# Patient Record
Sex: MALE | Race: WHITE | Employment: OTHER | URBAN - METROPOLITAN AREA
[De-identification: names, ages, dates, MRNs, and addresses within clinical notes are randomized per-mention and may not be internally consistent; named-entity substitution may affect disease eponyms.]

---

## 2017-01-05 ENCOUNTER — TELEPHONE (OUTPATIENT)
Dept: HEMATOLOGY/ONCOLOGY | Facility: HOSPITAL | Age: 67
End: 2017-01-05

## 2017-01-05 ENCOUNTER — TELEPHONE (OUTPATIENT)
Dept: FAMILY MEDICINE CLINIC | Facility: CLINIC | Age: 67
End: 2017-01-05

## 2017-01-05 DIAGNOSIS — E11.9 DIABETES MELLITUS WITH COINCIDENT HYPERTENSION (HCC): Primary | ICD-10-CM

## 2017-01-05 DIAGNOSIS — C90.00 MULTIPLE MYELOMA, STAGE 3 (HCC): Primary | ICD-10-CM

## 2017-01-05 DIAGNOSIS — I10 DIABETES MELLITUS WITH COINCIDENT HYPERTENSION (HCC): Primary | ICD-10-CM

## 2017-01-05 RX ORDER — POTASSIUM CHLORIDE 20 MEQ/1
20 TABLET, EXTENDED RELEASE ORAL DAILY
Qty: 30 TABLET | Refills: 5 | Status: SHIPPED | OUTPATIENT
Start: 2017-01-05 | End: 2017-05-19 | Stop reason: ALTCHOICE

## 2017-01-05 RX ORDER — ACYCLOVIR 400 MG/1
400 TABLET ORAL 2 TIMES DAILY
Qty: 60 TABLET | Refills: 6 | Status: SHIPPED | OUTPATIENT
Start: 2017-01-05 | End: 2017-08-28

## 2017-01-05 RX ORDER — HYDROCHLOROTHIAZIDE 25 MG/1
TABLET ORAL
Qty: 30 TABLET | Refills: 5 | Status: SHIPPED | OUTPATIENT
Start: 2017-01-05 | End: 2017-05-19 | Stop reason: ALTCHOICE

## 2017-01-05 RX ORDER — LISINOPRIL 10 MG/1
TABLET ORAL
Qty: 30 TABLET | Refills: 5 | Status: SHIPPED | OUTPATIENT
Start: 2017-01-05 | End: 2017-07-29

## 2017-01-05 NOTE — TELEPHONE ENCOUNTER
Pending Prescriptions Disp Refills    Lancets 30G Does not apply Misc 200 each 6     Sig: Please provide one touch ultra soft to fit patients device       Lov12/12/2016, Future Appointments  Date Time Provider Bo Ratliff   3/1/2017 9:30 AM Debi Moulton

## 2017-02-28 ENCOUNTER — TELEPHONE (OUTPATIENT)
Dept: FAMILY MEDICINE CLINIC | Facility: CLINIC | Age: 67
End: 2017-02-28

## 2017-02-28 DIAGNOSIS — I10 ESSENTIAL HYPERTENSION: Primary | ICD-10-CM

## 2017-02-28 DIAGNOSIS — Z00.00 ROUTINE GENERAL MEDICAL EXAMINATION AT A HEALTH CARE FACILITY: ICD-10-CM

## 2017-02-28 DIAGNOSIS — I10 DIABETES MELLITUS WITH COINCIDENT HYPERTENSION (HCC): ICD-10-CM

## 2017-02-28 DIAGNOSIS — E11.9 DIABETES MELLITUS WITH COINCIDENT HYPERTENSION (HCC): ICD-10-CM

## 2017-02-28 DIAGNOSIS — E78.5 HYPERLIPIDEMIA, UNSPECIFIED HYPERLIPIDEMIA TYPE: ICD-10-CM

## 2017-02-28 NOTE — TELEPHONE ENCOUNTER
Spoke with patient regarding up to date on his blood work and that Dr Patricia Grover may order blood work during his follow up visit for a future date, he verbalized understanding and will come in for his scheduled appointment time

## 2017-02-28 NOTE — TELEPHONE ENCOUNTER
Pt called asking if there is blood work orders in for him. He has a 3 month follow-up scheduled for tomorrow.  Can orders be put in?

## 2017-03-01 ENCOUNTER — OFFICE VISIT (OUTPATIENT)
Dept: FAMILY MEDICINE CLINIC | Facility: CLINIC | Age: 67
End: 2017-03-01

## 2017-03-01 VITALS
HEART RATE: 75 BPM | WEIGHT: 225.38 LBS | DIASTOLIC BLOOD PRESSURE: 79 MMHG | SYSTOLIC BLOOD PRESSURE: 110 MMHG | TEMPERATURE: 97 F | OXYGEN SATURATION: 97 % | BODY MASS INDEX: 30.53 KG/M2 | HEIGHT: 72 IN

## 2017-03-01 DIAGNOSIS — E11.9 DIABETES MELLITUS WITH COINCIDENT HYPERTENSION (HCC): Primary | ICD-10-CM

## 2017-03-01 DIAGNOSIS — I10 DIABETES MELLITUS WITH COINCIDENT HYPERTENSION (HCC): Primary | ICD-10-CM

## 2017-03-01 DIAGNOSIS — C90.00 MULTIPLE MYELOMA, STAGE 3 (HCC): ICD-10-CM

## 2017-03-01 PROCEDURE — 99214 OFFICE O/P EST MOD 30 MIN: CPT | Performed by: FAMILY MEDICINE

## 2017-03-01 RX ORDER — SULFAMETHOXAZOLE AND TRIMETHOPRIM 800; 160 MG/1; MG/1
1 TABLET ORAL
COMMUNITY
Start: 2017-02-02 | End: 2017-05-19 | Stop reason: ALTCHOICE

## 2017-03-01 RX ORDER — PROCHLORPERAZINE MALEATE 10 MG
10 TABLET ORAL
COMMUNITY
Start: 2017-02-02 | End: 2017-09-08

## 2017-03-01 RX ORDER — RANITIDINE 150 MG/1
150 TABLET ORAL
COMMUNITY
Start: 2017-02-02 | End: 2017-05-19 | Stop reason: ALTCHOICE

## 2017-03-01 RX ORDER — MULTIVITAMIN
1 TABLET ORAL
COMMUNITY
Start: 2017-02-02

## 2017-03-01 RX ORDER — FOLIC ACID 1 MG/1
1 TABLET ORAL
COMMUNITY
Start: 2017-02-02 | End: 2017-07-14

## 2017-03-01 RX ORDER — OLANZAPINE 10 MG/1
10 TABLET ORAL
COMMUNITY
Start: 2017-02-02 | End: 2017-05-19 | Stop reason: ALTCHOICE

## 2017-03-03 ENCOUNTER — OFFICE VISIT (OUTPATIENT)
Dept: HEMATOLOGY/ONCOLOGY | Age: 67
End: 2017-03-03
Attending: INTERNAL MEDICINE
Payer: MEDICARE

## 2017-03-03 VITALS
OXYGEN SATURATION: 100 % | WEIGHT: 224 LBS | TEMPERATURE: 97 F | RESPIRATION RATE: 20 BRPM | HEIGHT: 72.99 IN | HEART RATE: 72 BPM | DIASTOLIC BLOOD PRESSURE: 76 MMHG | SYSTOLIC BLOOD PRESSURE: 122 MMHG | BODY MASS INDEX: 29.69 KG/M2

## 2017-03-03 DIAGNOSIS — E11.9 DIABETES MELLITUS WITH COINCIDENT HYPERTENSION (HCC): ICD-10-CM

## 2017-03-03 DIAGNOSIS — I10 DIABETES MELLITUS WITH COINCIDENT HYPERTENSION (HCC): ICD-10-CM

## 2017-03-03 DIAGNOSIS — C90.00 MULTIPLE MYELOMA, STAGE 3 (HCC): Primary | ICD-10-CM

## 2017-03-03 LAB
EST. AVERAGE GLUCOSE BLD GHB EST-MCNC: 108 MG/DL (ref 68–126)
HBA1C MFR BLD HPLC: 5.4 % (ref ?–5.7)

## 2017-03-03 PROCEDURE — 99214 OFFICE O/P EST MOD 30 MIN: CPT | Performed by: INTERNAL MEDICINE

## 2017-03-03 NOTE — PROGRESS NOTES
/79 mmHg  Pulse 75  Temp(Src) 97.2 °F (36.2 °C) (Oral)  Ht 72\"  Wt 225 lb 6.4 oz  BMI 30.56 kg/m2  SpO2 97%              Patient presents with:  Diabetes       HPI;    Tim Hollins is a 74year old male.   Patient came here for follow-up, he was [facility] Tab per tablet Take 1 tablet by mouth. Disp:  Rfl:    lisinopril 10 MG Oral Tab TAKE 1 TABLET (10 MG TOTAL) BY MOUTH DAILY. Disp: 30 tablet Rfl: 5   acyclovir 400 MG Oral Tab Take 1 tablet (400 mg total) by mouth 2 (two) times daily.  Disp: 60 tablet Rfl: 6 2007     s/p total prostatectomy and radiation   • BCC (basal cell carcinoma of skin) 1997     s/p surgical excision   • Colon polyps    • Herniation of intervertebral disc between L4 and L5 2000     s/p surgical repair   • Unspecified internal derangement in 3 months  Total time spent about 35 minutes  \, Most of the time was spent on discussing about his treatments both the multiple myeloma and bone marrow as well as stem cell transplant    The patient indicates understanding of these issues and agrees to

## 2017-03-03 NOTE — PROGRESS NOTES
Cancer Center Progress Note    Problem List:      Patient Active Problem List:     Colon polyps     Impotence of organic origin     Generalized osteoarthrosis, involving hand     Trigger finger (acquired)     Morbid obesity ([address]     Essential hypertension Tab Take 1 tablet by mouth. Disp:  Rfl:    Sulfamethoxazole-TMP DS (BACTRIM DS) 800-160 MG Oral Tab per tablet Take 1 tablet by mouth. Disp:  Rfl:    lisinopril 10 MG Oral Tab TAKE 1 TABLET (10 MG TOTAL) BY MOUTH DAILY.  Disp: 30 tablet Rfl: 5   acyclovir 4 12/16/2016   TP 6.9 12/16/2016         Impression:     Multiple myeloma:  Hypercalcemia  Acute renal failure  Anemia  Diffuse bone lytic lesions  Beta-2 Microglobulin 18.5 mg/L    ISS stage III    He had 5 cycles of (VCd) Dexamethasone, Bortezomib a

## 2017-03-03 NOTE — PROGRESS NOTES
Pt here for follow up visit for Multiple Myeloma. Pt denies any c/os. He had stem transplant at [facility] recently.    Education Record    Learner:  Patient    Disease / Diagnosis:    Barriers / Limitations:  None   Comments:    Method:  Discussion   Comments:

## 2017-03-17 ENCOUNTER — NURSE ONLY (OUTPATIENT)
Dept: HEMATOLOGY/ONCOLOGY | Age: 67
End: 2017-03-17
Attending: INTERNAL MEDICINE
Payer: MEDICARE

## 2017-03-17 DIAGNOSIS — C90.00 MULTIPLE MYELOMA, STAGE 3 (HCC): Primary | ICD-10-CM

## 2017-03-17 LAB
BASOPHILS # BLD AUTO: 0.03 X10(3) UL (ref 0–0.1)
BASOPHILS NFR BLD AUTO: 0.5 %
EOSINOPHIL # BLD AUTO: 0.19 X10(3) UL (ref 0–0.3)
EOSINOPHIL NFR BLD AUTO: 3.4 %
ERYTHROCYTE [DISTWIDTH] IN BLOOD BY AUTOMATED COUNT: 14.6 % (ref 11.5–16)
HCT VFR BLD AUTO: 33.5 % (ref 37–53)
HGB BLD-MCNC: 11.4 G/DL (ref 13–17)
IMMATURE GRANULOCYTE COUNT: 0.02 X10(3) UL (ref 0–1)
IMMATURE GRANULOCYTE RATIO %: 0.4 %
LYMPHOCYTES # BLD AUTO: 0.89 X10(3) UL (ref 0.9–4)
LYMPHOCYTES NFR BLD AUTO: 15.7 %
MCH RBC QN AUTO: 33.5 PG (ref 27–33.2)
MCHC RBC AUTO-ENTMCNC: 34 G/DL (ref 31–37)
MCV RBC AUTO: 98.5 FL (ref 80–99)
MONOCYTES # BLD AUTO: 0.6 X10(3) UL (ref 0.1–0.6)
MONOCYTES NFR BLD AUTO: 10.6 %
NEUTROPHIL ABS PRELIM: 3.93 X10 (3) UL (ref 1.3–6.7)
NEUTROPHILS # BLD AUTO: 3.93 X10(3) UL (ref 1.3–6.7)
NEUTROPHILS NFR BLD AUTO: 69.4 %
PLATELET # BLD AUTO: 200 10(3)UL (ref 150–450)
RBC # BLD AUTO: 3.4 X10(6)UL (ref 3.8–5.8)
RED CELL DISTRIBUTION WIDTH-SD: 52.7 FL (ref 35.1–46.3)
WBC # BLD AUTO: 5.7 X10(3) UL (ref 4–13)

## 2017-03-17 PROCEDURE — 85025 COMPLETE CBC W/AUTO DIFF WBC: CPT

## 2017-03-17 PROCEDURE — 36415 COLL VENOUS BLD VENIPUNCTURE: CPT

## 2017-03-31 ENCOUNTER — NURSE ONLY (OUTPATIENT)
Dept: HEMATOLOGY/ONCOLOGY | Age: 67
End: 2017-03-31
Attending: INTERNAL MEDICINE
Payer: MEDICARE

## 2017-03-31 DIAGNOSIS — I10 DIABETES MELLITUS WITH COINCIDENT HYPERTENSION (HCC): ICD-10-CM

## 2017-03-31 DIAGNOSIS — C90.00 MULTIPLE MYELOMA, STAGE 3 (HCC): ICD-10-CM

## 2017-03-31 DIAGNOSIS — E11.9 DIABETES MELLITUS WITH COINCIDENT HYPERTENSION (HCC): ICD-10-CM

## 2017-03-31 LAB
BASOPHILS # BLD AUTO: 0.03 X10(3) UL (ref 0–0.1)
BASOPHILS NFR BLD AUTO: 0.4 %
EOSINOPHIL # BLD AUTO: 0.15 X10(3) UL (ref 0–0.3)
EOSINOPHIL NFR BLD AUTO: 2.1 %
ERYTHROCYTE [DISTWIDTH] IN BLOOD BY AUTOMATED COUNT: 13.7 % (ref 11.5–16)
HCT VFR BLD AUTO: 35.5 % (ref 37–53)
HGB BLD-MCNC: 12 G/DL (ref 13–17)
IMMATURE GRANULOCYTE COUNT: 0.02 X10(3) UL (ref 0–1)
IMMATURE GRANULOCYTE RATIO %: 0.3 %
LYMPHOCYTES # BLD AUTO: 0.91 X10(3) UL (ref 0.9–4)
LYMPHOCYTES NFR BLD AUTO: 12.9 %
MCH RBC QN AUTO: 33.6 PG (ref 27–33.2)
MCHC RBC AUTO-ENTMCNC: 33.8 G/DL (ref 31–37)
MCV RBC AUTO: 99.4 FL (ref 80–99)
MONOCYTES # BLD AUTO: 0.61 X10(3) UL (ref 0.1–0.6)
MONOCYTES NFR BLD AUTO: 8.7 %
NEUTROPHIL ABS PRELIM: 5.33 X10 (3) UL (ref 1.3–6.7)
NEUTROPHILS # BLD AUTO: 5.33 X10(3) UL (ref 1.3–6.7)
NEUTROPHILS NFR BLD AUTO: 75.6 %
PLATELET # BLD AUTO: 182 10(3)UL (ref 150–450)
RBC # BLD AUTO: 3.57 X10(6)UL (ref 3.8–5.8)
RED CELL DISTRIBUTION WIDTH-SD: 50.6 FL (ref 35.1–46.3)
WBC # BLD AUTO: 7.1 X10(3) UL (ref 4–13)

## 2017-03-31 PROCEDURE — 85025 COMPLETE CBC W/AUTO DIFF WBC: CPT

## 2017-03-31 PROCEDURE — 36415 COLL VENOUS BLD VENIPUNCTURE: CPT

## 2017-04-14 ENCOUNTER — NURSE ONLY (OUTPATIENT)
Dept: HEMATOLOGY/ONCOLOGY | Age: 67
End: 2017-04-14
Attending: INTERNAL MEDICINE
Payer: MEDICARE

## 2017-04-14 DIAGNOSIS — I10 DIABETES MELLITUS WITH COINCIDENT HYPERTENSION (HCC): ICD-10-CM

## 2017-04-14 DIAGNOSIS — E11.9 DIABETES MELLITUS WITH COINCIDENT HYPERTENSION (HCC): ICD-10-CM

## 2017-04-14 DIAGNOSIS — C90.00 MULTIPLE MYELOMA, STAGE 3 (HCC): ICD-10-CM

## 2017-04-14 PROCEDURE — 85025 COMPLETE CBC W/AUTO DIFF WBC: CPT

## 2017-04-14 PROCEDURE — 36415 COLL VENOUS BLD VENIPUNCTURE: CPT

## 2017-04-28 ENCOUNTER — NURSE ONLY (OUTPATIENT)
Dept: HEMATOLOGY/ONCOLOGY | Age: 67
End: 2017-04-28
Attending: INTERNAL MEDICINE
Payer: MEDICARE

## 2017-04-28 DIAGNOSIS — E11.9 DIABETES MELLITUS WITH COINCIDENT HYPERTENSION (HCC): ICD-10-CM

## 2017-04-28 DIAGNOSIS — I10 DIABETES MELLITUS WITH COINCIDENT HYPERTENSION (HCC): ICD-10-CM

## 2017-04-28 DIAGNOSIS — C90.00 MULTIPLE MYELOMA, STAGE 3 (HCC): ICD-10-CM

## 2017-04-28 PROCEDURE — 85025 COMPLETE CBC W/AUTO DIFF WBC: CPT

## 2017-04-28 PROCEDURE — 36415 COLL VENOUS BLD VENIPUNCTURE: CPT

## 2017-05-10 ENCOUNTER — MED REC SCAN ONLY (OUTPATIENT)
Dept: FAMILY MEDICINE CLINIC | Facility: CLINIC | Age: 67
End: 2017-05-10

## 2017-05-12 ENCOUNTER — OFFICE VISIT (OUTPATIENT)
Dept: HEMATOLOGY/ONCOLOGY | Age: 67
End: 2017-05-12
Attending: INTERNAL MEDICINE
Payer: MEDICARE

## 2017-05-12 ENCOUNTER — APPOINTMENT (OUTPATIENT)
Dept: HEMATOLOGY/ONCOLOGY | Age: 67
End: 2017-05-12
Attending: INTERNAL MEDICINE
Payer: MEDICARE

## 2017-05-19 ENCOUNTER — OFFICE VISIT (OUTPATIENT)
Dept: HEMATOLOGY/ONCOLOGY | Age: 67
End: 2017-05-19
Attending: INTERNAL MEDICINE
Payer: MEDICARE

## 2017-05-19 VITALS
OXYGEN SATURATION: 92 % | TEMPERATURE: 97 F | WEIGHT: 239.19 LBS | BODY MASS INDEX: 32 KG/M2 | RESPIRATION RATE: 20 BRPM | SYSTOLIC BLOOD PRESSURE: 135 MMHG | HEART RATE: 106 BPM | DIASTOLIC BLOOD PRESSURE: 78 MMHG

## 2017-05-19 DIAGNOSIS — I10 DIABETES MELLITUS WITH COINCIDENT HYPERTENSION (HCC): Primary | ICD-10-CM

## 2017-05-19 DIAGNOSIS — C90.00 MULTIPLE MYELOMA, STAGE 3 (HCC): Primary | ICD-10-CM

## 2017-05-19 DIAGNOSIS — E11.9 DIABETES MELLITUS WITH COINCIDENT HYPERTENSION (HCC): Primary | ICD-10-CM

## 2017-05-19 DIAGNOSIS — C90.00 MULTIPLE MYELOMA, STAGE 3 (HCC): ICD-10-CM

## 2017-05-19 PROCEDURE — 99214 OFFICE O/P EST MOD 30 MIN: CPT | Performed by: INTERNAL MEDICINE

## 2017-05-19 PROCEDURE — 96374 THER/PROPH/DIAG INJ IV PUSH: CPT

## 2017-05-19 RX ORDER — PROCHLORPERAZINE MALEATE 10 MG
10 TABLET ORAL EVERY 6 HOURS PRN
Qty: 30 TABLET | Refills: 3 | Status: SHIPPED | OUTPATIENT
Start: 2017-05-19 | End: 2017-09-08

## 2017-05-19 NOTE — PROGRESS NOTES
Zometa restarted per MD.  Given today. Outside labs from May 9th - Creatinine: 1.2; Albumin:  3.7; Calcium:  9.3. Given without incident and patient scheduled for monthly infusions.

## 2017-05-19 NOTE — PROGRESS NOTES
Cancer Center Progress Note    Problem List:      Patient Active Problem List:     Colon polyps     Impotence of organic origin     Generalized osteoarthrosis, involving hand     Trigger finger (acquired)     Morbid obesity ([address]     Essential hypertension mouth. Disp:  Rfl:    Multiple Vitamin Oral Tab Take 1 tablet by mouth. Disp:  Rfl:    [DISCONTINUED] Sulfamethoxazole-TMP DS (BACTRIM DS) 800-160 MG Oral Tab per tablet Take 1 tablet by mouth.  Disp:  Rfl:    lisinopril 10 MG Oral Tab TAKE 1 TABLET (10 MG .0 05/19/2017       Lab Results  Component Value Date    12/16/2016   K 3.5* 12/16/2016    12/16/2016   CO2 28.0 12/16/2016   BUN 23* 12/16/2016   CREATSERUM 1.18 12/16/2016   * 12/16/2016   CA 8.3 12/16/2016   ALKPHO 78 12/16/2

## 2017-05-19 NOTE — PROGRESS NOTES
Prescription Revlimid faxed to [facility].  Patient enrolled at the [employer] program authorization #[id number]

## 2017-05-26 ENCOUNTER — OFFICE VISIT (OUTPATIENT)
Dept: HEMATOLOGY/ONCOLOGY | Age: 67
End: 2017-05-26
Attending: INTERNAL MEDICINE
Payer: MEDICARE

## 2017-05-26 ENCOUNTER — TELEPHONE (OUTPATIENT)
Dept: HEMATOLOGY/ONCOLOGY | Facility: HOSPITAL | Age: 67
End: 2017-05-26

## 2017-05-26 VITALS
WEIGHT: 241.63 LBS | BODY MASS INDEX: 32 KG/M2 | HEART RATE: 71 BPM | TEMPERATURE: 96 F | RESPIRATION RATE: 20 BRPM | DIASTOLIC BLOOD PRESSURE: 72 MMHG | SYSTOLIC BLOOD PRESSURE: 117 MMHG | OXYGEN SATURATION: 98 %

## 2017-05-26 DIAGNOSIS — D61.82 ANEMIA ASSOCIATED WITH BONE MARROW INFILTRATION (HCC): ICD-10-CM

## 2017-05-26 DIAGNOSIS — I10 DIABETES MELLITUS WITH COINCIDENT HYPERTENSION (HCC): Primary | ICD-10-CM

## 2017-05-26 DIAGNOSIS — C90.00 MULTIPLE MYELOMA, STAGE 3 (HCC): ICD-10-CM

## 2017-05-26 DIAGNOSIS — E11.9 DIABETES MELLITUS WITH COINCIDENT HYPERTENSION (HCC): Primary | ICD-10-CM

## 2017-05-26 PROCEDURE — 88341 IMHCHEM/IMCYTCHM EA ADD ANTB: CPT | Performed by: INTERNAL MEDICINE

## 2017-05-26 PROCEDURE — 88184 FLOWCYTOMETRY/ TC 1 MARKER: CPT | Performed by: INTERNAL MEDICINE

## 2017-05-26 PROCEDURE — 38220 DX BONE MARROW ASPIRATIONS: CPT | Performed by: INTERNAL MEDICINE

## 2017-05-26 PROCEDURE — 88185 FLOWCYTOMETRY/TC ADD-ON: CPT | Performed by: INTERNAL MEDICINE

## 2017-05-26 PROCEDURE — 85097 BONE MARROW INTERPRETATION: CPT | Performed by: INTERNAL MEDICINE

## 2017-05-26 PROCEDURE — 88311 DECALCIFY TISSUE: CPT | Performed by: INTERNAL MEDICINE

## 2017-05-26 PROCEDURE — 38221 DX BONE MARROW BIOPSIES: CPT | Performed by: INTERNAL MEDICINE

## 2017-05-26 PROCEDURE — 88313 SPECIAL STAINS GROUP 2: CPT | Performed by: INTERNAL MEDICINE

## 2017-05-26 PROCEDURE — 88305 TISSUE EXAM BY PATHOLOGIST: CPT | Performed by: INTERNAL MEDICINE

## 2017-05-26 PROCEDURE — 88342 IMHCHEM/IMCYTCHM 1ST ANTB: CPT | Performed by: INTERNAL MEDICINE

## 2017-05-26 NOTE — TELEPHONE ENCOUNTER
I spoke with CHRIS at [phone]. They state foundation denied the patient assistance and now they will try to enroll the patient to get assistance through [address].  Patient was informed

## 2017-05-26 NOTE — PROGRESS NOTES
Patient here for bone marrow biopsy. Patient tolerated the procedure well. Band aid applied by MD to the site. No signs of bleeding noted. Discharge instructions given to the patient.   Education Record    Learner:  Patient    Disease / Diagnosis:    Murali العراقي

## 2017-05-26 NOTE — PROGRESS NOTES
Cancer Center Bone Marrow Procedure Note      Date of Service:  5/26/2017      Patient Active Problem List:     Colon polyps     Impotence of organic origin     Generalized osteoarthrosis, involving hand     Trigger finger (acquired)     Morbid obesity ([facility] Prochlorperazine Maleate (COMPAZINE) 10 mg tablet Take 1 tablet (10 mg total) by mouth every 6 (six) hours as needed for Nausea.  Disp: 30 tablet Rfl: 3   Lancets 30G Does not apply Misc Please provide one touch ultra soft to fit patients device Disp: 200

## 2017-06-05 ENCOUNTER — TELEPHONE (OUTPATIENT)
Dept: FAMILY MEDICINE CLINIC | Facility: CLINIC | Age: 67
End: 2017-06-05

## 2017-06-05 DIAGNOSIS — E78.5 HYPERLIPIDEMIA, UNSPECIFIED HYPERLIPIDEMIA TYPE: Primary | ICD-10-CM

## 2017-06-05 DIAGNOSIS — I10 ESSENTIAL HYPERTENSION: ICD-10-CM

## 2017-06-05 NOTE — TELEPHONE ENCOUNTER
Patient informed that his labs are up to date and if  needs anything, he will order during his office visit on Wednesday, patient verbalized understanding and keep his appointment on Wednesday.

## 2017-06-05 NOTE — TELEPHONE ENCOUNTER
Pt called to say he has an appointment with Dr Brandin Leong this Wednesday & would like to know if Blood work is needed. If so, let him know because he can go today to get it drawn if needed.

## 2017-06-07 ENCOUNTER — OFFICE VISIT (OUTPATIENT)
Dept: FAMILY MEDICINE CLINIC | Facility: CLINIC | Age: 67
End: 2017-06-07

## 2017-06-07 VITALS
TEMPERATURE: 99 F | BODY MASS INDEX: 32.1 KG/M2 | OXYGEN SATURATION: 98 % | DIASTOLIC BLOOD PRESSURE: 68 MMHG | WEIGHT: 237 LBS | HEIGHT: 72 IN | RESPIRATION RATE: 18 BRPM | HEART RATE: 67 BPM | SYSTOLIC BLOOD PRESSURE: 120 MMHG

## 2017-06-07 DIAGNOSIS — M25.562 CHRONIC PAIN OF LEFT KNEE: ICD-10-CM

## 2017-06-07 DIAGNOSIS — I10 DIABETES MELLITUS WITH COINCIDENT HYPERTENSION (HCC): Primary | ICD-10-CM

## 2017-06-07 DIAGNOSIS — E11.9 DIABETES MELLITUS WITH COINCIDENT HYPERTENSION (HCC): Primary | ICD-10-CM

## 2017-06-07 DIAGNOSIS — G89.29 CHRONIC PAIN OF LEFT KNEE: ICD-10-CM

## 2017-06-07 PROCEDURE — 99214 OFFICE O/P EST MOD 30 MIN: CPT | Performed by: FAMILY MEDICINE

## 2017-06-08 NOTE — PROGRESS NOTES
/68 mmHg  Pulse 67  Temp(Src) 98.6 °F (37 °C) (Oral)  Resp 18  Ht 72\"  Wt 237 lb  BMI 32.14 kg/m2  SpO2 98%              Patient presents with:  Diabetes       HPI;    Naomi Davenport is a 69year old male.   Who has been diagnosed with multiple myelo itching  Statins                     Current Outpatient Prescriptions:  Lenalidomide (REVLIMID) 10 MG Oral Cap Take 10 mg by mouth daily.  Disp: 21 capsule Rfl: 6   Prochlorperazine Maleate (COMPAZINE) 10 mg tablet Take 1 tablet (10 mg total) by mouth every (basal cell carcinoma of skin) 1997     s/p surgical excision   • Colon polyps    • Herniation of intervertebral disc between L4 and L5 2000     s/p surgical repair   • Unspecified internal derangement of knee 6/20/2013     Log Date: 08/21/2012    • Gaye Conteh agrees to the plan. Imaging & Consults:  ORTHOPEDIC - INTERNAL  Meds & Refills for this Visit:  No prescriptions requested or ordered in this encounter  No orders of the defined types were placed in this encounter.            Jonathan Chiu MD   [facility]

## 2017-06-13 ENCOUNTER — TELEPHONE (OUTPATIENT)
Dept: HEMATOLOGY/ONCOLOGY | Facility: HOSPITAL | Age: 67
End: 2017-06-13

## 2017-06-14 ENCOUNTER — SOCIAL WORK SERVICES (OUTPATIENT)
Dept: HEMATOLOGY/ONCOLOGY | Facility: HOSPITAL | Age: 67
End: 2017-06-14

## 2017-06-14 NOTE — PROGRESS NOTES
Working with patient to try to get him help with his co-pay assistance for Revlimid. This went to NICKI at [facility] and she sent it to her sister company CHRIS to Nichole Luu telephone # [phone].  Called chaka and she started telling me all what she had do

## 2017-06-16 ENCOUNTER — OFFICE VISIT (OUTPATIENT)
Dept: HEMATOLOGY/ONCOLOGY | Age: 67
End: 2017-06-16
Attending: INTERNAL MEDICINE
Payer: MEDICARE

## 2017-06-16 VITALS
RESPIRATION RATE: 20 BRPM | SYSTOLIC BLOOD PRESSURE: 123 MMHG | OXYGEN SATURATION: 98 % | WEIGHT: 241 LBS | HEART RATE: 54 BPM | TEMPERATURE: 97 F | DIASTOLIC BLOOD PRESSURE: 80 MMHG | BODY MASS INDEX: 33 KG/M2

## 2017-06-16 DIAGNOSIS — C90.00 MULTIPLE MYELOMA, STAGE 3 (HCC): Primary | ICD-10-CM

## 2017-06-16 DIAGNOSIS — I10 DIABETES MELLITUS WITH COINCIDENT HYPERTENSION (HCC): ICD-10-CM

## 2017-06-16 DIAGNOSIS — E11.9 DIABETES MELLITUS WITH COINCIDENT HYPERTENSION (HCC): ICD-10-CM

## 2017-06-16 PROCEDURE — 85025 COMPLETE CBC W/AUTO DIFF WBC: CPT

## 2017-06-16 PROCEDURE — 96374 THER/PROPH/DIAG INJ IV PUSH: CPT

## 2017-06-16 PROCEDURE — 82565 ASSAY OF CREATININE: CPT

## 2017-06-16 PROCEDURE — 82310 ASSAY OF CALCIUM: CPT

## 2017-06-16 PROCEDURE — 36415 COLL VENOUS BLD VENIPUNCTURE: CPT

## 2017-06-16 NOTE — PROGRESS NOTES
Education Record    Learner:  Patient    Disease / Vanessa Moreno    Barriers / Limitations:  None   Comments:    Method:  Brief focused and Printed material   Comments:    General Topics:  Medication, Side effects and symptom management and Plan of car

## 2017-06-16 NOTE — PATIENT INSTRUCTIONS
For Dr. Kay Davila nurse line, call  [phone] with any questions or concerns Monday through Friday 8:00 to 4:30.   After hours or weekends for emergent needs [phone]

## 2017-06-19 ENCOUNTER — TELEPHONE (OUTPATIENT)
Dept: HEMATOLOGY/ONCOLOGY | Facility: HOSPITAL | Age: 67
End: 2017-06-19

## 2017-06-19 ENCOUNTER — SOCIAL WORK SERVICES (OUTPATIENT)
Dept: HEMATOLOGY/ONCOLOGY | Facility: HOSPITAL | Age: 67
End: 2017-06-19

## 2017-06-19 NOTE — PROGRESS NOTES
SW has been in contact with the patient trying to get him help for his Revlimid co-pay assistance.  DAVION has talked to [facility], [address], [facility], [facility] delivery pharmacy, [address] [facility] and then [employer] at [phone]

## 2017-06-21 ENCOUNTER — HOSPITAL ENCOUNTER (OUTPATIENT)
Dept: MRI IMAGING | Age: 67
Discharge: HOME | End: 2017-06-21
Attending: PHYSICIAN ASSISTANT
Payer: MEDICARE

## 2017-06-21 DIAGNOSIS — M25.562 LEFT KNEE PAIN: ICD-10-CM

## 2017-06-21 PROCEDURE — 73721 MRI JNT OF LWR EXTRE W/O DYE: CPT | Performed by: PHYSICIAN ASSISTANT

## 2017-06-23 ENCOUNTER — OFFICE VISIT (OUTPATIENT)
Dept: HEMATOLOGY/ONCOLOGY | Age: 67
End: 2017-06-23
Attending: INTERNAL MEDICINE
Payer: MEDICARE

## 2017-06-23 VITALS
BODY MASS INDEX: 33 KG/M2 | DIASTOLIC BLOOD PRESSURE: 74 MMHG | HEART RATE: 76 BPM | RESPIRATION RATE: 20 BRPM | TEMPERATURE: 99 F | WEIGHT: 244 LBS | SYSTOLIC BLOOD PRESSURE: 126 MMHG | OXYGEN SATURATION: 98 %

## 2017-06-23 DIAGNOSIS — C90.00 MULTIPLE MYELOMA, STAGE 3 (HCC): Primary | ICD-10-CM

## 2017-06-23 PROCEDURE — 99214 OFFICE O/P EST MOD 30 MIN: CPT | Performed by: INTERNAL MEDICINE

## 2017-06-23 NOTE — PROGRESS NOTES
Cancer Center Progress Note    Problem List:      Patient Active Problem List:     Colon polyps     Impotence of organic origin     Generalized osteoarthrosis, involving hand     Trigger finger (acquired)     Morbid obesity ([address]     Essential hypertension in bowel habits and abdominal pain. Integument/breast: Negative for rash, skin lesions, and pruritus. Hematologic/lymphatic: Negative for easy bruising, bleeding, and lymphadenopathy.       Allergies:       Pcn [Penicillins]       Anaphylaxis, Hives, Jeneal Beecham palpable lymphadenopathy throughout in the cervical, supraclavicular, or axillary regions.       Lab Results  Component Value Date   WBC 6.2 06/16/2017   RBC 4.19 06/16/2017   HGB 13.7 06/16/2017   HCT 40.6 06/16/2017   MCV 96.9 06/16/2017   MCH 32.7 06/16/

## 2017-06-23 NOTE — PROGRESS NOTES
New authorization number given to [id number].  Patient will pay the co-pay for prescription

## 2017-06-26 ENCOUNTER — TELEPHONE (OUTPATIENT)
Dept: HEMATOLOGY/ONCOLOGY | Facility: HOSPITAL | Age: 67
End: 2017-06-26

## 2017-06-26 NOTE — TELEPHONE ENCOUNTER
Pt states his revlimid will be delivered to the [facility] in [place].   Attn:  Pharmacist.

## 2017-07-14 ENCOUNTER — OFFICE VISIT (OUTPATIENT)
Dept: HEMATOLOGY/ONCOLOGY | Age: 67
End: 2017-07-14
Attending: INTERNAL MEDICINE
Payer: MEDICARE

## 2017-07-14 ENCOUNTER — APPOINTMENT (OUTPATIENT)
Dept: HEMATOLOGY/ONCOLOGY | Age: 67
End: 2017-07-14
Attending: INTERNAL MEDICINE
Payer: MEDICARE

## 2017-07-14 VITALS
RESPIRATION RATE: 20 BRPM | DIASTOLIC BLOOD PRESSURE: 75 MMHG | BODY MASS INDEX: 33 KG/M2 | SYSTOLIC BLOOD PRESSURE: 123 MMHG | TEMPERATURE: 98 F | OXYGEN SATURATION: 98 % | WEIGHT: 244.69 LBS | HEART RATE: 60 BPM

## 2017-07-14 DIAGNOSIS — C90.00 MULTIPLE MYELOMA, STAGE 3 (HCC): ICD-10-CM

## 2017-07-14 DIAGNOSIS — C90.00 MULTIPLE MYELOMA, STAGE 3 (HCC): Primary | ICD-10-CM

## 2017-07-14 DIAGNOSIS — I10 DIABETES MELLITUS WITH COINCIDENT HYPERTENSION (HCC): ICD-10-CM

## 2017-07-14 DIAGNOSIS — E11.9 DIABETES MELLITUS WITH COINCIDENT HYPERTENSION (HCC): ICD-10-CM

## 2017-07-14 LAB
BASOPHILS # BLD AUTO: 0.02 X10(3) UL (ref 0–0.1)
BASOPHILS NFR BLD AUTO: 0.3 %
CALCIUM BLD-MCNC: 8.9 MG/DL (ref 8.3–10.3)
CREAT BLD-MCNC: 1.13 MG/DL (ref 0.7–1.3)
EOSINOPHIL # BLD AUTO: 0.17 X10(3) UL (ref 0–0.3)
EOSINOPHIL NFR BLD AUTO: 2.8 %
ERYTHROCYTE [DISTWIDTH] IN BLOOD BY AUTOMATED COUNT: 12.6 % (ref 11.5–16)
HCT VFR BLD AUTO: 37.5 % (ref 37–53)
HGB BLD-MCNC: 12.7 G/DL (ref 13–17)
IMMATURE GRANULOCYTE COUNT: 0.01 X10(3) UL (ref 0–1)
IMMATURE GRANULOCYTE RATIO %: 0.2 %
LYMPHOCYTES # BLD AUTO: 0.9 X10(3) UL (ref 0.9–4)
LYMPHOCYTES NFR BLD AUTO: 14.8 %
MCH RBC QN AUTO: 33.2 PG (ref 27–33.2)
MCHC RBC AUTO-ENTMCNC: 33.9 G/DL (ref 31–37)
MCV RBC AUTO: 97.9 FL (ref 80–99)
MONOCYTES # BLD AUTO: 0.91 X10(3) UL (ref 0.1–0.6)
MONOCYTES NFR BLD AUTO: 15 %
NEUTROPHIL ABS PRELIM: 4.07 X10 (3) UL (ref 1.3–6.7)
NEUTROPHILS # BLD AUTO: 4.07 X10(3) UL (ref 1.3–6.7)
NEUTROPHILS NFR BLD AUTO: 66.9 %
PLATELET # BLD AUTO: 146 10(3)UL (ref 150–450)
RBC # BLD AUTO: 3.83 X10(6)UL (ref 3.8–5.8)
RED CELL DISTRIBUTION WIDTH-SD: 45 FL (ref 35.1–46.3)
WBC # BLD AUTO: 6.1 X10(3) UL (ref 4–13)

## 2017-07-14 PROCEDURE — 96374 THER/PROPH/DIAG INJ IV PUSH: CPT

## 2017-07-14 PROCEDURE — 99214 OFFICE O/P EST MOD 30 MIN: CPT | Performed by: INTERNAL MEDICINE

## 2017-07-14 RX ORDER — FOLIC ACID 1 MG/1
1 TABLET ORAL DAILY
Qty: 30 TABLET | Refills: 6 | Status: SHIPPED | OUTPATIENT
Start: 2017-07-14 | End: 2017-08-11 | Stop reason: ALTCHOICE

## 2017-07-14 RX ORDER — SULFAMETHOXAZOLE AND TRIMETHOPRIM 800; 160 MG/1; MG/1
TABLET ORAL
Qty: 20 TABLET | Refills: 5 | Status: SHIPPED | OUTPATIENT
Start: 2017-07-14 | End: 2017-08-11 | Stop reason: ALTCHOICE

## 2017-07-14 NOTE — PROGRESS NOTES
Cancer Center Progress Note    Problem List:      Patient Active Problem List:     Colon polyps     Impotence of organic origin     Generalized osteoarthrosis, involving hand     Trigger finger (acquired)     Morbid obesity ([address]     Essential hypertension Oral Tab Take 1 tablet by mouth. Disp:  Rfl:    lisinopril 10 MG Oral Tab TAKE 1 TABLET (10 MG TOTAL) BY MOUTH DAILY. Disp: 30 tablet Rfl: 5   acyclovir 400 MG Oral Tab Take 1 tablet (400 mg total) by mouth 2 (two) times daily.  Disp: 60 tablet Rfl: 6   Khanh ALT 22 12/16/2016   AST 15 12/16/2016   BILT 0.4 12/16/2016   ALB 3.5 12/16/2016   TP 6.9 12/16/2016         Impression:     Multiple myeloma:  Hypercalcemia  Acute renal failure  Anemia  Diffuse bone lytic lesions  Beta-2 Microglobulin 18.5 mg/L  LDH 27

## 2017-07-21 DIAGNOSIS — C90.00 MULTIPLE MYELOMA, STAGE 3 (HCC): ICD-10-CM

## 2017-07-31 RX ORDER — LISINOPRIL 10 MG/1
TABLET ORAL
Qty: 30 TABLET | Refills: 5 | Status: SHIPPED | OUTPATIENT
Start: 2017-07-31 | End: 2017-09-06

## 2017-08-11 ENCOUNTER — OFFICE VISIT (OUTPATIENT)
Dept: HEMATOLOGY/ONCOLOGY | Age: 67
End: 2017-08-11
Attending: INTERNAL MEDICINE
Payer: MEDICARE

## 2017-08-11 ENCOUNTER — APPOINTMENT (OUTPATIENT)
Dept: HEMATOLOGY/ONCOLOGY | Age: 67
End: 2017-08-11
Attending: INTERNAL MEDICINE
Payer: MEDICARE

## 2017-08-11 VITALS
OXYGEN SATURATION: 99 % | SYSTOLIC BLOOD PRESSURE: 130 MMHG | RESPIRATION RATE: 20 BRPM | WEIGHT: 246.19 LBS | TEMPERATURE: 98 F | HEIGHT: 72.99 IN | BODY MASS INDEX: 32.63 KG/M2 | DIASTOLIC BLOOD PRESSURE: 77 MMHG | HEART RATE: 64 BPM

## 2017-08-11 DIAGNOSIS — I10 DIABETES MELLITUS WITH COINCIDENT HYPERTENSION (HCC): ICD-10-CM

## 2017-08-11 DIAGNOSIS — Z85.46 HISTORY OF PROSTATE CANCER: ICD-10-CM

## 2017-08-11 DIAGNOSIS — C90.00 MULTIPLE MYELOMA, STAGE 3 (HCC): Primary | ICD-10-CM

## 2017-08-11 DIAGNOSIS — C90.00 MULTIPLE MYELOMA, STAGE 3 (HCC): ICD-10-CM

## 2017-08-11 DIAGNOSIS — E11.9 DIABETES MELLITUS WITH COINCIDENT HYPERTENSION (HCC): ICD-10-CM

## 2017-08-11 LAB
ALBUMIN SERPL-MCNC: 3.7 G/DL (ref 3.5–4.8)
ALP LIVER SERPL-CCNC: 78 U/L (ref 45–117)
ALT SERPL-CCNC: 31 U/L (ref 17–63)
AST SERPL-CCNC: 22 U/L (ref 15–41)
BASOPHILS # BLD AUTO: 0.05 X10(3) UL (ref 0–0.1)
BASOPHILS NFR BLD AUTO: 0.8 %
BILIRUB SERPL-MCNC: 0.7 MG/DL (ref 0.1–2)
BUN BLD-MCNC: 22 MG/DL (ref 8–20)
CALCIUM BLD-MCNC: 8.8 MG/DL (ref 8.3–10.3)
CHLORIDE: 106 MMOL/L (ref 101–111)
CO2: 26 MMOL/L (ref 22–32)
CREAT BLD-MCNC: 1.02 MG/DL (ref 0.7–1.3)
EOSINOPHIL # BLD AUTO: 0.37 X10(3) UL (ref 0–0.3)
EOSINOPHIL NFR BLD AUTO: 6 %
ERYTHROCYTE [DISTWIDTH] IN BLOOD BY AUTOMATED COUNT: 13.2 % (ref 11.5–16)
GLUCOSE BLD-MCNC: 93 MG/DL (ref 70–99)
HCT VFR BLD AUTO: 37.4 % (ref 37–53)
HGB BLD-MCNC: 12.8 G/DL (ref 13–17)
IMMATURE GRANULOCYTE COUNT: 0.02 X10(3) UL (ref 0–1)
IMMATURE GRANULOCYTE RATIO %: 0.3 %
LYMPHOCYTES # BLD AUTO: 1.03 X10(3) UL (ref 0.9–4)
LYMPHOCYTES NFR BLD AUTO: 16.8 %
M PROTEIN MFR SERPL ELPH: 6.9 G/DL (ref 6.1–8.3)
MCH RBC QN AUTO: 32.9 PG (ref 27–33.2)
MCHC RBC AUTO-ENTMCNC: 34.2 G/DL (ref 31–37)
MCV RBC AUTO: 96.1 FL (ref 80–99)
MONOCYTES # BLD AUTO: 0.92 X10(3) UL (ref 0.1–0.6)
MONOCYTES NFR BLD AUTO: 15 %
NEUTROPHIL ABS PRELIM: 3.75 X10 (3) UL (ref 1.3–6.7)
NEUTROPHILS # BLD AUTO: 3.75 X10(3) UL (ref 1.3–6.7)
NEUTROPHILS NFR BLD AUTO: 61.1 %
PLATELET # BLD AUTO: 153 10(3)UL (ref 150–450)
POTASSIUM SERPL-SCNC: 4 MMOL/L (ref 3.6–5.1)
PSA SERPL-MCNC: <0.01 NG/ML (ref 0.01–4)
RBC # BLD AUTO: 3.89 X10(6)UL (ref 3.8–5.8)
RED CELL DISTRIBUTION WIDTH-SD: 46.7 FL (ref 35.1–46.3)
SODIUM SERPL-SCNC: 138 MMOL/L (ref 136–144)
WBC # BLD AUTO: 6.1 X10(3) UL (ref 4–13)

## 2017-08-11 PROCEDURE — 99214 OFFICE O/P EST MOD 30 MIN: CPT | Performed by: INTERNAL MEDICINE

## 2017-08-11 PROCEDURE — 96365 THER/PROPH/DIAG IV INF INIT: CPT

## 2017-08-11 NOTE — PROGRESS NOTES
Cancer Center Progress Note    Problem List:      Patient Active Problem List:     Colon polyps     Impotence of organic origin     Generalized osteoarthrosis, involving hand     Trigger finger (acquired)     Morbid obesity ([address]     Essential hypertension Vitamin Oral Tab Take 1 tablet by mouth. Disp:  Rfl:    acetaminophen 500 MG Oral Tab Take 500 mg by mouth every 6 (six) hours as needed for Pain.  Disp:  Rfl:    Calcium Carbonate Antacid 500 MG Oral Chew Tab Chew 2 tablets (1,000 mg total) by mouth 2 (two myeloma:  Hypercalcemia  Acute renal failure  Anemia  Diffuse bone lytic lesions  Beta-2 Microglobulin 18.5 mg/L    ISS stage III  Started Revlimid maintenance in 6/2017    He had 5 cycles of (VCd) Dexamethasone, Bortezomib and cyclophosphamide.  He

## 2017-08-16 DIAGNOSIS — C90.00 MULTIPLE MYELOMA, STAGE 3 (HCC): ICD-10-CM

## 2017-08-28 DIAGNOSIS — C90.00 MULTIPLE MYELOMA, STAGE 3 (HCC): ICD-10-CM

## 2017-08-28 RX ORDER — ACYCLOVIR 400 MG/1
TABLET ORAL
Qty: 60 TABLET | Refills: 0 | Status: SHIPPED | OUTPATIENT
Start: 2017-08-28 | End: 2017-09-26

## 2017-09-06 ENCOUNTER — OFFICE VISIT (OUTPATIENT)
Dept: FAMILY MEDICINE CLINIC | Facility: CLINIC | Age: 67
End: 2017-09-06

## 2017-09-06 ENCOUNTER — MED REC SCAN ONLY (OUTPATIENT)
Dept: FAMILY MEDICINE CLINIC | Facility: CLINIC | Age: 67
End: 2017-09-06

## 2017-09-06 VITALS
TEMPERATURE: 98 F | WEIGHT: 243 LBS | BODY MASS INDEX: 32.2 KG/M2 | OXYGEN SATURATION: 98 % | HEIGHT: 73 IN | RESPIRATION RATE: 18 BRPM | SYSTOLIC BLOOD PRESSURE: 122 MMHG | HEART RATE: 60 BPM | DIASTOLIC BLOOD PRESSURE: 70 MMHG

## 2017-09-06 DIAGNOSIS — E11.9 DIABETES MELLITUS WITH COINCIDENT HYPERTENSION (HCC): Primary | ICD-10-CM

## 2017-09-06 DIAGNOSIS — E78.5 HYPERLIPIDEMIA, UNSPECIFIED HYPERLIPIDEMIA TYPE: ICD-10-CM

## 2017-09-06 DIAGNOSIS — I10 DIABETES MELLITUS WITH COINCIDENT HYPERTENSION (HCC): Primary | ICD-10-CM

## 2017-09-06 DIAGNOSIS — I10 ESSENTIAL HYPERTENSION: ICD-10-CM

## 2017-09-06 LAB
EST. AVERAGE GLUCOSE BLD GHB EST-MCNC: 114 MG/DL (ref 68–126)
HBA1C MFR BLD HPLC: 5.6 % (ref ?–5.7)

## 2017-09-06 PROCEDURE — 83036 HEMOGLOBIN GLYCOSYLATED A1C: CPT | Performed by: FAMILY MEDICINE

## 2017-09-06 PROCEDURE — 99214 OFFICE O/P EST MOD 30 MIN: CPT | Performed by: FAMILY MEDICINE

## 2017-09-06 RX ORDER — LISINOPRIL 10 MG/1
10 TABLET ORAL DAILY
Qty: 90 TABLET | Refills: 3 | Status: SHIPPED | OUTPATIENT
Start: 2017-09-06 | End: 2018-09-20

## 2017-09-06 RX ORDER — ATORVASTATIN CALCIUM 20 MG/1
20 TABLET, FILM COATED ORAL DAILY
COMMUNITY
End: 2017-09-06

## 2017-09-06 RX ORDER — ATORVASTATIN CALCIUM 20 MG/1
20 TABLET, FILM COATED ORAL DAILY
Qty: 90 TABLET | Refills: 3 | Status: SHIPPED | OUTPATIENT
Start: 2017-09-06 | End: 2018-08-31

## 2017-09-06 NOTE — PROGRESS NOTES
/70   Pulse 60   Temp 98.1 °F (36.7 °C) (Oral)   Resp 18   Ht 73\"   Wt 243 lb   SpO2 98%   BMI 32.06 kg/m²               Patient presents with:  Diabetes       HPI;    Lorelei Gallegos is a 69year old male.   Patient has history of type 2 diabetes eduin 6   Prochlorperazine Maleate (COMPAZINE) 10 mg tablet Take 1 tablet (10 mg total) by mouth every 6 (six) hours as needed for Nausea. Disp: 30 tablet Rfl: 3   Multiple Vitamin Oral Tab Take 1 tablet by mouth.  Disp:  Rfl:    Prochlorperazine Maleate (COMPAZI REVIEW OF SYSTEMS:   GENERAL HEALTH: feels well otherwise  SKIN: denies any unusual skin lesions or rashes  RESPIRATORY: denies shortness of breath with exertion  CARDIOVASCULAR: denies chest pain on exertion  GI: denies abdominal pain and den Take 1 tablet (20 mg total) by mouth daily. lisinopril 10 MG Oral Tab 90 tablet 3      Sig: Take 1 tablet (10 mg total) by mouth daily.            Orders Placed This Encounter      Hemoglobin A1C        Christine Feng MD   [facility]  [id number]

## 2017-09-08 ENCOUNTER — OFFICE VISIT (OUTPATIENT)
Dept: HEMATOLOGY/ONCOLOGY | Age: 67
End: 2017-09-08
Attending: INTERNAL MEDICINE
Payer: MEDICARE

## 2017-09-08 VITALS
HEIGHT: 72.99 IN | RESPIRATION RATE: 18 BRPM | WEIGHT: 244.63 LBS | DIASTOLIC BLOOD PRESSURE: 72 MMHG | BODY MASS INDEX: 32.42 KG/M2 | OXYGEN SATURATION: 99 % | SYSTOLIC BLOOD PRESSURE: 112 MMHG | HEART RATE: 49 BPM | TEMPERATURE: 97 F

## 2017-09-08 DIAGNOSIS — C90.00 MULTIPLE MYELOMA, STAGE 3 (HCC): Primary | ICD-10-CM

## 2017-09-08 LAB
CALCIUM BLD-MCNC: 8.8 MG/DL (ref 8.3–10.3)
CREAT BLD-MCNC: 1.05 MG/DL (ref 0.7–1.3)

## 2017-09-08 PROCEDURE — 36415 COLL VENOUS BLD VENIPUNCTURE: CPT

## 2017-09-08 PROCEDURE — 82565 ASSAY OF CREATININE: CPT

## 2017-09-08 PROCEDURE — 82310 ASSAY OF CALCIUM: CPT

## 2017-09-08 PROCEDURE — 96374 THER/PROPH/DIAG INJ IV PUSH: CPT

## 2017-09-08 NOTE — PROGRESS NOTES
Education Record    Learner:  Patient    Disease / Diagnosis: multiple myeloma    Barriers / Limitations:  None   Comments:    Method:  Discussion   Comments:    General Topics:  Plan of care reviewed   Comments:    Outcome:  Shows understanding   Comments

## 2017-09-26 DIAGNOSIS — C90.00 MULTIPLE MYELOMA, STAGE 3 (HCC): ICD-10-CM

## 2017-09-28 ENCOUNTER — CHARTING TRANS (OUTPATIENT)
Dept: OTHER | Age: 67
End: 2017-09-28

## 2017-09-28 RX ORDER — ACYCLOVIR 400 MG/1
TABLET ORAL
Qty: 60 TABLET | Refills: 0 | Status: SHIPPED | OUTPATIENT
Start: 2017-09-28 | End: 2017-10-24

## 2017-10-06 ENCOUNTER — TELEPHONE (OUTPATIENT)
Dept: HEMATOLOGY/ONCOLOGY | Facility: HOSPITAL | Age: 67
End: 2017-10-06

## 2017-10-06 ENCOUNTER — OFFICE VISIT (OUTPATIENT)
Dept: HEMATOLOGY/ONCOLOGY | Age: 67
End: 2017-10-06
Attending: INTERNAL MEDICINE
Payer: MEDICARE

## 2017-10-06 VITALS
HEART RATE: 57 BPM | RESPIRATION RATE: 20 BRPM | BODY MASS INDEX: 33 KG/M2 | DIASTOLIC BLOOD PRESSURE: 73 MMHG | WEIGHT: 248.69 LBS | SYSTOLIC BLOOD PRESSURE: 120 MMHG | TEMPERATURE: 97 F | OXYGEN SATURATION: 98 %

## 2017-10-06 DIAGNOSIS — C90.00 MULTIPLE MYELOMA, STAGE 3 (HCC): ICD-10-CM

## 2017-10-06 DIAGNOSIS — C90.00 MULTIPLE MYELOMA, STAGE 3 (HCC): Primary | ICD-10-CM

## 2017-10-06 PROCEDURE — 36415 COLL VENOUS BLD VENIPUNCTURE: CPT

## 2017-10-06 PROCEDURE — 82310 ASSAY OF CALCIUM: CPT

## 2017-10-06 PROCEDURE — 96374 THER/PROPH/DIAG INJ IV PUSH: CPT

## 2017-10-06 PROCEDURE — 82565 ASSAY OF CREATININE: CPT

## 2017-10-06 NOTE — PROGRESS NOTES
Education Record    Learner:  Patient    Disease / Diagnosis:multiple myeloma    Barriers / Limitations:  None    Method:  Brief focused, printed material and  reinforcement    General Topics:  Plan of care reviewed    Outcome:  Shows understanding

## 2017-10-24 DIAGNOSIS — C90.00 MULTIPLE MYELOMA, STAGE 3 (HCC): ICD-10-CM

## 2017-10-25 RX ORDER — ACYCLOVIR 400 MG/1
TABLET ORAL
Qty: 60 TABLET | Refills: 6 | Status: SHIPPED | OUTPATIENT
Start: 2017-10-25 | End: 2018-06-02

## 2017-11-03 ENCOUNTER — OFFICE VISIT (OUTPATIENT)
Dept: HEMATOLOGY/ONCOLOGY | Age: 67
End: 2017-11-03
Attending: INTERNAL MEDICINE
Payer: MEDICARE

## 2017-11-03 VITALS
BODY MASS INDEX: 33 KG/M2 | RESPIRATION RATE: 20 BRPM | WEIGHT: 252.63 LBS | DIASTOLIC BLOOD PRESSURE: 64 MMHG | SYSTOLIC BLOOD PRESSURE: 126 MMHG | TEMPERATURE: 96 F | OXYGEN SATURATION: 97 % | HEART RATE: 51 BPM

## 2017-11-03 DIAGNOSIS — C90.00 MULTIPLE MYELOMA, STAGE 3 (HCC): Primary | ICD-10-CM

## 2017-11-03 DIAGNOSIS — Z85.46 HISTORY OF PROSTATE CANCER: ICD-10-CM

## 2017-11-03 DIAGNOSIS — I10 DIABETES MELLITUS WITH COINCIDENT HYPERTENSION (HCC): ICD-10-CM

## 2017-11-03 DIAGNOSIS — E11.9 DIABETES MELLITUS WITH COINCIDENT HYPERTENSION (HCC): ICD-10-CM

## 2017-11-03 PROCEDURE — 80053 COMPREHEN METABOLIC PANEL: CPT

## 2017-11-03 PROCEDURE — 85025 COMPLETE CBC W/AUTO DIFF WBC: CPT

## 2017-11-03 PROCEDURE — 86334 IMMUNOFIX E-PHORESIS SERUM: CPT

## 2017-11-03 PROCEDURE — 99214 OFFICE O/P EST MOD 30 MIN: CPT | Performed by: INTERNAL MEDICINE

## 2017-11-03 PROCEDURE — 84165 PROTEIN E-PHORESIS SERUM: CPT

## 2017-11-03 PROCEDURE — 36415 COLL VENOUS BLD VENIPUNCTURE: CPT

## 2017-11-03 PROCEDURE — 82784 ASSAY IGA/IGD/IGG/IGM EACH: CPT

## 2017-11-03 PROCEDURE — 83883 ASSAY NEPHELOMETRY NOT SPEC: CPT

## 2017-11-03 PROCEDURE — 83615 LACTATE (LD) (LDH) ENZYME: CPT

## 2017-11-03 NOTE — PROGRESS NOTES
Cancer Center Progress Note    Problem List:      Patient Active Problem List:     Colon polyps     Impotence of organic origin     Generalized osteoarthrosis, involving hand     Trigger finger (acquired)     Morbid obesity ([address]     Essential hypertension 10 MG Oral Cap Take 10 mg by mouth daily. Disp: 21 capsule Rfl: 6   atorvastatin 20 MG Oral Tab Take 1 tablet (20 mg total) by mouth daily. Disp: 90 tablet Rfl: 3   lisinopril 10 MG Oral Tab Take 1 tablet (10 mg total) by mouth daily.  Disp: 90 tablet Rfl: 11/03/2017   CA 8.7 11/03/2017   ALKPHO 86 11/03/2017   ALT 32 11/03/2017   AST 20 11/03/2017   BILT 0.7 11/03/2017   ALB 3.6 11/03/2017   TP 7.1 11/03/2017         Impression:     Multiple myeloma:  Hypercalcemia  Acute renal failure  Anemia  Diffuse bone

## 2017-11-27 ENCOUNTER — OFFICE VISIT (OUTPATIENT)
Dept: HEMATOLOGY/ONCOLOGY | Age: 67
End: 2017-11-27
Attending: INTERNAL MEDICINE
Payer: MEDICARE

## 2017-11-27 VITALS
TEMPERATURE: 97 F | HEART RATE: 76 BPM | BODY MASS INDEX: 33 KG/M2 | DIASTOLIC BLOOD PRESSURE: 79 MMHG | WEIGHT: 252.38 LBS | RESPIRATION RATE: 20 BRPM | SYSTOLIC BLOOD PRESSURE: 135 MMHG | OXYGEN SATURATION: 96 %

## 2017-11-27 DIAGNOSIS — C90.00 MULTIPLE MYELOMA, STAGE 3 (HCC): Primary | ICD-10-CM

## 2017-11-27 DIAGNOSIS — R21 RASH: ICD-10-CM

## 2017-11-27 PROCEDURE — 99214 OFFICE O/P EST MOD 30 MIN: CPT | Performed by: CLINICAL NURSE SPECIALIST

## 2017-11-27 RX ORDER — METHYLPREDNISOLONE 4 MG/1
TABLET ORAL
Qty: 1 PACKAGE | Refills: 0 | Status: SHIPPED | OUTPATIENT
Start: 2017-11-27 | End: 2018-01-04 | Stop reason: ALTCHOICE

## 2017-11-27 NOTE — PROGRESS NOTES
Cancer Center Progress Note    Patient Name: Maribel Juárez   YOB: 1950   Medical Record Number: [id number]   CSN: [id number]   Date of visit: 11/27/2017   Provider: TRUDY Aguilar  Referring Physician: No ref.  provider found    Proble Shortness of Breath    Comment:Respiratory distress  Beta Adrenergic Blo*      Latex                       Comment:Surgical tape  Welts, burning of skin, itching  Statins                     Vital Signs:  /79 (BP Location: Left ar or tongue swelling, no sinus tenderness, no oropharyngeal lesion/thrush, mucous membranes are moist.  Neck:  Supple, no tenderness, no masses  Skin:  Urticarial rash involving both axillae, BUEs and throughout back. None on abdomen or legs.   Lungs:  Clear

## 2017-12-01 ENCOUNTER — OFFICE VISIT (OUTPATIENT)
Dept: HEMATOLOGY/ONCOLOGY | Age: 67
End: 2017-12-01
Attending: INTERNAL MEDICINE
Payer: MEDICARE

## 2017-12-01 VITALS
DIASTOLIC BLOOD PRESSURE: 78 MMHG | RESPIRATION RATE: 20 BRPM | OXYGEN SATURATION: 98 % | TEMPERATURE: 97 F | SYSTOLIC BLOOD PRESSURE: 147 MMHG | HEART RATE: 47 BPM | WEIGHT: 253.63 LBS | BODY MASS INDEX: 33 KG/M2

## 2017-12-01 DIAGNOSIS — C90.00 MULTIPLE MYELOMA, STAGE 3 (HCC): Primary | ICD-10-CM

## 2017-12-01 PROCEDURE — 83883 ASSAY NEPHELOMETRY NOT SPEC: CPT

## 2017-12-01 PROCEDURE — 96374 THER/PROPH/DIAG INJ IV PUSH: CPT

## 2017-12-01 PROCEDURE — 86334 IMMUNOFIX E-PHORESIS SERUM: CPT

## 2017-12-01 PROCEDURE — 84165 PROTEIN E-PHORESIS SERUM: CPT

## 2017-12-01 PROCEDURE — 82784 ASSAY IGA/IGD/IGG/IGM EACH: CPT

## 2017-12-01 PROCEDURE — 85025 COMPLETE CBC W/AUTO DIFF WBC: CPT

## 2017-12-01 PROCEDURE — 36415 COLL VENOUS BLD VENIPUNCTURE: CPT

## 2017-12-01 PROCEDURE — 80053 COMPREHEN METABOLIC PANEL: CPT

## 2017-12-01 NOTE — PROGRESS NOTES
Education Record    Learner:  Patient    Disease / Diagnosis:mm    Barriers / Limitations:  None   Comments:    Method:  Brief focused and Printed material   Comments:    General Topics:  Medication, Side effects and symptom management and Plan of care rev

## 2017-12-01 NOTE — PROGRESS NOTES
Pt here for 1 month MD jane/porfirio. Pt saw NP on Monday for hives, has been on Medrol dose cassia and Revlimid on hold. Pt's rash/hives/itching gone since day 2. Energy level and appetite has been good. Denies pain.  Pt has had some diarrhea since steroids, no imodium

## 2017-12-01 NOTE — PATIENT INSTRUCTIONS
For Dr. Yolanda Ernst nurse line, call  [phone] with any questions or concerns Monday through Friday 8:00 to 4:30.   After hours or weekends for emergent needs [phone]

## 2017-12-01 NOTE — PROGRESS NOTES
Cancer Center Progress Note    Problem List:      Patient Active Problem List:     Colon polyps     Impotence of organic origin     Generalized osteoarthrosis, involving hand     Trigger finger (acquired)     Morbid obesity ([address]     Essential hypertension Medications:    methylPREDNISolone 4 MG Oral Tablet Therapy Pack Take as directed. Disp: 1 Package Rfl: 0   Ergocalciferol (VITAMIN D OR) Take by mouth.  Disp:  Rfl:    ACYCLOVIR 400 MG Oral Tab TAKE 1 TABLET(400 MG) BY MOUTH TWICE DAILY Disp: 60 tablet edema. No calf tenderness. Lymphatics: There is no palpable lymphadenopathy throughout in the cervical, supraclavicular, or axillary regions.       Lab Results  Component Value Date   WBC 7.9 12/01/2017   RBC 3.90 12/01/2017   HGB 12.7 (L) 12/01/2017   HCT

## 2017-12-04 DIAGNOSIS — C90.00 MULTIPLE MYELOMA, STAGE 3 (HCC): ICD-10-CM

## 2017-12-13 ENCOUNTER — OFFICE VISIT (OUTPATIENT)
Dept: FAMILY MEDICINE CLINIC | Facility: CLINIC | Age: 67
End: 2017-12-13

## 2017-12-13 VITALS
TEMPERATURE: 98 F | SYSTOLIC BLOOD PRESSURE: 124 MMHG | OXYGEN SATURATION: 98 % | HEART RATE: 74 BPM | DIASTOLIC BLOOD PRESSURE: 72 MMHG | BODY MASS INDEX: 32.09 KG/M2 | RESPIRATION RATE: 18 BRPM | HEIGHT: 73 IN | WEIGHT: 242.13 LBS

## 2017-12-13 DIAGNOSIS — E66.01 MORBID OBESITY (HCC): ICD-10-CM

## 2017-12-13 DIAGNOSIS — C90.00 MULTIPLE MYELOMA, REMISSION STATUS UNSPECIFIED (HCC): ICD-10-CM

## 2017-12-13 DIAGNOSIS — N52.9 IMPOTENCE OF ORGANIC ORIGIN: ICD-10-CM

## 2017-12-13 DIAGNOSIS — M15.9 GENERALIZED OSTEOARTHROSIS OF HAND: ICD-10-CM

## 2017-12-13 DIAGNOSIS — I10 ESSENTIAL HYPERTENSION: ICD-10-CM

## 2017-12-13 DIAGNOSIS — Z00.00 ANNUAL PHYSICAL EXAM: Primary | ICD-10-CM

## 2017-12-13 DIAGNOSIS — E11.9 DIABETES MELLITUS WITH COINCIDENT HYPERTENSION (HCC): ICD-10-CM

## 2017-12-13 DIAGNOSIS — D61.82 ANEMIA ASSOCIATED WITH BONE MARROW INFILTRATION (HCC): ICD-10-CM

## 2017-12-13 DIAGNOSIS — I10 DIABETES MELLITUS WITH COINCIDENT HYPERTENSION (HCC): ICD-10-CM

## 2017-12-13 PROBLEM — R21 RASH: Status: RESOLVED | Noted: 2017-11-27 | Resolved: 2017-12-13

## 2017-12-13 PROBLEM — Z94.84 HX OF STEM CELL TRANSPLANT (HCC): Status: ACTIVE | Noted: 2017-12-13

## 2017-12-13 PROCEDURE — G0439 PPPS, SUBSEQ VISIT: HCPCS | Performed by: FAMILY MEDICINE

## 2017-12-14 NOTE — PROGRESS NOTES
HPI:   Naomi Davenport is a 69year old male who presents for a Medicare Subsequent Annual Wellness visit (Pt already had Initial Annual Wellness).   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 MEDICAL INFORMATION:   He  has a past medical history of Allergic rhinitis, cause unspecified; BCC (basal cell carcinoma of skin) (1997); Colon polyps; Generalized osteoarthrosis, involving hand;  Herniat encounter: 73\". Weight as of this encounter: 242 lb 1.6 oz.     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

## 2017-12-15 ENCOUNTER — TELEPHONE (OUTPATIENT)
Dept: FAMILY MEDICINE CLINIC | Facility: CLINIC | Age: 67
End: 2017-12-15

## 2017-12-15 ENCOUNTER — MED REC SCAN ONLY (OUTPATIENT)
Dept: FAMILY MEDICINE CLINIC | Facility: CLINIC | Age: 67
End: 2017-12-15

## 2017-12-26 RX ORDER — LISINOPRIL 10 MG/1
TABLET ORAL
Qty: 90 TABLET | Refills: 5 | OUTPATIENT
Start: 2017-12-26

## 2017-12-28 DIAGNOSIS — C90.00 MULTIPLE MYELOMA, STAGE 3 (HCC): ICD-10-CM

## 2017-12-29 ENCOUNTER — OFFICE VISIT (OUTPATIENT)
Dept: HEMATOLOGY/ONCOLOGY | Age: 67
End: 2017-12-29
Attending: INTERNAL MEDICINE
Payer: MEDICARE

## 2017-12-29 VITALS
WEIGHT: 259 LBS | TEMPERATURE: 98 F | SYSTOLIC BLOOD PRESSURE: 133 MMHG | BODY MASS INDEX: 34 KG/M2 | HEART RATE: 65 BPM | OXYGEN SATURATION: 96 % | RESPIRATION RATE: 20 BRPM | DIASTOLIC BLOOD PRESSURE: 77 MMHG

## 2017-12-29 DIAGNOSIS — C90.00 MULTIPLE MYELOMA, STAGE 3 (HCC): Primary | ICD-10-CM

## 2017-12-29 DIAGNOSIS — I10 DIABETES MELLITUS WITH COINCIDENT HYPERTENSION (HCC): ICD-10-CM

## 2017-12-29 DIAGNOSIS — C90.00 MULTIPLE MYELOMA (HCC): ICD-10-CM

## 2017-12-29 DIAGNOSIS — E11.9 DIABETES MELLITUS WITH COINCIDENT HYPERTENSION (HCC): ICD-10-CM

## 2017-12-29 PROCEDURE — 85025 COMPLETE CBC W/AUTO DIFF WBC: CPT

## 2017-12-29 PROCEDURE — 80053 COMPREHEN METABOLIC PANEL: CPT

## 2017-12-29 PROCEDURE — 84443 ASSAY THYROID STIM HORMONE: CPT

## 2017-12-29 PROCEDURE — 96374 THER/PROPH/DIAG INJ IV PUSH: CPT

## 2017-12-29 PROCEDURE — 84439 ASSAY OF FREE THYROXINE: CPT

## 2017-12-29 PROCEDURE — 36415 COLL VENOUS BLD VENIPUNCTURE: CPT

## 2017-12-29 PROCEDURE — 82565 ASSAY OF CREATININE: CPT

## 2017-12-29 PROCEDURE — 83036 HEMOGLOBIN GLYCOSYLATED A1C: CPT

## 2017-12-29 NOTE — PROGRESS NOTES
Education Record    Learner:  Patient    Disease / Diagnosis: multiple myeloma    Barriers / Limitations:  None   Comments:    Method:  Discussion   Comments:    General Topics:  Medication, Side effects and symptom management and Plan of care reviewed   C

## 2018-01-02 ENCOUNTER — TELEPHONE (OUTPATIENT)
Dept: FAMILY MEDICINE CLINIC | Facility: CLINIC | Age: 68
End: 2018-01-02

## 2018-01-02 DIAGNOSIS — R79.89 ELEVATED SERUM CREATININE: Primary | ICD-10-CM

## 2018-01-02 NOTE — TELEPHONE ENCOUNTER
----- Message from Corrine Pandey MD sent at 12/30/2017  6:12 PM CST -----  Please notify the patient of a1c - 5.8  elevated cr - 1.36  rechk non fasting bmp

## 2018-01-02 NOTE — TELEPHONE ENCOUNTER
Patient informed of test results per dr's directive and verbalized understanding, he will have bmp rechecked this week.

## 2018-01-03 ENCOUNTER — APPOINTMENT (OUTPATIENT)
Dept: LAB | Age: 68
End: 2018-01-03
Attending: FAMILY MEDICINE
Payer: MEDICARE

## 2018-01-03 DIAGNOSIS — R79.89 ELEVATED SERUM CREATININE: ICD-10-CM

## 2018-01-03 LAB
BUN BLD-MCNC: 17 MG/DL (ref 8–20)
CALCIUM BLD-MCNC: 9 MG/DL (ref 8.3–10.3)
CHLORIDE: 108 MMOL/L (ref 101–111)
CO2: 26 MMOL/L (ref 22–32)
CREAT BLD-MCNC: 1.1 MG/DL (ref 0.7–1.3)
GLUCOSE BLD-MCNC: 95 MG/DL (ref 70–99)
POTASSIUM SERPL-SCNC: 4.2 MMOL/L (ref 3.6–5.1)
SODIUM SERPL-SCNC: 137 MMOL/L (ref 136–144)

## 2018-01-03 PROCEDURE — 36415 COLL VENOUS BLD VENIPUNCTURE: CPT

## 2018-01-03 PROCEDURE — 80048 BASIC METABOLIC PNL TOTAL CA: CPT

## 2018-01-04 ENCOUNTER — OFFICE VISIT (OUTPATIENT)
Dept: NEPHROLOGY | Facility: CLINIC | Age: 68
End: 2018-01-04

## 2018-01-04 VITALS — BODY MASS INDEX: 34 KG/M2 | WEIGHT: 257 LBS | SYSTOLIC BLOOD PRESSURE: 130 MMHG | DIASTOLIC BLOOD PRESSURE: 74 MMHG

## 2018-01-04 DIAGNOSIS — C90.00 MYELOMA KIDNEY (HCC): ICD-10-CM

## 2018-01-04 DIAGNOSIS — C90.00 MYELOMA KIDNEY DISEASE (HCC): Primary | ICD-10-CM

## 2018-01-04 DIAGNOSIS — N08 MYELOMA KIDNEY DISEASE (HCC): Primary | ICD-10-CM

## 2018-01-04 DIAGNOSIS — N08 MYELOMA KIDNEY (HCC): ICD-10-CM

## 2018-01-04 PROCEDURE — 99213 OFFICE O/P EST LOW 20 MIN: CPT | Performed by: INTERNAL MEDICINE

## 2018-01-04 NOTE — PROGRESS NOTES
Nephrology Progress Note      Teetee Velazco is a 69year old male. HPI:   Patient presents with:  Kidney Problem  Hypertension      70 yo male with hx of MM, MADDI related to myeloma kidney + severe hypercalcemia, DM, HTN presents for follow up.  He was t Medications (Active prior to today's visit):    Current Outpatient Prescriptions:  Lenalidomide (REVLIMID) 10 MG Oral Cap Take 10 mg by mouth daily. Disp: 21 capsule Rfl: 6   methylPREDNISolone 4 MG Oral Tablet Therapy Pack Take as directed. from Last 6 Encounters:  01/04/18 : 257 lb  12/29/17 : 259 lb  12/13/17 : 242 lb 1.6 oz  12/01/17 : 253 lb 9.6 oz  11/27/17 : 252 lb 6.4 oz  11/03/17 : 252 lb 9.6 oz      General: Alert and oriented in no apparent distress.   HEENT: No scleral icterus, MMM 41 U/L  21   ALT (SGPT)      17 - 63 U/L  34   Total Bilirubin      0.1 - 2.0 mg/dL  0.6   TOTAL PROTEIN      6.1 - 8.3 g/dL  6.8   Albumin      3.5 - 4.8 g/dL  3.2 (L)   Sodium      136 - 144 mmol/L 137 137   Potassium      3.6 - 5.1 mmol/L 4.2 4.4   Chlo BUN      8 - 20 mg/dL    CREATININE      0.70 - 1.30 mg/dL    GFR      >=60    CALCIUM      8.3 - 10.3 mg/dL    ALKALINE PHOSPHATASE      45 - 117 U/L    AST (SGOT)      15 - 41 U/L    ALT (SGPT)      17 - 63 U/L    Total Bilirubin      0.1 - 2.0 mg/dL

## 2018-01-26 ENCOUNTER — OFFICE VISIT (OUTPATIENT)
Dept: HEMATOLOGY/ONCOLOGY | Age: 68
End: 2018-01-26
Attending: INTERNAL MEDICINE
Payer: MEDICARE

## 2018-01-26 VITALS
DIASTOLIC BLOOD PRESSURE: 79 MMHG | BODY MASS INDEX: 34 KG/M2 | WEIGHT: 258.63 LBS | TEMPERATURE: 97 F | OXYGEN SATURATION: 99 % | SYSTOLIC BLOOD PRESSURE: 136 MMHG | RESPIRATION RATE: 20 BRPM | HEART RATE: 77 BPM

## 2018-01-26 DIAGNOSIS — C90.00 MULTIPLE MYELOMA, REMISSION STATUS UNSPECIFIED (HCC): ICD-10-CM

## 2018-01-26 DIAGNOSIS — C90.00 MULTIPLE MYELOMA, STAGE 3 (HCC): Primary | ICD-10-CM

## 2018-01-26 LAB
CALCIUM BLD-MCNC: 8.8 MG/DL (ref 8.3–10.3)
CREAT BLD-MCNC: 1.04 MG/DL (ref 0.7–1.3)

## 2018-01-26 PROCEDURE — 82565 ASSAY OF CREATININE: CPT

## 2018-01-26 PROCEDURE — 36415 COLL VENOUS BLD VENIPUNCTURE: CPT

## 2018-01-26 PROCEDURE — 82310 ASSAY OF CALCIUM: CPT

## 2018-01-26 PROCEDURE — 96374 THER/PROPH/DIAG INJ IV PUSH: CPT

## 2018-01-26 NOTE — PATIENT INSTRUCTIONS
To reach Dr Tanya Greenberg nurse during business hours, please call [phone]. After hours, including weekends, evenings, and holidays, please call the main number [phone] for emergent needs.

## 2018-01-26 NOTE — PROGRESS NOTES
Education Record    Learner:  Patient    Disease / Diagnosis: multiple myeloma    Barriers / Limitations:  None    Method:  Brief focused, printed material and  reinforcement    General Topics:  Plan of care reviewed    Outcome:  Shows understanding

## 2018-01-28 DIAGNOSIS — E11.9 DIABETES MELLITUS WITH COINCIDENT HYPERTENSION: ICD-10-CM

## 2018-01-28 DIAGNOSIS — I10 DIABETES MELLITUS WITH COINCIDENT HYPERTENSION: ICD-10-CM

## 2018-02-19 DIAGNOSIS — C90.00 MULTIPLE MYELOMA, STAGE 3 (HCC): ICD-10-CM

## 2018-02-19 RX ORDER — LENALIDOMIDE 10 MG/1
CAPSULE ORAL
Qty: 21 CAPSULE | Status: SHIPPED | OUTPATIENT
Start: 2018-02-19 | End: 2018-03-21

## 2018-02-23 ENCOUNTER — OFFICE VISIT (OUTPATIENT)
Dept: HEMATOLOGY/ONCOLOGY | Age: 68
End: 2018-02-23
Attending: INTERNAL MEDICINE
Payer: MEDICARE

## 2018-02-23 VITALS
TEMPERATURE: 97 F | BODY MASS INDEX: 34 KG/M2 | OXYGEN SATURATION: 99 % | SYSTOLIC BLOOD PRESSURE: 119 MMHG | WEIGHT: 260.81 LBS | HEART RATE: 55 BPM | RESPIRATION RATE: 20 BRPM | DIASTOLIC BLOOD PRESSURE: 73 MMHG

## 2018-02-23 DIAGNOSIS — C90.00 MULTIPLE MYELOMA, REMISSION STATUS UNSPECIFIED (HCC): ICD-10-CM

## 2018-02-23 DIAGNOSIS — C90.00 MULTIPLE MYELOMA, STAGE 3 (HCC): Primary | ICD-10-CM

## 2018-02-23 LAB
CALCIUM BLD-MCNC: 8.4 MG/DL (ref 8.3–10.3)
CREAT BLD-MCNC: 1.11 MG/DL (ref 0.7–1.3)

## 2018-02-23 PROCEDURE — 96374 THER/PROPH/DIAG INJ IV PUSH: CPT

## 2018-02-23 PROCEDURE — 82565 ASSAY OF CREATININE: CPT

## 2018-02-23 PROCEDURE — 82310 ASSAY OF CALCIUM: CPT

## 2018-02-23 PROCEDURE — 36415 COLL VENOUS BLD VENIPUNCTURE: CPT

## 2018-03-12 ENCOUNTER — OFFICE VISIT (OUTPATIENT)
Dept: FAMILY MEDICINE CLINIC | Facility: CLINIC | Age: 68
End: 2018-03-12

## 2018-03-12 VITALS
DIASTOLIC BLOOD PRESSURE: 68 MMHG | HEIGHT: 73 IN | OXYGEN SATURATION: 98 % | TEMPERATURE: 98 F | BODY MASS INDEX: 32.6 KG/M2 | SYSTOLIC BLOOD PRESSURE: 122 MMHG | RESPIRATION RATE: 18 BRPM | WEIGHT: 246 LBS | HEART RATE: 79 BPM

## 2018-03-12 DIAGNOSIS — J02.9 PHARYNGITIS, UNSPECIFIED ETIOLOGY: Primary | ICD-10-CM

## 2018-03-12 DIAGNOSIS — E11.9 TYPE 2 DIABETES MELLITUS WITHOUT COMPLICATION, WITHOUT LONG-TERM CURRENT USE OF INSULIN (HCC): ICD-10-CM

## 2018-03-12 PROCEDURE — 99214 OFFICE O/P EST MOD 30 MIN: CPT | Performed by: FAMILY MEDICINE

## 2018-03-12 RX ORDER — AZITHROMYCIN 250 MG/1
TABLET, FILM COATED ORAL
Qty: 6 TABLET | Refills: 0 | Status: SHIPPED | OUTPATIENT
Start: 2018-03-12 | End: 2018-06-21 | Stop reason: ALTCHOICE

## 2018-03-13 NOTE — PROGRESS NOTES
/68   Pulse 79   Temp 98.1 °F (36.7 °C) (Oral)   Resp 18   Ht 73\"   Wt 246 lb   SpO2 98%   BMI 32.46 kg/m²     Patient presents with:  Nasal Congestion  Cough  Nausea  Fever      HPI:   Naz Josue is a 69year old male came here with a complaint Oral Tab EC Take 1 tablet by mouth daily.  Disp: 1 tablet Rfl: 0      Past Medical History:   Diagnosis Date   • Allergic rhinitis, cause unspecified    • BCC (basal cell carcinoma of skin) 1997    s/p surgical excision   • Colon polyps    • Generalized ost denies shortness of breath with exertion  CARDIOVASCULAR: denies chest pain on exertion  GI: no nausea or abdominal pain  NEURO: denies headaches    EXAM:   /68   Pulse 79   Temp 98.1 °F (36.7 °C) (Oral)   Resp 18   Ht 73\"   Wt 246 lb   SpO2 98%   B

## 2018-03-19 DIAGNOSIS — E11.9 DIABETES MELLITUS WITH COINCIDENT HYPERTENSION (HCC): ICD-10-CM

## 2018-03-19 DIAGNOSIS — I10 DIABETES MELLITUS WITH COINCIDENT HYPERTENSION (HCC): ICD-10-CM

## 2018-03-20 DIAGNOSIS — C90.00 MULTIPLE MYELOMA, STAGE 3 (HCC): ICD-10-CM

## 2018-03-20 RX ORDER — LENALIDOMIDE 10 MG/1
CAPSULE ORAL
Qty: 21 CAPSULE | Status: CANCELLED | OUTPATIENT
Start: 2018-03-20

## 2018-03-21 DIAGNOSIS — C90.00 MULTIPLE MYELOMA, STAGE 3 (HCC): ICD-10-CM

## 2018-03-23 ENCOUNTER — OFFICE VISIT (OUTPATIENT)
Dept: HEMATOLOGY/ONCOLOGY | Age: 68
End: 2018-03-23
Attending: INTERNAL MEDICINE
Payer: MEDICARE

## 2018-03-23 VITALS
RESPIRATION RATE: 20 BRPM | OXYGEN SATURATION: 98 % | TEMPERATURE: 99 F | WEIGHT: 251.81 LBS | DIASTOLIC BLOOD PRESSURE: 78 MMHG | SYSTOLIC BLOOD PRESSURE: 134 MMHG | HEIGHT: 72.99 IN | HEART RATE: 71 BPM | BODY MASS INDEX: 33.37 KG/M2

## 2018-03-23 DIAGNOSIS — C90.00 MULTIPLE MYELOMA, REMISSION STATUS UNSPECIFIED (HCC): ICD-10-CM

## 2018-03-23 DIAGNOSIS — C90.00 MULTIPLE MYELOMA, STAGE 3 (HCC): Primary | ICD-10-CM

## 2018-03-23 LAB
CALCIUM BLD-MCNC: 9 MG/DL (ref 8.3–10.3)
CREAT BLD-MCNC: 1.2 MG/DL (ref 0.7–1.3)

## 2018-03-23 PROCEDURE — 36415 COLL VENOUS BLD VENIPUNCTURE: CPT

## 2018-03-23 PROCEDURE — 82310 ASSAY OF CALCIUM: CPT

## 2018-03-23 PROCEDURE — 82565 ASSAY OF CREATININE: CPT

## 2018-03-23 PROCEDURE — 96374 THER/PROPH/DIAG INJ IV PUSH: CPT

## 2018-03-23 NOTE — PATIENT INSTRUCTIONS
To reach Dr Beata Fernandez nurse during business hours, please call [phone]. After hours, including weekends, evenings, and holidays, please call the main number [phone] for emergent needs.

## 2018-03-29 DIAGNOSIS — I10 DIABETES MELLITUS WITH COINCIDENT HYPERTENSION (HCC): ICD-10-CM

## 2018-03-29 DIAGNOSIS — E11.9 DIABETES MELLITUS WITH COINCIDENT HYPERTENSION (HCC): ICD-10-CM

## 2018-03-29 NOTE — TELEPHONE ENCOUNTER
Diabetic Supplies Protocol Passed3/29 10:00 AM   Appointment in the past 12 or next 3 months     RX APPROVED

## 2018-04-10 DIAGNOSIS — C90.00 MULTIPLE MYELOMA, STAGE 3 (HCC): ICD-10-CM

## 2018-04-11 RX ORDER — LENALIDOMIDE 10 MG/1
CAPSULE ORAL
Qty: 21 CAPSULE | Status: SHIPPED | OUTPATIENT
Start: 2018-04-11 | End: 2018-05-09

## 2018-04-20 ENCOUNTER — OFFICE VISIT (OUTPATIENT)
Dept: HEMATOLOGY/ONCOLOGY | Age: 68
End: 2018-04-20
Attending: INTERNAL MEDICINE
Payer: MEDICARE

## 2018-04-20 VITALS
SYSTOLIC BLOOD PRESSURE: 124 MMHG | OXYGEN SATURATION: 98 % | TEMPERATURE: 98 F | BODY MASS INDEX: 34 KG/M2 | WEIGHT: 258.63 LBS | HEART RATE: 51 BPM | RESPIRATION RATE: 20 BRPM | DIASTOLIC BLOOD PRESSURE: 68 MMHG

## 2018-04-20 DIAGNOSIS — C90.00 MULTIPLE MYELOMA, REMISSION STATUS UNSPECIFIED (HCC): ICD-10-CM

## 2018-04-20 DIAGNOSIS — C90.00 MULTIPLE MYELOMA, STAGE 3 (HCC): Primary | ICD-10-CM

## 2018-04-20 PROCEDURE — 82310 ASSAY OF CALCIUM: CPT

## 2018-04-20 PROCEDURE — 36415 COLL VENOUS BLD VENIPUNCTURE: CPT

## 2018-04-20 PROCEDURE — 96374 THER/PROPH/DIAG INJ IV PUSH: CPT

## 2018-04-20 PROCEDURE — 82565 ASSAY OF CREATININE: CPT

## 2018-04-30 ENCOUNTER — MED REC SCAN ONLY (OUTPATIENT)
Dept: FAMILY MEDICINE CLINIC | Facility: CLINIC | Age: 68
End: 2018-04-30

## 2018-05-08 DIAGNOSIS — C90.00 MULTIPLE MYELOMA, STAGE 3 (HCC): ICD-10-CM

## 2018-05-08 RX ORDER — LENALIDOMIDE 10 MG/1
CAPSULE ORAL
Qty: 21 CAPSULE | Status: CANCELLED | OUTPATIENT
Start: 2018-05-08

## 2018-05-09 ENCOUNTER — TELEPHONE (OUTPATIENT)
Dept: HEMATOLOGY/ONCOLOGY | Facility: HOSPITAL | Age: 68
End: 2018-05-09

## 2018-05-09 DIAGNOSIS — C90.00 MULTIPLE MYELOMA, STAGE 3 (HCC): ICD-10-CM

## 2018-05-18 ENCOUNTER — OFFICE VISIT (OUTPATIENT)
Dept: HEMATOLOGY/ONCOLOGY | Age: 68
End: 2018-05-18
Attending: INTERNAL MEDICINE
Payer: MEDICARE

## 2018-05-18 ENCOUNTER — MED REC SCAN ONLY (OUTPATIENT)
Dept: FAMILY MEDICINE CLINIC | Facility: CLINIC | Age: 68
End: 2018-05-18

## 2018-05-18 VITALS
OXYGEN SATURATION: 98 % | RESPIRATION RATE: 20 BRPM | HEART RATE: 51 BPM | WEIGHT: 255.81 LBS | TEMPERATURE: 97 F | SYSTOLIC BLOOD PRESSURE: 131 MMHG | DIASTOLIC BLOOD PRESSURE: 82 MMHG | BODY MASS INDEX: 34 KG/M2

## 2018-05-18 DIAGNOSIS — C90.00 MULTIPLE MYELOMA, REMISSION STATUS UNSPECIFIED (HCC): ICD-10-CM

## 2018-05-18 DIAGNOSIS — C90.00 MULTIPLE MYELOMA, STAGE 3 (HCC): Primary | ICD-10-CM

## 2018-05-18 PROCEDURE — 36415 COLL VENOUS BLD VENIPUNCTURE: CPT

## 2018-05-18 PROCEDURE — 96374 THER/PROPH/DIAG INJ IV PUSH: CPT

## 2018-05-18 PROCEDURE — 82565 ASSAY OF CREATININE: CPT

## 2018-05-18 PROCEDURE — 82310 ASSAY OF CALCIUM: CPT

## 2018-05-18 NOTE — PROGRESS NOTES
Education Record    Learner:  Patient    Disease / Deb Doles for fracture    Barriers / Limitations:  None   Comments:    Method:  Discussion and Reinforcement   Comments:    General Topics:  Medication, Side effects and symptom management and Plan of

## 2018-06-01 NOTE — MR AVS SNAPSHOT
Only the providers are allowed to remove a diagnosis  You will need to remove yourself  Thank you  [facility] [employer]  [address] [place], [address] [address]               Thank you for choosing us for your health care visit with Samia Brenner MD.  We are glad to serve you and happy to p Diagnoses:  Chronic pain of left knee   Order:  Orthopedic - Internal    Donna Lawrence, [address] [address]   Phone:  [phone]   Fax:  [phone]         Referral Orders      Normal Orders This Visit    Kiya Mccall Take 500 mg by mouth every 6 (six) hours as needed for Pain. Commonly known as:  TYLENOL EXTRA STRENGTH           acyclovir 400 MG Tabs   Take 1 tablet (400 mg total) by mouth 2 (two) times daily.    Commonly known as:  ZOVIRAX           aspirin 81 MG Tbe Summaries. If you've been to the Emergency Department or your doctor's office, you can view your past visit information in [employer] by going to Visits < Visit Summaries. [employer] questions? Call [phone] for help.   [employer] is NOT to be used for urge

## 2018-06-02 DIAGNOSIS — C90.00 MULTIPLE MYELOMA, STAGE 3 (HCC): ICD-10-CM

## 2018-06-04 ENCOUNTER — TELEPHONE (OUTPATIENT)
Dept: FAMILY MEDICINE CLINIC | Facility: CLINIC | Age: 68
End: 2018-06-04

## 2018-06-04 DIAGNOSIS — C90.00 MULTIPLE MYELOMA, STAGE 3 (HCC): ICD-10-CM

## 2018-06-04 RX ORDER — ACYCLOVIR 400 MG/1
TABLET ORAL
Qty: 60 TABLET | Refills: 0 | Status: SHIPPED | OUTPATIENT
Start: 2018-06-04 | End: 2018-06-25

## 2018-06-04 NOTE — TELEPHONE ENCOUNTER
Pt left voicemail stating that pharmacy told him he needs a prior authorization for his ultra blue test strips

## 2018-06-04 NOTE — TELEPHONE ENCOUNTER
Called pharmacy, this is not a PA, Angelito is looking for yearly CMS form regarding diabetic control    MA will watch for the form and fax back when completed

## 2018-06-08 ENCOUNTER — TELEPHONE (OUTPATIENT)
Dept: HEMATOLOGY/ONCOLOGY | Facility: HOSPITAL | Age: 68
End: 2018-06-08

## 2018-06-08 ENCOUNTER — HOSPITAL ENCOUNTER (OUTPATIENT)
Dept: MRI IMAGING | Age: 68
Discharge: HOME | End: 2018-06-08
Attending: INTERNAL MEDICINE
Payer: MEDICARE

## 2018-06-08 DIAGNOSIS — C90.01 MULTIPLE MYELOMA IN REMISSION (HCC): ICD-10-CM

## 2018-06-08 PROCEDURE — 72156 MRI NECK SPINE W/O & W/DYE: CPT | Performed by: INTERNAL MEDICINE

## 2018-06-08 PROCEDURE — 72157 MRI CHEST SPINE W/O & W/DYE: CPT | Performed by: INTERNAL MEDICINE

## 2018-06-08 PROCEDURE — 72158 MRI LUMBAR SPINE W/O & W/DYE: CPT | Performed by: INTERNAL MEDICINE

## 2018-06-08 PROCEDURE — A9576 INJ PROHANCE MULTIPACK: HCPCS | Performed by: RADIOLOGY

## 2018-06-08 NOTE — TELEPHONE ENCOUNTER
Received fax from [facility]. Per fax, they have contacted patient several times about refills. LM for patient to call Si Mews at [phone].

## 2018-06-15 ENCOUNTER — OFFICE VISIT (OUTPATIENT)
Dept: HEMATOLOGY/ONCOLOGY | Age: 68
End: 2018-06-15
Attending: INTERNAL MEDICINE
Payer: MEDICARE

## 2018-06-15 VITALS
SYSTOLIC BLOOD PRESSURE: 127 MMHG | WEIGHT: 260 LBS | OXYGEN SATURATION: 98 % | BODY MASS INDEX: 34 KG/M2 | RESPIRATION RATE: 18 BRPM | HEART RATE: 50 BPM | TEMPERATURE: 97 F | DIASTOLIC BLOOD PRESSURE: 72 MMHG

## 2018-06-15 DIAGNOSIS — C90.00 MULTIPLE MYELOMA, REMISSION STATUS UNSPECIFIED (HCC): Primary | ICD-10-CM

## 2018-06-15 PROCEDURE — 82310 ASSAY OF CALCIUM: CPT

## 2018-06-15 PROCEDURE — 36415 COLL VENOUS BLD VENIPUNCTURE: CPT

## 2018-06-15 PROCEDURE — 82565 ASSAY OF CREATININE: CPT

## 2018-06-15 PROCEDURE — 96365 THER/PROPH/DIAG IV INF INIT: CPT

## 2018-06-15 NOTE — PROGRESS NOTES
Pt here for Zometa.   Arrives Ambulating independently, accompanied by Other self           Modifications in dose or schedule: self     Frequency of blood return and site check throughout administration: Prior to administration and At completion of therapy

## 2018-06-21 ENCOUNTER — OFFICE VISIT (OUTPATIENT)
Dept: FAMILY MEDICINE CLINIC | Facility: CLINIC | Age: 68
End: 2018-06-21

## 2018-06-21 VITALS
HEART RATE: 79 BPM | TEMPERATURE: 98 F | OXYGEN SATURATION: 98 % | BODY MASS INDEX: 33.53 KG/M2 | RESPIRATION RATE: 18 BRPM | SYSTOLIC BLOOD PRESSURE: 124 MMHG | WEIGHT: 253 LBS | DIASTOLIC BLOOD PRESSURE: 78 MMHG | HEIGHT: 72.99 IN

## 2018-06-21 DIAGNOSIS — E78.2 MIXED HYPERLIPIDEMIA: ICD-10-CM

## 2018-06-21 DIAGNOSIS — I10 DIABETES MELLITUS WITH COINCIDENT HYPERTENSION (HCC): Primary | ICD-10-CM

## 2018-06-21 DIAGNOSIS — C90.00 MULTIPLE MYELOMA, REMISSION STATUS UNSPECIFIED (HCC): ICD-10-CM

## 2018-06-21 DIAGNOSIS — E11.9 DIABETES MELLITUS WITH COINCIDENT HYPERTENSION (HCC): Primary | ICD-10-CM

## 2018-06-21 PROCEDURE — 99214 OFFICE O/P EST MOD 30 MIN: CPT | Performed by: FAMILY MEDICINE

## 2018-06-21 NOTE — PROGRESS NOTES
/78   Pulse 79   Temp 98.3 °F (36.8 °C) (Oral)   Resp 18   Ht 72.99\"   Wt 253 lb   SpO2 98%   BMI 33.39 kg/m²               Patient presents with:  Diabetes       HPI;    Teetee Velazco is a 77year old male.   Patient has history of multiple myeloma mouth daily for 21 days. Authorization #[id number] Disp: 21 capsule Rfl: 0   Ergocalciferol (VITAMIN D OR) Take 1,000 Int'l Units by mouth daily. Disp:  Rfl:    atorvastatin 20 MG Oral Tab Take 1 tablet (20 mg total) by mouth daily.  Disp: 90 tablet Rfl: 3 with exertion  CARDIOVASCULAR: denies chest pain on exertion  GI: denies abdominal pain and denies heartburn  NEURO: denies headaches  EXAM:   /78   Pulse 79   Temp 98.3 °F (36.8 °C) (Oral)   Resp 18   Ht 72.99\"   Wt 253 lb   SpO2 98%   BMI 33.39 kg device           Orders Placed This Encounter      Comp Metabolic Panel      Hemoglobin A1C      CBC, Platelet;  No Differential      TSH and Free T4      Lipid Panel        [address] [facility] [address]

## 2018-06-22 ENCOUNTER — LAB ENCOUNTER (OUTPATIENT)
Dept: LAB | Age: 68
End: 2018-06-22
Attending: INTERNAL MEDICINE
Payer: MEDICARE

## 2018-06-22 DIAGNOSIS — I10 DIABETES MELLITUS WITH COINCIDENT HYPERTENSION (HCC): ICD-10-CM

## 2018-06-22 DIAGNOSIS — C90.00 MYELOMA KIDNEY (HCC): ICD-10-CM

## 2018-06-22 DIAGNOSIS — E11.9 DIABETES MELLITUS WITH COINCIDENT HYPERTENSION (HCC): ICD-10-CM

## 2018-06-22 DIAGNOSIS — C90.00 MULTIPLE MYELOMA, REMISSION STATUS UNSPECIFIED (HCC): ICD-10-CM

## 2018-06-22 DIAGNOSIS — N08 MYELOMA KIDNEY DISEASE (HCC): ICD-10-CM

## 2018-06-22 DIAGNOSIS — C90.00 MYELOMA KIDNEY DISEASE (HCC): ICD-10-CM

## 2018-06-22 DIAGNOSIS — E78.2 MIXED HYPERLIPIDEMIA: ICD-10-CM

## 2018-06-22 DIAGNOSIS — N08 MYELOMA KIDNEY (HCC): ICD-10-CM

## 2018-06-22 PROCEDURE — 36415 COLL VENOUS BLD VENIPUNCTURE: CPT

## 2018-06-22 PROCEDURE — 80061 LIPID PANEL: CPT

## 2018-06-22 PROCEDURE — 82306 VITAMIN D 25 HYDROXY: CPT

## 2018-06-22 PROCEDURE — 80053 COMPREHEN METABOLIC PANEL: CPT

## 2018-06-22 PROCEDURE — 83036 HEMOGLOBIN GLYCOSYLATED A1C: CPT

## 2018-06-22 PROCEDURE — 84443 ASSAY THYROID STIM HORMONE: CPT

## 2018-06-22 PROCEDURE — 81003 URINALYSIS AUTO W/O SCOPE: CPT

## 2018-06-22 PROCEDURE — 84100 ASSAY OF PHOSPHORUS: CPT

## 2018-06-22 PROCEDURE — 83970 ASSAY OF PARATHORMONE: CPT

## 2018-06-22 PROCEDURE — 84439 ASSAY OF FREE THYROXINE: CPT

## 2018-06-22 PROCEDURE — 83735 ASSAY OF MAGNESIUM: CPT

## 2018-06-22 PROCEDURE — 85025 COMPLETE CBC W/AUTO DIFF WBC: CPT

## 2018-06-24 NOTE — PROGRESS NOTES
Please notify the patient of normal  Results  No change in medications  Recheck hemoglobin A1c in 6 months    Slightly low phosphorus

## 2018-06-25 ENCOUNTER — TELEPHONE (OUTPATIENT)
Dept: FAMILY MEDICINE CLINIC | Facility: CLINIC | Age: 68
End: 2018-06-25

## 2018-06-25 DIAGNOSIS — C90.00 MULTIPLE MYELOMA, STAGE 3 (HCC): ICD-10-CM

## 2018-06-25 DIAGNOSIS — E11.9 CONTROLLED TYPE 2 DIABETES MELLITUS WITHOUT COMPLICATION, UNSPECIFIED WHETHER LONG TERM INSULIN USE (HCC): Primary | ICD-10-CM

## 2018-06-25 RX ORDER — ACYCLOVIR 400 MG/1
TABLET ORAL
Qty: 60 TABLET | Refills: 2 | Status: SHIPPED | OUTPATIENT
Start: 2018-06-25 | End: 2018-10-04

## 2018-06-26 ENCOUNTER — OFFICE VISIT (OUTPATIENT)
Dept: NEPHROLOGY | Facility: CLINIC | Age: 68
End: 2018-06-26

## 2018-06-26 VITALS — WEIGHT: 246 LBS | SYSTOLIC BLOOD PRESSURE: 128 MMHG | BODY MASS INDEX: 32 KG/M2 | DIASTOLIC BLOOD PRESSURE: 74 MMHG

## 2018-06-26 DIAGNOSIS — N08 MYELOMA KIDNEY DISEASE (HCC): Primary | ICD-10-CM

## 2018-06-26 DIAGNOSIS — C90.00 MYELOMA KIDNEY DISEASE (HCC): Primary | ICD-10-CM

## 2018-06-26 PROCEDURE — 99213 OFFICE O/P EST LOW 20 MIN: CPT | Performed by: INTERNAL MEDICINE

## 2018-06-26 NOTE — TELEPHONE ENCOUNTER
Pt called re: his order for test strips, said medicare has been sending a form? For  to fill out but it was not fully complete when they received it back. Pt has been out of strips for 2wks.  Sending msg to clinical staff (prior auth?) and Bev saldivar

## 2018-06-26 NOTE — PROGRESS NOTES
Nephrology Progress Note      Magdy Rondon is a 77year old male. HPI:   Patient presents with:  Kidney Problem  Hypertension      78 yo male with hx of MM, MADDI related to myeloma kidney + severe hypercalcemia, DM, HTN presents for follow up.  He was t itching  Statins                       ROS:     Denies fever/chills  Denies wt loss/gain  Denies HA or visual changes  Denies CP or palpitations  Denies SOB/cough/hemoptysis  Denies abd or flank pain  Denies N/V/D  Denies change in urinary habits or gross Magnesium, Serum Latest Ref Range: 1.8 - 2.5 mg/dL 2.2   PHOSPHORUS Latest Ref Range: 2.5 - 4.9 mg/dL 2.3 (L)   CHOLESTEROL, TOTAL Latest Ref Range: <200 mg/dL 122   Triglycerides Latest Ref Range: <150 mg/dL 75   HDL Cholesterol Latest Ref Range: >45 mg 1.5   Immature Granulocyte % Latest Units: % 0.2   URINE-COLOR Latest Ref Range: Yellow  Colorless (A)   CLARITY URINE Latest Ref Range: Clear  Clear   SPECIFIC GRAVITY Latest Ref Range: 1.001 - 1.030  <1.005   GLUCOSE (URINE DIPSTICK) Latest Ref Range: Ne

## 2018-07-04 DIAGNOSIS — C90.00 MULTIPLE MYELOMA, STAGE 3 (HCC): ICD-10-CM

## 2018-07-13 ENCOUNTER — OFFICE VISIT (OUTPATIENT)
Dept: HEMATOLOGY/ONCOLOGY | Age: 68
End: 2018-07-13
Attending: INTERNAL MEDICINE
Payer: MEDICARE

## 2018-07-13 VITALS
DIASTOLIC BLOOD PRESSURE: 82 MMHG | TEMPERATURE: 98 F | RESPIRATION RATE: 18 BRPM | OXYGEN SATURATION: 98 % | HEART RATE: 59 BPM | WEIGHT: 255 LBS | BODY MASS INDEX: 34 KG/M2 | SYSTOLIC BLOOD PRESSURE: 174 MMHG

## 2018-07-13 DIAGNOSIS — C90.00 MULTIPLE MYELOMA, REMISSION STATUS UNSPECIFIED (HCC): Primary | ICD-10-CM

## 2018-07-13 LAB
CALCIUM BLD-MCNC: 9.2 MG/DL (ref 8.3–10.3)
CREAT BLD-MCNC: 1.3 MG/DL (ref 0.7–1.3)

## 2018-07-13 PROCEDURE — 82565 ASSAY OF CREATININE: CPT

## 2018-07-13 PROCEDURE — 36415 COLL VENOUS BLD VENIPUNCTURE: CPT

## 2018-07-13 PROCEDURE — 82310 ASSAY OF CALCIUM: CPT

## 2018-07-13 PROCEDURE — 96374 THER/PROPH/DIAG INJ IV PUSH: CPT

## 2018-07-13 NOTE — PROGRESS NOTES
Pt here for Zometa.   Arrives Ambulating independently, accompanied by Other self            Frequency of blood return and site check throughout administration: Prior to administration and At completion of therapy   Discharged to Home, Ambulating independen

## 2018-08-04 DIAGNOSIS — C90.00 MULTIPLE MYELOMA, STAGE 3 (HCC): ICD-10-CM

## 2018-08-10 ENCOUNTER — OFFICE VISIT (OUTPATIENT)
Dept: HEMATOLOGY/ONCOLOGY | Age: 68
End: 2018-08-10
Attending: INTERNAL MEDICINE
Payer: MEDICARE

## 2018-08-10 VITALS
SYSTOLIC BLOOD PRESSURE: 113 MMHG | OXYGEN SATURATION: 95 % | TEMPERATURE: 97 F | RESPIRATION RATE: 20 BRPM | HEART RATE: 65 BPM | DIASTOLIC BLOOD PRESSURE: 58 MMHG | WEIGHT: 261.81 LBS | BODY MASS INDEX: 35 KG/M2

## 2018-08-10 DIAGNOSIS — C90.00 MULTIPLE MYELOMA, REMISSION STATUS UNSPECIFIED (HCC): Primary | ICD-10-CM

## 2018-08-10 LAB
ALBUMIN SERPL-MCNC: 3.5 G/DL (ref 3.5–4.8)
ALBUMIN/GLOB SERPL: 1 {RATIO} (ref 1–2)
ALP LIVER SERPL-CCNC: 76 U/L (ref 45–117)
ALT SERPL-CCNC: 29 U/L (ref 17–63)
ANION GAP SERPL CALC-SCNC: 7 MMOL/L (ref 0–18)
AST SERPL-CCNC: 21 U/L (ref 15–41)
BASOPHILS # BLD AUTO: 0.02 X10(3) UL (ref 0–0.1)
BASOPHILS NFR BLD AUTO: 0.4 %
BILIRUB SERPL-MCNC: 0.7 MG/DL (ref 0.1–2)
BUN BLD-MCNC: 23 MG/DL (ref 8–20)
BUN/CREAT SERPL: 16.7 (ref 10–20)
CALCIUM BLD-MCNC: 8.2 MG/DL (ref 8.3–10.3)
CHLORIDE SERPL-SCNC: 109 MMOL/L (ref 101–111)
CO2 SERPL-SCNC: 25 MMOL/L (ref 22–32)
CREAT BLD-MCNC: 1.38 MG/DL (ref 0.7–1.3)
EOSINOPHIL # BLD AUTO: 0.15 X10(3) UL (ref 0–0.3)
EOSINOPHIL NFR BLD AUTO: 2.8 %
ERYTHROCYTE [DISTWIDTH] IN BLOOD BY AUTOMATED COUNT: 13.6 % (ref 11.5–16)
GLOBULIN PLAS-MCNC: 3.5 G/DL (ref 2.5–3.7)
GLUCOSE BLD-MCNC: 101 MG/DL (ref 70–99)
HCT VFR BLD AUTO: 38.2 % (ref 37–53)
HGB BLD-MCNC: 12.8 G/DL (ref 13–17)
IMMATURE GRANULOCYTE COUNT: 0.01 X10(3) UL (ref 0–1)
IMMATURE GRANULOCYTE RATIO %: 0.2 %
LYMPHOCYTES # BLD AUTO: 1.07 X10(3) UL (ref 0.9–4)
LYMPHOCYTES NFR BLD AUTO: 20 %
M PROTEIN MFR SERPL ELPH: 7 G/DL (ref 6.1–8.3)
MCH RBC QN AUTO: 33.8 PG (ref 27–33.2)
MCHC RBC AUTO-ENTMCNC: 33.5 G/DL (ref 31–37)
MCV RBC AUTO: 100.8 FL (ref 80–99)
MONOCYTES # BLD AUTO: 0.73 X10(3) UL (ref 0.1–1)
MONOCYTES NFR BLD AUTO: 13.6 %
NEUTROPHIL ABS PRELIM: 3.37 X10 (3) UL (ref 1.3–6.7)
NEUTROPHILS # BLD AUTO: 3.37 X10(3) UL (ref 1.3–6.7)
NEUTROPHILS NFR BLD AUTO: 63 %
OSMOLALITY SERPL CALC.SUM OF ELEC: 296 MOSM/KG (ref 275–295)
PLATELET # BLD AUTO: 158 10(3)UL (ref 150–450)
POTASSIUM SERPL-SCNC: 4.1 MMOL/L (ref 3.6–5.1)
RBC # BLD AUTO: 3.79 X10(6)UL (ref 3.8–5.8)
RED CELL DISTRIBUTION WIDTH-SD: 50.1 FL (ref 35.1–46.3)
SODIUM SERPL-SCNC: 141 MMOL/L (ref 136–144)
WBC # BLD AUTO: 5.4 X10(3) UL (ref 4–13)

## 2018-08-10 PROCEDURE — 96374 THER/PROPH/DIAG INJ IV PUSH: CPT

## 2018-08-10 PROCEDURE — 99214 OFFICE O/P EST MOD 30 MIN: CPT | Performed by: INTERNAL MEDICINE

## 2018-08-10 PROCEDURE — 36415 COLL VENOUS BLD VENIPUNCTURE: CPT

## 2018-08-10 PROCEDURE — 85025 COMPLETE CBC W/AUTO DIFF WBC: CPT

## 2018-08-10 PROCEDURE — 80053 COMPREHEN METABOLIC PANEL: CPT

## 2018-08-10 NOTE — PROGRESS NOTES
Cancer Center Progress Note    Problem List:      Patient Active Problem List:     Impotence of organic origin     Generalized osteoarthrosis of hand     Trigger finger (acquired)     Morbid obesity ([address]     Essential hypertension     Hypertension     Ane mg total) by mouth daily. Disp: 90 tablet Rfl: 3   lisinopril 10 MG Oral Tab Take 1 tablet (10 mg total) by mouth daily. Disp: 90 tablet Rfl: 3   Multiple Vitamin Oral Tab Take 1 tablet by mouth.  Disp:  Rfl:    acetaminophen 500 MG Oral Tab Take 500 mg by 21 08/10/2018   BILT 0.7 08/10/2018   ALB 3.5 08/10/2018   TP 7.0 08/10/2018         Impression:     Multiple myeloma:  Hypercalcemia  Acute renal failure  Anemia  Diffuse bone lytic lesions  Beta-2 Microglobulin 18.5 mg/L    ISS stage III  Started

## 2018-08-10 NOTE — PATIENT INSTRUCTIONS
For Dr. Kay Davila nurse line, call [phone] with any questions or concerns,  Monday through Friday 8:00 to 4:30.      After hours or weekends for urgent needs: [phone].   Central [phone]  Medical Records:   [address]

## 2018-08-24 ENCOUNTER — APPOINTMENT (OUTPATIENT)
Dept: LAB | Age: 68
End: 2018-08-24
Attending: UROLOGY
Payer: MEDICARE

## 2018-08-24 DIAGNOSIS — Z85.46 HISTORY OF PROSTATE CANCER: ICD-10-CM

## 2018-08-24 LAB — PSA SERPL-MCNC: <0.01 NG/ML (ref 0.01–4)

## 2018-08-24 PROCEDURE — 84153 ASSAY OF PSA TOTAL: CPT

## 2018-08-24 PROCEDURE — 36415 COLL VENOUS BLD VENIPUNCTURE: CPT

## 2018-08-30 DIAGNOSIS — C90.00 MULTIPLE MYELOMA, STAGE 3 (HCC): ICD-10-CM

## 2018-08-31 ENCOUNTER — OFFICE VISIT (OUTPATIENT)
Dept: FAMILY MEDICINE CLINIC | Facility: CLINIC | Age: 68
End: 2018-08-31
Payer: MEDICARE

## 2018-08-31 VITALS
OXYGEN SATURATION: 98 % | BODY MASS INDEX: 35.12 KG/M2 | DIASTOLIC BLOOD PRESSURE: 74 MMHG | TEMPERATURE: 98 F | HEIGHT: 73 IN | HEART RATE: 52 BPM | RESPIRATION RATE: 18 BRPM | WEIGHT: 265 LBS | SYSTOLIC BLOOD PRESSURE: 122 MMHG

## 2018-08-31 DIAGNOSIS — J02.9 ACUTE SORE THROAT: Primary | ICD-10-CM

## 2018-08-31 DIAGNOSIS — C90.00 MULTIPLE MYELOMA, STAGE 3 (HCC): ICD-10-CM

## 2018-08-31 PROCEDURE — 99213 OFFICE O/P EST LOW 20 MIN: CPT | Performed by: FAMILY MEDICINE

## 2018-08-31 RX ORDER — LENALIDOMIDE 10 MG/1
CAPSULE ORAL
Qty: 21 CAPSULE | Refills: 0 | Status: SHIPPED | OUTPATIENT
Start: 2018-08-31 | End: 2018-11-26 | Stop reason: ALTCHOICE

## 2018-08-31 RX ORDER — AZITHROMYCIN 250 MG/1
TABLET, FILM COATED ORAL
Qty: 6 TABLET | Refills: 0 | Status: SHIPPED | OUTPATIENT
Start: 2018-08-31 | End: 2018-09-20 | Stop reason: ALTCHOICE

## 2018-08-31 RX ORDER — ATORVASTATIN CALCIUM 20 MG/1
TABLET, FILM COATED ORAL
Qty: 90 TABLET | Refills: 0 | Status: SHIPPED | OUTPATIENT
Start: 2018-08-31 | End: 2018-11-22

## 2018-08-31 NOTE — TELEPHONE ENCOUNTER
ATORVASTATIN 20MG TABLETS  Will file in chart as: ATORVASTATIN 20 MG Oral Tab  TAKE 1 TABLET(20 MG) BY MOUTH DAILY       Disp: 90 tablet Refills: 0    Class: Normal Start: 8/31/2018   Documented:11 months ago  Last refill: 6/10/2018  Cholesterol Medication Protocol Passed8/31 12:16 PM   ALT < 80    ALT resulted within past year    Lipid panel within past 12 months    Appointment within past 12 or next 3 months

## 2018-08-31 NOTE — PROGRESS NOTES
/74   Pulse 52   Temp 98.4 °F (36.9 °C) (Oral)   Resp 18   Ht 73\"   Wt 265 lb   SpO2 98%   BMI 34.96 kg/m²     Patient presents with:  Sore Throat  Ear Pain: right  Dizziness      HPI:   Omid Alcazar is a 77year old male came here with a complain [id number] Disp: 21 capsule Rfl: 0      Past Medical History:   Diagnosis Date   • Allergic rhinitis, cause unspecified    • BCC (basal cell carcinoma of skin) 1997    s/p surgical excision   • Colon polyps    • Generalized osteoarthrosis, involving hand    • exertion  CARDIOVASCULAR: denies chest pain on exertion  GI: no nausea or abdominal pain  NEURO: denies headaches    EXAM:   /74   Pulse 52   Temp 98.4 °F (36.9 °C) (Oral)   Resp 18   Ht 73\"   Wt 265 lb   SpO2 98%   BMI 34.96 kg/m²   No distress, go

## 2018-09-07 ENCOUNTER — OFFICE VISIT (OUTPATIENT)
Dept: HEMATOLOGY/ONCOLOGY | Age: 68
End: 2018-09-07
Attending: INTERNAL MEDICINE
Payer: MEDICARE

## 2018-09-07 VITALS
HEART RATE: 53 BPM | BODY MASS INDEX: 35 KG/M2 | DIASTOLIC BLOOD PRESSURE: 68 MMHG | RESPIRATION RATE: 20 BRPM | OXYGEN SATURATION: 99 % | SYSTOLIC BLOOD PRESSURE: 119 MMHG | WEIGHT: 262.63 LBS | TEMPERATURE: 97 F

## 2018-09-07 DIAGNOSIS — C90.00 MULTIPLE MYELOMA, REMISSION STATUS UNSPECIFIED (HCC): Primary | ICD-10-CM

## 2018-09-07 LAB
ALBUMIN SERPL-MCNC: 3.6 G/DL (ref 3.5–4.8)
ALBUMIN/GLOB SERPL: 1 {RATIO} (ref 1–2)
ALP LIVER SERPL-CCNC: 79 U/L (ref 45–117)
ALT SERPL-CCNC: 26 U/L (ref 17–63)
ANION GAP SERPL CALC-SCNC: 7 MMOL/L (ref 0–18)
AST SERPL-CCNC: 20 U/L (ref 15–41)
BILIRUB SERPL-MCNC: 0.7 MG/DL (ref 0.1–2)
BUN BLD-MCNC: 23 MG/DL (ref 8–20)
BUN/CREAT SERPL: 20.4 (ref 10–20)
CALCIUM BLD-MCNC: 8.6 MG/DL (ref 8.3–10.3)
CHLORIDE SERPL-SCNC: 105 MMOL/L (ref 101–111)
CO2 SERPL-SCNC: 24 MMOL/L (ref 22–32)
CREAT BLD-MCNC: 1.13 MG/DL (ref 0.7–1.3)
GLOBULIN PLAS-MCNC: 3.5 G/DL (ref 2.5–4)
GLUCOSE BLD-MCNC: 92 MG/DL (ref 70–99)
M PROTEIN MFR SERPL ELPH: 7.1 G/DL (ref 6.1–8.3)
OSMOLALITY SERPL CALC.SUM OF ELEC: 285 MOSM/KG (ref 275–295)
POTASSIUM SERPL-SCNC: 3.9 MMOL/L (ref 3.6–5.1)
SODIUM SERPL-SCNC: 136 MMOL/L (ref 136–144)

## 2018-09-07 PROCEDURE — 96374 THER/PROPH/DIAG INJ IV PUSH: CPT

## 2018-09-07 PROCEDURE — 80053 COMPREHEN METABOLIC PANEL: CPT

## 2018-09-07 PROCEDURE — 36415 COLL VENOUS BLD VENIPUNCTURE: CPT

## 2018-09-07 NOTE — PATIENT INSTRUCTIONS
To reach Dr Freida Ivan nurse during business hours, please call [phone]. After hours, including weekends, evenings, and holidays, please call the main number [phone] for emergent needs.

## 2018-09-19 ENCOUNTER — LAB ENCOUNTER (OUTPATIENT)
Dept: LAB | Age: 68
End: 2018-09-19
Attending: FAMILY MEDICINE
Payer: MEDICARE

## 2018-09-19 DIAGNOSIS — E11.9 CONTROLLED TYPE 2 DIABETES MELLITUS WITHOUT COMPLICATION, UNSPECIFIED WHETHER LONG TERM INSULIN USE (HCC): ICD-10-CM

## 2018-09-19 LAB
EST. AVERAGE GLUCOSE BLD GHB EST-MCNC: 120 MG/DL (ref 68–126)
HBA1C MFR BLD HPLC: 5.8 % (ref ?–5.7)

## 2018-09-19 PROCEDURE — 83036 HEMOGLOBIN GLYCOSYLATED A1C: CPT

## 2018-09-19 PROCEDURE — 36415 COLL VENOUS BLD VENIPUNCTURE: CPT

## 2018-09-20 ENCOUNTER — OFFICE VISIT (OUTPATIENT)
Dept: FAMILY MEDICINE CLINIC | Facility: CLINIC | Age: 68
End: 2018-09-20
Payer: MEDICARE

## 2018-09-20 VITALS
SYSTOLIC BLOOD PRESSURE: 110 MMHG | HEIGHT: 73 IN | HEART RATE: 56 BPM | TEMPERATURE: 98 F | RESPIRATION RATE: 18 BRPM | BODY MASS INDEX: 34.33 KG/M2 | WEIGHT: 259 LBS | DIASTOLIC BLOOD PRESSURE: 80 MMHG | OXYGEN SATURATION: 98 %

## 2018-09-20 DIAGNOSIS — E11.9 DIABETES MELLITUS WITH COINCIDENT HYPERTENSION (HCC): ICD-10-CM

## 2018-09-20 DIAGNOSIS — I10 DIABETES MELLITUS WITH COINCIDENT HYPERTENSION (HCC): ICD-10-CM

## 2018-09-20 PROCEDURE — G0008 ADMIN INFLUENZA VIRUS VAC: HCPCS | Performed by: FAMILY MEDICINE

## 2018-09-20 PROCEDURE — 99214 OFFICE O/P EST MOD 30 MIN: CPT | Performed by: FAMILY MEDICINE

## 2018-09-20 PROCEDURE — 90653 IIV ADJUVANT VACCINE IM: CPT | Performed by: FAMILY MEDICINE

## 2018-09-20 RX ORDER — LISINOPRIL 10 MG/1
10 TABLET ORAL DAILY
Qty: 90 TABLET | Refills: 3 | Status: SHIPPED | OUTPATIENT
Start: 2018-09-20 | End: 2019-09-17

## 2018-09-20 NOTE — PROGRESS NOTES
/80   Pulse 56   Temp 97.9 °F (36.6 °C) (Oral)   Resp 18   Ht 73\"   Wt 259 lb   SpO2 98%   BMI 34.17 kg/m²               Patient presents with:  Diabetes  Test Results       HPI;    Julio Stanley is a 77year old male.     Patient is here for follow Tab TAKE 1 TABLET(400 MG) BY MOUTH TWICE DAILY Disp: 60 tablet Rfl: 2   Glucose Blood (ONETOUCH ULTRA BLUE) In Vitro Strip TEST TWICE DAILY Disp: 100 strip Rfl: 3   Lancets 30G Does not apply Misc Please provide one touch ultra soft to fit patients device any unusual skin lesions or rashes  RESPIRATORY: denies shortness of breath with exertion  CARDIOVASCULAR: denies chest pain on exertion  GI: denies abdominal pain and denies heartburn  NEURO: denies headaches  EXAM:   /80   Pulse 56   Temp 97.9 °F ( tablet 3     Sig: Take 1 tablet (10 mg total) by mouth daily.    • Glucose Blood (ONETOUCH ULTRA BLUE) In Vitro Strip 100 strip 3     Sig: TEST TWICE DAILY     Orders Placed This Encounter      FLU VACCINE ADJUVANT IM            Jacquie Bowling

## 2018-09-22 DIAGNOSIS — C90.00 MULTIPLE MYELOMA, STAGE 3 (HCC): ICD-10-CM

## 2018-10-04 DIAGNOSIS — C90.00 MULTIPLE MYELOMA, STAGE 3 (HCC): ICD-10-CM

## 2018-10-05 ENCOUNTER — OFFICE VISIT (OUTPATIENT)
Dept: HEMATOLOGY/ONCOLOGY | Age: 68
End: 2018-10-05
Attending: INTERNAL MEDICINE
Payer: MEDICARE

## 2018-10-05 VITALS
TEMPERATURE: 98 F | OXYGEN SATURATION: 98 % | DIASTOLIC BLOOD PRESSURE: 75 MMHG | BODY MASS INDEX: 35 KG/M2 | SYSTOLIC BLOOD PRESSURE: 128 MMHG | HEART RATE: 50 BPM | WEIGHT: 264 LBS | RESPIRATION RATE: 18 BRPM

## 2018-10-05 DIAGNOSIS — C90.00 MULTIPLE MYELOMA, REMISSION STATUS UNSPECIFIED (HCC): Primary | ICD-10-CM

## 2018-10-05 PROCEDURE — 96374 THER/PROPH/DIAG INJ IV PUSH: CPT

## 2018-10-05 PROCEDURE — 80053 COMPREHEN METABOLIC PANEL: CPT

## 2018-10-05 RX ORDER — ACYCLOVIR 400 MG/1
TABLET ORAL
Qty: 60 TABLET | Refills: 6 | Status: SHIPPED | OUTPATIENT
Start: 2018-10-05 | End: 2019-04-19

## 2018-10-05 NOTE — PROGRESS NOTES
Pt here for Zometa.   Arrives Ambulating independently, accompanied by Self          Modifications in dose or schedule: No     Frequency of blood return and site check throughout administration: Prior to administration   Discharged to Home, Ambulating indep

## 2018-10-22 RX ORDER — LISINOPRIL 10 MG/1
TABLET ORAL
Qty: 90 TABLET | Refills: 0 | OUTPATIENT
Start: 2018-10-22

## 2018-10-22 NOTE — TELEPHONE ENCOUNTER
Name from pharmacy: LISINOPRIL 10MG TABLETS          Will file in chart as: LISINOPRIL 10 MG Oral Tab    The source prescription was reordered on 9/20/2018 by Wild Patten MD.    Sig: TAKE 1 TABLET(10 MG) BY MOUTH DAILY    Disp:  90 tablet    Refills:  0

## 2018-10-27 DIAGNOSIS — C90.00 MULTIPLE MYELOMA, STAGE 3 (HCC): ICD-10-CM

## 2018-11-02 ENCOUNTER — OFFICE VISIT (OUTPATIENT)
Dept: HEMATOLOGY/ONCOLOGY | Age: 68
End: 2018-11-02
Attending: INTERNAL MEDICINE
Payer: MEDICARE

## 2018-11-02 VITALS
RESPIRATION RATE: 18 BRPM | SYSTOLIC BLOOD PRESSURE: 125 MMHG | HEART RATE: 50 BPM | BODY MASS INDEX: 35 KG/M2 | WEIGHT: 268.81 LBS | OXYGEN SATURATION: 98 % | TEMPERATURE: 96 F | DIASTOLIC BLOOD PRESSURE: 73 MMHG

## 2018-11-02 VITALS — TEMPERATURE: 98.1 F

## 2018-11-02 DIAGNOSIS — C90.00 MULTIPLE MYELOMA, REMISSION STATUS UNSPECIFIED (HCC): Primary | ICD-10-CM

## 2018-11-02 PROCEDURE — 96374 THER/PROPH/DIAG INJ IV PUSH: CPT

## 2018-11-02 PROCEDURE — 82310 ASSAY OF CALCIUM: CPT

## 2018-11-02 PROCEDURE — 36415 COLL VENOUS BLD VENIPUNCTURE: CPT

## 2018-11-02 PROCEDURE — 82040 ASSAY OF SERUM ALBUMIN: CPT

## 2018-11-02 PROCEDURE — 82565 ASSAY OF CREATININE: CPT

## 2018-11-02 NOTE — PROGRESS NOTES
Pt here for Zometa infusion. Arrives Ambulating independently, accompanied by Other self           Modifications in dose or schedule: No- OK to proceed with labs per Dr. Maryjane Herron.        Frequency of blood return and site check throughout administration: Prior

## 2018-11-24 DIAGNOSIS — C90.00 MULTIPLE MYELOMA, STAGE 3 (HCC): ICD-10-CM

## 2018-11-25 RX ORDER — ATORVASTATIN CALCIUM 20 MG/1
TABLET, FILM COATED ORAL
Qty: 90 TABLET | Refills: 0 | Status: SHIPPED | OUTPATIENT
Start: 2018-11-25 | End: 2019-02-17

## 2018-11-26 DIAGNOSIS — C90.00 MULTIPLE MYELOMA, STAGE 3 (HCC): ICD-10-CM

## 2018-11-26 RX ORDER — LENALIDOMIDE 10 MG/1
CAPSULE ORAL
Qty: 21 CAPSULE | Refills: 0 | OUTPATIENT
Start: 2018-11-26

## 2018-11-26 NOTE — TELEPHONE ENCOUNTER
Name from pharmacy: ATORVASTATIN 20MG TABLETS          Will file in chart as: ATORVASTATIN 20 MG Oral Tab    Sig: TAKE 1 TABLET(20 MG) BY MOUTH DAILY    Disp:  90 tablet    Refills:  0    Start: 11/22/2018    Class: Normal    Requested on: 11/22/2018    La

## 2018-11-27 DIAGNOSIS — C90.00 MULTIPLE MYELOMA, STAGE 3 (HCC): ICD-10-CM

## 2018-11-27 RX ORDER — LENALIDOMIDE 10 MG/1
CAPSULE ORAL
Qty: 21 CAPSULE | Refills: 0 | OUTPATIENT
Start: 2018-11-27

## 2018-11-28 RX ORDER — LENALIDOMIDE 15 MG/1
15 CAPSULE ORAL DAILY
Qty: 28 CAPSULE | Refills: 6 | Status: SHIPPED | OUTPATIENT
Start: 2018-11-28 | End: 2018-11-29

## 2018-11-29 RX ORDER — LENALIDOMIDE 15 MG/1
15 CAPSULE ORAL DAILY
Qty: 21 CAPSULE | Refills: 6 | Status: SHIPPED | OUTPATIENT
Start: 2018-11-29 | End: 2018-12-20

## 2018-11-30 ENCOUNTER — OFFICE VISIT (OUTPATIENT)
Dept: HEMATOLOGY/ONCOLOGY | Age: 68
End: 2018-11-30
Attending: INTERNAL MEDICINE
Payer: MEDICARE

## 2018-11-30 VITALS
BODY MASS INDEX: 36 KG/M2 | RESPIRATION RATE: 20 BRPM | TEMPERATURE: 96 F | HEART RATE: 58 BPM | DIASTOLIC BLOOD PRESSURE: 67 MMHG | OXYGEN SATURATION: 98 % | SYSTOLIC BLOOD PRESSURE: 106 MMHG | WEIGHT: 269.81 LBS

## 2018-11-30 DIAGNOSIS — C90.00 MULTIPLE MYELOMA, REMISSION STATUS UNSPECIFIED (HCC): Primary | ICD-10-CM

## 2018-11-30 PROCEDURE — 96374 THER/PROPH/DIAG INJ IV PUSH: CPT

## 2018-11-30 PROCEDURE — 82565 ASSAY OF CREATININE: CPT

## 2018-11-30 PROCEDURE — 82310 ASSAY OF CALCIUM: CPT

## 2018-11-30 NOTE — PROGRESS NOTES
Pt here for Zometa infusion. Arrives Ambulating independently, accompanied by Other self           Modifications in dose or schedule: No- Creat elevated- GFR still OK per Scotty Jonas in pharmacy. Pt given copy of lab results. Asked to hydrate more at home.  No re

## 2018-12-05 ENCOUNTER — MED REC SCAN ONLY (OUTPATIENT)
Dept: FAMILY MEDICINE CLINIC | Facility: CLINIC | Age: 68
End: 2018-12-05

## 2018-12-05 DIAGNOSIS — C90.00 MYELOMA KIDNEY DISEASE (HCC): Primary | ICD-10-CM

## 2018-12-05 DIAGNOSIS — N08 MYELOMA KIDNEY DISEASE (HCC): Primary | ICD-10-CM

## 2018-12-06 ENCOUNTER — APPOINTMENT (OUTPATIENT)
Dept: LAB | Age: 68
End: 2018-12-06
Attending: INTERNAL MEDICINE
Payer: MEDICARE

## 2018-12-06 DIAGNOSIS — N08 MYELOMA KIDNEY DISEASE (HCC): ICD-10-CM

## 2018-12-06 DIAGNOSIS — C90.00 MYELOMA KIDNEY DISEASE (HCC): ICD-10-CM

## 2018-12-06 PROCEDURE — 80048 BASIC METABOLIC PNL TOTAL CA: CPT

## 2018-12-06 PROCEDURE — 36415 COLL VENOUS BLD VENIPUNCTURE: CPT

## 2018-12-06 PROCEDURE — 83735 ASSAY OF MAGNESIUM: CPT

## 2018-12-11 ENCOUNTER — OFFICE VISIT (OUTPATIENT)
Dept: NEPHROLOGY | Facility: CLINIC | Age: 68
End: 2018-12-11
Payer: MEDICARE

## 2018-12-11 VITALS — SYSTOLIC BLOOD PRESSURE: 136 MMHG | DIASTOLIC BLOOD PRESSURE: 78 MMHG | WEIGHT: 277 LBS | BODY MASS INDEX: 37 KG/M2

## 2018-12-11 DIAGNOSIS — N08 MYELOMA KIDNEY DISEASE (HCC): Primary | ICD-10-CM

## 2018-12-11 DIAGNOSIS — C90.00 MYELOMA KIDNEY DISEASE (HCC): Primary | ICD-10-CM

## 2018-12-11 PROCEDURE — 99213 OFFICE O/P EST LOW 20 MIN: CPT | Performed by: INTERNAL MEDICINE

## 2018-12-11 RX ORDER — CHLORAL HYDRATE 500 MG
1000 CAPSULE ORAL 2 TIMES DAILY
COMMUNITY

## 2018-12-11 NOTE — PROGRESS NOTES
Nephrology Progress Note      Radha Records is a 77year old male.     HPI:   Patient presents with:  Hypertension  Other: myeloma kidney disease      76 yo male with hx of MM, MADDI related to myeloma kidney + severe hypercalcemia, DM, HTN presents for foll BREATH    Comment:Respiratory distress  Beta Adrenergic Blo*      Latex                       Comment:Surgical tape  Welts, burning of skin, itching  Statins                       ROS:     Denies fever/chills  Denies wt loss/gain  Denies HA or visual urias Serum Latest Ref Range: 1.8 - 2.5 mg/dL 2.2     Results for Suni Tsang (MRN [id number]) as of 12/11/2018 15:07   Ref.  Range 6/22/2018 07:08   URINE-COLOR Latest Ref Range: Yellow  Colorless (A)   CLARITY URINE Latest Ref Range: Clear  Clear   SPECIFIC

## 2018-12-17 ENCOUNTER — OFFICE VISIT (OUTPATIENT)
Dept: FAMILY MEDICINE CLINIC | Facility: CLINIC | Age: 68
End: 2018-12-17
Payer: MEDICARE

## 2018-12-17 VITALS
WEIGHT: 262.13 LBS | BODY MASS INDEX: 36.7 KG/M2 | SYSTOLIC BLOOD PRESSURE: 122 MMHG | OXYGEN SATURATION: 97 % | DIASTOLIC BLOOD PRESSURE: 70 MMHG | HEIGHT: 71 IN | RESPIRATION RATE: 16 BRPM | HEART RATE: 62 BPM | TEMPERATURE: 98 F

## 2018-12-17 DIAGNOSIS — I10 ESSENTIAL HYPERTENSION: Primary | ICD-10-CM

## 2018-12-17 DIAGNOSIS — E66.01 MORBID OBESITY (HCC): ICD-10-CM

## 2018-12-17 DIAGNOSIS — Z00.00 ENCOUNTER FOR ANNUAL HEALTH EXAMINATION: ICD-10-CM

## 2018-12-17 DIAGNOSIS — C90.00 MULTIPLE MYELOMA, REMISSION STATUS UNSPECIFIED (HCC): ICD-10-CM

## 2018-12-17 DIAGNOSIS — M54.6 CHRONIC RIGHT-SIDED THORACIC BACK PAIN: ICD-10-CM

## 2018-12-17 DIAGNOSIS — C90.00 MYELOMA KIDNEY DISEASE (HCC): ICD-10-CM

## 2018-12-17 DIAGNOSIS — E11.9 TYPE 2 DIABETES MELLITUS WITHOUT COMPLICATION, WITHOUT LONG-TERM CURRENT USE OF INSULIN (HCC): ICD-10-CM

## 2018-12-17 DIAGNOSIS — Z11.59 NEED FOR HEPATITIS C SCREENING TEST: ICD-10-CM

## 2018-12-17 DIAGNOSIS — G89.29 CHRONIC RIGHT-SIDED THORACIC BACK PAIN: ICD-10-CM

## 2018-12-17 DIAGNOSIS — N08 MYELOMA KIDNEY DISEASE (HCC): ICD-10-CM

## 2018-12-17 PROCEDURE — G0439 PPPS, SUBSEQ VISIT: HCPCS | Performed by: FAMILY MEDICINE

## 2018-12-17 RX ORDER — TRAMADOL HYDROCHLORIDE 50 MG/1
50 TABLET ORAL EVERY 6 HOURS PRN
Qty: 45 TABLET | Refills: 0 | Status: ON HOLD | OUTPATIENT
Start: 2018-12-17 | End: 2019-12-30

## 2018-12-17 NOTE — PATIENT INSTRUCTIONS
Endy Valle's SCREENING SCHEDULE   Tests on this list are recommended by your physician but may not be covered, or covered at this frequency, by your insurer. Please check with your insurance carrier before scheduling to verify coverage.     PREVENTATI history    Colorectal Cancer Screening Covered up to Age 76     Colonoscopy Screen   Covered every 10 years- more often if abnormal Colonoscopy due on 01/01/2022 Update Health Maintenance if applicable    Flex Sigmoidoscopy Screen  Covered every 5 years No Toxoid- Only covered with a cut with metal- TD and TDaP Not covered by Medicare Part B) Orders placed or performed in visit on 08/19/15   • TETANUS, DIPHTHERIA TOXOIDS AND ACELLULAR PERTUSIS VACCINE (TDAP), >7 YEARS, IM USE    This may be covered with your

## 2018-12-17 NOTE — PROGRESS NOTES
HPI:   Felix Moseley is a 77year old male who presents for a Medicare Subsequent Annual Wellness visit (Pt already had Initial Annual Wellness).   Patient with history of multiple myeloma in remission comes here for annual physical  He also has history Multiple myeloma (HCC)     Myeloma kidney disease ([facility]     Hx of stem cell transplant ([facility]    Wt Readings from Last 3 Encounters:  12/17/18 : 262 lb 2 oz  12/11/18 : 277 lb  11/30/18 : 269 lb 12.8 oz     Last Cholesterol Labs:   Lab Results   Component V MG Oral Chew Tab Chew 2 tablets (1,000 mg total) by mouth 2 (two) times daily. aspirin (ASPIR-81) 81 MG Oral Tab EC Take 1 tablet by mouth daily.       MEDICAL INFORMATION:   He  has a past medical history of Allergic rhinitis, cause unspecified, BCC (bas Estimated body mass index is 36.56 kg/m² as calculated from the following:    Height as of this encounter: 71\". Weight as of this encounter: 262 lb 2 oz.     Medicare Hearing Assessment  (Required for AWV/SWV)    Whispered Voice          Visual Acuity diet  Morbid obesity (HCC)  Weight is stable  Continue the diet and exercise  Multiple myeloma, remission status unspecified ([facility]  Under the care of oncology  Myeloma kidney disease ([facility]  Stable and the kidney disease is in remission  Encounter for annua 12/06/2018 131 (H)     GLUCOSE (mg/dL)   Date Value   05/21/2014 79       Cardiovascular Disease Screening     LDL Annually LDL Cholesterol (mg/dL)   Date Value   06/22/2018 47        EKG - w/ Initial Preventative Physical Exam only, or if medically nece prescription benefits      SPECIFIC DISEASE MONITORING Internal Lab or Procedure External Lab or Procedure      Annual Monitoring of Persistent     Medications (ACE/ARB, digoxin diuretics, anticonvulsants.)    Potassium  Annually Potassium (mmol/L)   Date

## 2018-12-20 RX ORDER — LENALIDOMIDE 15 MG/1
CAPSULE ORAL
Qty: 21 CAPSULE | Refills: 0 | Status: SHIPPED | OUTPATIENT
Start: 2018-12-20 | End: 2019-01-17

## 2018-12-21 ENCOUNTER — LAB ENCOUNTER (OUTPATIENT)
Dept: LAB | Age: 68
End: 2018-12-21
Attending: FAMILY MEDICINE
Payer: MEDICARE

## 2018-12-21 DIAGNOSIS — Z00.00 ENCOUNTER FOR ANNUAL HEALTH EXAMINATION: ICD-10-CM

## 2018-12-21 DIAGNOSIS — N08 MYELOMA KIDNEY DISEASE (HCC): ICD-10-CM

## 2018-12-21 DIAGNOSIS — Z11.59 NEED FOR HEPATITIS C SCREENING TEST: ICD-10-CM

## 2018-12-21 DIAGNOSIS — C90.00 MYELOMA KIDNEY DISEASE (HCC): ICD-10-CM

## 2018-12-21 DIAGNOSIS — E11.9 TYPE 2 DIABETES MELLITUS WITHOUT COMPLICATION, WITHOUT LONG-TERM CURRENT USE OF INSULIN (HCC): ICD-10-CM

## 2018-12-21 PROCEDURE — 83036 HEMOGLOBIN GLYCOSYLATED A1C: CPT

## 2018-12-21 PROCEDURE — 82570 ASSAY OF URINE CREATININE: CPT

## 2018-12-21 PROCEDURE — 80053 COMPREHEN METABOLIC PANEL: CPT

## 2018-12-21 PROCEDURE — 84443 ASSAY THYROID STIM HORMONE: CPT

## 2018-12-21 PROCEDURE — 86803 HEPATITIS C AB TEST: CPT

## 2018-12-21 PROCEDURE — 81003 URINALYSIS AUTO W/O SCOPE: CPT

## 2018-12-21 PROCEDURE — 84156 ASSAY OF PROTEIN URINE: CPT

## 2018-12-21 PROCEDURE — 36415 COLL VENOUS BLD VENIPUNCTURE: CPT

## 2018-12-21 PROCEDURE — 85025 COMPLETE CBC W/AUTO DIFF WBC: CPT

## 2018-12-22 DIAGNOSIS — N08 MYELOMA KIDNEY DISEASE (HCC): Primary | ICD-10-CM

## 2018-12-22 DIAGNOSIS — C90.00 MYELOMA KIDNEY DISEASE (HCC): Primary | ICD-10-CM

## 2018-12-22 NOTE — PROGRESS NOTES
Your  blood work is normal except slight elevation of your creatinine compared to 2 weeks ago  Drink lots of fluids and keep yourself hydrated, we will recheck your BMP in a month.   Orders are in the system  Your fasting sugar was 127, your hemoglobin A1c

## 2018-12-28 ENCOUNTER — OFFICE VISIT (OUTPATIENT)
Dept: HEMATOLOGY/ONCOLOGY | Age: 68
End: 2018-12-28
Attending: INTERNAL MEDICINE
Payer: MEDICARE

## 2018-12-28 VITALS
WEIGHT: 266.5 LBS | SYSTOLIC BLOOD PRESSURE: 132 MMHG | OXYGEN SATURATION: 98 % | TEMPERATURE: 97 F | DIASTOLIC BLOOD PRESSURE: 81 MMHG | BODY MASS INDEX: 35.32 KG/M2 | HEART RATE: 64 BPM | HEIGHT: 72.99 IN | RESPIRATION RATE: 18 BRPM

## 2018-12-28 DIAGNOSIS — C90.00 MULTIPLE MYELOMA, REMISSION STATUS UNSPECIFIED (HCC): Primary | ICD-10-CM

## 2018-12-28 PROCEDURE — 82310 ASSAY OF CALCIUM: CPT

## 2018-12-28 PROCEDURE — 36415 COLL VENOUS BLD VENIPUNCTURE: CPT

## 2018-12-28 PROCEDURE — 82565 ASSAY OF CREATININE: CPT

## 2018-12-28 PROCEDURE — 96374 THER/PROPH/DIAG INJ IV PUSH: CPT

## 2018-12-28 NOTE — PROGRESS NOTES
Pt here for zometa.   Arrives Ambulating independently, accompanied by Other self           Modifications in dose or schedule: No     Frequency of blood return and site check throughout administration: Prior to administration and At completion of therapy

## 2019-01-17 RX ORDER — LENALIDOMIDE 15 MG/1
CAPSULE ORAL
Qty: 21 CAPSULE | Refills: 0 | Status: SHIPPED | OUTPATIENT
Start: 2019-01-17 | End: 2019-02-14

## 2019-01-21 ENCOUNTER — TELEPHONE (OUTPATIENT)
Dept: FAMILY MEDICINE CLINIC | Facility: CLINIC | Age: 69
End: 2019-01-21

## 2019-01-21 ENCOUNTER — OFFICE VISIT (OUTPATIENT)
Dept: FAMILY MEDICINE CLINIC | Facility: CLINIC | Age: 69
End: 2019-01-21
Payer: MEDICARE

## 2019-01-21 VITALS
WEIGHT: 255.38 LBS | DIASTOLIC BLOOD PRESSURE: 60 MMHG | RESPIRATION RATE: 16 BRPM | SYSTOLIC BLOOD PRESSURE: 126 MMHG | OXYGEN SATURATION: 96 % | HEART RATE: 64 BPM | BODY MASS INDEX: 33.85 KG/M2 | TEMPERATURE: 98 F | HEIGHT: 73 IN

## 2019-01-21 DIAGNOSIS — Z94.84 HX OF STEM CELL TRANSPLANT (HCC): ICD-10-CM

## 2019-01-21 DIAGNOSIS — Z01.818 PRE-OP EXAMINATION: Primary | ICD-10-CM

## 2019-01-21 DIAGNOSIS — I10 ESSENTIAL HYPERTENSION: ICD-10-CM

## 2019-01-21 DIAGNOSIS — M25.562 CHRONIC PAIN OF LEFT KNEE: ICD-10-CM

## 2019-01-21 DIAGNOSIS — G89.29 CHRONIC PAIN OF LEFT KNEE: ICD-10-CM

## 2019-01-21 PROCEDURE — 99214 OFFICE O/P EST MOD 30 MIN: CPT | Performed by: FAMILY MEDICINE

## 2019-01-21 PROCEDURE — 93000 ELECTROCARDIOGRAM COMPLETE: CPT | Performed by: FAMILY MEDICINE

## 2019-01-21 NOTE — PROGRESS NOTES
Naz Josue is a 77year old male who presents for a pre-operative physical exam.   Naz Josue is scheduled for arthroscopic exploration of the knee procedure on 1/31/19 performed by Dr Verner Render indication: Chronic knee pain    HPI related to surgery SHORTNESS OF BREATH    Comment:Respiratory distress  Beta Adrenergic Blo*      Latex                       Comment:Surgical tape  Welts, burning of skin, itching  Statins                     Current Outpatient Medications:  REVLIMID 15 MG Or palpitations, orthopnea, PND or dizziness. Musculoskeletal: as per hpi  GI: No nausea, vomiting or diarrhea. No blood in stools.   Neurologic: No seizures, tremors, weakness or numbness    PHYSICAL EXAM:   /60   Pulse 64   Temp 98.4 °F (36.9 °C) ([place]

## 2019-01-25 ENCOUNTER — OFFICE VISIT (OUTPATIENT)
Dept: HEMATOLOGY/ONCOLOGY | Age: 69
End: 2019-01-25
Attending: INTERNAL MEDICINE
Payer: MEDICARE

## 2019-01-25 VITALS
RESPIRATION RATE: 20 BRPM | WEIGHT: 269.63 LBS | OXYGEN SATURATION: 99 % | HEART RATE: 51 BPM | BODY MASS INDEX: 36 KG/M2 | DIASTOLIC BLOOD PRESSURE: 66 MMHG | TEMPERATURE: 97 F | SYSTOLIC BLOOD PRESSURE: 141 MMHG

## 2019-01-25 DIAGNOSIS — C90.00 MULTIPLE MYELOMA, REMISSION STATUS UNSPECIFIED (HCC): Primary | ICD-10-CM

## 2019-01-25 LAB
CALCIUM BLD-MCNC: 8.6 MG/DL (ref 8.3–10.3)
CREAT BLD-MCNC: 1.65 MG/DL (ref 0.7–1.3)

## 2019-01-25 PROCEDURE — 36415 COLL VENOUS BLD VENIPUNCTURE: CPT

## 2019-01-25 PROCEDURE — 82565 ASSAY OF CREATININE: CPT

## 2019-01-25 PROCEDURE — 96374 THER/PROPH/DIAG INJ IV PUSH: CPT

## 2019-01-25 PROCEDURE — 82310 ASSAY OF CALCIUM: CPT

## 2019-01-31 PROBLEM — Z98.890 S/P ARTHROSCOPY OF LEFT KNEE: Status: ACTIVE | Noted: 2019-01-31

## 2019-02-08 ENCOUNTER — APPOINTMENT (OUTPATIENT)
Dept: HEMATOLOGY/ONCOLOGY | Age: 69
End: 2019-02-08
Attending: INTERNAL MEDICINE
Payer: MEDICARE

## 2019-02-15 RX ORDER — LENALIDOMIDE 15 MG/1
CAPSULE ORAL
Qty: 21 CAPSULE | Refills: 0 | Status: SHIPPED | OUTPATIENT
Start: 2019-02-15 | End: 2019-03-15

## 2019-02-18 RX ORDER — ATORVASTATIN CALCIUM 20 MG/1
TABLET, FILM COATED ORAL
Qty: 90 TABLET | Refills: 0 | Status: SHIPPED | OUTPATIENT
Start: 2019-02-18 | End: 2019-05-18

## 2019-02-18 NOTE — TELEPHONE ENCOUNTER
Name from pharmacy: ATORVASTATIN 20MG TABLETS          Will file in chart as: ATORVASTATIN 20 MG Oral Tab    Sig: TAKE 1 TABLET(20 MG) BY MOUTH DAILY    Disp:  90 tablet    Refills:  0    Start: 2/17/2019    Class: Normal    Requested on: 2/17/2019    Last

## 2019-02-22 ENCOUNTER — OFFICE VISIT (OUTPATIENT)
Dept: HEMATOLOGY/ONCOLOGY | Age: 69
End: 2019-02-22
Attending: INTERNAL MEDICINE
Payer: MEDICARE

## 2019-02-22 VITALS
SYSTOLIC BLOOD PRESSURE: 126 MMHG | WEIGHT: 266.13 LBS | TEMPERATURE: 96 F | BODY MASS INDEX: 35 KG/M2 | OXYGEN SATURATION: 99 % | DIASTOLIC BLOOD PRESSURE: 74 MMHG | RESPIRATION RATE: 20 BRPM | HEART RATE: 58 BPM

## 2019-02-22 DIAGNOSIS — C90.00 MULTIPLE MYELOMA, REMISSION STATUS UNSPECIFIED (HCC): ICD-10-CM

## 2019-02-22 DIAGNOSIS — C90.00 MULTIPLE MYELOMA, REMISSION STATUS UNSPECIFIED (HCC): Primary | ICD-10-CM

## 2019-02-22 LAB
ALBUMIN SERPL-MCNC: 3.7 G/DL (ref 3.4–5)
ALBUMIN/GLOB SERPL: 1.1 {RATIO} (ref 1–2)
ALP LIVER SERPL-CCNC: 81 U/L (ref 45–117)
ALT SERPL-CCNC: 30 U/L (ref 16–61)
ANION GAP SERPL CALC-SCNC: 8 MMOL/L (ref 0–18)
AST SERPL-CCNC: 23 U/L (ref 15–37)
BASOPHILS # BLD AUTO: 0.02 X10(3) UL (ref 0–0.2)
BASOPHILS NFR BLD AUTO: 0.5 %
BILIRUB SERPL-MCNC: 0.9 MG/DL (ref 0.1–2)
BUN BLD-MCNC: 20 MG/DL (ref 7–18)
BUN/CREAT SERPL: 16.5 (ref 10–20)
CALCIUM BLD-MCNC: 8.5 MG/DL (ref 8.5–10.1)
CHLORIDE SERPL-SCNC: 102 MMOL/L (ref 98–107)
CO2 SERPL-SCNC: 27 MMOL/L (ref 21–32)
CREAT BLD-MCNC: 1.21 MG/DL (ref 0.7–1.3)
DEPRECATED RDW RBC AUTO: 54.6 FL (ref 35.1–46.3)
EOSINOPHIL # BLD AUTO: 0.19 X10(3) UL (ref 0–0.7)
EOSINOPHIL NFR BLD AUTO: 4.4 %
ERYTHROCYTE [DISTWIDTH] IN BLOOD BY AUTOMATED COUNT: 14.6 % (ref 11–15)
GLOBULIN PLAS-MCNC: 3.5 G/DL (ref 2.8–4.4)
GLUCOSE BLD-MCNC: 95 MG/DL (ref 70–99)
HCT VFR BLD AUTO: 41.1 % (ref 39–53)
HGB BLD-MCNC: 13 G/DL (ref 13–17.5)
IGA SERPL-MCNC: 145 MG/DL (ref 70–312)
IGM SERPL-MCNC: 35.4 MG/DL (ref 43–279)
IMM GRANULOCYTES # BLD AUTO: 0.01 X10(3) UL (ref 0–1)
IMM GRANULOCYTES NFR BLD: 0.2 %
IMMUNOGLOBULIN PNL SER-MCNC: 1150 MG/DL (ref 791–1643)
LDH SERPL L TO P-CCNC: 189 U/L (ref 87–241)
LYMPHOCYTES # BLD AUTO: 1.02 X10(3) UL (ref 1–4)
LYMPHOCYTES NFR BLD AUTO: 23.7 %
M PROTEIN MFR SERPL ELPH: 7.2 G/DL (ref 6.4–8.2)
MCH RBC QN AUTO: 32.6 PG (ref 26–34)
MCHC RBC AUTO-ENTMCNC: 31.6 G/DL (ref 31–37)
MCV RBC AUTO: 103 FL (ref 80–100)
MONOCYTES # BLD AUTO: 0.66 X10(3) UL (ref 0.1–1)
MONOCYTES NFR BLD AUTO: 15.3 %
NEUTROPHILS # BLD AUTO: 2.4 X10 (3) UL (ref 1.5–7.7)
NEUTROPHILS # BLD AUTO: 2.4 X10(3) UL (ref 1.5–7.7)
NEUTROPHILS NFR BLD AUTO: 55.9 %
OSMOLALITY SERPL CALC.SUM OF ELEC: 286 MOSM/KG (ref 275–295)
PLATELET # BLD AUTO: 167 10(3)UL (ref 150–450)
POTASSIUM SERPL-SCNC: 4.3 MMOL/L (ref 3.5–5.1)
RBC # BLD AUTO: 3.99 X10(6)UL (ref 3.8–5.8)
SODIUM SERPL-SCNC: 137 MMOL/L (ref 136–145)
WBC # BLD AUTO: 4.3 X10(3) UL (ref 4–11)

## 2019-02-22 PROCEDURE — 99214 OFFICE O/P EST MOD 30 MIN: CPT | Performed by: INTERNAL MEDICINE

## 2019-02-22 PROCEDURE — 96374 THER/PROPH/DIAG INJ IV PUSH: CPT

## 2019-02-22 NOTE — PROGRESS NOTES
Cancer Center Progress Note    Problem List:      Patient Active Problem List:     Impotence of organic origin     Generalized osteoarthrosis of hand     Morbid obesity ([address]     Essential hypertension     Hypertension     Elevated serum immunoglobulin joycelyn Medications:    ATORVASTATIN 20 MG Oral Tab TAKE 1 TABLET(20 MG) BY MOUTH DAILY Disp: 90 tablet Rfl: 0   TraMADol HCl 50 MG Oral Tab Take 1 tablet (50 mg total) by mouth every 6 (six) hours as needed for Pain.  Disp: 45 tablet Rfl: 0   omega-3 fatty aci 3.99 02/22/2019    HGB 13.0 02/22/2019    HCT 41.1 02/22/2019    .0 (H) 02/22/2019    MCH 32.6 02/22/2019    MCHC 31.6 02/22/2019    RDW 14.6 02/22/2019    .0 02/22/2019     Lab Results   Component Value Date     12/21/2018    K 4.0 12/

## 2019-02-28 LAB
ALBUMIN SERPL-MCNC: 4.29 G/DL (ref 3.1–4.5)
ALBUMIN/GLOB SERPL: 1.53 {RATIO}
ALPHA1 GLOB SERPL ELPH-MCNC: 0.19 G/DL (ref 0.1–0.3)
ALPHA2 GLOB SERPL ELPH-MCNC: 0.84 G/DL (ref 0.6–1)
B-GLOBULIN SERPL ELPH-MCNC: 0.71 G/DL (ref 0.7–1.2)
GAMMA GLOB SERPL ELPH-MCNC: 1.07 G/DL (ref 0.6–1.6)
KAPPA FREE LIGHT CHAIN: 3.38 MG/DL (ref 0.33–1.94)
KAPPA/LAMBDA FLC RATIO: 2.21 (ref 0.26–1.65)
LAMBDA FREE LIGHT CHAIN: 1.53 MG/DL (ref 0.57–2.63)
MAI PROTEIN SERPL-MCNC: 7.1 G/DL (ref 6.4–8.2)

## 2019-03-01 DIAGNOSIS — I10 DIABETES MELLITUS WITH COINCIDENT HYPERTENSION (HCC): ICD-10-CM

## 2019-03-01 DIAGNOSIS — E11.9 DIABETES MELLITUS WITH COINCIDENT HYPERTENSION (HCC): ICD-10-CM

## 2019-03-02 NOTE — TELEPHONE ENCOUNTER
Name from pharmacy: [address] [place] [address] [address]          Will file in chart as: [address] [place] [address] Does not apply Misc    Sig: TEST DAILY    Disp:  Not specified (Pharmacy requested: 200 Undefined)    Refills:  0    Start: 3/1/2019    Class:

## 2019-03-04 RX ORDER — LANCETS
EACH MISCELLANEOUS
Qty: 200 EACH | Refills: 0 | Status: SHIPPED | OUTPATIENT
Start: 2019-03-04 | End: 2019-06-03

## 2019-03-15 RX ORDER — LENALIDOMIDE 15 MG/1
CAPSULE ORAL
Qty: 21 CAPSULE | Refills: 0 | Status: SHIPPED | OUTPATIENT
Start: 2019-03-15 | End: 2019-04-08

## 2019-03-21 ENCOUNTER — OFFICE VISIT (OUTPATIENT)
Dept: FAMILY MEDICINE CLINIC | Facility: CLINIC | Age: 69
End: 2019-03-21
Payer: MEDICARE

## 2019-03-21 VITALS
BODY MASS INDEX: 36.96 KG/M2 | SYSTOLIC BLOOD PRESSURE: 126 MMHG | WEIGHT: 264 LBS | RESPIRATION RATE: 17 BRPM | DIASTOLIC BLOOD PRESSURE: 60 MMHG | HEART RATE: 49 BPM | OXYGEN SATURATION: 95 % | HEIGHT: 71 IN | TEMPERATURE: 98 F

## 2019-03-21 DIAGNOSIS — E11.9 DIET-CONTROLLED DIABETES MELLITUS (HCC): Primary | ICD-10-CM

## 2019-03-21 PROCEDURE — 99213 OFFICE O/P EST LOW 20 MIN: CPT | Performed by: FAMILY MEDICINE

## 2019-03-21 NOTE — PROGRESS NOTES
/60 (BP Location: Left arm, Patient Position: Sitting, Cuff Size: adult)   Pulse (!) 49   Temp 98.1 °F (36.7 °C) (Oral)   Resp 17   Ht 71\"   Wt 264 lb   SpO2 95%   BMI 36.82 kg/m²               Patient presents with:  Medication Follow-Up: 3 month F daily. Disp:  Rfl:    ACYCLOVIR 400 MG Oral Tab TAKE 1 TABLET(400 MG) BY MOUTH TWICE DAILY Disp: 60 tablet Rfl: 6   lisinopril 10 MG Oral Tab Take 1 tablet (10 mg total) by mouth daily.  Disp: 90 tablet Rfl: 3   Glucose Blood (ONETOUCH ULTRA BLUE) In Vitro GENERAL HEALTH: feels well otherwise  SKIN: denies any unusual skin lesions or rashes  RESPIRATORY: denies shortness of breath with exertion  CARDIOVASCULAR: denies chest pain on exertion  GI: denies abdominal pain and denies heartburn  NEURO: denies hea

## 2019-03-22 ENCOUNTER — OFFICE VISIT (OUTPATIENT)
Dept: HEMATOLOGY/ONCOLOGY | Age: 69
End: 2019-03-22
Attending: INTERNAL MEDICINE
Payer: MEDICARE

## 2019-03-22 VITALS
WEIGHT: 271 LBS | SYSTOLIC BLOOD PRESSURE: 143 MMHG | BODY MASS INDEX: 38 KG/M2 | HEART RATE: 71 BPM | TEMPERATURE: 97 F | OXYGEN SATURATION: 98 % | DIASTOLIC BLOOD PRESSURE: 77 MMHG | RESPIRATION RATE: 20 BRPM

## 2019-03-22 DIAGNOSIS — C90.00 MULTIPLE MYELOMA, REMISSION STATUS UNSPECIFIED (HCC): Primary | ICD-10-CM

## 2019-03-22 DIAGNOSIS — E11.9 DIET-CONTROLLED DIABETES MELLITUS (HCC): ICD-10-CM

## 2019-03-22 LAB
BASOPHILS # BLD AUTO: 0.03 X10(3) UL (ref 0–0.2)
BASOPHILS NFR BLD AUTO: 0.9 %
CALCIUM BLD-MCNC: 8.7 MG/DL (ref 8.5–10.1)
CREAT BLD-MCNC: 1.23 MG/DL (ref 0.7–1.3)
DEPRECATED RDW RBC AUTO: 52.1 FL (ref 35.1–46.3)
EOSINOPHIL # BLD AUTO: 0.13 X10(3) UL (ref 0–0.7)
EOSINOPHIL NFR BLD AUTO: 3.8 %
ERYTHROCYTE [DISTWIDTH] IN BLOOD BY AUTOMATED COUNT: 14 % (ref 11–15)
EST. AVERAGE GLUCOSE BLD GHB EST-MCNC: 120 MG/DL (ref 68–126)
HBA1C MFR BLD HPLC: 5.8 % (ref ?–5.7)
HCT VFR BLD AUTO: 37.9 % (ref 39–53)
HGB BLD-MCNC: 12.8 G/DL (ref 13–17.5)
IMM GRANULOCYTES # BLD AUTO: 0 X10(3) UL (ref 0–1)
IMM GRANULOCYTES NFR BLD: 0 %
LYMPHOCYTES # BLD AUTO: 1.01 X10(3) UL (ref 1–4)
LYMPHOCYTES NFR BLD AUTO: 29.5 %
MCH RBC QN AUTO: 34.1 PG (ref 26–34)
MCHC RBC AUTO-ENTMCNC: 33.8 G/DL (ref 31–37)
MCV RBC AUTO: 101.1 FL (ref 80–100)
MONOCYTES # BLD AUTO: 0.6 X10(3) UL (ref 0.1–1)
MONOCYTES NFR BLD AUTO: 17.5 %
NEUTROPHILS # BLD AUTO: 1.65 X10 (3) UL (ref 1.5–7.7)
NEUTROPHILS # BLD AUTO: 1.65 X10(3) UL (ref 1.5–7.7)
NEUTROPHILS NFR BLD AUTO: 48.3 %
PLATELET # BLD AUTO: 155 10(3)UL (ref 150–450)
RBC # BLD AUTO: 3.75 X10(6)UL (ref 3.8–5.8)
WBC # BLD AUTO: 3.4 X10(3) UL (ref 4–11)

## 2019-03-22 PROCEDURE — 82565 ASSAY OF CREATININE: CPT

## 2019-03-22 PROCEDURE — 83883 ASSAY NEPHELOMETRY NOT SPEC: CPT

## 2019-03-22 PROCEDURE — 82310 ASSAY OF CALCIUM: CPT

## 2019-03-22 PROCEDURE — 83615 LACTATE (LD) (LDH) ENZYME: CPT

## 2019-03-22 PROCEDURE — 85025 COMPLETE CBC W/AUTO DIFF WBC: CPT

## 2019-03-22 PROCEDURE — 86334 IMMUNOFIX E-PHORESIS SERUM: CPT

## 2019-03-22 PROCEDURE — 84165 PROTEIN E-PHORESIS SERUM: CPT

## 2019-03-22 PROCEDURE — 80053 COMPREHEN METABOLIC PANEL: CPT

## 2019-03-22 PROCEDURE — 36415 COLL VENOUS BLD VENIPUNCTURE: CPT

## 2019-03-22 PROCEDURE — 83036 HEMOGLOBIN GLYCOSYLATED A1C: CPT

## 2019-03-22 PROCEDURE — 82784 ASSAY IGA/IGD/IGG/IGM EACH: CPT

## 2019-03-22 PROCEDURE — 96374 THER/PROPH/DIAG INJ IV PUSH: CPT

## 2019-03-22 NOTE — PROGRESS NOTES
.Education Record    Learner:  Patient    Disease / Diagnosis: multiple myeloma    Barriers / Limitations:  None   Comments:    Method:  Brief focused   Comments:    General Topics:  Plan of care reviewed   Comments:    Outcome:  Shows understanding   Comm

## 2019-04-08 RX ORDER — LENALIDOMIDE 15 MG/1
CAPSULE ORAL
Qty: 21 CAPSULE | Refills: 0 | Status: SHIPPED | OUTPATIENT
Start: 2019-04-08 | End: 2019-05-06

## 2019-04-15 DIAGNOSIS — C90.00 MULTIPLE MYELOMA, STAGE 3 (HCC): ICD-10-CM

## 2019-04-15 RX ORDER — ACYCLOVIR 400 MG/1
TABLET ORAL
Qty: 60 TABLET | Refills: 6 | Status: SHIPPED | OUTPATIENT
Start: 2019-04-15 | End: 2019-04-19

## 2019-04-19 ENCOUNTER — OFFICE VISIT (OUTPATIENT)
Dept: HEMATOLOGY/ONCOLOGY | Age: 69
End: 2019-04-19
Attending: INTERNAL MEDICINE
Payer: MEDICARE

## 2019-04-19 VITALS
BODY MASS INDEX: 37 KG/M2 | SYSTOLIC BLOOD PRESSURE: 121 MMHG | HEART RATE: 54 BPM | OXYGEN SATURATION: 98 % | DIASTOLIC BLOOD PRESSURE: 70 MMHG | WEIGHT: 266.88 LBS | RESPIRATION RATE: 20 BRPM | TEMPERATURE: 97 F

## 2019-04-19 DIAGNOSIS — C90.00 MULTIPLE MYELOMA, REMISSION STATUS UNSPECIFIED (HCC): Primary | ICD-10-CM

## 2019-04-19 DIAGNOSIS — C90.00 MULTIPLE MYELOMA, STAGE 3 (HCC): ICD-10-CM

## 2019-04-19 PROCEDURE — 82565 ASSAY OF CREATININE: CPT

## 2019-04-19 PROCEDURE — 96374 THER/PROPH/DIAG INJ IV PUSH: CPT

## 2019-04-19 PROCEDURE — 82310 ASSAY OF CALCIUM: CPT

## 2019-04-19 PROCEDURE — 36415 COLL VENOUS BLD VENIPUNCTURE: CPT

## 2019-04-19 RX ORDER — ACYCLOVIR 400 MG/1
TABLET ORAL
Qty: 60 TABLET | Refills: 6 | Status: SHIPPED | OUTPATIENT
Start: 2019-04-19 | End: 2019-11-18

## 2019-04-19 NOTE — PROGRESS NOTES
Education Record  Learner:  Patient  Disease / Diagnosis:   Mult Myeloma  Barriers / Limitations:  None  Method:  Printed material and Reinforcement  General Topics:  Plan of care reviewed; schedule; no new complaints.   Outcome:  Shows understanding and Pa

## 2019-04-19 NOTE — PATIENT INSTRUCTIONS
To reach Dr Harrold Boas nurse during business hours, please call [phone]. After hours, including weekends, evenings, and holidays, please call the main number [phone] for emergent needs.

## 2019-05-06 RX ORDER — LENALIDOMIDE 15 MG/1
CAPSULE ORAL
Qty: 21 CAPSULE | Refills: 0 | Status: SHIPPED | OUTPATIENT
Start: 2019-05-06 | End: 2019-06-03

## 2019-05-17 ENCOUNTER — OFFICE VISIT (OUTPATIENT)
Dept: HEMATOLOGY/ONCOLOGY | Age: 69
End: 2019-05-17
Attending: INTERNAL MEDICINE
Payer: MEDICARE

## 2019-05-17 VITALS
WEIGHT: 261.81 LBS | TEMPERATURE: 98 F | DIASTOLIC BLOOD PRESSURE: 71 MMHG | HEART RATE: 52 BPM | SYSTOLIC BLOOD PRESSURE: 115 MMHG | OXYGEN SATURATION: 98 % | RESPIRATION RATE: 20 BRPM | BODY MASS INDEX: 37 KG/M2

## 2019-05-17 DIAGNOSIS — C90.00 MULTIPLE MYELOMA, REMISSION STATUS UNSPECIFIED (HCC): Primary | ICD-10-CM

## 2019-05-17 PROCEDURE — 82565 ASSAY OF CREATININE: CPT

## 2019-05-17 PROCEDURE — 96374 THER/PROPH/DIAG INJ IV PUSH: CPT

## 2019-05-17 PROCEDURE — 82310 ASSAY OF CALCIUM: CPT

## 2019-05-17 PROCEDURE — 36415 COLL VENOUS BLD VENIPUNCTURE: CPT

## 2019-05-18 DIAGNOSIS — E78.00 HYPERCHOLESTEROLEMIA: Primary | ICD-10-CM

## 2019-05-20 RX ORDER — ATORVASTATIN CALCIUM 20 MG/1
TABLET, FILM COATED ORAL
Qty: 30 TABLET | Refills: 0 | Status: SHIPPED | OUTPATIENT
Start: 2019-05-20 | End: 2019-05-20

## 2019-05-20 NOTE — TELEPHONE ENCOUNTER
LOV 3/19    LAST LAB 6/18    LAST RX   ATORVASTATIN 20 MG Oral Tab 90 tablet 0 2/18/2019         Next OV 6/20/2019      PROTOCOL  Cholesterol Medication Protocol Passed    Lab ordered. Mychart to patient.

## 2019-05-21 RX ORDER — ATORVASTATIN CALCIUM 20 MG/1
TABLET, FILM COATED ORAL
Qty: 90 TABLET | Refills: 0 | Status: SHIPPED | OUTPATIENT
Start: 2019-05-21 | End: 2019-09-18

## 2019-05-21 NOTE — TELEPHONE ENCOUNTER
Name from pharmacy: ATORVASTATIN 20MG TABLETS          Will file in chart as: ATORVASTATIN 20 MG Oral Tab    Sig: TAKE ONE TABLET BY MOUTH DAILY    Disp:  90 tablet    Refills:  0    Start: 5/20/2019    Class: Normal    To pharmacy: **Patient requests 90 d

## 2019-06-03 DIAGNOSIS — E11.9 DIABETES MELLITUS WITH COINCIDENT HYPERTENSION (HCC): ICD-10-CM

## 2019-06-03 DIAGNOSIS — I10 DIABETES MELLITUS WITH COINCIDENT HYPERTENSION (HCC): ICD-10-CM

## 2019-06-03 RX ORDER — LENALIDOMIDE 15 MG/1
CAPSULE ORAL
Qty: 21 CAPSULE | Refills: 0 | Status: SHIPPED | OUTPATIENT
Start: 2019-06-03 | End: 2019-07-02

## 2019-06-03 RX ORDER — LANCETS
EACH MISCELLANEOUS
Qty: 200 EACH | Refills: 0 | Status: SHIPPED | OUTPATIENT
Start: 2019-06-03 | End: 2019-08-19

## 2019-06-05 ENCOUNTER — MED REC SCAN ONLY (OUTPATIENT)
Dept: FAMILY MEDICINE CLINIC | Facility: CLINIC | Age: 69
End: 2019-06-05

## 2019-06-10 ENCOUNTER — LAB ENCOUNTER (OUTPATIENT)
Dept: LAB | Age: 69
End: 2019-06-10
Attending: FAMILY MEDICINE
Payer: MEDICARE

## 2019-06-10 DIAGNOSIS — E78.00 HYPERCHOLESTEROLEMIA: ICD-10-CM

## 2019-06-10 PROCEDURE — 80061 LIPID PANEL: CPT

## 2019-06-10 PROCEDURE — 36415 COLL VENOUS BLD VENIPUNCTURE: CPT

## 2019-06-11 ENCOUNTER — OFFICE VISIT (OUTPATIENT)
Dept: NEPHROLOGY | Facility: CLINIC | Age: 69
End: 2019-06-11
Payer: MEDICARE

## 2019-06-11 VITALS — WEIGHT: 266 LBS | SYSTOLIC BLOOD PRESSURE: 122 MMHG | BODY MASS INDEX: 37 KG/M2 | DIASTOLIC BLOOD PRESSURE: 76 MMHG

## 2019-06-11 DIAGNOSIS — C90.00 MULTIPLE MYELOMA, REMISSION STATUS UNSPECIFIED (HCC): Primary | ICD-10-CM

## 2019-06-11 PROCEDURE — 99213 OFFICE O/P EST LOW 20 MIN: CPT | Performed by: INTERNAL MEDICINE

## 2019-06-11 NOTE — PROGRESS NOTES
Nephrology Progress Note      Shari Barrientos is a 68year old male.     HPI:   Patient presents with:  Hypertension  Other: myeloma kidney disease      67 yo male with hx of MM, MADDI related to myeloma kidney + severe hypercalcemia, DM, HTN presents for foll 6 (six) hours as needed for Pain.  Disp:  Rfl:        Allergies:    Pcn [Penicillins]       ANAPHYLAXIS, HIVES, HALLUCINATION,                            SHORTNESS OF BREATH    Comment:Respiratory distress  Beta Adrenergic Blo*      Latex Eosinophils Absolute      0.00 - 0.70 x10(3) uL      Basophils Absolute      0.00 - 0.20 x10(3) uL      Immature Granulocyte Absolute      0.00 - 1.00 x10(3) uL      Neutrophils %      %      Lymphocytes %      %      Monocytes %      %      Eosinophils x10 (3) uL 1.65   Neutrophils Absolute      1.50 - 7.70 x10(3) uL 1.65   Lymphocytes Absolute      1.00 - 4.00 x10(3) uL 1.01   Monocytes Absolute      0.10 - 1.00 x10(3) uL 0.60   Eosinophils Absolute      0.00 - 0.70 x10(3) uL 0.13   Basophils Absolute Prelim Neutrophil Abs      1.50 - 7.70 x10 (3) uL    Neutrophils Absolute      1.50 - 7.70 x10(3) uL    Lymphocytes Absolute      1.00 - 4.00 x10(3) uL    Monocytes Absolute      0.10 - 1.00 x10(3) uL    Eosinophils Absolute      0.00 - 0.70 x10(3) uL 150.0 - 450.0 10(3)uL    MCV      80.0 - 100.0 fL    MCH      26.0 - 34.0 pg    MCHC      31.0 - 37.0 g/dL    RDW      11.0 - 15.0 %    RDW-SD      35.1 - 46.3 fL    Prelim Neutrophil Abs      1.50 - 7.70 x10 (3) uL    Neutrophils Absolute      1.50 - 7.70 s/p stem cell;  on chemo per oncology; Dr. Yolanda Ernst      3) HTN- on lisinopril     F/U  6 mos    Greta Lopez MD  6/11/2019

## 2019-06-14 ENCOUNTER — OFFICE VISIT (OUTPATIENT)
Dept: HEMATOLOGY/ONCOLOGY | Age: 69
End: 2019-06-14
Attending: INTERNAL MEDICINE
Payer: MEDICARE

## 2019-06-14 VITALS
RESPIRATION RATE: 20 BRPM | OXYGEN SATURATION: 97 % | HEART RATE: 55 BPM | BODY MASS INDEX: 38 KG/M2 | TEMPERATURE: 98 F | SYSTOLIC BLOOD PRESSURE: 114 MMHG | WEIGHT: 268.88 LBS | DIASTOLIC BLOOD PRESSURE: 72 MMHG

## 2019-06-14 DIAGNOSIS — C90.00 MULTIPLE MYELOMA, REMISSION STATUS UNSPECIFIED (HCC): Primary | ICD-10-CM

## 2019-06-14 PROCEDURE — 82310 ASSAY OF CALCIUM: CPT

## 2019-06-14 PROCEDURE — 36415 COLL VENOUS BLD VENIPUNCTURE: CPT

## 2019-06-14 PROCEDURE — 96374 THER/PROPH/DIAG INJ IV PUSH: CPT

## 2019-06-14 PROCEDURE — 82565 ASSAY OF CREATININE: CPT

## 2019-06-14 NOTE — PROGRESS NOTES
Calcium is a bit low today, pt reports he is consistently taking his oral calcium and D3. I asked him to take some extra TUMS for the next few days. He will also monitor how he is feeling. Pt picked up his Revlimid today as well.       Education Record

## 2019-06-20 ENCOUNTER — OFFICE VISIT (OUTPATIENT)
Dept: FAMILY MEDICINE CLINIC | Facility: CLINIC | Age: 69
End: 2019-06-20
Payer: MEDICARE

## 2019-06-20 VITALS
DIASTOLIC BLOOD PRESSURE: 66 MMHG | BODY MASS INDEX: 37.1 KG/M2 | WEIGHT: 265 LBS | TEMPERATURE: 98 F | HEIGHT: 71 IN | OXYGEN SATURATION: 98 % | RESPIRATION RATE: 18 BRPM | HEART RATE: 64 BPM | SYSTOLIC BLOOD PRESSURE: 120 MMHG

## 2019-06-20 DIAGNOSIS — E11.9 DIABETES MELLITUS WITH COINCIDENT HYPERTENSION (HCC): Primary | ICD-10-CM

## 2019-06-20 DIAGNOSIS — I10 DIABETES MELLITUS WITH COINCIDENT HYPERTENSION (HCC): Primary | ICD-10-CM

## 2019-06-20 PROCEDURE — 99214 OFFICE O/P EST MOD 30 MIN: CPT | Performed by: FAMILY MEDICINE

## 2019-06-20 NOTE — PROGRESS NOTES
/66   Pulse 64   Temp 98.1 °F (36.7 °C) (Oral)   Resp 18   Ht 71\"   Wt 265 lb   SpO2 98%   BMI 36.96 kg/m²               Patient presents with:  Diabetes       HPI;    Shaun Merino is a 68year old male.   Who has history of multiple myeloma status Rfl:     REVLIMID 15 MG Oral Cap TAKE 1 CAPSULE BY MOUTH DAILY FOR 21 DAYS ON, THEN 7 DAYS OFF Disp: 21 capsule Rfl: 0   ONETOUCH ULTRASOFT LANCETS Does not apply Misc TEST BID Disp: 200 each Rfl: 0   ATORVASTATIN 20 MG Oral Tab TAKE ONE TABLET BY MOUTH CONSTANCE without mention of complication, not stated as uncontrolled 10/2007   • Unspecified essential hypertension    • Unspecified internal derangement of knee 6/20/2013    Log Date: 08/21/2012       Social History:  Social History    Tobacco Use      Smoking sta this Visit:  Requested Prescriptions      No prescriptions requested or ordered in this encounter     Orders Placed This Encounter      Comp Metabolic Panel      Hemoglobin A1C            [id number] [facility], [address] [place] [address] [address]

## 2019-06-24 ENCOUNTER — LAB ENCOUNTER (OUTPATIENT)
Dept: LAB | Age: 69
End: 2019-06-24
Attending: FAMILY MEDICINE
Payer: MEDICARE

## 2019-06-24 DIAGNOSIS — E11.9 DIABETES MELLITUS WITH COINCIDENT HYPERTENSION (HCC): ICD-10-CM

## 2019-06-24 DIAGNOSIS — I10 DIABETES MELLITUS WITH COINCIDENT HYPERTENSION (HCC): ICD-10-CM

## 2019-06-24 PROCEDURE — 83036 HEMOGLOBIN GLYCOSYLATED A1C: CPT

## 2019-06-24 PROCEDURE — 36415 COLL VENOUS BLD VENIPUNCTURE: CPT

## 2019-06-24 PROCEDURE — 80053 COMPREHEN METABOLIC PANEL: CPT

## 2019-06-24 RX ORDER — ATORVASTATIN CALCIUM 20 MG/1
TABLET, FILM COATED ORAL
Qty: 90 TABLET | Refills: 0 | Status: SHIPPED | OUTPATIENT
Start: 2019-06-24 | End: 2019-07-12

## 2019-06-24 NOTE — TELEPHONE ENCOUNTER
Name from pharmacy: ATORVASTATIN 20MG TABLETS          Will file in chart as: ATORVASTATIN 20 MG Oral Tab    The source prescription was reordered on 5/21/2019 by Laurie De Oliveira.     Sig: TAKE ONE TABLET BY MOUTH DAILY    Disp:  30 tablet    Refills:

## 2019-06-25 DIAGNOSIS — R79.89 BLOOD CREATININE INCREASED COMPARED WITH PRIOR MEASUREMENT: Primary | ICD-10-CM

## 2019-06-25 NOTE — PROGRESS NOTES
A very slight increase in the A1c at 6.1  No change in medications  Increased creatinine.   Continue hydration  We will recheck again in 3 months

## 2019-07-03 RX ORDER — LENALIDOMIDE 15 MG/1
CAPSULE ORAL
Qty: 21 CAPSULE | Refills: 0 | Status: SHIPPED | OUTPATIENT
Start: 2019-07-03 | End: 2019-08-01

## 2019-07-12 ENCOUNTER — OFFICE VISIT (OUTPATIENT)
Dept: HEMATOLOGY/ONCOLOGY | Age: 69
End: 2019-07-12
Attending: INTERNAL MEDICINE
Payer: MEDICARE

## 2019-07-12 VITALS
HEART RATE: 46 BPM | WEIGHT: 266.69 LBS | RESPIRATION RATE: 20 BRPM | OXYGEN SATURATION: 98 % | BODY MASS INDEX: 37 KG/M2 | DIASTOLIC BLOOD PRESSURE: 59 MMHG | TEMPERATURE: 97 F | SYSTOLIC BLOOD PRESSURE: 126 MMHG

## 2019-07-12 DIAGNOSIS — C90.00 MULTIPLE MYELOMA, REMISSION STATUS UNSPECIFIED (HCC): Primary | ICD-10-CM

## 2019-07-12 LAB
CALCIUM BLD-MCNC: 8.9 MG/DL (ref 8.5–10.1)
CREAT BLD-MCNC: 1.47 MG/DL (ref 0.7–1.3)

## 2019-07-12 PROCEDURE — 82310 ASSAY OF CALCIUM: CPT

## 2019-07-12 PROCEDURE — 36415 COLL VENOUS BLD VENIPUNCTURE: CPT

## 2019-07-12 PROCEDURE — 82565 ASSAY OF CREATININE: CPT

## 2019-07-12 PROCEDURE — 96374 THER/PROPH/DIAG INJ IV PUSH: CPT

## 2019-07-12 NOTE — PROGRESS NOTES
Education Record  Learner:  Patient  Disease / Diagnosis:   Jackie Sor for multiple myeloma  Barriers / Limitations:  None  Method:  Printed material and Reinforcement  General Topics:  Plan of care reviewed  Outcome:  Shows understanding and Patient given

## 2019-08-01 RX ORDER — LENALIDOMIDE 15 MG/1
CAPSULE ORAL
Qty: 21 CAPSULE | Refills: 0 | Status: SHIPPED | OUTPATIENT
Start: 2019-08-01 | End: 2019-08-28

## 2019-08-09 ENCOUNTER — OFFICE VISIT (OUTPATIENT)
Dept: HEMATOLOGY/ONCOLOGY | Age: 69
End: 2019-08-09
Attending: INTERNAL MEDICINE
Payer: MEDICARE

## 2019-08-09 VITALS
HEART RATE: 56 BPM | WEIGHT: 266.88 LBS | OXYGEN SATURATION: 99 % | SYSTOLIC BLOOD PRESSURE: 112 MMHG | TEMPERATURE: 98 F | RESPIRATION RATE: 20 BRPM | BODY MASS INDEX: 37 KG/M2 | DIASTOLIC BLOOD PRESSURE: 70 MMHG

## 2019-08-09 DIAGNOSIS — C90.00 MULTIPLE MYELOMA, REMISSION STATUS UNSPECIFIED (HCC): Primary | ICD-10-CM

## 2019-08-09 LAB
ALBUMIN SERPL-MCNC: 3.5 G/DL (ref 3.4–5)
ALBUMIN/GLOB SERPL: 1 {RATIO} (ref 1–2)
ALP LIVER SERPL-CCNC: 79 U/L (ref 45–117)
ALT SERPL-CCNC: 31 U/L (ref 16–61)
ANION GAP SERPL CALC-SCNC: 6 MMOL/L (ref 0–18)
AST SERPL-CCNC: 18 U/L (ref 15–37)
BASOPHILS # BLD AUTO: 0.02 X10(3) UL (ref 0–0.2)
BASOPHILS NFR BLD AUTO: 0.5 %
BILIRUB SERPL-MCNC: 1 MG/DL (ref 0.1–2)
BUN BLD-MCNC: 22 MG/DL (ref 7–18)
BUN/CREAT SERPL: 15.4 (ref 10–20)
CALCIUM BLD-MCNC: 8.4 MG/DL (ref 8.5–10.1)
CHLORIDE SERPL-SCNC: 105 MMOL/L (ref 98–112)
CO2 SERPL-SCNC: 26 MMOL/L (ref 21–32)
CREAT BLD-MCNC: 1.43 MG/DL (ref 0.7–1.3)
DEPRECATED RDW RBC AUTO: 50.4 FL (ref 35.1–46.3)
EOSINOPHIL # BLD AUTO: 0.17 X10(3) UL (ref 0–0.7)
EOSINOPHIL NFR BLD AUTO: 3.9 %
ERYTHROCYTE [DISTWIDTH] IN BLOOD BY AUTOMATED COUNT: 13.8 % (ref 11–15)
GLOBULIN PLAS-MCNC: 3.5 G/DL (ref 2.8–4.4)
GLUCOSE BLD-MCNC: 102 MG/DL (ref 70–99)
HCT VFR BLD AUTO: 37.7 % (ref 39–53)
HGB BLD-MCNC: 12.8 G/DL (ref 13–17.5)
IGA SERPL-MCNC: 171 MG/DL (ref 70–312)
IGM SERPL-MCNC: 39.1 MG/DL (ref 43–279)
IMM GRANULOCYTES # BLD AUTO: 0.01 X10(3) UL (ref 0–1)
IMM GRANULOCYTES NFR BLD: 0.2 %
IMMUNOGLOBULIN PNL SER-MCNC: 1110 MG/DL (ref 791–1643)
LDH SERPL L TO P-CCNC: 148 U/L (ref 87–241)
LYMPHOCYTES # BLD AUTO: 0.87 X10(3) UL (ref 1–4)
LYMPHOCYTES NFR BLD AUTO: 19.8 %
M PROTEIN MFR SERPL ELPH: 7 G/DL (ref 6.4–8.2)
MCH RBC QN AUTO: 34.1 PG (ref 26–34)
MCHC RBC AUTO-ENTMCNC: 34 G/DL (ref 31–37)
MCV RBC AUTO: 100.5 FL (ref 80–100)
MONOCYTES # BLD AUTO: 0.68 X10(3) UL (ref 0.1–1)
MONOCYTES NFR BLD AUTO: 15.5 %
NEUTROPHILS # BLD AUTO: 2.65 X10 (3) UL (ref 1.5–7.7)
NEUTROPHILS # BLD AUTO: 2.65 X10(3) UL (ref 1.5–7.7)
NEUTROPHILS NFR BLD AUTO: 60.1 %
OSMOLALITY SERPL CALC.SUM OF ELEC: 288 MOSM/KG (ref 275–295)
PLATELET # BLD AUTO: 140 10(3)UL (ref 150–450)
POTASSIUM SERPL-SCNC: 4 MMOL/L (ref 3.5–5.1)
RBC # BLD AUTO: 3.75 X10(6)UL (ref 3.8–5.8)
SODIUM SERPL-SCNC: 137 MMOL/L (ref 136–145)
WBC # BLD AUTO: 4.4 X10(3) UL (ref 4–11)

## 2019-08-09 PROCEDURE — 85025 COMPLETE CBC W/AUTO DIFF WBC: CPT

## 2019-08-09 PROCEDURE — 83615 LACTATE (LD) (LDH) ENZYME: CPT

## 2019-08-09 PROCEDURE — 36415 COLL VENOUS BLD VENIPUNCTURE: CPT

## 2019-08-09 PROCEDURE — 99213 OFFICE O/P EST LOW 20 MIN: CPT | Performed by: INTERNAL MEDICINE

## 2019-08-09 PROCEDURE — 82784 ASSAY IGA/IGD/IGG/IGM EACH: CPT

## 2019-08-09 PROCEDURE — 96374 THER/PROPH/DIAG INJ IV PUSH: CPT

## 2019-08-09 PROCEDURE — 84165 PROTEIN E-PHORESIS SERUM: CPT

## 2019-08-09 PROCEDURE — 80053 COMPREHEN METABOLIC PANEL: CPT

## 2019-08-09 PROCEDURE — 83883 ASSAY NEPHELOMETRY NOT SPEC: CPT

## 2019-08-09 PROCEDURE — 86334 IMMUNOFIX E-PHORESIS SERUM: CPT

## 2019-08-09 NOTE — PROGRESS NOTES
Pt here for zometa.   Arrives Ambulating independently, accompanied by Self           Modifications in dose or schedule: No     Frequency of blood return and site check throughout administration: Prior to administration and At completion of therapy   Awaisa

## 2019-08-09 NOTE — PROGRESS NOTES
Cancer Center Progress Note    Problem List:      Patient Active Problem List:     Impotence of organic origin     Generalized osteoarthrosis of hand     Morbid obesity ([address]     Essential hypertension     Hypertension     Elevated serum immunoglobulin joycelyn muscle weakness. Genitourinary: Negative for dysuria or hematuria  Neurological: Negative for headaches, dizziness, speech problems, gait problems and focal weakness. Psychiatric: The patient's mood was calm and appropriate for this visit.   The pertinent Medications:    REVLIMID 15 MG Oral Cap TAKE 1 CAPSULE BY MOUTH DAILY FOR 21 DAYS ON, THEN 7 DAYS OFF Disp: 21 capsule Rfl: 0   cholecalciferol 1000 units Oral Cap Take 1,000 Units by mouth daily.  Disp:  Rfl:    ATORVASTATIN 20 MG Oral Tab TAKE ONE TAB lymphadenopathy throughout in the cervical, supraclavicular, or axillary regions. Psychiatric: The patient's mood is calm and appropriate for this visit.       Labs reviewed at this visit:     Lab Results   Component Value Date    WBC 4.4 08/09/2019    RBC moderate osteoarthritis on the   left knee, moderate to severe on the right knee. No fractures, dislocations or loose bodies.      Assessment/Plan:     Multiple myeloma:  Hypercalcemia  Acute renal failure  Anemia  Diffuse bone lytic lesions  Beta-2 Microgl

## 2019-08-15 LAB
ALBUMIN SERPL-MCNC: 4.22 G/DL (ref 3.1–4.5)
ALBUMIN/GLOB SERPL: 1.64 {RATIO}
ALPHA1 GLOB SERPL ELPH-MCNC: 0.17 G/DL (ref 0.1–0.3)
ALPHA2 GLOB SERPL ELPH-MCNC: 0.71 G/DL (ref 0.6–1)
B-GLOBULIN SERPL ELPH-MCNC: 0.68 G/DL (ref 0.7–1.2)
GAMMA GLOB SERPL ELPH-MCNC: 1.02 G/DL (ref 0.6–1.6)
KAPPA FREE LIGHT CHAIN: 3.93 MG/DL (ref 0.33–1.94)
KAPPA/LAMBDA FLC RATIO: 2.36 (ref 0.26–1.65)
LAMBDA FREE LIGHT CHAIN: 1.67 MG/DL (ref 0.57–2.63)
M PROTEIN MFR SERPL ELPH: 6.8 G/DL (ref 6.4–8.2)

## 2019-08-19 DIAGNOSIS — E11.9 DIABETES MELLITUS WITH COINCIDENT HYPERTENSION (HCC): ICD-10-CM

## 2019-08-19 DIAGNOSIS — I10 DIABETES MELLITUS WITH COINCIDENT HYPERTENSION (HCC): ICD-10-CM

## 2019-08-19 RX ORDER — LANCETS
EACH MISCELLANEOUS
Qty: 200 EACH | Refills: 0 | Status: SHIPPED | OUTPATIENT
Start: 2019-08-19 | End: 2021-05-03

## 2019-08-19 NOTE — TELEPHONE ENCOUNTER
Diabetic Supplies Protocol Passed8/19 2:16 PM   Appointment in the past 12 or next 3 months     LOV 6/20/19     LAST LAB 6/24/19    LAST RX 6/3/19 200     Next OV   Future Appointments   Date Time Provider Bo Ratliff   8/26/2019  1:00 PM Yoko Sheldon

## 2019-08-26 ENCOUNTER — APPOINTMENT (OUTPATIENT)
Dept: LAB | Age: 69
End: 2019-08-26
Attending: UROLOGY
Payer: MEDICARE

## 2019-08-26 DIAGNOSIS — Z85.46 HISTORY OF PROSTATE CANCER: ICD-10-CM

## 2019-08-26 LAB — PSA SERPL-MCNC: <0.01 NG/ML (ref ?–4)

## 2019-08-26 PROCEDURE — 36415 COLL VENOUS BLD VENIPUNCTURE: CPT

## 2019-08-26 PROCEDURE — 84153 ASSAY OF PSA TOTAL: CPT

## 2019-08-29 RX ORDER — LENALIDOMIDE 15 MG/1
CAPSULE ORAL
Qty: 21 CAPSULE | Refills: 0 | Status: SHIPPED | OUTPATIENT
Start: 2019-08-29 | End: 2019-09-28

## 2019-09-03 ENCOUNTER — APPOINTMENT (OUTPATIENT)
Dept: HEMATOLOGY/ONCOLOGY | Age: 69
End: 2019-09-03
Attending: INTERNAL MEDICINE
Payer: MEDICARE

## 2019-09-10 ENCOUNTER — OFFICE VISIT (OUTPATIENT)
Dept: HEMATOLOGY/ONCOLOGY | Age: 69
End: 2019-09-10
Attending: INTERNAL MEDICINE
Payer: MEDICARE

## 2019-09-10 VITALS
WEIGHT: 266.88 LBS | RESPIRATION RATE: 20 BRPM | HEART RATE: 72 BPM | TEMPERATURE: 98 F | DIASTOLIC BLOOD PRESSURE: 72 MMHG | OXYGEN SATURATION: 98 % | BODY MASS INDEX: 37 KG/M2 | SYSTOLIC BLOOD PRESSURE: 114 MMHG

## 2019-09-10 DIAGNOSIS — C90.00 MULTIPLE MYELOMA, REMISSION STATUS UNSPECIFIED (HCC): Primary | ICD-10-CM

## 2019-09-10 LAB
CALCIUM BLD-MCNC: 8.7 MG/DL (ref 8.5–10.1)
CREAT BLD-MCNC: 1.35 MG/DL (ref 0.7–1.3)

## 2019-09-10 PROCEDURE — 82310 ASSAY OF CALCIUM: CPT

## 2019-09-10 PROCEDURE — 82565 ASSAY OF CREATININE: CPT

## 2019-09-10 PROCEDURE — 36415 COLL VENOUS BLD VENIPUNCTURE: CPT

## 2019-09-10 PROCEDURE — 96374 THER/PROPH/DIAG INJ IV PUSH: CPT

## 2019-09-10 NOTE — PROGRESS NOTES
Education Record    Learner:  Patient    Disease / Diagnosis: zometa infusion    Barriers / Limitations:  None   Comments:    Method:  Brief focused and Discussion   Comments:    General Topics:  Medication, Side effects and symptom management and Plan of

## 2019-09-18 RX ORDER — LISINOPRIL 10 MG/1
TABLET ORAL
Qty: 30 TABLET | Refills: 0 | Status: ON HOLD | OUTPATIENT
Start: 2019-09-18 | End: 2019-12-30

## 2019-09-18 RX ORDER — ATORVASTATIN CALCIUM 20 MG/1
TABLET, FILM COATED ORAL
Qty: 90 TABLET | Refills: 2 | Status: ON HOLD | OUTPATIENT
Start: 2019-09-18 | End: 2019-12-30

## 2019-09-18 NOTE — TELEPHONE ENCOUNTER
LOV 6/19    LAST LAB 6/19    LAST RX   ATORVASTATIN 20 MG Oral Tab 90 tablet 0 5/21/2019     lisinopril 10 MG Oral Tab 90 tablet 3 9/20/2018    Sig:   Take 1 tablet (10 mg total) by mouth daily.          Next OV 9/26/2019      PROTOCOL    Refilled x 30 days

## 2019-09-25 ENCOUNTER — LAB ENCOUNTER (OUTPATIENT)
Dept: LAB | Age: 69
End: 2019-09-25
Attending: FAMILY MEDICINE
Payer: MEDICARE

## 2019-09-25 DIAGNOSIS — R79.89 BLOOD CREATININE INCREASED COMPARED WITH PRIOR MEASUREMENT: ICD-10-CM

## 2019-09-25 LAB
ALBUMIN SERPL-MCNC: 3.4 G/DL (ref 3.4–5)
ALBUMIN/GLOB SERPL: 0.9 {RATIO} (ref 1–2)
ALP LIVER SERPL-CCNC: 81 U/L (ref 45–117)
ALT SERPL-CCNC: 35 U/L (ref 16–61)
ANION GAP SERPL CALC-SCNC: 5 MMOL/L (ref 0–18)
AST SERPL-CCNC: 23 U/L (ref 15–37)
BILIRUB SERPL-MCNC: 0.7 MG/DL (ref 0.1–2)
BUN BLD-MCNC: 25 MG/DL (ref 7–18)
BUN/CREAT SERPL: 19.5 (ref 10–20)
CALCIUM BLD-MCNC: 9.3 MG/DL (ref 8.5–10.1)
CHLORIDE SERPL-SCNC: 106 MMOL/L (ref 98–112)
CO2 SERPL-SCNC: 27 MMOL/L (ref 21–32)
CREAT BLD-MCNC: 1.28 MG/DL (ref 0.7–1.3)
GLOBULIN PLAS-MCNC: 4 G/DL (ref 2.8–4.4)
GLUCOSE BLD-MCNC: 103 MG/DL (ref 70–99)
M PROTEIN MFR SERPL ELPH: 7.4 G/DL (ref 6.4–8.2)
OSMOLALITY SERPL CALC.SUM OF ELEC: 291 MOSM/KG (ref 275–295)
POTASSIUM SERPL-SCNC: 3.8 MMOL/L (ref 3.5–5.1)
SODIUM SERPL-SCNC: 138 MMOL/L (ref 136–145)

## 2019-09-25 PROCEDURE — 36415 COLL VENOUS BLD VENIPUNCTURE: CPT

## 2019-09-25 PROCEDURE — 80053 COMPREHEN METABOLIC PANEL: CPT

## 2019-09-26 ENCOUNTER — LABORATORY ENCOUNTER (OUTPATIENT)
Dept: LAB | Age: 69
End: 2019-09-26
Attending: FAMILY MEDICINE
Payer: MEDICARE

## 2019-09-26 ENCOUNTER — OFFICE VISIT (OUTPATIENT)
Dept: FAMILY MEDICINE CLINIC | Facility: CLINIC | Age: 69
End: 2019-09-26
Payer: MEDICARE

## 2019-09-26 VITALS
TEMPERATURE: 97 F | HEART RATE: 60 BPM | SYSTOLIC BLOOD PRESSURE: 120 MMHG | OXYGEN SATURATION: 100 % | BODY MASS INDEX: 36.03 KG/M2 | HEIGHT: 72 IN | RESPIRATION RATE: 16 BRPM | DIASTOLIC BLOOD PRESSURE: 70 MMHG | WEIGHT: 266 LBS

## 2019-09-26 DIAGNOSIS — I10 DIABETES MELLITUS WITH COINCIDENT HYPERTENSION (HCC): Primary | ICD-10-CM

## 2019-09-26 DIAGNOSIS — C90.00 MULTIPLE MYELOMA, REMISSION STATUS UNSPECIFIED (HCC): ICD-10-CM

## 2019-09-26 DIAGNOSIS — I10 DIABETES MELLITUS WITH COINCIDENT HYPERTENSION (HCC): ICD-10-CM

## 2019-09-26 DIAGNOSIS — E11.9 DIABETES MELLITUS WITH COINCIDENT HYPERTENSION (HCC): ICD-10-CM

## 2019-09-26 DIAGNOSIS — E11.9 DIABETES MELLITUS WITH COINCIDENT HYPERTENSION (HCC): Primary | ICD-10-CM

## 2019-09-26 LAB
BASOPHILS # BLD AUTO: 0.05 X10(3) UL (ref 0–0.2)
BASOPHILS NFR BLD AUTO: 0.7 %
DEPRECATED RDW RBC AUTO: 49.6 FL (ref 35.1–46.3)
EOSINOPHIL # BLD AUTO: 0.26 X10(3) UL (ref 0–0.7)
EOSINOPHIL NFR BLD AUTO: 3.5 %
ERYTHROCYTE [DISTWIDTH] IN BLOOD BY AUTOMATED COUNT: 13.2 % (ref 11–15)
EST. AVERAGE GLUCOSE BLD GHB EST-MCNC: 131 MG/DL (ref 68–126)
HBA1C MFR BLD HPLC: 6.2 % (ref ?–5.7)
HCT VFR BLD AUTO: 36.7 % (ref 39–53)
HGB BLD-MCNC: 12 G/DL (ref 13–17.5)
IMM GRANULOCYTES # BLD AUTO: 0.01 X10(3) UL (ref 0–1)
IMM GRANULOCYTES NFR BLD: 0.1 %
LYMPHOCYTES # BLD AUTO: 1.13 X10(3) UL (ref 1–4)
LYMPHOCYTES NFR BLD AUTO: 15.2 %
MCH RBC QN AUTO: 33 PG (ref 26–34)
MCHC RBC AUTO-ENTMCNC: 32.7 G/DL (ref 31–37)
MCV RBC AUTO: 100.8 FL (ref 80–100)
MONOCYTES # BLD AUTO: 0.91 X10(3) UL (ref 0.1–1)
MONOCYTES NFR BLD AUTO: 12.2 %
NEUTROPHILS # BLD AUTO: 5.07 X10 (3) UL (ref 1.5–7.7)
NEUTROPHILS # BLD AUTO: 5.07 X10(3) UL (ref 1.5–7.7)
NEUTROPHILS NFR BLD AUTO: 68.3 %
PLATELET # BLD AUTO: 244 10(3)UL (ref 150–450)
RBC # BLD AUTO: 3.64 X10(6)UL (ref 3.8–5.8)
WBC # BLD AUTO: 7.4 X10(3) UL (ref 4–11)

## 2019-09-26 PROCEDURE — 99214 OFFICE O/P EST MOD 30 MIN: CPT | Performed by: FAMILY MEDICINE

## 2019-09-26 PROCEDURE — 85025 COMPLETE CBC W/AUTO DIFF WBC: CPT

## 2019-09-26 PROCEDURE — 83036 HEMOGLOBIN GLYCOSYLATED A1C: CPT

## 2019-09-26 NOTE — PROGRESS NOTES
/70   Pulse 60   Temp 97.2 °F (36.2 °C) (Oral)   Resp 16   Ht 72\"   Wt 266 lb   SpO2 100%   BMI 36.08 kg/m²               Patient presents with: Follow - Up: diabetes follow up and to review labs       HPI;    Gardnerludin Belcher is a 68year old male. Rfl: 0   omega-3 fatty acids 1000 MG Oral Cap Take 1,000 mg by mouth 2 (two) times daily.  Disp:  Rfl:    Glucose Blood (ONETOUCH ULTRA BLUE) In Vitro Strip TEST TWICE DAILY Disp: 100 strip Rfl: 3   Glucose Blood (ONETOUCH ULTRA BLUE) In Vitro Strip TEST TW exertion  GI: denies abdominal pain and denies heartburn  NEURO: denies headaches  EXAM:   /70   Pulse 60   Temp 97.2 °F (36.2 °C) (Oral)   Resp 16   Ht 72\"   Wt 266 lb   SpO2 100%   BMI 36.08 kg/m²   GENERAL: well developed, well nourished,in no ap

## 2019-09-27 NOTE — PROGRESS NOTES
Please notify the patient of hemoglobin A1c of 6.2, slightly worse than before.   CBC showed slight anemia but stable from a month ago

## 2019-09-30 RX ORDER — LENALIDOMIDE 15 MG/1
CAPSULE ORAL
Qty: 21 CAPSULE | Refills: 0 | Status: SHIPPED | OUTPATIENT
Start: 2019-09-30 | End: 2019-10-29

## 2019-10-04 ENCOUNTER — APPOINTMENT (OUTPATIENT)
Dept: HEMATOLOGY/ONCOLOGY | Age: 69
End: 2019-10-04
Attending: INTERNAL MEDICINE
Payer: MEDICARE

## 2019-10-11 ENCOUNTER — OFFICE VISIT (OUTPATIENT)
Dept: HEMATOLOGY/ONCOLOGY | Age: 69
End: 2019-10-11
Attending: INTERNAL MEDICINE
Payer: MEDICARE

## 2019-10-11 ENCOUNTER — APPOINTMENT (OUTPATIENT)
Dept: HEMATOLOGY/ONCOLOGY | Age: 69
End: 2019-10-11
Attending: INTERNAL MEDICINE
Payer: MEDICARE

## 2019-10-11 VITALS
RESPIRATION RATE: 20 BRPM | BODY MASS INDEX: 36 KG/M2 | WEIGHT: 267.19 LBS | SYSTOLIC BLOOD PRESSURE: 112 MMHG | HEART RATE: 66 BPM | DIASTOLIC BLOOD PRESSURE: 66 MMHG | OXYGEN SATURATION: 97 % | TEMPERATURE: 97 F

## 2019-10-11 DIAGNOSIS — C90.00 MULTIPLE MYELOMA, REMISSION STATUS UNSPECIFIED (HCC): Primary | ICD-10-CM

## 2019-10-11 PROCEDURE — 82310 ASSAY OF CALCIUM: CPT

## 2019-10-11 PROCEDURE — 36415 COLL VENOUS BLD VENIPUNCTURE: CPT

## 2019-10-11 PROCEDURE — 82565 ASSAY OF CREATININE: CPT

## 2019-10-11 NOTE — PROGRESS NOTES
Pt here for Zometa infusion. Pt reports back in September he noted upper right jaw pain. Pt states he saw his dentist and had exam and xrays. All were normal. He was going out of town and MD placed him on several days of PO abx.   No other issues until las

## 2019-10-25 ENCOUNTER — TELEPHONE (OUTPATIENT)
Dept: HEMATOLOGY/ONCOLOGY | Facility: HOSPITAL | Age: 69
End: 2019-10-25

## 2019-10-25 ENCOUNTER — PATIENT OUTREACH (OUTPATIENT)
Dept: CASE MANAGEMENT | Age: 69
End: 2019-10-25

## 2019-10-25 NOTE — PROGRESS NOTES
Left message regarding CCM services with no answer. Will continue contacting to discuss.    Contact number left [phone]

## 2019-10-30 RX ORDER — LENALIDOMIDE 15 MG/1
CAPSULE ORAL
Qty: 21 CAPSULE | Refills: 0 | Status: SHIPPED | OUTPATIENT
Start: 2019-10-30 | End: 2019-11-22

## 2019-11-01 ENCOUNTER — APPOINTMENT (OUTPATIENT)
Dept: HEMATOLOGY/ONCOLOGY | Age: 69
End: 2019-11-01
Attending: INTERNAL MEDICINE
Payer: MEDICARE

## 2019-11-08 ENCOUNTER — APPOINTMENT (OUTPATIENT)
Dept: HEMATOLOGY/ONCOLOGY | Age: 69
End: 2019-11-08
Attending: INTERNAL MEDICINE
Payer: MEDICARE

## 2019-11-14 ENCOUNTER — PATIENT OUTREACH (OUTPATIENT)
Dept: CASE MANAGEMENT | Age: 69
End: 2019-11-14

## 2019-11-14 ENCOUNTER — TELEPHONE (OUTPATIENT)
Dept: FAMILY MEDICINE CLINIC | Facility: CLINIC | Age: 69
End: 2019-11-14

## 2019-11-16 DIAGNOSIS — I10 DIABETES MELLITUS WITH COINCIDENT HYPERTENSION (HCC): ICD-10-CM

## 2019-11-16 DIAGNOSIS — E11.9 DIABETES MELLITUS WITH COINCIDENT HYPERTENSION (HCC): ICD-10-CM

## 2019-11-18 DIAGNOSIS — C90.00 MULTIPLE MYELOMA, STAGE 3 (HCC): ICD-10-CM

## 2019-11-18 RX ORDER — ACYCLOVIR 400 MG/1
TABLET ORAL
Qty: 60 TABLET | Refills: 6 | Status: ON HOLD | OUTPATIENT
Start: 2019-11-18 | End: 2019-12-30

## 2019-11-19 NOTE — TELEPHONE ENCOUNTER
LOV 9/19    LAST LAB 9/19    LAST RX   Glucose Blood (ONETOUCH ULTRA BLUE) In Vitro Strip 100 strip 3 9/20/2018         Next OV 12/5/2019      PROTOCOL  Diabetic Supplies Protocol Passed    Refilled x 3 months.

## 2019-11-22 ENCOUNTER — TELEPHONE (OUTPATIENT)
Dept: HEMATOLOGY/ONCOLOGY | Facility: HOSPITAL | Age: 69
End: 2019-11-22

## 2019-12-04 ENCOUNTER — LAB ENCOUNTER (OUTPATIENT)
Dept: LAB | Age: 69
End: 2019-12-04
Attending: FAMILY MEDICINE
Payer: MEDICARE

## 2019-12-04 DIAGNOSIS — E11.9 DIABETES MELLITUS WITH COINCIDENT HYPERTENSION (HCC): ICD-10-CM

## 2019-12-04 DIAGNOSIS — I10 DIABETES MELLITUS WITH COINCIDENT HYPERTENSION (HCC): ICD-10-CM

## 2019-12-04 PROCEDURE — 83036 HEMOGLOBIN GLYCOSYLATED A1C: CPT

## 2019-12-04 PROCEDURE — 80053 COMPREHEN METABOLIC PANEL: CPT

## 2019-12-04 PROCEDURE — 36415 COLL VENOUS BLD VENIPUNCTURE: CPT

## 2019-12-05 ENCOUNTER — OFFICE VISIT (OUTPATIENT)
Dept: FAMILY MEDICINE CLINIC | Facility: CLINIC | Age: 69
End: 2019-12-05
Payer: MEDICARE

## 2019-12-05 VITALS
HEIGHT: 72 IN | HEART RATE: 79 BPM | RESPIRATION RATE: 16 BRPM | BODY MASS INDEX: 36.3 KG/M2 | SYSTOLIC BLOOD PRESSURE: 118 MMHG | TEMPERATURE: 98 F | DIASTOLIC BLOOD PRESSURE: 64 MMHG | WEIGHT: 268 LBS | OXYGEN SATURATION: 95 %

## 2019-12-05 DIAGNOSIS — E66.01 MORBID OBESITY (HCC): ICD-10-CM

## 2019-12-05 DIAGNOSIS — C90.00 MULTIPLE MYELOMA, REMISSION STATUS UNSPECIFIED (HCC): ICD-10-CM

## 2019-12-05 DIAGNOSIS — C90.00 MYELOMA KIDNEY DISEASE (HCC): ICD-10-CM

## 2019-12-05 DIAGNOSIS — E11.9 DIET-CONTROLLED DIABETES MELLITUS (HCC): ICD-10-CM

## 2019-12-05 DIAGNOSIS — Z94.84 HX OF STEM CELL TRANSPLANT (HCC): ICD-10-CM

## 2019-12-05 DIAGNOSIS — Z00.00 ENCOUNTER FOR ANNUAL HEALTH EXAMINATION: Primary | ICD-10-CM

## 2019-12-05 DIAGNOSIS — N08 MYELOMA KIDNEY DISEASE (HCC): ICD-10-CM

## 2019-12-05 DIAGNOSIS — I10 ESSENTIAL HYPERTENSION: ICD-10-CM

## 2019-12-05 PROCEDURE — G0439 PPPS, SUBSEQ VISIT: HCPCS | Performed by: FAMILY MEDICINE

## 2019-12-06 ENCOUNTER — APPOINTMENT (OUTPATIENT)
Dept: HEMATOLOGY/ONCOLOGY | Age: 69
End: 2019-12-06
Attending: INTERNAL MEDICINE
Payer: MEDICARE

## 2019-12-06 NOTE — PROGRESS NOTES
HPI:   Naomi Davenport is a 68year old male who presents for a Medicare Subsequent Annual Wellness visit (Pt already had Initial Annual Wellness).   Patient is here for his annual visit  Has history of multiple myeloma status post bone marrow transplant [facility]  Kimi García MD (NEPHROLOGY)    Patient Active Problem List:     Impotence of organic origin     Generalized osteoarthrosis of hand     Morbid obesity ([address]     Essential hypertension     Hypertension     Multiple myeloma ([address]     Myeloma kidney d Take 1 tablet by mouth. acetaminophen 500 MG Oral Tab, Take 500 mg by mouth every 6 (six) hours as needed for Pain. Calcium Carbonate Antacid 500 MG Oral Chew Tab, Chew 2 tablets (1,000 mg total) by mouth 2 (two) times daily.   aspirin (ASPIR-81) 81 MG Or or numbness    EXAM:   /64   Pulse 79   Temp 98 °F (36.7 °C) (Oral)   Resp 16   Ht 72\"   Wt 268 lb (121.6 kg)   SpO2 95%   BMI 36.35 kg/m²   Estimated body mass index is 36.35 kg/m² as calculated from the following:    Height as of this encounter: 7 09/29/2019   • TDAP 08/19/2015   • Zoster Vaccine Live (Zostavax) 08/20/2013   Pended Date(s) Pended   • HIGH DOSE FLU 65 YRS AND OLDER PRSV FREE SINGLE D (11923) FLU CLINIC 12/12/2016        ASSESSMENT AND OTHER RELEVANT CHRONIC CONDITIONS:   Kasi Jones 40        EKG - w/ Initial Preventative Physical Exam only, or if medically necessary Electrocardiogram date01/21/2019    Colorectal Cancer Screening      Colonoscopy Screen every 10 years Colonoscopy due on 01/01/2022 Update Health Maintenance if applicab diuretics, anticonvulsants.)    Potassium  Annually Potassium (mmol/L)   Date Value   12/04/2019 4.1     POTASSIUM (mmol/L)   Date Value   05/21/2014 4.3    No flowsheet data found.     Creatinine  Annually CREATININE (mg/dL)   Date Value   05/21/2014 0.75

## 2019-12-10 ENCOUNTER — OFFICE VISIT (OUTPATIENT)
Dept: NEPHROLOGY | Facility: CLINIC | Age: 69
End: 2019-12-10
Payer: MEDICARE

## 2019-12-10 VITALS — DIASTOLIC BLOOD PRESSURE: 74 MMHG | SYSTOLIC BLOOD PRESSURE: 122 MMHG | BODY MASS INDEX: 35 KG/M2 | WEIGHT: 260 LBS

## 2019-12-10 DIAGNOSIS — N08 MYELOMA KIDNEY (HCC): Primary | ICD-10-CM

## 2019-12-10 DIAGNOSIS — C90.00 MYELOMA KIDNEY (HCC): Primary | ICD-10-CM

## 2019-12-10 PROCEDURE — 99213 OFFICE O/P EST LOW 20 MIN: CPT | Performed by: INTERNAL MEDICINE

## 2019-12-10 NOTE — PROGRESS NOTES
Nephrology Progress Note      Alcira Wiseman is a 68year old male. HPI:   Patient presents with: Other: myeloma kidney disease      67 yo male with hx of MM, MADDI related to myeloma kidney + severe hypercalcemia, DM, HTN presents for follow up.  He was SHORTNESS OF BREATH    Comment:Respiratory distress  Beta Adrenergic Blo*      Latex                       Comment:Surgical tape  Welts, burning of skin, itching  Statins                       ROS:     Denies fever/chills  Denies 10.1 mg/dL  8.9 9.4   BUN/CREAT Ratio Latest Ref Range: 10.0 - 20.0    20.2 (H)   eGFR NON-AFR.  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    Required plasmapheresis/HD treatments in the hos

## 2019-12-11 ENCOUNTER — LAB ENCOUNTER (OUTPATIENT)
Dept: LAB | Age: 69
End: 2019-12-11
Attending: INTERNAL MEDICINE
Payer: MEDICARE

## 2019-12-11 DIAGNOSIS — N08 MYELOMA KIDNEY (HCC): ICD-10-CM

## 2019-12-11 DIAGNOSIS — C90.00 MYELOMA KIDNEY (HCC): ICD-10-CM

## 2019-12-11 PROCEDURE — 83735 ASSAY OF MAGNESIUM: CPT

## 2019-12-11 PROCEDURE — 36415 COLL VENOUS BLD VENIPUNCTURE: CPT

## 2019-12-11 PROCEDURE — 84100 ASSAY OF PHOSPHORUS: CPT

## 2019-12-11 PROCEDURE — 81003 URINALYSIS AUTO W/O SCOPE: CPT

## 2019-12-11 PROCEDURE — 80048 BASIC METABOLIC PNL TOTAL CA: CPT

## 2019-12-11 PROCEDURE — 85025 COMPLETE CBC W/AUTO DIFF WBC: CPT

## 2019-12-20 NOTE — TELEPHONE ENCOUNTER
Snehal at [phone] option 3 and Fax# is [phone] is calling for a urgent refill for Revlimid, the patient starts his medication on 12/25/19. It need to be faxed in by today per Rajesh.

## 2019-12-30 ENCOUNTER — APPOINTMENT (OUTPATIENT)
Dept: CT IMAGING | Age: 69
DRG: 871 | End: 2019-12-30
Attending: EMERGENCY MEDICINE
Payer: MEDICARE

## 2019-12-30 ENCOUNTER — HOSPITAL ENCOUNTER (INPATIENT)
Facility: HOSPITAL | Age: 69
LOS: 2 days | Discharge: HOME | DRG: 871 | End: 2020-01-01
Attending: EMERGENCY MEDICINE | Admitting: HOSPITALIST
Payer: MEDICARE

## 2019-12-30 ENCOUNTER — APPOINTMENT (OUTPATIENT)
Dept: GENERAL RADIOLOGY | Age: 69
DRG: 871 | End: 2019-12-30
Attending: EMERGENCY MEDICINE
Payer: MEDICARE

## 2019-12-30 DIAGNOSIS — C90.00 MULTIPLE MYELOMA, REMISSION STATUS UNSPECIFIED (HCC): ICD-10-CM

## 2019-12-30 DIAGNOSIS — J18.9 COMMUNITY ACQUIRED PNEUMONIA, UNSPECIFIED LATERALITY: Primary | ICD-10-CM

## 2019-12-30 DIAGNOSIS — R09.02 HYPOXIA: ICD-10-CM

## 2019-12-30 LAB
ADENOVIRUS PCR:: NEGATIVE
ALBUMIN SERPL-MCNC: 3.4 G/DL (ref 3.4–5)
ALBUMIN/GLOB SERPL: 0.8 {RATIO} (ref 1–2)
ALP LIVER SERPL-CCNC: 86 U/L (ref 45–117)
ALT SERPL-CCNC: 40 U/L (ref 16–61)
ANION GAP SERPL CALC-SCNC: 10 MMOL/L (ref 0–18)
AST SERPL-CCNC: 40 U/L (ref 15–37)
B PERT DNA SPEC QL NAA+PROBE: NEGATIVE
BASOPHILS # BLD AUTO: 0.01 X10(3) UL (ref 0–0.2)
BASOPHILS NFR BLD AUTO: 0.3 %
BILIRUB SERPL-MCNC: 0.8 MG/DL (ref 0.1–2)
BUN BLD-MCNC: 21 MG/DL (ref 7–18)
BUN/CREAT SERPL: 14.1 (ref 10–20)
C PNEUM DNA SPEC QL NAA+PROBE: NEGATIVE
CALCIUM BLD-MCNC: 8 MG/DL (ref 8.5–10.1)
CHLORIDE SERPL-SCNC: 102 MMOL/L (ref 98–112)
CO2 SERPL-SCNC: 24 MMOL/L (ref 21–32)
CORONAVIRUS 229E PCR:: NEGATIVE
CORONAVIRUS HKU1 PCR:: NEGATIVE
CORONAVIRUS NL63 PCR:: NEGATIVE
CORONAVIRUS OC43 PCR:: NEGATIVE
CREAT BLD-MCNC: 1.49 MG/DL (ref 0.7–1.3)
DEPRECATED RDW RBC AUTO: 52.4 FL (ref 35.1–46.3)
EOSINOPHIL # BLD AUTO: 0 X10(3) UL (ref 0–0.7)
EOSINOPHIL NFR BLD AUTO: 0 %
ERYTHROCYTE [DISTWIDTH] IN BLOOD BY AUTOMATED COUNT: 14.2 % (ref 11–15)
FLUAV RNA SPEC QL NAA+PROBE: NEGATIVE
FLUBV RNA SPEC QL NAA+PROBE: NEGATIVE
GLOBULIN PLAS-MCNC: 4.2 G/DL (ref 2.8–4.4)
GLUCOSE BLD-MCNC: 110 MG/DL (ref 70–99)
GLUCOSE BLD-MCNC: 120 MG/DL (ref 70–99)
GLUCOSE BLD-MCNC: 160 MG/DL (ref 70–99)
HCT VFR BLD AUTO: 40.5 % (ref 39–53)
HGB BLD-MCNC: 13.5 G/DL (ref 13–17.5)
IMM GRANULOCYTES # BLD AUTO: 0 X10(3) UL (ref 0–1)
IMM GRANULOCYTES NFR BLD: 0 %
LACTATE SERPL-SCNC: 1 MMOL/L (ref 0.4–2)
LACTATE SERPL-SCNC: 1.4 MMOL/L (ref 0.4–2)
LYMPHOCYTES # BLD AUTO: 0.5 X10(3) UL (ref 1–4)
LYMPHOCYTES NFR BLD AUTO: 13.9 %
M PROTEIN MFR SERPL ELPH: 7.6 G/DL (ref 6.4–8.2)
MCH RBC QN AUTO: 33.3 PG (ref 26–34)
MCHC RBC AUTO-ENTMCNC: 33.3 G/DL (ref 31–37)
MCV RBC AUTO: 100 FL (ref 80–100)
METAPNEUMOVIRUS PCR:: NEGATIVE
MONOCYTES # BLD AUTO: 0.8 X10(3) UL (ref 0.1–1)
MONOCYTES NFR BLD AUTO: 22.2 %
MYCOPLASMA PNEUMONIA PCR:: NEGATIVE
NEUTROPHILS # BLD AUTO: 2.3 X10 (3) UL (ref 1.5–7.7)
NEUTROPHILS # BLD AUTO: 2.3 X10(3) UL (ref 1.5–7.7)
NEUTROPHILS NFR BLD AUTO: 63.6 %
OSMOLALITY SERPL CALC.SUM OF ELEC: 288 MOSM/KG (ref 275–295)
PARAINFLUENZA 1 PCR:: NEGATIVE
PARAINFLUENZA 2 PCR:: NEGATIVE
PARAINFLUENZA 3 PCR:: NEGATIVE
PARAINFLUENZA 4 PCR:: NEGATIVE
PLATELET # BLD AUTO: 120 10(3)UL (ref 150–450)
POCT INFLUENZA A: NEGATIVE
POCT INFLUENZA B: NEGATIVE
POTASSIUM SERPL-SCNC: 4.1 MMOL/L (ref 3.5–5.1)
RBC # BLD AUTO: 4.05 X10(6)UL (ref 3.8–5.8)
RHINOVIRUS/ENTERO PCR:: NEGATIVE
RSV RNA SPEC QL NAA+PROBE: POSITIVE
SODIUM SERPL-SCNC: 136 MMOL/L (ref 136–145)
WBC # BLD AUTO: 3.6 X10(3) UL (ref 4–11)

## 2019-12-30 PROCEDURE — 99223 1ST HOSP IP/OBS HIGH 75: CPT | Performed by: HOSPITALIST

## 2019-12-30 PROCEDURE — 71045 X-RAY EXAM CHEST 1 VIEW: CPT | Performed by: EMERGENCY MEDICINE

## 2019-12-30 PROCEDURE — 71250 CT THORAX DX C-: CPT | Performed by: EMERGENCY MEDICINE

## 2019-12-30 RX ORDER — DEXTROSE MONOHYDRATE 25 G/50ML
50 INJECTION, SOLUTION INTRAVENOUS
Status: DISCONTINUED | OUTPATIENT
Start: 2019-12-30 | End: 2020-01-01

## 2019-12-30 RX ORDER — ATORVASTATIN CALCIUM 20 MG/1
20 TABLET, FILM COATED ORAL DAILY
COMMUNITY
End: 2020-06-08

## 2019-12-30 RX ORDER — ASPIRIN 81 MG/1
81 TABLET ORAL DAILY
Status: DISCONTINUED | OUTPATIENT
Start: 2019-12-30 | End: 2020-01-01

## 2019-12-30 RX ORDER — ATORVASTATIN CALCIUM 20 MG/1
20 TABLET, FILM COATED ORAL
Status: DISCONTINUED | OUTPATIENT
Start: 2019-12-31 | End: 2019-12-30

## 2019-12-30 RX ORDER — LISINOPRIL 10 MG/1
10 TABLET ORAL DAILY
Status: DISCONTINUED | OUTPATIENT
Start: 2019-12-30 | End: 2020-01-01

## 2019-12-30 RX ORDER — TRAMADOL HYDROCHLORIDE 50 MG/1
50 TABLET ORAL EVERY 6 HOURS PRN
Status: DISCONTINUED | OUTPATIENT
Start: 2019-12-30 | End: 2019-12-30 | Stop reason: ALTCHOICE

## 2019-12-30 RX ORDER — LEVOFLOXACIN 5 MG/ML
500 INJECTION, SOLUTION INTRAVENOUS ONCE
Status: COMPLETED | OUTPATIENT
Start: 2019-12-30 | End: 2019-12-30

## 2019-12-30 RX ORDER — ATORVASTATIN CALCIUM 20 MG/1
20 TABLET, FILM COATED ORAL DAILY
Status: DISCONTINUED | OUTPATIENT
Start: 2019-12-30 | End: 2020-01-01

## 2019-12-30 RX ORDER — LISINOPRIL 10 MG/1
10 TABLET ORAL EVERY EVENING
Status: DISCONTINUED | OUTPATIENT
Start: 2019-12-30 | End: 2019-12-30

## 2019-12-30 RX ORDER — ACETAMINOPHEN 500 MG
1000 TABLET ORAL EVERY 6 HOURS PRN
Status: DISCONTINUED | OUTPATIENT
Start: 2019-12-30 | End: 2019-12-30

## 2019-12-30 RX ORDER — ENOXAPARIN SODIUM 100 MG/ML
40 INJECTION SUBCUTANEOUS DAILY
Status: DISCONTINUED | OUTPATIENT
Start: 2019-12-30 | End: 2020-01-01

## 2019-12-30 RX ORDER — ACYCLOVIR 400 MG/1
400 TABLET ORAL 2 TIMES DAILY
COMMUNITY
End: 2020-05-07

## 2019-12-30 RX ORDER — ACYCLOVIR 400 MG/1
400 TABLET ORAL 2 TIMES DAILY
Status: DISCONTINUED | OUTPATIENT
Start: 2019-12-30 | End: 2020-01-01

## 2019-12-30 RX ORDER — ACETAMINOPHEN 500 MG
1000 TABLET ORAL ONCE
Status: COMPLETED | OUTPATIENT
Start: 2019-12-30 | End: 2019-12-30

## 2019-12-30 RX ORDER — IPRATROPIUM BROMIDE AND ALBUTEROL SULFATE 2.5; .5 MG/3ML; MG/3ML
SOLUTION RESPIRATORY (INHALATION)
Status: COMPLETED
Start: 2019-12-30 | End: 2019-12-30

## 2019-12-30 RX ORDER — METOCLOPRAMIDE HYDROCHLORIDE 5 MG/ML
10 INJECTION INTRAMUSCULAR; INTRAVENOUS EVERY 8 HOURS PRN
Status: DISCONTINUED | OUTPATIENT
Start: 2019-12-30 | End: 2020-01-01

## 2019-12-30 RX ORDER — ACYCLOVIR 400 MG/1
400 TABLET ORAL 2 TIMES DAILY
Status: DISCONTINUED | OUTPATIENT
Start: 2019-12-30 | End: 2019-12-30

## 2019-12-30 RX ORDER — ONDANSETRON 2 MG/ML
4 INJECTION INTRAMUSCULAR; INTRAVENOUS EVERY 6 HOURS PRN
Status: DISCONTINUED | OUTPATIENT
Start: 2019-12-30 | End: 2020-01-01

## 2019-12-30 RX ORDER — ACETAMINOPHEN 500 MG
1000 TABLET ORAL NIGHTLY
Status: DISCONTINUED | OUTPATIENT
Start: 2019-12-30 | End: 2020-01-01

## 2019-12-30 RX ORDER — LEVOFLOXACIN 5 MG/ML
750 INJECTION, SOLUTION INTRAVENOUS EVERY 24 HOURS
Status: DISCONTINUED | OUTPATIENT
Start: 2019-12-31 | End: 2020-01-01

## 2019-12-30 RX ORDER — IPRATROPIUM BROMIDE AND ALBUTEROL SULFATE 2.5; .5 MG/3ML; MG/3ML
3 SOLUTION RESPIRATORY (INHALATION) ONCE
Status: DISCONTINUED | OUTPATIENT
Start: 2019-12-30 | End: 2020-01-01

## 2019-12-30 RX ORDER — LISINOPRIL 10 MG/1
10 TABLET ORAL DAILY
COMMUNITY
End: 2020-02-18

## 2019-12-30 RX ORDER — SODIUM CHLORIDE 9 MG/ML
INJECTION, SOLUTION INTRAVENOUS CONTINUOUS
Status: DISCONTINUED | OUTPATIENT
Start: 2019-12-30 | End: 2019-12-31

## 2019-12-30 NOTE — ED PROVIDER NOTES
Patient Seen in: [facility] Emergency Department In [place]      History   Patient presents with:  Cough/URI    Stated Complaint: cough    HPI    47-year-old male presents emergency department with cough for 2 weeks.   Has been also short of breath for the Systems    Positive for stated complaint: cough  Other systems are as noted in HPI. Constitutional and vital signs reviewed. All other systems reviewed and negative except as noted above.     Physical Exam     ED Triage Vitals [12/30/19 0740]    Narrative: This assay is a rapid molecular in vitro test utilizing nucleic acid amplification of influenza A and B viral RNA. CBC WITH DIFFERENTIAL WITH PLATELET    Narrative:      The following orders were created for panel order CBC WITH DIFFERENTIA condition. The patient required my constant attention. Time was spent ordering, reviewing, and interpreting ancillary testing and imaging. The patient was frequently reevaluated, and I made frequent checks of  vital signs and monitor.  Nursing notes were

## 2019-12-30 NOTE — ED INITIAL ASSESSMENT (HPI)
Pt presented to ER c/o cough for 2 weeks. Pt has been short of breath for one week.  Pt c/o nausea but denies vomiting

## 2019-12-30 NOTE — H&P
MARIANA HOSPITALIST  History and Physical     [facility] SERVICES Patient Status:  Inpatient    1950 MRN [id number]   [facility] 4NW-A Attending Pelon Rivers MD   Hosp Day # 0 PCP Samia Brenner MD     Chief Complaint: SOB    History of P Laterality Date   • BACK SURGERY  1/2001    lower back L4 L5 for herniated disc   • COLONOSCOPY      202, 2012 Dr. Meena Armenta, polyps   • OTHER SURGICAL HISTORY  1/31/2007    total prostatectomy   • SKIN SURGERY  1997    [facility]     Social History:  reports that h DAILY, Disp: 100 strip, Rfl: 2  ONETOUCH ULTRASOFT LANCETS Does not apply Misc, TEST BID, Disp: 200 each, Rfl: 0  TraMADol HCl 50 MG Oral Tab, Take 1 tablet (50 mg total) by mouth every 6 (six) hours as needed for Pain.  (Patient not taking: Reported on 12/ and plan as above  3. HTN- cont home meds   4. DM II- diet controlled  5. Obesity BMI   6. DL- statin   7.  Multiple Myeloma    Quality:  · DVT Prophylaxis: lovenox   · CODE status: full  · Galan: no  Plan of care discussed with patient     CECILIA Langley

## 2019-12-31 LAB
ANION GAP SERPL CALC-SCNC: 2 MMOL/L (ref 0–18)
BUN BLD-MCNC: 15 MG/DL (ref 7–18)
BUN/CREAT SERPL: 14.9 (ref 10–20)
CALCIUM BLD-MCNC: 8 MG/DL (ref 8.5–10.1)
CHLORIDE SERPL-SCNC: 111 MMOL/L (ref 98–112)
CO2 SERPL-SCNC: 28 MMOL/L (ref 21–32)
CREAT BLD-MCNC: 1.01 MG/DL (ref 0.7–1.3)
GLUCOSE BLD-MCNC: 113 MG/DL (ref 70–99)
GLUCOSE BLD-MCNC: 79 MG/DL (ref 70–99)
GLUCOSE BLD-MCNC: 86 MG/DL (ref 70–99)
GLUCOSE BLD-MCNC: 91 MG/DL (ref 70–99)
GLUCOSE BLD-MCNC: 94 MG/DL (ref 70–99)
OSMOLALITY SERPL CALC.SUM OF ELEC: 294 MOSM/KG (ref 275–295)
POTASSIUM SERPL-SCNC: 4.5 MMOL/L (ref 3.5–5.1)
SODIUM SERPL-SCNC: 141 MMOL/L (ref 136–145)

## 2019-12-31 PROCEDURE — 99232 SBSQ HOSP IP/OBS MODERATE 35: CPT | Performed by: HOSPITALIST

## 2019-12-31 NOTE — PLAN OF CARE
Pt care assumed @ 1930. VSS. On 5L 02. Aox4. All evening meds per MAR. IVF infusing. No insulin coverage needed this shift. All needs met. Will continue to monitor.    Problem: RISK FOR INFECTION - ADULT  Goal: Absence of fever/infection during anticipated

## 2019-12-31 NOTE — CM/SW NOTE
12/31/19 1400   CM/SW Screening   Referral Source Physician   Information Source Chart review;Nursing rounds   Patient's Mental Status Alert;Oriented   Patient's [address]   Patient lives with Spouse   Patient Status Prior to Admission   In

## 2019-12-31 NOTE — PLAN OF CARE
Afebrile , alert , droplet isolation in place for RSV, ocassional cough, voice wraspy, ambulating saturation on RA , sitting at bedside =96%, ambulating to nurses station and back to room maintains constant sat of 94%, begin to wean o2 , IV Abx

## 2019-12-31 NOTE — PROGRESS NOTES
MARIANA HOSPITALIST  Progress Note     Naz Josue Patient Status:  Inpatient    1950 MRN [id number]   [facility] 4NW-A Attending Vandana Hanley MD   Hosp Day # 1 PCP Janice Montiel MD     Chief Complaint: PNA  S:  Feels better.  No resp failure- still on 4-5L without much improvement but feels better  1. IV levaquin   2. +RSV- droplet precaution  3. Start steroid burst   4. Nebs PRN  5. O2 walk   2. Suspected sepsis with MADDI and leukopenia, hypoxia  1. Improving so stop IVF  3.  HTN-

## 2019-12-31 NOTE — PLAN OF CARE
Admitted from ED [place] with history of coughing and SOB for several days. Alert and oriented, was febrile in ED, afebrile on arrival to floor. Has occasional non productive cough, SOB off of O2 and with exertion.  On 5 liters via nasal cannula, satura

## 2020-01-01 VITALS
BODY MASS INDEX: 34.06 KG/M2 | TEMPERATURE: 98 F | HEART RATE: 62 BPM | RESPIRATION RATE: 18 BRPM | DIASTOLIC BLOOD PRESSURE: 66 MMHG | HEIGHT: 72.84 IN | SYSTOLIC BLOOD PRESSURE: 126 MMHG | WEIGHT: 257 LBS | OXYGEN SATURATION: 97 %

## 2020-01-01 LAB — GLUCOSE BLD-MCNC: 97 MG/DL (ref 70–99)

## 2020-01-01 PROCEDURE — 99239 HOSP IP/OBS DSCHRG MGMT >30: CPT | Performed by: HOSPITALIST

## 2020-01-01 RX ORDER — PREDNISONE 20 MG/1
40 TABLET ORAL DAILY
Qty: 6 TABLET | Refills: 0 | Status: SHIPPED | OUTPATIENT
Start: 2020-01-01 | End: 2020-02-24

## 2020-01-01 RX ORDER — LEVOFLOXACIN 750 MG/1
750 TABLET ORAL DAILY
Qty: 3 TABLET | Refills: 0 | Status: SHIPPED | OUTPATIENT
Start: 2020-01-01 | End: 2020-02-24

## 2020-01-01 NOTE — DISCHARGE SUMMARY
MARIANA HOSPITALIST  DISCHARGE SUMMARY     [facility] SERVICES Patient Status:  Inpatient    1950 MRN [id number]   [facility] 4NW-A Attending Padmini Kahn MD   Hosp Day # 2 PCP Holden Oro MD     Date of Admission: 2019  Date of Gerry Ramirez findings and recommendations (brief descriptions):  • Per Brief Synopsis of Hospital Course    Lab/Test results pending at Discharge:   · None     Consultants:  • None     Discharge Medication List:     Discharge Medications      START taking these medicat strip  Refills:  2     ONETOUCH ULTRASOFT LANCETS Misc      TEST BID   Quantity:  200 each  Refills:  0     REVLIMID 15 MG Caps  Generic drug:  Lenalidomide      Take 15 mg by mouth See Admin Instructions.  Take 1 capsule daily for 21 days on then 7 days of

## 2020-01-01 NOTE — PLAN OF CARE
NURSING DISCHARGE NOTE    Discharged Home via Ambulatory. Accompanied by RN  Belongings Taken by patient/family. DC instructions and rx given and explained. Pt ambulated around unit with mask. Good handwashing and respiratory hygiene reviewed.  Pt adelina

## 2020-01-01 NOTE — PLAN OF CARE
Pt care assumed @ 1930. Pt aox4. VSS. On room air. No dyspnea w/ exertion when ambulating. All evening meds per MAR. Pt denies pain. Droplet for RSV maintained. All needs met @ this time. Will continue to monitor for pt needs.    Problem: RISK FOR INFECTION

## 2020-01-02 ENCOUNTER — PATIENT OUTREACH (OUTPATIENT)
Dept: CASE MANAGEMENT | Age: 70
End: 2020-01-02

## 2020-01-02 DIAGNOSIS — J18.9 COMMUNITY ACQUIRED PNEUMONIA, UNSPECIFIED LATERALITY: ICD-10-CM

## 2020-01-02 DIAGNOSIS — Z02.9 ENCOUNTERS FOR UNSPECIFIED ADMINISTRATIVE PURPOSE: ICD-10-CM

## 2020-01-02 PROCEDURE — 1111F DSCHRG MED/CURRENT MED MERGE: CPT

## 2020-01-03 ENCOUNTER — OFFICE VISIT (OUTPATIENT)
Dept: HEMATOLOGY/ONCOLOGY | Age: 70
End: 2020-01-03
Attending: INTERNAL MEDICINE
Payer: MEDICARE

## 2020-01-03 VITALS
WEIGHT: 265.63 LBS | SYSTOLIC BLOOD PRESSURE: 126 MMHG | HEART RATE: 67 BPM | RESPIRATION RATE: 20 BRPM | DIASTOLIC BLOOD PRESSURE: 80 MMHG | BODY MASS INDEX: 35.21 KG/M2 | HEIGHT: 72.99 IN | OXYGEN SATURATION: 97 % | TEMPERATURE: 99 F

## 2020-01-03 DIAGNOSIS — C90.00 MULTIPLE MYELOMA, REMISSION STATUS UNSPECIFIED (HCC): Primary | ICD-10-CM

## 2020-01-03 LAB
CALCIUM BLD-MCNC: 9 MG/DL (ref 8.5–10.1)
CREAT BLD-MCNC: 1.44 MG/DL (ref 0.7–1.3)

## 2020-01-03 PROCEDURE — 82310 ASSAY OF CALCIUM: CPT

## 2020-01-03 PROCEDURE — 96374 THER/PROPH/DIAG INJ IV PUSH: CPT

## 2020-01-03 PROCEDURE — 36415 COLL VENOUS BLD VENIPUNCTURE: CPT

## 2020-01-03 PROCEDURE — 82565 ASSAY OF CREATININE: CPT

## 2020-01-03 NOTE — PROGRESS NOTES
Pt here for Zometa.   Arrives Ambulating independently, accompanied by Self           Patient reports possible pregnancy since last therapy cycle: Not Applicable    Modifications in dose or schedule: No     Frequency of blood return and site check throughou

## 2020-01-03 NOTE — PROGRESS NOTES
Initial Post Discharge Follow Up   Discharge Date: 1/1/20  Contact Date: 1/3/2020    Consent Verification:  Assessment Completed With: Patient  HIPAA Verified?   Yes    Discharge Dx:  Pneumonia  +RSV    Was TCC ordered: no    General:   • How have you be tablet 0   • acyclovir 400 MG Oral Tab Take 400 mg by mouth 2 (two) times daily. • atorvastatin 20 MG Oral Tab Take 20 mg by mouth daily. • Lenalidomide (REVLIMID) 15 MG Oral Cap Take 15 mg by mouth See Admin Instructions.  Take 1 capsule daily for discuss: fever, chills, shaking, chest pain, productive cough, difficulty breathing)    Comments: Reviewed s/s of pneumonia reoccurrence with patient and he is aware when/if to return to ER or call 911.        Needs post D/C:   Now that you are home, are th [address] [id number] [facility] [address] [phone]          PCP TCM/HFU appointment: scheduled at D/C within 7-14 days  no     NCM Reviewed/scheduled/rescheduled PCP TCM/HFU appointment with pt:  Yes      Have you made all of

## 2020-01-09 ENCOUNTER — OFFICE VISIT (OUTPATIENT)
Dept: FAMILY MEDICINE CLINIC | Facility: CLINIC | Age: 70
End: 2020-01-09
Payer: MEDICARE

## 2020-01-09 VITALS
RESPIRATION RATE: 16 BRPM | OXYGEN SATURATION: 100 % | TEMPERATURE: 97 F | DIASTOLIC BLOOD PRESSURE: 70 MMHG | SYSTOLIC BLOOD PRESSURE: 128 MMHG | WEIGHT: 252 LBS | HEART RATE: 77 BPM | HEIGHT: 72.5 IN | BODY MASS INDEX: 33.76 KG/M2

## 2020-01-09 DIAGNOSIS — J18.9 COMMUNITY ACQUIRED PNEUMONIA, UNSPECIFIED LATERALITY: Primary | ICD-10-CM

## 2020-01-09 DIAGNOSIS — C90.00 MULTIPLE MYELOMA, REMISSION STATUS UNSPECIFIED (HCC): ICD-10-CM

## 2020-01-09 DIAGNOSIS — E11.9 DIET-CONTROLLED DIABETES MELLITUS (HCC): ICD-10-CM

## 2020-01-09 PROCEDURE — 99495 TRANSJ CARE MGMT MOD F2F 14D: CPT | Performed by: FAMILY MEDICINE

## 2020-01-09 NOTE — PROGRESS NOTES
HPI:    Shaun Merino is a 68year old male here today for hospital follow up.    He was discharged from Inpatient hospital, [facility] to Home   Admission Date: 12/30/19   Discharge Date: 1/1/20  Hospital Discharge Diagnoses (since 12/10/2019)     [address] [place] [address] [address] lingering cough that is getting better  No history of any fever, chest pain or shortness of breath    Allergies:  He is allergic to pcn [penicillins]; beta adrenergic blockers; latex; and statins.     Current Meds:  acyclovir 400 MG Oral Tab, Take 400 mg by complication, not stated as uncontrolled (10/2007), Unspecified essential hypertension, and Unspecified internal derangement of knee (6/20/2013).     He  has a past surgical history that includes back surgery (1/2001); colonoscopy; other surgical history (1 good BS's, no masses, HSM or tenderness  MUSCULOSKELETAL: back is not tender, FROM of the extremities  EXTREMITIES: no cyanosis, clubbing or edema  NEURO: Oriented times three, cranial nerves are intact, motor and sensory are grossly intact    ASSESSMENT/

## 2020-01-13 RX ORDER — LENALIDOMIDE 15 MG/1
CAPSULE ORAL
Qty: 21 CAPSULE | Refills: 0 | Status: SHIPPED | OUTPATIENT
Start: 2020-01-13 | End: 2020-02-07

## 2020-01-15 RX ORDER — LISINOPRIL 10 MG/1
TABLET ORAL
Qty: 30 TABLET | Refills: 0 | OUTPATIENT
Start: 2020-01-15

## 2020-01-15 NOTE — TELEPHONE ENCOUNTER
Name from pharmacy: LISINOPRIL 10MG TABLETS         Will file in chart as: LISINOPRIL 10 MG Oral Tab         Sig: TAKE 1 TABLET BY MOUTH DAILY. PATIENT NEEDS APPT FOR FURTHER REFILLS    Disp:  30 tablet    Refills:  0 (Pharmacy requested: Not specified)

## 2020-01-31 ENCOUNTER — OFFICE VISIT (OUTPATIENT)
Dept: HEMATOLOGY/ONCOLOGY | Age: 70
End: 2020-01-31
Attending: INTERNAL MEDICINE
Payer: MEDICARE

## 2020-01-31 VITALS
BODY MASS INDEX: 35.92 KG/M2 | WEIGHT: 271 LBS | HEIGHT: 72.99 IN | TEMPERATURE: 97 F | RESPIRATION RATE: 20 BRPM | OXYGEN SATURATION: 99 % | HEART RATE: 56 BPM | DIASTOLIC BLOOD PRESSURE: 52 MMHG | SYSTOLIC BLOOD PRESSURE: 123 MMHG

## 2020-01-31 DIAGNOSIS — C90.00 MULTIPLE MYELOMA, REMISSION STATUS UNSPECIFIED (HCC): Primary | ICD-10-CM

## 2020-01-31 DIAGNOSIS — M89.9 LYTIC BONE LESIONS ON XRAY: ICD-10-CM

## 2020-01-31 DIAGNOSIS — Z94.84 HX OF STEM CELL TRANSPLANT (HCC): ICD-10-CM

## 2020-01-31 LAB
ALBUMIN SERPL-MCNC: 3.4 G/DL (ref 3.4–5)
ALBUMIN/GLOB SERPL: 0.9 {RATIO} (ref 1–2)
ALP LIVER SERPL-CCNC: 89 U/L (ref 45–117)
ALT SERPL-CCNC: 29 U/L (ref 16–61)
ANION GAP SERPL CALC-SCNC: 6 MMOL/L (ref 0–18)
AST SERPL-CCNC: 21 U/L (ref 15–37)
BASOPHILS # BLD AUTO: 0.05 X10(3) UL (ref 0–0.2)
BASOPHILS NFR BLD AUTO: 1 %
BILIRUB SERPL-MCNC: 0.7 MG/DL (ref 0.1–2)
BUN BLD-MCNC: 21 MG/DL (ref 7–18)
BUN/CREAT SERPL: 18.1 (ref 10–20)
CALCIUM BLD-MCNC: 8.4 MG/DL (ref 8.5–10.1)
CHLORIDE SERPL-SCNC: 104 MMOL/L (ref 98–112)
CO2 SERPL-SCNC: 28 MMOL/L (ref 21–32)
CREAT BLD-MCNC: 1.16 MG/DL (ref 0.7–1.3)
DEPRECATED RDW RBC AUTO: 56.5 FL (ref 35.1–46.3)
EOSINOPHIL # BLD AUTO: 0.14 X10(3) UL (ref 0–0.7)
EOSINOPHIL NFR BLD AUTO: 2.8 %
ERYTHROCYTE [DISTWIDTH] IN BLOOD BY AUTOMATED COUNT: 15 % (ref 11–15)
GLOBULIN PLAS-MCNC: 3.7 G/DL (ref 2.8–4.4)
GLUCOSE BLD-MCNC: 110 MG/DL (ref 70–99)
HCT VFR BLD AUTO: 40 % (ref 39–53)
HGB BLD-MCNC: 12.8 G/DL (ref 13–17.5)
IMM GRANULOCYTES # BLD AUTO: 0.01 X10(3) UL (ref 0–1)
IMM GRANULOCYTES NFR BLD: 0.2 %
LDH SERPL L TO P-CCNC: 174 U/L (ref 87–241)
LYMPHOCYTES # BLD AUTO: 1.03 X10(3) UL (ref 1–4)
LYMPHOCYTES NFR BLD AUTO: 20.8 %
M PROTEIN MFR SERPL ELPH: 7.1 G/DL (ref 6.4–8.2)
MCH RBC QN AUTO: 32.6 PG (ref 26–34)
MCHC RBC AUTO-ENTMCNC: 32 G/DL (ref 31–37)
MCV RBC AUTO: 101.8 FL (ref 80–100)
MONOCYTES # BLD AUTO: 0.88 X10(3) UL (ref 0.1–1)
MONOCYTES NFR BLD AUTO: 17.7 %
NEUTROPHILS # BLD AUTO: 2.85 X10 (3) UL (ref 1.5–7.7)
NEUTROPHILS # BLD AUTO: 2.85 X10(3) UL (ref 1.5–7.7)
NEUTROPHILS NFR BLD AUTO: 57.5 %
OSMOLALITY SERPL CALC.SUM OF ELEC: 290 MOSM/KG (ref 275–295)
PATIENT FASTING Y/N/NP: NO
PLATELET # BLD AUTO: 195 10(3)UL (ref 150–450)
POTASSIUM SERPL-SCNC: 3.9 MMOL/L (ref 3.5–5.1)
RBC # BLD AUTO: 3.93 X10(6)UL (ref 3.8–5.8)
SODIUM SERPL-SCNC: 138 MMOL/L (ref 136–145)
WBC # BLD AUTO: 5 X10(3) UL (ref 4–11)

## 2020-01-31 PROCEDURE — 96374 THER/PROPH/DIAG INJ IV PUSH: CPT

## 2020-01-31 PROCEDURE — 99213 OFFICE O/P EST LOW 20 MIN: CPT | Performed by: INTERNAL MEDICINE

## 2020-01-31 NOTE — PROGRESS NOTES
Education Record    Learner:  Patient    Disease / Diagnosis: zometa    Barriers / Limitations:  None   Comments:    Method:  Brief focused   Comments:    General Topics:  Plan of care reviewed   Comments:    Outcome:  Shows understanding   Comments:

## 2020-01-31 NOTE — PROGRESS NOTES
Cancer Center Progress Note    Problem List:      Patient Active Problem List:     Impotence of organic origin     Generalized osteoarthrosis of hand     Morbid obesity ([address]     Essential hypertension     Hypertension     Multiple myeloma ([address]     Myelom for nausea, vomiting, change in bowel habits, diarrhea, constipation and abdominal pain. Integument/breast: Negative for rash, skin lesions, and pruritus. Hematologic/lymphatic: Negative for easy bruising, bleeding, and lymphadenopathy.   Musculoskeletal: Relation Age of Onset   • Diabetes Other         family hx   • Hypertension Other         family hx   • Prostate Cancer Father        Allergies:       Pcn [Penicillins]       ANAPHYLAXIS, HIVES, HALLUCINATION,                            SHORTNESS OF BREATH supple. Respiratory: Clear to auscultation and percussion. No rales. No wheezes. Cardiovascular: Regular rate and rhythm. No murmurs. Gastrointestinal: Soft, non tender with good bowel sounds. Musculoskeletal: No edema. No calf tenderness.   Neurologic GM/DL [facility]     INTERPRETATION THE SERUM PROTEIN ELECTROPHORESIS PATTERN SHOWS:  Comment:   THE PATTERN IS SUSPICIOUS FOR THE PRESENCE OF AN ATYPICAL BAND IN THE GAMMA   REGION.   THE RESTRICTED BAND IS PRESENT AGAINST A BACKGROUND OF OTH

## 2020-02-07 RX ORDER — LENALIDOMIDE 15 MG/1
CAPSULE ORAL
Qty: 21 CAPSULE | Refills: 0 | Status: SHIPPED | OUTPATIENT
Start: 2020-02-07 | End: 2020-03-16

## 2020-02-17 NOTE — TELEPHONE ENCOUNTER
LOV 1/9/2020      LAST LAB 9/25/19    LAST RX n/a    Next OV   Future Appointments   Date Time Provider Bo Ratliff   2/28/2020 10:00 AM PF TX RN1 PF CHEMO I [place]   3/5/2020  2:00 PM Jenny Morales MD [facility]   3/27/2020 10:00 AM PF TX R

## 2020-02-18 RX ORDER — LISINOPRIL 10 MG/1
TABLET ORAL
Qty: 90 TABLET | Refills: 0 | Status: SHIPPED | OUTPATIENT
Start: 2020-02-18 | End: 2020-05-07

## 2020-02-18 NOTE — TELEPHONE ENCOUNTER
LOV 1/20 Follow-up in a month Pneumonia. Next OV 3/5/2020    Filled x 3 months.        LISINOPRIL  10 MG TABS 12/17/2019 09/18/2019  30 Bottle

## 2020-02-24 ENCOUNTER — OFFICE VISIT (OUTPATIENT)
Dept: FAMILY MEDICINE CLINIC | Facility: CLINIC | Age: 70
End: 2020-02-24
Payer: MEDICARE

## 2020-02-24 VITALS
BODY MASS INDEX: 35.92 KG/M2 | TEMPERATURE: 97 F | WEIGHT: 271 LBS | HEART RATE: 72 BPM | RESPIRATION RATE: 16 BRPM | DIASTOLIC BLOOD PRESSURE: 64 MMHG | SYSTOLIC BLOOD PRESSURE: 112 MMHG | HEIGHT: 73 IN | OXYGEN SATURATION: 98 %

## 2020-02-24 DIAGNOSIS — J20.9 ACUTE BRONCHITIS, UNSPECIFIED ORGANISM: Primary | ICD-10-CM

## 2020-02-24 PROCEDURE — 99214 OFFICE O/P EST MOD 30 MIN: CPT | Performed by: FAMILY MEDICINE

## 2020-02-24 RX ORDER — PREDNISONE 20 MG/1
40 TABLET ORAL DAILY
Qty: 6 TABLET | Refills: 0 | Status: SHIPPED | OUTPATIENT
Start: 2020-02-24 | End: 2020-02-27

## 2020-02-24 RX ORDER — LEVOFLOXACIN 750 MG/1
750 TABLET ORAL DAILY
Qty: 7 TABLET | Refills: 0 | Status: SHIPPED | OUTPATIENT
Start: 2020-02-24 | End: 2020-03-02

## 2020-02-25 PROBLEM — J18.9 COMMUNITY ACQUIRED PNEUMONIA: Status: RESOLVED | Noted: 2019-12-30 | Resolved: 2020-02-25

## 2020-02-25 PROBLEM — J18.9 COMMUNITY ACQUIRED PNEUMONIA, UNSPECIFIED LATERALITY: Status: RESOLVED | Noted: 2019-12-30 | Resolved: 2020-02-25

## 2020-02-25 NOTE — PROGRESS NOTES
/64   Pulse 72   Temp 97.1 °F (36.2 °C) (Temporal)   Resp 16   Ht 73\"   Wt 271 lb (122.9 kg)   SpO2 98%   BMI 35.75 kg/m²     Patient presents with:  Cough      HPI:   Chela Pozo is a 68year old male came here with a complaint of cough and cold (1,000 mg total) by mouth 2 (two) times daily. 90 tablet 0   • aspirin (ASPIR-81) 81 MG Oral Tab EC Take 1 tablet by mouth daily.  1 tablet 0      Past Medical History:   Diagnosis Date   • Allergic rhinitis, cause unspecified    • BCC (basal cell carcinoma Comment:Surgical tape  Welts, burning of skin, itching  Statins                   REVIEW OF SYSTEMS:   GENERAL: feels well otherwise  SKIN: no rashes  EYES:denies blurred vision or double vision  HEENT: congested  LUNGS: denies shortness of breath with exe encounter. Meds & Refills for this Visit:  Requested Prescriptions     Signed Prescriptions Disp Refills   • levofloxacin 750 MG Oral Tab 7 tablet 0     Sig: Take 1 tablet (750 mg total) by mouth daily for 7 days.    • predniSONE 20 MG Oral Tab 6 table

## 2020-02-28 ENCOUNTER — OFFICE VISIT (OUTPATIENT)
Dept: HEMATOLOGY/ONCOLOGY | Age: 70
End: 2020-02-28
Attending: INTERNAL MEDICINE
Payer: MEDICARE

## 2020-02-28 VITALS
BODY MASS INDEX: 35.54 KG/M2 | DIASTOLIC BLOOD PRESSURE: 67 MMHG | OXYGEN SATURATION: 99 % | WEIGHT: 268.19 LBS | TEMPERATURE: 98 F | SYSTOLIC BLOOD PRESSURE: 129 MMHG | HEIGHT: 72.99 IN | HEART RATE: 63 BPM | RESPIRATION RATE: 20 BRPM

## 2020-02-28 DIAGNOSIS — C90.00 MULTIPLE MYELOMA, REMISSION STATUS UNSPECIFIED (HCC): Primary | ICD-10-CM

## 2020-02-28 LAB
CALCIUM BLD-MCNC: 8.4 MG/DL (ref 8.5–10.1)
CREAT BLD-MCNC: 1.22 MG/DL (ref 0.7–1.3)

## 2020-02-28 PROCEDURE — 82565 ASSAY OF CREATININE: CPT

## 2020-02-28 PROCEDURE — 36415 COLL VENOUS BLD VENIPUNCTURE: CPT

## 2020-02-28 PROCEDURE — 96374 THER/PROPH/DIAG INJ IV PUSH: CPT

## 2020-02-28 PROCEDURE — 82310 ASSAY OF CALCIUM: CPT

## 2020-02-28 NOTE — PROGRESS NOTES
Pt here for Zometa infusion for MM.   Arrives Ambulating independently, accompanied by Self           Patient reports possible pregnancy since last therapy cycle: Not Applicable    Modifications in dose or schedule: No     Frequency of blood return and site

## 2020-03-03 RX ORDER — LENALIDOMIDE 15 MG/1
CAPSULE ORAL
Qty: 21 CAPSULE | Refills: 0 | OUTPATIENT
Start: 2020-03-03

## 2020-03-16 RX ORDER — LENALIDOMIDE 15 MG/1
CAPSULE ORAL
Qty: 21 CAPSULE | Refills: 0 | Status: SHIPPED | OUTPATIENT
Start: 2020-03-16 | End: 2020-04-10

## 2020-03-27 ENCOUNTER — OFFICE VISIT (OUTPATIENT)
Dept: HEMATOLOGY/ONCOLOGY | Age: 70
End: 2020-03-27
Attending: INTERNAL MEDICINE
Payer: MEDICARE

## 2020-03-27 VITALS
DIASTOLIC BLOOD PRESSURE: 56 MMHG | WEIGHT: 265.63 LBS | HEART RATE: 83 BPM | HEIGHT: 72.99 IN | OXYGEN SATURATION: 95 % | TEMPERATURE: 97 F | BODY MASS INDEX: 35.21 KG/M2 | SYSTOLIC BLOOD PRESSURE: 105 MMHG | RESPIRATION RATE: 20 BRPM

## 2020-03-27 DIAGNOSIS — C90.00 MULTIPLE MYELOMA, REMISSION STATUS UNSPECIFIED (HCC): Primary | ICD-10-CM

## 2020-03-27 LAB
ALBUMIN SERPL-MCNC: 3.7 G/DL (ref 3.4–5)
CALCIUM BLD-MCNC: 9.5 MG/DL (ref 8.5–10.1)
CREAT BLD-MCNC: 1.19 MG/DL (ref 0.7–1.3)

## 2020-03-27 PROCEDURE — 96374 THER/PROPH/DIAG INJ IV PUSH: CPT

## 2020-03-27 PROCEDURE — 36415 COLL VENOUS BLD VENIPUNCTURE: CPT

## 2020-03-27 PROCEDURE — 82310 ASSAY OF CALCIUM: CPT

## 2020-03-27 PROCEDURE — 82040 ASSAY OF SERUM ALBUMIN: CPT

## 2020-03-27 PROCEDURE — 82565 ASSAY OF CREATININE: CPT

## 2020-04-10 ENCOUNTER — TELEPHONE (OUTPATIENT)
Dept: HEMATOLOGY/ONCOLOGY | Facility: HOSPITAL | Age: 70
End: 2020-04-10

## 2020-04-10 RX ORDER — LENALIDOMIDE 15 MG/1
CAPSULE ORAL
Qty: 21 CAPSULE | Refills: 0 | OUTPATIENT
Start: 2020-04-10

## 2020-04-10 NOTE — TELEPHONE ENCOUNTER
Valeria called and was looking to speak with somebody about a medication for Kip Croft. They asked that they get a call back.

## 2020-04-24 ENCOUNTER — APPOINTMENT (OUTPATIENT)
Dept: HEMATOLOGY/ONCOLOGY | Age: 70
End: 2020-04-24
Attending: INTERNAL MEDICINE
Payer: MEDICARE

## 2020-04-24 ENCOUNTER — OFFICE VISIT (OUTPATIENT)
Dept: HEMATOLOGY/ONCOLOGY | Facility: HOSPITAL | Age: 70
End: 2020-04-24
Attending: INTERNAL MEDICINE
Payer: MEDICARE

## 2020-04-24 VITALS
TEMPERATURE: 98 F | WEIGHT: 271 LBS | DIASTOLIC BLOOD PRESSURE: 70 MMHG | HEART RATE: 62 BPM | HEIGHT: 72.99 IN | SYSTOLIC BLOOD PRESSURE: 123 MMHG | BODY MASS INDEX: 35.92 KG/M2 | RESPIRATION RATE: 18 BRPM

## 2020-04-24 DIAGNOSIS — C90.00 MULTIPLE MYELOMA, REMISSION STATUS UNSPECIFIED (HCC): Primary | ICD-10-CM

## 2020-04-24 PROCEDURE — 96365 THER/PROPH/DIAG IV INF INIT: CPT

## 2020-04-24 PROCEDURE — 82565 ASSAY OF CREATININE: CPT

## 2020-04-24 PROCEDURE — 82040 ASSAY OF SERUM ALBUMIN: CPT

## 2020-04-24 PROCEDURE — 82310 ASSAY OF CALCIUM: CPT

## 2020-04-24 PROCEDURE — 36415 COLL VENOUS BLD VENIPUNCTURE: CPT

## 2020-04-24 NOTE — PROGRESS NOTES
Education Record    Learner:  Patient    Disease / Diagnosis: Mutitple Myeloma    Barriers / Limitations:  None   Comments:    Method:  Brief focused   Comments:    General Topics:  Plan of care reviewed   Comments:    Outcome:  Shows understanding   Yvonne Monsalve

## 2020-05-05 ENCOUNTER — TELEPHONE (OUTPATIENT)
Dept: FAMILY MEDICINE CLINIC | Facility: CLINIC | Age: 70
End: 2020-05-05

## 2020-05-05 DIAGNOSIS — E11.9 DIET-CONTROLLED DIABETES MELLITUS (HCC): Primary | ICD-10-CM

## 2020-05-05 DIAGNOSIS — C90.00 MULTIPLE MYELOMA, REMISSION STATUS UNSPECIFIED (HCC): ICD-10-CM

## 2020-05-05 NOTE — TELEPHONE ENCOUNTER
Patient stated he would like to know if Dr. Lorri Barahona Could put in orders for b/w to be done before appt on 5/7. Please advise.

## 2020-05-05 NOTE — TELEPHONE ENCOUNTER
Left detailed message that lab orders placed and if questions patient asked to call back to discuss.

## 2020-05-06 ENCOUNTER — LAB ENCOUNTER (OUTPATIENT)
Dept: LAB | Age: 70
End: 2020-05-06
Attending: FAMILY MEDICINE
Payer: MEDICARE

## 2020-05-06 DIAGNOSIS — E11.9 DIET-CONTROLLED DIABETES MELLITUS (HCC): ICD-10-CM

## 2020-05-06 DIAGNOSIS — C90.00 MULTIPLE MYELOMA, REMISSION STATUS UNSPECIFIED (HCC): ICD-10-CM

## 2020-05-06 PROCEDURE — 36415 COLL VENOUS BLD VENIPUNCTURE: CPT

## 2020-05-06 PROCEDURE — 80053 COMPREHEN METABOLIC PANEL: CPT

## 2020-05-06 PROCEDURE — 83036 HEMOGLOBIN GLYCOSYLATED A1C: CPT

## 2020-05-06 PROCEDURE — 85025 COMPLETE CBC W/AUTO DIFF WBC: CPT

## 2020-05-07 ENCOUNTER — TELEMEDICINE (OUTPATIENT)
Dept: FAMILY MEDICINE CLINIC | Facility: CLINIC | Age: 70
End: 2020-05-07
Payer: MEDICARE

## 2020-05-07 VITALS
SYSTOLIC BLOOD PRESSURE: 110 MMHG | DIASTOLIC BLOOD PRESSURE: 67 MMHG | WEIGHT: 262.69 LBS | BODY MASS INDEX: 35 KG/M2 | TEMPERATURE: 97 F | HEART RATE: 50 BPM

## 2020-05-07 DIAGNOSIS — I10 ESSENTIAL HYPERTENSION: ICD-10-CM

## 2020-05-07 DIAGNOSIS — E11.65 TYPE 2 DIABETES MELLITUS WITH HYPERGLYCEMIA, WITHOUT LONG-TERM CURRENT USE OF INSULIN (HCC): Primary | ICD-10-CM

## 2020-05-07 PROCEDURE — 99214 OFFICE O/P EST MOD 30 MIN: CPT | Performed by: FAMILY MEDICINE

## 2020-05-07 RX ORDER — ACYCLOVIR 400 MG/1
400 TABLET ORAL 2 TIMES DAILY
Qty: 60 TABLET | Refills: 3 | Status: SHIPPED | OUTPATIENT
Start: 2020-05-07 | End: 2020-09-03

## 2020-05-07 RX ORDER — LISINOPRIL 10 MG/1
10 TABLET ORAL DAILY
Qty: 90 TABLET | Refills: 0 | Status: SHIPPED | OUTPATIENT
Start: 2020-05-07 | End: 2020-08-06

## 2020-05-07 NOTE — PROGRESS NOTES
Please note that the following visit was completed using two-way, real-time interactive audio and/or video communication.   This has been done in good emler to provide continuity of care in the best interest of the provider-patient relationship, due to th untoward reactions to medications. Patient following dietary restrictions of caffeine and salt.   medications include lisinopril  Chief Complaint Reviewed and Verified  Nursing Notes Reviewed and   Verified  Tobacco Reviewed  Allergies Reviewed  Medication

## 2020-05-22 ENCOUNTER — OFFICE VISIT (OUTPATIENT)
Dept: HEMATOLOGY/ONCOLOGY | Facility: HOSPITAL | Age: 70
End: 2020-05-22
Attending: INTERNAL MEDICINE
Payer: MEDICARE

## 2020-05-22 ENCOUNTER — APPOINTMENT (OUTPATIENT)
Dept: HEMATOLOGY/ONCOLOGY | Age: 70
End: 2020-05-22
Attending: INTERNAL MEDICINE
Payer: MEDICARE

## 2020-05-22 VITALS
TEMPERATURE: 99 F | HEIGHT: 72.99 IN | BODY MASS INDEX: 34.99 KG/M2 | WEIGHT: 264 LBS | RESPIRATION RATE: 16 BRPM | HEART RATE: 58 BPM | OXYGEN SATURATION: 96 % | DIASTOLIC BLOOD PRESSURE: 65 MMHG | SYSTOLIC BLOOD PRESSURE: 133 MMHG

## 2020-05-22 DIAGNOSIS — C90.00 MULTIPLE MYELOMA, REMISSION STATUS UNSPECIFIED (HCC): Primary | ICD-10-CM

## 2020-05-22 PROCEDURE — 36415 COLL VENOUS BLD VENIPUNCTURE: CPT

## 2020-05-22 PROCEDURE — 82040 ASSAY OF SERUM ALBUMIN: CPT

## 2020-05-22 PROCEDURE — 82310 ASSAY OF CALCIUM: CPT

## 2020-05-22 PROCEDURE — 96374 THER/PROPH/DIAG INJ IV PUSH: CPT

## 2020-05-22 PROCEDURE — 82565 ASSAY OF CREATININE: CPT

## 2020-05-22 NOTE — PROGRESS NOTES
Education Record    Learner:  Patient    Disease / Diagnosis:    Barriers / Limitations:  None   Comments:    Method:  Discussion   Comments:    General Topics:  Procedure   Comments:    Outcome:  Shows understanding   Comments:

## 2020-06-08 RX ORDER — ATORVASTATIN CALCIUM 20 MG/1
TABLET, FILM COATED ORAL
Qty: 60 TABLET | Refills: 0 | Status: SHIPPED | OUTPATIENT
Start: 2020-06-08 | End: 2020-09-28

## 2020-06-09 ENCOUNTER — OFFICE VISIT (OUTPATIENT)
Dept: NEPHROLOGY | Facility: CLINIC | Age: 70
End: 2020-06-09
Payer: MEDICARE

## 2020-06-09 VITALS — DIASTOLIC BLOOD PRESSURE: 60 MMHG | BODY MASS INDEX: 34 KG/M2 | WEIGHT: 261 LBS | SYSTOLIC BLOOD PRESSURE: 102 MMHG

## 2020-06-09 DIAGNOSIS — C90.00 MYELOMA KIDNEY (HCC): Primary | ICD-10-CM

## 2020-06-09 DIAGNOSIS — N08 MYELOMA KIDNEY (HCC): Primary | ICD-10-CM

## 2020-06-09 PROCEDURE — 99213 OFFICE O/P EST LOW 20 MIN: CPT | Performed by: INTERNAL MEDICINE

## 2020-06-09 NOTE — PROGRESS NOTES
Nephrology Progress Note      Howard Kwok is a 65year old male. HPI:   Patient presents with: Other: Myeloma kidney disease      64 yo male with hx of MM, MADDI related to myeloma kidney + severe hypercalcemia, DM, HTN presents for follow up.  He was otherwise unremarkable       PHYSICAL EXAM:   /60 (BP Location: Right arm, Patient Position: Sitting, Cuff Size: large)   Wt 261 lb (118.4 kg)   BMI 34.44 kg/m²   Wt Readings from Last 6 Encounters:  06/09/20 : 261 lb (118.4 kg)  05/22/20 : 264 lb (1 Ref Range: 45 - 117 U/L   84    AST (SGOT) Latest Ref Range: 15 - 37 U/L   22    ALT (SGPT) Latest Ref Range: 16 - 61 U/L   28    Total Bilirubin Latest Ref Range: 0.1 - 2.0 mg/dL   0.8    Globulin Latest Ref Range: 2.8 - 4.4 g/dL   3.7    A/G Ratio Latest follows w/ Ivet/ and Dr. Kay Davila      3) HTN- on lisinopril     F/U  W/ labs q 6 mos; in clinic in 1 y r    Aria Resendiz MD  6/9/2020

## 2020-06-15 ENCOUNTER — LAB ENCOUNTER (OUTPATIENT)
Dept: LAB | Age: 70
End: 2020-06-15
Attending: FAMILY MEDICINE
Payer: MEDICARE

## 2020-06-15 DIAGNOSIS — E11.65 TYPE 2 DIABETES MELLITUS WITH HYPERGLYCEMIA, WITHOUT LONG-TERM CURRENT USE OF INSULIN (HCC): ICD-10-CM

## 2020-06-15 PROCEDURE — 36415 COLL VENOUS BLD VENIPUNCTURE: CPT

## 2020-06-15 PROCEDURE — 85027 COMPLETE CBC AUTOMATED: CPT

## 2020-06-15 PROCEDURE — 80053 COMPREHEN METABOLIC PANEL: CPT

## 2020-06-15 PROCEDURE — 83036 HEMOGLOBIN GLYCOSYLATED A1C: CPT

## 2020-06-19 ENCOUNTER — OFFICE VISIT (OUTPATIENT)
Dept: HEMATOLOGY/ONCOLOGY | Facility: HOSPITAL | Age: 70
End: 2020-06-19
Attending: INTERNAL MEDICINE
Payer: MEDICARE

## 2020-06-19 ENCOUNTER — APPOINTMENT (OUTPATIENT)
Dept: HEMATOLOGY/ONCOLOGY | Age: 70
End: 2020-06-19
Attending: INTERNAL MEDICINE
Payer: MEDICARE

## 2020-06-19 VITALS
HEART RATE: 57 BPM | OXYGEN SATURATION: 98 % | BODY MASS INDEX: 34.59 KG/M2 | RESPIRATION RATE: 16 BRPM | SYSTOLIC BLOOD PRESSURE: 107 MMHG | DIASTOLIC BLOOD PRESSURE: 64 MMHG | HEIGHT: 72.99 IN | WEIGHT: 261 LBS

## 2020-06-19 DIAGNOSIS — C90.00 MULTIPLE MYELOMA, REMISSION STATUS UNSPECIFIED (HCC): Primary | ICD-10-CM

## 2020-06-19 PROCEDURE — 82040 ASSAY OF SERUM ALBUMIN: CPT

## 2020-06-19 PROCEDURE — 96374 THER/PROPH/DIAG INJ IV PUSH: CPT

## 2020-06-19 PROCEDURE — 82310 ASSAY OF CALCIUM: CPT

## 2020-06-19 PROCEDURE — 36415 COLL VENOUS BLD VENIPUNCTURE: CPT

## 2020-06-19 PROCEDURE — 82565 ASSAY OF CREATININE: CPT

## 2020-06-19 NOTE — PROGRESS NOTES
Education Record    Learner:  Patient    Disease / Diagnosis:pt here for zometa    Barriers / Limitations:  None    Method:  Brief focused, printed material and  reinforcement    General Topics:  Plan of care reviewed    Outcome:  Shows understanding

## 2020-07-17 ENCOUNTER — OFFICE VISIT (OUTPATIENT)
Dept: HEMATOLOGY/ONCOLOGY | Age: 70
End: 2020-07-17
Attending: INTERNAL MEDICINE
Payer: MEDICARE

## 2020-07-17 VITALS
SYSTOLIC BLOOD PRESSURE: 119 MMHG | WEIGHT: 262.38 LBS | DIASTOLIC BLOOD PRESSURE: 64 MMHG | OXYGEN SATURATION: 98 % | BODY MASS INDEX: 34.77 KG/M2 | RESPIRATION RATE: 18 BRPM | HEART RATE: 59 BPM | TEMPERATURE: 97 F | HEIGHT: 72.99 IN

## 2020-07-17 DIAGNOSIS — C90.00 MULTIPLE MYELOMA, REMISSION STATUS UNSPECIFIED (HCC): Primary | ICD-10-CM

## 2020-07-17 DIAGNOSIS — M89.9 LYTIC BONE LESIONS ON XRAY: ICD-10-CM

## 2020-07-17 DIAGNOSIS — Z94.84 HX OF STEM CELL TRANSPLANT (HCC): ICD-10-CM

## 2020-07-17 LAB
ALBUMIN SERPL-MCNC: 3.5 G/DL (ref 3.4–5)
ALBUMIN/GLOB SERPL: 0.9 {RATIO} (ref 1–2)
ALP LIVER SERPL-CCNC: 76 U/L (ref 45–117)
ALT SERPL-CCNC: 30 U/L (ref 16–61)
ANION GAP SERPL CALC-SCNC: 5 MMOL/L (ref 0–18)
AST SERPL-CCNC: 23 U/L (ref 15–37)
BASOPHILS # BLD AUTO: 0.06 X10(3) UL (ref 0–0.2)
BASOPHILS NFR BLD AUTO: 1.4 %
BILIRUB SERPL-MCNC: 0.9 MG/DL (ref 0.1–2)
BUN BLD-MCNC: 18 MG/DL (ref 7–18)
BUN/CREAT SERPL: 15.8 (ref 10–20)
CALCIUM BLD-MCNC: 8.7 MG/DL (ref 8.5–10.1)
CHLORIDE SERPL-SCNC: 106 MMOL/L (ref 98–112)
CO2 SERPL-SCNC: 26 MMOL/L (ref 21–32)
CREAT BLD-MCNC: 1.14 MG/DL (ref 0.7–1.3)
DEPRECATED RDW RBC AUTO: 57.1 FL (ref 35.1–46.3)
EOSINOPHIL # BLD AUTO: 0.12 X10(3) UL (ref 0–0.7)
EOSINOPHIL NFR BLD AUTO: 2.8 %
ERYTHROCYTE [DISTWIDTH] IN BLOOD BY AUTOMATED COUNT: 15.3 % (ref 11–15)
GLOBULIN PLAS-MCNC: 4 G/DL (ref 2.8–4.4)
GLUCOSE BLD-MCNC: 108 MG/DL (ref 70–99)
HCT VFR BLD AUTO: 39.1 % (ref 39–53)
HGB BLD-MCNC: 12.9 G/DL (ref 13–17.5)
IGA SERPL-MCNC: 170 MG/DL (ref 70–312)
IGM SERPL-MCNC: 24.4 MG/DL (ref 43–279)
IMM GRANULOCYTES # BLD AUTO: 0.01 X10(3) UL (ref 0–1)
IMM GRANULOCYTES NFR BLD: 0.2 %
IMMUNOGLOBULIN PNL SER-MCNC: 1070 MG/DL (ref 791–1643)
LDH SERPL L TO P-CCNC: 140 U/L
LYMPHOCYTES # BLD AUTO: 0.88 X10(3) UL (ref 1–4)
LYMPHOCYTES NFR BLD AUTO: 20.5 %
M PROTEIN MFR SERPL ELPH: 7.5 G/DL (ref 6.4–8.2)
MCH RBC QN AUTO: 33.6 PG (ref 26–34)
MCHC RBC AUTO-ENTMCNC: 33 G/DL (ref 31–37)
MCV RBC AUTO: 101.8 FL (ref 80–100)
MONOCYTES # BLD AUTO: 0.75 X10(3) UL (ref 0.1–1)
MONOCYTES NFR BLD AUTO: 17.5 %
NEUTROPHILS # BLD AUTO: 2.47 X10 (3) UL (ref 1.5–7.7)
NEUTROPHILS # BLD AUTO: 2.47 X10(3) UL (ref 1.5–7.7)
NEUTROPHILS NFR BLD AUTO: 57.6 %
OSMOLALITY SERPL CALC.SUM OF ELEC: 286 MOSM/KG (ref 275–295)
PATIENT FASTING Y/N/NP: NO
PLATELET # BLD AUTO: 201 10(3)UL (ref 150–450)
POTASSIUM SERPL-SCNC: 4 MMOL/L (ref 3.5–5.1)
RBC # BLD AUTO: 3.84 X10(6)UL (ref 3.8–5.8)
SODIUM SERPL-SCNC: 137 MMOL/L (ref 136–145)
WBC # BLD AUTO: 4.3 X10(3) UL (ref 4–11)

## 2020-07-17 PROCEDURE — 99213 OFFICE O/P EST LOW 20 MIN: CPT | Performed by: INTERNAL MEDICINE

## 2020-07-17 PROCEDURE — 96374 THER/PROPH/DIAG INJ IV PUSH: CPT

## 2020-07-17 NOTE — PROGRESS NOTES
Education Record    Learner:  Patient    Disease / Diagnosis: zometa infusion    Barriers / Limitations:  None   Comments:    Method:  Brief focused   Comments:    General Topics:  Plan of care reviewed   Comments:    Outcome:  Shows understanding   Commen

## 2020-07-17 NOTE — PROGRESS NOTES
Cancer Center Progress Note    Problem List:      Patient Active Problem List:     Impotence of organic origin     Generalized osteoarthrosis of hand     Morbid obesity ([address]     Essential hypertension     Hypertension     Multiple myeloma ([address]     Myelom Negative for myalgias, arthralgias, muscle weakness. Genitourinary: Negative for dysuria or hematuria  Neurological: Negative for headaches, dizziness, speech problems, gait problems and focal weakness.   Psychiatric: The patient's mood was calm and approp Comment:Respiratory distress  Beta Adrenergic Blo*      Latex                       Comment:Surgical tape  Welts, burning of skin, itching  Statins                     Medications:  Lenalidomide (REVLIMID) 15 MG Oral Cap, TAKE 1 CAPSULE BY MOUTH  DAILY FOR wheezes. Cardiovascular: Regular rate and rhythm. No murmurs. Gastrointestinal: Soft, non tender with good bowel sounds. Musculoskeletal: No edema. No calf tenderness. Neurological: Grossly intact without focal motor or sensory deficit.   Skin: No suspi PATTERN SHOWS:  Comment:   THE PATTERN IS SUSPICIOUS FOR THE PRESENCE OF AN ATYPICAL BAND IN THE GAMMA   REGION. THE RESTRICTED BAND IS PRESENT AGAINST A BACKGROUND OF OTHERWISE POLYCLONAL   GLOBULINS.   THIS BAND MAY BE A VARIANT OF A NORMAL SERUM PROTEIN

## 2020-07-20 LAB
ALBUMIN SERPL ELPH-MCNC: 3.82 G/DL (ref 3.75–5.21)
ALBUMIN/GLOB SERPL: 1.38 {RATIO} (ref 1–2)
ALPHA1 GLOB SERPL ELPH-MCNC: 0.28 G/DL (ref 0.19–0.46)
ALPHA2 GLOB SERPL ELPH-MCNC: 0.69 G/DL (ref 0.48–1.05)
B-GLOBULIN SERPL ELPH-MCNC: 0.75 G/DL (ref 0.68–1.23)
GAMMA GLOB SERPL ELPH-MCNC: 1.07 G/DL (ref 0.62–1.7)
KAPPA FREE LIGHT CHAIN: 11.66 MG/DL (ref 0.33–1.94)
KAPPA/LAMBDA FLC RATIO: 8.86 (ref 0.26–1.65)
LAMBDA FREE LIGHT CHAIN: 1.32 MG/DL (ref 0.57–2.63)
M PROTEIN MFR SERPL ELPH: 6.6 G/DL (ref 6.4–8.2)

## 2020-08-01 DIAGNOSIS — I10 ESSENTIAL HYPERTENSION: ICD-10-CM

## 2020-08-01 RX ORDER — LISINOPRIL 10 MG/1
10 TABLET ORAL DAILY
Qty: 90 TABLET | Refills: 0 | OUTPATIENT
Start: 2020-08-01

## 2020-08-01 NOTE — TELEPHONE ENCOUNTER
LOV 5/7/2020     LAST LAB 5/6/2020    LAST RX 5/7/2020 90 tablet 0 refills     Next OV   Future Appointments   Date Time Provider Bo Ratliff   8/14/2020  9:30 AM PF TX RN3 PF CHEMO I [place]   8/31/2020  1:30 PM Davin Conte MD NPV [facility]

## 2020-08-03 ENCOUNTER — TELEPHONE (OUTPATIENT)
Dept: FAMILY MEDICINE CLINIC | Facility: CLINIC | Age: 70
End: 2020-08-03

## 2020-08-03 DIAGNOSIS — E11.9 DIET-CONTROLLED DIABETES MELLITUS (HCC): Primary | ICD-10-CM

## 2020-08-03 NOTE — TELEPHONE ENCOUNTER
Pt called - scheduled his diabetic med f/u for 8/6/20. Wants to know if Dr would enter his blood work orders prior to the abisai.

## 2020-08-03 NOTE — TELEPHONE ENCOUNTER
LOV 2/24/20  Phone visit 5/7/20  Last labs 6/15/20    - would you like pt to complete any labs prior to scheduled OV? Please advise. Thank you.

## 2020-08-04 ENCOUNTER — LAB ENCOUNTER (OUTPATIENT)
Dept: LAB | Age: 70
End: 2020-08-04
Attending: FAMILY MEDICINE
Payer: MEDICARE

## 2020-08-04 DIAGNOSIS — E11.9 DIET-CONTROLLED DIABETES MELLITUS (HCC): ICD-10-CM

## 2020-08-04 LAB
ALBUMIN SERPL-MCNC: 3.4 G/DL (ref 3.4–5)
ALBUMIN/GLOB SERPL: 0.9 {RATIO} (ref 1–2)
ALP LIVER SERPL-CCNC: 94 U/L (ref 45–117)
ALT SERPL-CCNC: 29 U/L (ref 16–61)
ANION GAP SERPL CALC-SCNC: 1 MMOL/L (ref 0–18)
AST SERPL-CCNC: 25 U/L (ref 15–37)
BILIRUB SERPL-MCNC: 1 MG/DL (ref 0.1–2)
BUN BLD-MCNC: 15 MG/DL (ref 7–18)
BUN/CREAT SERPL: 12.4 (ref 10–20)
CALCIUM BLD-MCNC: 8.7 MG/DL (ref 8.5–10.1)
CHLORIDE SERPL-SCNC: 105 MMOL/L (ref 98–112)
CO2 SERPL-SCNC: 30 MMOL/L (ref 21–32)
CREAT BLD-MCNC: 1.21 MG/DL (ref 0.7–1.3)
EST. AVERAGE GLUCOSE BLD GHB EST-MCNC: 123 MG/DL (ref 68–126)
GLOBULIN PLAS-MCNC: 3.8 G/DL (ref 2.8–4.4)
GLUCOSE BLD-MCNC: 107 MG/DL (ref 70–99)
HBA1C MFR BLD HPLC: 5.9 % (ref ?–5.7)
M PROTEIN MFR SERPL ELPH: 7.2 G/DL (ref 6.4–8.2)
OSMOLALITY SERPL CALC.SUM OF ELEC: 283 MOSM/KG (ref 275–295)
PATIENT FASTING Y/N/NP: YES
POTASSIUM SERPL-SCNC: 4.3 MMOL/L (ref 3.5–5.1)
SODIUM SERPL-SCNC: 136 MMOL/L (ref 136–145)

## 2020-08-04 PROCEDURE — 80053 COMPREHEN METABOLIC PANEL: CPT

## 2020-08-04 PROCEDURE — 36415 COLL VENOUS BLD VENIPUNCTURE: CPT

## 2020-08-04 PROCEDURE — 83036 HEMOGLOBIN GLYCOSYLATED A1C: CPT

## 2020-08-06 ENCOUNTER — OFFICE VISIT (OUTPATIENT)
Dept: FAMILY MEDICINE CLINIC | Facility: CLINIC | Age: 70
End: 2020-08-06
Payer: MEDICARE

## 2020-08-06 VITALS
SYSTOLIC BLOOD PRESSURE: 116 MMHG | TEMPERATURE: 98 F | DIASTOLIC BLOOD PRESSURE: 58 MMHG | HEART RATE: 76 BPM | BODY MASS INDEX: 34.46 KG/M2 | OXYGEN SATURATION: 97 % | WEIGHT: 260 LBS | RESPIRATION RATE: 16 BRPM | HEIGHT: 72.99 IN

## 2020-08-06 DIAGNOSIS — I10 ESSENTIAL HYPERTENSION: ICD-10-CM

## 2020-08-06 DIAGNOSIS — E11.9 DIET-CONTROLLED DIABETES MELLITUS (HCC): Primary | ICD-10-CM

## 2020-08-06 PROCEDURE — 99213 OFFICE O/P EST LOW 20 MIN: CPT | Performed by: FAMILY MEDICINE

## 2020-08-06 RX ORDER — LISINOPRIL 10 MG/1
10 TABLET ORAL DAILY
Qty: 90 TABLET | Refills: 3 | Status: SHIPPED | OUTPATIENT
Start: 2020-08-06 | End: 2021-02-11

## 2020-08-06 NOTE — PROGRESS NOTES
/58   Pulse 76   Temp 98 °F (36.7 °C) (Temporal)   Resp 16   Ht 72.99\"   Wt 260 lb (117.9 kg)   SpO2 97%   BMI 34.31 kg/m²               Patient presents with: Follow - Up: diabetes       HPI;    Shaun Merino is a 65year old male.   With past med tablet by mouth. • acetaminophen 500 MG Oral Tab Take 1,000 mg by mouth nightly. • Calcium Carbonate Antacid 500 MG Oral Chew Tab Chew 2 tablets (1,000 mg total) by mouth 2 (two) times daily.  90 tablet 0   • aspirin (ASPIR-81) 81 MG Oral Tab EC T well nourished,in no apparent distress  SKIN: no rashes,no suspicious lesions  HEENT: atraumatic, normocephalic,ears and throat are clear  NECK: supple,no adenopathy,no bruits  LUNGS: clear to auscultation  CARDIO: RRR without murmur  GI: good BS's,no mass

## 2020-08-10 ENCOUNTER — SOCIAL WORK SERVICES (OUTPATIENT)
Dept: HEMATOLOGY/ONCOLOGY | Facility: HOSPITAL | Age: 70
End: 2020-08-10

## 2020-08-10 NOTE — PROGRESS NOTES
SW left message requesting a return call. Sw received some paperwork for patient and would like to speak with patient about where he would like it sent.

## 2020-08-14 ENCOUNTER — OFFICE VISIT (OUTPATIENT)
Dept: HEMATOLOGY/ONCOLOGY | Age: 70
End: 2020-08-14
Attending: INTERNAL MEDICINE
Payer: MEDICARE

## 2020-08-14 VITALS
RESPIRATION RATE: 20 BRPM | HEIGHT: 72.99 IN | OXYGEN SATURATION: 98 % | SYSTOLIC BLOOD PRESSURE: 110 MMHG | DIASTOLIC BLOOD PRESSURE: 63 MMHG | HEART RATE: 56 BPM | BODY MASS INDEX: 35.01 KG/M2 | WEIGHT: 264.19 LBS | TEMPERATURE: 97 F

## 2020-08-14 DIAGNOSIS — C90.00 MULTIPLE MYELOMA, REMISSION STATUS UNSPECIFIED (HCC): Primary | ICD-10-CM

## 2020-08-14 LAB
ALBUMIN SERPL-MCNC: 3.5 G/DL (ref 3.4–5)
CALCIUM BLD-MCNC: 9.1 MG/DL (ref 8.5–10.1)
CREAT BLD-MCNC: 1.26 MG/DL (ref 0.7–1.3)

## 2020-08-14 PROCEDURE — 82040 ASSAY OF SERUM ALBUMIN: CPT

## 2020-08-14 PROCEDURE — 82565 ASSAY OF CREATININE: CPT

## 2020-08-14 PROCEDURE — 82310 ASSAY OF CALCIUM: CPT

## 2020-08-14 PROCEDURE — 36415 COLL VENOUS BLD VENIPUNCTURE: CPT

## 2020-08-14 PROCEDURE — 96374 THER/PROPH/DIAG INJ IV PUSH: CPT

## 2020-08-14 NOTE — PROGRESS NOTES
Labs reviewed. Zometa infused per MD order w/out incident. Pt d/c home in stable condition, no complaints. AVS provided.

## 2020-08-26 DIAGNOSIS — C90.00 MULTIPLE MYELOMA, REMISSION STATUS UNSPECIFIED (HCC): Primary | ICD-10-CM

## 2020-08-26 RX ORDER — LENALIDOMIDE 15 MG/1
CAPSULE ORAL
Qty: 21 CAPSULE | Refills: 0 | OUTPATIENT
Start: 2020-08-26

## 2020-08-31 ENCOUNTER — LAB ENCOUNTER (OUTPATIENT)
Dept: LAB | Age: 70
End: 2020-08-31
Attending: UROLOGY
Payer: MEDICARE

## 2020-08-31 DIAGNOSIS — Z85.46 HISTORY OF PROSTATE CANCER: ICD-10-CM

## 2020-08-31 LAB — PSA SERPL-MCNC: <0.01 NG/ML (ref ?–4)

## 2020-08-31 PROCEDURE — 36415 COLL VENOUS BLD VENIPUNCTURE: CPT

## 2020-08-31 PROCEDURE — 84153 ASSAY OF PSA TOTAL: CPT

## 2020-09-03 RX ORDER — ACYCLOVIR 400 MG/1
400 TABLET ORAL 2 TIMES DAILY
Qty: 60 TABLET | Refills: 3 | Status: SHIPPED | OUTPATIENT
Start: 2020-09-03 | End: 2020-12-31

## 2020-09-03 NOTE — TELEPHONE ENCOUNTER
LOV 8/6/2020    LAST LAB    LAST RX 5/7/2020 60 tablet 3 refills    Next OV   Future Appointments   Date Time Provider Bo Ratliff   9/11/2020  9:30 AM PF TX RN2 PF CHEMO I [place]   10/9/2020  9:30 AM PF TX RN2 PF CHEMO I [place]   11/5/2020

## 2020-09-11 ENCOUNTER — OFFICE VISIT (OUTPATIENT)
Dept: HEMATOLOGY/ONCOLOGY | Age: 70
End: 2020-09-11
Attending: INTERNAL MEDICINE
Payer: MEDICARE

## 2020-09-11 VITALS
HEIGHT: 72.99 IN | DIASTOLIC BLOOD PRESSURE: 73 MMHG | HEART RATE: 58 BPM | TEMPERATURE: 97 F | SYSTOLIC BLOOD PRESSURE: 117 MMHG | OXYGEN SATURATION: 98 % | WEIGHT: 265.63 LBS | RESPIRATION RATE: 20 BRPM | BODY MASS INDEX: 35.21 KG/M2

## 2020-09-11 DIAGNOSIS — C90.00 MULTIPLE MYELOMA, REMISSION STATUS UNSPECIFIED (HCC): Primary | ICD-10-CM

## 2020-09-11 LAB
ALBUMIN SERPL-MCNC: 3.7 G/DL (ref 3.4–5)
CALCIUM BLD-MCNC: 8.8 MG/DL (ref 8.5–10.1)
CREAT BLD-MCNC: 1.11 MG/DL (ref 0.7–1.3)

## 2020-09-11 PROCEDURE — 82565 ASSAY OF CREATININE: CPT

## 2020-09-11 PROCEDURE — 82310 ASSAY OF CALCIUM: CPT

## 2020-09-11 PROCEDURE — 96374 THER/PROPH/DIAG INJ IV PUSH: CPT

## 2020-09-11 PROCEDURE — 82040 ASSAY OF SERUM ALBUMIN: CPT

## 2020-09-11 PROCEDURE — 36415 COLL VENOUS BLD VENIPUNCTURE: CPT

## 2020-09-11 NOTE — PROGRESS NOTES
Labs reviewed. zometa infused per MD order w/out incident. Pt d/c home in stable condition, no complaints.    Education Record    Learner:  Patient    Disease / Diagnosis:MM    Barriers / Limitations:  None   Comments:    Method:  Brief focused and Printed

## 2020-09-26 DIAGNOSIS — C90.00 MULTIPLE MYELOMA, REMISSION STATUS UNSPECIFIED (HCC): ICD-10-CM

## 2020-09-28 RX ORDER — LENALIDOMIDE 15 MG/1
CAPSULE ORAL
Qty: 21 CAPSULE | Refills: 0 | Status: SHIPPED | OUTPATIENT
Start: 2020-09-28 | End: 2020-10-26

## 2020-09-28 RX ORDER — ATORVASTATIN CALCIUM 20 MG/1
20 TABLET, FILM COATED ORAL DAILY
Qty: 60 TABLET | Refills: 0 | Status: SHIPPED | OUTPATIENT
Start: 2020-09-28 | End: 2020-12-02

## 2020-09-28 NOTE — TELEPHONE ENCOUNTER
LOV 5/7/2020    LAST LAB 5/6/2020    LAST RX 6/8/2020 60 tablet 0 refills    Next OV   Future Appointments   Date Time Provider Bo Ratliff   10/9/2020  9:30 AM PF TX RN2 PF CHEMO I [place]   11/5/2020  1:45 PM Hamzah Rodriguez MD [facility]   6

## 2020-10-08 DIAGNOSIS — C90.00 MULTIPLE MYELOMA, REMISSION STATUS UNSPECIFIED (HCC): Primary | ICD-10-CM

## 2020-10-09 ENCOUNTER — OFFICE VISIT (OUTPATIENT)
Dept: HEMATOLOGY/ONCOLOGY | Age: 70
End: 2020-10-09
Attending: INTERNAL MEDICINE
Payer: MEDICARE

## 2020-10-09 VITALS
HEART RATE: 55 BPM | DIASTOLIC BLOOD PRESSURE: 62 MMHG | TEMPERATURE: 97 F | WEIGHT: 261.38 LBS | BODY MASS INDEX: 34.64 KG/M2 | HEIGHT: 72.99 IN | SYSTOLIC BLOOD PRESSURE: 117 MMHG | RESPIRATION RATE: 20 BRPM | OXYGEN SATURATION: 97 %

## 2020-10-09 DIAGNOSIS — C90.00 MULTIPLE MYELOMA, REMISSION STATUS UNSPECIFIED (HCC): Primary | ICD-10-CM

## 2020-10-09 PROCEDURE — 82565 ASSAY OF CREATININE: CPT

## 2020-10-09 PROCEDURE — 36415 COLL VENOUS BLD VENIPUNCTURE: CPT

## 2020-10-09 PROCEDURE — 96374 THER/PROPH/DIAG INJ IV PUSH: CPT

## 2020-10-09 PROCEDURE — 82310 ASSAY OF CALCIUM: CPT

## 2020-10-09 PROCEDURE — 82040 ASSAY OF SERUM ALBUMIN: CPT

## 2020-10-09 NOTE — PROGRESS NOTES
Education Record    Learner:  Patient    Disease / Diagnosis: multiple myeloma    Barriers / Limitations:  None   Comments:    Method:  Brief focused   Comments:    General Topics:  Plan of care reviewed   Comments:    Outcome:  Shows understanding   Ebonie Chan

## 2020-10-24 DIAGNOSIS — C90.00 MULTIPLE MYELOMA, REMISSION STATUS UNSPECIFIED (HCC): ICD-10-CM

## 2020-10-26 RX ORDER — LENALIDOMIDE 15 MG/1
CAPSULE ORAL
Qty: 21 CAPSULE | Refills: 0 | Status: SHIPPED | OUTPATIENT
Start: 2020-10-26 | End: 2020-11-20

## 2020-11-04 ENCOUNTER — TELEPHONE (OUTPATIENT)
Dept: FAMILY MEDICINE CLINIC | Facility: CLINIC | Age: 70
End: 2020-11-04

## 2020-11-04 DIAGNOSIS — E11.9 DIET-CONTROLLED DIABETES MELLITUS (HCC): Primary | ICD-10-CM

## 2020-11-04 NOTE — TELEPHONE ENCOUNTER
Dr. Scooby Law,  Please advise if repeat A1c due now.      Last Lab  8/4/2020  A1c 5.9    Viewed by Nehemiah Ruelas on 8/6/2020  6:56 PM  Written by Leslee Ba MD on 8/4/2020  9:55 PM  We will discuss the labs at the time of follow-up next week    Patient never

## 2020-11-05 ENCOUNTER — OFFICE VISIT (OUTPATIENT)
Dept: FAMILY MEDICINE CLINIC | Facility: CLINIC | Age: 70
End: 2020-11-05
Payer: MEDICARE

## 2020-11-05 VITALS
OXYGEN SATURATION: 98 % | WEIGHT: 267 LBS | HEIGHT: 72.99 IN | HEART RATE: 66 BPM | RESPIRATION RATE: 16 BRPM | DIASTOLIC BLOOD PRESSURE: 60 MMHG | TEMPERATURE: 97 F | SYSTOLIC BLOOD PRESSURE: 128 MMHG | BODY MASS INDEX: 35.39 KG/M2

## 2020-11-05 DIAGNOSIS — C90.00 MULTIPLE MYELOMA, REMISSION STATUS UNSPECIFIED (HCC): ICD-10-CM

## 2020-11-05 DIAGNOSIS — R07.81 RIB PAIN ON RIGHT SIDE: ICD-10-CM

## 2020-11-05 DIAGNOSIS — E78.2 MIXED HYPERLIPIDEMIA: ICD-10-CM

## 2020-11-05 DIAGNOSIS — E66.01 MORBID OBESITY (HCC): ICD-10-CM

## 2020-11-05 DIAGNOSIS — C90.00 MULTIPLE MYELOMA, REMISSION STATUS UNSPECIFIED (HCC): Primary | ICD-10-CM

## 2020-11-05 DIAGNOSIS — M54.50 ACUTE MIDLINE LOW BACK PAIN WITHOUT SCIATICA: Primary | ICD-10-CM

## 2020-11-05 DIAGNOSIS — E11.9 DIET-CONTROLLED DIABETES MELLITUS (HCC): ICD-10-CM

## 2020-11-05 PROCEDURE — 99214 OFFICE O/P EST MOD 30 MIN: CPT | Performed by: FAMILY MEDICINE

## 2020-11-05 RX ORDER — TRAMADOL HYDROCHLORIDE 50 MG/1
50 TABLET ORAL EVERY 6 HOURS PRN
Qty: 30 TABLET | Refills: 0 | Status: SHIPPED | OUTPATIENT
Start: 2020-11-05 | End: 2021-06-15

## 2020-11-06 ENCOUNTER — OFFICE VISIT (OUTPATIENT)
Dept: HEMATOLOGY/ONCOLOGY | Age: 70
End: 2020-11-06
Attending: INTERNAL MEDICINE
Payer: MEDICARE

## 2020-11-06 VITALS
HEART RATE: 59 BPM | SYSTOLIC BLOOD PRESSURE: 95 MMHG | BODY MASS INDEX: 35.23 KG/M2 | DIASTOLIC BLOOD PRESSURE: 64 MMHG | OXYGEN SATURATION: 98 % | WEIGHT: 265.81 LBS | HEIGHT: 72.99 IN | RESPIRATION RATE: 20 BRPM | TEMPERATURE: 98 F

## 2020-11-06 DIAGNOSIS — E78.2 MIXED HYPERLIPIDEMIA: ICD-10-CM

## 2020-11-06 DIAGNOSIS — C90.00 MULTIPLE MYELOMA, REMISSION STATUS UNSPECIFIED (HCC): Primary | ICD-10-CM

## 2020-11-06 DIAGNOSIS — E11.9 DIET-CONTROLLED DIABETES MELLITUS (HCC): ICD-10-CM

## 2020-11-06 PROCEDURE — 80053 COMPREHEN METABOLIC PANEL: CPT

## 2020-11-06 PROCEDURE — 80061 LIPID PANEL: CPT

## 2020-11-06 PROCEDURE — 36415 COLL VENOUS BLD VENIPUNCTURE: CPT

## 2020-11-06 PROCEDURE — 96374 THER/PROPH/DIAG INJ IV PUSH: CPT

## 2020-11-06 PROCEDURE — 83036 HEMOGLOBIN GLYCOSYLATED A1C: CPT

## 2020-11-06 NOTE — PROGRESS NOTES
/60   Pulse 66   Temp 97 °F (36.1 °C) (Temporal)   Resp 16   Ht 72.99\"   Wt 267 lb (121.1 kg)   SpO2 98%   BMI 35.24 kg/m²               Patient presents with:  Diabetes       HPI;    Magnolia Jones is a 65year old male.   Patient has history of typ tablet (400 mg total) by mouth 2 (two) times daily. 60 tablet 3   • lisinopril 10 MG Oral Tab Take 1 tablet (10 mg total) by mouth daily.  90 tablet 3   • ONETOUCH ULTRASOFT LANCETS Does not apply Misc TEST  each 0   • omega-3 fatty acids 1000 MG Ora GENERAL HEALTH: feels well otherwise  SKIN: denies any unusual skin lesions or rashes  RESPIRATORY: denies shortness of breath with exertion  CARDIOVASCULAR: denies chest pain on exertion  GI: denies abdominal pain and denies heartburn  NEURO: denies hea CHEST (3 VIEWS), RIGHT  (CPT=71101)  Meds & Refills for this Visit:  Requested Prescriptions     Signed Prescriptions Disp Refills   • traMADol HCl 50 MG Oral Tab 30 tablet 0     Sig: Take 1 tablet (50 mg total) by mouth every 6 (six) hours as needed for P

## 2020-11-06 NOTE — PROGRESS NOTES
Education Record    Learner:  Patient    Disease / Diagnosis:     Barriers / Limitations:  None   Comments:    Method:  Brief focused   Comments:    General Topics:  Plan of care reviewed   Comments:    Outcome:  Shows understanding   Comments:    Pt hay

## 2020-11-08 ENCOUNTER — HOSPITAL ENCOUNTER (OUTPATIENT)
Dept: GENERAL RADIOLOGY | Age: 70
Discharge: HOME | End: 2020-11-08
Attending: FAMILY MEDICINE
Payer: MEDICARE

## 2020-11-08 DIAGNOSIS — C90.00 MULTIPLE MYELOMA, REMISSION STATUS UNSPECIFIED (HCC): ICD-10-CM

## 2020-11-08 DIAGNOSIS — M54.50 ACUTE MIDLINE LOW BACK PAIN WITHOUT SCIATICA: ICD-10-CM

## 2020-11-08 DIAGNOSIS — R07.81 RIB PAIN ON RIGHT SIDE: ICD-10-CM

## 2020-11-08 PROCEDURE — 71101 X-RAY EXAM UNILAT RIBS/CHEST: CPT | Performed by: FAMILY MEDICINE

## 2020-11-08 PROCEDURE — 72110 X-RAY EXAM L-2 SPINE 4/>VWS: CPT | Performed by: FAMILY MEDICINE

## 2020-11-09 DIAGNOSIS — M54.50 ACUTE MIDLINE LOW BACK PAIN WITHOUT SCIATICA: Primary | ICD-10-CM

## 2020-11-09 DIAGNOSIS — C90.00 MULTIPLE MYELOMA, REMISSION STATUS UNSPECIFIED (HCC): ICD-10-CM

## 2020-11-09 NOTE — PROGRESS NOTES
Discussed with the patient, he is in a lot of pain in his back, given the history of multiple myeloma and wedging at L1-L2  Ordered CBC as well as ESR  Patient to continue taking Tylenol for his pain  He has tramadol but that does not help him as much as T

## 2020-11-20 DIAGNOSIS — C90.00 MULTIPLE MYELOMA, REMISSION STATUS UNSPECIFIED (HCC): ICD-10-CM

## 2020-11-20 RX ORDER — LENALIDOMIDE 15 MG/1
CAPSULE ORAL
Qty: 21 CAPSULE | Refills: 0 | Status: SHIPPED | OUTPATIENT
Start: 2020-11-20 | End: 2020-12-17

## 2020-11-20 RX ORDER — LENALIDOMIDE 15 MG/1
CAPSULE ORAL
Qty: 21 CAPSULE | Refills: 0 | OUTPATIENT
Start: 2020-11-20

## 2020-11-25 ENCOUNTER — TELEPHONE (OUTPATIENT)
Dept: FAMILY MEDICINE CLINIC | Facility: CLINIC | Age: 70
End: 2020-11-25

## 2020-11-27 ENCOUNTER — MED REC SCAN ONLY (OUTPATIENT)
Dept: FAMILY MEDICINE CLINIC | Facility: CLINIC | Age: 70
End: 2020-11-27

## 2020-12-02 RX ORDER — ATORVASTATIN CALCIUM 20 MG/1
20 TABLET, FILM COATED ORAL DAILY
Qty: 90 TABLET | Refills: 0 | Status: SHIPPED | OUTPATIENT
Start: 2020-12-02 | End: 2021-02-22

## 2020-12-02 NOTE — TELEPHONE ENCOUNTER
LOV  11/5/2020    LAST LAB 11/6/2020    LAST RX   atorvastatin 20 MG Oral Tab 60 tablet 0 9/28/2020       Next OV   Future Appointments   Date Time Provider Bo Ratliff   12/4/2020  9:30 AM PF TX RN3 PF CHEMO I [place]   1/8/2021  9:15 AM Mark Paul

## 2020-12-04 ENCOUNTER — OFFICE VISIT (OUTPATIENT)
Dept: HEMATOLOGY/ONCOLOGY | Age: 70
End: 2020-12-04
Attending: INTERNAL MEDICINE
Payer: MEDICARE

## 2020-12-04 VITALS
BODY MASS INDEX: 34.83 KG/M2 | WEIGHT: 262.81 LBS | RESPIRATION RATE: 20 BRPM | DIASTOLIC BLOOD PRESSURE: 74 MMHG | HEIGHT: 72.99 IN | SYSTOLIC BLOOD PRESSURE: 128 MMHG | HEART RATE: 68 BPM | OXYGEN SATURATION: 98 % | TEMPERATURE: 98 F

## 2020-12-04 DIAGNOSIS — C90.00 MULTIPLE MYELOMA, REMISSION STATUS UNSPECIFIED (HCC): Primary | ICD-10-CM

## 2020-12-04 PROCEDURE — 36415 COLL VENOUS BLD VENIPUNCTURE: CPT

## 2020-12-04 PROCEDURE — 96365 THER/PROPH/DIAG IV INF INIT: CPT

## 2020-12-04 PROCEDURE — 82040 ASSAY OF SERUM ALBUMIN: CPT

## 2020-12-04 PROCEDURE — 82310 ASSAY OF CALCIUM: CPT

## 2020-12-04 PROCEDURE — 82565 ASSAY OF CREATININE: CPT

## 2020-12-14 ENCOUNTER — LAB ENCOUNTER (OUTPATIENT)
Dept: LAB | Age: 70
End: 2020-12-14
Attending: FAMILY MEDICINE
Payer: MEDICARE

## 2020-12-14 DIAGNOSIS — C90.00 MULTIPLE MYELOMA, REMISSION STATUS UNSPECIFIED (HCC): ICD-10-CM

## 2020-12-14 DIAGNOSIS — M54.50 ACUTE MIDLINE LOW BACK PAIN WITHOUT SCIATICA: ICD-10-CM

## 2020-12-14 PROCEDURE — 85027 COMPLETE CBC AUTOMATED: CPT

## 2020-12-14 PROCEDURE — 85652 RBC SED RATE AUTOMATED: CPT

## 2020-12-14 PROCEDURE — 36415 COLL VENOUS BLD VENIPUNCTURE: CPT

## 2020-12-17 DIAGNOSIS — C90.00 MULTIPLE MYELOMA, REMISSION STATUS UNSPECIFIED (HCC): ICD-10-CM

## 2020-12-17 RX ORDER — LENALIDOMIDE 15 MG/1
CAPSULE ORAL
Qty: 21 CAPSULE | Refills: 0 | Status: SHIPPED | OUTPATIENT
Start: 2020-12-17 | End: 2021-01-08

## 2020-12-31 RX ORDER — ACYCLOVIR 400 MG/1
400 TABLET ORAL 2 TIMES DAILY
Qty: 60 TABLET | Refills: 3 | Status: SHIPPED | OUTPATIENT
Start: 2020-12-31 | End: 2021-05-06

## 2020-12-31 NOTE — TELEPHONE ENCOUNTER
LOV 11/5/2020    LAST LAB    LAST RX   acyclovir 400 MG Oral Tab 60 tablet 3 9/3/2020         Next OV   Future Appointments   Date Time Provider Bo Ratliff   1/8/2021  9:15 AM Carlton Lind MD PF HEM ONC [place]   1/8/2021  9:30 AM CATHY TX RN3 PF

## 2021-01-01 ENCOUNTER — EXTERNAL RECORD (OUTPATIENT)
Dept: OTHER | Age: 71
End: 2021-01-01

## 2021-01-05 ENCOUNTER — MED REC SCAN ONLY (OUTPATIENT)
Dept: FAMILY MEDICINE CLINIC | Facility: CLINIC | Age: 71
End: 2021-01-05

## 2021-01-08 ENCOUNTER — OFFICE VISIT (OUTPATIENT)
Dept: HEMATOLOGY/ONCOLOGY | Age: 71
End: 2021-01-08
Attending: INTERNAL MEDICINE
Payer: MEDICARE

## 2021-01-08 VITALS
SYSTOLIC BLOOD PRESSURE: 144 MMHG | HEART RATE: 69 BPM | HEIGHT: 72.99 IN | TEMPERATURE: 96 F | WEIGHT: 265.38 LBS | BODY MASS INDEX: 35.17 KG/M2 | OXYGEN SATURATION: 98 % | DIASTOLIC BLOOD PRESSURE: 79 MMHG | RESPIRATION RATE: 20 BRPM

## 2021-01-08 DIAGNOSIS — C90.02 MULTIPLE MYELOMA IN RELAPSE (HCC): Primary | ICD-10-CM

## 2021-01-08 DIAGNOSIS — C90.00 MULTIPLE MYELOMA, REMISSION STATUS UNSPECIFIED (HCC): Primary | ICD-10-CM

## 2021-01-08 DIAGNOSIS — M54.50 ACUTE BILATERAL LOW BACK PAIN WITHOUT SCIATICA: ICD-10-CM

## 2021-01-08 LAB
ALBUMIN SERPL-MCNC: 3.8 G/DL (ref 3.4–5)
ALBUMIN/GLOB SERPL: 1.1 {RATIO} (ref 1–2)
ALP LIVER SERPL-CCNC: 106 U/L
ALT SERPL-CCNC: 34 U/L
ANION GAP SERPL CALC-SCNC: 6 MMOL/L (ref 0–18)
AST SERPL-CCNC: 25 U/L (ref 15–37)
BASOPHILS # BLD AUTO: 0.03 X10(3) UL (ref 0–0.2)
BASOPHILS NFR BLD AUTO: 0.6 %
BILIRUB SERPL-MCNC: 0.6 MG/DL (ref 0.1–2)
BUN BLD-MCNC: 27 MG/DL (ref 7–18)
BUN/CREAT SERPL: 20.8 (ref 10–20)
CALCIUM BLD-MCNC: 9.5 MG/DL (ref 8.5–10.1)
CHLORIDE SERPL-SCNC: 107 MMOL/L (ref 98–112)
CO2 SERPL-SCNC: 26 MMOL/L (ref 21–32)
CREAT BLD-MCNC: 1.3 MG/DL
DEPRECATED RDW RBC AUTO: 58.3 FL (ref 35.1–46.3)
EOSINOPHIL # BLD AUTO: 0.03 X10(3) UL (ref 0–0.7)
EOSINOPHIL NFR BLD AUTO: 0.6 %
ERYTHROCYTE [DISTWIDTH] IN BLOOD BY AUTOMATED COUNT: 15.7 % (ref 11–15)
GLOBULIN PLAS-MCNC: 3.6 G/DL (ref 2.8–4.4)
GLUCOSE BLD-MCNC: 98 MG/DL (ref 70–99)
HCT VFR BLD AUTO: 38.5 %
HGB BLD-MCNC: 12.8 G/DL
IGA SERPL-MCNC: 120 MG/DL (ref 70–312)
IGM SERPL-MCNC: 12.3 MG/DL (ref 43–279)
IMM GRANULOCYTES # BLD AUTO: 0.02 X10(3) UL (ref 0–1)
IMM GRANULOCYTES NFR BLD: 0.4 %
IMMUNOGLOBULIN PNL SER-MCNC: 980 MG/DL (ref 791–1643)
LDH SERPL L TO P-CCNC: 198 U/L
LYMPHOCYTES # BLD AUTO: 1.09 X10(3) UL (ref 1–4)
LYMPHOCYTES NFR BLD AUTO: 21.2 %
M PROTEIN MFR SERPL ELPH: 7.4 G/DL (ref 6.4–8.2)
MCH RBC QN AUTO: 33.5 PG (ref 26–34)
MCHC RBC AUTO-ENTMCNC: 33.2 G/DL (ref 31–37)
MCV RBC AUTO: 100.8 FL
MONOCYTES # BLD AUTO: 0.45 X10(3) UL (ref 0.1–1)
MONOCYTES NFR BLD AUTO: 8.8 %
NEUTROPHILS # BLD AUTO: 3.52 X10 (3) UL (ref 1.5–7.7)
NEUTROPHILS # BLD AUTO: 3.52 X10(3) UL (ref 1.5–7.7)
NEUTROPHILS NFR BLD AUTO: 68.4 %
OSMOLALITY SERPL CALC.SUM OF ELEC: 293 MOSM/KG (ref 275–295)
PATIENT FASTING Y/N/NP: NO
PLATELET # BLD AUTO: 226 10(3)UL (ref 150–450)
POTASSIUM SERPL-SCNC: 4.1 MMOL/L (ref 3.5–5.1)
RBC # BLD AUTO: 3.82 X10(6)UL
SODIUM SERPL-SCNC: 139 MMOL/L (ref 136–145)
WBC # BLD AUTO: 5.1 X10(3) UL (ref 4–11)

## 2021-01-08 PROCEDURE — 99214 OFFICE O/P EST MOD 30 MIN: CPT | Performed by: INTERNAL MEDICINE

## 2021-01-08 PROCEDURE — 96374 THER/PROPH/DIAG INJ IV PUSH: CPT

## 2021-01-08 NOTE — PROGRESS NOTES
Outpatient Oncology Care Plan  Problem list:  pain - Right low back pain, right scapula to rib pain (seldom takes tramadol)    Problems related to:    disease/disease progression    Interventions:  provided general teaching    Expected outcomes:  Hai Carrasquillo

## 2021-01-08 NOTE — PROGRESS NOTES
Education Record    Learner:  Patient    Disease / Diagnosis: multiple myeloma    Barriers / Limitations:  None   Comments:    Method:  Brief focused   Comments:    General Topics:  Plan of care reviewed   Comments:    Outcome:  Shows understanding   Analisa Cline

## 2021-01-08 NOTE — PROGRESS NOTES
Cancer Center Progress Note    Problem List:      Patient Active Problem List:     Impotence of organic origin     Generalized osteoarthrosis of hand     Morbid obesity ([address]     Essential hypertension     Hypertension     Multiple myeloma ([address]     Myelom pruritus. Hematologic/lymphatic: Negative for easy bruising, bleeding, and lymphadenopathy. Musculoskeletal: Negative for myalgias, arthralgias, muscle weakness.   Genitourinary: Negative for dysuria or hematuria  Neurological: Negative for headaches, diz Pcn [Penicillins]       ANAPHYLAXIS, HIVES, HALLUCINATION,                            SHORTNESS OF BREATH    Comment:Respiratory distress  Beta Adrenergic Blo*      Latex                       Comment:Surgical tape  Welts, burning of skin, itching  S distress. Respiratory: Clear to auscultation and percussion. No rales. No wheezes. Cardiovascular: Regular rate and rhythm. No murmurs. Gastrointestinal: Soft, non tender with good bowel sounds. Musculoskeletal: No edema. No calf tenderness.   Carolyn Echavarria Revlimid maintenance in 6/2017     He had 5 cycles of (VCd) Dexamethasone, Bortezomib and cyclophosphamide. He had high dose therapy with stem cell infusion on 1/27/2017. He had been on Revlimid maintenance since 7/2017.  He now has evidence of progression

## 2021-01-11 ENCOUNTER — LAB ENCOUNTER (OUTPATIENT)
Dept: LAB | Age: 71
End: 2021-01-11
Attending: INTERNAL MEDICINE
Payer: MEDICARE

## 2021-01-11 DIAGNOSIS — M54.50 ACUTE BILATERAL LOW BACK PAIN WITHOUT SCIATICA: ICD-10-CM

## 2021-01-11 DIAGNOSIS — C90.02 MULTIPLE MYELOMA IN RELAPSE (HCC): ICD-10-CM

## 2021-01-11 PROCEDURE — 86335 IMMUNFIX E-PHORSIS/URINE/CSF: CPT

## 2021-01-11 PROCEDURE — 83883 ASSAY NEPHELOMETRY NOT SPEC: CPT

## 2021-01-11 PROCEDURE — 84156 ASSAY OF PROTEIN URINE: CPT

## 2021-01-12 ENCOUNTER — OFFICE VISIT (OUTPATIENT)
Dept: HEMATOLOGY/ONCOLOGY | Facility: HOSPITAL | Age: 71
End: 2021-01-12
Attending: INTERNAL MEDICINE
Payer: MEDICARE

## 2021-01-12 VITALS
SYSTOLIC BLOOD PRESSURE: 152 MMHG | RESPIRATION RATE: 16 BRPM | OXYGEN SATURATION: 98 % | HEIGHT: 72.99 IN | WEIGHT: 266 LBS | DIASTOLIC BLOOD PRESSURE: 75 MMHG | HEART RATE: 82 BPM | TEMPERATURE: 99 F | BODY MASS INDEX: 35.25 KG/M2

## 2021-01-12 DIAGNOSIS — C90.00 MULTIPLE MYELOMA, REMISSION STATUS UNSPECIFIED (HCC): ICD-10-CM

## 2021-01-12 PROCEDURE — 38222 DX BONE MARROW BX & ASPIR: CPT | Performed by: INTERNAL MEDICINE

## 2021-01-12 NOTE — PROGRESS NOTES
Cancer Center Bone Marrow Procedure Note      Date of Service:  1/12/2021    Problem List  Patient Active Problem List:     Impotence of organic origin     Generalized osteoarthrosis of hand     Morbid obesity ([address]     Essential hypertension     Hypertens MG Oral Tab EC Take 1 tablet by mouth daily.  1 tablet 0       Vital Signs:  /75 (BP Location: Left arm, Patient Position: Sitting, Cuff Size: large)   Pulse 82   Temp 98.7 °F (37.1 °C) (Temporal)   Resp 16   Ht 1.854 m (6' 0.99\")   Wt 120.7 kg (266

## 2021-01-12 NOTE — PROGRESS NOTES
Patient is here today for Bone Marrow Biopsy and Aspiration  with . Medication list and medical history were reviewed and updated.      Education Record    Learner:  Patient    Disease / Diagnosis: Multiple Myeloma    Barriers / Limitations:  None

## 2021-01-13 LAB
FREE UR LAMBDA EXCRETION/DAY: 28.19 MG/D
FREE UR LAMBDA LIGHT CHAIN: 10.25 MG/L
FREE URINARY KAPPA EXCRETION/D: ABNORMAL MG/D
FREE URINARY KAPPA LIGHT CHAIN: 9549.19 MG/L
HOURS COLLECTED: 24 HR
TOTAL PROTEIN, URINE: ABNORMAL MG/D
TOTAL VOLUME: 2750 ML

## 2021-01-15 LAB
ALBUMIN SERPL ELPH-MCNC: 4.21 G/DL (ref 3.75–5.21)
ALBUMIN/GLOB SERPL: 1.36 {RATIO} (ref 1–2)
ALPHA1 GLOB SERPL ELPH-MCNC: 0.32 G/DL (ref 0.19–0.46)
ALPHA2 GLOB SERPL ELPH-MCNC: 0.89 G/DL (ref 0.48–1.05)
B-GLOBULIN SERPL ELPH-MCNC: 0.82 G/DL (ref 0.68–1.23)
GAMMA GLOB SERPL ELPH-MCNC: 1.05 G/DL (ref 0.62–1.7)
KAPPA LC FREE SER-MCNC: 206.62 MG/DL (ref 0.33–1.94)
KAPPA LC FREE/LAMBDA FREE SER NEPH: 207.24 {RATIO} (ref 0.26–1.65)
LAMBDA LC FREE SERPL-MCNC: 1 MG/DL (ref 0.57–2.63)
M PROTEIN MFR SERPL ELPH: 7.3 G/DL (ref 6.4–8.2)

## 2021-01-19 LAB
CD45-/CD38+ KAPPA: 86 %
CD45-/CD38+ LAMBDA: <1 %
CD56 CELLS NFR SPEC: 94 %

## 2021-01-20 ENCOUNTER — TELEPHONE (OUTPATIENT)
Dept: HEMATOLOGY/ONCOLOGY | Facility: HOSPITAL | Age: 71
End: 2021-01-20

## 2021-01-21 ENCOUNTER — HOSPITAL ENCOUNTER (OUTPATIENT)
Dept: NUCLEAR MEDICINE | Facility: HOSPITAL | Age: 71
Discharge: HOME | End: 2021-01-21
Attending: INTERNAL MEDICINE
Payer: MEDICARE

## 2021-01-21 DIAGNOSIS — M54.50 ACUTE BILATERAL LOW BACK PAIN WITHOUT SCIATICA: ICD-10-CM

## 2021-01-21 DIAGNOSIS — C90.02 MULTIPLE MYELOMA IN RELAPSE (HCC): ICD-10-CM

## 2021-01-21 LAB — GLUCOSE BLD-MCNC: 79 MG/DL (ref 70–99)

## 2021-01-21 PROCEDURE — 78816 PET IMAGE W/CT FULL BODY: CPT | Performed by: INTERNAL MEDICINE

## 2021-01-21 PROCEDURE — 82962 GLUCOSE BLOOD TEST: CPT

## 2021-01-21 RX ORDER — PROCHLORPERAZINE MALEATE 10 MG
10 TABLET ORAL EVERY 6 HOURS PRN
Qty: 30 TABLET | Refills: 3 | Status: SHIPPED | OUTPATIENT
Start: 2021-01-21 | End: 2021-02-11

## 2021-01-21 RX ORDER — DEXAMETHASONE 4 MG/1
40 TABLET ORAL WEEKLY
Qty: 40 TABLET | Refills: 11 | Status: SHIPPED | OUTPATIENT
Start: 2021-01-21 | End: 2021-01-29

## 2021-01-21 RX ORDER — ONDANSETRON HYDROCHLORIDE 8 MG/1
8 TABLET, FILM COATED ORAL EVERY 8 HOURS PRN
Qty: 30 TABLET | Refills: 3 | Status: SHIPPED | OUTPATIENT
Start: 2021-01-21 | End: 2021-02-11

## 2021-01-22 ENCOUNTER — OFFICE VISIT (OUTPATIENT)
Dept: HEMATOLOGY/ONCOLOGY | Age: 71
End: 2021-01-22
Attending: INTERNAL MEDICINE
Payer: MEDICARE

## 2021-01-22 ENCOUNTER — SOCIAL WORK SERVICES (OUTPATIENT)
Dept: HEMATOLOGY/ONCOLOGY | Facility: HOSPITAL | Age: 71
End: 2021-01-22

## 2021-01-22 VITALS
TEMPERATURE: 97 F | BODY MASS INDEX: 34.81 KG/M2 | SYSTOLIC BLOOD PRESSURE: 118 MMHG | HEIGHT: 71.85 IN | DIASTOLIC BLOOD PRESSURE: 70 MMHG | RESPIRATION RATE: 18 BRPM | HEART RATE: 69 BPM | OXYGEN SATURATION: 97 % | WEIGHT: 254.19 LBS

## 2021-01-22 DIAGNOSIS — Z71.9 ENCOUNTER FOR HEALTH EDUCATION: ICD-10-CM

## 2021-01-22 DIAGNOSIS — Z79.899 ENCOUNTER FOR MONITORING CARDIOTOXIC DRUG THERAPY: ICD-10-CM

## 2021-01-22 DIAGNOSIS — C90.00 MULTIPLE MYELOMA NOT HAVING ACHIEVED REMISSION (HCC): Primary | ICD-10-CM

## 2021-01-22 DIAGNOSIS — Z51.81 ENCOUNTER FOR MONITORING CARDIOTOXIC DRUG THERAPY: ICD-10-CM

## 2021-01-22 LAB
ALBUMIN SERPL-MCNC: 3.8 G/DL (ref 3.4–5)
ALBUMIN/GLOB SERPL: 1 {RATIO} (ref 1–2)
ALP LIVER SERPL-CCNC: 127 U/L
ALT SERPL-CCNC: 34 U/L
ANION GAP SERPL CALC-SCNC: 7 MMOL/L (ref 0–18)
ANTIBODY SCREEN: NEGATIVE
AST SERPL-CCNC: 34 U/L (ref 15–37)
BASOPHILS # BLD AUTO: 0.02 X10(3) UL (ref 0–0.2)
BASOPHILS NFR BLD AUTO: 0.3 %
BILIRUB SERPL-MCNC: 0.7 MG/DL (ref 0.1–2)
BUN BLD-MCNC: 32 MG/DL (ref 7–18)
BUN/CREAT SERPL: 22.1 (ref 10–20)
CALCIUM BLD-MCNC: 10.3 MG/DL (ref 8.5–10.1)
CHLORIDE SERPL-SCNC: 106 MMOL/L (ref 98–112)
CO2 SERPL-SCNC: 24 MMOL/L (ref 21–32)
CREAT BLD-MCNC: 1.45 MG/DL
DEPRECATED RDW RBC AUTO: 53.8 FL (ref 35.1–46.3)
EOSINOPHIL # BLD AUTO: 0.04 X10(3) UL (ref 0–0.7)
EOSINOPHIL NFR BLD AUTO: 0.7 %
ERYTHROCYTE [DISTWIDTH] IN BLOOD BY AUTOMATED COUNT: 14.7 % (ref 11–15)
GLOBULIN PLAS-MCNC: 3.9 G/DL (ref 2.8–4.4)
GLUCOSE BLD-MCNC: 114 MG/DL (ref 70–99)
HCT VFR BLD AUTO: 38.5 %
HGB BLD-MCNC: 12.9 G/DL
IMM GRANULOCYTES # BLD AUTO: 0.02 X10(3) UL (ref 0–1)
IMM GRANULOCYTES NFR BLD: 0.3 %
LYMPHOCYTES # BLD AUTO: 0.97 X10(3) UL (ref 1–4)
LYMPHOCYTES NFR BLD AUTO: 16.4 %
M PROTEIN MFR SERPL ELPH: 7.7 G/DL (ref 6.4–8.2)
MCH RBC QN AUTO: 33.2 PG (ref 26–34)
MCHC RBC AUTO-ENTMCNC: 33.5 G/DL (ref 31–37)
MCV RBC AUTO: 99 FL
MONOCYTES # BLD AUTO: 0.57 X10(3) UL (ref 0.1–1)
MONOCYTES NFR BLD AUTO: 9.6 %
NEUTROPHILS # BLD AUTO: 4.3 X10 (3) UL (ref 1.5–7.7)
NEUTROPHILS # BLD AUTO: 4.3 X10(3) UL (ref 1.5–7.7)
NEUTROPHILS NFR BLD AUTO: 72.7 %
OSMOLALITY SERPL CALC.SUM OF ELEC: 292 MOSM/KG (ref 275–295)
PATIENT FASTING Y/N/NP: NO
PLATELET # BLD AUTO: 209 10(3)UL (ref 150–450)
POTASSIUM SERPL-SCNC: 3.7 MMOL/L (ref 3.5–5.1)
RBC # BLD AUTO: 3.89 X10(6)UL
RH BLOOD TYPE: NEGATIVE
SODIUM SERPL-SCNC: 137 MMOL/L (ref 136–145)
WBC # BLD AUTO: 5.9 X10(3) UL (ref 4–11)

## 2021-01-22 PROCEDURE — 81479 UNLISTED MOLECULAR PATHOLOGY: CPT

## 2021-01-22 PROCEDURE — 96413 CHEMO IV INFUSION 1 HR: CPT

## 2021-01-22 PROCEDURE — 96401 CHEMO ANTI-NEOPL SQ/IM: CPT

## 2021-01-22 PROCEDURE — 96374 THER/PROPH/DIAG INJ IV PUSH: CPT

## 2021-01-22 PROCEDURE — 96375 TX/PRO/DX INJ NEW DRUG ADDON: CPT

## 2021-01-22 PROCEDURE — 99215 OFFICE O/P EST HI 40 MIN: CPT | Performed by: CLINICAL NURSE SPECIALIST

## 2021-01-22 RX ORDER — ACETAMINOPHEN 325 MG/1
650 TABLET ORAL ONCE
Status: COMPLETED | OUTPATIENT
Start: 2021-01-22 | End: 2021-01-22

## 2021-01-22 RX ORDER — DIPHENHYDRAMINE HYDROCHLORIDE 50 MG/ML
50 INJECTION INTRAMUSCULAR; INTRAVENOUS ONCE
Status: COMPLETED | OUTPATIENT
Start: 2021-01-22 | End: 2021-01-22

## 2021-01-22 RX ADMIN — ACETAMINOPHEN 650 MG: 325 TABLET ORAL at 09:59:00

## 2021-01-22 RX ADMIN — DIPHENHYDRAMINE HYDROCHLORIDE 50 MG: 50 INJECTION INTRAMUSCULAR; INTRAVENOUS at 09:57:00

## 2021-01-22 NOTE — PROGRESS NOTES
Chemotherapy Education    Learner:  Patient    Barriers / Limitations:  None    Chemotherapy education goals:  · Learn the drug names  · Administration schedule  · Routes of administration  · Treatment setting    Drug names:  Carfilzomib, Daratumumuab Hyal menstrual irregularity and vaginal dryness:  N/A    Notify MD/RN of any changes or problems:  Achieved    Comments:    Treatment Effects on Emotional Status:    Potential mood changes, depression, nervousness, difficulty sleeping:  Achieved    Importance o

## 2021-01-22 NOTE — PROGRESS NOTES
met with Patient in treatment room for introduction and role explanation. Patient reports that he was previously on oral treatment, and is not feeling overwhelmed about his new treatment at the [facility].  He reports that he has a strong s

## 2021-01-25 ENCOUNTER — HOSPITAL ENCOUNTER (OUTPATIENT)
Dept: CV DIAGNOSTICS | Facility: HOSPITAL | Age: 71
Discharge: HOME | End: 2021-01-25
Attending: INTERNAL MEDICINE
Payer: MEDICARE

## 2021-01-25 ENCOUNTER — APPOINTMENT (OUTPATIENT)
Dept: LAB | Facility: HOSPITAL | Age: 71
End: 2021-01-25
Attending: INTERNAL MEDICINE
Payer: MEDICARE

## 2021-01-25 ENCOUNTER — TELEPHONE (OUTPATIENT)
Dept: HEMATOLOGY/ONCOLOGY | Facility: HOSPITAL | Age: 71
End: 2021-01-25

## 2021-01-25 ENCOUNTER — HOSPITAL ENCOUNTER (OUTPATIENT)
Dept: LAB | Facility: HOSPITAL | Age: 71
Discharge: HOME | End: 2021-01-25
Attending: INTERNAL MEDICINE
Payer: MEDICARE

## 2021-01-25 DIAGNOSIS — Z79.899 ENCOUNTER FOR MONITORING CARDIOTOXIC DRUG THERAPY: ICD-10-CM

## 2021-01-25 DIAGNOSIS — C90.02 MULTIPLE MYELOMA IN RELAPSE (HCC): ICD-10-CM

## 2021-01-25 DIAGNOSIS — C90.00 MULTIPLE MYELOMA NOT HAVING ACHIEVED REMISSION (HCC): ICD-10-CM

## 2021-01-25 DIAGNOSIS — M54.50 ACUTE BILATERAL LOW BACK PAIN WITHOUT SCIATICA: ICD-10-CM

## 2021-01-25 DIAGNOSIS — Z51.81 ENCOUNTER FOR MONITORING CARDIOTOXIC DRUG THERAPY: ICD-10-CM

## 2021-01-25 LAB
ALBUMIN SERPL-MCNC: 3.4 G/DL (ref 3.4–5)
ATRIAL RATE: 54 BPM
BASOPHILS # BLD AUTO: 0.01 X10(3) UL (ref 0–0.2)
BASOPHILS NFR BLD AUTO: 0.1 %
CALCIUM BLD-MCNC: 9.1 MG/DL (ref 8.5–10.1)
CREAT BLD-MCNC: 1.4 MG/DL
DEPRECATED RDW RBC AUTO: 57.1 FL (ref 35.1–46.3)
EOSINOPHIL # BLD AUTO: 0.01 X10(3) UL (ref 0–0.7)
EOSINOPHIL NFR BLD AUTO: 0.1 %
ERYTHROCYTE [DISTWIDTH] IN BLOOD BY AUTOMATED COUNT: 15.1 % (ref 11–15)
HCT VFR BLD AUTO: 36.9 %
HGB BLD-MCNC: 11.9 G/DL
IMM GRANULOCYTES # BLD AUTO: 0.04 X10(3) UL (ref 0–1)
IMM GRANULOCYTES NFR BLD: 0.4 %
LYMPHOCYTES # BLD AUTO: 1 X10(3) UL (ref 1–4)
LYMPHOCYTES NFR BLD AUTO: 9.4 %
MCH RBC QN AUTO: 33.1 PG (ref 26–34)
MCHC RBC AUTO-ENTMCNC: 32.2 G/DL (ref 31–37)
MCV RBC AUTO: 102.5 FL
MONOCYTES # BLD AUTO: 0.86 X10(3) UL (ref 0.1–1)
MONOCYTES NFR BLD AUTO: 8 %
NEUTROPHILS # BLD AUTO: 8.77 X10 (3) UL (ref 1.5–7.7)
NEUTROPHILS # BLD AUTO: 8.77 X10(3) UL (ref 1.5–7.7)
NEUTROPHILS NFR BLD AUTO: 82 %
P AXIS: 21 DEGREES
P-R INTERVAL: 186 MS
PLATELET # BLD AUTO: 155 10(3)UL (ref 150–450)
Q-T INTERVAL: 376 MS
QRS DURATION: 78 MS
QTC CALCULATION (BEZET): 356 MS
R AXIS: 81 DEGREES
RBC # BLD AUTO: 3.6 X10(6)UL
T AXIS: 36 DEGREES
VENTRICULAR RATE: 54 BPM
WBC # BLD AUTO: 10.7 X10(3) UL (ref 4–11)

## 2021-01-25 PROCEDURE — 36415 COLL VENOUS BLD VENIPUNCTURE: CPT | Performed by: INTERNAL MEDICINE

## 2021-01-25 PROCEDURE — 85025 COMPLETE CBC W/AUTO DIFF WBC: CPT | Performed by: INTERNAL MEDICINE

## 2021-01-25 PROCEDURE — 82040 ASSAY OF SERUM ALBUMIN: CPT | Performed by: INTERNAL MEDICINE

## 2021-01-25 PROCEDURE — 82565 ASSAY OF CREATININE: CPT | Performed by: INTERNAL MEDICINE

## 2021-01-25 PROCEDURE — 82310 ASSAY OF CALCIUM: CPT | Performed by: INTERNAL MEDICINE

## 2021-01-25 PROCEDURE — 93306 TTE W/DOPPLER COMPLETE: CPT | Performed by: INTERNAL MEDICINE

## 2021-01-25 PROCEDURE — 93010 ELECTROCARDIOGRAM REPORT: CPT | Performed by: INTERNAL MEDICINE

## 2021-01-25 PROCEDURE — 93005 ELECTROCARDIOGRAM TRACING: CPT

## 2021-01-25 NOTE — TELEPHONE ENCOUNTER
Chucky Jessica said Friday night he felt chilled. No fever. He had nausea which was relieved by taking his anti emetic. No further nausea since. He also felt thirsty. He has been drinking 64-80oz of caffeine free fluid daily.  He said he felt good over the weekend an

## 2021-01-25 NOTE — TELEPHONE ENCOUNTER
Toxicities: C1 D1 Carfilzomib 20-70/Daratumumab/Dexamethasone on 1/22/2021    I attempted to reach Dale Torres to see how he is feeling after his first treatment. No answer.  I left a voice mail message asking him to please return my call at his earliest [place]

## 2021-01-27 ENCOUNTER — TELEPHONE (OUTPATIENT)
Dept: HEMATOLOGY/ONCOLOGY | Facility: HOSPITAL | Age: 71
End: 2021-01-27

## 2021-01-28 ENCOUNTER — TELEPHONE (OUTPATIENT)
Dept: CARDIOLOGY | Age: 71
End: 2021-01-28

## 2021-01-28 ENCOUNTER — OFFICE VISIT (OUTPATIENT)
Dept: CARDIOLOGY | Age: 71
End: 2021-01-28

## 2021-01-28 VITALS
RESPIRATION RATE: 16 BRPM | WEIGHT: 261.4 LBS | HEART RATE: 72 BPM | SYSTOLIC BLOOD PRESSURE: 128 MMHG | OXYGEN SATURATION: 98 % | DIASTOLIC BLOOD PRESSURE: 68 MMHG | BODY MASS INDEX: 34.64 KG/M2 | HEIGHT: 73 IN

## 2021-01-28 DIAGNOSIS — C90.00 MULTIPLE MYELOMA, STAGE 3 (CMD): Primary | ICD-10-CM

## 2021-01-28 DIAGNOSIS — E11.9 TYPE 2 DIABETES MELLITUS WITHOUT COMPLICATION, WITHOUT LONG-TERM CURRENT USE OF INSULIN (CMD): ICD-10-CM

## 2021-01-28 DIAGNOSIS — I10 ESSENTIAL HYPERTENSION: ICD-10-CM

## 2021-01-28 DIAGNOSIS — E78.49 OTHER HYPERLIPIDEMIA: ICD-10-CM

## 2021-01-28 PROBLEM — M65.30 TRIGGER FINGER, ACQUIRED: Status: ACTIVE | Noted: 2021-01-28

## 2021-01-28 PROBLEM — C61 PROSTATE CANCER (CMD): Status: ACTIVE | Noted: 2021-01-28

## 2021-01-28 PROBLEM — M50.10 HERNIATION OF CERVICAL INTERVERTEBRAL DISC WITH RADICULOPATHY: Status: ACTIVE | Noted: 2021-01-28

## 2021-01-28 PROBLEM — E66.9 CLASS 1 OBESITY WITHOUT SERIOUS COMORBIDITY WITH BODY MASS INDEX (BMI) OF 34.0 TO 34.9 IN ADULT: Status: ACTIVE | Noted: 2021-01-28

## 2021-01-28 PROBLEM — C44.91 BCC (BASAL CELL CARCINOMA OF SKIN): Status: ACTIVE | Noted: 2021-01-28

## 2021-01-28 PROBLEM — M19.90 OSTEOARTHROSIS: Status: ACTIVE | Noted: 2021-01-28

## 2021-01-28 PROCEDURE — 99205 OFFICE O/P NEW HI 60 MIN: CPT | Performed by: INTERNAL MEDICINE

## 2021-01-28 RX ORDER — LISINOPRIL 10 MG/1
10 TABLET ORAL DAILY
COMMUNITY
Start: 2020-08-06 | End: 2021-03-08 | Stop reason: ALTCHOICE

## 2021-01-28 RX ORDER — ATORVASTATIN CALCIUM 20 MG/1
20 TABLET, FILM COATED ORAL DAILY
COMMUNITY
Start: 2020-12-02

## 2021-01-28 RX ORDER — CHLORAL HYDRATE 500 MG
1000 CAPSULE ORAL 2 TIMES DAILY
COMMUNITY

## 2021-01-28 RX ORDER — LANCETS
EACH MISCELLANEOUS
COMMUNITY
Start: 2019-08-19 | End: 2021-04-08 | Stop reason: ALTCHOICE

## 2021-01-28 RX ORDER — MULTIVITAMIN
1 TABLET ORAL DAILY
COMMUNITY
Start: 2017-02-02

## 2021-01-28 RX ORDER — ONDANSETRON HYDROCHLORIDE 8 MG/1
8 TABLET, FILM COATED ORAL PRN
COMMUNITY
Start: 2021-01-21

## 2021-01-28 RX ORDER — DEXAMETHASONE 4 MG/1
40 TABLET ORAL
COMMUNITY
Start: 2021-01-21

## 2021-01-28 RX ORDER — ACETAMINOPHEN 500 MG
1000 TABLET ORAL AT BEDTIME
COMMUNITY

## 2021-01-28 RX ORDER — CALCIUM CARBONATE 500 MG/1
2000 TABLET, CHEWABLE ORAL 2 TIMES DAILY
COMMUNITY
Start: 2016-08-18

## 2021-01-28 RX ORDER — ASPIRIN 81 MG/1
81 TABLET ORAL DAILY
COMMUNITY
Start: 2015-08-19

## 2021-01-28 RX ORDER — PROCHLORPERAZINE MALEATE 10 MG
10 TABLET ORAL PRN
COMMUNITY
Start: 2021-01-21

## 2021-01-28 RX ORDER — TRAMADOL HYDROCHLORIDE 50 MG/1
50 TABLET ORAL PRN
COMMUNITY
Start: 2020-11-05

## 2021-01-28 RX ORDER — ACYCLOVIR 400 MG/1
400 TABLET ORAL 2 TIMES DAILY
COMMUNITY
Start: 2017-02-02

## 2021-01-28 ASSESSMENT — ENCOUNTER SYMPTOMS
GASTROINTESTINAL NEGATIVE: 1
BACK PAIN: 1
CONSTITUTIONAL NEGATIVE: 1
HEMATOLOGIC/LYMPHATIC NEGATIVE: 1
NEUROLOGICAL NEGATIVE: 1
EYES NEGATIVE: 1
ENDOCRINE NEGATIVE: 1
PSYCHIATRIC NEGATIVE: 1
SHORTNESS OF BREATH: 0
ALLERGIC/IMMUNOLOGIC NEGATIVE: 1
LIGHT-HEADEDNESS: 0
DIZZINESS: 0

## 2021-01-28 ASSESSMENT — PATIENT HEALTH QUESTIONNAIRE - PHQ9
CLINICAL INTERPRETATION OF PHQ9 SCORE: NO FURTHER SCREENING NEEDED
SUM OF ALL RESPONSES TO PHQ9 QUESTIONS 1 AND 2: 0
1. LITTLE INTEREST OR PLEASURE IN DOING THINGS: NOT AT ALL
CLINICAL INTERPRETATION OF PHQ2 SCORE: NO FURTHER SCREENING NEEDED
2. FEELING DOWN, DEPRESSED OR HOPELESS: NOT AT ALL
SUM OF ALL RESPONSES TO PHQ9 QUESTIONS 1 AND 2: 0

## 2021-01-29 ENCOUNTER — OFFICE VISIT (OUTPATIENT)
Dept: HEMATOLOGY/ONCOLOGY | Age: 71
End: 2021-01-29
Attending: INTERNAL MEDICINE
Payer: MEDICARE

## 2021-01-29 VITALS
SYSTOLIC BLOOD PRESSURE: 130 MMHG | HEART RATE: 60 BPM | DIASTOLIC BLOOD PRESSURE: 71 MMHG | BODY MASS INDEX: 35.88 KG/M2 | TEMPERATURE: 98 F | RESPIRATION RATE: 20 BRPM | OXYGEN SATURATION: 98 % | WEIGHT: 262 LBS | HEIGHT: 71.85 IN

## 2021-01-29 DIAGNOSIS — I42.9 CARDIOMYOPATHY, UNSPECIFIED TYPE (HCC): ICD-10-CM

## 2021-01-29 DIAGNOSIS — C90.00 MULTIPLE MYELOMA NOT HAVING ACHIEVED REMISSION (HCC): Primary | ICD-10-CM

## 2021-01-29 DIAGNOSIS — Z51.81 ENCOUNTER FOR MONITORING CARDIOTOXIC DRUG THERAPY: ICD-10-CM

## 2021-01-29 DIAGNOSIS — D63.0 ANEMIA COMPLICATING NEOPLASTIC DISEASE: ICD-10-CM

## 2021-01-29 DIAGNOSIS — Z79.899 ENCOUNTER FOR MONITORING CARDIOTOXIC DRUG THERAPY: ICD-10-CM

## 2021-01-29 LAB
BASOPHILS # BLD AUTO: 0.01 X10(3) UL (ref 0–0.2)
BASOPHILS NFR BLD AUTO: 0.2 %
DEPRECATED RDW RBC AUTO: 56.4 FL (ref 35.1–46.3)
EOSINOPHIL # BLD AUTO: 0.08 X10(3) UL (ref 0–0.7)
EOSINOPHIL NFR BLD AUTO: 1.3 %
ERYTHROCYTE [DISTWIDTH] IN BLOOD BY AUTOMATED COUNT: 15 % (ref 11–15)
HCT VFR BLD AUTO: 38.8 %
HGB BLD-MCNC: 12.9 G/DL
IMM GRANULOCYTES # BLD AUTO: 0.01 X10(3) UL (ref 0–1)
IMM GRANULOCYTES NFR BLD: 0.2 %
LYMPHOCYTES # BLD AUTO: 0.83 X10(3) UL (ref 1–4)
LYMPHOCYTES NFR BLD AUTO: 13.7 %
MCH RBC QN AUTO: 33.7 PG (ref 26–34)
MCHC RBC AUTO-ENTMCNC: 33.2 G/DL (ref 31–37)
MCV RBC AUTO: 101.3 FL
MONOCYTES # BLD AUTO: 0.48 X10(3) UL (ref 0.1–1)
MONOCYTES NFR BLD AUTO: 7.9 %
NEUTROPHILS # BLD AUTO: 4.63 X10 (3) UL (ref 1.5–7.7)
NEUTROPHILS # BLD AUTO: 4.63 X10(3) UL (ref 1.5–7.7)
NEUTROPHILS NFR BLD AUTO: 76.7 %
PLATELET # BLD AUTO: 176 10(3)UL (ref 150–450)
RBC # BLD AUTO: 3.83 X10(6)UL
WBC # BLD AUTO: 6 X10(3) UL (ref 4–11)

## 2021-01-29 PROCEDURE — 96401 CHEMO ANTI-NEOPL SQ/IM: CPT

## 2021-01-29 PROCEDURE — 96413 CHEMO IV INFUSION 1 HR: CPT

## 2021-01-29 PROCEDURE — 96375 TX/PRO/DX INJ NEW DRUG ADDON: CPT

## 2021-01-29 PROCEDURE — 99215 OFFICE O/P EST HI 40 MIN: CPT | Performed by: INTERNAL MEDICINE

## 2021-01-29 PROCEDURE — 85025 COMPLETE CBC W/AUTO DIFF WBC: CPT

## 2021-01-29 PROCEDURE — 96361 HYDRATE IV INFUSION ADD-ON: CPT

## 2021-01-29 RX ORDER — DIPHENHYDRAMINE HYDROCHLORIDE 50 MG/ML
50 INJECTION INTRAMUSCULAR; INTRAVENOUS ONCE
Status: CANCELLED | OUTPATIENT
Start: 2021-01-29

## 2021-01-29 RX ORDER — DEXAMETHASONE 4 MG/1
20 TABLET ORAL WEEKLY
COMMUNITY
End: 2021-12-22

## 2021-01-29 RX ORDER — ACETAMINOPHEN 325 MG/1
650 TABLET ORAL ONCE
Status: COMPLETED | OUTPATIENT
Start: 2021-01-29 | End: 2021-01-29

## 2021-01-29 RX ORDER — DIPHENHYDRAMINE HYDROCHLORIDE 50 MG/ML
50 INJECTION INTRAMUSCULAR; INTRAVENOUS ONCE
Status: COMPLETED | OUTPATIENT
Start: 2021-01-29 | End: 2021-01-29

## 2021-01-29 RX ORDER — ACETAMINOPHEN 325 MG/1
650 TABLET ORAL ONCE
Status: CANCELLED | OUTPATIENT
Start: 2021-01-29

## 2021-01-29 RX ADMIN — DIPHENHYDRAMINE HYDROCHLORIDE 50 MG: 50 INJECTION INTRAMUSCULAR; INTRAVENOUS at 12:21:00

## 2021-01-29 RX ADMIN — ACETAMINOPHEN 650 MG: 325 TABLET ORAL at 12:22:00

## 2021-01-29 NOTE — PROGRESS NOTES
Outpatient Oncology Care Plan  Problem list:  chills, dehydration, nausea - took nausea meds and hydrated, doing fine now.     Problems related to:    chemotherapy    Interventions:  provided general teaching    Expected outcomes:  understands plan of care

## 2021-01-29 NOTE — H&P
Progress Notes  - documented in this encounter  Pastor Jono MD - 01/28/2021 3:15 PM CST  Formatting of this note might be different from the original.  Advanced Heart Failure Consult Note    Subjective:   The patient is a very pleasant 75-year-old difficult to pinpoint as the patient's activities are restricted by musculoskeletal pain. Review of Systems:  Review of Systems   Constitution: Negative. HENT: Negative. Eyes: Negative.    Cardiovascular: Negative for dyspnea on exertion and leg swel • lisinopril (ZESTRIL) 10 MG tablet Take 10 mg by mouth daily. • Multiple Vitamin tablet Take 1 tablet by mouth daily. • prochlorperazine (COMPAZINE) 10 MG tablet Take 10 mg by mouth as needed.  As needed for nausea   • traMADol (ULTRAM) 50 MG tablet 1000 MG capsule Take 1,000 mg by mouth 2 times daily. Outside Provider   acetaminophen (TYLENOL) 500 MG tablet Take 1,000 mg by mouth at bedtime.  Outside Provider   cholecalciferol (Vitamin D-[address]) 25 mcg (1,000 units) tablet Take 1,000 Units by adwson symptoms, exam and I reviewed both the echocardiogram done in 2016 and the most recent one.  I agree that there has been a decline in left ventricular systolic function and the images are not adequate to determine whether there are changes consisting with c [address]  [address], [address] [place], [address] [address] [place]  Phone: [phone]  Fax: [phone]  E-mail: [email or web address]    2/2/2021  10:33 AM

## 2021-01-29 NOTE — PROGRESS NOTES
Cancer Center Progress Note    Problem List:      Patient Active Problem List:     Impotence of organic origin     Generalized osteoarthrosis of hand     Morbid obesity ([address]     Essential hypertension     Hypertension     Multiple myeloma ([address]     Myelom • Herniation of intervertebral disc between L4 and L5 2000    s/p surgical repair   • High blood pressure    • High cholesterol    • Impotence of organic origin    • Morbid obesity (HCC)    • Osteoarthritis    • Osteoarthrosis, unspecified whether genera tablet, Rfl: 11    •  acyclovir 400 MG Oral Tab, Take 1 tablet (400 mg total) by mouth 2 (two) times daily. , Disp: 60 tablet, Rfl: 3    •  atorvastatin 20 MG Oral Tab, Take 1 tablet (20 mg total) by mouth daily. , Disp: 90 tablet, Rfl: 0    •  traMADol HCl lymphadenopathy throughout in the cervical, supraclavicular, or axillary regions. Psychiatric: The patient's mood is calm and appropriate for this visit.       Labs reviewed at this visit:     Lab Results   Component Value Date    WBC 6.0 01/29/2021    RBC clinically.    =====  CONCLUSION:    1. Heterogeneous density and uptake throughout the osseous structures consistent with history of multiple myeloma. Xray of ribs on 11/8/2020:  FINDINGS:    LUNGS:  No focal consolidation or infiltrate.   CARDIAC:  Nor

## 2021-02-01 ENCOUNTER — LAB ENCOUNTER (OUTPATIENT)
Dept: LAB | Age: 71
End: 2021-02-01
Attending: INTERNAL MEDICINE
Payer: MEDICARE

## 2021-02-01 DIAGNOSIS — C90.00 MULTIPLE MYELOMA (HCC): ICD-10-CM

## 2021-02-01 DIAGNOSIS — Z23 NEED FOR VACCINATION: ICD-10-CM

## 2021-02-01 LAB
SARS-COV-2 RNA RESP QL NAA+PROBE: NOT DETECTED
SARS-COV-2 RNA SPEC QL NAA+PROBE: NOT DETECTED
SPECIMEN SOURCE: NORMAL

## 2021-02-02 ENCOUNTER — MED REC SCAN ONLY (OUTPATIENT)
Dept: FAMILY MEDICINE CLINIC | Facility: CLINIC | Age: 71
End: 2021-02-02

## 2021-02-02 ENCOUNTER — HOSPITAL ENCOUNTER (OUTPATIENT)
Dept: INTERVENTIONAL RADIOLOGY/VASCULAR | Facility: HOSPITAL | Age: 71
Discharge: HOME | End: 2021-02-02
Attending: INTERNAL MEDICINE | Admitting: INTERNAL MEDICINE
Payer: MEDICARE

## 2021-02-02 VITALS
OXYGEN SATURATION: 100 % | RESPIRATION RATE: 20 BRPM | HEIGHT: 73 IN | HEART RATE: 46 BPM | BODY MASS INDEX: 33.4 KG/M2 | TEMPERATURE: 98 F | SYSTOLIC BLOOD PRESSURE: 128 MMHG | WEIGHT: 252 LBS | DIASTOLIC BLOOD PRESSURE: 68 MMHG

## 2021-02-02 DIAGNOSIS — C90.00 MULTIPLE MYELOMA, REMISSION STATUS UNSPECIFIED (HCC): ICD-10-CM

## 2021-02-02 DIAGNOSIS — C90.00 MULTIPLE MYELOMA (HCC): Primary | ICD-10-CM

## 2021-02-02 LAB
ALLENS TEST: POSITIVE
ARTERIAL BLD GAS O2 SATURATION: 95 % (ref 92–100)
ARTERIAL BLOOD GAS BASE EXCESS: -2 MMOL/L (ref ?–2)
ARTERIAL BLOOD GAS HCO3: 22.1 MEQ/L (ref 22–26)
ARTERIAL BLOOD GAS PCO2: 36 MM HG (ref 35–45)
ARTERIAL BLOOD GAS PH: 7.41 (ref 7.35–7.45)
ARTERIAL BLOOD GAS PO2: 83 MM HG (ref 80–105)
CALCULATED O2 SATURATION: 96 % (ref 92–100)
CARBOXYHEMOGLOBIN: 1 % SAT (ref 0–3)
IONIZED CALCIUM: 1.23 MMOL/L (ref 1.12–1.32)
LACTIC ACID ARTERIAL: <1.6 MMOL/L (ref 0.5–2)
METHEMOGLOBIN: 0.6 % SAT (ref 0.4–1.5)
P/F RATIO: 396.5 MMHG
PATIENT TEMPERATURE: 98.6 F
POTASSIUM BLOOD GAS: 4.2 MMOL/L (ref 3.6–5.1)
SODIUM BLOOD GAS: 139 MMOL/L (ref 136–144)
TOTAL HEMOGLOBIN: 13.1 G/DL

## 2021-02-02 PROCEDURE — 88307 TISSUE EXAM BY PATHOLOGIST: CPT | Performed by: INTERNAL MEDICINE

## 2021-02-02 PROCEDURE — 4A023N6 MEASUREMENT OF CARDIAC SAMPLING AND PRESSURE, RIGHT HEART, PERCUTANEOUS APPROACH: ICD-10-PCS | Performed by: INTERNAL MEDICINE

## 2021-02-02 PROCEDURE — 93451 RIGHT HEART CATH: CPT

## 2021-02-02 PROCEDURE — 88341 IMHCHEM/IMCYTCHM EA ADD ANTB: CPT | Performed by: INTERNAL MEDICINE

## 2021-02-02 PROCEDURE — 82375 ASSAY CARBOXYHB QUANT: CPT | Performed by: INTERNAL MEDICINE

## 2021-02-02 PROCEDURE — 82803 BLOOD GASES ANY COMBINATION: CPT | Performed by: INTERNAL MEDICINE

## 2021-02-02 PROCEDURE — 88313 SPECIAL STAINS GROUP 2: CPT | Performed by: INTERNAL MEDICINE

## 2021-02-02 PROCEDURE — 84132 ASSAY OF SERUM POTASSIUM: CPT | Performed by: INTERNAL MEDICINE

## 2021-02-02 PROCEDURE — 83605 ASSAY OF LACTIC ACID: CPT | Performed by: INTERNAL MEDICINE

## 2021-02-02 PROCEDURE — 93505 ENDOMYOCARDIAL BIOPSY: CPT

## 2021-02-02 PROCEDURE — 93505 ENDOMYOCARDIAL BIOPSY: CPT | Performed by: INTERNAL MEDICINE

## 2021-02-02 PROCEDURE — 36600 WITHDRAWAL OF ARTERIAL BLOOD: CPT | Performed by: INTERNAL MEDICINE

## 2021-02-02 PROCEDURE — 84295 ASSAY OF SERUM SODIUM: CPT | Performed by: INTERNAL MEDICINE

## 2021-02-02 PROCEDURE — 82330 ASSAY OF CALCIUM: CPT | Performed by: INTERNAL MEDICINE

## 2021-02-02 PROCEDURE — 83050 HGB METHEMOGLOBIN QUAN: CPT | Performed by: INTERNAL MEDICINE

## 2021-02-02 PROCEDURE — 02BK3ZX EXCISION OF RIGHT VENTRICLE, PERCUTANEOUS APPROACH, DIAGNOSTIC: ICD-10-PCS | Performed by: INTERNAL MEDICINE

## 2021-02-02 PROCEDURE — 85018 HEMOGLOBIN: CPT | Performed by: INTERNAL MEDICINE

## 2021-02-02 PROCEDURE — 88342 IMHCHEM/IMCYTCHM 1ST ANTB: CPT | Performed by: INTERNAL MEDICINE

## 2021-02-02 RX ORDER — HEPARIN SODIUM 5000 [USP'U]/ML
INJECTION, SOLUTION INTRAVENOUS; SUBCUTANEOUS
Status: COMPLETED
Start: 2021-02-02 | End: 2021-02-02

## 2021-02-02 RX ORDER — SODIUM CHLORIDE 9 MG/ML
INJECTION, SOLUTION INTRAVENOUS
Status: DISCONTINUED | OUTPATIENT
Start: 2021-02-03 | End: 2021-02-02 | Stop reason: HOSPADM

## 2021-02-02 RX ORDER — LIDOCAINE HYDROCHLORIDE 10 MG/ML
INJECTION, SOLUTION EPIDURAL; INFILTRATION; INTRACAUDAL; PERINEURAL
Status: COMPLETED
Start: 2021-02-02 | End: 2021-02-02

## 2021-02-02 NOTE — PROGRESS NOTES
Pt A&O x 4 and fully recovered. Pt's neck dressing is c/d/i with no signs of bleeding or any other issues. Pt has no complaints. Discharge instructions reviewed with pt. All questions answered. IV and tele d/clark. Pt brought to lobby with all belongings.

## 2021-02-02 NOTE — OPERATIVE REPORT
RHC with biopsy    Procedures:    Right Heart Catheterization  Right Ventricular Endomyocardial Biopsy    Indication:  R/O Amyloid in the setting of recurrent MM  Access Site: Right Internal Jugular Vein    Sheath Size (Fr): 7    Complications:  None    Fi

## 2021-02-03 ENCOUNTER — IMMUNIZATION (OUTPATIENT)
Dept: LAB | Age: 71
End: 2021-02-03
Attending: HOSPITALIST
Payer: MEDICARE

## 2021-02-03 DIAGNOSIS — Z23 NEED FOR VACCINATION: Primary | ICD-10-CM

## 2021-02-03 PROCEDURE — 0001A SARSCOV2 VAC 30MCG/0.3ML IM: CPT

## 2021-02-05 ENCOUNTER — OFFICE VISIT (OUTPATIENT)
Dept: HEMATOLOGY/ONCOLOGY | Age: 71
End: 2021-02-05
Attending: INTERNAL MEDICINE
Payer: MEDICARE

## 2021-02-05 VITALS
SYSTOLIC BLOOD PRESSURE: 131 MMHG | BODY MASS INDEX: 35.44 KG/M2 | TEMPERATURE: 98 F | HEART RATE: 70 BPM | RESPIRATION RATE: 20 BRPM | HEIGHT: 71.85 IN | OXYGEN SATURATION: 98 % | DIASTOLIC BLOOD PRESSURE: 73 MMHG | WEIGHT: 258.81 LBS

## 2021-02-05 DIAGNOSIS — I42.9 CARDIOMYOPATHY, UNSPECIFIED TYPE (HCC): ICD-10-CM

## 2021-02-05 DIAGNOSIS — C90.00 MULTIPLE MYELOMA NOT HAVING ACHIEVED REMISSION (HCC): Primary | ICD-10-CM

## 2021-02-05 DIAGNOSIS — D63.0 ANEMIA COMPLICATING NEOPLASTIC DISEASE: ICD-10-CM

## 2021-02-05 LAB
BASOPHILS # BLD AUTO: 0.01 X10(3) UL (ref 0–0.2)
BASOPHILS NFR BLD AUTO: 0.2 %
DEPRECATED RDW RBC AUTO: 54.6 FL (ref 35.1–46.3)
EOSINOPHIL # BLD AUTO: 0.07 X10(3) UL (ref 0–0.7)
EOSINOPHIL NFR BLD AUTO: 1.3 %
ERYTHROCYTE [DISTWIDTH] IN BLOOD BY AUTOMATED COUNT: 15 % (ref 11–15)
HCT VFR BLD AUTO: 36.4 %
HGB BLD-MCNC: 12.3 G/DL
IMM GRANULOCYTES # BLD AUTO: 0.01 X10(3) UL (ref 0–1)
IMM GRANULOCYTES NFR BLD: 0.2 %
LYMPHOCYTES # BLD AUTO: 0.65 X10(3) UL (ref 1–4)
LYMPHOCYTES NFR BLD AUTO: 11.9 %
MCH RBC QN AUTO: 33.2 PG (ref 26–34)
MCHC RBC AUTO-ENTMCNC: 33.8 G/DL (ref 31–37)
MCV RBC AUTO: 98.4 FL
MONOCYTES # BLD AUTO: 0.45 X10(3) UL (ref 0.1–1)
MONOCYTES NFR BLD AUTO: 8.2 %
NEUTROPHILS # BLD AUTO: 4.28 X10 (3) UL (ref 1.5–7.7)
NEUTROPHILS # BLD AUTO: 4.28 X10(3) UL (ref 1.5–7.7)
NEUTROPHILS NFR BLD AUTO: 78.2 %
PLATELET # BLD AUTO: 140 10(3)UL (ref 150–450)
RBC # BLD AUTO: 3.7 X10(6)UL
WBC # BLD AUTO: 5.5 X10(3) UL (ref 4–11)

## 2021-02-05 PROCEDURE — 99214 OFFICE O/P EST MOD 30 MIN: CPT | Performed by: INTERNAL MEDICINE

## 2021-02-05 PROCEDURE — 96413 CHEMO IV INFUSION 1 HR: CPT

## 2021-02-05 PROCEDURE — 96401 CHEMO ANTI-NEOPL SQ/IM: CPT

## 2021-02-05 PROCEDURE — 96375 TX/PRO/DX INJ NEW DRUG ADDON: CPT

## 2021-02-05 PROCEDURE — 96361 HYDRATE IV INFUSION ADD-ON: CPT

## 2021-02-05 RX ORDER — ACETAMINOPHEN 325 MG/1
650 TABLET ORAL ONCE
Status: CANCELLED | OUTPATIENT
Start: 2021-02-05

## 2021-02-05 RX ORDER — ACETAMINOPHEN 325 MG/1
650 TABLET ORAL ONCE
Status: CANCELLED | OUTPATIENT
Start: 2021-02-12

## 2021-02-05 RX ORDER — ACETAMINOPHEN 325 MG/1
650 TABLET ORAL ONCE
Status: COMPLETED | OUTPATIENT
Start: 2021-02-05 | End: 2021-02-05

## 2021-02-05 RX ORDER — DIPHENHYDRAMINE HYDROCHLORIDE 50 MG/ML
50 INJECTION INTRAMUSCULAR; INTRAVENOUS ONCE
Status: CANCELLED | OUTPATIENT
Start: 2021-02-12

## 2021-02-05 RX ORDER — DIPHENHYDRAMINE HYDROCHLORIDE 50 MG/ML
50 INJECTION INTRAMUSCULAR; INTRAVENOUS ONCE
Status: CANCELLED | OUTPATIENT
Start: 2021-02-05

## 2021-02-05 RX ORDER — DIPHENHYDRAMINE HYDROCHLORIDE 50 MG/ML
50 INJECTION INTRAMUSCULAR; INTRAVENOUS ONCE
Status: COMPLETED | OUTPATIENT
Start: 2021-02-05 | End: 2021-02-05

## 2021-02-05 RX ADMIN — DIPHENHYDRAMINE HYDROCHLORIDE 50 MG: 50 INJECTION INTRAMUSCULAR; INTRAVENOUS at 10:11:00

## 2021-02-05 RX ADMIN — ACETAMINOPHEN 650 MG: 325 TABLET ORAL at 10:06:00

## 2021-02-05 NOTE — PROGRESS NOTES
Pt here for MD f/u visit and due for Darzalex. Pt c/o some fatigue. Appetite good. Denies fever/chills. Had RV endomyocardial bx on 2/2, has f/u appt w cardiologist this coming wed to discuss results. Had 1st covid vaccine, 2/3- tolerated well.    Education

## 2021-02-05 NOTE — PROGRESS NOTES
Pt here for C1D15.   Arrives Ambulating independently, accompanied by Self           Patient reports possible pregnancy since last therapy cycle: Not applicable    Modifications in dose or schedule: No     Frequency of blood return and site check throughout

## 2021-02-05 NOTE — PROGRESS NOTES
Cancer Center Progress Note    Problem List:      Patient Active Problem List:     Impotence of organic origin     Generalized osteoarthrosis of hand     Morbid obesity ([address]     Essential hypertension     Hypertension     Multiple myeloma ([address]     Myelom • Herniation of intervertebral disc between L4 and L5 2000    s/p surgical repair   • High blood pressure    • High cholesterol    • Impotence of organic origin    • Morbid obesity (HCC)    • Osteoarthritis    • Osteoarthrosis, unspecified whether genera mg total) by mouth 2 (two) times daily. , Disp: 60 tablet, Rfl: 3    •  atorvastatin 20 MG Oral Tab, Take 1 tablet (20 mg total) by mouth daily. , Disp: 90 tablet, Rfl: 0    •  traMADol HCl 50 MG Oral Tab, Take 1 tablet (50 mg total) by mouth every 6 (six) h MCV 98.4 02/05/2021    MCH 33.2 02/05/2021    MCHC 33.8 02/05/2021    RDW 15.0 02/05/2021    .0 (L) 02/05/2021     Lab Results   Component Value Date     01/22/2021    K 3.7 01/22/2021     01/22/2021    CO2 24.0 01/22/2021    BUN 32 (H) Normal.  PLEURA:  No pneumothorax or pleural effusion. Chante Master   BONES:  Bilateral rib fractures with callus formation involving right posterior lateral 5th and 6th ribs and left anterior 5th 6th and 7th ribs and right anterior 5th rib.      =====  CONCLUSION:  Ca

## 2021-02-09 ENCOUNTER — TELEPHONE (OUTPATIENT)
Dept: FAMILY MEDICINE CLINIC | Facility: CLINIC | Age: 71
End: 2021-02-09

## 2021-02-09 ENCOUNTER — TELEPHONE (OUTPATIENT)
Dept: CARDIOLOGY | Age: 71
End: 2021-02-09

## 2021-02-09 ENCOUNTER — LAB ENCOUNTER (OUTPATIENT)
Dept: LAB | Age: 71
End: 2021-02-09
Attending: FAMILY MEDICINE
Payer: MEDICARE

## 2021-02-09 DIAGNOSIS — E11.9 TYPE 2 DIABETES MELLITUS WITHOUT COMPLICATION, WITHOUT LONG-TERM CURRENT USE OF INSULIN (HCC): ICD-10-CM

## 2021-02-09 DIAGNOSIS — E11.9 TYPE 2 DIABETES MELLITUS WITHOUT COMPLICATION, WITHOUT LONG-TERM CURRENT USE OF INSULIN (HCC): Primary | ICD-10-CM

## 2021-02-09 LAB
ALBUMIN SERPL-MCNC: 3.6 G/DL (ref 3.4–5)
ALBUMIN/GLOB SERPL: 1.1 {RATIO} (ref 1–2)
ALP LIVER SERPL-CCNC: 168 U/L
ALT SERPL-CCNC: 39 U/L
ANION GAP SERPL CALC-SCNC: 4 MMOL/L (ref 0–18)
AST SERPL-CCNC: 8 U/L (ref 15–37)
BILIRUB SERPL-MCNC: 1 MG/DL (ref 0.1–2)
BUN BLD-MCNC: 27 MG/DL (ref 7–18)
BUN/CREAT SERPL: 19.6 (ref 10–20)
CALCIUM BLD-MCNC: 9.1 MG/DL (ref 8.5–10.1)
CHLORIDE SERPL-SCNC: 108 MMOL/L (ref 98–112)
CO2 SERPL-SCNC: 27 MMOL/L (ref 21–32)
CREAT BLD-MCNC: 1.38 MG/DL
EST. AVERAGE GLUCOSE BLD GHB EST-MCNC: 160 MG/DL (ref 68–126)
GLOBULIN PLAS-MCNC: 3.2 G/DL (ref 2.8–4.4)
GLUCOSE BLD-MCNC: 117 MG/DL (ref 70–99)
HBA1C MFR BLD HPLC: 7.2 % (ref ?–5.7)
M PROTEIN MFR SERPL ELPH: 6.8 G/DL (ref 6.4–8.2)
OSMOLALITY SERPL CALC.SUM OF ELEC: 294 MOSM/KG (ref 275–295)
PATIENT FASTING Y/N/NP: YES
POTASSIUM SERPL-SCNC: 4.5 MMOL/L (ref 3.5–5.1)
SODIUM SERPL-SCNC: 139 MMOL/L (ref 136–145)

## 2021-02-09 PROCEDURE — 80053 COMPREHEN METABOLIC PANEL: CPT

## 2021-02-09 PROCEDURE — 83036 HEMOGLOBIN GLYCOSYLATED A1C: CPT

## 2021-02-09 PROCEDURE — 36415 COLL VENOUS BLD VENIPUNCTURE: CPT

## 2021-02-09 NOTE — PROGRESS NOTES
Worsened hemoglobin A1c  We may have to restart diabetes medicines  We will discuss it further at the time of follow-up

## 2021-02-10 ENCOUNTER — OFFICE VISIT (OUTPATIENT)
Dept: CARDIOLOGY | Age: 71
End: 2021-02-10

## 2021-02-10 VITALS
DIASTOLIC BLOOD PRESSURE: 72 MMHG | HEART RATE: 60 BPM | HEIGHT: 73 IN | SYSTOLIC BLOOD PRESSURE: 130 MMHG | BODY MASS INDEX: 33.24 KG/M2 | WEIGHT: 250.8 LBS

## 2021-02-10 DIAGNOSIS — I42.0 DILATED CARDIOMYOPATHY (CMD): ICD-10-CM

## 2021-02-10 DIAGNOSIS — I10 ESSENTIAL HYPERTENSION: Primary | ICD-10-CM

## 2021-02-10 DIAGNOSIS — E78.49 OTHER HYPERLIPIDEMIA: ICD-10-CM

## 2021-02-10 PROCEDURE — 99214 OFFICE O/P EST MOD 30 MIN: CPT | Performed by: INTERNAL MEDICINE

## 2021-02-10 RX ORDER — EPLERENONE 25 MG/1
25 TABLET, FILM COATED ORAL DAILY
Qty: 30 TABLET | Refills: 11 | Status: SHIPPED | OUTPATIENT
Start: 2021-02-10 | End: 2021-02-18 | Stop reason: ALTCHOICE

## 2021-02-10 RX ORDER — SACUBITRIL AND VALSARTAN 24; 26 MG/1; MG/1
1 TABLET, FILM COATED ORAL 2 TIMES DAILY
Qty: 30 TABLET | Refills: 11 | Status: SHIPPED | OUTPATIENT
Start: 2021-02-10 | End: 2021-03-18 | Stop reason: ALTCHOICE

## 2021-02-10 RX ORDER — METOPROLOL SUCCINATE 25 MG/1
25 TABLET, EXTENDED RELEASE ORAL EVERY EVENING
Qty: 30 TABLET | Refills: 11 | Status: SHIPPED | OUTPATIENT
Start: 2021-02-10 | End: 2021-03-18 | Stop reason: DRUGHIGH

## 2021-02-10 ASSESSMENT — PATIENT HEALTH QUESTIONNAIRE - PHQ9
CLINICAL INTERPRETATION OF PHQ9 SCORE: NO FURTHER SCREENING NEEDED
1. LITTLE INTEREST OR PLEASURE IN DOING THINGS: NOT AT ALL
SUM OF ALL RESPONSES TO PHQ9 QUESTIONS 1 AND 2: 0
CLINICAL INTERPRETATION OF PHQ2 SCORE: NO FURTHER SCREENING NEEDED
SUM OF ALL RESPONSES TO PHQ9 QUESTIONS 1 AND 2: 0
2. FEELING DOWN, DEPRESSED OR HOPELESS: NOT AT ALL

## 2021-02-10 ASSESSMENT — ENCOUNTER SYMPTOMS
WEIGHT GAIN: 0
COUGH: 0
CHILLS: 0
BRUISES/BLEEDS EASILY: 0
FEVER: 0
ALLERGIC/IMMUNOLOGIC COMMENTS: NO NEW FOOD ALLERGIES
HEMOPTYSIS: 0
HEMATOCHEZIA: 0
SUSPICIOUS LESIONS: 0
WEIGHT LOSS: 1

## 2021-02-11 ENCOUNTER — TELEPHONE (OUTPATIENT)
Dept: FAMILY MEDICINE CLINIC | Facility: CLINIC | Age: 71
End: 2021-02-11

## 2021-02-11 ENCOUNTER — OFFICE VISIT (OUTPATIENT)
Dept: FAMILY MEDICINE CLINIC | Facility: CLINIC | Age: 71
End: 2021-02-11
Payer: MEDICARE

## 2021-02-11 VITALS
HEART RATE: 72 BPM | RESPIRATION RATE: 18 BRPM | OXYGEN SATURATION: 91 % | DIASTOLIC BLOOD PRESSURE: 74 MMHG | SYSTOLIC BLOOD PRESSURE: 122 MMHG | BODY MASS INDEX: 34.37 KG/M2 | TEMPERATURE: 97 F | WEIGHT: 251 LBS | HEIGHT: 71.85 IN

## 2021-02-11 DIAGNOSIS — E11.65 TYPE 2 DIABETES MELLITUS WITH HYPERGLYCEMIA, WITHOUT LONG-TERM CURRENT USE OF INSULIN (HCC): Primary | ICD-10-CM

## 2021-02-11 DIAGNOSIS — J96.01 ACUTE RESPIRATORY FAILURE WITH HYPOXIA (HCC): ICD-10-CM

## 2021-02-11 DIAGNOSIS — I10 ESSENTIAL HYPERTENSION: ICD-10-CM

## 2021-02-11 DIAGNOSIS — I42.3 ENDOMYOCARDIAL FIBROSIS (HCC): ICD-10-CM

## 2021-02-11 DIAGNOSIS — E66.01 MORBID OBESITY (HCC): ICD-10-CM

## 2021-02-11 DIAGNOSIS — E11.9 TYPE 2 DIABETES MELLITUS WITHOUT COMPLICATION, WITHOUT LONG-TERM CURRENT USE OF INSULIN (HCC): ICD-10-CM

## 2021-02-11 PROCEDURE — 99214 OFFICE O/P EST MOD 30 MIN: CPT | Performed by: FAMILY MEDICINE

## 2021-02-11 PROCEDURE — 1111F DSCHRG MED/CURRENT MED MERGE: CPT | Performed by: FAMILY MEDICINE

## 2021-02-11 RX ORDER — BLOOD SUGAR DIAGNOSTIC
STRIP MISCELLANEOUS
Qty: 300 STRIP | Refills: 2 | Status: SHIPPED | OUTPATIENT
Start: 2021-02-11 | End: 2021-11-13

## 2021-02-11 RX ORDER — GLIMEPIRIDE 1 MG/1
1 TABLET ORAL
Qty: 30 TABLET | Refills: 2 | Status: SHIPPED | OUTPATIENT
Start: 2021-02-11 | End: 2021-05-06

## 2021-02-11 RX ORDER — METOPROLOL SUCCINATE 25 MG/1
25 TABLET, EXTENDED RELEASE ORAL EVERY EVENING
COMMUNITY
Start: 2021-02-10 | End: 2021-03-19

## 2021-02-11 RX ORDER — EPLERENONE 25 MG/1
25 TABLET, FILM COATED ORAL DAILY
COMMUNITY
Start: 2021-02-10

## 2021-02-11 NOTE — PROGRESS NOTES
/74   Pulse 72   Temp 96.9 °F (36.1 °C) (Temporal)   Resp 18   Ht 5' 11.85\" (1.825 m)   Wt 251 lb (113.9 kg)   SpO2 91%   BMI 34.18 kg/m²               Patient presents with:  Medication Follow-Up  Follow - Up: diabetes       HPI;    Howard Flatten Metoprolol Succinate ER 25 MG Oral Tablet 24 Hr Take 25 mg by mouth every evening. • eplerenone 25 MG Oral Tab Take 25 mg by mouth daily. • Sacubitril-Valsartan 24-26 MG Oral Tab Take 1 tablet by mouth 2 (two) times daily.      • glimepiride 1 MG Or 6/20/2013    Log Date: 08/21/2012    • Other and unspecified hyperlipidemia    • Pneumonia due to organism    • Prostate cancer [facility]) 2007    s/p total prostatectomy and radiation   • Trigger finger (acquired)    • Type II or unspecified type diabetes yemi hemoglobin A1c he needs to be on medication  Patient does not want to start insulin  I am starting him on low-dose glimepiride  Side effect of the medication including hypoglycemia discussed with the patient  Patient will send me his numbers through my stephanie

## 2021-02-12 ENCOUNTER — OFFICE VISIT (OUTPATIENT)
Dept: HEMATOLOGY/ONCOLOGY | Age: 71
End: 2021-02-12
Attending: INTERNAL MEDICINE
Payer: MEDICARE

## 2021-02-12 VITALS
BODY MASS INDEX: 34.59 KG/M2 | RESPIRATION RATE: 20 BRPM | TEMPERATURE: 97 F | OXYGEN SATURATION: 100 % | HEART RATE: 67 BPM | SYSTOLIC BLOOD PRESSURE: 115 MMHG | DIASTOLIC BLOOD PRESSURE: 73 MMHG | HEIGHT: 71.85 IN | WEIGHT: 252.63 LBS

## 2021-02-12 DIAGNOSIS — C90.00 MULTIPLE MYELOMA, REMISSION STATUS UNSPECIFIED (HCC): ICD-10-CM

## 2021-02-12 DIAGNOSIS — C90.00 MULTIPLE MYELOMA NOT HAVING ACHIEVED REMISSION (HCC): Primary | ICD-10-CM

## 2021-02-12 LAB
ALBUMIN SERPL-MCNC: 3.6 G/DL (ref 3.4–5)
BASOPHILS # BLD AUTO: 0.01 X10(3) UL (ref 0–0.2)
BASOPHILS NFR BLD AUTO: 0.2 %
CALCIUM BLD-MCNC: 8.5 MG/DL (ref 8.5–10.1)
CREAT BLD-MCNC: 1.28 MG/DL
DEPRECATED RDW RBC AUTO: 55 FL (ref 35.1–46.3)
EOSINOPHIL # BLD AUTO: 0.04 X10(3) UL (ref 0–0.7)
EOSINOPHIL NFR BLD AUTO: 0.7 %
ERYTHROCYTE [DISTWIDTH] IN BLOOD BY AUTOMATED COUNT: 15.2 % (ref 11–15)
HCT VFR BLD AUTO: 38.7 %
HGB BLD-MCNC: 13 G/DL
IMM GRANULOCYTES # BLD AUTO: 0.02 X10(3) UL (ref 0–1)
IMM GRANULOCYTES NFR BLD: 0.3 %
LYMPHOCYTES # BLD AUTO: 0.48 X10(3) UL (ref 1–4)
LYMPHOCYTES NFR BLD AUTO: 7.9 %
MCH RBC QN AUTO: 33.3 PG (ref 26–34)
MCHC RBC AUTO-ENTMCNC: 33.6 G/DL (ref 31–37)
MCV RBC AUTO: 99.2 FL
MONOCYTES # BLD AUTO: 0.59 X10(3) UL (ref 0.1–1)
MONOCYTES NFR BLD AUTO: 9.7 %
NEUTROPHILS # BLD AUTO: 4.94 X10 (3) UL (ref 1.5–7.7)
NEUTROPHILS # BLD AUTO: 4.94 X10(3) UL (ref 1.5–7.7)
NEUTROPHILS NFR BLD AUTO: 81.2 %
PLATELET # BLD AUTO: 150 10(3)UL (ref 150–450)
RBC # BLD AUTO: 3.9 X10(6)UL
WBC # BLD AUTO: 6.1 X10(3) UL (ref 4–11)

## 2021-02-12 PROCEDURE — 82310 ASSAY OF CALCIUM: CPT

## 2021-02-12 PROCEDURE — 82565 ASSAY OF CREATININE: CPT

## 2021-02-12 PROCEDURE — 82040 ASSAY OF SERUM ALBUMIN: CPT

## 2021-02-12 PROCEDURE — 96401 CHEMO ANTI-NEOPL SQ/IM: CPT

## 2021-02-12 PROCEDURE — 85025 COMPLETE CBC W/AUTO DIFF WBC: CPT

## 2021-02-12 PROCEDURE — 36415 COLL VENOUS BLD VENIPUNCTURE: CPT

## 2021-02-12 PROCEDURE — 96375 TX/PRO/DX INJ NEW DRUG ADDON: CPT

## 2021-02-12 PROCEDURE — 96374 THER/PROPH/DIAG INJ IV PUSH: CPT

## 2021-02-12 RX ORDER — ACETAMINOPHEN 325 MG/1
650 TABLET ORAL ONCE
Status: COMPLETED | OUTPATIENT
Start: 2021-02-12 | End: 2021-02-12

## 2021-02-12 RX ORDER — DIPHENHYDRAMINE HYDROCHLORIDE 50 MG/ML
50 INJECTION INTRAMUSCULAR; INTRAVENOUS ONCE
Status: COMPLETED | OUTPATIENT
Start: 2021-02-12 | End: 2021-02-12

## 2021-02-12 RX ADMIN — DIPHENHYDRAMINE HYDROCHLORIDE 50 MG: 50 INJECTION INTRAMUSCULAR; INTRAVENOUS at 09:59:00

## 2021-02-12 RX ADMIN — ACETAMINOPHEN 650 MG: 325 TABLET ORAL at 09:58:00

## 2021-02-12 NOTE — PROGRESS NOTES
Pt here for C22D1.   Arrives Ambulating independently, accompanied by Self           Patient reports possible pregnancy since last therapy cycle: Not Applicable    Modifications in dose or schedule: No     Frequency of blood return and site check throughout

## 2021-02-16 ENCOUNTER — TELEPHONE (OUTPATIENT)
Dept: CARDIOLOGY | Age: 71
End: 2021-02-16

## 2021-02-17 ENCOUNTER — LAB ENCOUNTER (OUTPATIENT)
Dept: LAB | Age: 71
End: 2021-02-17
Attending: FAMILY MEDICINE
Payer: MEDICARE

## 2021-02-17 DIAGNOSIS — I42.0 CONGESTIVE CARDIOMYOPATHY (HCC): ICD-10-CM

## 2021-02-17 DIAGNOSIS — I10 ESSENTIAL HYPERTENSION, MALIGNANT: ICD-10-CM

## 2021-02-17 DIAGNOSIS — E78.49 FAMILIAL COMBINED HYPERLIPIDEMIA: Primary | ICD-10-CM

## 2021-02-17 LAB
ANION GAP SERPL CALC-SCNC: 6 MMOL/L (ref 0–18)
BUN BLD-MCNC: 24 MG/DL (ref 7–18)
BUN/CREAT SERPL: 19 (ref 10–20)
CALCIUM BLD-MCNC: 8.9 MG/DL (ref 8.5–10.1)
CHLORIDE SERPL-SCNC: 106 MMOL/L (ref 98–112)
CO2 SERPL-SCNC: 27 MMOL/L (ref 21–32)
CREAT BLD-MCNC: 1.26 MG/DL
GLUCOSE BLD-MCNC: 62 MG/DL (ref 70–99)
OSMOLALITY SERPL CALC.SUM OF ELEC: 290 MOSM/KG (ref 275–295)
PATIENT FASTING Y/N/NP: YES
POTASSIUM SERPL-SCNC: 4.3 MMOL/L (ref 3.5–5.1)
SODIUM SERPL-SCNC: 139 MMOL/L (ref 136–145)

## 2021-02-17 PROCEDURE — 80048 BASIC METABOLIC PNL TOTAL CA: CPT

## 2021-02-17 PROCEDURE — 36415 COLL VENOUS BLD VENIPUNCTURE: CPT

## 2021-02-18 RX ORDER — SPIRONOLACTONE 25 MG/1
12.5 TABLET ORAL DAILY
Qty: 15 TABLET | Refills: 11 | Status: SHIPPED | OUTPATIENT
Start: 2021-02-18 | End: 2021-03-04 | Stop reason: SDUPTHER

## 2021-02-19 ENCOUNTER — OFFICE VISIT (OUTPATIENT)
Dept: HEMATOLOGY/ONCOLOGY | Age: 71
End: 2021-02-19
Attending: INTERNAL MEDICINE
Payer: MEDICARE

## 2021-02-19 VITALS
RESPIRATION RATE: 18 BRPM | WEIGHT: 249.19 LBS | BODY MASS INDEX: 34 KG/M2 | HEART RATE: 60 BPM | TEMPERATURE: 97 F | OXYGEN SATURATION: 98 % | DIASTOLIC BLOOD PRESSURE: 73 MMHG | SYSTOLIC BLOOD PRESSURE: 108 MMHG

## 2021-02-19 DIAGNOSIS — C90.00 MULTIPLE MYELOMA NOT HAVING ACHIEVED REMISSION (HCC): Primary | ICD-10-CM

## 2021-02-19 DIAGNOSIS — Z51.81 ENCOUNTER FOR MONITORING CARDIOTOXIC DRUG THERAPY: ICD-10-CM

## 2021-02-19 DIAGNOSIS — Z79.899 ENCOUNTER FOR MONITORING CARDIOTOXIC DRUG THERAPY: ICD-10-CM

## 2021-02-19 DIAGNOSIS — I42.9 CARDIOMYOPATHY, UNSPECIFIED TYPE (HCC): Primary | ICD-10-CM

## 2021-02-19 DIAGNOSIS — D63.0 ANEMIA COMPLICATING NEOPLASTIC DISEASE: ICD-10-CM

## 2021-02-19 DIAGNOSIS — C90.00 MULTIPLE MYELOMA NOT HAVING ACHIEVED REMISSION (HCC): ICD-10-CM

## 2021-02-19 LAB
ALBUMIN SERPL-MCNC: 3.5 G/DL (ref 3.4–5)
ALBUMIN/GLOB SERPL: 1.2 {RATIO} (ref 1–2)
ALP LIVER SERPL-CCNC: 208 U/L
ALT SERPL-CCNC: 49 U/L
ANION GAP SERPL CALC-SCNC: 4 MMOL/L (ref 0–18)
AST SERPL-CCNC: 22 U/L (ref 15–37)
BASOPHILS # BLD AUTO: 0.01 X10(3) UL (ref 0–0.2)
BASOPHILS NFR BLD AUTO: 0.2 %
BILIRUB SERPL-MCNC: 0.7 MG/DL (ref 0.1–2)
BUN BLD-MCNC: 22 MG/DL (ref 7–18)
BUN/CREAT SERPL: 19 (ref 10–20)
CALCIUM BLD-MCNC: 9.5 MG/DL (ref 8.5–10.1)
CHLORIDE SERPL-SCNC: 106 MMOL/L (ref 98–112)
CO2 SERPL-SCNC: 27 MMOL/L (ref 21–32)
CREAT BLD-MCNC: 1.16 MG/DL
DEPRECATED RDW RBC AUTO: 57.3 FL (ref 35.1–46.3)
EOSINOPHIL # BLD AUTO: 0.02 X10(3) UL (ref 0–0.7)
EOSINOPHIL NFR BLD AUTO: 0.3 %
ERYTHROCYTE [DISTWIDTH] IN BLOOD BY AUTOMATED COUNT: 15.6 % (ref 11–15)
GLOBULIN PLAS-MCNC: 3 G/DL (ref 2.8–4.4)
GLUCOSE BLD-MCNC: 86 MG/DL (ref 70–99)
HCT VFR BLD AUTO: 38.7 %
HGB BLD-MCNC: 12.9 G/DL
IMM GRANULOCYTES # BLD AUTO: 0.01 X10(3) UL (ref 0–1)
IMM GRANULOCYTES NFR BLD: 0.2 %
LYMPHOCYTES # BLD AUTO: 0.53 X10(3) UL (ref 1–4)
LYMPHOCYTES NFR BLD AUTO: 8.5 %
M PROTEIN MFR SERPL ELPH: 6.5 G/DL (ref 6.4–8.2)
MCH RBC QN AUTO: 33.5 PG (ref 26–34)
MCHC RBC AUTO-ENTMCNC: 33.3 G/DL (ref 31–37)
MCV RBC AUTO: 100.5 FL
MONOCYTES # BLD AUTO: 0.6 X10(3) UL (ref 0.1–1)
MONOCYTES NFR BLD AUTO: 9.7 %
NEUTROPHILS # BLD AUTO: 5.03 X10 (3) UL (ref 1.5–7.7)
NEUTROPHILS # BLD AUTO: 5.03 X10(3) UL (ref 1.5–7.7)
NEUTROPHILS NFR BLD AUTO: 81.1 %
OSMOLALITY SERPL CALC.SUM OF ELEC: 287 MOSM/KG (ref 275–295)
PATIENT FASTING Y/N/NP: NO
PLATELET # BLD AUTO: 205 10(3)UL (ref 150–450)
POTASSIUM SERPL-SCNC: 4.3 MMOL/L (ref 3.5–5.1)
RBC # BLD AUTO: 3.85 X10(6)UL
SODIUM SERPL-SCNC: 137 MMOL/L (ref 136–145)
WBC # BLD AUTO: 6.2 X10(3) UL (ref 4–11)

## 2021-02-19 PROCEDURE — 84165 PROTEIN E-PHORESIS SERUM: CPT

## 2021-02-19 PROCEDURE — 96413 CHEMO IV INFUSION 1 HR: CPT

## 2021-02-19 PROCEDURE — 83883 ASSAY NEPHELOMETRY NOT SPEC: CPT

## 2021-02-19 PROCEDURE — 99214 OFFICE O/P EST MOD 30 MIN: CPT | Performed by: INTERNAL MEDICINE

## 2021-02-19 PROCEDURE — 96375 TX/PRO/DX INJ NEW DRUG ADDON: CPT

## 2021-02-19 PROCEDURE — 96361 HYDRATE IV INFUSION ADD-ON: CPT

## 2021-02-19 PROCEDURE — 80053 COMPREHEN METABOLIC PANEL: CPT

## 2021-02-19 PROCEDURE — 86334 IMMUNOFIX E-PHORESIS SERUM: CPT

## 2021-02-19 PROCEDURE — 96401 CHEMO ANTI-NEOPL SQ/IM: CPT

## 2021-02-19 RX ORDER — ACETAMINOPHEN 325 MG/1
650 TABLET ORAL ONCE
Status: CANCELLED | OUTPATIENT
Start: 2021-03-05

## 2021-02-19 RX ORDER — ACETAMINOPHEN 325 MG/1
650 TABLET ORAL ONCE
Status: COMPLETED | OUTPATIENT
Start: 2021-02-19 | End: 2021-02-19

## 2021-02-19 RX ORDER — DIPHENHYDRAMINE HYDROCHLORIDE 50 MG/ML
50 INJECTION INTRAMUSCULAR; INTRAVENOUS ONCE
Status: COMPLETED | OUTPATIENT
Start: 2021-02-19 | End: 2021-02-19

## 2021-02-19 RX ORDER — ACETAMINOPHEN 325 MG/1
650 TABLET ORAL ONCE
Status: CANCELLED | OUTPATIENT
Start: 2021-03-12

## 2021-02-19 RX ORDER — DIPHENHYDRAMINE HYDROCHLORIDE 50 MG/ML
50 INJECTION INTRAMUSCULAR; INTRAVENOUS ONCE
Status: CANCELLED | OUTPATIENT
Start: 2021-02-19

## 2021-02-19 RX ORDER — DIPHENHYDRAMINE HYDROCHLORIDE 50 MG/ML
50 INJECTION INTRAMUSCULAR; INTRAVENOUS ONCE
Status: CANCELLED | OUTPATIENT
Start: 2021-02-26

## 2021-02-19 RX ORDER — ACETAMINOPHEN 325 MG/1
650 TABLET ORAL ONCE
Status: CANCELLED | OUTPATIENT
Start: 2021-02-26

## 2021-02-19 RX ORDER — DIPHENHYDRAMINE HYDROCHLORIDE 50 MG/ML
50 INJECTION INTRAMUSCULAR; INTRAVENOUS ONCE
Status: CANCELLED | OUTPATIENT
Start: 2021-03-05

## 2021-02-19 RX ORDER — ACETAMINOPHEN 325 MG/1
650 TABLET ORAL ONCE
Status: CANCELLED | OUTPATIENT
Start: 2021-02-19

## 2021-02-19 RX ORDER — DIPHENHYDRAMINE HYDROCHLORIDE 50 MG/ML
50 INJECTION INTRAMUSCULAR; INTRAVENOUS ONCE
Status: CANCELLED | OUTPATIENT
Start: 2021-03-12

## 2021-02-19 RX ADMIN — ACETAMINOPHEN 650 MG: 325 TABLET ORAL at 11:24:00

## 2021-02-19 RX ADMIN — DIPHENHYDRAMINE HYDROCHLORIDE 50 MG: 50 INJECTION INTRAMUSCULAR; INTRAVENOUS at 11:24:00

## 2021-02-19 NOTE — PROGRESS NOTES
Pt here for C3D1 Kyprolis/Darzalex.   Arrives Ambulating independently, accompanied by Self           Patient reports possible pregnancy since last therapy cycle: Not Applicable    Modifications in dose or schedule: No     Frequency of blood return and site

## 2021-02-19 NOTE — PROGRESS NOTES
Cancer Center Progress Note    Problem List:      Patient Active Problem List:     Impotence of organic origin     Generalized osteoarthrosis of hand     Morbid obesity ([address]     Essential hypertension     Hypertension     Multiple myeloma ([address]     Myelom endomyocardial biopsy for electron microscopy:   -Negative for any significant ultrastructural changes, see addendum below for details.      Comment:     The clinical concern for amyloidosis is noted.   A Congo red stain performed with appropriate controls Date: 08/21/2012        Past Surgical History:   Procedure Laterality Date   • BACK SURGERY  1/2001    lower back L4 L5 for herniated disc   • COLONOSCOPY      202, 2012 Dr. Racquel Tafoya, polyps   • OTHER SURGICAL HISTORY  1/31/2007    total prostatectomy   • SK Blood (ONETOUCH ULTRA BLUE) In Vitro Strip, TEST TWICE DAILY, Disp: 100 strip, Rfl: 3    •  Multiple Vitamin Oral Tab, Take 1 tablet by mouth., Disp: , Rfl:     •  acetaminophen 500 MG Oral Tab, Take 1,000 mg by mouth nightly.  , Disp: , Rfl:     •  Calciu Date/Time     01/08/2021 1028       Radiologic imaging reviewed at this visit:    PET/CT scan on 1/21/2021:  FINDINGS:    ABNORMAL FOCI: Heterogeneous density throughout the osseous structures with sclerotic and lytic lesions consistent with history stage III  Started Revlimid maintenance in 6/2017     He had 5 cycles of (VCd) Dexamethasone, Bortezomib and cyclophosphamide. He had high dose therapy with stem cell infusion on 1/27/2017. He had been on Revlimid maintenance from 7/2017 to 12/2020.     He

## 2021-02-22 LAB
ALBUMIN SERPL ELPH-MCNC: 4.04 G/DL (ref 3.75–5.21)
ALBUMIN/GLOB SERPL: 1.72 {RATIO} (ref 1–2)
ALPHA1 GLOB SERPL ELPH-MCNC: 0.25 G/DL (ref 0.19–0.46)
ALPHA2 GLOB SERPL ELPH-MCNC: 0.86 G/DL (ref 0.48–1.05)
B-GLOBULIN SERPL ELPH-MCNC: 0.65 G/DL (ref 0.68–1.23)
GAMMA GLOB SERPL ELPH-MCNC: 0.6 G/DL (ref 0.62–1.7)
KAPPA LC FREE SER-MCNC: 0.79 MG/DL (ref 0.33–1.94)
LAMBDA LC FREE SERPL-MCNC: <0.14 MG/DL (ref 0.57–2.63)
M PROTEIN MFR SERPL ELPH: 6.4 G/DL (ref 6.4–8.2)
M-SPIKE 1: 0.12 G/DL (ref ?–0)

## 2021-02-22 RX ORDER — ATORVASTATIN CALCIUM 20 MG/1
20 TABLET, FILM COATED ORAL DAILY
Qty: 90 TABLET | Refills: 0 | Status: SHIPPED | OUTPATIENT
Start: 2021-02-22 | End: 2021-05-06

## 2021-02-22 NOTE — TELEPHONE ENCOUNTER
Cholesterol Medication Protocol [id number]/22/2021 04:56 PM   ALT < 80 Protocol Details    ALT resulted within past year     Lipid panel within past 12 months     Appointment within past 12 or next 3 months      LOV 2/11/21     LAST LAB 2/19/21     LAST RX

## 2021-02-22 NOTE — PROGRESS NOTES
Nutrition Consultation    Patient Name: Naz Josue  YOB: 1950  Medical Record Number: [id number]   Account Number: [de-identified]  Dietitian: Evangelista Watson RD, BIANCA      Date of visit: 2/19/2021    Diet Rx: high protein, balanced diet as t Rfl: 2  •  dexamethasone (DECADRON) 4 MG tablet, Take 20 mg by mouth once a week., Disp: , Rfl:   •  acyclovir 400 MG Oral Tab, Take 1 tablet (400 mg total) by mouth 2 (two) times daily. , Disp: 60 tablet, Rfl: 3  •  atorvastatin 20 MG Oral Tab, Take 1 tabl side effects of tx; otherwise, RD encouraged pt to continue as he has been. Pt verbalized understanding. Thank you for allowing me to participate in the care of . Pao Jensen.

## 2021-02-24 ENCOUNTER — IMMUNIZATION (OUTPATIENT)
Dept: LAB | Age: 71
End: 2021-02-24
Attending: HOSPITALIST
Payer: MEDICARE

## 2021-02-24 DIAGNOSIS — Z23 NEED FOR VACCINATION: Primary | ICD-10-CM

## 2021-02-24 PROCEDURE — 0002A SARSCOV2 VAC 30MCG/0.3ML IM: CPT

## 2021-02-26 ENCOUNTER — OFFICE VISIT (OUTPATIENT)
Dept: HEMATOLOGY/ONCOLOGY | Age: 71
End: 2021-02-26
Attending: INTERNAL MEDICINE
Payer: MEDICARE

## 2021-02-26 VITALS
HEART RATE: 65 BPM | WEIGHT: 249.38 LBS | SYSTOLIC BLOOD PRESSURE: 109 MMHG | TEMPERATURE: 97 F | HEIGHT: 71.85 IN | RESPIRATION RATE: 18 BRPM | DIASTOLIC BLOOD PRESSURE: 72 MMHG | OXYGEN SATURATION: 98 % | BODY MASS INDEX: 34.15 KG/M2

## 2021-02-26 DIAGNOSIS — C90.00 MULTIPLE MYELOMA NOT HAVING ACHIEVED REMISSION (HCC): Primary | ICD-10-CM

## 2021-02-26 LAB
BASOPHILS # BLD AUTO: 0 X10(3) UL (ref 0–0.2)
BASOPHILS NFR BLD AUTO: 0 %
DEPRECATED RDW RBC AUTO: 58.2 FL (ref 35.1–46.3)
EOSINOPHIL # BLD AUTO: 0.04 X10(3) UL (ref 0–0.7)
EOSINOPHIL NFR BLD AUTO: 0.6 %
ERYTHROCYTE [DISTWIDTH] IN BLOOD BY AUTOMATED COUNT: 15.5 % (ref 11–15)
HCT VFR BLD AUTO: 40.3 %
HGB BLD-MCNC: 13.2 G/DL
IMM GRANULOCYTES # BLD AUTO: 0.02 X10(3) UL (ref 0–1)
IMM GRANULOCYTES NFR BLD: 0.3 %
LYMPHOCYTES # BLD AUTO: 0.64 X10(3) UL (ref 1–4)
LYMPHOCYTES NFR BLD AUTO: 8.8 %
MCH RBC QN AUTO: 33 PG (ref 26–34)
MCHC RBC AUTO-ENTMCNC: 32.8 G/DL (ref 31–37)
MCV RBC AUTO: 100.8 FL
MONOCYTES # BLD AUTO: 0.75 X10(3) UL (ref 0.1–1)
MONOCYTES NFR BLD AUTO: 10.3 %
NEUTROPHILS # BLD AUTO: 5.82 X10 (3) UL (ref 1.5–7.7)
NEUTROPHILS # BLD AUTO: 5.82 X10(3) UL (ref 1.5–7.7)
NEUTROPHILS NFR BLD AUTO: 80 %
PLATELET # BLD AUTO: 124 10(3)UL (ref 150–450)
RBC # BLD AUTO: 4 X10(6)UL
WBC # BLD AUTO: 7.3 X10(3) UL (ref 4–11)

## 2021-02-26 PROCEDURE — 96413 CHEMO IV INFUSION 1 HR: CPT

## 2021-02-26 PROCEDURE — 96361 HYDRATE IV INFUSION ADD-ON: CPT

## 2021-02-26 PROCEDURE — 96375 TX/PRO/DX INJ NEW DRUG ADDON: CPT

## 2021-02-26 PROCEDURE — 96401 CHEMO ANTI-NEOPL SQ/IM: CPT

## 2021-02-26 PROCEDURE — 36415 COLL VENOUS BLD VENIPUNCTURE: CPT

## 2021-02-26 PROCEDURE — 85025 COMPLETE CBC W/AUTO DIFF WBC: CPT

## 2021-02-26 RX ORDER — DIPHENHYDRAMINE HYDROCHLORIDE 50 MG/ML
50 INJECTION INTRAMUSCULAR; INTRAVENOUS ONCE
Status: COMPLETED | OUTPATIENT
Start: 2021-02-26 | End: 2021-02-26

## 2021-02-26 RX ORDER — ACETAMINOPHEN 325 MG/1
650 TABLET ORAL ONCE
Status: COMPLETED | OUTPATIENT
Start: 2021-02-26 | End: 2021-02-26

## 2021-02-26 RX ADMIN — ACETAMINOPHEN 650 MG: 325 TABLET ORAL at 10:36:00

## 2021-02-26 RX ADMIN — DIPHENHYDRAMINE HYDROCHLORIDE 50 MG: 50 INJECTION INTRAMUSCULAR; INTRAVENOUS at 10:36:00

## 2021-03-02 ENCOUNTER — TELEPHONE (OUTPATIENT)
Dept: HEMATOLOGY/ONCOLOGY | Facility: HOSPITAL | Age: 71
End: 2021-03-02

## 2021-03-02 NOTE — TELEPHONE ENCOUNTER
Amy Al from [facility] called asking to speak with Edna Velasquez. She is working on a project for patient accounts, and is calling regarding a progress note  For 2/19/21 she needs that is pending. Please call. She says she is working from home.

## 2021-03-03 ENCOUNTER — TELEPHONE (OUTPATIENT)
Dept: FAMILY MEDICINE CLINIC | Facility: CLINIC | Age: 71
End: 2021-03-03

## 2021-03-04 ENCOUNTER — OFFICE VISIT (OUTPATIENT)
Dept: CARDIOLOGY | Age: 71
End: 2021-03-04

## 2021-03-04 VITALS
SYSTOLIC BLOOD PRESSURE: 118 MMHG | DIASTOLIC BLOOD PRESSURE: 64 MMHG | BODY MASS INDEX: 32.6 KG/M2 | HEIGHT: 73 IN | WEIGHT: 246 LBS | HEART RATE: 56 BPM

## 2021-03-04 DIAGNOSIS — E78.49 OTHER HYPERLIPIDEMIA: ICD-10-CM

## 2021-03-04 DIAGNOSIS — E11.9 TYPE 2 DIABETES MELLITUS WITHOUT COMPLICATION, WITHOUT LONG-TERM CURRENT USE OF INSULIN (CMD): ICD-10-CM

## 2021-03-04 DIAGNOSIS — I42.9 CARDIOMYOPATHY, UNSPECIFIED TYPE (CMD): Primary | ICD-10-CM

## 2021-03-04 DIAGNOSIS — I10 ESSENTIAL HYPERTENSION: ICD-10-CM

## 2021-03-04 RX ORDER — GLIMEPIRIDE 1 MG/1
1 TABLET ORAL DAILY
COMMUNITY
Start: 2021-02-11

## 2021-03-04 RX ORDER — SPIRONOLACTONE 25 MG/1
25 TABLET ORAL DAILY
Qty: 15 TABLET | Refills: 11 | Status: SHIPPED | OUTPATIENT
Start: 2021-03-04

## 2021-03-04 ASSESSMENT — ENCOUNTER SYMPTOMS
ORTHOPNEA: 0
HEADACHES: 0
DIAPHORESIS: 0
SYNCOPE: 0
COUGH: 0
CHILLS: 0
WHEEZING: 0
ALTERED MENTAL STATUS: 0
DIZZINESS: 0
LIGHT-HEADEDNESS: 0
SHORTNESS OF BREATH: 0
NAUSEA: 0
BLURRED VISION: 0
ABDOMINAL PAIN: 0
FEVER: 0
VOMITING: 0

## 2021-03-04 ASSESSMENT — PATIENT HEALTH QUESTIONNAIRE - PHQ9
2. FEELING DOWN, DEPRESSED OR HOPELESS: NOT AT ALL
SUM OF ALL RESPONSES TO PHQ9 QUESTIONS 1 AND 2: 0
CLINICAL INTERPRETATION OF PHQ2 SCORE: NO FURTHER SCREENING NEEDED
1. LITTLE INTEREST OR PLEASURE IN DOING THINGS: NOT AT ALL
SUM OF ALL RESPONSES TO PHQ9 QUESTIONS 1 AND 2: 0
CLINICAL INTERPRETATION OF PHQ9 SCORE: NO FURTHER SCREENING NEEDED

## 2021-03-05 ENCOUNTER — OFFICE VISIT (OUTPATIENT)
Dept: HEMATOLOGY/ONCOLOGY | Age: 71
End: 2021-03-05
Attending: INTERNAL MEDICINE
Payer: MEDICARE

## 2021-03-05 VITALS
OXYGEN SATURATION: 100 % | DIASTOLIC BLOOD PRESSURE: 73 MMHG | TEMPERATURE: 98 F | WEIGHT: 247 LBS | HEART RATE: 65 BPM | RESPIRATION RATE: 18 BRPM | BODY MASS INDEX: 33.82 KG/M2 | HEIGHT: 71.85 IN | SYSTOLIC BLOOD PRESSURE: 111 MMHG

## 2021-03-05 DIAGNOSIS — D63.0 ANEMIA COMPLICATING NEOPLASTIC DISEASE: ICD-10-CM

## 2021-03-05 DIAGNOSIS — C90.00 MULTIPLE MYELOMA NOT HAVING ACHIEVED REMISSION (HCC): Primary | ICD-10-CM

## 2021-03-05 LAB
BASOPHILS # BLD AUTO: 0.01 X10(3) UL (ref 0–0.2)
BASOPHILS NFR BLD AUTO: 0.1 %
DEPRECATED RDW RBC AUTO: 55.1 FL (ref 35.1–46.3)
EOSINOPHIL # BLD AUTO: 0.04 X10(3) UL (ref 0–0.7)
EOSINOPHIL NFR BLD AUTO: 0.5 %
ERYTHROCYTE [DISTWIDTH] IN BLOOD BY AUTOMATED COUNT: 15.3 % (ref 11–15)
HCT VFR BLD AUTO: 37.2 %
HGB BLD-MCNC: 12.7 G/DL
IMM GRANULOCYTES # BLD AUTO: 0.03 X10(3) UL (ref 0–1)
IMM GRANULOCYTES NFR BLD: 0.4 %
LYMPHOCYTES # BLD AUTO: 0.46 X10(3) UL (ref 1–4)
LYMPHOCYTES NFR BLD AUTO: 5.7 %
MCH RBC QN AUTO: 33.8 PG (ref 26–34)
MCHC RBC AUTO-ENTMCNC: 34.1 G/DL (ref 31–37)
MCV RBC AUTO: 98.9 FL
MONOCYTES # BLD AUTO: 0.7 X10(3) UL (ref 0.1–1)
MONOCYTES NFR BLD AUTO: 8.7 %
NEUTROPHILS # BLD AUTO: 6.82 X10 (3) UL (ref 1.5–7.7)
NEUTROPHILS # BLD AUTO: 6.82 X10(3) UL (ref 1.5–7.7)
NEUTROPHILS NFR BLD AUTO: 84.6 %
PLATELET # BLD AUTO: 135 10(3)UL (ref 150–450)
RBC # BLD AUTO: 3.76 X10(6)UL
WBC # BLD AUTO: 8.1 X10(3) UL (ref 4–11)

## 2021-03-05 PROCEDURE — 96413 CHEMO IV INFUSION 1 HR: CPT

## 2021-03-05 PROCEDURE — 96361 HYDRATE IV INFUSION ADD-ON: CPT

## 2021-03-05 PROCEDURE — 96401 CHEMO ANTI-NEOPL SQ/IM: CPT

## 2021-03-05 PROCEDURE — 96375 TX/PRO/DX INJ NEW DRUG ADDON: CPT

## 2021-03-05 PROCEDURE — 99214 OFFICE O/P EST MOD 30 MIN: CPT | Performed by: INTERNAL MEDICINE

## 2021-03-05 RX ORDER — DIPHENHYDRAMINE HYDROCHLORIDE 50 MG/ML
50 INJECTION INTRAMUSCULAR; INTRAVENOUS ONCE
Status: COMPLETED | OUTPATIENT
Start: 2021-03-05 | End: 2021-03-05

## 2021-03-05 RX ORDER — ACETAMINOPHEN 325 MG/1
650 TABLET ORAL ONCE
Status: COMPLETED | OUTPATIENT
Start: 2021-03-05 | End: 2021-03-05

## 2021-03-05 RX ADMIN — ACETAMINOPHEN 650 MG: 325 TABLET ORAL at 12:30:00

## 2021-03-05 RX ADMIN — DIPHENHYDRAMINE HYDROCHLORIDE 50 MG: 50 INJECTION INTRAMUSCULAR; INTRAVENOUS at 12:28:00

## 2021-03-05 NOTE — PROGRESS NOTES
Pt here for C2D15.   Arrives Ambulating independently, accompanied by Self           Patient reports possible pregnancy since last therapy cycle: Not applicable    Modifications in dose or schedule: No     Frequency of blood return and site check throughout

## 2021-03-05 NOTE — PROGRESS NOTES
Cancer Center Progress Note    Problem List:      Patient Active Problem List:     Impotence of organic origin     Generalized osteoarthrosis of hand     Morbid obesity ([address]     Essential hypertension     Hypertension     Multiple myeloma ([address]     Myelom -Portion of fibrosis tissue.      B. Heart, endomyocardial biopsy for electron microscopy:   -Negative for any significant ultrastructural changes, see addendum below for details.      Comment:     The clinical concern for amyloidosis is noted.   A Congo Unspecified internal derangement of knee 6/20/2013    Log Date: 08/21/2012        Past Surgical History:   Procedure Laterality Date   • BACK SURGERY  1/2001    lower back L4 L5 for herniated disc   • COLONOSCOPY      202, 2012 Dr. Radha Arredondo, polyps   • OTHER by mouth 2 (two) times daily. , Disp: , Rfl:     •  Glucose Blood (ONETOUCH ULTRA BLUE) In Vitro Strip, TEST TWICE DAILY, Disp: 100 strip, Rfl: 3    •  Multiple Vitamin Oral Tab, Take 1 tablet by mouth., Disp: , Rfl:     •  acetaminophen 500 MG Oral Tab, Ta BILT 0.7 02/19/2021    ALB 3.5 02/19/2021    ALB 4.04 02/19/2021    TP 6.5 02/19/2021    TP 6.4 02/19/2021     Lab Component   Component Value Date/Time     01/08/2021 1028       Radiologic imaging reviewed at this visit:    PET/CT scan on 1/21/2021 Assessment/Plan:     Multiple myeloma:  Hypercalcemia  Acute renal failure  Anemia  Diffuse bone lytic lesions  Beta-2 Microglobulin 18.5 mg/L    ISS stage III  Started Revlimid maintenance in 6/2017     He had 5 cycles of (VCd) Dexamethasone, Bor

## 2021-03-12 ENCOUNTER — OFFICE VISIT (OUTPATIENT)
Dept: HEMATOLOGY/ONCOLOGY | Age: 71
End: 2021-03-12
Attending: INTERNAL MEDICINE
Payer: MEDICARE

## 2021-03-12 VITALS
DIASTOLIC BLOOD PRESSURE: 75 MMHG | WEIGHT: 249 LBS | HEART RATE: 52 BPM | HEIGHT: 71.85 IN | SYSTOLIC BLOOD PRESSURE: 122 MMHG | BODY MASS INDEX: 34.1 KG/M2 | OXYGEN SATURATION: 100 % | RESPIRATION RATE: 18 BRPM | TEMPERATURE: 97 F

## 2021-03-12 DIAGNOSIS — C90.00 MULTIPLE MYELOMA, REMISSION STATUS UNSPECIFIED (HCC): ICD-10-CM

## 2021-03-12 DIAGNOSIS — C90.00 MULTIPLE MYELOMA NOT HAVING ACHIEVED REMISSION (HCC): Primary | ICD-10-CM

## 2021-03-12 LAB
ALBUMIN SERPL-MCNC: 3.5 G/DL (ref 3.4–5)
BASOPHILS # BLD AUTO: 0.01 X10(3) UL (ref 0–0.2)
BASOPHILS NFR BLD AUTO: 0.1 %
CALCIUM BLD-MCNC: 9.5 MG/DL (ref 8.5–10.1)
CREAT BLD-MCNC: 1.2 MG/DL
DEPRECATED RDW RBC AUTO: 58.3 FL (ref 35.1–46.3)
EOSINOPHIL # BLD AUTO: 0.05 X10(3) UL (ref 0–0.7)
EOSINOPHIL NFR BLD AUTO: 0.7 %
ERYTHROCYTE [DISTWIDTH] IN BLOOD BY AUTOMATED COUNT: 15.6 % (ref 11–15)
HCT VFR BLD AUTO: 37.8 %
HGB BLD-MCNC: 12.8 G/DL
IMM GRANULOCYTES # BLD AUTO: 0.02 X10(3) UL (ref 0–1)
IMM GRANULOCYTES NFR BLD: 0.3 %
LYMPHOCYTES # BLD AUTO: 0.47 X10(3) UL (ref 1–4)
LYMPHOCYTES NFR BLD AUTO: 6.2 %
MCH RBC QN AUTO: 34.1 PG (ref 26–34)
MCHC RBC AUTO-ENTMCNC: 33.9 G/DL (ref 31–37)
MCV RBC AUTO: 100.8 FL
MONOCYTES # BLD AUTO: 0.73 X10(3) UL (ref 0.1–1)
MONOCYTES NFR BLD AUTO: 9.6 %
NEUTROPHILS # BLD AUTO: 6.35 X10 (3) UL (ref 1.5–7.7)
NEUTROPHILS # BLD AUTO: 6.35 X10(3) UL (ref 1.5–7.7)
NEUTROPHILS NFR BLD AUTO: 83.1 %
PLATELET # BLD AUTO: 142 10(3)UL (ref 150–450)
RBC # BLD AUTO: 3.75 X10(6)UL
WBC # BLD AUTO: 7.6 X10(3) UL (ref 4–11)

## 2021-03-12 PROCEDURE — 36415 COLL VENOUS BLD VENIPUNCTURE: CPT

## 2021-03-12 PROCEDURE — 85025 COMPLETE CBC W/AUTO DIFF WBC: CPT

## 2021-03-12 PROCEDURE — 96375 TX/PRO/DX INJ NEW DRUG ADDON: CPT

## 2021-03-12 PROCEDURE — 82565 ASSAY OF CREATININE: CPT

## 2021-03-12 PROCEDURE — 96401 CHEMO ANTI-NEOPL SQ/IM: CPT

## 2021-03-12 PROCEDURE — 82310 ASSAY OF CALCIUM: CPT

## 2021-03-12 PROCEDURE — 82040 ASSAY OF SERUM ALBUMIN: CPT

## 2021-03-12 PROCEDURE — 96374 THER/PROPH/DIAG INJ IV PUSH: CPT

## 2021-03-12 RX ORDER — DIPHENHYDRAMINE HYDROCHLORIDE 50 MG/ML
50 INJECTION INTRAMUSCULAR; INTRAVENOUS ONCE
Status: COMPLETED | OUTPATIENT
Start: 2021-03-12 | End: 2021-03-12

## 2021-03-12 RX ORDER — ACETAMINOPHEN 325 MG/1
650 TABLET ORAL ONCE
Status: COMPLETED | OUTPATIENT
Start: 2021-03-12 | End: 2021-03-12

## 2021-03-12 RX ADMIN — ACETAMINOPHEN 650 MG: 325 TABLET ORAL at 11:50:00

## 2021-03-12 RX ADMIN — DIPHENHYDRAMINE HYDROCHLORIDE 50 MG: 50 INJECTION INTRAMUSCULAR; INTRAVENOUS at 11:50:00

## 2021-03-12 NOTE — PROGRESS NOTES
Pt here for C2D22 Darzalex Faspro. Also receiving Zometa today.   Arrives Ambulating independently, accompanied by Self           Pregnancy screening: Not applicable    Modifications in dose or schedule: No     Frequency of blood return and site check throu

## 2021-03-18 ENCOUNTER — OFFICE VISIT (OUTPATIENT)
Dept: CARDIOLOGY | Age: 71
End: 2021-03-18

## 2021-03-18 VITALS
WEIGHT: 247.56 LBS | HEIGHT: 73 IN | HEART RATE: 94 BPM | SYSTOLIC BLOOD PRESSURE: 120 MMHG | DIASTOLIC BLOOD PRESSURE: 68 MMHG | BODY MASS INDEX: 32.81 KG/M2

## 2021-03-18 DIAGNOSIS — E78.49 OTHER HYPERLIPIDEMIA: ICD-10-CM

## 2021-03-18 DIAGNOSIS — I42.0 DILATED CARDIOMYOPATHY (CMD): ICD-10-CM

## 2021-03-18 DIAGNOSIS — I10 ESSENTIAL HYPERTENSION: Primary | ICD-10-CM

## 2021-03-18 DIAGNOSIS — I42.9 CARDIOMYOPATHY, UNSPECIFIED TYPE (CMD): ICD-10-CM

## 2021-03-18 PROCEDURE — 99214 OFFICE O/P EST MOD 30 MIN: CPT | Performed by: NURSE PRACTITIONER

## 2021-03-18 RX ORDER — METOPROLOL SUCCINATE 25 MG/1
50 TABLET, EXTENDED RELEASE ORAL EVERY EVENING
Qty: 30 TABLET | Refills: 11 | Status: SHIPPED | OUTPATIENT
Start: 2021-03-18 | End: 2021-04-08 | Stop reason: SDUPTHER

## 2021-03-18 RX ORDER — EPLERENONE 25 MG/1
25 TABLET, FILM COATED ORAL DAILY
COMMUNITY
Start: 2021-02-10 | End: 2021-03-01

## 2021-03-18 RX ORDER — LISINOPRIL 10 MG/1
10 TABLET ORAL DAILY
Qty: 90 TABLET | Refills: 2 | Status: SHIPPED | OUTPATIENT
Start: 2021-03-18 | End: 2021-04-22 | Stop reason: SDUPTHER

## 2021-03-18 ASSESSMENT — ENCOUNTER SYMPTOMS
COUGH: 0
SYNCOPE: 0
FEVER: 0
BLURRED VISION: 0
NAUSEA: 0
ALTERED MENTAL STATUS: 0
CHILLS: 0
LIGHT-HEADEDNESS: 0
WHEEZING: 0
VOMITING: 0
HEADACHES: 0
ABDOMINAL PAIN: 0
SHORTNESS OF BREATH: 0
DIZZINESS: 0
DIAPHORESIS: 0
ORTHOPNEA: 0

## 2021-03-18 ASSESSMENT — PATIENT HEALTH QUESTIONNAIRE - PHQ9
CLINICAL INTERPRETATION OF PHQ9 SCORE: NO FURTHER SCREENING NEEDED
2. FEELING DOWN, DEPRESSED OR HOPELESS: NOT AT ALL
1. LITTLE INTEREST OR PLEASURE IN DOING THINGS: NOT AT ALL
SUM OF ALL RESPONSES TO PHQ9 QUESTIONS 1 AND 2: 0
CLINICAL INTERPRETATION OF PHQ2 SCORE: NO FURTHER SCREENING NEEDED
SUM OF ALL RESPONSES TO PHQ9 QUESTIONS 1 AND 2: 0

## 2021-03-19 ENCOUNTER — OFFICE VISIT (OUTPATIENT)
Dept: HEMATOLOGY/ONCOLOGY | Age: 71
End: 2021-03-19
Attending: INTERNAL MEDICINE
Payer: MEDICARE

## 2021-03-19 ENCOUNTER — TELEPHONE (OUTPATIENT)
Dept: CARDIOLOGY | Age: 71
End: 2021-03-19

## 2021-03-19 VITALS
TEMPERATURE: 97 F | HEART RATE: 62 BPM | RESPIRATION RATE: 18 BRPM | DIASTOLIC BLOOD PRESSURE: 76 MMHG | BODY MASS INDEX: 34.29 KG/M2 | WEIGHT: 250.38 LBS | OXYGEN SATURATION: 100 % | SYSTOLIC BLOOD PRESSURE: 116 MMHG | HEIGHT: 71.85 IN

## 2021-03-19 DIAGNOSIS — C90.00 MULTIPLE MYELOMA NOT HAVING ACHIEVED REMISSION (HCC): Primary | ICD-10-CM

## 2021-03-19 DIAGNOSIS — C90.00 MULTIPLE MYELOMA NOT HAVING ACHIEVED REMISSION (HCC): ICD-10-CM

## 2021-03-19 LAB
ALBUMIN SERPL-MCNC: 3.5 G/DL (ref 3.4–5)
ALBUMIN/GLOB SERPL: 1.2 {RATIO} (ref 1–2)
ALP LIVER SERPL-CCNC: 95 U/L
ALT SERPL-CCNC: 30 U/L
ANION GAP SERPL CALC-SCNC: 5 MMOL/L (ref 0–18)
AST SERPL-CCNC: 9 U/L (ref 15–37)
BASOPHILS # BLD AUTO: 0.01 X10(3) UL (ref 0–0.2)
BASOPHILS NFR BLD AUTO: 0.1 %
BILIRUB SERPL-MCNC: 0.9 MG/DL (ref 0.1–2)
BUN BLD-MCNC: 24 MG/DL (ref 7–18)
BUN/CREAT SERPL: 21.8 (ref 10–20)
CALCIUM BLD-MCNC: 9.4 MG/DL (ref 8.5–10.1)
CHLORIDE SERPL-SCNC: 104 MMOL/L (ref 98–112)
CO2 SERPL-SCNC: 28 MMOL/L (ref 21–32)
CREAT BLD-MCNC: 1.1 MG/DL
DEPRECATED RDW RBC AUTO: 61.1 FL (ref 35.1–46.3)
EOSINOPHIL # BLD AUTO: 0.04 X10(3) UL (ref 0–0.7)
EOSINOPHIL NFR BLD AUTO: 0.5 %
ERYTHROCYTE [DISTWIDTH] IN BLOOD BY AUTOMATED COUNT: 15.8 % (ref 11–15)
GLOBULIN PLAS-MCNC: 2.9 G/DL (ref 2.8–4.4)
GLUCOSE BLD-MCNC: 74 MG/DL (ref 70–99)
HCT VFR BLD AUTO: 36.8 %
HGB BLD-MCNC: 12.1 G/DL
IMM GRANULOCYTES # BLD AUTO: 0.02 X10(3) UL (ref 0–1)
IMM GRANULOCYTES NFR BLD: 0.3 %
LYMPHOCYTES # BLD AUTO: 0.4 X10(3) UL (ref 1–4)
LYMPHOCYTES NFR BLD AUTO: 5.3 %
M PROTEIN MFR SERPL ELPH: 6.4 G/DL (ref 6.4–8.2)
MCH RBC QN AUTO: 34.4 PG (ref 26–34)
MCHC RBC AUTO-ENTMCNC: 32.9 G/DL (ref 31–37)
MCV RBC AUTO: 104.5 FL
MONOCYTES # BLD AUTO: 0.68 X10(3) UL (ref 0.1–1)
MONOCYTES NFR BLD AUTO: 8.9 %
NEUTROPHILS # BLD AUTO: 6.46 X10 (3) UL (ref 1.5–7.7)
NEUTROPHILS # BLD AUTO: 6.46 X10(3) UL (ref 1.5–7.7)
NEUTROPHILS NFR BLD AUTO: 84.9 %
OSMOLALITY SERPL CALC.SUM OF ELEC: 287 MOSM/KG (ref 275–295)
PATIENT FASTING Y/N/NP: NO
PLATELET # BLD AUTO: 206 10(3)UL (ref 150–450)
POTASSIUM SERPL-SCNC: 4.3 MMOL/L (ref 3.5–5.1)
RBC # BLD AUTO: 3.52 X10(6)UL
SODIUM SERPL-SCNC: 137 MMOL/L (ref 136–145)
WBC # BLD AUTO: 7.6 X10(3) UL (ref 4–11)

## 2021-03-19 PROCEDURE — 96375 TX/PRO/DX INJ NEW DRUG ADDON: CPT

## 2021-03-19 PROCEDURE — 96413 CHEMO IV INFUSION 1 HR: CPT

## 2021-03-19 PROCEDURE — 96361 HYDRATE IV INFUSION ADD-ON: CPT

## 2021-03-19 PROCEDURE — 96401 CHEMO ANTI-NEOPL SQ/IM: CPT

## 2021-03-19 PROCEDURE — 99215 OFFICE O/P EST HI 40 MIN: CPT | Performed by: CLINICAL NURSE SPECIALIST

## 2021-03-19 RX ORDER — DIPHENHYDRAMINE HYDROCHLORIDE 50 MG/ML
50 INJECTION INTRAMUSCULAR; INTRAVENOUS ONCE
Status: CANCELLED | OUTPATIENT
Start: 2021-04-02

## 2021-03-19 RX ORDER — DIPHENHYDRAMINE HYDROCHLORIDE 50 MG/ML
50 INJECTION INTRAMUSCULAR; INTRAVENOUS ONCE
Status: CANCELLED | OUTPATIENT
Start: 2021-03-19

## 2021-03-19 RX ORDER — DIPHENHYDRAMINE HYDROCHLORIDE 50 MG/ML
50 INJECTION INTRAMUSCULAR; INTRAVENOUS ONCE
Status: COMPLETED | OUTPATIENT
Start: 2021-03-19 | End: 2021-03-19

## 2021-03-19 RX ORDER — LISINOPRIL 10 MG/1
10 TABLET ORAL DAILY
COMMUNITY
Start: 2021-03-18 | End: 2021-10-29

## 2021-03-19 RX ORDER — ACETAMINOPHEN 325 MG/1
650 TABLET ORAL ONCE
Status: CANCELLED | OUTPATIENT
Start: 2021-03-19

## 2021-03-19 RX ORDER — METOPROLOL SUCCINATE 200 MG/1
200 TABLET, EXTENDED RELEASE ORAL EVERY EVENING
COMMUNITY
Start: 2021-03-18

## 2021-03-19 RX ORDER — ACETAMINOPHEN 325 MG/1
650 TABLET ORAL ONCE
Status: COMPLETED | OUTPATIENT
Start: 2021-03-19 | End: 2021-03-19

## 2021-03-19 RX ORDER — ACETAMINOPHEN 325 MG/1
650 TABLET ORAL ONCE
Status: CANCELLED | OUTPATIENT
Start: 2021-04-02

## 2021-03-19 RX ADMIN — ACETAMINOPHEN 650 MG: 325 TABLET ORAL at 10:48:00

## 2021-03-19 RX ADMIN — DIPHENHYDRAMINE HYDROCHLORIDE 50 MG: 50 INJECTION INTRAMUSCULAR; INTRAVENOUS at 10:49:00

## 2021-03-19 NOTE — PROGRESS NOTES
Pt here for C3D1.   Arrives Ambulating independently, accompanied by Self           Pregnancy screening: Not applicable    Modifications in dose or schedule: No     Frequency of blood return and site check throughout administration: Prior to administration

## 2021-03-19 NOTE — PROGRESS NOTES
ANP Visit Note    Patient Name: Radha [facility]   YOB: 1950   Medical Record Number: [id number]   CSN: [id number]   Date of visit: 3/19/2021   Rajwinder Madrid MD   No primary care provider on file.      Chief Complaint/Reason for Visit:  No jameson sx 1-31-19/Globa Exp 4-30-19/TRK     Hypoxia     Multiple myeloma, remission status unspecified (HCC)     Type 2 diabetes mellitus with hyperglycemia, without long-term current use of insulin (HCC)     Acute respiratory failure with hypoxia [address]       Med Hypertension Other         family hx   • Prostate Cancer Father        Social History:  Social History    Socioeconomic History      Marital status:       Spouse name: Not on file      Number of children: 3      Years of education: Not on file mouth daily. , Disp: , Rfl:   •  Sacubitril-Valsartan 24-26 MG Oral Tab, Take 1 tablet by mouth 2 (two) times daily. , Disp: , Rfl:   •  glimepiride 1 MG Oral Tab, Take 1 tablet (1 mg total) by mouth daily with breakfast., Disp: 30 tablet, Rfl: 2  •  Glucose No JVD. No palpable lymphadenopathy. Neck is supple. Chest: Clear to auscultation. Heart: Regular rate and rhythm. Abdomen: Soft, non tender with good bowel sounds. Mild bruising. Extremities: No edema. Neurological: Grossly intact. Lymphatics:  Fredy Lazo Absolute 0.02 0.00 - 1.00 x10(3) uL    Neutrophil % 84.9 %    Lymphocyte % 5.3 %    Monocyte % 8.9 %    Eosinophil % 0.5 %    Basophil % 0.1 %    Immature Granulocyte % 0.3 %       Impression/Plan:  1. MM: proceed with treatment without modifications   2.

## 2021-03-23 ENCOUNTER — DOCUMENTATION (OUTPATIENT)
Dept: CARDIOLOGY | Age: 71
End: 2021-03-23

## 2021-03-23 LAB
ALBUMIN SERPL ELPH-MCNC: 3.72 G/DL (ref 3.75–5.21)
ALBUMIN/GLOB SERPL: 1.63 {RATIO} (ref 1–2)
ALPHA1 GLOB SERPL ELPH-MCNC: 0.28 G/DL (ref 0.19–0.46)
ALPHA2 GLOB SERPL ELPH-MCNC: 0.86 G/DL (ref 0.48–1.05)
B-GLOBULIN SERPL ELPH-MCNC: 0.68 G/DL (ref 0.68–1.23)
GAMMA GLOB SERPL ELPH-MCNC: 0.46 G/DL (ref 0.62–1.7)
KAPPA LC FREE SER-MCNC: 0.15 MG/DL (ref 0.33–1.94)
LAMBDA LC FREE SERPL-MCNC: <0.142 MG/DL (ref 0.57–2.63)
M PROTEIN MFR SERPL ELPH: 6 G/DL (ref 6.4–8.2)
M-SPIKE 1: 0.1 G/DL (ref ?–0)

## 2021-03-25 ENCOUNTER — TELEPHONE (OUTPATIENT)
Dept: FAMILY MEDICINE CLINIC | Facility: CLINIC | Age: 71
End: 2021-03-25

## 2021-03-26 ENCOUNTER — OFFICE VISIT (OUTPATIENT)
Dept: HEMATOLOGY/ONCOLOGY | Age: 71
End: 2021-03-26
Attending: INTERNAL MEDICINE
Payer: MEDICARE

## 2021-03-26 VITALS
SYSTOLIC BLOOD PRESSURE: 123 MMHG | DIASTOLIC BLOOD PRESSURE: 76 MMHG | HEART RATE: 69 BPM | BODY MASS INDEX: 33.96 KG/M2 | RESPIRATION RATE: 18 BRPM | HEIGHT: 71.85 IN | WEIGHT: 248 LBS | TEMPERATURE: 97 F | OXYGEN SATURATION: 92 %

## 2021-03-26 DIAGNOSIS — C90.00 MULTIPLE MYELOMA NOT HAVING ACHIEVED REMISSION (HCC): Primary | ICD-10-CM

## 2021-03-26 LAB
BASOPHILS # BLD AUTO: 0.01 X10(3) UL (ref 0–0.2)
BASOPHILS NFR BLD AUTO: 0.1 %
DEPRECATED RDW RBC AUTO: 59.9 FL (ref 35.1–46.3)
EOSINOPHIL # BLD AUTO: 0.06 X10(3) UL (ref 0–0.7)
EOSINOPHIL NFR BLD AUTO: 0.7 %
ERYTHROCYTE [DISTWIDTH] IN BLOOD BY AUTOMATED COUNT: 15.8 % (ref 11–15)
HCT VFR BLD AUTO: 36.3 %
HGB BLD-MCNC: 12.4 G/DL
IMM GRANULOCYTES # BLD AUTO: 0.02 X10(3) UL (ref 0–1)
IMM GRANULOCYTES NFR BLD: 0.2 %
LYMPHOCYTES # BLD AUTO: 0.48 X10(3) UL (ref 1–4)
LYMPHOCYTES NFR BLD AUTO: 5.9 %
MCH RBC QN AUTO: 35.4 PG (ref 26–34)
MCHC RBC AUTO-ENTMCNC: 34.2 G/DL (ref 31–37)
MCV RBC AUTO: 103.7 FL
MONOCYTES # BLD AUTO: 0.7 X10(3) UL (ref 0.1–1)
MONOCYTES NFR BLD AUTO: 8.7 %
NEUTROPHILS # BLD AUTO: 6.8 X10 (3) UL (ref 1.5–7.7)
NEUTROPHILS # BLD AUTO: 6.8 X10(3) UL (ref 1.5–7.7)
NEUTROPHILS NFR BLD AUTO: 84.4 %
PLATELET # BLD AUTO: 120 10(3)UL (ref 150–450)
RBC # BLD AUTO: 3.5 X10(6)UL
WBC # BLD AUTO: 8.1 X10(3) UL (ref 4–11)

## 2021-03-26 PROCEDURE — 96375 TX/PRO/DX INJ NEW DRUG ADDON: CPT

## 2021-03-26 PROCEDURE — 36415 COLL VENOUS BLD VENIPUNCTURE: CPT

## 2021-03-26 PROCEDURE — 85025 COMPLETE CBC W/AUTO DIFF WBC: CPT

## 2021-03-26 PROCEDURE — 96413 CHEMO IV INFUSION 1 HR: CPT

## 2021-03-26 PROCEDURE — 96361 HYDRATE IV INFUSION ADD-ON: CPT

## 2021-03-26 NOTE — PROGRESS NOTES
Pt here for C3D8. Pt denies any new complaints today. Labs drawn and reviewed. Kyprolis infused per MD order w/out incident. Pt dc home in stable condition.      Education Record    Learner:  Patient    Disease / Diagnosis:MM    Barriers / Limitations:  Non

## 2021-04-02 ENCOUNTER — OFFICE VISIT (OUTPATIENT)
Dept: HEMATOLOGY/ONCOLOGY | Age: 71
End: 2021-04-02
Attending: INTERNAL MEDICINE
Payer: MEDICARE

## 2021-04-02 VITALS
HEIGHT: 71.85 IN | DIASTOLIC BLOOD PRESSURE: 74 MMHG | OXYGEN SATURATION: 100 % | BODY MASS INDEX: 34.4 KG/M2 | SYSTOLIC BLOOD PRESSURE: 121 MMHG | WEIGHT: 251.19 LBS | RESPIRATION RATE: 18 BRPM | HEART RATE: 59 BPM | TEMPERATURE: 97 F

## 2021-04-02 DIAGNOSIS — C90.00 MULTIPLE MYELOMA, REMISSION STATUS UNSPECIFIED (HCC): ICD-10-CM

## 2021-04-02 DIAGNOSIS — C90.00 MULTIPLE MYELOMA NOT HAVING ACHIEVED REMISSION (HCC): Primary | ICD-10-CM

## 2021-04-02 PROCEDURE — 96361 HYDRATE IV INFUSION ADD-ON: CPT

## 2021-04-02 PROCEDURE — 36415 COLL VENOUS BLD VENIPUNCTURE: CPT

## 2021-04-02 PROCEDURE — 85025 COMPLETE CBC W/AUTO DIFF WBC: CPT

## 2021-04-02 PROCEDURE — 96413 CHEMO IV INFUSION 1 HR: CPT

## 2021-04-02 PROCEDURE — 96375 TX/PRO/DX INJ NEW DRUG ADDON: CPT

## 2021-04-02 PROCEDURE — 96401 CHEMO ANTI-NEOPL SQ/IM: CPT

## 2021-04-02 RX ORDER — DIPHENHYDRAMINE HYDROCHLORIDE 50 MG/ML
50 INJECTION INTRAMUSCULAR; INTRAVENOUS ONCE
Status: COMPLETED | OUTPATIENT
Start: 2021-04-02 | End: 2021-04-02

## 2021-04-02 RX ORDER — ACETAMINOPHEN 325 MG/1
650 TABLET ORAL ONCE
Status: COMPLETED | OUTPATIENT
Start: 2021-04-02 | End: 2021-04-02

## 2021-04-02 RX ADMIN — DIPHENHYDRAMINE HYDROCHLORIDE 50 MG: 50 INJECTION INTRAMUSCULAR; INTRAVENOUS at 10:31:00

## 2021-04-02 RX ADMIN — ACETAMINOPHEN 650 MG: 325 TABLET ORAL at 10:30:00

## 2021-04-02 NOTE — PROGRESS NOTES
Pt here for C3D15. Pt denies any new complaints @ this time.   Arrives Ambulating independently, accompanied by Self           Pregnancy screening: Not applicable    Modifications in dose or schedule: No     Frequency of blood return and site check through

## 2021-04-07 ENCOUNTER — MED REC SCAN ONLY (OUTPATIENT)
Dept: FAMILY MEDICINE CLINIC | Facility: CLINIC | Age: 71
End: 2021-04-07

## 2021-04-08 ENCOUNTER — OFFICE VISIT (OUTPATIENT)
Dept: CARDIOLOGY | Age: 71
End: 2021-04-08

## 2021-04-08 VITALS
DIASTOLIC BLOOD PRESSURE: 64 MMHG | HEIGHT: 73 IN | WEIGHT: 251 LBS | BODY MASS INDEX: 33.27 KG/M2 | SYSTOLIC BLOOD PRESSURE: 120 MMHG | HEART RATE: 60 BPM

## 2021-04-08 DIAGNOSIS — E78.49 OTHER HYPERLIPIDEMIA: ICD-10-CM

## 2021-04-08 DIAGNOSIS — I42.9 CARDIOMYOPATHY, UNSPECIFIED TYPE (CMD): Primary | ICD-10-CM

## 2021-04-08 DIAGNOSIS — I42.0 DILATED CARDIOMYOPATHY (CMD): ICD-10-CM

## 2021-04-08 DIAGNOSIS — I10 ESSENTIAL HYPERTENSION: ICD-10-CM

## 2021-04-08 PROCEDURE — 99214 OFFICE O/P EST MOD 30 MIN: CPT | Performed by: NURSE PRACTITIONER

## 2021-04-08 RX ORDER — METOPROLOL SUCCINATE 100 MG/1
100 TABLET, EXTENDED RELEASE ORAL EVERY EVENING
Qty: 30 TABLET | Refills: 3 | Status: SHIPPED | OUTPATIENT
Start: 2021-04-08 | End: 2021-04-22 | Stop reason: SDUPTHER

## 2021-04-08 ASSESSMENT — PATIENT HEALTH QUESTIONNAIRE - PHQ9
SUM OF ALL RESPONSES TO PHQ9 QUESTIONS 1 AND 2: 0
1. LITTLE INTEREST OR PLEASURE IN DOING THINGS: NOT AT ALL
2. FEELING DOWN, DEPRESSED OR HOPELESS: NOT AT ALL
CLINICAL INTERPRETATION OF PHQ2 SCORE: NO FURTHER SCREENING NEEDED
CLINICAL INTERPRETATION OF PHQ9 SCORE: NO FURTHER SCREENING NEEDED
SUM OF ALL RESPONSES TO PHQ9 QUESTIONS 1 AND 2: 0

## 2021-04-13 ENCOUNTER — LAB ENCOUNTER (OUTPATIENT)
Dept: LAB | Age: 71
End: 2021-04-13
Attending: FAMILY MEDICINE
Payer: MEDICARE

## 2021-04-13 DIAGNOSIS — E11.9 TYPE 2 DIABETES MELLITUS WITHOUT COMPLICATION, WITHOUT LONG-TERM CURRENT USE OF INSULIN (HCC): ICD-10-CM

## 2021-04-13 PROCEDURE — 83036 HEMOGLOBIN GLYCOSYLATED A1C: CPT

## 2021-04-13 PROCEDURE — 36415 COLL VENOUS BLD VENIPUNCTURE: CPT

## 2021-04-13 PROCEDURE — 80053 COMPREHEN METABOLIC PANEL: CPT

## 2021-04-14 NOTE — PROGRESS NOTES
Hemoglobin A1c improved to 5.8 from 7.2Your chemistry shows slightly elevated BUN, probably fasting stateNothing to worry aboutContinue the current medications unchanged

## 2021-04-16 ENCOUNTER — OFFICE VISIT (OUTPATIENT)
Dept: HEMATOLOGY/ONCOLOGY | Age: 71
End: 2021-04-16
Attending: INTERNAL MEDICINE
Payer: MEDICARE

## 2021-04-16 VITALS
HEART RATE: 59 BPM | OXYGEN SATURATION: 94 % | BODY MASS INDEX: 34.7 KG/M2 | WEIGHT: 253.38 LBS | TEMPERATURE: 98 F | RESPIRATION RATE: 18 BRPM | HEIGHT: 71.85 IN | DIASTOLIC BLOOD PRESSURE: 70 MMHG | SYSTOLIC BLOOD PRESSURE: 117 MMHG

## 2021-04-16 DIAGNOSIS — C90.00 MULTIPLE MYELOMA NOT HAVING ACHIEVED REMISSION (HCC): Primary | ICD-10-CM

## 2021-04-16 DIAGNOSIS — D63.0 ANEMIA COMPLICATING NEOPLASTIC DISEASE: ICD-10-CM

## 2021-04-16 DIAGNOSIS — I42.9 CARDIOMYOPATHY, UNSPECIFIED TYPE (HCC): ICD-10-CM

## 2021-04-16 DIAGNOSIS — C90.00 MULTIPLE MYELOMA, REMISSION STATUS UNSPECIFIED (HCC): ICD-10-CM

## 2021-04-16 PROCEDURE — 96413 CHEMO IV INFUSION 1 HR: CPT

## 2021-04-16 PROCEDURE — 96361 HYDRATE IV INFUSION ADD-ON: CPT

## 2021-04-16 PROCEDURE — 96401 CHEMO ANTI-NEOPL SQ/IM: CPT

## 2021-04-16 PROCEDURE — 96375 TX/PRO/DX INJ NEW DRUG ADDON: CPT

## 2021-04-16 PROCEDURE — 99215 OFFICE O/P EST HI 40 MIN: CPT | Performed by: INTERNAL MEDICINE

## 2021-04-16 RX ORDER — ACETAMINOPHEN 325 MG/1
650 TABLET ORAL ONCE
Status: COMPLETED | OUTPATIENT
Start: 2021-04-16 | End: 2021-04-16

## 2021-04-16 RX ORDER — DIPHENHYDRAMINE HYDROCHLORIDE 50 MG/ML
50 INJECTION INTRAMUSCULAR; INTRAVENOUS ONCE
Status: CANCELLED | OUTPATIENT
Start: 2021-04-30

## 2021-04-16 RX ORDER — ACETAMINOPHEN 325 MG/1
650 TABLET ORAL ONCE
Status: CANCELLED | OUTPATIENT
Start: 2021-04-30

## 2021-04-16 RX ORDER — DIPHENHYDRAMINE HYDROCHLORIDE 50 MG/ML
50 INJECTION INTRAMUSCULAR; INTRAVENOUS ONCE
Status: COMPLETED | OUTPATIENT
Start: 2021-04-16 | End: 2021-04-16

## 2021-04-16 RX ADMIN — ACETAMINOPHEN 650 MG: 325 TABLET ORAL at 10:37:00

## 2021-04-16 RX ADMIN — DIPHENHYDRAMINE HYDROCHLORIDE 50 MG: 50 INJECTION INTRAMUSCULAR; INTRAVENOUS at 10:37:00

## 2021-04-16 NOTE — PROGRESS NOTES
Cancer Center Progress Note    Problem List:      Patient Active Problem List:     Impotence of organic origin     Generalized osteoarthrosis of hand     Morbid obesity ([address]     Essential hypertension     Hypertension     Multiple myeloma ([address]     Myelom -Portion of fibrosis tissue.      B. Heart, endomyocardial biopsy for electron microscopy:   -Negative for any significant ultrastructural changes, see addendum below for details.      Comment:     The clinical concern for amyloidosis is noted.   A [place] Unspecified internal derangement of knee 6/20/2013    Log Date: 08/21/2012        Past Surgical History:   Procedure Laterality Date   • BACK SURGERY  1/2001    lower back L4 L5 for herniated disc   • COLONOSCOPY      202, 2012 Dr. Chevy Tucker, polyps   • OTHER acetaminophen 500 MG Oral Tab, Take 1,000 mg by mouth nightly.  , Disp: , Rfl:   Calcium Carbonate Antacid 500 MG Oral Chew Tab, Chew 2 tablets (1,000 mg total) by mouth 2 (two) times daily. , Disp: 90 tablet, Rfl: 0  aspirin (ASPIR-81) 81 MG Oral Tab EC, 1/21/2021:  FINDINGS:    ABNORMAL FOCI: Heterogeneous density throughout the osseous structures with sclerotic and lytic lesions consistent with history of multiple myeloma.  Heterogeneous uptake throughout the osseous structures with maximum SUV approximat and cyclophosphamide. He had high dose therapy with stem cell infusion on 1/27/2017. He had been on Revlimid maintenance from 7/2017 to 12/2020. He now is on salvage treatment with vic/carfilzomib/Dex. He will continue with cycle 4, day 1.  He is tolera

## 2021-04-16 NOTE — PROGRESS NOTES
Pt here for C3D15.   Arrives Ambulating independently, accompanied by Self          Modifications in dose or schedule: No    Pt denies current pregnancy     Frequency of blood return and site check throughout administration: Prior to administration   Ernestina

## 2021-04-22 ENCOUNTER — V-VISIT (OUTPATIENT)
Dept: CARDIOLOGY | Age: 71
End: 2021-04-22

## 2021-04-22 ENCOUNTER — APPOINTMENT (OUTPATIENT)
Dept: CARDIOLOGY | Age: 71
End: 2021-04-22

## 2021-04-22 VITALS
HEIGHT: 73 IN | WEIGHT: 245.5 LBS | SYSTOLIC BLOOD PRESSURE: 122 MMHG | BODY MASS INDEX: 32.54 KG/M2 | DIASTOLIC BLOOD PRESSURE: 48 MMHG | HEART RATE: 62 BPM

## 2021-04-22 DIAGNOSIS — E78.49 OTHER HYPERLIPIDEMIA: ICD-10-CM

## 2021-04-22 DIAGNOSIS — I42.9 CARDIOMYOPATHY, UNSPECIFIED TYPE (CMD): Primary | ICD-10-CM

## 2021-04-22 DIAGNOSIS — I42.0 DILATED CARDIOMYOPATHY (CMD): ICD-10-CM

## 2021-04-22 DIAGNOSIS — I10 ESSENTIAL HYPERTENSION: ICD-10-CM

## 2021-04-22 PROCEDURE — 99213 OFFICE O/P EST LOW 20 MIN: CPT | Performed by: NURSE PRACTITIONER

## 2021-04-22 RX ORDER — LISINOPRIL 10 MG/1
10 TABLET ORAL DAILY
Qty: 90 TABLET | Refills: 2 | Status: SHIPPED | OUTPATIENT
Start: 2021-04-22 | End: 2021-11-04

## 2021-04-22 RX ORDER — METOPROLOL SUCCINATE 200 MG/1
200 TABLET, EXTENDED RELEASE ORAL DAILY
Qty: 30 TABLET | Refills: 6 | Status: SHIPPED | OUTPATIENT
Start: 2021-04-22

## 2021-04-22 ASSESSMENT — PATIENT HEALTH QUESTIONNAIRE - PHQ9
CLINICAL INTERPRETATION OF PHQ2 SCORE: NO FURTHER SCREENING NEEDED
SUM OF ALL RESPONSES TO PHQ9 QUESTIONS 1 AND 2: 0
CLINICAL INTERPRETATION OF PHQ9 SCORE: NO FURTHER SCREENING NEEDED
SUM OF ALL RESPONSES TO PHQ9 QUESTIONS 1 AND 2: 0
1. LITTLE INTEREST OR PLEASURE IN DOING THINGS: NOT AT ALL
2. FEELING DOWN, DEPRESSED OR HOPELESS: NOT AT ALL

## 2021-04-23 ENCOUNTER — OFFICE VISIT (OUTPATIENT)
Dept: HEMATOLOGY/ONCOLOGY | Age: 71
End: 2021-04-23
Attending: INTERNAL MEDICINE
Payer: MEDICARE

## 2021-04-23 VITALS
HEIGHT: 71.85 IN | DIASTOLIC BLOOD PRESSURE: 68 MMHG | BODY MASS INDEX: 34.81 KG/M2 | SYSTOLIC BLOOD PRESSURE: 110 MMHG | RESPIRATION RATE: 18 BRPM | WEIGHT: 254.19 LBS | TEMPERATURE: 96 F | OXYGEN SATURATION: 100 % | HEART RATE: 48 BPM

## 2021-04-23 DIAGNOSIS — C90.00 MULTIPLE MYELOMA NOT HAVING ACHIEVED REMISSION (HCC): Primary | ICD-10-CM

## 2021-04-23 PROCEDURE — 96413 CHEMO IV INFUSION 1 HR: CPT

## 2021-04-23 PROCEDURE — 36415 COLL VENOUS BLD VENIPUNCTURE: CPT

## 2021-04-23 PROCEDURE — 96375 TX/PRO/DX INJ NEW DRUG ADDON: CPT

## 2021-04-23 PROCEDURE — 96361 HYDRATE IV INFUSION ADD-ON: CPT

## 2021-04-23 PROCEDURE — 85025 COMPLETE CBC W/AUTO DIFF WBC: CPT

## 2021-04-23 NOTE — PROGRESS NOTES
Pt here for C4D8.   Arrives Ambulating independently, accompanied by Self           Pregnancy screening: Not applicable    Modifications in dose or schedule: No     Frequency of blood return and site check throughout administration: Prior to administration

## 2021-04-29 ENCOUNTER — HOSPITAL ENCOUNTER (OUTPATIENT)
Dept: CV DIAGNOSTICS | Facility: HOSPITAL | Age: 71
Discharge: HOME | End: 2021-04-29
Attending: INTERNAL MEDICINE
Payer: MEDICARE

## 2021-04-29 DIAGNOSIS — E78.49 OTHER HYPERLIPIDEMIA: ICD-10-CM

## 2021-04-29 DIAGNOSIS — I42.0 DILATED CARDIOMYOPATHY (HCC): ICD-10-CM

## 2021-04-29 DIAGNOSIS — I10 HYPERTENSION, ESSENTIAL: ICD-10-CM

## 2021-04-29 PROCEDURE — 93306 TTE W/DOPPLER COMPLETE: CPT | Performed by: INTERNAL MEDICINE

## 2021-04-30 ENCOUNTER — OFFICE VISIT (OUTPATIENT)
Dept: HEMATOLOGY/ONCOLOGY | Age: 71
End: 2021-04-30
Attending: INTERNAL MEDICINE
Payer: MEDICARE

## 2021-04-30 VITALS
BODY MASS INDEX: 34.62 KG/M2 | WEIGHT: 252.81 LBS | TEMPERATURE: 97 F | DIASTOLIC BLOOD PRESSURE: 71 MMHG | HEIGHT: 71.85 IN | OXYGEN SATURATION: 92 % | SYSTOLIC BLOOD PRESSURE: 138 MMHG | RESPIRATION RATE: 18 BRPM | HEART RATE: 56 BPM

## 2021-04-30 DIAGNOSIS — C90.00 MULTIPLE MYELOMA NOT HAVING ACHIEVED REMISSION (HCC): Primary | ICD-10-CM

## 2021-04-30 PROCEDURE — 96375 TX/PRO/DX INJ NEW DRUG ADDON: CPT

## 2021-04-30 PROCEDURE — 96361 HYDRATE IV INFUSION ADD-ON: CPT

## 2021-04-30 PROCEDURE — 85025 COMPLETE CBC W/AUTO DIFF WBC: CPT

## 2021-04-30 PROCEDURE — 36415 COLL VENOUS BLD VENIPUNCTURE: CPT

## 2021-04-30 PROCEDURE — 96401 CHEMO ANTI-NEOPL SQ/IM: CPT

## 2021-04-30 PROCEDURE — 96413 CHEMO IV INFUSION 1 HR: CPT

## 2021-04-30 RX ORDER — DIPHENHYDRAMINE HYDROCHLORIDE 50 MG/ML
50 INJECTION INTRAMUSCULAR; INTRAVENOUS ONCE
Status: COMPLETED | OUTPATIENT
Start: 2021-04-30 | End: 2021-04-30

## 2021-04-30 RX ORDER — ACETAMINOPHEN 325 MG/1
650 TABLET ORAL ONCE
Status: COMPLETED | OUTPATIENT
Start: 2021-04-30 | End: 2021-04-30

## 2021-04-30 RX ADMIN — ACETAMINOPHEN 650 MG: 325 TABLET ORAL at 10:27:00

## 2021-04-30 RX ADMIN — DIPHENHYDRAMINE HYDROCHLORIDE 50 MG: 50 INJECTION INTRAMUSCULAR; INTRAVENOUS at 10:25:00

## 2021-04-30 NOTE — PROGRESS NOTES
Pt here for C4D15.   Arrives Ambulating independently, accompanied by Self          Modifications in dose or schedule: No    Pt denies current pregnancy     Frequency of blood return and site check throughout administration: Prior to administration   Ernestina

## 2021-05-03 DIAGNOSIS — I10 DIABETES MELLITUS WITH COINCIDENT HYPERTENSION (HCC): ICD-10-CM

## 2021-05-03 DIAGNOSIS — E11.9 DIABETES MELLITUS WITH COINCIDENT HYPERTENSION (HCC): ICD-10-CM

## 2021-05-03 RX ORDER — LANCETS
EACH MISCELLANEOUS
Qty: 200 EACH | Refills: 2 | Status: SHIPPED | OUTPATIENT
Start: 2021-05-03

## 2021-05-03 NOTE — TELEPHONE ENCOUNTER
Diabetic Supplies Protocol [id number]/03/2021 11:24 AM   Appointment in the past 12 or next 3 months

## 2021-05-05 ENCOUNTER — TELEPHONE (OUTPATIENT)
Dept: CARDIOLOGY | Age: 71
End: 2021-05-05

## 2021-05-05 ENCOUNTER — APPOINTMENT (OUTPATIENT)
Dept: CARDIOLOGY | Age: 71
End: 2021-05-05

## 2021-05-06 DIAGNOSIS — E11.9 TYPE 2 DIABETES MELLITUS WITHOUT COMPLICATION, WITHOUT LONG-TERM CURRENT USE OF INSULIN (HCC): ICD-10-CM

## 2021-05-06 RX ORDER — GLIMEPIRIDE 1 MG/1
1 TABLET ORAL
Qty: 90 TABLET | Refills: 0 | Status: SHIPPED | OUTPATIENT
Start: 2021-05-06 | End: 2021-08-04

## 2021-05-06 RX ORDER — ACYCLOVIR 400 MG/1
400 TABLET ORAL 2 TIMES DAILY
Qty: 60 TABLET | Refills: 3 | Status: SHIPPED | OUTPATIENT
Start: 2021-05-06 | End: 2021-09-27

## 2021-05-06 RX ORDER — ATORVASTATIN CALCIUM 20 MG/1
20 TABLET, FILM COATED ORAL DAILY
Qty: 90 TABLET | Refills: 0 | Status: SHIPPED | OUTPATIENT
Start: 2021-05-06 | End: 2021-08-04

## 2021-05-06 NOTE — TELEPHONE ENCOUNTER
LOV 2/11/2021    LAST LAB 4/16/2021    LAST RX   atorvastatin 20 MG Oral Tab 90 tablet 0 2/22/2021     glimepiride 1 MG Oral Tab 30 tablet 2 2/11/2021     acyclovir 400 MG Oral Tab 60 tablet 3 12/31/2020           Next OV   Future Appointments   Date Time

## 2021-05-14 ENCOUNTER — OFFICE VISIT (OUTPATIENT)
Dept: HEMATOLOGY/ONCOLOGY | Age: 71
End: 2021-05-14
Attending: INTERNAL MEDICINE
Payer: MEDICARE

## 2021-05-14 VITALS
DIASTOLIC BLOOD PRESSURE: 63 MMHG | HEART RATE: 49 BPM | BODY MASS INDEX: 35.19 KG/M2 | SYSTOLIC BLOOD PRESSURE: 104 MMHG | OXYGEN SATURATION: 100 % | RESPIRATION RATE: 16 BRPM | WEIGHT: 257 LBS | TEMPERATURE: 97 F | HEIGHT: 71.85 IN

## 2021-05-14 DIAGNOSIS — C90.00 MULTIPLE MYELOMA NOT HAVING ACHIEVED REMISSION (HCC): ICD-10-CM

## 2021-05-14 DIAGNOSIS — C90.00 MULTIPLE MYELOMA NOT HAVING ACHIEVED REMISSION (HCC): Primary | ICD-10-CM

## 2021-05-14 PROCEDURE — 96401 CHEMO ANTI-NEOPL SQ/IM: CPT

## 2021-05-14 PROCEDURE — 96361 HYDRATE IV INFUSION ADD-ON: CPT

## 2021-05-14 PROCEDURE — 99215 OFFICE O/P EST HI 40 MIN: CPT | Performed by: INTERNAL MEDICINE

## 2021-05-14 PROCEDURE — 96413 CHEMO IV INFUSION 1 HR: CPT

## 2021-05-14 PROCEDURE — 96375 TX/PRO/DX INJ NEW DRUG ADDON: CPT

## 2021-05-14 RX ORDER — ACETAMINOPHEN 325 MG/1
650 TABLET ORAL ONCE
Status: CANCELLED | OUTPATIENT
Start: 2021-05-28

## 2021-05-14 RX ORDER — DIPHENHYDRAMINE HYDROCHLORIDE 50 MG/ML
50 INJECTION INTRAMUSCULAR; INTRAVENOUS ONCE
Status: CANCELLED | OUTPATIENT
Start: 2021-05-28

## 2021-05-14 RX ORDER — ACETAMINOPHEN 325 MG/1
650 TABLET ORAL ONCE
Status: COMPLETED | OUTPATIENT
Start: 2021-05-14 | End: 2021-05-14

## 2021-05-14 RX ORDER — ACETAMINOPHEN 325 MG/1
650 TABLET ORAL ONCE
Status: CANCELLED | OUTPATIENT
Start: 2021-05-14

## 2021-05-14 RX ORDER — DIPHENHYDRAMINE HYDROCHLORIDE 50 MG/ML
50 INJECTION INTRAMUSCULAR; INTRAVENOUS ONCE
Status: CANCELLED | OUTPATIENT
Start: 2021-05-14

## 2021-05-14 RX ORDER — DIPHENHYDRAMINE HYDROCHLORIDE 50 MG/ML
50 INJECTION INTRAMUSCULAR; INTRAVENOUS ONCE
Status: COMPLETED | OUTPATIENT
Start: 2021-05-14 | End: 2021-05-14

## 2021-05-14 RX ADMIN — DIPHENHYDRAMINE HYDROCHLORIDE 50 MG: 50 INJECTION INTRAMUSCULAR; INTRAVENOUS at 10:41:00

## 2021-05-14 RX ADMIN — ACETAMINOPHEN 650 MG: 325 TABLET ORAL at 10:41:00

## 2021-05-14 NOTE — PROGRESS NOTES
Cancer Center Progress Note    Problem List:      Patient Active Problem List:     Impotence of organic origin     Generalized osteoarthrosis of hand     Morbid obesity ([address]     Essential hypertension     Hypertension     Multiple myeloma ([address]     Myelom endomyocardial biopsy:   -Fragments of myocardium with mild myocyte hypertrophy, no evidence of cardiac amyloidosis, see comment.   -Portion of fibrosis tissue.      B.   Heart, endomyocardial biopsy for electron microscopy:   -Negative for any significant mellitus without mention of complication, not stated as uncontrolled 10/2007   • Unspecified essential hypertension    • Unspecified internal derangement of knee 6/20/2013    Log Date: 08/21/2012        Past Surgical History:   Procedure Laterality Date mouth 2 (two) times daily. , Disp: , Rfl:   Glucose Blood (ONETOUCH ULTRA BLUE) In Vitro Strip, TEST TWICE DAILY, Disp: 100 strip, Rfl: 3  Multiple Vitamin Oral Tab, Take 1 tablet by mouth., Disp: , Rfl:   acetaminophen 500 MG Oral Tab, Take 1,000 mg by dawson ALB 3.5 04/16/2021    ALB 3.98 04/16/2021    TP 6.3 (L) 04/16/2021    TP 6.1 (L) 04/16/2021     Lab Component   Component Value Date/Time     01/08/2021 1028       Radiologic imaging reviewed at this visit:    PET/CT scan on 1/21/2021:  FINDINGS: Multiple myeloma:  Hypercalcemia  Acute renal failure  Anemia  Diffuse bone lytic lesions  Beta-2 Microglobulin 18.5 mg/L    ISS stage III  Started Revlimid maintenance in 6/2017     He had 5 cycles of (VCd) Dexamethasone, Bortezomib and cycloph

## 2021-05-20 ENCOUNTER — OFFICE VISIT (OUTPATIENT)
Dept: FAMILY MEDICINE CLINIC | Facility: CLINIC | Age: 71
End: 2021-05-20
Payer: MEDICARE

## 2021-05-20 ENCOUNTER — MED REC SCAN ONLY (OUTPATIENT)
Dept: FAMILY MEDICINE CLINIC | Facility: CLINIC | Age: 71
End: 2021-05-20

## 2021-05-20 VITALS
OXYGEN SATURATION: 97 % | DIASTOLIC BLOOD PRESSURE: 48 MMHG | TEMPERATURE: 97 F | HEIGHT: 71.85 IN | BODY MASS INDEX: 35.47 KG/M2 | HEART RATE: 61 BPM | RESPIRATION RATE: 20 BRPM | WEIGHT: 259 LBS | SYSTOLIC BLOOD PRESSURE: 102 MMHG

## 2021-05-20 DIAGNOSIS — C90.00 MULTIPLE MYELOMA NOT HAVING ACHIEVED REMISSION (HCC): ICD-10-CM

## 2021-05-20 DIAGNOSIS — Z94.84 HX OF STEM CELL TRANSPLANT (HCC): ICD-10-CM

## 2021-05-20 DIAGNOSIS — E66.01 MORBID OBESITY (HCC): ICD-10-CM

## 2021-05-20 DIAGNOSIS — I42.3 ENDOMYOCARDIAL FIBROSIS (HCC): ICD-10-CM

## 2021-05-20 DIAGNOSIS — Z00.00 ENCOUNTER FOR ANNUAL HEALTH EXAMINATION: Primary | ICD-10-CM

## 2021-05-20 DIAGNOSIS — I10 ESSENTIAL HYPERTENSION: ICD-10-CM

## 2021-05-20 DIAGNOSIS — E11.9 TYPE 2 DIABETES MELLITUS WITHOUT COMPLICATION, WITHOUT LONG-TERM CURRENT USE OF INSULIN (HCC): ICD-10-CM

## 2021-05-20 PROBLEM — Z98.890 S/P ARTHROSCOPY OF LEFT KNEE: Status: RESOLVED | Noted: 2019-01-31 | Resolved: 2021-05-20

## 2021-05-20 PROCEDURE — G0439 PPPS, SUBSEQ VISIT: HCPCS | Performed by: FAMILY MEDICINE

## 2021-05-20 NOTE — PROGRESS NOTES
HPI:   Isidoro Roman is a 66year old male who presents for a Medicare Subsequent Annual Wellness visit (Pt already had Initial Annual Wellness).     Patient with history of multiple myeloma, hypertension hyperlipidemia coronary artery disease and halie kg)  05/14/21 : 257 lb (116.6 kg)  04/30/21 : 252 lb 12.8 oz (114.7 kg)     Last Cholesterol Labs:   Lab Results   Component Value Date    CHOLEST 112 11/06/2020    HDL 60 (H) 11/06/2020    LDL 38 11/06/2020    TRIG 71 11/06/2020          Last Chemistry La Tab EC, Take 1 tablet by mouth daily.        MEDICAL INFORMATION:   He  has a past medical history of Allergic rhinitis, cause unspecified, BCC (basal cell carcinoma of skin) (1997), BPH (benign prostatic hyperplasia), Colon polyps, Generalized osteoarthros (117.5 kg)   SpO2 97%   BMI 35.27 kg/m²   Estimated body mass index is 35.27 kg/m² as calculated from the following:    Height as of this encounter: 5' 11.85\" (1.825 m). Weight as of this encounter: 259 lb (117.5 kg).     Medicare Hearing Assessment  (R 09/29/2019   • TDAP 08/19/2015   • Zoster Vaccine Live (Zostavax) 08/20/2013   Pended Date(s) Pended   • HIGH DOSE FLU 65 YRS AND OLDER PRSV FREE SINGLE D (98651) FLU CLINIC 12/12/2016        ASSESSMENT AND OTHER RELEVANT CHRONIC CONDITIONS:   Desiree Mcnally Moderate  How would you describe your current health state?: Good  How do you maintain positive mental well-being?: Social Interaction;Puzzles; Visiting Friends; Visiting Family    This section provided for quick review of chart, separate sheet to patient  P End-stage renal disease   Hemophiliacs who received Factor VIII or IX concentrates   Clients of institutions for the mentally retarded   Persons who live in the same house as a HepB virus carrier   Homosexual men   Illicit injectable drug abusers     Tet

## 2021-05-20 NOTE — PATIENT INSTRUCTIONS
Ilda Valle's SCREENING SCHEDULE   Tests on this list are recommended by your physician but may not be covered, or covered at this frequency, by your insurer. Please check with your insurance carrier before scheduling to verify coverage.     PREVENT history    Colorectal Cancer Screening Covered up to Age 76     Colonoscopy Screen   Covered every 10 years- more often if abnormal Colonoscopy due on 01/01/2022 Update Health Maintenance if applicable    Flex Sigmoidoscopy Screen  Covered every 5 years No Toxoid- Only covered with a cut with metal- TD and TDaP Not covered by Medicare Part B) Orders placed or performed in visit on 08/19/15   • TETANUS, DIPHTHERIA TOXOIDS AND ACELLULAR PERTUSIS VACCINE (TDAP), >7 YEARS, IM USE    This may be covered with your

## 2021-05-21 ENCOUNTER — OFFICE VISIT (OUTPATIENT)
Dept: HEMATOLOGY/ONCOLOGY | Age: 71
End: 2021-05-21
Attending: INTERNAL MEDICINE
Payer: MEDICARE

## 2021-05-21 VITALS
RESPIRATION RATE: 16 BRPM | DIASTOLIC BLOOD PRESSURE: 57 MMHG | OXYGEN SATURATION: 100 % | BODY MASS INDEX: 35.33 KG/M2 | TEMPERATURE: 98 F | HEIGHT: 71.85 IN | SYSTOLIC BLOOD PRESSURE: 102 MMHG | WEIGHT: 258 LBS | HEART RATE: 54 BPM

## 2021-05-21 DIAGNOSIS — C90.00 MULTIPLE MYELOMA NOT HAVING ACHIEVED REMISSION (HCC): Primary | ICD-10-CM

## 2021-05-21 PROCEDURE — 85025 COMPLETE CBC W/AUTO DIFF WBC: CPT

## 2021-05-21 PROCEDURE — 96375 TX/PRO/DX INJ NEW DRUG ADDON: CPT

## 2021-05-21 PROCEDURE — 96413 CHEMO IV INFUSION 1 HR: CPT

## 2021-05-21 PROCEDURE — 96361 HYDRATE IV INFUSION ADD-ON: CPT

## 2021-05-21 NOTE — PROGRESS NOTES
Pt here for C5D8.   Arrives Ambulating independently, accompanied by Self          Modifications in dose or schedule: No    Pt denies current pregnancy     Frequency of blood return and site check throughout administration: Prior to administration   Dischar

## 2021-05-28 ENCOUNTER — OFFICE VISIT (OUTPATIENT)
Dept: HEMATOLOGY/ONCOLOGY | Age: 71
End: 2021-05-28
Attending: INTERNAL MEDICINE
Payer: MEDICARE

## 2021-05-28 VITALS
HEART RATE: 51 BPM | TEMPERATURE: 97 F | DIASTOLIC BLOOD PRESSURE: 64 MMHG | HEIGHT: 71.85 IN | WEIGHT: 261 LBS | SYSTOLIC BLOOD PRESSURE: 98 MMHG | RESPIRATION RATE: 18 BRPM | BODY MASS INDEX: 35.74 KG/M2 | OXYGEN SATURATION: 100 %

## 2021-05-28 DIAGNOSIS — C90.00 MULTIPLE MYELOMA NOT HAVING ACHIEVED REMISSION (HCC): Primary | ICD-10-CM

## 2021-05-28 DIAGNOSIS — C90.00 MULTIPLE MYELOMA, REMISSION STATUS UNSPECIFIED (HCC): ICD-10-CM

## 2021-05-28 PROCEDURE — 96375 TX/PRO/DX INJ NEW DRUG ADDON: CPT

## 2021-05-28 PROCEDURE — 96413 CHEMO IV INFUSION 1 HR: CPT

## 2021-05-28 PROCEDURE — 96401 CHEMO ANTI-NEOPL SQ/IM: CPT

## 2021-05-28 PROCEDURE — 96367 TX/PROPH/DG ADDL SEQ IV INF: CPT

## 2021-05-28 PROCEDURE — 85025 COMPLETE CBC W/AUTO DIFF WBC: CPT

## 2021-05-28 RX ORDER — DIPHENHYDRAMINE HYDROCHLORIDE 50 MG/ML
50 INJECTION INTRAMUSCULAR; INTRAVENOUS ONCE
Status: COMPLETED | OUTPATIENT
Start: 2021-05-28 | End: 2021-05-28

## 2021-05-28 RX ORDER — ACETAMINOPHEN 325 MG/1
650 TABLET ORAL ONCE
Status: COMPLETED | OUTPATIENT
Start: 2021-05-28 | End: 2021-05-28

## 2021-05-28 RX ADMIN — ACETAMINOPHEN 650 MG: 325 TABLET ORAL at 11:12:00

## 2021-05-28 RX ADMIN — DIPHENHYDRAMINE HYDROCHLORIDE 50 MG: 50 INJECTION INTRAMUSCULAR; INTRAVENOUS at 11:13:00

## 2021-06-02 ENCOUNTER — OFFICE VISIT (OUTPATIENT)
Dept: CARDIOLOGY | Age: 71
End: 2021-06-02

## 2021-06-02 VITALS
DIASTOLIC BLOOD PRESSURE: 58 MMHG | SYSTOLIC BLOOD PRESSURE: 116 MMHG | HEART RATE: 56 BPM | WEIGHT: 244 LBS | BODY MASS INDEX: 32.34 KG/M2 | HEIGHT: 73 IN

## 2021-06-02 DIAGNOSIS — I42.9 CARDIOMYOPATHY, UNSPECIFIED TYPE (CMD): Primary | ICD-10-CM

## 2021-06-02 DIAGNOSIS — C61 PROSTATE CANCER (CMD): ICD-10-CM

## 2021-06-02 DIAGNOSIS — R06.00 DYSPNEA, UNSPECIFIED TYPE: ICD-10-CM

## 2021-06-02 PROCEDURE — 99214 OFFICE O/P EST MOD 30 MIN: CPT | Performed by: INTERNAL MEDICINE

## 2021-06-02 ASSESSMENT — PATIENT HEALTH QUESTIONNAIRE - PHQ9
1. LITTLE INTEREST OR PLEASURE IN DOING THINGS: NOT AT ALL
SUM OF ALL RESPONSES TO PHQ9 QUESTIONS 1 AND 2: 0
2. FEELING DOWN, DEPRESSED OR HOPELESS: NOT AT ALL
SUM OF ALL RESPONSES TO PHQ9 QUESTIONS 1 AND 2: 0
CLINICAL INTERPRETATION OF PHQ9 SCORE: NO FURTHER SCREENING NEEDED
CLINICAL INTERPRETATION OF PHQ2 SCORE: NO FURTHER SCREENING NEEDED

## 2021-06-02 ASSESSMENT — ENCOUNTER SYMPTOMS
FEVER: 0
HEMOPTYSIS: 0
BRUISES/BLEEDS EASILY: 0
ALLERGIC/IMMUNOLOGIC COMMENTS: NO NEW FOOD ALLERGIES
COUGH: 0
CHILLS: 0
WEIGHT LOSS: 0
HEMATOCHEZIA: 0
WEIGHT GAIN: 0
SUSPICIOUS LESIONS: 0

## 2021-06-04 ENCOUNTER — MED REC SCAN ONLY (OUTPATIENT)
Dept: FAMILY MEDICINE CLINIC | Facility: CLINIC | Age: 71
End: 2021-06-04

## 2021-06-11 ENCOUNTER — OFFICE VISIT (OUTPATIENT)
Dept: HEMATOLOGY/ONCOLOGY | Age: 71
End: 2021-06-11
Attending: INTERNAL MEDICINE
Payer: MEDICARE

## 2021-06-11 VITALS
SYSTOLIC BLOOD PRESSURE: 95 MMHG | OXYGEN SATURATION: 98 % | HEIGHT: 71.85 IN | DIASTOLIC BLOOD PRESSURE: 53 MMHG | RESPIRATION RATE: 18 BRPM | BODY MASS INDEX: 35.21 KG/M2 | WEIGHT: 257.13 LBS | HEART RATE: 50 BPM | TEMPERATURE: 98 F

## 2021-06-11 DIAGNOSIS — C90.00 MULTIPLE MYELOMA NOT HAVING ACHIEVED REMISSION (HCC): Primary | ICD-10-CM

## 2021-06-11 PROCEDURE — 86334 IMMUNOFIX E-PHORESIS SERUM: CPT

## 2021-06-11 PROCEDURE — 96401 CHEMO ANTI-NEOPL SQ/IM: CPT

## 2021-06-11 PROCEDURE — 83883 ASSAY NEPHELOMETRY NOT SPEC: CPT

## 2021-06-11 PROCEDURE — 96375 TX/PRO/DX INJ NEW DRUG ADDON: CPT

## 2021-06-11 PROCEDURE — 84165 PROTEIN E-PHORESIS SERUM: CPT

## 2021-06-11 PROCEDURE — 99215 OFFICE O/P EST HI 40 MIN: CPT | Performed by: INTERNAL MEDICINE

## 2021-06-11 PROCEDURE — 80053 COMPREHEN METABOLIC PANEL: CPT

## 2021-06-11 PROCEDURE — 96413 CHEMO IV INFUSION 1 HR: CPT

## 2021-06-11 RX ORDER — ACETAMINOPHEN 325 MG/1
650 TABLET ORAL ONCE
Status: COMPLETED | OUTPATIENT
Start: 2021-06-11 | End: 2021-06-11

## 2021-06-11 RX ORDER — DIPHENHYDRAMINE HYDROCHLORIDE 50 MG/ML
50 INJECTION INTRAMUSCULAR; INTRAVENOUS ONCE
Status: COMPLETED | OUTPATIENT
Start: 2021-06-11 | End: 2021-06-11

## 2021-06-11 RX ORDER — DIPHENHYDRAMINE HYDROCHLORIDE 50 MG/ML
50 INJECTION INTRAMUSCULAR; INTRAVENOUS ONCE
Status: CANCELLED | OUTPATIENT
Start: 2021-06-25

## 2021-06-11 RX ORDER — DIPHENHYDRAMINE HYDROCHLORIDE 50 MG/ML
50 INJECTION INTRAMUSCULAR; INTRAVENOUS ONCE
Status: CANCELLED | OUTPATIENT
Start: 2021-06-11

## 2021-06-11 RX ORDER — ACETAMINOPHEN 325 MG/1
650 TABLET ORAL ONCE
Status: CANCELLED | OUTPATIENT
Start: 2021-06-25

## 2021-06-11 RX ORDER — ACETAMINOPHEN 325 MG/1
650 TABLET ORAL ONCE
Status: CANCELLED | OUTPATIENT
Start: 2021-06-11

## 2021-06-11 RX ADMIN — ACETAMINOPHEN 650 MG: 325 TABLET ORAL at 10:09:00

## 2021-06-11 RX ADMIN — DIPHENHYDRAMINE HYDROCHLORIDE 50 MG: 50 INJECTION INTRAMUSCULAR; INTRAVENOUS at 10:09:00

## 2021-06-11 NOTE — PROGRESS NOTES
Patient here for MD visit and chemotherapy. Last week or week before he went to see Dr. Agata Staley at [facility] and he told him that he was going to reach out to cryo and see if patient has any stem cells left. He also mentioned doing another bone marrow biopsy.  P

## 2021-06-11 NOTE — PROGRESS NOTES
Cancer Center Progress Note    Problem List:      Patient Active Problem List:     Impotence of organic origin     Generalized osteoarthrosis of hand     Morbid obesity ([address]     Essential hypertension     Hypertension     Multiple myeloma ([address]     Myelom    Comment:     The clinical concern for amyloidosis is noted.   A Congo red stain performed with appropriate controls is negative for amyloid deposition, and a trichrome stain with appropriate controls shows focal interstitial and subendocardial fibrosis disc   • COLONOSCOPY      202, 2012 Dr. Jyotsna Portillo, polyps   • OTHER SURGICAL HISTORY  1/31/2007    total prostatectomy   • SKIN SURGERY  1997    BCC       Family History Reviewed:  Family History   Problem Relation Age of Onset   • Diabetes Other         fami 4.3 05/14/2021     05/14/2021    CO2 23.0 05/14/2021    BUN 26 (H) 05/14/2021    CREATSERUM 1.29 05/14/2021    GLU 86 05/14/2021    CA 9.7 05/14/2021    ALKPHO 66 05/14/2021    ALT 34 05/14/2021    AST 19 05/14/2021    BILT 0.9 05/14/2021    ALB 3.6 ribs and left anterior 5th 6th and 7th ribs and right anterior 5th rib.      =====  CONCLUSION:  Callus formation about bilateral rib fractures. No focal infiltrate, consolidation, effusion or pneumothorax.           Assessment/Plan:     Multiple myelom

## 2021-06-15 ENCOUNTER — LAB ENCOUNTER (OUTPATIENT)
Dept: LAB | Age: 71
End: 2021-06-15
Attending: INTERNAL MEDICINE
Payer: MEDICARE

## 2021-06-15 ENCOUNTER — OFFICE VISIT (OUTPATIENT)
Dept: NEPHROLOGY | Facility: CLINIC | Age: 71
End: 2021-06-15
Payer: MEDICARE

## 2021-06-15 VITALS — BODY MASS INDEX: 35 KG/M2 | WEIGHT: 257.63 LBS | DIASTOLIC BLOOD PRESSURE: 72 MMHG | SYSTOLIC BLOOD PRESSURE: 118 MMHG

## 2021-06-15 DIAGNOSIS — C90.00 MULTIPLE MYELOMA NOT HAVING ACHIEVED REMISSION (HCC): ICD-10-CM

## 2021-06-15 DIAGNOSIS — C90.00 MULTIPLE MYELOMA NOT HAVING ACHIEVED REMISSION (HCC): Primary | ICD-10-CM

## 2021-06-15 PROCEDURE — 99213 OFFICE O/P EST LOW 20 MIN: CPT | Performed by: INTERNAL MEDICINE

## 2021-06-15 PROCEDURE — 82570 ASSAY OF URINE CREATININE: CPT

## 2021-06-15 PROCEDURE — 84156 ASSAY OF PROTEIN URINE: CPT

## 2021-06-15 PROCEDURE — 81001 URINALYSIS AUTO W/SCOPE: CPT

## 2021-06-15 NOTE — PROGRESS NOTES
Nephrology Progress Note      Ruth Harrell is a 66year old male. HPI:   No chief complaint on file. 67 yo male with hx of MM, MADDI related to myeloma kidney + severe hypercalcemia, DM, HTN presents for follow up.  He was treated in hospital wit 1 tablet by mouth. • acetaminophen 500 MG Oral Tab Take 1,000 mg by mouth nightly. • Calcium Carbonate Antacid 500 MG Oral Chew Tab Chew 2 tablets (1,000 mg total) by mouth 2 (two) times daily.  90 tablet 0   • aspirin (ASPIR-81) 81 MG Oral Tab EC Range: 8.5 - 10.1 mg/dL 9.6    BUN/CREAT Ratio Latest Ref Range: 10.0 - 20.0  22.9 (H)    eGFR NON-AFR.  AMERICAN Latest Ref Range: >=60  61    eGFR  Latest Ref Range: >=60  70    ANION GAP Latest Ref Range: 0 - 18 mmol/L 6    CALCULATED [facility] service has dded eplerenone and BB     F/U  3mos    Andrez Nuñez MD  6/15/2021

## 2021-06-18 ENCOUNTER — OFFICE VISIT (OUTPATIENT)
Dept: HEMATOLOGY/ONCOLOGY | Age: 71
End: 2021-06-18
Attending: INTERNAL MEDICINE
Payer: MEDICARE

## 2021-06-18 VITALS
DIASTOLIC BLOOD PRESSURE: 70 MMHG | HEART RATE: 47 BPM | RESPIRATION RATE: 16 BRPM | TEMPERATURE: 97 F | OXYGEN SATURATION: 98 % | SYSTOLIC BLOOD PRESSURE: 113 MMHG

## 2021-06-18 DIAGNOSIS — C90.00 MULTIPLE MYELOMA NOT HAVING ACHIEVED REMISSION (HCC): Primary | ICD-10-CM

## 2021-06-18 PROCEDURE — 96360 HYDRATION IV INFUSION INIT: CPT

## 2021-06-18 PROCEDURE — 85025 COMPLETE CBC W/AUTO DIFF WBC: CPT

## 2021-06-18 PROCEDURE — 96375 TX/PRO/DX INJ NEW DRUG ADDON: CPT

## 2021-06-18 PROCEDURE — 96361 HYDRATE IV INFUSION ADD-ON: CPT

## 2021-06-18 PROCEDURE — 96413 CHEMO IV INFUSION 1 HR: CPT

## 2021-06-25 ENCOUNTER — OFFICE VISIT (OUTPATIENT)
Dept: HEMATOLOGY/ONCOLOGY | Age: 71
End: 2021-06-25
Attending: INTERNAL MEDICINE
Payer: MEDICARE

## 2021-06-25 VITALS
WEIGHT: 255.19 LBS | OXYGEN SATURATION: 96 % | HEART RATE: 48 BPM | TEMPERATURE: 96 F | DIASTOLIC BLOOD PRESSURE: 75 MMHG | SYSTOLIC BLOOD PRESSURE: 138 MMHG | BODY MASS INDEX: 34.94 KG/M2 | HEIGHT: 71.85 IN | RESPIRATION RATE: 18 BRPM

## 2021-06-25 DIAGNOSIS — C90.00 MULTIPLE MYELOMA NOT HAVING ACHIEVED REMISSION (HCC): Primary | ICD-10-CM

## 2021-06-25 DIAGNOSIS — C90.00 MULTIPLE MYELOMA, REMISSION STATUS UNSPECIFIED (HCC): ICD-10-CM

## 2021-06-25 PROCEDURE — 96361 HYDRATE IV INFUSION ADD-ON: CPT

## 2021-06-25 PROCEDURE — 96375 TX/PRO/DX INJ NEW DRUG ADDON: CPT

## 2021-06-25 PROCEDURE — 82310 ASSAY OF CALCIUM: CPT

## 2021-06-25 PROCEDURE — 96413 CHEMO IV INFUSION 1 HR: CPT

## 2021-06-25 PROCEDURE — 36415 COLL VENOUS BLD VENIPUNCTURE: CPT

## 2021-06-25 PROCEDURE — 82040 ASSAY OF SERUM ALBUMIN: CPT

## 2021-06-25 PROCEDURE — 85025 COMPLETE CBC W/AUTO DIFF WBC: CPT

## 2021-06-25 PROCEDURE — 96401 CHEMO ANTI-NEOPL SQ/IM: CPT

## 2021-06-25 PROCEDURE — 82565 ASSAY OF CREATININE: CPT

## 2021-06-25 RX ORDER — ACETAMINOPHEN 325 MG/1
650 TABLET ORAL ONCE
Status: COMPLETED | OUTPATIENT
Start: 2021-06-25 | End: 2021-06-25

## 2021-06-25 RX ORDER — DIPHENHYDRAMINE HYDROCHLORIDE 50 MG/ML
50 INJECTION INTRAMUSCULAR; INTRAVENOUS ONCE
Status: COMPLETED | OUTPATIENT
Start: 2021-06-25 | End: 2021-06-25

## 2021-06-25 RX ADMIN — ACETAMINOPHEN 650 MG: 325 TABLET ORAL at 11:20:00

## 2021-06-25 RX ADMIN — DIPHENHYDRAMINE HYDROCHLORIDE 50 MG: 50 INJECTION INTRAMUSCULAR; INTRAVENOUS at 11:20:00

## 2021-06-25 NOTE — PROGRESS NOTES
Pt here for C6D15.   Arrives Ambulating independently, accompanied by Self          Modifications in dose or schedule: No    Pt denies current pregnancy     Frequency of blood return and site check throughout administration: Prior to administration   Ernestina

## 2021-07-09 ENCOUNTER — OFFICE VISIT (OUTPATIENT)
Dept: HEMATOLOGY/ONCOLOGY | Age: 71
End: 2021-07-09
Attending: INTERNAL MEDICINE
Payer: MEDICARE

## 2021-07-09 VITALS
TEMPERATURE: 98 F | OXYGEN SATURATION: 98 % | SYSTOLIC BLOOD PRESSURE: 93 MMHG | RESPIRATION RATE: 18 BRPM | BODY MASS INDEX: 34.73 KG/M2 | HEIGHT: 71.85 IN | DIASTOLIC BLOOD PRESSURE: 59 MMHG | HEART RATE: 59 BPM | WEIGHT: 253.63 LBS

## 2021-07-09 DIAGNOSIS — C90.00 MULTIPLE MYELOMA NOT HAVING ACHIEVED REMISSION (HCC): ICD-10-CM

## 2021-07-09 DIAGNOSIS — C90.00 MULTIPLE MYELOMA NOT HAVING ACHIEVED REMISSION (HCC): Primary | ICD-10-CM

## 2021-07-09 LAB
ALBUMIN SERPL-MCNC: 3.7 G/DL (ref 3.4–5)
ALBUMIN/GLOB SERPL: 1.4 {RATIO} (ref 1–2)
ALP LIVER SERPL-CCNC: 67 U/L
ALT SERPL-CCNC: 34 U/L
ANION GAP SERPL CALC-SCNC: 7 MMOL/L (ref 0–18)
AST SERPL-CCNC: 12 U/L (ref 15–37)
BASOPHILS # BLD AUTO: 0.02 X10(3) UL (ref 0–0.2)
BASOPHILS NFR BLD AUTO: 0.3 %
BILIRUB SERPL-MCNC: 0.8 MG/DL (ref 0.1–2)
BUN BLD-MCNC: 30 MG/DL (ref 7–18)
BUN/CREAT SERPL: 21.4 (ref 10–20)
CALCIUM BLD-MCNC: 9.9 MG/DL (ref 8.5–10.1)
CHLORIDE SERPL-SCNC: 107 MMOL/L (ref 98–112)
CO2 SERPL-SCNC: 22 MMOL/L (ref 21–32)
CREAT BLD-MCNC: 1.4 MG/DL
DEPRECATED RDW RBC AUTO: 49.3 FL (ref 35.1–46.3)
EOSINOPHIL # BLD AUTO: 0.07 X10(3) UL (ref 0–0.7)
EOSINOPHIL NFR BLD AUTO: 0.9 %
ERYTHROCYTE [DISTWIDTH] IN BLOOD BY AUTOMATED COUNT: 12.6 % (ref 11–15)
GLOBULIN PLAS-MCNC: 2.6 G/DL (ref 2.8–4.4)
GLUCOSE BLD-MCNC: 98 MG/DL (ref 70–99)
HCT VFR BLD AUTO: 32.6 %
HGB BLD-MCNC: 11.3 G/DL
IMM GRANULOCYTES # BLD AUTO: 0.01 X10(3) UL (ref 0–1)
IMM GRANULOCYTES NFR BLD: 0.1 %
LYMPHOCYTES # BLD AUTO: 0.49 X10(3) UL (ref 1–4)
LYMPHOCYTES NFR BLD AUTO: 6.4 %
M PROTEIN MFR SERPL ELPH: 6.3 G/DL (ref 6.4–8.2)
MCH RBC QN AUTO: 37 PG (ref 26–34)
MCHC RBC AUTO-ENTMCNC: 34.7 G/DL (ref 31–37)
MCV RBC AUTO: 106.9 FL
MONOCYTES # BLD AUTO: 0.71 X10(3) UL (ref 0.1–1)
MONOCYTES NFR BLD AUTO: 9.3 %
NEUTROPHILS # BLD AUTO: 6.35 X10 (3) UL (ref 1.5–7.7)
NEUTROPHILS # BLD AUTO: 6.35 X10(3) UL (ref 1.5–7.7)
NEUTROPHILS NFR BLD AUTO: 83 %
OSMOLALITY SERPL CALC.SUM OF ELEC: 288 MOSM/KG (ref 275–295)
PATIENT FASTING Y/N/NP: NO
PLATELET # BLD AUTO: 223 10(3)UL (ref 150–450)
POTASSIUM SERPL-SCNC: 4.2 MMOL/L (ref 3.5–5.1)
RBC # BLD AUTO: 3.05 X10(6)UL
SODIUM SERPL-SCNC: 136 MMOL/L (ref 136–145)
WBC # BLD AUTO: 7.7 X10(3) UL (ref 4–11)

## 2021-07-09 PROCEDURE — 96375 TX/PRO/DX INJ NEW DRUG ADDON: CPT

## 2021-07-09 PROCEDURE — 96361 HYDRATE IV INFUSION ADD-ON: CPT

## 2021-07-09 PROCEDURE — 99214 OFFICE O/P EST MOD 30 MIN: CPT | Performed by: INTERNAL MEDICINE

## 2021-07-09 PROCEDURE — 96401 CHEMO ANTI-NEOPL SQ/IM: CPT

## 2021-07-09 PROCEDURE — 96413 CHEMO IV INFUSION 1 HR: CPT

## 2021-07-09 RX ORDER — DIPHENHYDRAMINE HYDROCHLORIDE 50 MG/ML
50 INJECTION INTRAMUSCULAR; INTRAVENOUS ONCE
Status: COMPLETED | OUTPATIENT
Start: 2021-07-09 | End: 2021-07-09

## 2021-07-09 RX ORDER — DIPHENHYDRAMINE HYDROCHLORIDE 50 MG/ML
50 INJECTION INTRAMUSCULAR; INTRAVENOUS ONCE
Status: CANCELLED | OUTPATIENT
Start: 2021-07-09

## 2021-07-09 RX ORDER — ACETAMINOPHEN 325 MG/1
650 TABLET ORAL ONCE
Status: COMPLETED | OUTPATIENT
Start: 2021-07-09 | End: 2021-07-09

## 2021-07-09 RX ORDER — ACETAMINOPHEN 325 MG/1
650 TABLET ORAL ONCE
Status: CANCELLED | OUTPATIENT
Start: 2021-07-09

## 2021-07-09 RX ADMIN — DIPHENHYDRAMINE HYDROCHLORIDE 50 MG: 50 INJECTION INTRAMUSCULAR; INTRAVENOUS at 10:57:00

## 2021-07-09 RX ADMIN — ACETAMINOPHEN 650 MG: 325 TABLET ORAL at 11:02:00

## 2021-07-09 NOTE — PROGRESS NOTES
Pt here for C7D1.   Arrives Ambulating independently, accompanied by Self          Modifications in dose or schedule: No    Pt denies current pregnancy     Frequency of blood return and site check throughout administration: Prior to administration   Dischar

## 2021-07-09 NOTE — PROGRESS NOTES
Cancer Center Progress Note    Problem List:      Patient Active Problem List:     Impotence of organic origin     Generalized osteoarthrosis of hand     Morbid obesity ([address]     Essential hypertension     Hypertension     Multiple myeloma ([address]     Myelom addendum below for details.      Comment:     The clinical concern for amyloidosis is noted.   A Congo red stain performed with appropriate controls is negative for amyloid deposition, and a trichrome stain with appropriate controls shows focal interstitial lower back L4 L5 for herniated disc   • COLONOSCOPY      202, 2012 Dr. Chevy Tucker, polyps   • OTHER SURGICAL HISTORY  1/31/2007    total prostatectomy   • SKIN SURGERY  1997    BCC       Family History Reviewed:  Family History   Problem Relation Age of Onset (1,000 mg total) by mouth 2 (two) times daily. , Disp: 90 tablet, Rfl: 0  aspirin (ASPIR-81) 81 MG Oral Tab EC, Take 1 tablet by mouth daily. , Disp: 1 tablet, Rfl: 0      Vital Signs:      Height: 182.5 cm (5' 11.85\") (07/09 1007)  Weight: 115 kg (253 lb 9 history of multiple myeloma. Heterogeneous uptake throughout the osseous structures with maximum SUV approximately 5.1. Re-identified is vertebral plana appearance of the T6 vertebral body and chronic sternal fractures.  Bilateral healed rib fractures also 12/2020. He had progression with repeat bone marrow biopsy on 1/12/2021. This showed 50% plasma cells. FISH study unfortunately was not done at that time.  Chromosomes at relapse:   45,X,-Y[7]/46,XY[13]     He now is on salvage treatment with vic/carfil

## 2021-07-12 ENCOUNTER — TELEPHONE (OUTPATIENT)
Dept: CARDIOLOGY | Age: 71
End: 2021-07-12

## 2021-07-12 LAB
ALBUMIN SERPL ELPH-MCNC: 4.07 G/DL (ref 3.75–5.21)
ALBUMIN/GLOB SERPL: 1.91 {RATIO} (ref 1–2)
ALPHA1 GLOB SERPL ELPH-MCNC: 0.32 G/DL (ref 0.19–0.46)
ALPHA2 GLOB SERPL ELPH-MCNC: 0.84 G/DL (ref 0.48–1.05)
B-GLOBULIN SERPL ELPH-MCNC: 0.69 G/DL (ref 0.68–1.23)
GAMMA GLOB SERPL ELPH-MCNC: 0.29 G/DL (ref 0.62–1.7)
KAPPA LC FREE SER-MCNC: 0.13 MG/DL (ref 0.33–1.94)
KAPPA LC FREE/LAMBDA FREE SER NEPH: 0.8 {RATIO} (ref 0.26–1.65)
LAMBDA LC FREE SERPL-MCNC: 0.16 MG/DL (ref 0.57–2.63)
M PROTEIN MFR SERPL ELPH: 6.2 G/DL (ref 6.4–8.2)
M-SPIKE 1: 0.08 G/DL (ref ?–0)

## 2021-07-16 ENCOUNTER — OFFICE VISIT (OUTPATIENT)
Dept: HEMATOLOGY/ONCOLOGY | Age: 71
End: 2021-07-16
Attending: INTERNAL MEDICINE
Payer: MEDICARE

## 2021-07-16 VITALS
TEMPERATURE: 98 F | RESPIRATION RATE: 18 BRPM | BODY MASS INDEX: 34.74 KG/M2 | WEIGHT: 253.69 LBS | HEART RATE: 62 BPM | DIASTOLIC BLOOD PRESSURE: 63 MMHG | SYSTOLIC BLOOD PRESSURE: 100 MMHG | HEIGHT: 71.85 IN | OXYGEN SATURATION: 99 %

## 2021-07-16 DIAGNOSIS — C90.00 MULTIPLE MYELOMA NOT HAVING ACHIEVED REMISSION (HCC): Primary | ICD-10-CM

## 2021-07-16 LAB
BASOPHILS # BLD AUTO: 0 X10(3) UL (ref 0–0.2)
BASOPHILS NFR BLD AUTO: 0 %
DEPRECATED RDW RBC AUTO: 50.8 FL (ref 35.1–46.3)
EOSINOPHIL # BLD AUTO: 0.08 X10(3) UL (ref 0–0.7)
EOSINOPHIL NFR BLD AUTO: 0.9 %
ERYTHROCYTE [DISTWIDTH] IN BLOOD BY AUTOMATED COUNT: 12.7 % (ref 11–15)
HCT VFR BLD AUTO: 35.3 %
HGB BLD-MCNC: 11.8 G/DL
IMM GRANULOCYTES # BLD AUTO: 0.05 X10(3) UL (ref 0–1)
IMM GRANULOCYTES NFR BLD: 0.6 %
LYMPHOCYTES # BLD AUTO: 0.71 X10(3) UL (ref 1–4)
LYMPHOCYTES NFR BLD AUTO: 8.3 %
MCH RBC QN AUTO: 36.6 PG (ref 26–34)
MCHC RBC AUTO-ENTMCNC: 33.4 G/DL (ref 31–37)
MCV RBC AUTO: 109.6 FL
MONOCYTES # BLD AUTO: 0.73 X10(3) UL (ref 0.1–1)
MONOCYTES NFR BLD AUTO: 8.5 %
NEUTROPHILS # BLD AUTO: 7 X10 (3) UL (ref 1.5–7.7)
NEUTROPHILS # BLD AUTO: 7 X10(3) UL (ref 1.5–7.7)
NEUTROPHILS NFR BLD AUTO: 81.7 %
PLATELET # BLD AUTO: 143 10(3)UL (ref 150–450)
RBC # BLD AUTO: 3.22 X10(6)UL
WBC # BLD AUTO: 8.6 X10(3) UL (ref 4–11)

## 2021-07-16 PROCEDURE — 96361 HYDRATE IV INFUSION ADD-ON: CPT

## 2021-07-16 PROCEDURE — 36415 COLL VENOUS BLD VENIPUNCTURE: CPT

## 2021-07-16 PROCEDURE — 96375 TX/PRO/DX INJ NEW DRUG ADDON: CPT

## 2021-07-16 PROCEDURE — 96413 CHEMO IV INFUSION 1 HR: CPT

## 2021-07-16 PROCEDURE — 85025 COMPLETE CBC W/AUTO DIFF WBC: CPT

## 2021-07-16 NOTE — PROGRESS NOTES
Pt here for C7D8.   Arrives Ambulating independently, accompanied by Self           Pregnancy screening: Not applicable    Modifications in dose or schedule: No     Frequency of blood return and site check throughout administration: Prior to administration

## 2021-07-23 ENCOUNTER — OFFICE VISIT (OUTPATIENT)
Dept: HEMATOLOGY/ONCOLOGY | Age: 71
End: 2021-07-23
Attending: INTERNAL MEDICINE
Payer: MEDICARE

## 2021-07-23 VITALS
RESPIRATION RATE: 18 BRPM | SYSTOLIC BLOOD PRESSURE: 91 MMHG | HEART RATE: 67 BPM | HEIGHT: 71.85 IN | BODY MASS INDEX: 34.99 KG/M2 | TEMPERATURE: 98 F | WEIGHT: 255.5 LBS | OXYGEN SATURATION: 93 % | DIASTOLIC BLOOD PRESSURE: 57 MMHG

## 2021-07-23 DIAGNOSIS — C90.00 MULTIPLE MYELOMA NOT HAVING ACHIEVED REMISSION (HCC): Primary | ICD-10-CM

## 2021-07-23 LAB
BASOPHILS # BLD AUTO: 0.02 X10(3) UL (ref 0–0.2)
BASOPHILS NFR BLD AUTO: 0.1 %
DEPRECATED RDW RBC AUTO: 51.8 FL (ref 35.1–46.3)
EOSINOPHIL # BLD AUTO: 0.12 X10(3) UL (ref 0–0.7)
EOSINOPHIL NFR BLD AUTO: 0.7 %
ERYTHROCYTE [DISTWIDTH] IN BLOOD BY AUTOMATED COUNT: 13.2 % (ref 11–15)
HCT VFR BLD AUTO: 32.7 %
HGB BLD-MCNC: 10.9 G/DL
IMM GRANULOCYTES # BLD AUTO: 0.09 X10(3) UL (ref 0–1)
IMM GRANULOCYTES NFR BLD: 0.6 %
LYMPHOCYTES # BLD AUTO: 0.62 X10(3) UL (ref 1–4)
LYMPHOCYTES NFR BLD AUTO: 3.9 %
MCH RBC QN AUTO: 36.5 PG (ref 26–34)
MCHC RBC AUTO-ENTMCNC: 33.3 G/DL (ref 31–37)
MCV RBC AUTO: 109.4 FL
MONOCYTES # BLD AUTO: 1.01 X10(3) UL (ref 0.1–1)
MONOCYTES NFR BLD AUTO: 6.3 %
NEUTROPHILS # BLD AUTO: 14.17 X10 (3) UL (ref 1.5–7.7)
NEUTROPHILS # BLD AUTO: 14.17 X10(3) UL (ref 1.5–7.7)
NEUTROPHILS NFR BLD AUTO: 88.4 %
PLATELET # BLD AUTO: 129 10(3)UL (ref 150–450)
RBC # BLD AUTO: 2.99 X10(6)UL
WBC # BLD AUTO: 16 X10(3) UL (ref 4–11)

## 2021-07-23 PROCEDURE — 96413 CHEMO IV INFUSION 1 HR: CPT

## 2021-07-23 PROCEDURE — 85025 COMPLETE CBC W/AUTO DIFF WBC: CPT

## 2021-07-23 PROCEDURE — 36415 COLL VENOUS BLD VENIPUNCTURE: CPT

## 2021-07-23 PROCEDURE — 96375 TX/PRO/DX INJ NEW DRUG ADDON: CPT

## 2021-07-23 NOTE — PROGRESS NOTES
Pt here for C7D15.   Arrives Ambulating independently, accompanied by Self          Modifications in dose or schedule: No    Pt denies current pregnancy     Frequency of blood return and site check throughout administration: Prior to administration   Ernestina

## 2021-08-04 DIAGNOSIS — E11.9 TYPE 2 DIABETES MELLITUS WITHOUT COMPLICATION, WITHOUT LONG-TERM CURRENT USE OF INSULIN (HCC): ICD-10-CM

## 2021-08-04 RX ORDER — ATORVASTATIN CALCIUM 20 MG/1
TABLET, FILM COATED ORAL
Qty: 90 TABLET | Refills: 0 | Status: SHIPPED | OUTPATIENT
Start: 2021-08-04 | End: 2021-10-23

## 2021-08-04 RX ORDER — GLIMEPIRIDE 1 MG/1
TABLET ORAL
Qty: 90 TABLET | Refills: 0 | Status: SHIPPED | OUTPATIENT
Start: 2021-08-04 | End: 2021-10-23

## 2021-08-04 NOTE — TELEPHONE ENCOUNTER
LOV 5/20/2021      LAST LAB 4/13/2021    LAST RX   atorvastatin 20 MG Oral Tab 90 tablet 0 5/6/2021     glimepiride 1 MG Oral Tab 90 tablet 0 5/6/2021         Next OV   Future Appointments   Date Time Provider Bo Ratliff   8/6/2021 11:15 AM Maninder Wallace

## 2021-08-06 ENCOUNTER — OFFICE VISIT (OUTPATIENT)
Dept: HEMATOLOGY/ONCOLOGY | Age: 71
End: 2021-08-06
Attending: INTERNAL MEDICINE
Payer: MEDICARE

## 2021-08-06 VITALS
WEIGHT: 257.31 LBS | BODY MASS INDEX: 35.23 KG/M2 | HEIGHT: 71.85 IN | RESPIRATION RATE: 18 BRPM | SYSTOLIC BLOOD PRESSURE: 103 MMHG | DIASTOLIC BLOOD PRESSURE: 64 MMHG | HEART RATE: 61 BPM | OXYGEN SATURATION: 97 % | TEMPERATURE: 97 F

## 2021-08-06 DIAGNOSIS — D63.0 ANEMIA COMPLICATING NEOPLASTIC DISEASE: ICD-10-CM

## 2021-08-06 DIAGNOSIS — C90.00 MULTIPLE MYELOMA NOT HAVING ACHIEVED REMISSION (HCC): Primary | ICD-10-CM

## 2021-08-06 DIAGNOSIS — Z51.81 ENCOUNTER FOR MONITORING CARDIOTOXIC DRUG THERAPY: ICD-10-CM

## 2021-08-06 DIAGNOSIS — I42.9 CARDIOMYOPATHY, UNSPECIFIED TYPE (HCC): ICD-10-CM

## 2021-08-06 DIAGNOSIS — Z79.899 ENCOUNTER FOR MONITORING CARDIOTOXIC DRUG THERAPY: ICD-10-CM

## 2021-08-06 LAB
ALBUMIN SERPL-MCNC: 3.7 G/DL (ref 3.4–5)
ALBUMIN/GLOB SERPL: 1.4 {RATIO} (ref 1–2)
ALP LIVER SERPL-CCNC: 56 U/L
ALT SERPL-CCNC: 31 U/L
ANION GAP SERPL CALC-SCNC: 6 MMOL/L (ref 0–18)
AST SERPL-CCNC: 12 U/L (ref 15–37)
BASOPHILS # BLD AUTO: 0.03 X10(3) UL (ref 0–0.2)
BASOPHILS NFR BLD AUTO: 0.4 %
BILIRUB SERPL-MCNC: 1 MG/DL (ref 0.1–2)
BUN BLD-MCNC: 34 MG/DL (ref 7–18)
CALCIUM BLD-MCNC: 9.2 MG/DL (ref 8.5–10.1)
CHLORIDE SERPL-SCNC: 107 MMOL/L (ref 98–112)
CO2 SERPL-SCNC: 22 MMOL/L (ref 21–32)
CREAT BLD-MCNC: 1.38 MG/DL
EOSINOPHIL # BLD AUTO: 0.11 X10(3) UL (ref 0–0.7)
EOSINOPHIL NFR BLD AUTO: 1.4 %
ERYTHROCYTE [DISTWIDTH] IN BLOOD BY AUTOMATED COUNT: 13.2 %
GLOBULIN PLAS-MCNC: 2.6 G/DL (ref 2.8–4.4)
GLUCOSE BLD-MCNC: 84 MG/DL (ref 70–99)
HCT VFR BLD AUTO: 33.1 %
HGB BLD-MCNC: 10.9 G/DL
IMM GRANULOCYTES # BLD AUTO: 0.02 X10(3) UL (ref 0–1)
IMM GRANULOCYTES NFR BLD: 0.2 %
LYMPHOCYTES # BLD AUTO: 0.52 X10(3) UL (ref 1–4)
LYMPHOCYTES NFR BLD AUTO: 6.4 %
M PROTEIN MFR SERPL ELPH: 6.3 G/DL (ref 6.4–8.2)
MCH RBC QN AUTO: 36.6 PG (ref 26–34)
MCHC RBC AUTO-ENTMCNC: 32.9 G/DL (ref 31–37)
MCV RBC AUTO: 111.1 FL
MONOCYTES # BLD AUTO: 0.83 X10(3) UL (ref 0.1–1)
MONOCYTES NFR BLD AUTO: 10.3 %
NEUTROPHILS # BLD AUTO: 6.56 X10 (3) UL (ref 1.5–7.7)
NEUTROPHILS # BLD AUTO: 6.56 X10(3) UL (ref 1.5–7.7)
NEUTROPHILS NFR BLD AUTO: 81.3 %
OSMOLALITY SERPL CALC.SUM OF ELEC: 287 MOSM/KG (ref 275–295)
PATIENT FASTING Y/N/NP: NO
PLATELET # BLD AUTO: 219 10(3)UL (ref 150–450)
POTASSIUM SERPL-SCNC: 4.5 MMOL/L (ref 3.5–5.1)
RBC # BLD AUTO: 2.98 X10(6)UL
SODIUM SERPL-SCNC: 135 MMOL/L (ref 136–145)
WBC # BLD AUTO: 8.1 X10(3) UL (ref 4–11)

## 2021-08-06 PROCEDURE — 96375 TX/PRO/DX INJ NEW DRUG ADDON: CPT

## 2021-08-06 PROCEDURE — 96401 CHEMO ANTI-NEOPL SQ/IM: CPT

## 2021-08-06 PROCEDURE — 99215 OFFICE O/P EST HI 40 MIN: CPT | Performed by: INTERNAL MEDICINE

## 2021-08-06 PROCEDURE — 96413 CHEMO IV INFUSION 1 HR: CPT

## 2021-08-06 RX ORDER — DIPHENHYDRAMINE HYDROCHLORIDE 50 MG/ML
25 INJECTION INTRAMUSCULAR; INTRAVENOUS ONCE
Status: COMPLETED | OUTPATIENT
Start: 2021-08-06 | End: 2021-08-06

## 2021-08-06 RX ORDER — ACETAMINOPHEN 325 MG/1
650 TABLET ORAL ONCE
Status: CANCELLED | OUTPATIENT
Start: 2021-08-06

## 2021-08-06 RX ORDER — ACETAMINOPHEN 325 MG/1
650 TABLET ORAL ONCE
Status: COMPLETED | OUTPATIENT
Start: 2021-08-06 | End: 2021-08-06

## 2021-08-06 RX ORDER — DIPHENHYDRAMINE HYDROCHLORIDE 50 MG/ML
25 INJECTION INTRAMUSCULAR; INTRAVENOUS ONCE
Status: CANCELLED | OUTPATIENT
Start: 2021-08-06

## 2021-08-06 RX ADMIN — ACETAMINOPHEN 650 MG: 325 TABLET ORAL at 12:30:00

## 2021-08-06 RX ADMIN — DIPHENHYDRAMINE HYDROCHLORIDE 25 MG: 50 INJECTION INTRAMUSCULAR; INTRAVENOUS at 12:29:00

## 2021-08-06 NOTE — PROGRESS NOTES
Cancer Center Progress Note    Problem List:      Patient Active Problem List:     Impotence of organic origin     Generalized osteoarthrosis of hand     Morbid obesity ([address]     Essential hypertension     Hypertension     Multiple myeloma ([address]     Myelom for amyloidosis is noted. A Congo red stain performed with appropriate controls is negative for amyloid deposition, and a trichrome stain with appropriate controls shows focal interstitial and subendocardial fibrosis.   Prussian zeyad stain is negative for Anna Gonzalez, polyps   • OTHER SURGICAL HISTORY  1/31/2007    total prostatectomy   • SKIN SURGERY  1997    BCC       Family History Reviewed:  Family History   Problem Relation Age of Onset   • Diabetes Other         family hx   • Hypertension Other         fam 81 MG Oral Tab EC, Take 1 tablet by mouth daily. , Disp: 1 tablet, Rfl: 0      Vital Signs:      Height: 182.5 cm (5' 11.85\") (08/06 1049)  Weight: 116.7 kg (257 lb 4.8 oz) (08/06 1049)  BSA (Calculated - sq m): 2.37 sq meters (08/06 1049)  Pulse: 61 (08/0 approximately 5.1. Re-identified is vertebral plana appearance of the T6 vertebral body and chronic sternal fractures. Bilateral healed rib fractures also demonstrate increased uptake particularly the anterior right 2nd rib with a maximum SUV of 6.2.   OTHE FISH study unfortunately was not done at that time. Chromosomes at relapse:   45,X,-Y[7]/46,XY[13]     He now is on salvage treatment with vic/carfilzomib/Dex. He will continue with cycle 8, day 1. He is tolerating this well.  I will reduce his dex to 20 m

## 2021-08-06 NOTE — PROGRESS NOTES
Kyprolis/Darzalex faspro given per MD order w/out incident. Pt dc home ambulatory in stable condition, no new complaints. AVS provided.

## 2021-08-12 LAB
ALBUMIN SERPL ELPH-MCNC: 4.25 G/DL (ref 3.75–5.21)
ALBUMIN/GLOB SERPL: 2.07 {RATIO} (ref 1–2)
ALPHA1 GLOB SERPL ELPH-MCNC: 0.32 G/DL (ref 0.19–0.46)
ALPHA2 GLOB SERPL ELPH-MCNC: 0.79 G/DL (ref 0.48–1.05)
B-GLOBULIN SERPL ELPH-MCNC: 0.67 G/DL (ref 0.68–1.23)
GAMMA GLOB SERPL ELPH-MCNC: 0.26 G/DL (ref 0.62–1.7)
KAPPA LC FREE SER-MCNC: 0.13 MG/DL (ref 0.33–1.94)
LAMBDA LC FREE SERPL-MCNC: <0.146 MG/DL (ref 0.57–2.63)
M PROTEIN MFR SERPL ELPH: 6.3 G/DL (ref 6.4–8.2)
M-SPIKE 1: 0.07 G/DL (ref ?–0)

## 2021-08-13 ENCOUNTER — OFFICE VISIT (OUTPATIENT)
Dept: HEMATOLOGY/ONCOLOGY | Age: 71
End: 2021-08-13
Attending: INTERNAL MEDICINE
Payer: MEDICARE

## 2021-08-13 VITALS
HEART RATE: 61 BPM | RESPIRATION RATE: 20 BRPM | BODY MASS INDEX: 35.62 KG/M2 | TEMPERATURE: 98 F | SYSTOLIC BLOOD PRESSURE: 128 MMHG | DIASTOLIC BLOOD PRESSURE: 73 MMHG | HEIGHT: 71.85 IN | WEIGHT: 260.13 LBS | OXYGEN SATURATION: 99 %

## 2021-08-13 DIAGNOSIS — C90.00 MULTIPLE MYELOMA NOT HAVING ACHIEVED REMISSION (HCC): Primary | ICD-10-CM

## 2021-08-13 LAB
BASOPHILS # BLD AUTO: 0.01 X10(3) UL (ref 0–0.2)
BASOPHILS NFR BLD AUTO: 0.1 %
EOSINOPHIL # BLD AUTO: 0.02 X10(3) UL (ref 0–0.7)
EOSINOPHIL NFR BLD AUTO: 0.2 %
ERYTHROCYTE [DISTWIDTH] IN BLOOD BY AUTOMATED COUNT: 14.1 %
HCT VFR BLD AUTO: 31.9 %
HGB BLD-MCNC: 10.9 G/DL
IMM GRANULOCYTES # BLD AUTO: 0.07 X10(3) UL (ref 0–1)
IMM GRANULOCYTES NFR BLD: 0.6 %
LYMPHOCYTES # BLD AUTO: 0.19 X10(3) UL (ref 1–4)
LYMPHOCYTES NFR BLD AUTO: 1.6 %
MCH RBC QN AUTO: 37.3 PG (ref 26–34)
MCHC RBC AUTO-ENTMCNC: 34.2 G/DL (ref 31–37)
MCV RBC AUTO: 109.2 FL
MONOCYTES # BLD AUTO: 0.17 X10(3) UL (ref 0.1–1)
MONOCYTES NFR BLD AUTO: 1.4 %
NEUTROPHILS # BLD AUTO: 11.78 X10 (3) UL (ref 1.5–7.7)
NEUTROPHILS # BLD AUTO: 11.78 X10(3) UL (ref 1.5–7.7)
NEUTROPHILS NFR BLD AUTO: 96.1 %
PLATELET # BLD AUTO: 120 10(3)UL (ref 150–450)
RBC # BLD AUTO: 2.92 X10(6)UL
WBC # BLD AUTO: 12.2 X10(3) UL (ref 4–11)

## 2021-08-13 PROCEDURE — 96413 CHEMO IV INFUSION 1 HR: CPT

## 2021-08-13 PROCEDURE — 36415 COLL VENOUS BLD VENIPUNCTURE: CPT

## 2021-08-13 PROCEDURE — 85025 COMPLETE CBC W/AUTO DIFF WBC: CPT

## 2021-08-19 ENCOUNTER — OFFICE VISIT (OUTPATIENT)
Dept: FAMILY MEDICINE CLINIC | Facility: CLINIC | Age: 71
End: 2021-08-19
Payer: MEDICARE

## 2021-08-19 VITALS
OXYGEN SATURATION: 98 % | SYSTOLIC BLOOD PRESSURE: 116 MMHG | BODY MASS INDEX: 35.74 KG/M2 | HEART RATE: 50 BPM | TEMPERATURE: 97 F | HEIGHT: 71.85 IN | DIASTOLIC BLOOD PRESSURE: 70 MMHG | WEIGHT: 261 LBS | RESPIRATION RATE: 18 BRPM

## 2021-08-19 DIAGNOSIS — Z23 NEED FOR VACCINATION: ICD-10-CM

## 2021-08-19 DIAGNOSIS — Z94.89 TRANSPLANT RECIPIENT: ICD-10-CM

## 2021-08-19 DIAGNOSIS — E11.9 TYPE 2 DIABETES MELLITUS WITHOUT COMPLICATION, WITHOUT LONG-TERM CURRENT USE OF INSULIN (HCC): Primary | ICD-10-CM

## 2021-08-19 PROCEDURE — 99214 OFFICE O/P EST MOD 30 MIN: CPT | Performed by: FAMILY MEDICINE

## 2021-08-19 NOTE — PROGRESS NOTES
/70   Pulse 50   Temp 97.2 °F (36.2 °C) (Temporal)   Resp 18   Ht 5' 11.85\" (1.825 m)   Wt 261 lb (118.4 kg)   SpO2 98%   BMI 35.55 kg/m²               Patient presents with:  Diabetes: f/u       HPI;    Isidoro Roman is a 66year old male.   Sofia Lemus tablet 0   • acyclovir 400 MG Oral Tab Take 1 tablet (400 mg total) by mouth 2 (two) times daily.  60 tablet 3   • OneTouch UltraSoft Lancets Does not apply Misc TEST BLOOD SUGAR TWICE DAILY 200 each 2   • Metoprolol Succinate ER 25 MG Oral Tablet 24 Hr Cory uncontrolled 10/2007   • Unspecified essential hypertension    • Unspecified internal derangement of knee 6/20/2013    Log Date: 08/21/2012       Social History:  Social History    Tobacco Use      Smoking status: Never Smoker      Smokeless tobacco: Never prescriptions requested or ordered in this encounter     Orders Placed This Encounter      Hemoglobin A1C      Covid-19 Pfizer vaccine (Juno Banks ONLY) [place], [address] [facility]

## 2021-08-20 ENCOUNTER — OFFICE VISIT (OUTPATIENT)
Dept: HEMATOLOGY/ONCOLOGY | Age: 71
End: 2021-08-20
Attending: INTERNAL MEDICINE
Payer: MEDICARE

## 2021-08-20 VITALS
BODY MASS INDEX: 35.67 KG/M2 | HEIGHT: 71.85 IN | SYSTOLIC BLOOD PRESSURE: 105 MMHG | RESPIRATION RATE: 20 BRPM | HEART RATE: 63 BPM | DIASTOLIC BLOOD PRESSURE: 70 MMHG | OXYGEN SATURATION: 97 % | TEMPERATURE: 98 F | WEIGHT: 260.5 LBS

## 2021-08-20 DIAGNOSIS — E11.9 TYPE 2 DIABETES MELLITUS WITHOUT COMPLICATION, WITHOUT LONG-TERM CURRENT USE OF INSULIN (HCC): ICD-10-CM

## 2021-08-20 DIAGNOSIS — C90.00 MULTIPLE MYELOMA NOT HAVING ACHIEVED REMISSION (HCC): Primary | ICD-10-CM

## 2021-08-20 DIAGNOSIS — C90.00 MULTIPLE MYELOMA, REMISSION STATUS UNSPECIFIED (HCC): ICD-10-CM

## 2021-08-20 LAB
ALBUMIN SERPL-MCNC: 3.8 G/DL (ref 3.4–5)
BASOPHILS # BLD AUTO: 0.01 X10(3) UL (ref 0–0.2)
BASOPHILS NFR BLD AUTO: 0.1 %
CALCIUM BLD-MCNC: 9.1 MG/DL (ref 8.5–10.1)
CREAT BLD-MCNC: 1.31 MG/DL
EOSINOPHIL # BLD AUTO: 0.01 X10(3) UL (ref 0–0.7)
EOSINOPHIL NFR BLD AUTO: 0.1 %
ERYTHROCYTE [DISTWIDTH] IN BLOOD BY AUTOMATED COUNT: 14.5 %
EST. AVERAGE GLUCOSE BLD GHB EST-MCNC: 100 MG/DL (ref 68–126)
HBA1C MFR BLD HPLC: 5.1 % (ref ?–5.7)
HCT VFR BLD AUTO: 32.5 %
HGB BLD-MCNC: 10.7 G/DL
IMM GRANULOCYTES # BLD AUTO: 0.05 X10(3) UL (ref 0–1)
IMM GRANULOCYTES NFR BLD: 0.4 %
LYMPHOCYTES # BLD AUTO: 0.17 X10(3) UL (ref 1–4)
LYMPHOCYTES NFR BLD AUTO: 1.3 %
MCH RBC QN AUTO: 37.3 PG (ref 26–34)
MCHC RBC AUTO-ENTMCNC: 32.9 G/DL (ref 31–37)
MCV RBC AUTO: 113.2 FL
MONOCYTES # BLD AUTO: 0.09 X10(3) UL (ref 0.1–1)
MONOCYTES NFR BLD AUTO: 0.7 %
NEUTROPHILS # BLD AUTO: 12.58 X10 (3) UL (ref 1.5–7.7)
NEUTROPHILS # BLD AUTO: 12.58 X10(3) UL (ref 1.5–7.7)
NEUTROPHILS NFR BLD AUTO: 97.4 %
PLATELET # BLD AUTO: 147 10(3)UL (ref 150–450)
RBC # BLD AUTO: 2.87 X10(6)UL
WBC # BLD AUTO: 12.9 X10(3) UL (ref 4–11)

## 2021-08-20 PROCEDURE — 82565 ASSAY OF CREATININE: CPT

## 2021-08-20 PROCEDURE — 96375 TX/PRO/DX INJ NEW DRUG ADDON: CPT

## 2021-08-20 PROCEDURE — 82040 ASSAY OF SERUM ALBUMIN: CPT

## 2021-08-20 PROCEDURE — 85025 COMPLETE CBC W/AUTO DIFF WBC: CPT

## 2021-08-20 PROCEDURE — 36415 COLL VENOUS BLD VENIPUNCTURE: CPT

## 2021-08-20 PROCEDURE — 82310 ASSAY OF CALCIUM: CPT

## 2021-08-20 PROCEDURE — 83036 HEMOGLOBIN GLYCOSYLATED A1C: CPT

## 2021-08-20 PROCEDURE — 96413 CHEMO IV INFUSION 1 HR: CPT

## 2021-08-20 NOTE — PROGRESS NOTES
Pt here for C8D15.   Arrives Ambulating independently, accompanied by Self          Modifications in dose or schedule: No    Pt denies current pregnancy     Frequency of blood return and site check throughout administration: Prior to administration   Ernestina

## 2021-08-24 ENCOUNTER — MED REC SCAN ONLY (OUTPATIENT)
Dept: FAMILY MEDICINE CLINIC | Facility: CLINIC | Age: 71
End: 2021-08-24

## 2021-09-03 ENCOUNTER — OFFICE VISIT (OUTPATIENT)
Dept: HEMATOLOGY/ONCOLOGY | Age: 71
End: 2021-09-03
Attending: INTERNAL MEDICINE
Payer: MEDICARE

## 2021-09-03 VITALS — SYSTOLIC BLOOD PRESSURE: 121 MMHG | HEART RATE: 59 BPM | DIASTOLIC BLOOD PRESSURE: 72 MMHG

## 2021-09-03 VITALS
OXYGEN SATURATION: 98 % | DIASTOLIC BLOOD PRESSURE: 69 MMHG | TEMPERATURE: 97 F | WEIGHT: 259.81 LBS | HEART RATE: 69 BPM | BODY MASS INDEX: 35.58 KG/M2 | HEIGHT: 71.85 IN | SYSTOLIC BLOOD PRESSURE: 120 MMHG | RESPIRATION RATE: 20 BRPM

## 2021-09-03 DIAGNOSIS — I42.9 CARDIOMYOPATHY, UNSPECIFIED TYPE (HCC): ICD-10-CM

## 2021-09-03 DIAGNOSIS — C90.00 MULTIPLE MYELOMA NOT HAVING ACHIEVED REMISSION (HCC): Primary | ICD-10-CM

## 2021-09-03 DIAGNOSIS — E11.9 TYPE 2 DIABETES MELLITUS WITHOUT COMPLICATION, WITHOUT LONG-TERM CURRENT USE OF INSULIN (HCC): ICD-10-CM

## 2021-09-03 LAB
ALBUMIN SERPL-MCNC: 3.8 G/DL (ref 3.4–5)
ALBUMIN/GLOB SERPL: 1.3 {RATIO} (ref 1–2)
ALP LIVER SERPL-CCNC: 67 U/L
ALT SERPL-CCNC: 23 U/L
ANION GAP SERPL CALC-SCNC: 8 MMOL/L (ref 0–18)
AST SERPL-CCNC: 19 U/L (ref 15–37)
BASOPHILS # BLD AUTO: 0.02 X10(3) UL (ref 0–0.2)
BASOPHILS NFR BLD AUTO: 0.2 %
BILIRUB SERPL-MCNC: 0.8 MG/DL (ref 0.1–2)
BUN BLD-MCNC: 36 MG/DL (ref 7–18)
CALCIUM BLD-MCNC: 9.8 MG/DL (ref 8.5–10.1)
CHLORIDE SERPL-SCNC: 107 MMOL/L (ref 98–112)
CO2 SERPL-SCNC: 20 MMOL/L (ref 21–32)
CREAT BLD-MCNC: 1.35 MG/DL
EOSINOPHIL # BLD AUTO: 0 X10(3) UL (ref 0–0.7)
EOSINOPHIL NFR BLD AUTO: 0 %
ERYTHROCYTE [DISTWIDTH] IN BLOOD BY AUTOMATED COUNT: 13.2 %
GLOBULIN PLAS-MCNC: 3 G/DL (ref 2.8–4.4)
GLUCOSE BLD-MCNC: 152 MG/DL (ref 70–99)
HCT VFR BLD AUTO: 33.5 %
HGB BLD-MCNC: 11.3 G/DL
IMM GRANULOCYTES # BLD AUTO: 0.05 X10(3) UL (ref 0–1)
IMM GRANULOCYTES NFR BLD: 0.4 %
LYMPHOCYTES # BLD AUTO: 0.18 X10(3) UL (ref 1–4)
LYMPHOCYTES NFR BLD AUTO: 1.4 %
M PROTEIN MFR SERPL ELPH: 6.8 G/DL (ref 6.4–8.2)
MCH RBC QN AUTO: 37.4 PG (ref 26–34)
MCHC RBC AUTO-ENTMCNC: 33.7 G/DL (ref 31–37)
MCV RBC AUTO: 110.9 FL
MONOCYTES # BLD AUTO: 0.14 X10(3) UL (ref 0.1–1)
MONOCYTES NFR BLD AUTO: 1.1 %
NEUTROPHILS # BLD AUTO: 12.14 X10 (3) UL (ref 1.5–7.7)
NEUTROPHILS # BLD AUTO: 12.14 X10(3) UL (ref 1.5–7.7)
NEUTROPHILS NFR BLD AUTO: 96.9 %
OSMOLALITY SERPL CALC.SUM OF ELEC: 291 MOSM/KG (ref 275–295)
PATIENT FASTING Y/N/NP: NO
PLATELET # BLD AUTO: 304 10(3)UL (ref 150–450)
POTASSIUM SERPL-SCNC: 4.3 MMOL/L (ref 3.5–5.1)
RBC # BLD AUTO: 3.02 X10(6)UL
SODIUM SERPL-SCNC: 135 MMOL/L (ref 136–145)
WBC # BLD AUTO: 12.5 X10(3) UL (ref 4–11)

## 2021-09-03 PROCEDURE — 96413 CHEMO IV INFUSION 1 HR: CPT

## 2021-09-03 PROCEDURE — 96375 TX/PRO/DX INJ NEW DRUG ADDON: CPT

## 2021-09-03 PROCEDURE — 96401 CHEMO ANTI-NEOPL SQ/IM: CPT

## 2021-09-03 PROCEDURE — 99214 OFFICE O/P EST MOD 30 MIN: CPT | Performed by: INTERNAL MEDICINE

## 2021-09-03 RX ORDER — ACETAMINOPHEN 325 MG/1
650 TABLET ORAL ONCE
Status: COMPLETED | OUTPATIENT
Start: 2021-09-03 | End: 2021-09-03

## 2021-09-03 RX ORDER — DIPHENHYDRAMINE HYDROCHLORIDE 50 MG/ML
25 INJECTION INTRAMUSCULAR; INTRAVENOUS ONCE
Status: CANCELLED | OUTPATIENT
Start: 2021-09-03

## 2021-09-03 RX ORDER — ACETAMINOPHEN 325 MG/1
650 TABLET ORAL ONCE
Status: CANCELLED | OUTPATIENT
Start: 2021-09-03

## 2021-09-03 RX ORDER — DIPHENHYDRAMINE HYDROCHLORIDE 50 MG/ML
25 INJECTION INTRAMUSCULAR; INTRAVENOUS ONCE
Status: COMPLETED | OUTPATIENT
Start: 2021-09-03 | End: 2021-09-03

## 2021-09-03 RX ADMIN — DIPHENHYDRAMINE HYDROCHLORIDE 25 MG: 50 INJECTION INTRAMUSCULAR; INTRAVENOUS at 12:36:00

## 2021-09-03 RX ADMIN — ACETAMINOPHEN 650 MG: 325 TABLET ORAL at 12:38:00

## 2021-09-03 NOTE — PROGRESS NOTES
Patient presents with: Follow - Up  Chemotherapy    Patient here for md follow up to multiple myeloma-D1C9 Kryprolis/ Darzalex is expected.   Patient is doing well energy level is fair as well as appetite- denies any N/V/D/C and no numbness and tingling in

## 2021-09-03 NOTE — PROGRESS NOTES
Cancer Center Progress Note    Problem List:      Patient Active Problem List:     Impotence of organic origin     Generalized osteoarthrosis of hand     Morbid obesity ([address]     Essential hypertension     Hypertension     Multiple myeloma ([address]     Myelom addendum below for details.      Comment:     The clinical concern for amyloidosis is noted.   A Congo red stain performed with appropriate controls is negative for amyloid deposition, and a trichrome stain with appropriate controls shows focal interstitial lower back L4 L5 for herniated disc   • COLONOSCOPY      202, 2012 Dr. Rodrick Plascencia, polyps   • OTHER SURGICAL HISTORY  1/31/2007    total prostatectomy   • SKIN SURGERY  1997    BCC       Family History Reviewed:  Family History   Problem Relation Age of Onset Value Date     (L) 09/03/2021    K 4.3 09/03/2021     09/03/2021    CO2 20.0 (L) 09/03/2021    BUN 36 (H) 09/03/2021    CREATSERUM 1.35 (H) 09/03/2021     (H) 09/03/2021    CA 9.8 09/03/2021    ALKPHO 67 09/03/2021    ALT 23 09/03/2021 fractures also demonstrate increased uptake particularly the anterior right 2nd rib with a maximum SUV of 6.2. OTHER:  Re-identified is bilateral calcified pleural plaques as noted on CT of the chest performed 12/30/2019.   Physiologic uptake within the wil vic/carfilzomib/Dex. He is doing very well. He will continue with cycle 9, day 1. He is tolerating this well. He will continue with Dex 20 mg weekly. I will see him in four weeks for the 9th cycle. .     Lytic bone disease:  back pain:     He has been on

## 2021-09-03 NOTE — PROGRESS NOTES
Pt here for C9D1.   Arrives Ambulating independently, accompanied by Self          Modifications in dose or schedule: No    Pt denies current pregnancy     Frequency of blood return and site check throughout administration: Prior to administration   Dischar

## 2021-09-10 ENCOUNTER — OFFICE VISIT (OUTPATIENT)
Dept: HEMATOLOGY/ONCOLOGY | Age: 71
End: 2021-09-10
Attending: INTERNAL MEDICINE
Payer: MEDICARE

## 2021-09-10 VITALS
DIASTOLIC BLOOD PRESSURE: 72 MMHG | HEART RATE: 57 BPM | BODY MASS INDEX: 36 KG/M2 | OXYGEN SATURATION: 100 % | WEIGHT: 263 LBS | SYSTOLIC BLOOD PRESSURE: 117 MMHG | RESPIRATION RATE: 18 BRPM | TEMPERATURE: 98 F

## 2021-09-10 DIAGNOSIS — C90.00 MULTIPLE MYELOMA NOT HAVING ACHIEVED REMISSION (HCC): Primary | ICD-10-CM

## 2021-09-10 LAB
BASOPHILS # BLD AUTO: 0.01 X10(3) UL (ref 0–0.2)
BASOPHILS NFR BLD AUTO: 0.1 %
EOSINOPHIL # BLD AUTO: 0.13 X10(3) UL (ref 0–0.7)
EOSINOPHIL NFR BLD AUTO: 1.6 %
ERYTHROCYTE [DISTWIDTH] IN BLOOD BY AUTOMATED COUNT: 13.2 %
HCT VFR BLD AUTO: 32.1 %
HGB BLD-MCNC: 10.8 G/DL
IMM GRANULOCYTES # BLD AUTO: 0.03 X10(3) UL (ref 0–1)
IMM GRANULOCYTES NFR BLD: 0.4 %
LYMPHOCYTES # BLD AUTO: 0.73 X10(3) UL (ref 1–4)
LYMPHOCYTES NFR BLD AUTO: 8.8 %
MCH RBC QN AUTO: 37.2 PG (ref 26–34)
MCHC RBC AUTO-ENTMCNC: 33.6 G/DL (ref 31–37)
MCV RBC AUTO: 110.7 FL
MONOCYTES # BLD AUTO: 0.88 X10(3) UL (ref 0.1–1)
MONOCYTES NFR BLD AUTO: 10.6 %
NEUTROPHILS # BLD AUTO: 6.55 X10 (3) UL (ref 1.5–7.7)
NEUTROPHILS # BLD AUTO: 6.55 X10(3) UL (ref 1.5–7.7)
NEUTROPHILS NFR BLD AUTO: 78.5 %
PLATELET # BLD AUTO: 159 10(3)UL (ref 150–450)
RBC # BLD AUTO: 2.9 X10(6)UL
WBC # BLD AUTO: 8.3 X10(3) UL (ref 4–11)

## 2021-09-10 PROCEDURE — 96413 CHEMO IV INFUSION 1 HR: CPT

## 2021-09-10 PROCEDURE — 36415 COLL VENOUS BLD VENIPUNCTURE: CPT

## 2021-09-10 PROCEDURE — 85025 COMPLETE CBC W/AUTO DIFF WBC: CPT

## 2021-09-13 LAB
ALBUMIN SERPL ELPH-MCNC: 4.46 G/DL (ref 3.75–5.21)
ALBUMIN/GLOB SERPL: 2.08 {RATIO} (ref 1–2)
ALPHA1 GLOB SERPL ELPH-MCNC: 0.3 G/DL (ref 0.19–0.46)
ALPHA2 GLOB SERPL ELPH-MCNC: 0.87 G/DL (ref 0.48–1.05)
B-GLOBULIN SERPL ELPH-MCNC: 0.73 G/DL (ref 0.68–1.23)
GAMMA GLOB SERPL ELPH-MCNC: 0.24 G/DL (ref 0.62–1.7)
KAPPA LC FREE SER-MCNC: 0.14 MG/DL (ref 0.33–1.94)
LAMBDA LC FREE SERPL-MCNC: <0.146 MG/DL (ref 0.57–2.63)
M PROTEIN MFR SERPL ELPH: 6.6 G/DL (ref 6.4–8.2)
M-SPIKE 1: 0.06 G/DL (ref ?–0)

## 2021-09-14 ENCOUNTER — OFFICE VISIT (OUTPATIENT)
Dept: NEPHROLOGY | Facility: CLINIC | Age: 71
End: 2021-09-14
Payer: MEDICARE

## 2021-09-14 VITALS — DIASTOLIC BLOOD PRESSURE: 74 MMHG | SYSTOLIC BLOOD PRESSURE: 126 MMHG | BODY MASS INDEX: 36 KG/M2 | WEIGHT: 261 LBS

## 2021-09-14 DIAGNOSIS — C90.00 MULTIPLE MYELOMA NOT HAVING ACHIEVED REMISSION (HCC): Primary | ICD-10-CM

## 2021-09-14 PROCEDURE — 99213 OFFICE O/P EST LOW 20 MIN: CPT | Performed by: INTERNAL MEDICINE

## 2021-09-14 NOTE — PROGRESS NOTES
Nephrology Progress Note      Ricky Whaley is a 66year old male. HPI:   Patient presents with: Other: myeloma kidney disease      67 yo male with hx of MM, MADDI related to myeloma kidney + severe hypercalcemia, DM, HTN presents for follow up.  He w tablet by mouth daily.  1 tablet 0       Allergies:    Pcn [Penicillins]       ANAPHYLAXIS, HIVES, HALLUCINATION,                            SHORTNESS OF BREATH    Comment:Respiratory distress  Beta Adrenergic Blo*      Latex                       Comment:S Latest Ref Range: >=60  54 (L) 52 (L)     eGFR AFRICAN AMERICAN Latest Ref Range: >=60  62 60     ANION GAP Latest Ref Range: 0 - 18 mmol/L  8     CALCULATED OSMOLALITY Latest Ref Range: 275 - 295 mOsm/kg  291     ALKALINE PHOSPHATASE Latest Ref Range: 45 32.9 33.7  33.6   MCV Latest Ref Range: 80.0 - 100.0 fL 113.2 (H) 110.9 (H)  110.7 (H)   RDW Latest Units: % 14.5 13.2  13.2   Prelim Neutrophil Abs Latest Ref Range: 1.50 - 7.70 x10 (3) uL 12.58 (H) 12.14 (H)  6.55   Neutrophils Absolute Latest Ref Range:

## 2021-09-17 ENCOUNTER — OFFICE VISIT (OUTPATIENT)
Dept: HEMATOLOGY/ONCOLOGY | Age: 71
End: 2021-09-17
Attending: INTERNAL MEDICINE
Payer: MEDICARE

## 2021-09-17 VITALS
TEMPERATURE: 98 F | OXYGEN SATURATION: 97 % | HEART RATE: 54 BPM | BODY MASS INDEX: 35.54 KG/M2 | RESPIRATION RATE: 18 BRPM | WEIGHT: 259.5 LBS | DIASTOLIC BLOOD PRESSURE: 73 MMHG | HEIGHT: 71.85 IN | SYSTOLIC BLOOD PRESSURE: 114 MMHG

## 2021-09-17 DIAGNOSIS — C90.00 MULTIPLE MYELOMA, REMISSION STATUS UNSPECIFIED (HCC): ICD-10-CM

## 2021-09-17 DIAGNOSIS — C90.00 MULTIPLE MYELOMA NOT HAVING ACHIEVED REMISSION (HCC): Primary | ICD-10-CM

## 2021-09-17 LAB
BASOPHILS # BLD AUTO: 0.01 X10(3) UL (ref 0–0.2)
BASOPHILS NFR BLD AUTO: 0.1 %
EOSINOPHIL # BLD AUTO: 0 X10(3) UL (ref 0–0.7)
EOSINOPHIL NFR BLD AUTO: 0 %
ERYTHROCYTE [DISTWIDTH] IN BLOOD BY AUTOMATED COUNT: 13.2 %
HCT VFR BLD AUTO: 32.3 %
HGB BLD-MCNC: 10.9 G/DL
IMM GRANULOCYTES # BLD AUTO: 0.04 X10(3) UL (ref 0–1)
IMM GRANULOCYTES NFR BLD: 0.3 %
LYMPHOCYTES # BLD AUTO: 0.15 X10(3) UL (ref 1–4)
LYMPHOCYTES NFR BLD AUTO: 1.3 %
MCH RBC QN AUTO: 36.9 PG (ref 26–34)
MCHC RBC AUTO-ENTMCNC: 33.7 G/DL (ref 31–37)
MCV RBC AUTO: 109.5 FL
MONOCYTES # BLD AUTO: 0.08 X10(3) UL (ref 0.1–1)
MONOCYTES NFR BLD AUTO: 0.7 %
NEUTROPHILS # BLD AUTO: 11.26 X10 (3) UL (ref 1.5–7.7)
NEUTROPHILS # BLD AUTO: 11.26 X10(3) UL (ref 1.5–7.7)
NEUTROPHILS NFR BLD AUTO: 97.6 %
PLATELET # BLD AUTO: 138 10(3)UL (ref 150–450)
RBC # BLD AUTO: 2.95 X10(6)UL
WBC # BLD AUTO: 11.5 X10(3) UL (ref 4–11)

## 2021-09-17 PROCEDURE — 96413 CHEMO IV INFUSION 1 HR: CPT

## 2021-09-17 PROCEDURE — 36415 COLL VENOUS BLD VENIPUNCTURE: CPT

## 2021-09-17 PROCEDURE — 85025 COMPLETE CBC W/AUTO DIFF WBC: CPT

## 2021-09-17 PROCEDURE — 96375 TX/PRO/DX INJ NEW DRUG ADDON: CPT

## 2021-09-17 NOTE — PROGRESS NOTES
Ok to use cmp from 9/3 for today's zometa per Russell Gutierrez, NP. Pt here for C9D15 Kyprolis/zomea.   Arrives Ambulating independently, accompanied by Self           Pregnancy screening: Not applicable    Modifications in dose or schedule: No     Frequency of blo

## 2021-09-27 RX ORDER — ACYCLOVIR 400 MG/1
400 TABLET ORAL 2 TIMES DAILY
Qty: 60 TABLET | Refills: 3 | Status: SHIPPED | OUTPATIENT
Start: 2021-09-27 | End: 2022-01-19

## 2021-09-27 NOTE — TELEPHONE ENCOUNTER
Last office visit: (if over 1 year, schedule appointment) 8/19/21    Appointment scheduled with: none    Requested medication: acyclovi    Pharmacy: [facility] on file    Pt is almost out of medication said pharmacy told him they requested refill last week

## 2021-10-01 ENCOUNTER — OFFICE VISIT (OUTPATIENT)
Dept: HEMATOLOGY/ONCOLOGY | Age: 71
End: 2021-10-01
Attending: INTERNAL MEDICINE
Payer: MEDICARE

## 2021-10-01 VITALS
BODY MASS INDEX: 36.01 KG/M2 | RESPIRATION RATE: 18 BRPM | TEMPERATURE: 98 F | WEIGHT: 263 LBS | DIASTOLIC BLOOD PRESSURE: 63 MMHG | SYSTOLIC BLOOD PRESSURE: 115 MMHG | OXYGEN SATURATION: 99 % | HEART RATE: 52 BPM | HEIGHT: 71.85 IN

## 2021-10-01 DIAGNOSIS — D63.0 ANEMIA COMPLICATING NEOPLASTIC DISEASE: ICD-10-CM

## 2021-10-01 DIAGNOSIS — E11.9 TYPE 2 DIABETES MELLITUS WITHOUT COMPLICATION, WITHOUT LONG-TERM CURRENT USE OF INSULIN (HCC): ICD-10-CM

## 2021-10-01 DIAGNOSIS — C90.00 MULTIPLE MYELOMA NOT HAVING ACHIEVED REMISSION (HCC): Primary | ICD-10-CM

## 2021-10-01 DIAGNOSIS — Z85.46 HISTORY OF PROSTATE CANCER: ICD-10-CM

## 2021-10-01 PROCEDURE — 96401 CHEMO ANTI-NEOPL SQ/IM: CPT

## 2021-10-01 PROCEDURE — 96413 CHEMO IV INFUSION 1 HR: CPT

## 2021-10-01 PROCEDURE — 99214 OFFICE O/P EST MOD 30 MIN: CPT | Performed by: INTERNAL MEDICINE

## 2021-10-01 RX ORDER — ACETAMINOPHEN 325 MG/1
650 TABLET ORAL ONCE
Status: DISCONTINUED | OUTPATIENT
Start: 2021-10-01 | End: 2021-10-01

## 2021-10-01 RX ORDER — DIPHENHYDRAMINE HYDROCHLORIDE 50 MG/ML
25 INJECTION INTRAMUSCULAR; INTRAVENOUS ONCE
Status: CANCELLED | OUTPATIENT
Start: 2021-10-01

## 2021-10-01 RX ORDER — ACETAMINOPHEN 325 MG/1
650 TABLET ORAL ONCE
Status: CANCELLED | OUTPATIENT
Start: 2021-10-01

## 2021-10-01 RX ORDER — SPIRONOLACTONE 25 MG/1
25 TABLET ORAL DAILY
COMMUNITY
Start: 2021-09-03 | End: 2021-12-02 | Stop reason: ALTCHOICE

## 2021-10-01 RX ORDER — DIPHENHYDRAMINE HYDROCHLORIDE 50 MG/ML
25 INJECTION INTRAMUSCULAR; INTRAVENOUS ONCE
Status: DISCONTINUED | OUTPATIENT
Start: 2021-10-01 | End: 2021-10-01

## 2021-10-01 NOTE — PROGRESS NOTES
Cancer Center Progress Note    Problem List:      Patient Active Problem List:     Impotence of organic origin     Generalized osteoarthrosis of hand     Morbid obesity ([address]     Essential hypertension     Hypertension     Multiple myeloma ([address]     Myelom clinical concern for amyloidosis is noted. A Congo red stain performed with appropriate controls is negative for amyloid deposition, and a trichrome stain with appropriate controls shows focal interstitial and subendocardial fibrosis.   Prussian zeyad stain 202, 2012 Dr. Jyotsna Portillo, polyps   • OTHER SURGICAL HISTORY  1/31/2007    total prostatectomy   • SKIN SURGERY  1997    BCC       Family History Reviewed:  Family History   Problem Relation Age of Onset   • Diabetes Other         family hx   • Hypertension Oral Tab EC, Take 1 tablet by mouth daily. , Disp: 1 tablet, Rfl: 0      Vital Signs:      Height: 182.5 cm (5' 11.85\") (10/01 1048)  Weight: 119.3 kg (263 lb) (10/01 1048)  BSA (Calculated - sq m): 2.39 sq meters (10/01 1048)  Pulse: 52 (10/01 1048)  BP: 1.00 - 2.00 2.08 High     SPE Interpretation  Small monoclonal spike in the gamma region. Hypogammaglobulinemia. Reviewed by Ina Schneider M.D. Pathology 09/13/21 at 10:57 AM    Immunofixation  Monoclonal IgG kappa.  If clinically indicated, 24 hour ur failure  Anemia  Diffuse bone lytic lesions  Beta-2 Microglobulin 18.5 mg/L    ISS stage III  5 cycles of (VCd) Dexamethasone, Bortezomib and cyclophosphamide  High dose therapy with stem cell infusion on 1/27/2017  Revlimid maintenance from 6/2017

## 2021-10-01 NOTE — PROGRESS NOTES
Patient is here for C10 D1. He took his decadron this morning. He denies any side effects of treatment. No diarrhea, no fever, no shortness of breath. His energy is good. He is eating and drinking well.     Education Record    Learner:  Patient

## 2021-10-01 NOTE — PROGRESS NOTES
Pt here for C10D1. Pt seen by MD today.   Arrives Ambulating independently, accompanied by Self           Pregnancy screening: Not applicable    Modifications in dose or schedule: No     Frequency of blood return and site check throughout administration: Pr

## 2021-10-08 ENCOUNTER — OFFICE VISIT (OUTPATIENT)
Dept: HEMATOLOGY/ONCOLOGY | Age: 71
End: 2021-10-08
Attending: INTERNAL MEDICINE
Payer: MEDICARE

## 2021-10-08 VITALS
RESPIRATION RATE: 20 BRPM | TEMPERATURE: 97 F | HEART RATE: 62 BPM | OXYGEN SATURATION: 99 % | BODY MASS INDEX: 36.08 KG/M2 | DIASTOLIC BLOOD PRESSURE: 71 MMHG | HEIGHT: 71.85 IN | WEIGHT: 263.5 LBS | SYSTOLIC BLOOD PRESSURE: 115 MMHG

## 2021-10-08 DIAGNOSIS — C90.00 MULTIPLE MYELOMA NOT HAVING ACHIEVED REMISSION (HCC): Primary | ICD-10-CM

## 2021-10-08 PROCEDURE — 85025 COMPLETE CBC W/AUTO DIFF WBC: CPT

## 2021-10-08 PROCEDURE — 36415 COLL VENOUS BLD VENIPUNCTURE: CPT

## 2021-10-08 PROCEDURE — 96413 CHEMO IV INFUSION 1 HR: CPT

## 2021-10-08 NOTE — PROGRESS NOTES
Pt here for C10D8. Pt denies any complaints today. Energy level and appetite good. Denies n/v. Denies bowel problems.   Arrives Ambulating independently, accompanied by Self            Pregnancy screening: Not applicable     Modifications in dose or schedul

## 2021-10-11 ENCOUNTER — LAB ENCOUNTER (OUTPATIENT)
Dept: LAB | Age: 71
End: 2021-10-11
Attending: INTERNAL MEDICINE
Payer: MEDICARE

## 2021-10-11 DIAGNOSIS — E78.5 HYPERLIPEMIA: ICD-10-CM

## 2021-10-11 DIAGNOSIS — I10 ESSENTIAL HYPERTENSION, MALIGNANT: ICD-10-CM

## 2021-10-11 DIAGNOSIS — Z85.46 HISTORY OF PROSTATE CANCER: ICD-10-CM

## 2021-10-11 DIAGNOSIS — E11.9 DIABETES MELLITUS (HCC): Primary | ICD-10-CM

## 2021-10-11 PROCEDURE — 36415 COLL VENOUS BLD VENIPUNCTURE: CPT

## 2021-10-11 PROCEDURE — 84443 ASSAY THYROID STIM HORMONE: CPT

## 2021-10-11 PROCEDURE — 82306 VITAMIN D 25 HYDROXY: CPT

## 2021-10-11 PROCEDURE — 83880 ASSAY OF NATRIURETIC PEPTIDE: CPT

## 2021-10-11 PROCEDURE — 80061 LIPID PANEL: CPT

## 2021-10-11 PROCEDURE — 83036 HEMOGLOBIN GLYCOSYLATED A1C: CPT

## 2021-10-11 PROCEDURE — 85025 COMPLETE CBC W/AUTO DIFF WBC: CPT

## 2021-10-11 PROCEDURE — 80053 COMPREHEN METABOLIC PANEL: CPT

## 2021-10-15 ENCOUNTER — OFFICE VISIT (OUTPATIENT)
Dept: HEMATOLOGY/ONCOLOGY | Age: 71
End: 2021-10-15
Attending: INTERNAL MEDICINE
Payer: MEDICARE

## 2021-10-15 VITALS
HEART RATE: 59 BPM | OXYGEN SATURATION: 98 % | RESPIRATION RATE: 18 BRPM | DIASTOLIC BLOOD PRESSURE: 79 MMHG | TEMPERATURE: 97 F | BODY MASS INDEX: 35.95 KG/M2 | SYSTOLIC BLOOD PRESSURE: 133 MMHG | WEIGHT: 262.5 LBS | HEIGHT: 71.85 IN

## 2021-10-15 DIAGNOSIS — C90.00 MULTIPLE MYELOMA NOT HAVING ACHIEVED REMISSION (HCC): Primary | ICD-10-CM

## 2021-10-15 DIAGNOSIS — C90.00 MULTIPLE MYELOMA, REMISSION STATUS UNSPECIFIED (HCC): ICD-10-CM

## 2021-10-15 PROCEDURE — 96367 TX/PROPH/DG ADDL SEQ IV INF: CPT

## 2021-10-15 PROCEDURE — 85025 COMPLETE CBC W/AUTO DIFF WBC: CPT

## 2021-10-15 PROCEDURE — 36415 COLL VENOUS BLD VENIPUNCTURE: CPT

## 2021-10-15 PROCEDURE — 96413 CHEMO IV INFUSION 1 HR: CPT

## 2021-10-15 NOTE — PROGRESS NOTES
Pt here for C10D15. Pt denies any complaints today. Energy level and appetite good. Denies n/v.  Denies bowel problems.  Arrives Ambulating independently, accompanied by Self            Pregnancy screening: Not applicable     Modifications in dose or schedu

## 2021-10-23 DIAGNOSIS — E11.9 TYPE 2 DIABETES MELLITUS WITHOUT COMPLICATION, WITHOUT LONG-TERM CURRENT USE OF INSULIN (HCC): ICD-10-CM

## 2021-10-23 RX ORDER — ATORVASTATIN CALCIUM 20 MG/1
TABLET, FILM COATED ORAL
Qty: 90 TABLET | Refills: 1 | Status: SHIPPED | OUTPATIENT
Start: 2021-10-23

## 2021-10-23 RX ORDER — GLIMEPIRIDE 1 MG/1
TABLET ORAL
Qty: 90 TABLET | Refills: 0 | Status: SHIPPED | OUTPATIENT
Start: 2021-10-23 | End: 2021-12-23 | Stop reason: ALTCHOICE

## 2021-10-23 NOTE — TELEPHONE ENCOUNTER
LOV 8/19/2021      LAST LAB 10/11/2021    LAST RX   ATORVASTATIN 20 MG Oral Tab 90 tablet 0 8/4/2021     GLIMEPIRIDE 1 MG Oral Tab 90 tablet 0 8/4/2021           Next OV   Future Appointments   Date Time Provider Bo Ratliff   10/29/2021 10:30 AM Jeremie

## 2021-10-29 ENCOUNTER — OFFICE VISIT (OUTPATIENT)
Dept: HEMATOLOGY/ONCOLOGY | Age: 71
End: 2021-10-29
Attending: INTERNAL MEDICINE
Payer: MEDICARE

## 2021-10-29 VITALS
HEIGHT: 71.85 IN | OXYGEN SATURATION: 99 % | TEMPERATURE: 96 F | RESPIRATION RATE: 20 BRPM | WEIGHT: 262.63 LBS | HEART RATE: 62 BPM | DIASTOLIC BLOOD PRESSURE: 68 MMHG | BODY MASS INDEX: 35.96 KG/M2 | SYSTOLIC BLOOD PRESSURE: 105 MMHG

## 2021-10-29 DIAGNOSIS — C90.00 MULTIPLE MYELOMA NOT HAVING ACHIEVED REMISSION (HCC): ICD-10-CM

## 2021-10-29 DIAGNOSIS — D63.0 ANEMIA COMPLICATING NEOPLASTIC DISEASE: Primary | ICD-10-CM

## 2021-10-29 DIAGNOSIS — C90.00 MULTIPLE MYELOMA NOT HAVING ACHIEVED REMISSION (HCC): Primary | ICD-10-CM

## 2021-10-29 PROCEDURE — 99214 OFFICE O/P EST MOD 30 MIN: CPT | Performed by: INTERNAL MEDICINE

## 2021-10-29 PROCEDURE — 96413 CHEMO IV INFUSION 1 HR: CPT

## 2021-10-29 PROCEDURE — 96401 CHEMO ANTI-NEOPL SQ/IM: CPT

## 2021-10-29 RX ORDER — DIPHENHYDRAMINE HYDROCHLORIDE 50 MG/ML
25 INJECTION INTRAMUSCULAR; INTRAVENOUS ONCE
Status: CANCELLED | OUTPATIENT
Start: 2021-10-29

## 2021-10-29 RX ORDER — ACETAMINOPHEN 325 MG/1
650 TABLET ORAL ONCE
Status: CANCELLED | OUTPATIENT
Start: 2021-10-29

## 2021-10-29 NOTE — PROGRESS NOTES
Patient is here for follow up and treatment C11D1. He reports that he is tolerating treatment well. He takes Decadron 10 mg on the day of treatment. He generally feels well. His energy is stable, he rests in the afternoon.      Education Record    L

## 2021-10-29 NOTE — PROGRESS NOTES
Pt here for C1D1. Pt seen by MD today  Arrives Ambulating independently, accompanied by Self            Pregnancy screening: Not applicable     Modifications in dose or schedule: No  Pt took tylenol and benadryl @ home                              Ras

## 2021-10-29 NOTE — PROGRESS NOTES
Cancer Center Progress Note    Problem List:      Patient Active Problem List:     Impotence of organic origin     Generalized osteoarthrosis of hand     Morbid obesity ([address]     Essential hypertension     Hypertension     Multiple myeloma ([address]     Myelom clinical concern for amyloidosis is noted. A Congo red stain performed with appropriate controls is negative for amyloid deposition, and a trichrome stain with appropriate controls shows focal interstitial and subendocardial fibrosis.   Prussian zeyad stain 202, 2012 Dr. Claudia Loo, polyps   • OTHER SURGICAL HISTORY  1/31/2007    total prostatectomy   • SKIN SURGERY  1997    BCC       Family History Reviewed:  Family History   Problem Relation Age of Onset   • Diabetes Other         family hx   • Hypertension 90 tablet, Rfl: 0  aspirin (ASPIR-81) 81 MG Oral Tab EC, Take 1 tablet by mouth daily. , Disp: 1 tablet, Rfl: 0      Vital Signs:      Height: 182.5 cm (5' 11.85\") (10/29 1049)  Weight: 119.1 kg (262 lb 9.6 oz) (10/29 1049)  BSA (Calculated - sq m): 2.39 s Globulins   0.62 - 1.70 g/dL 0.20 Low     Albumin/Globulin Ratio   1.00 - 2.00 2.14 High     SPE Interpretation  Hypogammaglobulinemia. Small abnormality in the gamma region. Reviewed by Shakila Stevenson M.D.  Pathology 10/05/21 at 3:16 PM    Immunofixatio Dexamethasone, Bortezomib and cyclophosphamide  High dose therapy with stem cell infusion on 1/27/2017  Revlimid maintenance from 6/2017 to 12/202  Progression with repeat bone marrow biopsy on 1/12/2021  50% plasma cells  FISH study was not done  [place]

## 2021-11-02 ENCOUNTER — LAB ENCOUNTER (OUTPATIENT)
Dept: LAB | Age: 71
End: 2021-11-02
Attending: NURSE PRACTITIONER
Payer: MEDICARE

## 2021-11-02 DIAGNOSIS — I42.8 OTHER CARDIOMYOPATHY (HCC): Primary | ICD-10-CM

## 2021-11-02 PROCEDURE — 80048 BASIC METABOLIC PNL TOTAL CA: CPT

## 2021-11-02 PROCEDURE — 36415 COLL VENOUS BLD VENIPUNCTURE: CPT

## 2021-11-05 ENCOUNTER — OFFICE VISIT (OUTPATIENT)
Dept: HEMATOLOGY/ONCOLOGY | Age: 71
End: 2021-11-05
Attending: INTERNAL MEDICINE
Payer: MEDICARE

## 2021-11-05 VITALS
HEART RATE: 57 BPM | DIASTOLIC BLOOD PRESSURE: 62 MMHG | TEMPERATURE: 96 F | RESPIRATION RATE: 20 BRPM | BODY MASS INDEX: 35.88 KG/M2 | WEIGHT: 262 LBS | HEIGHT: 71.85 IN | OXYGEN SATURATION: 100 % | SYSTOLIC BLOOD PRESSURE: 120 MMHG

## 2021-11-05 DIAGNOSIS — C90.00 MULTIPLE MYELOMA NOT HAVING ACHIEVED REMISSION (HCC): Primary | ICD-10-CM

## 2021-11-05 PROCEDURE — 96413 CHEMO IV INFUSION 1 HR: CPT

## 2021-11-05 PROCEDURE — 36415 COLL VENOUS BLD VENIPUNCTURE: CPT

## 2021-11-05 PROCEDURE — 85025 COMPLETE CBC W/AUTO DIFF WBC: CPT

## 2021-11-05 NOTE — PROGRESS NOTES
Pt here for C11D8.   Arrives Ambulating independently, accompanied by Self           Pregnancy screening: Not applicable    Modifications in dose or schedule: No     Frequency of blood return and site check throughout administration: Prior to administration

## 2021-11-12 ENCOUNTER — OFFICE VISIT (OUTPATIENT)
Dept: HEMATOLOGY/ONCOLOGY | Age: 71
End: 2021-11-12
Attending: INTERNAL MEDICINE
Payer: MEDICARE

## 2021-11-12 VITALS
DIASTOLIC BLOOD PRESSURE: 70 MMHG | TEMPERATURE: 97 F | BODY MASS INDEX: 36.49 KG/M2 | WEIGHT: 266.5 LBS | HEART RATE: 73 BPM | OXYGEN SATURATION: 98 % | SYSTOLIC BLOOD PRESSURE: 138 MMHG | HEIGHT: 71.85 IN | RESPIRATION RATE: 20 BRPM

## 2021-11-12 DIAGNOSIS — C90.00 MULTIPLE MYELOMA NOT HAVING ACHIEVED REMISSION (HCC): ICD-10-CM

## 2021-11-12 DIAGNOSIS — C90.00 MULTIPLE MYELOMA, REMISSION STATUS UNSPECIFIED (HCC): Primary | ICD-10-CM

## 2021-11-12 PROCEDURE — 96409 CHEMO IV PUSH SNGL DRUG: CPT

## 2021-11-12 PROCEDURE — 82310 ASSAY OF CALCIUM: CPT

## 2021-11-12 PROCEDURE — 82040 ASSAY OF SERUM ALBUMIN: CPT

## 2021-11-12 PROCEDURE — 82565 ASSAY OF CREATININE: CPT

## 2021-11-12 PROCEDURE — 85025 COMPLETE CBC W/AUTO DIFF WBC: CPT

## 2021-11-12 PROCEDURE — 36415 COLL VENOUS BLD VENIPUNCTURE: CPT

## 2021-11-12 PROCEDURE — 96375 TX/PRO/DX INJ NEW DRUG ADDON: CPT

## 2021-11-12 NOTE — PROGRESS NOTES
Pt here for C15D11.   Arrives Ambulating independently, accompanied by Self           Pregnancy screening: Not applicable    Modifications in dose or schedule: No     Frequency of blood return and site check throughout administration: Prior to [facility]

## 2021-11-13 DIAGNOSIS — E11.9 TYPE 2 DIABETES MELLITUS WITHOUT COMPLICATION, WITHOUT LONG-TERM CURRENT USE OF INSULIN (HCC): ICD-10-CM

## 2021-11-13 RX ORDER — BLOOD SUGAR DIAGNOSTIC
STRIP MISCELLANEOUS
Qty: 300 STRIP | Refills: 2 | Status: SHIPPED | OUTPATIENT
Start: 2021-11-13

## 2021-11-13 NOTE — TELEPHONE ENCOUNTER
Protocol passed    8/19/2021    Future Appointments   Date Time Provider Bo Evy   11/16/2021  3:00 PM Neris Bruce MD [id number] [address]   11/26/2021 10:30 AM ITA [place] HEM [facility] [place]   11/26/2021 11:00 AM CATHY TX RN3 PF JESSEE

## 2021-11-16 ENCOUNTER — OFFICE VISIT (OUTPATIENT)
Dept: NEPHROLOGY | Facility: CLINIC | Age: 71
End: 2021-11-16
Payer: MEDICARE

## 2021-11-16 VITALS — SYSTOLIC BLOOD PRESSURE: 120 MMHG | WEIGHT: 263 LBS | BODY MASS INDEX: 36 KG/M2 | DIASTOLIC BLOOD PRESSURE: 76 MMHG

## 2021-11-16 DIAGNOSIS — N18.30 STAGE 3 CHRONIC KIDNEY DISEASE, UNSPECIFIED WHETHER STAGE 3A OR 3B CKD (HCC): Primary | ICD-10-CM

## 2021-11-16 PROCEDURE — 99213 OFFICE O/P EST LOW 20 MIN: CPT | Performed by: INTERNAL MEDICINE

## 2021-11-16 NOTE — PROGRESS NOTES
Nephrology Progress Note      Heriberto Whitney is a 66year old male. HPI:   Patient presents with: Other: myeloma kidney disease      67 yo male with hx of MM, MADDI related to myeloma kidney + severe hypercalcemia, DM, HTN presents for follow up.  He w (ASPIR-81) 81 MG Oral Tab EC Take 1 tablet by mouth daily.  1 tablet 0       Allergies:    Pcn [Penicillins]       ANAPHYLAXIS, HIVES, HALLUCINATION,                            SHORTNESS OF BREATH    Comment:Respiratory distress  Beta Adrenergic Blo*      L mg/dL 9.1 9.8     eGFR NON-AFR.  AMERICAN Latest Ref Range: >=60  54 (L) 52 (L)     eGFR AFRICAN AMERICAN Latest Ref Range: >=60  62 60     ANION GAP Latest Ref Range: 0 - 18 mmol/L  8     CALCULATED OSMOLALITY Latest Ref Range: 275 - 295 mOsm/kg  291     A MCHC Latest Ref Range: 31.0 - 37.0 g/dL 32.9 33.7  33.6   MCV Latest Ref Range: 80.0 - 100.0 fL 113.2 (H) 110.9 (H)  110.7 (H)   RDW Latest Units: % 14.5 13.2  13.2   Prelim Neutrophil Abs Latest Ref Range: 1.50 - 7.70 x10 (3) uL 12.58 (H) 12.14 (H)  6.5

## 2021-11-22 ENCOUNTER — TELEPHONE (OUTPATIENT)
Dept: FAMILY MEDICINE CLINIC | Facility: CLINIC | Age: 71
End: 2021-11-22

## 2021-11-26 ENCOUNTER — OFFICE VISIT (OUTPATIENT)
Dept: HEMATOLOGY/ONCOLOGY | Age: 71
End: 2021-11-26
Attending: INTERNAL MEDICINE
Payer: MEDICARE

## 2021-11-26 VITALS
OXYGEN SATURATION: 98 % | SYSTOLIC BLOOD PRESSURE: 99 MMHG | TEMPERATURE: 97 F | RESPIRATION RATE: 18 BRPM | HEART RATE: 53 BPM | DIASTOLIC BLOOD PRESSURE: 65 MMHG

## 2021-11-26 VITALS
WEIGHT: 262.81 LBS | HEIGHT: 71.85 IN | SYSTOLIC BLOOD PRESSURE: 100 MMHG | RESPIRATION RATE: 20 BRPM | TEMPERATURE: 97 F | BODY MASS INDEX: 35.99 KG/M2 | OXYGEN SATURATION: 93 % | DIASTOLIC BLOOD PRESSURE: 47 MMHG | HEART RATE: 57 BPM

## 2021-11-26 DIAGNOSIS — Z51.11 ENCOUNTER FOR CHEMOTHERAPY MANAGEMENT: Primary | ICD-10-CM

## 2021-11-26 DIAGNOSIS — C90.00 MULTIPLE MYELOMA NOT HAVING ACHIEVED REMISSION (HCC): Primary | ICD-10-CM

## 2021-11-26 DIAGNOSIS — Z94.84 HX OF STEM CELL TRANSPLANT (HCC): ICD-10-CM

## 2021-11-26 DIAGNOSIS — C90.00 MULTIPLE MYELOMA NOT HAVING ACHIEVED REMISSION (HCC): ICD-10-CM

## 2021-11-26 DIAGNOSIS — Z85.46 HISTORY OF PROSTATE CANCER: ICD-10-CM

## 2021-11-26 DIAGNOSIS — N08 MYELOMA KIDNEY DISEASE (HCC): ICD-10-CM

## 2021-11-26 DIAGNOSIS — E11.9 DIET-CONTROLLED DIABETES MELLITUS (HCC): ICD-10-CM

## 2021-11-26 DIAGNOSIS — I10 ESSENTIAL HYPERTENSION: ICD-10-CM

## 2021-11-26 DIAGNOSIS — C90.00 MYELOMA KIDNEY DISEASE (HCC): ICD-10-CM

## 2021-11-26 PROCEDURE — 96401 CHEMO ANTI-NEOPL SQ/IM: CPT

## 2021-11-26 PROCEDURE — 99214 OFFICE O/P EST MOD 30 MIN: CPT | Performed by: CLINICAL NURSE SPECIALIST

## 2021-11-26 PROCEDURE — 96413 CHEMO IV INFUSION 1 HR: CPT

## 2021-11-26 RX ORDER — ACETAMINOPHEN 325 MG/1
650 TABLET ORAL ONCE
Status: COMPLETED | OUTPATIENT
Start: 2021-11-26 | End: 2021-11-26

## 2021-11-26 RX ORDER — DIPHENHYDRAMINE HYDROCHLORIDE 50 MG/ML
25 INJECTION INTRAMUSCULAR; INTRAVENOUS ONCE
Status: CANCELLED | OUTPATIENT
Start: 2021-11-26

## 2021-11-26 RX ORDER — ACETAMINOPHEN 325 MG/1
650 TABLET ORAL ONCE
Status: CANCELLED | OUTPATIENT
Start: 2021-11-26

## 2021-11-26 RX ORDER — DIPHENHYDRAMINE HYDROCHLORIDE 50 MG/ML
25 INJECTION INTRAMUSCULAR; INTRAVENOUS ONCE
Status: DISCONTINUED | OUTPATIENT
Start: 2021-11-26 | End: 2021-11-26

## 2021-11-26 RX ADMIN — ACETAMINOPHEN 650 MG: 325 TABLET ORAL at 11:06:00

## 2021-11-26 NOTE — PROGRESS NOTES
ANP Visit Note    Patient Name: Colleen Merchant [facility] SERVICES   YOB: 1950   Medical Record Number: [id number]   CSN: [id number]   Date of visit: 11/26/2021   Catarino De Luna MD   No primary care provider on file.      Chief Complaint/Reason for Visit:  Praveena Marshall [facility])       Medical History:  Past Medical History:   Diagnosis Date   • Allergic rhinitis, cause unspecified    • BCC (basal cell carcinoma of skin) 1997    s/p surgical excision   • BPH (benign prostatic hyperplasia)    • Colon polyps    • Generalized os file      Highest education level: Not on file    Occupational History      Occupation: Retired         Comment:     Tobacco Use      Smoking status: Never Smoker      Smokeless tobacco: Never Used    Vaping Use      Vaping Us Blood (ONETOUCH ULTRA BLUE) In Vitro Strip, TEST TWICE DAILY, Disp: 100 strip, Rfl: 3  •  Multiple Vitamin Oral Tab, Take 1 tablet by mouth., Disp: , Rfl:   •  acetaminophen 500 MG Oral Tab, Take 1,000 mg by mouth nightly., Disp: , Rfl:   •  Calcium Carbon x10 (3) uL    Neutrophil Absolute 10.53 (H) 1.50 - 7.70 x10(3) uL    Lymphocyte Absolute 0.18 (L) 1.00 - 4.00 x10(3) uL    Monocyte Absolute 0.11 0.10 - 1.00 x10(3) uL    Eosinophil Absolute 0.01 0.00 - 0.70 x10(3) uL    Basophil Absolute 0.02 0.00 - 0.20

## 2021-11-26 NOTE — PROGRESS NOTES
Pt here for C12D1.   Arrives Ambulating independently, accompanied by Self           Pregnancy screening: Not applicable    Modifications in dose or schedule: No     Frequency of blood return and site check throughout administration: Prior to administration

## 2021-12-01 ENCOUNTER — LAB ENCOUNTER (OUTPATIENT)
Dept: LAB | Age: 71
End: 2021-12-01
Attending: NURSE PRACTITIONER
Payer: MEDICARE

## 2021-12-01 DIAGNOSIS — E11.9 TYPE 2 DIABETES MELLITUS WITHOUT COMPLICATION, WITHOUT LONG-TERM CURRENT USE OF INSULIN (HCC): ICD-10-CM

## 2021-12-01 DIAGNOSIS — E55.9 VITAMIN D DEFICIENCY: ICD-10-CM

## 2021-12-01 DIAGNOSIS — R53.83 FATIGUE: ICD-10-CM

## 2021-12-01 DIAGNOSIS — Z85.79 HISTORY OF MULTIPLE MYELOMA: ICD-10-CM

## 2021-12-01 DIAGNOSIS — I42.9 CARDIOMYOPATHY (HCC): Primary | ICD-10-CM

## 2021-12-01 DIAGNOSIS — I10 ESSENTIAL HYPERTENSION: ICD-10-CM

## 2021-12-01 DIAGNOSIS — E78.49 OTHER HYPERLIPIDEMIA: ICD-10-CM

## 2021-12-01 PROCEDURE — 85025 COMPLETE CBC W/AUTO DIFF WBC: CPT

## 2021-12-01 PROCEDURE — 84439 ASSAY OF FREE THYROXINE: CPT

## 2021-12-01 PROCEDURE — 82306 VITAMIN D 25 HYDROXY: CPT

## 2021-12-01 PROCEDURE — 83036 HEMOGLOBIN GLYCOSYLATED A1C: CPT

## 2021-12-01 PROCEDURE — 80061 LIPID PANEL: CPT

## 2021-12-01 PROCEDURE — 80053 COMPREHEN METABOLIC PANEL: CPT

## 2021-12-01 PROCEDURE — 84443 ASSAY THYROID STIM HORMONE: CPT

## 2021-12-01 PROCEDURE — 83880 ASSAY OF NATRIURETIC PEPTIDE: CPT

## 2021-12-01 PROCEDURE — 36415 COLL VENOUS BLD VENIPUNCTURE: CPT

## 2021-12-02 ENCOUNTER — OFFICE VISIT (OUTPATIENT)
Dept: FAMILY MEDICINE CLINIC | Facility: CLINIC | Age: 71
End: 2021-12-02
Payer: MEDICARE

## 2021-12-02 VITALS
OXYGEN SATURATION: 98 % | TEMPERATURE: 97 F | WEIGHT: 264 LBS | RESPIRATION RATE: 20 BRPM | SYSTOLIC BLOOD PRESSURE: 110 MMHG | HEART RATE: 60 BPM | BODY MASS INDEX: 36.15 KG/M2 | HEIGHT: 71.85 IN | DIASTOLIC BLOOD PRESSURE: 64 MMHG

## 2021-12-02 DIAGNOSIS — E11.9 TYPE 2 DIABETES MELLITUS WITHOUT COMPLICATION, WITHOUT LONG-TERM CURRENT USE OF INSULIN (HCC): Primary | ICD-10-CM

## 2021-12-02 PROCEDURE — 99214 OFFICE O/P EST MOD 30 MIN: CPT | Performed by: FAMILY MEDICINE

## 2021-12-03 ENCOUNTER — OFFICE VISIT (OUTPATIENT)
Dept: HEMATOLOGY/ONCOLOGY | Age: 71
End: 2021-12-03
Attending: INTERNAL MEDICINE
Payer: MEDICARE

## 2021-12-03 VITALS
TEMPERATURE: 96 F | OXYGEN SATURATION: 98 % | DIASTOLIC BLOOD PRESSURE: 57 MMHG | WEIGHT: 260.81 LBS | BODY MASS INDEX: 35.71 KG/M2 | SYSTOLIC BLOOD PRESSURE: 99 MMHG | HEART RATE: 54 BPM | RESPIRATION RATE: 18 BRPM | HEIGHT: 71.85 IN

## 2021-12-03 DIAGNOSIS — C90.00 MULTIPLE MYELOMA, REMISSION STATUS UNSPECIFIED (HCC): Primary | ICD-10-CM

## 2021-12-03 DIAGNOSIS — C90.00 MULTIPLE MYELOMA NOT HAVING ACHIEVED REMISSION (HCC): ICD-10-CM

## 2021-12-03 PROCEDURE — 96413 CHEMO IV INFUSION 1 HR: CPT

## 2021-12-03 PROCEDURE — 36415 COLL VENOUS BLD VENIPUNCTURE: CPT

## 2021-12-03 PROCEDURE — 85025 COMPLETE CBC W/AUTO DIFF WBC: CPT

## 2021-12-03 NOTE — PROGRESS NOTES
Pt here for C12D8.   Arrives Ambulating independently, accompanied by Self           Pregnancy screening: Not applicable    Modifications in dose or schedule: No     Frequency of blood return and site check throughout administration: Prior to administration

## 2021-12-04 NOTE — PROGRESS NOTES
/64   Pulse 60   Temp 97.4 °F (36.3 °C) (Temporal)   Resp 20   Ht 5' 11.85\" (1.825 m)   Wt 264 lb (119.7 kg)   SpO2 98%   BMI 35.95 kg/m²  Body mass index is 35.95 kg/m².      Patient presents with:  Diabetes: f/u       HPI:   [facility] is - - - - -   Hgb A1c <5.7 % - - 5.2 - - -   Cholesterol Total <200 mg/dL - - 153 - - -   Triglycerides 30 - 149 mg/dL - - 185(H) - - -   HDL 40 - 59 mg/dL - - 76(H) - - -   LDL <100 mg/dL - - 48 - - -   Microalbumin Urine mg/dL - - - - - -   HGBA1C <5.7 % - Strip, TEST TWICE DAILY, Disp: 100 strip, Rfl: 3  Multiple Vitamin Oral Tab, Take 1 tablet by mouth., Disp: , Rfl:   acetaminophen 500 MG Oral Tab, Take 1,000 mg by mouth nightly., Disp: , Rfl:   Calcium Carbonate Antacid 500 MG Oral Chew Tab, Chew 2 table sugars next visit  - should continue to check  sugars at 4 times a week, to fasting numbers into postprandial  Labs ordered for the future     COMP METABOLIC PANEL (14);  Future  -     HEMOGLOBIN A1C; Future    Follow-up in 4 months        Medications fille

## 2021-12-08 ENCOUNTER — APPOINTMENT (OUTPATIENT)
Dept: CARDIOLOGY | Age: 71
End: 2021-12-08

## 2021-12-09 ENCOUNTER — TELEPHONE (OUTPATIENT)
Dept: FAMILY MEDICINE CLINIC | Facility: CLINIC | Age: 71
End: 2021-12-09

## 2021-12-09 NOTE — TELEPHONE ENCOUNTER
Pt called asking if we had 2 weeks - samples of the \"Entresto\"  For him. He said he waiting to hear back from the [facility] for the  for this medication. Please advise.

## 2021-12-09 NOTE — TELEPHONE ENCOUNTER
Dr. Angelica Almazan,  Please advise    We have one sample bottle of Entresto 49-51 (28 tabs). Is it OK to give to patient?     LOV 12/2/21 DM

## 2021-12-10 ENCOUNTER — OFFICE VISIT (OUTPATIENT)
Dept: HEMATOLOGY/ONCOLOGY | Age: 71
End: 2021-12-10
Attending: INTERNAL MEDICINE
Payer: MEDICARE

## 2021-12-10 VITALS
RESPIRATION RATE: 18 BRPM | DIASTOLIC BLOOD PRESSURE: 68 MMHG | OXYGEN SATURATION: 96 % | WEIGHT: 262.19 LBS | SYSTOLIC BLOOD PRESSURE: 105 MMHG | HEIGHT: 71.85 IN | BODY MASS INDEX: 35.9 KG/M2 | TEMPERATURE: 97 F | HEART RATE: 67 BPM

## 2021-12-10 DIAGNOSIS — C90.00 MULTIPLE MYELOMA NOT HAVING ACHIEVED REMISSION (HCC): ICD-10-CM

## 2021-12-10 DIAGNOSIS — C90.00 MULTIPLE MYELOMA, REMISSION STATUS UNSPECIFIED (HCC): Primary | ICD-10-CM

## 2021-12-10 PROCEDURE — 82565 ASSAY OF CREATININE: CPT

## 2021-12-10 PROCEDURE — 36415 COLL VENOUS BLD VENIPUNCTURE: CPT

## 2021-12-10 PROCEDURE — 96375 TX/PRO/DX INJ NEW DRUG ADDON: CPT

## 2021-12-10 PROCEDURE — 82040 ASSAY OF SERUM ALBUMIN: CPT

## 2021-12-10 PROCEDURE — 96413 CHEMO IV INFUSION 1 HR: CPT

## 2021-12-10 PROCEDURE — 82310 ASSAY OF CALCIUM: CPT

## 2021-12-10 PROCEDURE — 85025 COMPLETE CBC W/AUTO DIFF WBC: CPT

## 2021-12-10 NOTE — PROGRESS NOTES
Pt here for C12D15.   Arrives Ambulating independently, accompanied by Self           Pregnancy screening: Not applicable    Modifications in dose or schedule: No     Frequency of blood return and site check throughout administration: Prior to [facility]

## 2021-12-13 ENCOUNTER — TELEPHONE (OUTPATIENT)
Dept: FAMILY MEDICINE CLINIC | Facility: CLINIC | Age: 71
End: 2021-12-13

## 2021-12-22 RX ORDER — DEXAMETHASONE 4 MG/1
TABLET ORAL
Qty: 40 TABLET | Refills: 0 | Status: SHIPPED | OUTPATIENT
Start: 2021-12-22 | End: 2022-01-21

## 2021-12-23 ENCOUNTER — OFFICE VISIT (OUTPATIENT)
Dept: HEMATOLOGY/ONCOLOGY | Age: 71
End: 2021-12-23
Attending: INTERNAL MEDICINE
Payer: MEDICARE

## 2021-12-23 VITALS
BODY MASS INDEX: 36.07 KG/M2 | OXYGEN SATURATION: 100 % | HEIGHT: 71.85 IN | HEART RATE: 53 BPM | RESPIRATION RATE: 18 BRPM | SYSTOLIC BLOOD PRESSURE: 108 MMHG | DIASTOLIC BLOOD PRESSURE: 66 MMHG | WEIGHT: 263.38 LBS | TEMPERATURE: 97 F

## 2021-12-23 VITALS
TEMPERATURE: 98 F | HEART RATE: 51 BPM | RESPIRATION RATE: 18 BRPM | SYSTOLIC BLOOD PRESSURE: 96 MMHG | DIASTOLIC BLOOD PRESSURE: 61 MMHG | OXYGEN SATURATION: 96 %

## 2021-12-23 DIAGNOSIS — M54.50 ACUTE BILATERAL LOW BACK PAIN WITHOUT SCIATICA: ICD-10-CM

## 2021-12-23 DIAGNOSIS — Z51.11 ENCOUNTER FOR CHEMOTHERAPY MANAGEMENT: Primary | ICD-10-CM

## 2021-12-23 DIAGNOSIS — C90.00 MYELOMA KIDNEY DISEASE (HCC): ICD-10-CM

## 2021-12-23 DIAGNOSIS — Z85.46 HISTORY OF PROSTATE CANCER: ICD-10-CM

## 2021-12-23 DIAGNOSIS — D63.0 ANEMIA COMPLICATING NEOPLASTIC DISEASE: ICD-10-CM

## 2021-12-23 DIAGNOSIS — C90.00 MULTIPLE MYELOMA NOT HAVING ACHIEVED REMISSION (HCC): ICD-10-CM

## 2021-12-23 DIAGNOSIS — N08 MYELOMA KIDNEY DISEASE (HCC): ICD-10-CM

## 2021-12-23 DIAGNOSIS — C90.00 MULTIPLE MYELOMA NOT HAVING ACHIEVED REMISSION (HCC): Primary | ICD-10-CM

## 2021-12-23 PROCEDURE — 96413 CHEMO IV INFUSION 1 HR: CPT

## 2021-12-23 PROCEDURE — 96401 CHEMO ANTI-NEOPL SQ/IM: CPT

## 2021-12-23 PROCEDURE — 99214 OFFICE O/P EST MOD 30 MIN: CPT | Performed by: CLINICAL NURSE SPECIALIST

## 2021-12-23 RX ORDER — ACETAMINOPHEN 325 MG/1
650 TABLET ORAL ONCE
Status: DISCONTINUED | OUTPATIENT
Start: 2021-12-23 | End: 2021-12-23

## 2021-12-23 RX ORDER — DIPHENHYDRAMINE HYDROCHLORIDE 50 MG/ML
25 INJECTION INTRAMUSCULAR; INTRAVENOUS ONCE
Status: DISCONTINUED | OUTPATIENT
Start: 2021-12-23 | End: 2021-12-23

## 2021-12-23 RX ORDER — ERGOCALCIFEROL 1.25 MG/1
CAPSULE ORAL
COMMUNITY
Start: 2021-12-08

## 2021-12-23 RX ORDER — DIPHENHYDRAMINE HYDROCHLORIDE 50 MG/ML
25 INJECTION INTRAMUSCULAR; INTRAVENOUS ONCE
Status: CANCELLED | OUTPATIENT
Start: 2021-12-24

## 2021-12-23 RX ORDER — TRAMADOL HYDROCHLORIDE 50 MG/1
50 TABLET ORAL EVERY 6 HOURS PRN
COMMUNITY

## 2021-12-23 RX ORDER — ACETAMINOPHEN 325 MG/1
650 TABLET ORAL ONCE
Status: CANCELLED | OUTPATIENT
Start: 2021-12-24

## 2021-12-23 NOTE — PROGRESS NOTES
ANP Visit Note    Patient Name: [facility]   YOB: 1950   Medical Record Number: [id number]   CSN: [id number]   Date of visit: 12/23/2021   Christine Feng MD   No primary care provider on file.      Chief Complaint/Reason for Visit:  Fleipe Clark Medical History:   Diagnosis Date   • Allergic rhinitis, cause unspecified    • BCC (basal cell carcinoma of skin) 1997    s/p surgical excision   • BPH (benign prostatic hyperplasia)    • Colon polyps    • Generalized osteoarthrosis, involving hand    • H Not on file    Occupational History      Occupation: Retired         Comment:     Tobacco Use      Smoking status: Never Smoker      Smokeless tobacco: Never Used    Vaping Use      Vaping Use: Never used    Substance and Sexu Cap, Take 1,000 mg by mouth 2 (two) times daily. , Disp: , Rfl:   •  Glucose Blood (ONETOUCH ULTRA BLUE) In Vitro Strip, TEST TWICE DAILY, Disp: 100 strip, Rfl: 3  •  Multiple Vitamin Oral Tab, Take 1 tablet by mouth., Disp: , Rfl:   •  acetaminophen 500 MG 1.30 mg/dL    Calcium, Total 9.2 8.5 - 10.1 mg/dL    Calculated Osmolality 291 275 - 295 mOsm/kg    GFR, Non- 55 (L) >=60    GFR, -American 63 >=60    AST 16 15 - 37 U/L    ALT 25 16 - 61 U/L    Alkaline Phosphatase 80 45 - 117 U/L next cycle     Risk Level: High: MM on treatment with side effects and co-morbidities requiring monitoring     Electronically Signed by:    Elizabeth Herbert ANP-BC  Nurse Practitioner  [facility]

## 2021-12-23 NOTE — PROGRESS NOTES
Pt here for C13 D1 kyprolis/faspro.   Arrives Ambulating independently, accompanied by Self           Pregnancy screening: Not applicable    Modifications in dose or schedule: No     Frequency of blood return and site check throughout administration: Prior

## 2021-12-30 ENCOUNTER — OFFICE VISIT (OUTPATIENT)
Dept: HEMATOLOGY/ONCOLOGY | Age: 71
End: 2021-12-30
Attending: INTERNAL MEDICINE
Payer: MEDICARE

## 2021-12-30 VITALS
OXYGEN SATURATION: 89 % | DIASTOLIC BLOOD PRESSURE: 64 MMHG | TEMPERATURE: 97 F | HEIGHT: 71.85 IN | BODY MASS INDEX: 35.6 KG/M2 | WEIGHT: 260 LBS | SYSTOLIC BLOOD PRESSURE: 126 MMHG | HEART RATE: 59 BPM | RESPIRATION RATE: 18 BRPM

## 2021-12-30 DIAGNOSIS — C90.00 MULTIPLE MYELOMA NOT HAVING ACHIEVED REMISSION (HCC): Primary | ICD-10-CM

## 2021-12-30 PROCEDURE — 96413 CHEMO IV INFUSION 1 HR: CPT

## 2021-12-30 PROCEDURE — 36415 COLL VENOUS BLD VENIPUNCTURE: CPT

## 2021-12-30 PROCEDURE — 85025 COMPLETE CBC W/AUTO DIFF WBC: CPT

## 2021-12-30 NOTE — PROGRESS NOTES
Pt here for C13D8.   Arrives Ambulating independently, accompanied by Self          Modifications in dose or schedule: No    Pt denies current pregnancy     Frequency of blood return and site check throughout administration: Prior to administration   Ernestina

## 2022-01-04 ENCOUNTER — MED REC SCAN ONLY (OUTPATIENT)
Dept: FAMILY MEDICINE CLINIC | Facility: CLINIC | Age: 72
End: 2022-01-04

## 2022-01-07 ENCOUNTER — OFFICE VISIT (OUTPATIENT)
Dept: HEMATOLOGY/ONCOLOGY | Age: 72
End: 2022-01-07
Attending: INTERNAL MEDICINE
Payer: MEDICARE

## 2022-01-07 VITALS
HEART RATE: 82 BPM | OXYGEN SATURATION: 96 % | WEIGHT: 262.31 LBS | DIASTOLIC BLOOD PRESSURE: 68 MMHG | SYSTOLIC BLOOD PRESSURE: 120 MMHG | HEIGHT: 71.85 IN | BODY MASS INDEX: 35.92 KG/M2 | TEMPERATURE: 97 F | RESPIRATION RATE: 18 BRPM

## 2022-01-07 DIAGNOSIS — C90.00 MULTIPLE MYELOMA NOT HAVING ACHIEVED REMISSION (HCC): ICD-10-CM

## 2022-01-07 DIAGNOSIS — C90.00 MULTIPLE MYELOMA, REMISSION STATUS UNSPECIFIED (HCC): Primary | ICD-10-CM

## 2022-01-07 LAB
ALBUMIN SERPL-MCNC: 3.7 G/DL (ref 3.4–5)
BASOPHILS # BLD AUTO: 0.01 X10(3) UL (ref 0–0.2)
BASOPHILS NFR BLD AUTO: 0.1 %
CALCIUM BLD-MCNC: 9.4 MG/DL (ref 8.5–10.1)
CREAT BLD-MCNC: 1.37 MG/DL
EOSINOPHIL # BLD AUTO: 0 X10(3) UL (ref 0–0.7)
EOSINOPHIL NFR BLD AUTO: 0 %
ERYTHROCYTE [DISTWIDTH] IN BLOOD BY AUTOMATED COUNT: 13.3 %
HCT VFR BLD AUTO: 40 %
HGB BLD-MCNC: 13.2 G/DL
IMM GRANULOCYTES # BLD AUTO: 0.06 X10(3) UL (ref 0–1)
IMM GRANULOCYTES NFR BLD: 0.4 %
LYMPHOCYTES # BLD AUTO: 0.19 X10(3) UL (ref 1–4)
LYMPHOCYTES NFR BLD AUTO: 1.2 %
MCH RBC QN AUTO: 35.9 PG (ref 26–34)
MCHC RBC AUTO-ENTMCNC: 33 G/DL (ref 31–37)
MCV RBC AUTO: 108.7 FL
MONOCYTES # BLD AUTO: 0.09 X10(3) UL (ref 0.1–1)
MONOCYTES NFR BLD AUTO: 0.6 %
NEUTROPHILS # BLD AUTO: 15.03 X10 (3) UL (ref 1.5–7.7)
NEUTROPHILS # BLD AUTO: 15.03 X10(3) UL (ref 1.5–7.7)
NEUTROPHILS NFR BLD AUTO: 97.7 %
PLATELET # BLD AUTO: 203 10(3)UL (ref 150–450)
RBC # BLD AUTO: 3.68 X10(6)UL
WBC # BLD AUTO: 15.4 X10(3) UL (ref 4–11)

## 2022-01-07 PROCEDURE — 96375 TX/PRO/DX INJ NEW DRUG ADDON: CPT

## 2022-01-07 PROCEDURE — 36415 COLL VENOUS BLD VENIPUNCTURE: CPT

## 2022-01-07 PROCEDURE — 82565 ASSAY OF CREATININE: CPT

## 2022-01-07 PROCEDURE — 82040 ASSAY OF SERUM ALBUMIN: CPT

## 2022-01-07 PROCEDURE — 96413 CHEMO IV INFUSION 1 HR: CPT

## 2022-01-07 PROCEDURE — 85025 COMPLETE CBC W/AUTO DIFF WBC: CPT

## 2022-01-07 PROCEDURE — 82310 ASSAY OF CALCIUM: CPT

## 2022-01-07 NOTE — PROGRESS NOTES
Pt here for C13D15.   Arrives Ambulating independently, accompanied by Self           Pregnancy screening: Not applicable    Modifications in dose or schedule: No     Frequency of blood return and site check throughout administration: Prior to [facility]

## 2022-01-14 ENCOUNTER — APPOINTMENT (OUTPATIENT)
Dept: HEMATOLOGY/ONCOLOGY | Age: 72
End: 2022-01-14
Attending: INTERNAL MEDICINE
Payer: MEDICARE

## 2022-01-19 DIAGNOSIS — E11.9 TYPE 2 DIABETES MELLITUS WITHOUT COMPLICATION, WITHOUT LONG-TERM CURRENT USE OF INSULIN (HCC): ICD-10-CM

## 2022-01-19 RX ORDER — ACYCLOVIR 400 MG/1
TABLET ORAL
Qty: 90 TABLET | Refills: 1 | Status: SHIPPED | OUTPATIENT
Start: 2022-01-19

## 2022-01-19 RX ORDER — GLIMEPIRIDE 1 MG/1
TABLET ORAL
Qty: 90 TABLET | Refills: 1 | Status: SHIPPED | OUTPATIENT
Start: 2022-01-19 | End: 2022-01-19 | Stop reason: CLARIF

## 2022-01-19 NOTE — TELEPHONE ENCOUNTER
Called pharmacy and advised per Dr. Keshia Valverde, rx for glimepiride was ordered in error. Patient should not be taking this medication.   Requested they remove from patient's active medication list.

## 2022-01-19 NOTE — TELEPHONE ENCOUNTER
acyclovir 400 MG Oral Tab 60 tablet 3 9/27/2021     GLIMEPIRIDE 1 MG Oral Tab (Discontinued) 90 tablet 0 10/23/2021 12/23/2021   Sig:   TAKE 1 TABLET(1 MG) BY MOUTH DAILY WITH BREAKFAST     Route:   (none)     Reason for Discontinue:   Alternate therapy

## 2022-01-21 ENCOUNTER — OFFICE VISIT (OUTPATIENT)
Dept: HEMATOLOGY/ONCOLOGY | Age: 72
End: 2022-01-21
Attending: INTERNAL MEDICINE
Payer: MEDICARE

## 2022-01-21 VITALS
SYSTOLIC BLOOD PRESSURE: 106 MMHG | HEIGHT: 71.85 IN | HEART RATE: 57 BPM | OXYGEN SATURATION: 98 % | TEMPERATURE: 96 F | RESPIRATION RATE: 18 BRPM | DIASTOLIC BLOOD PRESSURE: 65 MMHG | BODY MASS INDEX: 35.33 KG/M2 | WEIGHT: 258 LBS

## 2022-01-21 DIAGNOSIS — C90.00 MULTIPLE MYELOMA NOT HAVING ACHIEVED REMISSION (HCC): Primary | ICD-10-CM

## 2022-01-21 LAB
ALBUMIN SERPL-MCNC: 3.7 G/DL (ref 3.4–5)
ALBUMIN/GLOB SERPL: 1.3 {RATIO} (ref 1–2)
ALP LIVER SERPL-CCNC: 74 U/L
ALT SERPL-CCNC: 22 U/L
ANION GAP SERPL CALC-SCNC: 5 MMOL/L (ref 0–18)
AST SERPL-CCNC: 16 U/L (ref 15–37)
BASOPHILS # BLD AUTO: 0.02 X10(3) UL (ref 0–0.2)
BASOPHILS NFR BLD AUTO: 0.1 %
BILIRUB SERPL-MCNC: 0.8 MG/DL (ref 0.1–2)
BUN BLD-MCNC: 29 MG/DL (ref 7–18)
CALCIUM BLD-MCNC: 9.4 MG/DL (ref 8.5–10.1)
CHLORIDE SERPL-SCNC: 106 MMOL/L (ref 98–112)
CO2 SERPL-SCNC: 24 MMOL/L (ref 21–32)
CREAT BLD-MCNC: 1.43 MG/DL
EOSINOPHIL # BLD AUTO: 0 X10(3) UL (ref 0–0.7)
EOSINOPHIL NFR BLD AUTO: 0 %
ERYTHROCYTE [DISTWIDTH] IN BLOOD BY AUTOMATED COUNT: 12.9 %
FASTING STATUS PATIENT QL REPORTED: NO
GLOBULIN PLAS-MCNC: 2.9 G/DL (ref 2.8–4.4)
GLUCOSE BLD-MCNC: 158 MG/DL (ref 70–99)
HCT VFR BLD AUTO: 40.7 %
HGB BLD-MCNC: 13.7 G/DL
IMM GRANULOCYTES # BLD AUTO: 0.07 X10(3) UL (ref 0–1)
IMM GRANULOCYTES NFR BLD: 0.5 %
LYMPHOCYTES # BLD AUTO: 0.15 X10(3) UL (ref 1–4)
LYMPHOCYTES NFR BLD AUTO: 1 %
MCH RBC QN AUTO: 36.2 PG (ref 26–34)
MCHC RBC AUTO-ENTMCNC: 33.7 G/DL (ref 31–37)
MCV RBC AUTO: 107.7 FL
MONOCYTES # BLD AUTO: 0.06 X10(3) UL (ref 0.1–1)
MONOCYTES NFR BLD AUTO: 0.4 %
NEUTROPHILS # BLD AUTO: 14.7 X10 (3) UL (ref 1.5–7.7)
NEUTROPHILS # BLD AUTO: 14.7 X10(3) UL (ref 1.5–7.7)
NEUTROPHILS NFR BLD AUTO: 98 %
OSMOLALITY SERPL CALC.SUM OF ELEC: 289 MOSM/KG (ref 275–295)
PLATELET # BLD AUTO: 297 10(3)UL (ref 150–450)
POTASSIUM SERPL-SCNC: 4.2 MMOL/L (ref 3.5–5.1)
PROT SERPL-MCNC: 6.6 G/DL (ref 6.4–8.2)
RBC # BLD AUTO: 3.78 X10(6)UL
SODIUM SERPL-SCNC: 135 MMOL/L (ref 136–145)
WBC # BLD AUTO: 15 X10(3) UL (ref 4–11)

## 2022-01-21 PROCEDURE — 96401 CHEMO ANTI-NEOPL SQ/IM: CPT

## 2022-01-21 PROCEDURE — 96413 CHEMO IV INFUSION 1 HR: CPT

## 2022-01-21 PROCEDURE — 99214 OFFICE O/P EST MOD 30 MIN: CPT | Performed by: INTERNAL MEDICINE

## 2022-01-21 RX ORDER — ACETAMINOPHEN 325 MG/1
650 TABLET ORAL ONCE
Status: DISCONTINUED | OUTPATIENT
Start: 2022-01-21 | End: 2022-01-21

## 2022-01-21 RX ORDER — DEXAMETHASONE 4 MG/1
20 TABLET ORAL WEEKLY
Qty: 40 TABLET | Refills: 5 | Status: SHIPPED | OUTPATIENT
Start: 2022-01-21

## 2022-01-21 RX ORDER — DIPHENHYDRAMINE HYDROCHLORIDE 50 MG/ML
25 INJECTION INTRAMUSCULAR; INTRAVENOUS ONCE
Status: DISCONTINUED | OUTPATIENT
Start: 2022-01-21 | End: 2022-01-21

## 2022-01-21 NOTE — PROGRESS NOTES
Patient is here for follow up for myeloma and C14 of kyprolis and darzalex. He reports that he is feeling well. He took his decadron today. He also took his benadryl and tylenol premeds today at 1045.     He states that he has no numbness or tingling in

## 2022-01-21 NOTE — PROGRESS NOTES
Cancer Center Progress Note    Problem List:      Patient Active Problem List:     Impotence of organic origin     Generalized osteoarthrosis of hand     Morbid obesity ([address]     Essential hypertension     Hypertension     Multiple myeloma ([address]     Myelom ultrastructural changes, see addendum below for details.      Comment:     The clinical concern for amyloidosis is noted.   A Congo red stain performed with appropriate controls is negative for amyloid deposition, and a trichrome stain with appropriate cont BACK SURGERY  1/2001    lower back L4 L5 for herniated disc   • COLONOSCOPY      202, 2012 Dr. Yassine Rhoades, polyps   • OTHER SURGICAL HISTORY  1/31/2007    total prostatectomy   • SKIN SURGERY  1997    BCC       Family History Reviewed:  Family History   Proble 1 tablet by mouth., Disp: , Rfl:   acetaminophen 500 MG Oral Tab, Take 1,000 mg by mouth nightly., Disp: , Rfl:   Calcium Carbonate Antacid 500 MG Oral Chew Tab, Chew 2 tablets (1,000 mg total) by mouth 2 (two) times daily. , Disp: 90 tablet, Rfl: 0  aspiri Protein   6.4 - 8.2 g/dL 6.1 Low     Albumin   3.75 - 5.21 g/dL 4.09    Alpha-1-Globulins   0.19 - 0.46 g/dL 0.29    Alpha-2-Globulins   0.48 - 1.05 g/dL 0.81    Beta Globulins   0.68 - 1.23 g/dL 0.76    Gamma Globulins   0.62 - 1.70 g/dL 0.16 Low     Albu clinically.    =====  CONCLUSION:    1. Heterogeneous density and uptake throughout the osseous structures consistent with history of multiple myeloma.        Assessment/Plan:     Multiple myeloma:  Hypercalcemia  Acute renal failure  Anemia  Diffuse bone l

## 2022-01-21 NOTE — PROGRESS NOTES
Pt here for C14D1 Kyprolis.   Arrives Ambulating independently, accompanied by Self           Pregnancy screening: Not applicable    Modifications in dose or schedule: No     Frequency of blood return and site check throughout administration: Prior to [facility]

## 2022-01-27 LAB
ALBUMIN SERPL ELPH-MCNC: 4.18 G/DL (ref 3.75–5.21)
ALBUMIN/GLOB SERPL: 1.97 {RATIO} (ref 1–2)
ALPHA1 GLOB SERPL ELPH-MCNC: 0.32 G/DL (ref 0.19–0.46)
ALPHA2 GLOB SERPL ELPH-MCNC: 0.86 G/DL (ref 0.48–1.05)
B-GLOBULIN SERPL ELPH-MCNC: 0.7 G/DL (ref 0.68–1.23)
GAMMA GLOB SERPL ELPH-MCNC: 0.25 G/DL (ref 0.62–1.7)
KAPPA LC FREE SER-MCNC: 0.14 MG/DL (ref 0.33–1.94)
LAMBDA LC FREE SERPL-MCNC: <0.146 MG/DL (ref 0.57–2.63)
M PROTEIN 1 SERPL ELPH-MCNC: 0.06 G/DL (ref ?–0)
PROT SERPL-MCNC: 6.3 G/DL (ref 6.4–8.2)

## 2022-01-28 ENCOUNTER — OFFICE VISIT (OUTPATIENT)
Dept: HEMATOLOGY/ONCOLOGY | Age: 72
End: 2022-01-28
Attending: INTERNAL MEDICINE
Payer: MEDICARE

## 2022-01-28 VITALS
HEIGHT: 71.85 IN | RESPIRATION RATE: 18 BRPM | BODY MASS INDEX: 35.6 KG/M2 | DIASTOLIC BLOOD PRESSURE: 67 MMHG | SYSTOLIC BLOOD PRESSURE: 105 MMHG | TEMPERATURE: 97 F | WEIGHT: 260 LBS | HEART RATE: 55 BPM | OXYGEN SATURATION: 98 %

## 2022-01-28 DIAGNOSIS — C90.00 MULTIPLE MYELOMA NOT HAVING ACHIEVED REMISSION (HCC): ICD-10-CM

## 2022-01-28 DIAGNOSIS — C90.00 MULTIPLE MYELOMA, REMISSION STATUS UNSPECIFIED (HCC): Primary | ICD-10-CM

## 2022-01-28 LAB
BASOPHILS # BLD AUTO: 0.01 X10(3) UL (ref 0–0.2)
BASOPHILS NFR BLD AUTO: 0.1 %
EOSINOPHIL # BLD AUTO: 0.01 X10(3) UL (ref 0–0.7)
EOSINOPHIL NFR BLD AUTO: 0.1 %
ERYTHROCYTE [DISTWIDTH] IN BLOOD BY AUTOMATED COUNT: 12.8 %
HCT VFR BLD AUTO: 40.4 %
HGB BLD-MCNC: 13.5 G/DL
IMM GRANULOCYTES # BLD AUTO: 0.04 X10(3) UL (ref 0–1)
IMM GRANULOCYTES NFR BLD: 0.3 %
LYMPHOCYTES # BLD AUTO: 0.17 X10(3) UL (ref 1–4)
LYMPHOCYTES NFR BLD AUTO: 1.3 %
MCH RBC QN AUTO: 36 PG (ref 26–34)
MCHC RBC AUTO-ENTMCNC: 33.4 G/DL (ref 31–37)
MCV RBC AUTO: 107.7 FL
MONOCYTES # BLD AUTO: 0.1 X10(3) UL (ref 0.1–1)
MONOCYTES NFR BLD AUTO: 0.7 %
NEUTROPHILS # BLD AUTO: 13.13 X10 (3) UL (ref 1.5–7.7)
NEUTROPHILS # BLD AUTO: 13.13 X10(3) UL (ref 1.5–7.7)
NEUTROPHILS NFR BLD AUTO: 97.5 %
PLATELET # BLD AUTO: 130 10(3)UL (ref 150–450)
RBC # BLD AUTO: 3.75 X10(6)UL
WBC # BLD AUTO: 13.5 X10(3) UL (ref 4–11)

## 2022-01-28 PROCEDURE — 85025 COMPLETE CBC W/AUTO DIFF WBC: CPT

## 2022-01-28 PROCEDURE — 96413 CHEMO IV INFUSION 1 HR: CPT

## 2022-01-28 NOTE — PROGRESS NOTES
Pt here for C14D8.   Arrives Ambulating independently, accompanied by Self           Pregnancy screening: Not applicable    Modifications in dose or schedule: No     Frequency of blood return and site check throughout administration: Prior to administration

## 2022-02-02 ENCOUNTER — TELEPHONE (OUTPATIENT)
Dept: HEMATOLOGY/ONCOLOGY | Facility: HOSPITAL | Age: 72
End: 2022-02-02

## 2022-02-04 ENCOUNTER — OFFICE VISIT (OUTPATIENT)
Dept: HEMATOLOGY/ONCOLOGY | Age: 72
End: 2022-02-04
Attending: INTERNAL MEDICINE
Payer: MEDICARE

## 2022-02-04 VITALS
OXYGEN SATURATION: 97 % | RESPIRATION RATE: 18 BRPM | BODY MASS INDEX: 35.23 KG/M2 | DIASTOLIC BLOOD PRESSURE: 58 MMHG | WEIGHT: 257.31 LBS | TEMPERATURE: 97 F | SYSTOLIC BLOOD PRESSURE: 109 MMHG | HEIGHT: 71.85 IN | HEART RATE: 58 BPM

## 2022-02-04 DIAGNOSIS — C90.00 MULTIPLE MYELOMA NOT HAVING ACHIEVED REMISSION (HCC): ICD-10-CM

## 2022-02-04 DIAGNOSIS — C90.00 MULTIPLE MYELOMA, REMISSION STATUS UNSPECIFIED (HCC): Primary | ICD-10-CM

## 2022-02-04 LAB
ALBUMIN SERPL-MCNC: 4 G/DL (ref 3.4–5)
BASOPHILS # BLD AUTO: 0.01 X10(3) UL (ref 0–0.2)
BASOPHILS NFR BLD AUTO: 0.1 %
CALCIUM BLD-MCNC: 10.4 MG/DL (ref 8.5–10.1)
CREAT BLD-MCNC: 1.24 MG/DL
EOSINOPHIL # BLD AUTO: 0.01 X10(3) UL (ref 0–0.7)
EOSINOPHIL NFR BLD AUTO: 0.1 %
ERYTHROCYTE [DISTWIDTH] IN BLOOD BY AUTOMATED COUNT: 13 %
HCT VFR BLD AUTO: 40.5 %
HGB BLD-MCNC: 13.8 G/DL
IMM GRANULOCYTES # BLD AUTO: 0.06 X10(3) UL (ref 0–1)
LYMPHOCYTES # BLD AUTO: 0.14 X10(3) UL (ref 1–4)
LYMPHOCYTES NFR BLD AUTO: 1 %
MCH RBC QN AUTO: 35.9 PG (ref 26–34)
MCHC RBC AUTO-ENTMCNC: 34.1 G/DL (ref 31–37)
MCV RBC AUTO: 105.5 FL
MONOCYTES # BLD AUTO: 0.1 X10(3) UL (ref 0.1–1)
MONOCYTES NFR BLD AUTO: 0.7 %
NEUTROPHILS # BLD AUTO: 13.37 X10 (3) UL (ref 1.5–7.7)
NEUTROPHILS # BLD AUTO: 13.37 X10(3) UL (ref 1.5–7.7)
NEUTROPHILS NFR BLD AUTO: 97.7 %
PLATELET # BLD AUTO: 158 10(3)UL (ref 150–450)
RBC # BLD AUTO: 3.84 X10(6)UL
WBC # BLD AUTO: 13.7 X10(3) UL (ref 4–11)

## 2022-02-04 PROCEDURE — 96413 CHEMO IV INFUSION 1 HR: CPT

## 2022-02-04 PROCEDURE — 82040 ASSAY OF SERUM ALBUMIN: CPT

## 2022-02-04 PROCEDURE — 82310 ASSAY OF CALCIUM: CPT

## 2022-02-04 PROCEDURE — 85025 COMPLETE CBC W/AUTO DIFF WBC: CPT

## 2022-02-04 PROCEDURE — 82565 ASSAY OF CREATININE: CPT

## 2022-02-04 PROCEDURE — 96375 TX/PRO/DX INJ NEW DRUG ADDON: CPT

## 2022-02-04 PROCEDURE — 36415 COLL VENOUS BLD VENIPUNCTURE: CPT

## 2022-02-08 ENCOUNTER — TELEPHONE (OUTPATIENT)
Dept: HEMATOLOGY/ONCOLOGY | Age: 72
End: 2022-02-08

## 2022-02-08 NOTE — TELEPHONE ENCOUNTER
----- Message from Lukas Roque RN sent at 2/2/2022  1:19 PM CST -----  Please schedule patient for Consuelo Li  ----- Message -----  From: Gee Valdivia MD  Sent: 2/2/2022  11:23 AM CST  To: Peyton Garcia    Discussed and ordered dose; ok to call to arrange.

## 2022-02-10 ENCOUNTER — OFFICE VISIT (OUTPATIENT)
Dept: HEMATOLOGY/ONCOLOGY | Age: 72
End: 2022-02-10
Attending: INTERNAL MEDICINE
Payer: MEDICARE

## 2022-02-10 VITALS
RESPIRATION RATE: 18 BRPM | OXYGEN SATURATION: 98 % | SYSTOLIC BLOOD PRESSURE: 109 MMHG | TEMPERATURE: 98 F | HEART RATE: 54 BPM | DIASTOLIC BLOOD PRESSURE: 58 MMHG

## 2022-02-10 DIAGNOSIS — C90.00 MULTIPLE MYELOMA, REMISSION STATUS UNSPECIFIED (HCC): Primary | ICD-10-CM

## 2022-02-10 NOTE — PROGRESS NOTES
Education Record    Learner:  Patient    Disease / Diagnosis: pre-exposure prophylaxis for prevention of COVID-19    Barriers / Limitations:  None   Comments:    Method:  Discussion and EVUSHELD Fact Sheet   Comments:    General Topics:  Medication, Procedure and Side effects and symptom management   Comments:    Outcome:  Shows understanding   Comments:    Evusheld given into bilateral upper outer gluteal muscles. Patient monitored for 60 minutes post injections and no s/sx of adverse reaction. Pt dc home ambulatory in stable condition, no complaints. AVS provided.

## 2022-02-14 ENCOUNTER — TELEPHONE (OUTPATIENT)
Dept: FAMILY MEDICINE CLINIC | Facility: CLINIC | Age: 72
End: 2022-02-14

## 2022-02-14 NOTE — TELEPHONE ENCOUNTER
PARVINML and advised patient lab orders were placed on 12/2/21 to be done in the future. Instructed to have labs done prior to upcoming appt on 2/24/22.

## 2022-02-14 NOTE — TELEPHONE ENCOUNTER
Dr. OMI DIEZ,  Please advise if you want labs prior to appt. Does he need to F/U with you now or in April?       LOV 12/2/21   DM   Follow-up in 4 months    Last Labs 2/4/22   CBC    1/21/22   CBC, CMP    12/1/21  CBC, CMP, Lipid,TSH, A1c 5.2,  Vit D 27.7    Future Appointments   Date Time Provider Bo Garciaisti   2/18/2022 10:45 AM Jeremy Henrietta Bonilla MD PF HEM ONC [place]   2/18/2022 11:00 AM PF TX RN2 PF CHEMO I [place]   2/22/2022  3:00 PM Johnathan Szymanski MD [id number] [address]   2/24/2022 11:00 AM Matt Romo MD [facility] [address]

## 2022-02-14 NOTE — TELEPHONE ENCOUNTER
Patient scheduled 2/24 diabetic f/u. He would like to know if he needs to do labs prior to appt. Please advise if Dr. Azucena Baig will place orders and notify patient.

## 2022-02-15 ENCOUNTER — TELEPHONE (OUTPATIENT)
Dept: NEPHROLOGY | Facility: CLINIC | Age: 72
End: 2022-02-15

## 2022-02-17 ENCOUNTER — LAB ENCOUNTER (OUTPATIENT)
Dept: LAB | Age: 72
End: 2022-02-17
Attending: INTERNAL MEDICINE
Payer: MEDICARE

## 2022-02-17 DIAGNOSIS — E11.9 TYPE 2 DIABETES MELLITUS WITHOUT COMPLICATION, WITHOUT LONG-TERM CURRENT USE OF INSULIN (HCC): ICD-10-CM

## 2022-02-17 DIAGNOSIS — N18.9 CHRONIC KIDNEY DISEASE, UNSPECIFIED CKD STAGE: ICD-10-CM

## 2022-02-17 LAB
ALBUMIN SERPL-MCNC: 3.7 G/DL (ref 3.4–5)
ALBUMIN/GLOB SERPL: 1.8 {RATIO} (ref 1–2)
ALT SERPL-CCNC: 20 U/L
ANION GAP SERPL CALC-SCNC: 5 MMOL/L (ref 0–18)
AST SERPL-CCNC: 16 U/L (ref 15–37)
BASOPHILS # BLD AUTO: 0.03 X10(3) UL (ref 0–0.2)
BASOPHILS NFR BLD AUTO: 0.4 %
BILIRUB SERPL-MCNC: 0.8 MG/DL (ref 0.1–2)
BILIRUB UR QL STRIP.AUTO: NEGATIVE
BUN BLD-MCNC: 34 MG/DL (ref 7–18)
CALCIUM BLD-MCNC: 9.1 MG/DL (ref 8.5–10.1)
CHLORIDE SERPL-SCNC: 110 MMOL/L (ref 98–112)
CO2 SERPL-SCNC: 26 MMOL/L (ref 21–32)
COLOR UR AUTO: YELLOW
CREAT BLD-MCNC: 1.22 MG/DL
CREAT UR-SCNC: 123 MG/DL
EOSINOPHIL # BLD AUTO: 0.12 X10(3) UL (ref 0–0.7)
EOSINOPHIL NFR BLD AUTO: 1.6 %
ERYTHROCYTE [DISTWIDTH] IN BLOOD BY AUTOMATED COUNT: 12.9 %
EST. AVERAGE GLUCOSE BLD GHB EST-MCNC: 108 MG/DL (ref 68–126)
FASTING STATUS PATIENT QL REPORTED: YES
GLOBULIN PLAS-MCNC: 2.1 G/DL (ref 2.8–4.4)
GLUCOSE BLD-MCNC: 132 MG/DL (ref 70–99)
HBA1C MFR BLD: 5.4 % (ref ?–5.7)
HCT VFR BLD AUTO: 40.4 %
HGB BLD-MCNC: 12.7 G/DL
HYALINE CASTS #/AREA URNS AUTO: PRESENT /LPF
IMM GRANULOCYTES # BLD AUTO: 0.02 X10(3) UL (ref 0–1)
IMM GRANULOCYTES NFR BLD: 0.3 %
KETONES UR STRIP.AUTO-MCNC: NEGATIVE MG/DL
LYMPHOCYTES # BLD AUTO: 0.43 X10(3) UL (ref 1–4)
LYMPHOCYTES NFR BLD AUTO: 5.6 %
MAGNESIUM SERPL-MCNC: 2.5 MG/DL (ref 1.6–2.6)
MCH RBC QN AUTO: 34.7 PG (ref 26–34)
MCHC RBC AUTO-ENTMCNC: 31.4 G/DL (ref 31–37)
MCV RBC AUTO: 110.4 FL
MONOCYTES # BLD AUTO: 0.83 X10(3) UL (ref 0.1–1)
MONOCYTES NFR BLD AUTO: 10.7 %
NEUTROPHILS # BLD AUTO: 6.3 X10 (3) UL (ref 1.5–7.7)
NEUTROPHILS # BLD AUTO: 6.3 X10(3) UL (ref 1.5–7.7)
NEUTROPHILS NFR BLD AUTO: 81.4 %
NITRITE UR QL STRIP.AUTO: POSITIVE
OSMOLALITY SERPL CALC.SUM OF ELEC: 301 MOSM/KG (ref 275–295)
PH UR STRIP.AUTO: 5 [PH] (ref 5–8)
PHOSPHATE SERPL-MCNC: 2.6 MG/DL (ref 2.5–4.9)
PLATELET # BLD AUTO: 301 10(3)UL (ref 150–450)
POTASSIUM SERPL-SCNC: 4.3 MMOL/L (ref 3.5–5.1)
PROT SERPL-MCNC: 5.8 G/DL (ref 6.4–8.2)
PROT UR STRIP.AUTO-MCNC: NEGATIVE MG/DL
PROT UR-MCNC: 29 MG/DL
RBC # BLD AUTO: 3.66 X10(6)UL
RBC UR QL AUTO: NEGATIVE
SODIUM SERPL-SCNC: 141 MMOL/L (ref 136–145)
SP GR UR STRIP.AUTO: 1.02 (ref 1–1.03)
UROBILINOGEN UR STRIP.AUTO-MCNC: <2 MG/DL
WBC # BLD AUTO: 7.7 X10(3) UL (ref 4–11)
WBC #/AREA URNS AUTO: >50 /HPF

## 2022-02-17 PROCEDURE — 81001 URINALYSIS AUTO W/SCOPE: CPT

## 2022-02-17 PROCEDURE — 82570 ASSAY OF URINE CREATININE: CPT

## 2022-02-17 PROCEDURE — 84100 ASSAY OF PHOSPHORUS: CPT

## 2022-02-17 PROCEDURE — 3044F HG A1C LEVEL LT 7.0%: CPT | Performed by: INTERNAL MEDICINE

## 2022-02-17 PROCEDURE — 84156 ASSAY OF PROTEIN URINE: CPT

## 2022-02-17 PROCEDURE — 83735 ASSAY OF MAGNESIUM: CPT

## 2022-02-17 PROCEDURE — 85025 COMPLETE CBC W/AUTO DIFF WBC: CPT

## 2022-02-17 PROCEDURE — 36415 COLL VENOUS BLD VENIPUNCTURE: CPT

## 2022-02-17 PROCEDURE — 83036 HEMOGLOBIN GLYCOSYLATED A1C: CPT

## 2022-02-17 PROCEDURE — 80053 COMPREHEN METABOLIC PANEL: CPT

## 2022-02-18 ENCOUNTER — OFFICE VISIT (OUTPATIENT)
Dept: HEMATOLOGY/ONCOLOGY | Age: 72
End: 2022-02-18
Attending: INTERNAL MEDICINE
Payer: MEDICARE

## 2022-02-18 VITALS
OXYGEN SATURATION: 98 % | TEMPERATURE: 97 F | RESPIRATION RATE: 18 BRPM | DIASTOLIC BLOOD PRESSURE: 56 MMHG | HEART RATE: 44 BPM | SYSTOLIC BLOOD PRESSURE: 93 MMHG

## 2022-02-18 VITALS
OXYGEN SATURATION: 98 % | BODY MASS INDEX: 36 KG/M2 | WEIGHT: 262.38 LBS | HEART RATE: 53 BPM | TEMPERATURE: 97 F | SYSTOLIC BLOOD PRESSURE: 88 MMHG | DIASTOLIC BLOOD PRESSURE: 53 MMHG

## 2022-02-18 DIAGNOSIS — C90.00 MULTIPLE MYELOMA NOT HAVING ACHIEVED REMISSION (HCC): Primary | ICD-10-CM

## 2022-02-18 PROCEDURE — 99214 OFFICE O/P EST MOD 30 MIN: CPT | Performed by: INTERNAL MEDICINE

## 2022-02-18 PROCEDURE — 96413 CHEMO IV INFUSION 1 HR: CPT

## 2022-02-18 PROCEDURE — 96401 CHEMO ANTI-NEOPL SQ/IM: CPT

## 2022-02-18 RX ORDER — DIPHENHYDRAMINE HYDROCHLORIDE 50 MG/ML
25 INJECTION INTRAMUSCULAR; INTRAVENOUS ONCE
Status: DISCONTINUED | OUTPATIENT
Start: 2022-02-18 | End: 2022-02-18

## 2022-02-18 RX ORDER — ACETAMINOPHEN 325 MG/1
650 TABLET ORAL ONCE
Status: CANCELLED | OUTPATIENT
Start: 2022-02-18

## 2022-02-18 RX ORDER — DIPHENHYDRAMINE HYDROCHLORIDE 50 MG/ML
25 INJECTION INTRAMUSCULAR; INTRAVENOUS ONCE
Status: CANCELLED | OUTPATIENT
Start: 2022-02-18

## 2022-02-18 RX ORDER — ACETAMINOPHEN 325 MG/1
650 TABLET ORAL ONCE
Status: DISCONTINUED | OUTPATIENT
Start: 2022-02-18 | End: 2022-02-18

## 2022-02-18 NOTE — PROGRESS NOTES
Pt here for C15D1. Arrives Ambulating independently, accompanied by Self           Pregnancy screening: Not applicable    Modifications in dose or schedule: No     Frequency of blood return and site check throughout administration: Prior to administration and At completion of therapy   Discharged to Home, Ambulating independently, accompanied by:Self    Outpatient Oncology Care Plan  Problem list:  knowledge deficit  Problems related to:    chemotherapy  Interventions:  emotional support given  maintain safe environment  patient/family supported  chemotherapy teaching  monitor effect of therapy  monitor lab values  promoted rest  provided general teaching  Expected outcomes:  understands plan of care  Progress towards outcome:  making progress    Education Record    Learner:  Patient  Barriers / Limitations:  None  Method:  Discussion  Outcome:  Shows understanding  Comments: Patient ambulatory with no complaints. Denies pain, sob, dizziness. Tolerated treatment today. Monitored for 30 min post faspro. VSS. AVS given. Patient discharged to home in stable condition.

## 2022-02-18 NOTE — PROGRESS NOTES
Patient is here for follow up for myeloma. He took his benadryl and tylenol this morning at 10 am.  He takes his decadron prior to the treatment as well. He reports that he is feeling well. He offers no complaints. He does not currently have any pain. He has intermittent back pain. He denies fever, dyspnea or cough. He had his Evusheld injection last week and had no problems with these injections. His energy and appetite are good.      Education Record    Learner:  Patient    Disease / Diagnosis: Myeloma    Barriers / Limitations:  None   Comments:    Method:  Discussion   Comments:    General Topics:  Side effects and symptom management   Comments:    Outcome:  Shows understanding   Comments:

## 2022-02-22 ENCOUNTER — OFFICE VISIT (OUTPATIENT)
Dept: NEPHROLOGY | Facility: CLINIC | Age: 72
End: 2022-02-22
Payer: MEDICARE

## 2022-02-22 VITALS — BODY MASS INDEX: 36 KG/M2 | WEIGHT: 262 LBS | DIASTOLIC BLOOD PRESSURE: 74 MMHG | SYSTOLIC BLOOD PRESSURE: 126 MMHG

## 2022-02-22 DIAGNOSIS — N30.00 ACUTE CYSTITIS WITHOUT HEMATURIA: Primary | ICD-10-CM

## 2022-02-22 PROCEDURE — 3074F SYST BP LT 130 MM HG: CPT | Performed by: INTERNAL MEDICINE

## 2022-02-22 PROCEDURE — 99214 OFFICE O/P EST MOD 30 MIN: CPT | Performed by: INTERNAL MEDICINE

## 2022-02-22 PROCEDURE — 3078F DIAST BP <80 MM HG: CPT | Performed by: INTERNAL MEDICINE

## 2022-02-23 ENCOUNTER — LAB ENCOUNTER (OUTPATIENT)
Dept: LAB | Age: 72
End: 2022-02-23
Attending: INTERNAL MEDICINE
Payer: MEDICARE

## 2022-02-23 DIAGNOSIS — N30.00 ACUTE CYSTITIS WITHOUT HEMATURIA: ICD-10-CM

## 2022-02-23 DIAGNOSIS — N18.30 STAGE 3 CHRONIC KIDNEY DISEASE, UNSPECIFIED WHETHER STAGE 3A OR 3B CKD (HCC): ICD-10-CM

## 2022-02-23 LAB
BILIRUB UR QL STRIP.AUTO: NEGATIVE
CLARITY UR REFRACT.AUTO: CLEAR
CREAT UR-SCNC: 16.1 MG/DL
GLUCOSE UR STRIP.AUTO-MCNC: >=500 MG/DL
KETONES UR STRIP.AUTO-MCNC: NEGATIVE MG/DL
NITRITE UR QL STRIP.AUTO: NEGATIVE
PH UR STRIP.AUTO: 5 [PH] (ref 5–8)
PROT UR STRIP.AUTO-MCNC: NEGATIVE MG/DL
PROT UR-MCNC: 6.6 MG/DL
RBC UR QL AUTO: NEGATIVE
SP GR UR STRIP.AUTO: 1.01 (ref 1–1.03)
UROBILINOGEN UR STRIP.AUTO-MCNC: <2 MG/DL

## 2022-02-23 PROCEDURE — 87186 SC STD MICRODIL/AGAR DIL: CPT

## 2022-02-23 PROCEDURE — 82570 ASSAY OF URINE CREATININE: CPT

## 2022-02-23 PROCEDURE — 87086 URINE CULTURE/COLONY COUNT: CPT

## 2022-02-23 PROCEDURE — 81001 URINALYSIS AUTO W/SCOPE: CPT

## 2022-02-23 PROCEDURE — 87077 CULTURE AEROBIC IDENTIFY: CPT

## 2022-02-23 PROCEDURE — 87088 URINE BACTERIA CULTURE: CPT

## 2022-02-23 PROCEDURE — 84156 ASSAY OF PROTEIN URINE: CPT

## 2022-02-24 ENCOUNTER — OFFICE VISIT (OUTPATIENT)
Dept: FAMILY MEDICINE CLINIC | Facility: CLINIC | Age: 72
End: 2022-02-24
Payer: MEDICARE

## 2022-02-24 VITALS
BODY MASS INDEX: 35.42 KG/M2 | OXYGEN SATURATION: 98 % | SYSTOLIC BLOOD PRESSURE: 106 MMHG | DIASTOLIC BLOOD PRESSURE: 58 MMHG | HEART RATE: 54 BPM | HEIGHT: 72 IN | RESPIRATION RATE: 18 BRPM | WEIGHT: 261.5 LBS | TEMPERATURE: 97 F

## 2022-02-24 DIAGNOSIS — Z94.84 HX OF STEM CELL TRANSPLANT (HCC): ICD-10-CM

## 2022-02-24 DIAGNOSIS — E66.01 MORBID OBESITY (HCC): ICD-10-CM

## 2022-02-24 DIAGNOSIS — I42.9 CARDIOMYOPATHY, UNSPECIFIED TYPE (HCC): ICD-10-CM

## 2022-02-24 DIAGNOSIS — E11.9 TYPE 2 DIABETES MELLITUS WITHOUT COMPLICATION, WITHOUT LONG-TERM CURRENT USE OF INSULIN (HCC): Primary | ICD-10-CM

## 2022-02-24 DIAGNOSIS — Z12.11 SCREENING FOR COLON CANCER: ICD-10-CM

## 2022-02-24 LAB
ALBUMIN SERPL ELPH-MCNC: 4.12 G/DL (ref 3.75–5.21)
ALBUMIN/GLOB SERPL: 2.19 {RATIO} (ref 1–2)
ALPHA1 GLOB SERPL ELPH-MCNC: 0.29 G/DL (ref 0.19–0.46)
ALPHA2 GLOB SERPL ELPH-MCNC: 0.74 G/DL (ref 0.48–1.05)
B-GLOBULIN SERPL ELPH-MCNC: 0.65 G/DL (ref 0.68–1.23)
GAMMA GLOB SERPL ELPH-MCNC: 0.2 G/DL (ref 0.62–1.7)
KAPPA LC FREE SER-MCNC: 0.15 MG/DL (ref 0.33–1.94)
LAMBDA LC FREE SERPL-MCNC: <0.145 MG/DL (ref 0.57–2.63)
M PROTEIN 1 SERPL ELPH-MCNC: 0.05 G/DL (ref ?–0)
PROT SERPL-MCNC: 6 G/DL (ref 6.4–8.2)

## 2022-02-24 PROCEDURE — 3008F BODY MASS INDEX DOCD: CPT | Performed by: FAMILY MEDICINE

## 2022-02-24 PROCEDURE — 3074F SYST BP LT 130 MM HG: CPT | Performed by: FAMILY MEDICINE

## 2022-02-24 PROCEDURE — 3078F DIAST BP <80 MM HG: CPT | Performed by: FAMILY MEDICINE

## 2022-02-24 PROCEDURE — 99214 OFFICE O/P EST MOD 30 MIN: CPT | Performed by: FAMILY MEDICINE

## 2022-02-25 ENCOUNTER — OFFICE VISIT (OUTPATIENT)
Dept: HEMATOLOGY/ONCOLOGY | Age: 72
End: 2022-02-25
Attending: INTERNAL MEDICINE
Payer: MEDICARE

## 2022-02-25 VITALS
HEART RATE: 61 BPM | SYSTOLIC BLOOD PRESSURE: 100 MMHG | HEIGHT: 72 IN | TEMPERATURE: 97 F | DIASTOLIC BLOOD PRESSURE: 64 MMHG | OXYGEN SATURATION: 98 % | WEIGHT: 260 LBS | BODY MASS INDEX: 35.21 KG/M2

## 2022-02-25 DIAGNOSIS — C90.00 MULTIPLE MYELOMA NOT HAVING ACHIEVED REMISSION (HCC): Primary | ICD-10-CM

## 2022-02-25 LAB
BASOPHILS # BLD AUTO: 0.02 X10(3) UL (ref 0–0.2)
BASOPHILS NFR BLD AUTO: 0.2 %
EOSINOPHIL # BLD AUTO: 0 X10(3) UL (ref 0–0.7)
EOSINOPHIL NFR BLD AUTO: 0 %
ERYTHROCYTE [DISTWIDTH] IN BLOOD BY AUTOMATED COUNT: 13.2 %
HCT VFR BLD AUTO: 40.4 %
HGB BLD-MCNC: 13.2 G/DL
IMM GRANULOCYTES # BLD AUTO: 0.04 X10(3) UL (ref 0–1)
IMM GRANULOCYTES NFR BLD: 0.3 %
LYMPHOCYTES # BLD AUTO: 0.13 X10(3) UL (ref 1–4)
LYMPHOCYTES NFR BLD AUTO: 1 %
MCH RBC QN AUTO: 35.3 PG (ref 26–34)
MCHC RBC AUTO-ENTMCNC: 32.7 G/DL (ref 31–37)
MCV RBC AUTO: 108 FL
MONOCYTES # BLD AUTO: 0.09 X10(3) UL (ref 0.1–1)
MONOCYTES NFR BLD AUTO: 0.7 %
NEUTROPHILS # BLD AUTO: 12.54 X10 (3) UL (ref 1.5–7.7)
NEUTROPHILS # BLD AUTO: 12.54 X10(3) UL (ref 1.5–7.7)
NEUTROPHILS NFR BLD AUTO: 97.8 %
PLATELET # BLD AUTO: 133 10(3)UL (ref 150–450)
RBC # BLD AUTO: 3.74 X10(6)UL
WBC # BLD AUTO: 12.8 X10(3) UL (ref 4–11)

## 2022-02-25 PROCEDURE — 96413 CHEMO IV INFUSION 1 HR: CPT

## 2022-02-25 PROCEDURE — 36415 COLL VENOUS BLD VENIPUNCTURE: CPT

## 2022-02-25 PROCEDURE — 85025 COMPLETE CBC W/AUTO DIFF WBC: CPT

## 2022-03-03 ENCOUNTER — TELEPHONE (OUTPATIENT)
Dept: NEPHROLOGY | Facility: CLINIC | Age: 72
End: 2022-03-03

## 2022-03-03 RX ORDER — CIPROFLOXACIN 250 MG/1
250 TABLET, FILM COATED ORAL 2 TIMES DAILY
Qty: 14 TABLET | Refills: 0 | Status: SHIPPED | OUTPATIENT
Start: 2022-03-03 | End: 2022-03-18 | Stop reason: ALTCHOICE

## 2022-03-04 ENCOUNTER — OFFICE VISIT (OUTPATIENT)
Dept: HEMATOLOGY/ONCOLOGY | Age: 72
End: 2022-03-04
Attending: INTERNAL MEDICINE
Payer: MEDICARE

## 2022-03-04 VITALS
RESPIRATION RATE: 18 BRPM | SYSTOLIC BLOOD PRESSURE: 91 MMHG | BODY MASS INDEX: 34.4 KG/M2 | DIASTOLIC BLOOD PRESSURE: 40 MMHG | HEART RATE: 63 BPM | WEIGHT: 254 LBS | OXYGEN SATURATION: 97 % | HEIGHT: 72.01 IN | TEMPERATURE: 97 F

## 2022-03-04 DIAGNOSIS — C90.00 MULTIPLE MYELOMA, REMISSION STATUS UNSPECIFIED (HCC): Primary | ICD-10-CM

## 2022-03-04 DIAGNOSIS — C90.00 MULTIPLE MYELOMA NOT HAVING ACHIEVED REMISSION (HCC): ICD-10-CM

## 2022-03-04 LAB
ALBUMIN SERPL-MCNC: 3.8 G/DL (ref 3.4–5)
BASOPHILS # BLD AUTO: 0 X10(3) UL (ref 0–0.2)
BASOPHILS NFR BLD AUTO: 0 %
CALCIUM BLD-MCNC: 9.2 MG/DL (ref 8.5–10.1)
CREAT BLD-MCNC: 1.4 MG/DL
EOSINOPHIL # BLD AUTO: 0 X10(3) UL (ref 0–0.7)
EOSINOPHIL NFR BLD AUTO: 0 %
ERYTHROCYTE [DISTWIDTH] IN BLOOD BY AUTOMATED COUNT: 12.8 %
HCT VFR BLD AUTO: 41.7 %
HGB BLD-MCNC: 13.7 G/DL
IMM GRANULOCYTES # BLD AUTO: 0.06 X10(3) UL (ref 0–1)
IMM GRANULOCYTES NFR BLD: 0.5 %
LYMPHOCYTES # BLD AUTO: 0.14 X10(3) UL (ref 1–4)
LYMPHOCYTES NFR BLD AUTO: 1.1 %
MCH RBC QN AUTO: 35.1 PG (ref 26–34)
MCHC RBC AUTO-ENTMCNC: 32.9 G/DL (ref 31–37)
MCV RBC AUTO: 106.9 FL
MONOCYTES # BLD AUTO: 0.06 X10(3) UL (ref 0.1–1)
MONOCYTES NFR BLD AUTO: 0.5 %
NEUTROPHILS # BLD AUTO: 13.04 X10 (3) UL (ref 1.5–7.7)
NEUTROPHILS # BLD AUTO: 13.04 X10(3) UL (ref 1.5–7.7)
NEUTROPHILS NFR BLD AUTO: 97.9 %
PLATELET # BLD AUTO: 142 10(3)UL (ref 150–450)
RBC # BLD AUTO: 3.9 X10(6)UL
WBC # BLD AUTO: 13.3 X10(3) UL (ref 4–11)

## 2022-03-04 PROCEDURE — 85025 COMPLETE CBC W/AUTO DIFF WBC: CPT

## 2022-03-04 PROCEDURE — 96413 CHEMO IV INFUSION 1 HR: CPT

## 2022-03-04 PROCEDURE — 96375 TX/PRO/DX INJ NEW DRUG ADDON: CPT

## 2022-03-04 PROCEDURE — 82565 ASSAY OF CREATININE: CPT

## 2022-03-04 PROCEDURE — 82310 ASSAY OF CALCIUM: CPT

## 2022-03-04 PROCEDURE — 36415 COLL VENOUS BLD VENIPUNCTURE: CPT

## 2022-03-04 PROCEDURE — 82040 ASSAY OF SERUM ALBUMIN: CPT

## 2022-03-16 ENCOUNTER — OFFICE VISIT (OUTPATIENT)
Dept: SURGERY | Facility: CLINIC | Age: 72
End: 2022-03-16
Payer: MEDICARE

## 2022-03-16 VITALS
HEART RATE: 57 BPM | SYSTOLIC BLOOD PRESSURE: 106 MMHG | TEMPERATURE: 97 F | BODY MASS INDEX: 34.25 KG/M2 | DIASTOLIC BLOOD PRESSURE: 61 MMHG | HEIGHT: 72 IN | WEIGHT: 252.88 LBS

## 2022-03-16 DIAGNOSIS — K80.12 CALCULUS OF GALLBLADDER WITH ACUTE ON CHRONIC CHOLECYSTITIS WITHOUT OBSTRUCTION: Primary | ICD-10-CM

## 2022-03-16 PROCEDURE — 99204 OFFICE O/P NEW MOD 45 MIN: CPT | Performed by: SURGERY

## 2022-03-16 RX ORDER — POLYETHYLENE GLYCOL 3350, SODIUM CHLORIDE, SODIUM BICARBONATE, POTASSIUM CHLORIDE 420; 11.2; 5.72; 1.48 G/4L; G/4L; G/4L; G/4L
POWDER, FOR SOLUTION ORAL
Qty: 1 EACH | Refills: 0 | Status: SHIPPED | OUTPATIENT
Start: 2022-03-16

## 2022-03-18 ENCOUNTER — OFFICE VISIT (OUTPATIENT)
Dept: HEMATOLOGY/ONCOLOGY | Age: 72
End: 2022-03-18
Attending: INTERNAL MEDICINE
Payer: MEDICARE

## 2022-03-18 VITALS
TEMPERATURE: 98 F | SYSTOLIC BLOOD PRESSURE: 127 MMHG | HEIGHT: 72.01 IN | OXYGEN SATURATION: 97 % | DIASTOLIC BLOOD PRESSURE: 72 MMHG | BODY MASS INDEX: 35.49 KG/M2 | WEIGHT: 262 LBS | HEART RATE: 50 BPM

## 2022-03-18 VITALS — HEART RATE: 51 BPM | RESPIRATION RATE: 16 BRPM | DIASTOLIC BLOOD PRESSURE: 68 MMHG | SYSTOLIC BLOOD PRESSURE: 99 MMHG

## 2022-03-18 DIAGNOSIS — C90.00 MULTIPLE MYELOMA NOT HAVING ACHIEVED REMISSION (HCC): Primary | ICD-10-CM

## 2022-03-18 DIAGNOSIS — D63.0 ANEMIA COMPLICATING NEOPLASTIC DISEASE: ICD-10-CM

## 2022-03-18 LAB
ALBUMIN SERPL-MCNC: 3.8 G/DL (ref 3.4–5)
ALBUMIN/GLOB SERPL: 1.4 {RATIO} (ref 1–2)
ALP LIVER SERPL-CCNC: 77 U/L
ALT SERPL-CCNC: 26 U/L
ANION GAP SERPL CALC-SCNC: 7 MMOL/L (ref 0–18)
AST SERPL-CCNC: 15 U/L (ref 15–37)
BASOPHILS # BLD AUTO: 0.03 X10(3) UL (ref 0–0.2)
BILIRUB SERPL-MCNC: 0.7 MG/DL (ref 0.1–2)
BUN BLD-MCNC: 33 MG/DL (ref 7–18)
CALCIUM BLD-MCNC: 9.1 MG/DL (ref 8.5–10.1)
CHLORIDE SERPL-SCNC: 106 MMOL/L (ref 98–112)
CO2 SERPL-SCNC: 22 MMOL/L (ref 21–32)
CREAT BLD-MCNC: 1.17 MG/DL
EOSINOPHIL # BLD AUTO: 0.01 X10(3) UL (ref 0–0.7)
EOSINOPHIL NFR BLD AUTO: 0.1 %
ERYTHROCYTE [DISTWIDTH] IN BLOOD BY AUTOMATED COUNT: 12.6 %
FASTING STATUS PATIENT QL REPORTED: NO
GLOBULIN PLAS-MCNC: 2.7 G/DL (ref 2.8–4.4)
GLUCOSE BLD-MCNC: 159 MG/DL (ref 70–99)
HCT VFR BLD AUTO: 41.1 %
HGB BLD-MCNC: 13.6 G/DL
IMM GRANULOCYTES # BLD AUTO: 0.06 X10(3) UL (ref 0–1)
IMM GRANULOCYTES NFR BLD: 0.4 %
LYMPHOCYTES # BLD AUTO: 0.18 X10(3) UL (ref 1–4)
LYMPHOCYTES NFR BLD AUTO: 1.2 %
MCH RBC QN AUTO: 35.3 PG (ref 26–34)
MCHC RBC AUTO-ENTMCNC: 33.1 G/DL (ref 31–37)
MCV RBC AUTO: 106.8 FL
MONOCYTES # BLD AUTO: 0.1 X10(3) UL (ref 0.1–1)
MONOCYTES NFR BLD AUTO: 0.7 %
NEUTROPHILS # BLD AUTO: 14.18 X10 (3) UL (ref 1.5–7.7)
NEUTROPHILS # BLD AUTO: 14.18 X10(3) UL (ref 1.5–7.7)
NEUTROPHILS NFR BLD AUTO: 97.4 %
PLATELET # BLD AUTO: 265 10(3)UL (ref 150–450)
POTASSIUM SERPL-SCNC: 4.2 MMOL/L (ref 3.5–5.1)
PROT SERPL-MCNC: 6.5 G/DL (ref 6.4–8.2)
RBC # BLD AUTO: 3.85 X10(6)UL
SODIUM SERPL-SCNC: 135 MMOL/L (ref 136–145)
WBC # BLD AUTO: 14.6 X10(3) UL (ref 4–11)

## 2022-03-18 PROCEDURE — 96401 CHEMO ANTI-NEOPL SQ/IM: CPT

## 2022-03-18 PROCEDURE — 96413 CHEMO IV INFUSION 1 HR: CPT

## 2022-03-18 PROCEDURE — 99214 OFFICE O/P EST MOD 30 MIN: CPT | Performed by: INTERNAL MEDICINE

## 2022-03-18 RX ORDER — ACETAMINOPHEN 325 MG/1
650 TABLET ORAL ONCE
Status: CANCELLED | OUTPATIENT
Start: 2022-03-18

## 2022-03-18 RX ORDER — DIPHENHYDRAMINE HYDROCHLORIDE 50 MG/ML
25 INJECTION INTRAMUSCULAR; INTRAVENOUS ONCE
Status: CANCELLED | OUTPATIENT
Start: 2022-03-18

## 2022-03-18 RX ORDER — ACETAMINOPHEN 325 MG/1
650 TABLET ORAL ONCE
Status: DISCONTINUED | OUTPATIENT
Start: 2022-03-18 | End: 2022-03-18

## 2022-03-18 RX ORDER — DIPHENHYDRAMINE HYDROCHLORIDE 50 MG/ML
25 INJECTION INTRAMUSCULAR; INTRAVENOUS ONCE
Status: DISCONTINUED | OUTPATIENT
Start: 2022-03-18 | End: 2022-03-18

## 2022-03-18 NOTE — PROGRESS NOTES
Patient is here for follow up for myeloma on C16D1 of kyprolis and darzalex. He took his premeds for today at 10am. He reports that he is doing well. He feels cold because of the weather but denies fever. He is eating well. His energy is fair. He is scheduled for a colonoscopy on April 20. He wants to confirm with Dr. Choi Payment that he may proceed with this. He is also wondering if he should get an additional covid booster.      Education Record    Learner:  Patient    Disease / Diagnosis: myeloma    Barriers / Limitations:  None   Comments:    Method:  Discussion   Comments:    General Topics:  Side effects and symptom management   Comments:    Outcome:  Shows understanding   Comments:

## 2022-03-18 NOTE — PROGRESS NOTES
Pt here for C16D1. Arrives Ambulating independently, accompanied by Self          Modifications in dose or schedule: No        Frequency of blood return and site check throughout administration: Prior to administration   Discharged to Home, Ambulating independently, accompanied by:Self    Outpatient Oncology Care Plan  Problem list:  knowledge deficit  Problems related to:    chemotherapy  Interventions:  provided general teaching  Expected outcomes:  understands plan of care  Progress towards outcome:  making progress    Education Record    Learner:  Patient  Barriers / Limitations:  None  Method:  Brief focused  Outcome:  Shows understanding  Comments: no c/o.  Tolerated treatment

## 2022-03-22 ENCOUNTER — OFFICE VISIT (OUTPATIENT)
Dept: NEPHROLOGY | Facility: CLINIC | Age: 72
End: 2022-03-22
Payer: MEDICARE

## 2022-03-22 VITALS — WEIGHT: 260 LBS | SYSTOLIC BLOOD PRESSURE: 110 MMHG | DIASTOLIC BLOOD PRESSURE: 62 MMHG | BODY MASS INDEX: 35 KG/M2

## 2022-03-22 DIAGNOSIS — N18.30 STAGE 3 CHRONIC KIDNEY DISEASE, UNSPECIFIED WHETHER STAGE 3A OR 3B CKD (HCC): Primary | ICD-10-CM

## 2022-03-22 LAB
ALBUMIN SERPL ELPH-MCNC: 4.28 G/DL (ref 3.75–5.21)
ALBUMIN/GLOB SERPL: 2.24 {RATIO} (ref 1–2)
ALPHA1 GLOB SERPL ELPH-MCNC: 0.29 G/DL (ref 0.19–0.46)
ALPHA2 GLOB SERPL ELPH-MCNC: 0.77 G/DL (ref 0.48–1.05)
B-GLOBULIN SERPL ELPH-MCNC: 0.66 G/DL (ref 0.68–1.23)
GAMMA GLOB SERPL ELPH-MCNC: 0.19 G/DL (ref 0.62–1.7)
KAPPA LC FREE SER-MCNC: 0.13 MG/DL (ref 0.33–1.94)
LAMBDA LC FREE SERPL-MCNC: <0.145 MG/DL (ref 0.57–2.63)
M PROTEIN 1 SERPL ELPH-MCNC: 0.05 G/DL (ref ?–0)
PROT SERPL-MCNC: 6.2 G/DL (ref 6.4–8.2)

## 2022-03-22 PROCEDURE — 99213 OFFICE O/P EST LOW 20 MIN: CPT | Performed by: INTERNAL MEDICINE

## 2022-03-25 ENCOUNTER — OFFICE VISIT (OUTPATIENT)
Dept: HEMATOLOGY/ONCOLOGY | Age: 72
End: 2022-03-25
Attending: INTERNAL MEDICINE
Payer: MEDICARE

## 2022-03-25 VITALS
HEIGHT: 72.01 IN | SYSTOLIC BLOOD PRESSURE: 109 MMHG | WEIGHT: 260.38 LBS | OXYGEN SATURATION: 98 % | HEART RATE: 71 BPM | TEMPERATURE: 97 F | BODY MASS INDEX: 35.27 KG/M2 | DIASTOLIC BLOOD PRESSURE: 67 MMHG

## 2022-03-25 DIAGNOSIS — C90.00 MULTIPLE MYELOMA NOT HAVING ACHIEVED REMISSION (HCC): Primary | ICD-10-CM

## 2022-03-25 LAB
BASOPHILS # BLD AUTO: 0.01 X10(3) UL (ref 0–0.2)
BASOPHILS NFR BLD AUTO: 0.1 %
EOSINOPHIL # BLD AUTO: 0 X10(3) UL (ref 0–0.7)
EOSINOPHIL NFR BLD AUTO: 0 %
ERYTHROCYTE [DISTWIDTH] IN BLOOD BY AUTOMATED COUNT: 12.8 %
HCT VFR BLD AUTO: 43.4 %
HGB BLD-MCNC: 14.3 G/DL
IMM GRANULOCYTES # BLD AUTO: 0.04 X10(3) UL (ref 0–1)
IMM GRANULOCYTES NFR BLD: 0.3 %
LYMPHOCYTES # BLD AUTO: 0.17 X10(3) UL (ref 1–4)
LYMPHOCYTES NFR BLD AUTO: 1.4 %
MCH RBC QN AUTO: 35.1 PG (ref 26–34)
MCHC RBC AUTO-ENTMCNC: 32.9 G/DL (ref 31–37)
MCV RBC AUTO: 106.6 FL
MONOCYTES # BLD AUTO: 0.08 X10(3) UL (ref 0.1–1)
MONOCYTES NFR BLD AUTO: 0.7 %
NEUTROPHILS # BLD AUTO: 11.77 X10 (3) UL (ref 1.5–7.7)
NEUTROPHILS # BLD AUTO: 11.77 X10(3) UL (ref 1.5–7.7)
NEUTROPHILS NFR BLD AUTO: 97.5 %
PLATELET # BLD AUTO: 118 10(3)UL (ref 150–450)
RBC # BLD AUTO: 4.07 X10(6)UL
WBC # BLD AUTO: 12.1 X10(3) UL (ref 4–11)

## 2022-03-25 PROCEDURE — 36415 COLL VENOUS BLD VENIPUNCTURE: CPT

## 2022-03-25 PROCEDURE — 85025 COMPLETE CBC W/AUTO DIFF WBC: CPT

## 2022-03-25 PROCEDURE — 96413 CHEMO IV INFUSION 1 HR: CPT

## 2022-03-25 NOTE — PROGRESS NOTES
Pt here for C16D8. Arrives Ambulating independently, accompanied by Self           Pregnancy screening: Not applicable    Modifications in dose or schedule: No     Frequency of blood return and site check throughout administration: Prior to administration and At completion of therapy   Discharged to Home, Ambulating independently, accompanied by:Self    Outpatient Oncology Care Plan  Problem list:  knowledge deficit  Problems related to:    chemotherapy  Interventions:  emotional support given  maintain safe environment  patient/family supported  chemotherapy teaching  monitor effect of therapy  monitor lab values  promoted rest  provided general teaching  Expected outcomes:  understands plan of care  Progress towards outcome:  making progress    Education Record    Learner:  Patient  Barriers / Limitations:  None  Method:  Discussion  Outcome:  Shows understanding  Comments: Patient ambulatory with no complaints. Tolerated treatment. Discharged home in stable condition.

## 2022-04-01 ENCOUNTER — OFFICE VISIT (OUTPATIENT)
Dept: HEMATOLOGY/ONCOLOGY | Age: 72
End: 2022-04-01
Attending: INTERNAL MEDICINE
Payer: MEDICARE

## 2022-04-01 VITALS
SYSTOLIC BLOOD PRESSURE: 91 MMHG | WEIGHT: 262 LBS | TEMPERATURE: 98 F | DIASTOLIC BLOOD PRESSURE: 57 MMHG | HEART RATE: 58 BPM | HEIGHT: 72.01 IN | OXYGEN SATURATION: 98 % | BODY MASS INDEX: 35.49 KG/M2

## 2022-04-01 DIAGNOSIS — C90.00 MULTIPLE MYELOMA, REMISSION STATUS UNSPECIFIED (HCC): Primary | ICD-10-CM

## 2022-04-01 DIAGNOSIS — C90.00 MULTIPLE MYELOMA NOT HAVING ACHIEVED REMISSION (HCC): ICD-10-CM

## 2022-04-01 LAB
ALBUMIN SERPL-MCNC: 3.9 G/DL (ref 3.4–5)
BASOPHILS # BLD AUTO: 0.01 X10(3) UL (ref 0–0.2)
BASOPHILS NFR BLD AUTO: 0.1 %
CALCIUM BLD-MCNC: 9.4 MG/DL (ref 8.5–10.1)
CREAT BLD-MCNC: 1.28 MG/DL
EOSINOPHIL # BLD AUTO: 0.01 X10(3) UL (ref 0–0.7)
EOSINOPHIL NFR BLD AUTO: 0.1 %
ERYTHROCYTE [DISTWIDTH] IN BLOOD BY AUTOMATED COUNT: 12.9 %
HCT VFR BLD AUTO: 41.5 %
HGB BLD-MCNC: 13.8 G/DL
IMM GRANULOCYTES # BLD AUTO: 0.04 X10(3) UL (ref 0–1)
LYMPHOCYTES # BLD AUTO: 0.17 X10(3) UL (ref 1–4)
LYMPHOCYTES NFR BLD AUTO: 1.3 %
MCH RBC QN AUTO: 35.4 PG (ref 26–34)
MCHC RBC AUTO-ENTMCNC: 33.3 G/DL (ref 31–37)
MONOCYTES # BLD AUTO: 0.09 X10(3) UL (ref 0.1–1)
MONOCYTES NFR BLD AUTO: 0.7 %
NEUTROPHILS # BLD AUTO: 12.89 X10 (3) UL (ref 1.5–7.7)
NEUTROPHILS # BLD AUTO: 12.89 X10(3) UL (ref 1.5–7.7)
NEUTROPHILS NFR BLD AUTO: 97.5 %
PLATELET # BLD AUTO: 146 10(3)UL (ref 150–450)
RBC # BLD AUTO: 3.9 X10(6)UL
WBC # BLD AUTO: 13.2 X10(3) UL (ref 4–11)

## 2022-04-01 PROCEDURE — 96375 TX/PRO/DX INJ NEW DRUG ADDON: CPT

## 2022-04-01 PROCEDURE — 82565 ASSAY OF CREATININE: CPT

## 2022-04-01 PROCEDURE — 82040 ASSAY OF SERUM ALBUMIN: CPT

## 2022-04-01 PROCEDURE — 36415 COLL VENOUS BLD VENIPUNCTURE: CPT

## 2022-04-01 PROCEDURE — 82310 ASSAY OF CALCIUM: CPT

## 2022-04-01 PROCEDURE — 85025 COMPLETE CBC W/AUTO DIFF WBC: CPT

## 2022-04-01 PROCEDURE — 96413 CHEMO IV INFUSION 1 HR: CPT

## 2022-04-01 NOTE — PROGRESS NOTES
Pt here for C16D15. Arrives Ambulating independently, accompanied by Self           Modifications in dose or schedule: No     Frequency of blood return and site check throughout administration: Prior to administration   Discharged to Home, Ambulating independently, accompanied by:Self    Outpatient Oncology Care Plan  Problem list:  knowledge deficit  Problems related to:    chemotherapy  Interventions:  provided general teaching  Expected outcomes:  understands plan of care  Progress towards outcome:  making progress    Education Record    Learner:  Patient  Barriers / Limitations:  None  Method:  Brief focused  Outcome:  Shows understanding  Comments:    Pt with no complaints.  Receiving kyprolis and monthly zometa

## 2022-04-15 ENCOUNTER — OFFICE VISIT (OUTPATIENT)
Dept: HEMATOLOGY/ONCOLOGY | Age: 72
End: 2022-04-15
Attending: INTERNAL MEDICINE
Payer: MEDICARE

## 2022-04-15 VITALS
SYSTOLIC BLOOD PRESSURE: 107 MMHG | HEIGHT: 72.01 IN | WEIGHT: 264.5 LBS | DIASTOLIC BLOOD PRESSURE: 67 MMHG | BODY MASS INDEX: 35.83 KG/M2 | RESPIRATION RATE: 16 BRPM | OXYGEN SATURATION: 92 % | TEMPERATURE: 96 F | HEART RATE: 59 BPM

## 2022-04-15 VITALS — RESPIRATION RATE: 16 BRPM | HEART RATE: 52 BPM | DIASTOLIC BLOOD PRESSURE: 61 MMHG | SYSTOLIC BLOOD PRESSURE: 97 MMHG

## 2022-04-15 DIAGNOSIS — Z51.11 ENCOUNTER FOR CHEMOTHERAPY MANAGEMENT: Primary | ICD-10-CM

## 2022-04-15 DIAGNOSIS — E11.9 TYPE 2 DIABETES MELLITUS WITHOUT COMPLICATION, WITHOUT LONG-TERM CURRENT USE OF INSULIN (HCC): ICD-10-CM

## 2022-04-15 DIAGNOSIS — N08 MYELOMA KIDNEY DISEASE (HCC): ICD-10-CM

## 2022-04-15 DIAGNOSIS — Z85.46 HISTORY OF PROSTATE CANCER: ICD-10-CM

## 2022-04-15 DIAGNOSIS — C90.00 MULTIPLE MYELOMA NOT HAVING ACHIEVED REMISSION (HCC): ICD-10-CM

## 2022-04-15 DIAGNOSIS — C90.00 MYELOMA KIDNEY DISEASE (HCC): ICD-10-CM

## 2022-04-15 DIAGNOSIS — D63.0 ANEMIA COMPLICATING NEOPLASTIC DISEASE: ICD-10-CM

## 2022-04-15 DIAGNOSIS — C90.00 MULTIPLE MYELOMA NOT HAVING ACHIEVED REMISSION (HCC): Primary | ICD-10-CM

## 2022-04-15 DIAGNOSIS — I10 ESSENTIAL HYPERTENSION: ICD-10-CM

## 2022-04-15 LAB
ALBUMIN SERPL-MCNC: 3.5 G/DL (ref 3.4–5)
ALBUMIN/GLOB SERPL: 1.4 {RATIO} (ref 1–2)
ALP LIVER SERPL-CCNC: 74 U/L
ALT SERPL-CCNC: 23 U/L
ANION GAP SERPL CALC-SCNC: 10 MMOL/L (ref 0–18)
AST SERPL-CCNC: 14 U/L (ref 15–37)
BASOPHILS # BLD AUTO: 0.01 X10(3) UL (ref 0–0.2)
BASOPHILS NFR BLD AUTO: 0.1 %
BILIRUB SERPL-MCNC: 0.8 MG/DL (ref 0.1–2)
BUN BLD-MCNC: 32 MG/DL (ref 7–18)
CALCIUM BLD-MCNC: 8.9 MG/DL (ref 8.5–10.1)
CHLORIDE SERPL-SCNC: 106 MMOL/L (ref 98–112)
CO2 SERPL-SCNC: 20 MMOL/L (ref 21–32)
CREAT BLD-MCNC: 1.35 MG/DL
EOSINOPHIL # BLD AUTO: 0 X10(3) UL (ref 0–0.7)
EOSINOPHIL NFR BLD AUTO: 0 %
ERYTHROCYTE [DISTWIDTH] IN BLOOD BY AUTOMATED COUNT: 12.7 %
FASTING STATUS PATIENT QL REPORTED: NO
GLOBULIN PLAS-MCNC: 2.5 G/DL (ref 2.8–4.4)
GLUCOSE BLD-MCNC: 226 MG/DL (ref 70–99)
HCT VFR BLD AUTO: 39.1 %
HGB BLD-MCNC: 13 G/DL
IGA SERPL-MCNC: <7.83 MG/DL (ref 70–312)
IGM SERPL-MCNC: <5.3 MG/DL (ref 43–279)
IMM GRANULOCYTES # BLD AUTO: 0.04 X10(3) UL (ref 0–1)
IMM GRANULOCYTES NFR BLD: 0.3 %
IMMUNOGLOBULIN PNL SER-MCNC: 156 MG/DL (ref 791–1643)
LYMPHOCYTES # BLD AUTO: 0.14 X10(3) UL (ref 1–4)
LYMPHOCYTES NFR BLD AUTO: 1.1 %
MCH RBC QN AUTO: 34.9 PG (ref 26–34)
MCHC RBC AUTO-ENTMCNC: 33.2 G/DL (ref 31–37)
MCV RBC AUTO: 105.1 FL
MONOCYTES # BLD AUTO: 0.07 X10(3) UL (ref 0.1–1)
MONOCYTES NFR BLD AUTO: 0.5 %
NEUTROPHILS # BLD AUTO: 12.66 X10 (3) UL (ref 1.5–7.7)
NEUTROPHILS # BLD AUTO: 12.66 X10(3) UL (ref 1.5–7.7)
NEUTROPHILS NFR BLD AUTO: 98 %
OSMOLALITY SERPL CALC.SUM OF ELEC: 296 MOSM/KG (ref 275–295)
PLATELET # BLD AUTO: 251 10(3)UL (ref 150–450)
POTASSIUM SERPL-SCNC: 4.1 MMOL/L (ref 3.5–5.1)
PROT SERPL-MCNC: 6 G/DL (ref 6.4–8.2)
RBC # BLD AUTO: 3.72 X10(6)UL
SODIUM SERPL-SCNC: 136 MMOL/L (ref 136–145)
WBC # BLD AUTO: 12.9 X10(3) UL (ref 4–11)

## 2022-04-15 PROCEDURE — 82784 ASSAY IGA/IGD/IGG/IGM EACH: CPT | Performed by: CLINICAL NURSE SPECIALIST

## 2022-04-15 PROCEDURE — 96413 CHEMO IV INFUSION 1 HR: CPT

## 2022-04-15 PROCEDURE — 36415 COLL VENOUS BLD VENIPUNCTURE: CPT

## 2022-04-15 PROCEDURE — 80053 COMPREHEN METABOLIC PANEL: CPT | Performed by: CLINICAL NURSE SPECIALIST

## 2022-04-15 PROCEDURE — 85025 COMPLETE CBC W/AUTO DIFF WBC: CPT | Performed by: CLINICAL NURSE SPECIALIST

## 2022-04-15 PROCEDURE — 96401 CHEMO ANTI-NEOPL SQ/IM: CPT

## 2022-04-15 RX ORDER — ACETAMINOPHEN 325 MG/1
650 TABLET ORAL ONCE
Status: CANCELLED | OUTPATIENT
Start: 2022-04-15

## 2022-04-15 RX ORDER — DIPHENHYDRAMINE HYDROCHLORIDE 50 MG/ML
25 INJECTION INTRAMUSCULAR; INTRAVENOUS ONCE
Status: CANCELLED | OUTPATIENT
Start: 2022-04-15

## 2022-04-15 NOTE — PROGRESS NOTES
Pt here for C1D17. Arrives Ambulating independently, accompanied by Self          Modifications in dose or schedule: No       Frequency of blood return and site check throughout administration: Prior to administration   Discharged to Home, Ambulating independently, accompanied by:Self    Outpatient Oncology Care Plan  Problem list:  knowledge deficit  Problems related to:    chemotherapy  Interventions:  provided general teaching  Expected outcomes:  understands plan of care  Progress towards outcome:  making progress    Education Record    Learner:  Patient  Barriers / Limitations:  None  Method:  Brief focused  Outcome:  Shows understanding  Comments:  No c/o. Pt tolerated treatment. Observed pt 30min+ post faspro.

## 2022-04-22 ENCOUNTER — OFFICE VISIT (OUTPATIENT)
Dept: HEMATOLOGY/ONCOLOGY | Age: 72
End: 2022-04-22
Attending: INTERNAL MEDICINE
Payer: MEDICARE

## 2022-04-22 VITALS
WEIGHT: 262 LBS | HEART RATE: 77 BPM | HEIGHT: 72.01 IN | TEMPERATURE: 98 F | RESPIRATION RATE: 20 BRPM | BODY MASS INDEX: 35.49 KG/M2 | OXYGEN SATURATION: 97 % | DIASTOLIC BLOOD PRESSURE: 68 MMHG | SYSTOLIC BLOOD PRESSURE: 123 MMHG

## 2022-04-22 DIAGNOSIS — C90.00 MULTIPLE MYELOMA NOT HAVING ACHIEVED REMISSION (HCC): Primary | ICD-10-CM

## 2022-04-22 LAB
BASOPHILS # BLD AUTO: 0.01 X10(3) UL (ref 0–0.2)
BASOPHILS NFR BLD AUTO: 0.1 %
EOSINOPHIL # BLD AUTO: 0 X10(3) UL (ref 0–0.7)
EOSINOPHIL NFR BLD AUTO: 0 %
ERYTHROCYTE [DISTWIDTH] IN BLOOD BY AUTOMATED COUNT: 13.2 %
HCT VFR BLD AUTO: 42.1 %
HGB BLD-MCNC: 13.9 G/DL
IMM GRANULOCYTES # BLD AUTO: 0.04 X10(3) UL (ref 0–1)
IMM GRANULOCYTES NFR BLD: 0.3 %
LYMPHOCYTES # BLD AUTO: 0.18 X10(3) UL (ref 1–4)
LYMPHOCYTES NFR BLD AUTO: 1.3 %
MCH RBC QN AUTO: 34.6 PG (ref 26–34)
MCHC RBC AUTO-ENTMCNC: 33 G/DL (ref 31–37)
MCV RBC AUTO: 104.7 FL
MONOCYTES # BLD AUTO: 0.1 X10(3) UL (ref 0.1–1)
MONOCYTES NFR BLD AUTO: 0.7 %
NEUTROPHILS # BLD AUTO: 13.21 X10 (3) UL (ref 1.5–7.7)
NEUTROPHILS # BLD AUTO: 13.21 X10(3) UL (ref 1.5–7.7)
NEUTROPHILS NFR BLD AUTO: 97.6 %
PLATELET # BLD AUTO: 131 10(3)UL (ref 150–450)
RBC # BLD AUTO: 4.02 X10(6)UL
WBC # BLD AUTO: 13.5 X10(3) UL (ref 4–11)

## 2022-04-22 PROCEDURE — 96413 CHEMO IV INFUSION 1 HR: CPT

## 2022-04-22 PROCEDURE — 85025 COMPLETE CBC W/AUTO DIFF WBC: CPT

## 2022-04-22 PROCEDURE — 36415 COLL VENOUS BLD VENIPUNCTURE: CPT

## 2022-04-22 NOTE — PROGRESS NOTES
Pt here for C17D8 of Kyprolis. Arrives Ambulating independently, accompanied by Self           Pregnancy screening: Not applicable    Modifications in dose or schedule: No     Frequency of blood return and site check throughout administration: Prior to administration   Discharged to Home, Ambulating independently, accompanied by:Self    Outpatient Oncology Care Plan  Problem list:  knowledge deficit  Problems related to:    chemotherapy  Interventions:  chemotherapy teaching  Expected outcomes:  understands plan of care  Progress towards outcome:  making progress    Education Record    Learner:  Patient  Barriers / Limitations:  None  Method:  Brief focused  Outcome:  Shows understanding  Comments: Tolerated well.  Given AVS

## 2022-04-23 RX ORDER — ATORVASTATIN CALCIUM 20 MG/1
20 TABLET, FILM COATED ORAL DAILY
Qty: 90 TABLET | Refills: 1 | Status: SHIPPED | OUTPATIENT
Start: 2022-04-23

## 2022-04-23 NOTE — TELEPHONE ENCOUNTER
Cholesterol Medication Protocol Passed 04/22/2022 01:04 PM   Protocol Details  ALT < 80    ALT resulted within past year    Lipid panel within past 12 months    Appointment within past 12 or next 3 months        LOV  2/24/22     LAST LAB 12/1/21     LAST RX  10/23/21 90 with 1     Next OV   Future Appointments   Date Time Provider Bo Ratliff   4/29/2022 11:30 AM PF TX RN3 PF CHEMO I [place]   5/13/2022 11:30 AM ITA Wheeler PF HEM ONC [place]   5/13/2022 11:45 AM PF TX RN2 PF CHEMO I [place]   5/26/2022  2:00 PM Monica Armenta MD [facility] [address]   6/21/2022  1:40 PM Srikanth Sanders MD [id number] [facility] [address]   8/11/2022  3:30 PM PF TX RN2 PF CHEMO I [place]         PROTOCOL pass

## 2022-04-25 RX ORDER — ACYCLOVIR 400 MG/1
TABLET ORAL
Qty: 90 TABLET | Refills: 1 | OUTPATIENT
Start: 2022-04-25

## 2022-04-25 NOTE — TELEPHONE ENCOUNTER
Herpes Agent Protocol Passed 04/24/2022 03:46 AM    Appointment in the past 12 or next 3 months     LOV 2/24/22    LAST LAB n/a     LAST RX 1/19/22 90 with 1     Next OV   Future Appointments   Date Time Provider Bo Ratliff   4/29/2022 11:30 AM PF TX RN3 PF CHEMO I [place]   5/13/2022 11:30 AM ITA [place] PF HEM ONC [place]   5/13/2022 11:45 AM PF TX RN2 PF CHEMO I [place]   5/26/2022  2:00 PM Genevieve Klinefelter, MD [facility] [address]   6/21/2022  1:40 PM Coni Mai MD [id number] [address]   8/11/2022  3:30 PM PF TX RN2 PF CHEMO I [place]         PROTOCOL pass

## 2022-04-29 ENCOUNTER — OFFICE VISIT (OUTPATIENT)
Dept: HEMATOLOGY/ONCOLOGY | Age: 72
End: 2022-04-29
Attending: INTERNAL MEDICINE
Payer: MEDICARE

## 2022-04-29 ENCOUNTER — APPOINTMENT (OUTPATIENT)
Dept: HEMATOLOGY/ONCOLOGY | Age: 72
End: 2022-04-29
Attending: INTERNAL MEDICINE
Payer: MEDICARE

## 2022-04-29 VITALS
SYSTOLIC BLOOD PRESSURE: 100 MMHG | DIASTOLIC BLOOD PRESSURE: 58 MMHG | HEIGHT: 72.01 IN | TEMPERATURE: 98 F | BODY MASS INDEX: 35.62 KG/M2 | WEIGHT: 263 LBS | OXYGEN SATURATION: 96 % | HEART RATE: 60 BPM

## 2022-04-29 DIAGNOSIS — C90.00 MULTIPLE MYELOMA, REMISSION STATUS UNSPECIFIED (HCC): ICD-10-CM

## 2022-04-29 DIAGNOSIS — C90.00 MULTIPLE MYELOMA NOT HAVING ACHIEVED REMISSION (HCC): Primary | ICD-10-CM

## 2022-04-29 LAB
ALBUMIN SERPL-MCNC: 3.6 G/DL (ref 3.4–5)
BASOPHILS # BLD AUTO: 0.01 X10(3) UL (ref 0–0.2)
BASOPHILS NFR BLD AUTO: 0.1 %
CALCIUM BLD-MCNC: 9.2 MG/DL (ref 8.5–10.1)
CREAT BLD-MCNC: 1.38 MG/DL
EOSINOPHIL # BLD AUTO: 0.01 X10(3) UL (ref 0–0.7)
EOSINOPHIL NFR BLD AUTO: 0.1 %
ERYTHROCYTE [DISTWIDTH] IN BLOOD BY AUTOMATED COUNT: 12.9 %
HCT VFR BLD AUTO: 40.8 %
HGB BLD-MCNC: 13.6 G/DL
IMM GRANULOCYTES # BLD AUTO: 0.06 X10(3) UL (ref 0–1)
IMM GRANULOCYTES NFR BLD: 0.4 %
LYMPHOCYTES # BLD AUTO: 0.14 X10(3) UL (ref 1–4)
LYMPHOCYTES NFR BLD AUTO: 1 %
MCH RBC QN AUTO: 35.1 PG (ref 26–34)
MCHC RBC AUTO-ENTMCNC: 33.3 G/DL (ref 31–37)
MCV RBC AUTO: 105.4 FL
MONOCYTES # BLD AUTO: 0.07 X10(3) UL (ref 0.1–1)
MONOCYTES NFR BLD AUTO: 0.5 %
NEUTROPHILS # BLD AUTO: 13.52 X10 (3) UL (ref 1.5–7.7)
NEUTROPHILS # BLD AUTO: 13.52 X10(3) UL (ref 1.5–7.7)
NEUTROPHILS NFR BLD AUTO: 97.9 %
PLATELET # BLD AUTO: 167 10(3)UL (ref 150–450)
RBC # BLD AUTO: 3.87 X10(6)UL
WBC # BLD AUTO: 13.8 X10(3) UL (ref 4–11)

## 2022-04-29 PROCEDURE — 85025 COMPLETE CBC W/AUTO DIFF WBC: CPT

## 2022-04-29 PROCEDURE — 82040 ASSAY OF SERUM ALBUMIN: CPT

## 2022-04-29 PROCEDURE — 82310 ASSAY OF CALCIUM: CPT

## 2022-04-29 PROCEDURE — 82565 ASSAY OF CREATININE: CPT

## 2022-04-29 PROCEDURE — 96413 CHEMO IV INFUSION 1 HR: CPT

## 2022-04-29 PROCEDURE — 96367 TX/PROPH/DG ADDL SEQ IV INF: CPT

## 2022-05-13 ENCOUNTER — OFFICE VISIT (OUTPATIENT)
Dept: HEMATOLOGY/ONCOLOGY | Age: 72
End: 2022-05-13
Attending: INTERNAL MEDICINE
Payer: MEDICARE

## 2022-05-13 VITALS
WEIGHT: 266.13 LBS | BODY MASS INDEX: 36.04 KG/M2 | HEIGHT: 72.01 IN | TEMPERATURE: 98 F | DIASTOLIC BLOOD PRESSURE: 74 MMHG | SYSTOLIC BLOOD PRESSURE: 123 MMHG | OXYGEN SATURATION: 97 % | HEART RATE: 56 BPM | RESPIRATION RATE: 20 BRPM

## 2022-05-13 VITALS
TEMPERATURE: 97 F | OXYGEN SATURATION: 94 % | DIASTOLIC BLOOD PRESSURE: 66 MMHG | RESPIRATION RATE: 18 BRPM | HEART RATE: 49 BPM | SYSTOLIC BLOOD PRESSURE: 103 MMHG

## 2022-05-13 DIAGNOSIS — Z94.84 HX OF STEM CELL TRANSPLANT (HCC): ICD-10-CM

## 2022-05-13 DIAGNOSIS — M89.9 LYTIC BONE LESIONS ON XRAY: ICD-10-CM

## 2022-05-13 DIAGNOSIS — C90.02 MULTIPLE MYELOMA IN RELAPSE (HCC): ICD-10-CM

## 2022-05-13 DIAGNOSIS — N08 MYELOMA KIDNEY DISEASE (HCC): ICD-10-CM

## 2022-05-13 DIAGNOSIS — C90.00 MYELOMA KIDNEY DISEASE (HCC): ICD-10-CM

## 2022-05-13 DIAGNOSIS — C90.00 MULTIPLE MYELOMA NOT HAVING ACHIEVED REMISSION (HCC): Primary | ICD-10-CM

## 2022-05-13 LAB
ALBUMIN SERPL-MCNC: 3.6 G/DL (ref 3.4–5)
ALBUMIN/GLOB SERPL: 1.3 {RATIO} (ref 1–2)
ALP LIVER SERPL-CCNC: 78 U/L
ALT SERPL-CCNC: 22 U/L
ANION GAP SERPL CALC-SCNC: 10 MMOL/L (ref 0–18)
AST SERPL-CCNC: 11 U/L (ref 15–37)
BASOPHILS # BLD AUTO: 0.02 X10(3) UL (ref 0–0.2)
BASOPHILS NFR BLD AUTO: 0.2 %
BILIRUB SERPL-MCNC: 0.8 MG/DL (ref 0.1–2)
BUN BLD-MCNC: 27 MG/DL (ref 7–18)
CALCIUM BLD-MCNC: 9.3 MG/DL (ref 8.5–10.1)
CHLORIDE SERPL-SCNC: 106 MMOL/L (ref 98–112)
CO2 SERPL-SCNC: 20 MMOL/L (ref 21–32)
CREAT BLD-MCNC: 1.39 MG/DL
EOSINOPHIL # BLD AUTO: 0.01 X10(3) UL (ref 0–0.7)
EOSINOPHIL NFR BLD AUTO: 0.1 %
ERYTHROCYTE [DISTWIDTH] IN BLOOD BY AUTOMATED COUNT: 12.4 %
FASTING STATUS PATIENT QL REPORTED: NO
GLOBULIN PLAS-MCNC: 2.8 G/DL (ref 2.8–4.4)
GLUCOSE BLD-MCNC: 177 MG/DL (ref 70–99)
HCT VFR BLD AUTO: 40.7 %
HGB BLD-MCNC: 13.6 G/DL
IGA SERPL-MCNC: <7.83 MG/DL (ref 70–312)
IGM SERPL-MCNC: <5.3 MG/DL (ref 43–279)
IMM GRANULOCYTES # BLD AUTO: 0.05 X10(3) UL (ref 0–1)
IMM GRANULOCYTES NFR BLD: 0.4 %
IMMUNOGLOBULIN PNL SER-MCNC: 161 MG/DL (ref 791–1643)
LYMPHOCYTES # BLD AUTO: 0.15 X10(3) UL (ref 1–4)
LYMPHOCYTES NFR BLD AUTO: 1.3 %
MCH RBC QN AUTO: 35.1 PG (ref 26–34)
MCHC RBC AUTO-ENTMCNC: 33.4 G/DL (ref 31–37)
MCV RBC AUTO: 104.9 FL
MONOCYTES # BLD AUTO: 0.08 X10(3) UL (ref 0.1–1)
MONOCYTES NFR BLD AUTO: 0.7 %
NEUTROPHILS # BLD AUTO: 11.64 X10 (3) UL (ref 1.5–7.7)
NEUTROPHILS # BLD AUTO: 11.64 X10(3) UL (ref 1.5–7.7)
NEUTROPHILS NFR BLD AUTO: 97.3 %
OSMOLALITY SERPL CALC.SUM OF ELEC: 291 MOSM/KG (ref 275–295)
PLATELET # BLD AUTO: 234 10(3)UL (ref 150–450)
POTASSIUM SERPL-SCNC: 4 MMOL/L (ref 3.5–5.1)
PROT SERPL-MCNC: 6.4 G/DL (ref 6.4–8.2)
RBC # BLD AUTO: 3.88 X10(6)UL
SODIUM SERPL-SCNC: 136 MMOL/L (ref 136–145)
WBC # BLD AUTO: 12 X10(3) UL (ref 4–11)

## 2022-05-13 PROCEDURE — 99214 OFFICE O/P EST MOD 30 MIN: CPT | Performed by: NURSE PRACTITIONER

## 2022-05-13 PROCEDURE — 96401 CHEMO ANTI-NEOPL SQ/IM: CPT

## 2022-05-13 PROCEDURE — 96413 CHEMO IV INFUSION 1 HR: CPT

## 2022-05-13 RX ORDER — DIPHENHYDRAMINE HYDROCHLORIDE 50 MG/ML
25 INJECTION INTRAMUSCULAR; INTRAVENOUS ONCE
Status: CANCELLED | OUTPATIENT
Start: 2022-05-13

## 2022-05-13 RX ORDER — ACETAMINOPHEN 325 MG/1
650 TABLET ORAL ONCE
Status: CANCELLED | OUTPATIENT
Start: 2022-05-13

## 2022-05-13 NOTE — PROGRESS NOTES
Pt here for APN f/u and due for chemo. ENergy level and appetite are good. Denies pain. Pt has no bowel issues or nausea. Pt has no further complaints.      Outpatient Oncology Care Plan  Problem list:  fatigue  knowledge deficit    Problems related to:    disease/disease progression    Interventions:  provided general teaching    Expected outcomes:  safe in environment  symptoms relieved/minimized  understands plan of care    Progress towards outcome:  making progress    Education Record    Learner:  Patient  Barriers / Limitations:  None  Method:  Reinforcement  Outcome:  Shows understanding  Comments:

## 2022-05-20 ENCOUNTER — OFFICE VISIT (OUTPATIENT)
Dept: HEMATOLOGY/ONCOLOGY | Age: 72
End: 2022-05-20
Attending: INTERNAL MEDICINE
Payer: MEDICARE

## 2022-05-20 VITALS
HEART RATE: 49 BPM | HEIGHT: 72.01 IN | SYSTOLIC BLOOD PRESSURE: 113 MMHG | DIASTOLIC BLOOD PRESSURE: 72 MMHG | TEMPERATURE: 97 F | BODY MASS INDEX: 35.13 KG/M2 | WEIGHT: 259.38 LBS | OXYGEN SATURATION: 98 %

## 2022-05-20 DIAGNOSIS — C90.00 MULTIPLE MYELOMA NOT HAVING ACHIEVED REMISSION (HCC): Primary | ICD-10-CM

## 2022-05-20 LAB
BASOPHILS # BLD AUTO: 0.01 X10(3) UL (ref 0–0.2)
BASOPHILS NFR BLD AUTO: 0.1 %
EOSINOPHIL # BLD AUTO: 0 X10(3) UL (ref 0–0.7)
EOSINOPHIL NFR BLD AUTO: 0 %
ERYTHROCYTE [DISTWIDTH] IN BLOOD BY AUTOMATED COUNT: 12.7 %
HCT VFR BLD AUTO: 43.3 %
HGB BLD-MCNC: 14.3 G/DL
IMM GRANULOCYTES # BLD AUTO: 0.04 X10(3) UL (ref 0–1)
IMM GRANULOCYTES NFR BLD: 0.4 %
LYMPHOCYTES # BLD AUTO: 0.12 X10(3) UL (ref 1–4)
LYMPHOCYTES NFR BLD AUTO: 1.2 %
MCH RBC QN AUTO: 34.1 PG (ref 26–34)
MCHC RBC AUTO-ENTMCNC: 33 G/DL (ref 31–37)
MCV RBC AUTO: 103.3 FL
MONOCYTES # BLD AUTO: 0.06 X10(3) UL (ref 0.1–1)
MONOCYTES NFR BLD AUTO: 0.6 %
NEUTROPHILS # BLD AUTO: 9.51 X10 (3) UL (ref 1.5–7.7)
NEUTROPHILS # BLD AUTO: 9.51 X10(3) UL (ref 1.5–7.7)
NEUTROPHILS NFR BLD AUTO: 97.7 %
PLATELET # BLD AUTO: 115 10(3)UL (ref 150–450)
RBC # BLD AUTO: 4.19 X10(6)UL
WBC # BLD AUTO: 9.7 X10(3) UL (ref 4–11)

## 2022-05-20 PROCEDURE — 85025 COMPLETE CBC W/AUTO DIFF WBC: CPT

## 2022-05-20 PROCEDURE — 96413 CHEMO IV INFUSION 1 HR: CPT

## 2022-05-20 PROCEDURE — 36415 COLL VENOUS BLD VENIPUNCTURE: CPT

## 2022-05-20 NOTE — PROGRESS NOTES
Pt here for C18D8. Arrives Ambulating independently, accompanied by Self           Pregnancy screening: Not applicable    Modifications in dose or schedule: No     Frequency of blood return and site check throughout administration: Prior to administration and At completion of therapy   Discharged to Home, Ambulating independently, accompanied by:Self    Outpatient Oncology Care Plan  Problem list:  knowledge deficit  Problems related to:    chemotherapy  Interventions:  emotional support given  maintain safe environment  patient/family supported  chemotherapy teaching  monitor effect of therapy  monitor lab values  promoted rest  provided general teaching  Expected outcomes:  understands plan of care  Progress towards outcome:  making progress    Education Record    Learner:  Patient  Barriers / Limitations:  None  Method:  Discussion  Outcome:  Shows understanding  Comments: patient ambulatory with no complaints. Tolerated treatment today.  Discharged in stable condition

## 2022-05-23 ENCOUNTER — MED REC SCAN ONLY (OUTPATIENT)
Dept: FAMILY MEDICINE CLINIC | Facility: CLINIC | Age: 72
End: 2022-05-23

## 2022-05-26 ENCOUNTER — OFFICE VISIT (OUTPATIENT)
Dept: FAMILY MEDICINE CLINIC | Facility: CLINIC | Age: 72
End: 2022-05-26
Payer: MEDICARE

## 2022-05-26 VITALS
OXYGEN SATURATION: 99 % | TEMPERATURE: 97 F | RESPIRATION RATE: 18 BRPM | HEART RATE: 50 BPM | WEIGHT: 260 LBS | BODY MASS INDEX: 35.21 KG/M2 | SYSTOLIC BLOOD PRESSURE: 104 MMHG | HEIGHT: 72 IN | DIASTOLIC BLOOD PRESSURE: 62 MMHG

## 2022-05-26 DIAGNOSIS — E78.2 MIXED HYPERLIPIDEMIA: ICD-10-CM

## 2022-05-26 DIAGNOSIS — Z94.89 TRANSPLANT RECIPIENT: ICD-10-CM

## 2022-05-26 DIAGNOSIS — I10 ESSENTIAL HYPERTENSION: ICD-10-CM

## 2022-05-26 DIAGNOSIS — C90.00 MULTIPLE MYELOMA NOT HAVING ACHIEVED REMISSION (HCC): ICD-10-CM

## 2022-05-26 DIAGNOSIS — E11.9 TYPE 2 DIABETES MELLITUS WITHOUT COMPLICATION, WITHOUT LONG-TERM CURRENT USE OF INSULIN (HCC): ICD-10-CM

## 2022-05-26 DIAGNOSIS — E11.65 TYPE 2 DIABETES MELLITUS WITH HYPERGLYCEMIA, WITHOUT LONG-TERM CURRENT USE OF INSULIN (HCC): ICD-10-CM

## 2022-05-26 DIAGNOSIS — E66.01 MORBID OBESITY (HCC): ICD-10-CM

## 2022-05-26 DIAGNOSIS — D69.6 PLATELETS DECREASED (HCC): ICD-10-CM

## 2022-05-26 DIAGNOSIS — Z00.00 ENCOUNTER FOR ANNUAL HEALTH EXAMINATION: Primary | ICD-10-CM

## 2022-05-26 DIAGNOSIS — I42.9 CARDIOMYOPATHY, UNSPECIFIED TYPE (HCC): ICD-10-CM

## 2022-05-26 PROCEDURE — G0439 PPPS, SUBSEQ VISIT: HCPCS | Performed by: FAMILY MEDICINE

## 2022-05-27 ENCOUNTER — OFFICE VISIT (OUTPATIENT)
Dept: HEMATOLOGY/ONCOLOGY | Age: 72
End: 2022-05-27
Attending: INTERNAL MEDICINE
Payer: MEDICARE

## 2022-05-27 VITALS
DIASTOLIC BLOOD PRESSURE: 68 MMHG | HEART RATE: 55 BPM | SYSTOLIC BLOOD PRESSURE: 101 MMHG | OXYGEN SATURATION: 97 % | WEIGHT: 259.31 LBS | BODY MASS INDEX: 35.12 KG/M2 | TEMPERATURE: 97 F | HEIGHT: 72.01 IN

## 2022-05-27 DIAGNOSIS — E11.9 TYPE 2 DIABETES MELLITUS WITHOUT COMPLICATION, WITHOUT LONG-TERM CURRENT USE OF INSULIN (HCC): ICD-10-CM

## 2022-05-27 DIAGNOSIS — C90.00 MULTIPLE MYELOMA, REMISSION STATUS UNSPECIFIED (HCC): ICD-10-CM

## 2022-05-27 DIAGNOSIS — C90.00 MULTIPLE MYELOMA NOT HAVING ACHIEVED REMISSION (HCC): Primary | ICD-10-CM

## 2022-05-27 LAB
BASOPHILS # BLD AUTO: 0.01 X10(3) UL (ref 0–0.2)
BASOPHILS NFR BLD AUTO: 0.1 %
CREAT UR-SCNC: <13 MG/DL
EOSINOPHIL # BLD AUTO: 0.01 X10(3) UL (ref 0–0.7)
EOSINOPHIL NFR BLD AUTO: 0.1 %
ERYTHROCYTE [DISTWIDTH] IN BLOOD BY AUTOMATED COUNT: 13 %
EST. AVERAGE GLUCOSE BLD GHB EST-MCNC: 123 MG/DL (ref 68–126)
HBA1C MFR BLD: 5.9 % (ref ?–5.7)
HCT VFR BLD AUTO: 41.6 %
HGB BLD-MCNC: 14 G/DL
IMM GRANULOCYTES # BLD AUTO: 0.05 X10(3) UL (ref 0–1)
IMM GRANULOCYTES NFR BLD: 0.4 %
LYMPHOCYTES # BLD AUTO: 0.18 X10(3) UL (ref 1–4)
LYMPHOCYTES NFR BLD AUTO: 1.6 %
MCH RBC QN AUTO: 34.6 PG (ref 26–34)
MCHC RBC AUTO-ENTMCNC: 33.7 G/DL (ref 31–37)
MCV RBC AUTO: 102.7 FL
MICROALBUMIN UR-MCNC: 0.6 MG/DL
MONOCYTES # BLD AUTO: 0.07 X10(3) UL (ref 0.1–1)
MONOCYTES NFR BLD AUTO: 0.6 %
NEUTROPHILS # BLD AUTO: 10.85 X10 (3) UL (ref 1.5–7.7)
NEUTROPHILS # BLD AUTO: 10.85 X10(3) UL (ref 1.5–7.7)
NEUTROPHILS NFR BLD AUTO: 97.2 %
PLATELET # BLD AUTO: 147 10(3)UL (ref 150–450)
RBC # BLD AUTO: 4.05 X10(6)UL
WBC # BLD AUTO: 11.2 X10(3) UL (ref 4–11)

## 2022-05-27 PROCEDURE — 83036 HEMOGLOBIN GLYCOSYLATED A1C: CPT

## 2022-05-27 PROCEDURE — 82043 UR ALBUMIN QUANTITATIVE: CPT

## 2022-05-27 PROCEDURE — 82570 ASSAY OF URINE CREATININE: CPT

## 2022-05-27 PROCEDURE — 85025 COMPLETE CBC W/AUTO DIFF WBC: CPT

## 2022-05-27 PROCEDURE — 36415 COLL VENOUS BLD VENIPUNCTURE: CPT

## 2022-05-31 ENCOUNTER — LAB ENCOUNTER (OUTPATIENT)
Dept: LAB | Age: 72
End: 2022-05-31
Attending: FAMILY MEDICINE
Payer: MEDICARE

## 2022-05-31 DIAGNOSIS — E78.2 MIXED HYPERLIPIDEMIA: ICD-10-CM

## 2022-05-31 LAB
CHOLEST SERPL-MCNC: 162 MG/DL (ref ?–200)
FASTING PATIENT LIPID ANSWER: YES
HDLC SERPL-MCNC: 71 MG/DL (ref 40–59)
LDLC SERPL CALC-MCNC: 65 MG/DL (ref ?–100)
NONHDLC SERPL-MCNC: 91 MG/DL (ref ?–130)
TRIGL SERPL-MCNC: 155 MG/DL (ref 30–149)
VLDLC SERPL CALC-MCNC: 23 MG/DL (ref 0–30)

## 2022-05-31 PROCEDURE — 80061 LIPID PANEL: CPT

## 2022-05-31 PROCEDURE — 36415 COLL VENOUS BLD VENIPUNCTURE: CPT

## 2022-05-31 NOTE — PROGRESS NOTES
Slightly elevated triglycerides otherwise normal lipid profileContinue the current medications unchangedWe will recheck your lipid profile again in 1 year

## 2022-06-10 ENCOUNTER — OFFICE VISIT (OUTPATIENT)
Dept: HEMATOLOGY/ONCOLOGY | Age: 72
End: 2022-06-10
Attending: INTERNAL MEDICINE
Payer: MEDICARE

## 2022-06-10 VITALS
HEIGHT: 72.01 IN | SYSTOLIC BLOOD PRESSURE: 107 MMHG | HEART RATE: 53 BPM | DIASTOLIC BLOOD PRESSURE: 66 MMHG | OXYGEN SATURATION: 99 % | BODY MASS INDEX: 35.67 KG/M2 | TEMPERATURE: 96 F | RESPIRATION RATE: 18 BRPM | WEIGHT: 263.31 LBS

## 2022-06-10 VITALS
HEART RATE: 50 BPM | SYSTOLIC BLOOD PRESSURE: 105 MMHG | DIASTOLIC BLOOD PRESSURE: 60 MMHG | TEMPERATURE: 97 F | RESPIRATION RATE: 16 BRPM

## 2022-06-10 DIAGNOSIS — C90.00 MULTIPLE MYELOMA NOT HAVING ACHIEVED REMISSION (HCC): Primary | ICD-10-CM

## 2022-06-10 DIAGNOSIS — Z94.84 HX OF STEM CELL TRANSPLANT (HCC): ICD-10-CM

## 2022-06-10 DIAGNOSIS — E11.9 TYPE 2 DIABETES MELLITUS WITHOUT COMPLICATION, WITHOUT LONG-TERM CURRENT USE OF INSULIN (HCC): ICD-10-CM

## 2022-06-10 LAB
ALBUMIN SERPL-MCNC: 3.8 G/DL (ref 3.4–5)
ALBUMIN/GLOB SERPL: 1.4 {RATIO} (ref 1–2)
ALP LIVER SERPL-CCNC: 88 U/L
ALT SERPL-CCNC: 21 U/L
ANION GAP SERPL CALC-SCNC: 7 MMOL/L (ref 0–18)
AST SERPL-CCNC: 12 U/L (ref 15–37)
BASOPHILS # BLD AUTO: 0.02 X10(3) UL (ref 0–0.2)
BASOPHILS NFR BLD AUTO: 0.1 %
BILIRUB SERPL-MCNC: 0.8 MG/DL (ref 0.1–2)
BUN BLD-MCNC: 37 MG/DL (ref 7–18)
CALCIUM BLD-MCNC: 9.2 MG/DL (ref 8.5–10.1)
CHLORIDE SERPL-SCNC: 107 MMOL/L (ref 98–112)
CO2 SERPL-SCNC: 21 MMOL/L (ref 21–32)
CREAT BLD-MCNC: 1.61 MG/DL
EOSINOPHIL # BLD AUTO: 0.01 X10(3) UL (ref 0–0.7)
EOSINOPHIL NFR BLD AUTO: 0.1 %
ERYTHROCYTE [DISTWIDTH] IN BLOOD BY AUTOMATED COUNT: 13.2 %
FASTING STATUS PATIENT QL REPORTED: NO
GLOBULIN PLAS-MCNC: 2.7 G/DL (ref 2.8–4.4)
GLUCOSE BLD-MCNC: 153 MG/DL (ref 70–99)
HCT VFR BLD AUTO: 40.9 %
HGB BLD-MCNC: 13.4 G/DL
IGA SERPL-MCNC: <7.83 MG/DL (ref 70–312)
IGM SERPL-MCNC: <5.3 MG/DL (ref 43–279)
IMM GRANULOCYTES # BLD AUTO: 0.06 X10(3) UL (ref 0–1)
IMM GRANULOCYTES NFR BLD: 0.4 %
IMMUNOGLOBULIN PNL SER-MCNC: 155 MG/DL (ref 791–1643)
LYMPHOCYTES # BLD AUTO: 0.18 X10(3) UL (ref 1–4)
LYMPHOCYTES NFR BLD AUTO: 1.3 %
MCH RBC QN AUTO: 34.6 PG (ref 26–34)
MCHC RBC AUTO-ENTMCNC: 32.8 G/DL (ref 31–37)
MCV RBC AUTO: 105.7 FL
MONOCYTES # BLD AUTO: 0.12 X10(3) UL (ref 0.1–1)
MONOCYTES NFR BLD AUTO: 0.9 %
NEUTROPHILS # BLD AUTO: 13.38 X10 (3) UL (ref 1.5–7.7)
NEUTROPHILS # BLD AUTO: 13.38 X10(3) UL (ref 1.5–7.7)
NEUTROPHILS NFR BLD AUTO: 97.2 %
OSMOLALITY SERPL CALC.SUM OF ELEC: 292 MOSM/KG (ref 275–295)
PLATELET # BLD AUTO: 259 10(3)UL (ref 150–450)
POTASSIUM SERPL-SCNC: 4.2 MMOL/L (ref 3.5–5.1)
PROT SERPL-MCNC: 6.5 G/DL (ref 6.4–8.2)
RBC # BLD AUTO: 3.87 X10(6)UL
SODIUM SERPL-SCNC: 135 MMOL/L (ref 136–145)
WBC # BLD AUTO: 13.8 X10(3) UL (ref 4–11)

## 2022-06-10 PROCEDURE — 99214 OFFICE O/P EST MOD 30 MIN: CPT | Performed by: INTERNAL MEDICINE

## 2022-06-10 PROCEDURE — 96401 CHEMO ANTI-NEOPL SQ/IM: CPT

## 2022-06-10 PROCEDURE — 96413 CHEMO IV INFUSION 1 HR: CPT

## 2022-06-10 RX ORDER — SACUBITRIL AND VALSARTAN 97; 103 MG/1; MG/1
TABLET, FILM COATED ORAL
COMMUNITY
Start: 2022-06-09

## 2022-06-10 RX ORDER — ACETAMINOPHEN 325 MG/1
650 TABLET ORAL ONCE
Status: CANCELLED | OUTPATIENT
Start: 2022-06-10

## 2022-06-10 RX ORDER — DIPHENHYDRAMINE HYDROCHLORIDE 50 MG/ML
25 INJECTION INTRAMUSCULAR; INTRAVENOUS ONCE
Status: CANCELLED | OUTPATIENT
Start: 2022-06-10

## 2022-06-10 NOTE — PROGRESS NOTES
Patient is here for follow up for myeloma on C19 of kyprolis and darzalex. He had a recent echo and his entresto was changed to a higher dosage by Dr. Robert Carmona. He is feeling well. He denies any fever or shortness of breath. He has some chronic pain that is tolerable. He says he will only report if he has a spike in pain.      Education Record    Learner:  Patient    Disease / Diagnosis: myeloma    Barriers / Limitations:  None   Comments:    Method:  Discussion   Comments:    General Topics:  Side effects and symptom management   Comments:    Outcome:  Shows understanding   Comments:

## 2022-06-14 ENCOUNTER — MED REC SCAN ONLY (OUTPATIENT)
Dept: FAMILY MEDICINE CLINIC | Facility: CLINIC | Age: 72
End: 2022-06-14

## 2022-06-14 LAB
ALBUMIN SERPL ELPH-MCNC: 4.22 G/DL (ref 3.75–5.21)
ALBUMIN/GLOB SERPL: 2.13 {RATIO} (ref 1–2)
ALPHA1 GLOB SERPL ELPH-MCNC: 0.3 G/DL (ref 0.19–0.46)
ALPHA2 GLOB SERPL ELPH-MCNC: 0.79 G/DL (ref 0.48–1.05)
B-GLOBULIN SERPL ELPH-MCNC: 0.69 G/DL (ref 0.68–1.23)
GAMMA GLOB SERPL ELPH-MCNC: 0.2 G/DL (ref 0.62–1.7)
KAPPA LC FREE SER-MCNC: 0.15 MG/DL (ref 0.33–1.94)
KAPPA LC FREE/LAMBDA FREE SER NEPH: 1 {RATIO} (ref 0.26–1.65)
LAMBDA LC FREE SERPL-MCNC: 0.15 MG/DL (ref 0.57–2.63)
M PROTEIN 1 SERPL ELPH-MCNC: 0.03 G/DL (ref ?–0)
PROT SERPL-MCNC: 6.2 G/DL (ref 6.4–8.2)

## 2022-06-17 ENCOUNTER — OFFICE VISIT (OUTPATIENT)
Dept: HEMATOLOGY/ONCOLOGY | Age: 72
End: 2022-06-17
Attending: INTERNAL MEDICINE
Payer: MEDICARE

## 2022-06-17 VITALS
RESPIRATION RATE: 18 BRPM | BODY MASS INDEX: 35.31 KG/M2 | DIASTOLIC BLOOD PRESSURE: 69 MMHG | SYSTOLIC BLOOD PRESSURE: 112 MMHG | HEART RATE: 63 BPM | TEMPERATURE: 97 F | HEIGHT: 72.01 IN | WEIGHT: 260.69 LBS | OXYGEN SATURATION: 98 %

## 2022-06-17 DIAGNOSIS — C90.00 MULTIPLE MYELOMA NOT HAVING ACHIEVED REMISSION (HCC): Primary | ICD-10-CM

## 2022-06-17 LAB
BASOPHILS # BLD AUTO: 0.01 X10(3) UL (ref 0–0.2)
BASOPHILS NFR BLD AUTO: 0.1 %
EOSINOPHIL # BLD AUTO: 0.01 X10(3) UL (ref 0–0.7)
EOSINOPHIL NFR BLD AUTO: 0.1 %
ERYTHROCYTE [DISTWIDTH] IN BLOOD BY AUTOMATED COUNT: 13.3 %
HCT VFR BLD AUTO: 40.5 %
HGB BLD-MCNC: 13.6 G/DL
IMM GRANULOCYTES # BLD AUTO: 0.07 X10(3) UL (ref 0–1)
IMM GRANULOCYTES NFR BLD: 0.5 %
LYMPHOCYTES # BLD AUTO: 0.13 X10(3) UL (ref 1–4)
LYMPHOCYTES NFR BLD AUTO: 1 %
MCH RBC QN AUTO: 34.7 PG (ref 26–34)
MCHC RBC AUTO-ENTMCNC: 33.6 G/DL (ref 31–37)
MCV RBC AUTO: 103.3 FL
MONOCYTES # BLD AUTO: 0.11 X10(3) UL (ref 0.1–1)
MONOCYTES NFR BLD AUTO: 0.8 %
NEUTROPHILS # BLD AUTO: 13.3 X10 (3) UL (ref 1.5–7.7)
NEUTROPHILS # BLD AUTO: 13.3 X10(3) UL (ref 1.5–7.7)
NEUTROPHILS NFR BLD AUTO: 97.5 %
PLATELET # BLD AUTO: 131 10(3)UL (ref 150–450)
RBC # BLD AUTO: 3.92 X10(6)UL
WBC # BLD AUTO: 13.6 X10(3) UL (ref 4–11)

## 2022-06-17 PROCEDURE — 96413 CHEMO IV INFUSION 1 HR: CPT

## 2022-06-17 PROCEDURE — 85025 COMPLETE CBC W/AUTO DIFF WBC: CPT

## 2022-06-17 NOTE — PROGRESS NOTES
Pt here for C19D8. Arrives Ambulating independently, accompanied by Self           Pregnancy screening: Not applicable    Modifications in dose or schedule: No    Drugs/infusions dual verified for appearance and physical integrity: yes     Frequency of blood return and site check throughout administration: Prior to administration and At completion of therapy   Discharged to Home, Ambulating independently, accompanied by:Self    Outpatient Oncology Care Plan  Problem list:  knowledge deficit  Problems related to:  chemotherapy  Interventions:  emotional support given  maintain safe environment  patient/family supported  chemotherapy teaching  monitor effect of therapy  monitor lab values  promoted rest  provided general teaching  Expected outcomes:  understands plan of care  Progress towards outcome:  making progress    Education Record    Learner:  Patient  Barriers / Limitations:  None  Method:  Discussion  Outcome:  Shows understanding  Comments: patient ambulatory with no complaints. Tolerated treatment today. Discharged in stable condition.

## 2022-06-20 ENCOUNTER — LAB ENCOUNTER (OUTPATIENT)
Dept: LAB | Age: 72
End: 2022-06-20
Attending: INTERNAL MEDICINE
Payer: MEDICARE

## 2022-06-20 DIAGNOSIS — I10 ESSENTIAL HYPERTENSION: ICD-10-CM

## 2022-06-20 DIAGNOSIS — Z85.79 PERSONAL HISTORY OF MULTIPLE MYELOMA: ICD-10-CM

## 2022-06-20 DIAGNOSIS — R53.83 FATIGUE: ICD-10-CM

## 2022-06-20 DIAGNOSIS — E55.9 VITAMIN D DEFICIENCY: ICD-10-CM

## 2022-06-20 DIAGNOSIS — Z51.81 ENCOUNTER FOR THERAPEUTIC DRUG MONITORING: ICD-10-CM

## 2022-06-20 DIAGNOSIS — C44.91 BCC (BASAL CELL CARCINOMA OF SKIN): Primary | ICD-10-CM

## 2022-06-20 DIAGNOSIS — N18.30 STAGE 3 CHRONIC KIDNEY DISEASE, UNSPECIFIED WHETHER STAGE 3A OR 3B CKD (HCC): ICD-10-CM

## 2022-06-20 DIAGNOSIS — N30.00 ACUTE CYSTITIS WITHOUT HEMATURIA: ICD-10-CM

## 2022-06-20 DIAGNOSIS — E11.9 DIABETES MELLITUS, TYPE 2 (HCC): ICD-10-CM

## 2022-06-20 DIAGNOSIS — I42.9 CARDIOMYOPATHY (HCC): ICD-10-CM

## 2022-06-20 LAB
ALBUMIN SERPL-MCNC: 3.4 G/DL (ref 3.4–5)
ALBUMIN/GLOB SERPL: 1.4 {RATIO} (ref 1–2)
ALP LIVER SERPL-CCNC: 63 U/L
ALT SERPL-CCNC: 17 U/L
ANION GAP SERPL CALC-SCNC: 7 MMOL/L (ref 0–18)
AST SERPL-CCNC: 14 U/L (ref 15–37)
BASOPHILS # BLD AUTO: 0.01 X10(3) UL (ref 0–0.2)
BASOPHILS NFR BLD AUTO: 0.2 %
BILIRUB SERPL-MCNC: 0.8 MG/DL (ref 0.1–2)
BILIRUB UR QL STRIP.AUTO: NEGATIVE
BUN BLD-MCNC: 39 MG/DL (ref 7–18)
CALCIUM BLD-MCNC: 9.3 MG/DL (ref 8.5–10.1)
CHLORIDE SERPL-SCNC: 112 MMOL/L (ref 98–112)
CHOLEST SERPL-MCNC: 158 MG/DL (ref ?–200)
CLARITY UR REFRACT.AUTO: CLEAR
CO2 SERPL-SCNC: 22 MMOL/L (ref 21–32)
COLOR UR AUTO: YELLOW
CREAT BLD-MCNC: 1.23 MG/DL
CREAT UR-SCNC: 49.7 MG/DL
EOSINOPHIL # BLD AUTO: 0.05 X10(3) UL (ref 0–0.7)
EOSINOPHIL NFR BLD AUTO: 0.9 %
ERYTHROCYTE [DISTWIDTH] IN BLOOD BY AUTOMATED COUNT: 13.2 %
FASTING PATIENT LIPID ANSWER: YES
FASTING STATUS PATIENT QL REPORTED: YES
GLOBULIN PLAS-MCNC: 2.5 G/DL (ref 2.8–4.4)
GLUCOSE BLD-MCNC: 121 MG/DL (ref 70–99)
GLUCOSE UR STRIP.AUTO-MCNC: >=1000 MG/DL
HCT VFR BLD AUTO: 40.6 %
HDLC SERPL-MCNC: 76 MG/DL (ref 40–59)
HGB BLD-MCNC: 13.1 G/DL
IMM GRANULOCYTES # BLD AUTO: 0.04 X10(3) UL (ref 0–1)
IMM GRANULOCYTES NFR BLD: 0.7 %
KETONES UR STRIP.AUTO-MCNC: NEGATIVE MG/DL
LDLC SERPL CALC-MCNC: 52 MG/DL (ref ?–100)
LEUKOCYTE ESTERASE UR QL STRIP.AUTO: NEGATIVE
LYMPHOCYTES # BLD AUTO: 0.47 X10(3) UL (ref 1–4)
LYMPHOCYTES NFR BLD AUTO: 8.5 %
MAGNESIUM SERPL-MCNC: 2.4 MG/DL (ref 1.6–2.6)
MCH RBC QN AUTO: 34.9 PG (ref 26–34)
MCHC RBC AUTO-ENTMCNC: 32.3 G/DL (ref 31–37)
MCV RBC AUTO: 108.3 FL
MONOCYTES # BLD AUTO: 1.21 X10(3) UL (ref 0.1–1)
MONOCYTES NFR BLD AUTO: 21.8 %
NEUTROPHILS # BLD AUTO: 3.77 X10 (3) UL (ref 1.5–7.7)
NEUTROPHILS # BLD AUTO: 3.77 X10(3) UL (ref 1.5–7.7)
NEUTROPHILS NFR BLD AUTO: 67.9 %
NITRITE UR QL STRIP.AUTO: NEGATIVE
NONHDLC SERPL-MCNC: 82 MG/DL (ref ?–130)
NT-PROBNP SERPL-MCNC: 234 PG/ML (ref ?–125)
OSMOLALITY SERPL CALC.SUM OF ELEC: 303 MOSM/KG (ref 275–295)
PH UR STRIP.AUTO: 5.5 [PH] (ref 5–8)
PHOSPHATE SERPL-MCNC: 2.2 MG/DL (ref 2.5–4.9)
PLATELET # BLD AUTO: 87 10(3)UL (ref 150–450)
POTASSIUM SERPL-SCNC: 4.3 MMOL/L (ref 3.5–5.1)
PROT SERPL-MCNC: 5.9 G/DL (ref 6.4–8.2)
PROT UR STRIP.AUTO-MCNC: NEGATIVE MG/DL
PROT UR-MCNC: 12.9 MG/DL
RBC # BLD AUTO: 3.75 X10(6)UL
RBC UR QL AUTO: NEGATIVE
SODIUM SERPL-SCNC: 141 MMOL/L (ref 136–145)
SP GR UR STRIP.AUTO: 1.01 (ref 1–1.03)
TRIGL SERPL-MCNC: 190 MG/DL (ref 30–149)
TSI SER-ACNC: 1.52 MIU/ML (ref 0.36–3.74)
UROBILINOGEN UR STRIP.AUTO-MCNC: 0.2 MG/DL
VIT D+METAB SERPL-MCNC: 38.5 NG/ML (ref 30–100)
VLDLC SERPL CALC-MCNC: 27 MG/DL (ref 0–30)
WBC # BLD AUTO: 5.6 X10(3) UL (ref 4–11)

## 2022-06-20 PROCEDURE — 84100 ASSAY OF PHOSPHORUS: CPT

## 2022-06-20 PROCEDURE — 36415 COLL VENOUS BLD VENIPUNCTURE: CPT

## 2022-06-20 PROCEDURE — 82570 ASSAY OF URINE CREATININE: CPT

## 2022-06-20 PROCEDURE — 80061 LIPID PANEL: CPT

## 2022-06-20 PROCEDURE — 80053 COMPREHEN METABOLIC PANEL: CPT

## 2022-06-20 PROCEDURE — 84156 ASSAY OF PROTEIN URINE: CPT

## 2022-06-20 PROCEDURE — 83880 ASSAY OF NATRIURETIC PEPTIDE: CPT

## 2022-06-20 PROCEDURE — 83735 ASSAY OF MAGNESIUM: CPT

## 2022-06-20 PROCEDURE — 82306 VITAMIN D 25 HYDROXY: CPT

## 2022-06-20 PROCEDURE — 85025 COMPLETE CBC W/AUTO DIFF WBC: CPT

## 2022-06-20 PROCEDURE — 81003 URINALYSIS AUTO W/O SCOPE: CPT

## 2022-06-20 PROCEDURE — 84443 ASSAY THYROID STIM HORMONE: CPT

## 2022-06-21 ENCOUNTER — OFFICE VISIT (OUTPATIENT)
Dept: NEPHROLOGY | Facility: CLINIC | Age: 72
End: 2022-06-21
Payer: MEDICARE

## 2022-06-21 VITALS — DIASTOLIC BLOOD PRESSURE: 70 MMHG | SYSTOLIC BLOOD PRESSURE: 108 MMHG | BODY MASS INDEX: 36 KG/M2 | WEIGHT: 263 LBS

## 2022-06-21 DIAGNOSIS — C90.00 MYELOMA KIDNEY (HCC): Primary | ICD-10-CM

## 2022-06-21 DIAGNOSIS — N08 MYELOMA KIDNEY (HCC): Primary | ICD-10-CM

## 2022-06-21 PROCEDURE — 99213 OFFICE O/P EST LOW 20 MIN: CPT | Performed by: INTERNAL MEDICINE

## 2022-06-24 ENCOUNTER — OFFICE VISIT (OUTPATIENT)
Dept: HEMATOLOGY/ONCOLOGY | Age: 72
End: 2022-06-24
Attending: INTERNAL MEDICINE
Payer: MEDICARE

## 2022-06-24 VITALS
HEIGHT: 72.01 IN | SYSTOLIC BLOOD PRESSURE: 125 MMHG | RESPIRATION RATE: 18 BRPM | DIASTOLIC BLOOD PRESSURE: 78 MMHG | TEMPERATURE: 97 F | HEART RATE: 65 BPM | WEIGHT: 263.81 LBS | BODY MASS INDEX: 35.73 KG/M2 | OXYGEN SATURATION: 98 %

## 2022-06-24 DIAGNOSIS — C90.00 MULTIPLE MYELOMA, REMISSION STATUS UNSPECIFIED (HCC): ICD-10-CM

## 2022-06-24 DIAGNOSIS — C90.00 MULTIPLE MYELOMA NOT HAVING ACHIEVED REMISSION (HCC): Primary | ICD-10-CM

## 2022-06-24 LAB
ALBUMIN SERPL-MCNC: 3.8 G/DL (ref 3.4–5)
BASOPHILS # BLD AUTO: 0.01 X10(3) UL (ref 0–0.2)
BASOPHILS NFR BLD AUTO: 0.1 %
CALCIUM BLD-MCNC: 10.2 MG/DL (ref 8.5–10.1)
CREAT BLD-MCNC: 1.23 MG/DL
EOSINOPHIL # BLD AUTO: 0.01 X10(3) UL (ref 0–0.7)
EOSINOPHIL NFR BLD AUTO: 0.1 %
ERYTHROCYTE [DISTWIDTH] IN BLOOD BY AUTOMATED COUNT: 13.4 %
HCT VFR BLD AUTO: 39.4 %
HGB BLD-MCNC: 13.3 G/DL
IMM GRANULOCYTES # BLD AUTO: 0.07 X10(3) UL (ref 0–1)
IMM GRANULOCYTES NFR BLD: 0.5 %
LYMPHOCYTES # BLD AUTO: 0.17 X10(3) UL (ref 1–4)
LYMPHOCYTES NFR BLD AUTO: 1.3 %
MCH RBC QN AUTO: 35 PG (ref 26–34)
MCHC RBC AUTO-ENTMCNC: 33.8 G/DL (ref 31–37)
MCV RBC AUTO: 103.7 FL
MONOCYTES # BLD AUTO: 0.11 X10(3) UL (ref 0.1–1)
MONOCYTES NFR BLD AUTO: 0.9 %
NEUTROPHILS # BLD AUTO: 12.51 X10 (3) UL (ref 1.5–7.7)
NEUTROPHILS # BLD AUTO: 12.51 X10(3) UL (ref 1.5–7.7)
NEUTROPHILS NFR BLD AUTO: 97.1 %
PLATELET # BLD AUTO: 137 10(3)UL (ref 150–450)
RBC # BLD AUTO: 3.8 X10(6)UL
WBC # BLD AUTO: 12.9 X10(3) UL (ref 4–11)

## 2022-06-24 PROCEDURE — 82310 ASSAY OF CALCIUM: CPT

## 2022-06-24 PROCEDURE — 96375 TX/PRO/DX INJ NEW DRUG ADDON: CPT

## 2022-06-24 PROCEDURE — 96413 CHEMO IV INFUSION 1 HR: CPT

## 2022-06-24 PROCEDURE — 82040 ASSAY OF SERUM ALBUMIN: CPT

## 2022-06-24 PROCEDURE — 36415 COLL VENOUS BLD VENIPUNCTURE: CPT

## 2022-06-24 PROCEDURE — 82565 ASSAY OF CREATININE: CPT

## 2022-06-24 PROCEDURE — 85025 COMPLETE CBC W/AUTO DIFF WBC: CPT

## 2022-06-24 NOTE — PROGRESS NOTES
Pt here for C19D8.   Arrives Ambulating independently, accompanied by Self           Pregnancy screening: Denies possibility of pregnancy    Modifications in dose or schedule: No    Drugs/infusions dual verified for appearance and physical integrity: yes     Frequency of blood return and site check throughout administration: Prior to administration   Discharged to Home, Ambulating independently, accompanied by:Self    Outpatient Oncology Care Plan  Problem list:  pt denies problems  Problems related to:  chemotherapy  Interventions:  emotional support given  Expected outcomes:  patient supported/coping well  Progress towards outcome:  making progress    Education Record    Learner:  Patient  Barriers / Limitations:  None  Method:  Discussion  Outcome:  Shows understanding  Comments:

## 2022-07-08 ENCOUNTER — OFFICE VISIT (OUTPATIENT)
Dept: HEMATOLOGY/ONCOLOGY | Age: 72
End: 2022-07-08
Attending: INTERNAL MEDICINE
Payer: MEDICARE

## 2022-07-08 ENCOUNTER — TELEPHONE (OUTPATIENT)
Facility: LOCATION | Age: 72
End: 2022-07-08

## 2022-07-08 VITALS
TEMPERATURE: 96 F | SYSTOLIC BLOOD PRESSURE: 115 MMHG | OXYGEN SATURATION: 98 % | RESPIRATION RATE: 18 BRPM | WEIGHT: 260 LBS | HEART RATE: 56 BPM | DIASTOLIC BLOOD PRESSURE: 62 MMHG | BODY MASS INDEX: 35.21 KG/M2 | HEIGHT: 72.01 IN

## 2022-07-08 VITALS
DIASTOLIC BLOOD PRESSURE: 65 MMHG | TEMPERATURE: 96 F | SYSTOLIC BLOOD PRESSURE: 108 MMHG | HEART RATE: 45 BPM | RESPIRATION RATE: 18 BRPM | OXYGEN SATURATION: 97 %

## 2022-07-08 DIAGNOSIS — C90.00 MULTIPLE MYELOMA NOT HAVING ACHIEVED REMISSION (HCC): Primary | ICD-10-CM

## 2022-07-08 DIAGNOSIS — M89.9 LYTIC BONE LESIONS ON XRAY: ICD-10-CM

## 2022-07-08 DIAGNOSIS — R39.15 URINARY URGENCY: ICD-10-CM

## 2022-07-08 LAB
ALBUMIN SERPL-MCNC: 4 G/DL (ref 3.4–5)
ALBUMIN/GLOB SERPL: 1.5 {RATIO} (ref 1–2)
ALP LIVER SERPL-CCNC: 83 U/L
ALT SERPL-CCNC: 23 U/L
ANION GAP SERPL CALC-SCNC: 9 MMOL/L (ref 0–18)
AST SERPL-CCNC: 14 U/L (ref 15–37)
BASOPHILS # BLD AUTO: 0.01 X10(3) UL (ref 0–0.2)
BASOPHILS NFR BLD AUTO: 0.1 %
BILIRUB SERPL-MCNC: 1.1 MG/DL (ref 0.1–2)
BILIRUB UR QL STRIP.AUTO: NEGATIVE
BUN BLD-MCNC: 29 MG/DL (ref 7–18)
CALCIUM BLD-MCNC: 10.1 MG/DL (ref 8.5–10.1)
CHLORIDE SERPL-SCNC: 106 MMOL/L (ref 98–112)
CLARITY UR REFRACT.AUTO: CLEAR
CO2 SERPL-SCNC: 22 MMOL/L (ref 21–32)
COLOR UR AUTO: YELLOW
CREAT BLD-MCNC: 1.47 MG/DL
EOSINOPHIL # BLD AUTO: 0 X10(3) UL (ref 0–0.7)
EOSINOPHIL NFR BLD AUTO: 0 %
ERYTHROCYTE [DISTWIDTH] IN BLOOD BY AUTOMATED COUNT: 13 %
FASTING STATUS PATIENT QL REPORTED: NO
GLOBULIN PLAS-MCNC: 2.6 G/DL (ref 2.8–4.4)
GLUCOSE BLD-MCNC: 163 MG/DL (ref 70–99)
GLUCOSE UR STRIP.AUTO-MCNC: 500 MG/DL
HCT VFR BLD AUTO: 41.5 %
HGB BLD-MCNC: 13.7 G/DL
IGA SERPL-MCNC: <7.83 MG/DL (ref 70–312)
IGM SERPL-MCNC: <5.3 MG/DL (ref 43–279)
IMM GRANULOCYTES # BLD AUTO: 0.04 X10(3) UL (ref 0–1)
IMM GRANULOCYTES NFR BLD: 0.3 %
IMMUNOGLOBULIN PNL SER-MCNC: 156 MG/DL (ref 791–1643)
KETONES UR STRIP.AUTO-MCNC: NEGATIVE MG/DL
LEUKOCYTE ESTERASE UR QL STRIP.AUTO: NEGATIVE
LYMPHOCYTES # BLD AUTO: 0.15 X10(3) UL (ref 1–4)
LYMPHOCYTES NFR BLD AUTO: 1.3 %
MCH RBC QN AUTO: 34.6 PG (ref 26–34)
MCHC RBC AUTO-ENTMCNC: 33 G/DL (ref 31–37)
MCV RBC AUTO: 104.8 FL
MONOCYTES # BLD AUTO: 0.08 X10(3) UL (ref 0.1–1)
MONOCYTES NFR BLD AUTO: 0.7 %
NEUTROPHILS # BLD AUTO: 11.56 X10 (3) UL (ref 1.5–7.7)
NEUTROPHILS # BLD AUTO: 11.56 X10(3) UL (ref 1.5–7.7)
NEUTROPHILS NFR BLD AUTO: 97.6 %
NITRITE UR QL STRIP.AUTO: NEGATIVE
OSMOLALITY SERPL CALC.SUM OF ELEC: 293 MOSM/KG (ref 275–295)
PH UR STRIP.AUTO: 5 [PH] (ref 5–8)
PLATELET # BLD AUTO: 251 10(3)UL (ref 150–450)
POTASSIUM SERPL-SCNC: 4.2 MMOL/L (ref 3.5–5.1)
PROT SERPL-MCNC: 6.6 G/DL (ref 6.4–8.2)
PROT UR STRIP.AUTO-MCNC: NEGATIVE MG/DL
RBC # BLD AUTO: 3.96 X10(6)UL
RBC UR QL AUTO: NEGATIVE
SODIUM SERPL-SCNC: 137 MMOL/L (ref 136–145)
SP GR UR STRIP.AUTO: <=1.005 (ref 1–1.03)
UROBILINOGEN UR STRIP.AUTO-MCNC: 0.2 MG/DL
WBC # BLD AUTO: 11.8 X10(3) UL (ref 4–11)

## 2022-07-08 PROCEDURE — 87086 URINE CULTURE/COLONY COUNT: CPT

## 2022-07-08 PROCEDURE — 81003 URINALYSIS AUTO W/O SCOPE: CPT

## 2022-07-08 PROCEDURE — 96401 CHEMO ANTI-NEOPL SQ/IM: CPT

## 2022-07-08 PROCEDURE — 96413 CHEMO IV INFUSION 1 HR: CPT

## 2022-07-08 PROCEDURE — 99215 OFFICE O/P EST HI 40 MIN: CPT | Performed by: NURSE PRACTITIONER

## 2022-07-08 RX ORDER — DIPHENHYDRAMINE HYDROCHLORIDE 50 MG/ML
25 INJECTION INTRAMUSCULAR; INTRAVENOUS ONCE
Status: CANCELLED | OUTPATIENT
Start: 2022-07-08

## 2022-07-08 RX ORDER — ACETAMINOPHEN 325 MG/1
650 TABLET ORAL ONCE
Status: DISCONTINUED | OUTPATIENT
Start: 2022-07-08 | End: 2022-07-08

## 2022-07-08 RX ORDER — ACETAMINOPHEN 325 MG/1
650 TABLET ORAL ONCE
Status: CANCELLED | OUTPATIENT
Start: 2022-07-08

## 2022-07-08 RX ORDER — DIPHENHYDRAMINE HYDROCHLORIDE 50 MG/ML
25 INJECTION INTRAMUSCULAR; INTRAVENOUS ONCE
Status: DISCONTINUED | OUTPATIENT
Start: 2022-07-08 | End: 2022-07-08

## 2022-07-08 NOTE — TELEPHONE ENCOUNTER
Per OR report 5/18/2022    You will require a repeat colonoscopy in 5 years based on your history of colon polyps. Recall placed.

## 2022-07-08 NOTE — PROGRESS NOTES
Pt here for C20D1.   Arrives Ambulating independently, accompanied by Self           Pregnancy screening: Not applicable    Modifications in dose or schedule: No    Drugs/infusions dual verified for appearance and physical integrity: yes     Frequency of blood return and site check throughout administration: Prior to administration and At completion of therapy   Discharged to Home, Ambulating independently, accompanied by:Self    Outpatient Oncology Care Plan  Problem list:  fatigue  Problems related to:  chemotherapy  Interventions:  encourage activity as tolerated  Expected outcomes:  understands plan of care  Progress towards outcome:  making progress    Education Record    Learner:  Patient  Barriers / Limitations:  None  Method:  Reinforcement  Outcome:  Shows understanding  Comments:  No c/o

## 2022-07-12 LAB
ALBUMIN SERPL ELPH-MCNC: 4.29 G/DL (ref 3.75–5.21)
ALBUMIN/GLOB SERPL: 2.13 {RATIO} (ref 1–2)
ALPHA1 GLOB SERPL ELPH-MCNC: 0.3 G/DL (ref 0.19–0.46)
ALPHA2 GLOB SERPL ELPH-MCNC: 0.81 G/DL (ref 0.48–1.05)
B-GLOBULIN SERPL ELPH-MCNC: 0.71 G/DL (ref 0.68–1.23)
GAMMA GLOB SERPL ELPH-MCNC: 0.2 G/DL (ref 0.62–1.7)
KAPPA LC FREE SER-MCNC: 0.14 MG/DL (ref 0.33–1.94)
LAMBDA LC FREE SERPL-MCNC: <0.137 MG/DL (ref 0.57–2.63)
M PROTEIN 1 SERPL ELPH-MCNC: 0.04 G/DL (ref ?–0)
PROT SERPL-MCNC: 6.3 G/DL (ref 6.4–8.2)

## 2022-07-15 ENCOUNTER — OFFICE VISIT (OUTPATIENT)
Dept: HEMATOLOGY/ONCOLOGY | Age: 72
End: 2022-07-15
Attending: INTERNAL MEDICINE
Payer: MEDICARE

## 2022-07-15 VITALS
RESPIRATION RATE: 18 BRPM | BODY MASS INDEX: 35.47 KG/M2 | WEIGHT: 261.88 LBS | HEART RATE: 56 BPM | DIASTOLIC BLOOD PRESSURE: 69 MMHG | TEMPERATURE: 97 F | OXYGEN SATURATION: 98 % | HEIGHT: 72.01 IN | SYSTOLIC BLOOD PRESSURE: 112 MMHG

## 2022-07-15 DIAGNOSIS — C90.00 MULTIPLE MYELOMA NOT HAVING ACHIEVED REMISSION (HCC): Primary | ICD-10-CM

## 2022-07-15 LAB
BASOPHILS # BLD AUTO: 0.01 X10(3) UL (ref 0–0.2)
BASOPHILS NFR BLD AUTO: 0.1 %
EOSINOPHIL # BLD AUTO: 0.01 X10(3) UL (ref 0–0.7)
EOSINOPHIL NFR BLD AUTO: 0.1 %
ERYTHROCYTE [DISTWIDTH] IN BLOOD BY AUTOMATED COUNT: 13.1 %
HCT VFR BLD AUTO: 41.2 %
HGB BLD-MCNC: 13.7 G/DL
IMM GRANULOCYTES # BLD AUTO: 0.04 X10(3) UL (ref 0–1)
IMM GRANULOCYTES NFR BLD: 0.3 %
LYMPHOCYTES # BLD AUTO: 0.16 X10(3) UL (ref 1–4)
LYMPHOCYTES NFR BLD AUTO: 1.3 %
MCH RBC QN AUTO: 34.9 PG (ref 26–34)
MCHC RBC AUTO-ENTMCNC: 33.3 G/DL (ref 31–37)
MCV RBC AUTO: 104.8 FL
MONOCYTES # BLD AUTO: 0.1 X10(3) UL (ref 0.1–1)
MONOCYTES NFR BLD AUTO: 0.8 %
NEUTROPHILS # BLD AUTO: 12.43 X10 (3) UL (ref 1.5–7.7)
NEUTROPHILS # BLD AUTO: 12.43 X10(3) UL (ref 1.5–7.7)
NEUTROPHILS NFR BLD AUTO: 97.4 %
PLATELET # BLD AUTO: 106 10(3)UL (ref 150–450)
RBC # BLD AUTO: 3.93 X10(6)UL
WBC # BLD AUTO: 12.8 X10(3) UL (ref 4–11)

## 2022-07-15 PROCEDURE — 96413 CHEMO IV INFUSION 1 HR: CPT

## 2022-07-15 PROCEDURE — 36415 COLL VENOUS BLD VENIPUNCTURE: CPT

## 2022-07-15 PROCEDURE — 85025 COMPLETE CBC W/AUTO DIFF WBC: CPT

## 2022-07-15 NOTE — PROGRESS NOTES
Pt here for C20D8. Arrives Ambulating independently, accompanied by Self           Pregnancy screening: Not applicable    Modifications in dose or schedule: No    Drugs/infusions dual verified for appearance and physical integrity: yes     Frequency of blood return and site check throughout administration: Prior to administration and At completion of therapy   Discharged to Home, Ambulating independently, accompanied by:Self    Outpatient Oncology Care Plan  Problem list:  knowledge deficit  Problems related to:  chemotherapy  Interventions:  emotional support given  maintain safe environment  patient/family supported  chemotherapy teaching  monitor effect of therapy  monitor lab values  promoted rest  provided general teaching  Expected outcomes:  understands plan of care  Progress towards outcome:  making progress    Education Record    Learner:  Patient  Barriers / Limitations:  None  Method:  Discussion  Outcome:  Shows understanding  Comments: patient ambulatory with no complaints. Tolerated treatment. Discharged in stable condition.

## 2022-07-22 ENCOUNTER — OFFICE VISIT (OUTPATIENT)
Dept: HEMATOLOGY/ONCOLOGY | Age: 72
End: 2022-07-22
Attending: INTERNAL MEDICINE
Payer: MEDICARE

## 2022-07-22 VITALS
WEIGHT: 260.63 LBS | HEIGHT: 72.01 IN | SYSTOLIC BLOOD PRESSURE: 113 MMHG | BODY MASS INDEX: 35.3 KG/M2 | OXYGEN SATURATION: 98 % | RESPIRATION RATE: 18 BRPM | TEMPERATURE: 98 F | HEART RATE: 58 BPM | DIASTOLIC BLOOD PRESSURE: 66 MMHG

## 2022-07-22 DIAGNOSIS — C90.00 MULTIPLE MYELOMA, REMISSION STATUS UNSPECIFIED (HCC): ICD-10-CM

## 2022-07-22 DIAGNOSIS — C90.00 MULTIPLE MYELOMA NOT HAVING ACHIEVED REMISSION (HCC): Primary | ICD-10-CM

## 2022-07-22 LAB
ALBUMIN SERPL-MCNC: 3.9 G/DL (ref 3.4–5)
BASOPHILS # BLD AUTO: 0.02 X10(3) UL (ref 0–0.2)
BASOPHILS NFR BLD AUTO: 0.1 %
CALCIUM BLD-MCNC: 9.9 MG/DL (ref 8.5–10.1)
CREAT BLD-MCNC: 1.48 MG/DL
EOSINOPHIL # BLD AUTO: 0.02 X10(3) UL (ref 0–0.7)
EOSINOPHIL NFR BLD AUTO: 0.1 %
ERYTHROCYTE [DISTWIDTH] IN BLOOD BY AUTOMATED COUNT: 13.2 %
HCT VFR BLD AUTO: 41.9 %
HGB BLD-MCNC: 13.9 G/DL
IMM GRANULOCYTES # BLD AUTO: 0.05 X10(3) UL (ref 0–1)
IMM GRANULOCYTES NFR BLD: 0.4 %
LYMPHOCYTES # BLD AUTO: 0.18 X10(3) UL (ref 1–4)
LYMPHOCYTES NFR BLD AUTO: 1.3 %
MCH RBC QN AUTO: 34.6 PG (ref 26–34)
MCHC RBC AUTO-ENTMCNC: 33.2 G/DL (ref 31–37)
MCV RBC AUTO: 104.2 FL
MONOCYTES # BLD AUTO: 0.09 X10(3) UL (ref 0.1–1)
MONOCYTES NFR BLD AUTO: 0.6 %
NEUTROPHILS # BLD AUTO: 13.59 X10 (3) UL (ref 1.5–7.7)
NEUTROPHILS # BLD AUTO: 13.59 X10(3) UL (ref 1.5–7.7)
NEUTROPHILS NFR BLD AUTO: 97.5 %
PLATELET # BLD AUTO: 158 10(3)UL (ref 150–450)
RBC # BLD AUTO: 4.02 X10(6)UL
WBC # BLD AUTO: 14 X10(3) UL (ref 4–11)

## 2022-07-22 PROCEDURE — 82310 ASSAY OF CALCIUM: CPT

## 2022-07-22 PROCEDURE — 36415 COLL VENOUS BLD VENIPUNCTURE: CPT

## 2022-07-22 PROCEDURE — 85025 COMPLETE CBC W/AUTO DIFF WBC: CPT

## 2022-07-22 PROCEDURE — 96413 CHEMO IV INFUSION 1 HR: CPT

## 2022-07-22 PROCEDURE — 82565 ASSAY OF CREATININE: CPT

## 2022-07-22 PROCEDURE — 82040 ASSAY OF SERUM ALBUMIN: CPT

## 2022-07-22 PROCEDURE — 96375 TX/PRO/DX INJ NEW DRUG ADDON: CPT

## 2022-07-22 NOTE — PROGRESS NOTES
Pt here for C20D15.   Arrives Ambulating independently, accompanied by Self           Pregnancy screening: Not applicable    Modifications in dose or schedule: No    Drugs/infusions dual verified for appearance and physical integrity: yes     Frequency of blood return and site check throughout administration: Prior to administration and Prior to each drug   Discharged to Home, Ambulating independently, accompanied by:Self    Outpatient Oncology Care Plan  Problem list:  knowledge deficit  Problems related to:  chemotherapy  Interventions:  chemotherapy teaching  Expected outcomes:  understands plan of care  Progress towards outcome:  making progress    Education Record    Learner:  Patient  Barriers / Limitations:  None  Method:  Reinforcement  Outcome:  Shows understanding  Comments:  No c/o

## 2022-08-01 RX ORDER — ACYCLOVIR 400 MG/1
TABLET ORAL
Qty: 180 TABLET | Refills: 1 | Status: SHIPPED | OUTPATIENT
Start: 2022-08-01

## 2022-08-05 ENCOUNTER — OFFICE VISIT (OUTPATIENT)
Dept: HEMATOLOGY/ONCOLOGY | Age: 72
End: 2022-08-05
Attending: INTERNAL MEDICINE
Payer: MEDICARE

## 2022-08-05 VITALS
DIASTOLIC BLOOD PRESSURE: 72 MMHG | HEIGHT: 72.01 IN | SYSTOLIC BLOOD PRESSURE: 125 MMHG | RESPIRATION RATE: 18 BRPM | TEMPERATURE: 98 F | OXYGEN SATURATION: 95 % | BODY MASS INDEX: 35.76 KG/M2 | HEART RATE: 74 BPM | WEIGHT: 264 LBS

## 2022-08-05 DIAGNOSIS — M89.9 LYTIC BONE LESIONS ON XRAY: ICD-10-CM

## 2022-08-05 DIAGNOSIS — C90.00 MULTIPLE MYELOMA NOT HAVING ACHIEVED REMISSION (HCC): Primary | ICD-10-CM

## 2022-08-05 LAB
ALBUMIN SERPL-MCNC: 3.9 G/DL (ref 3.4–5)
ALBUMIN/GLOB SERPL: 1.5 {RATIO} (ref 1–2)
ALP LIVER SERPL-CCNC: 91 U/L
ALT SERPL-CCNC: 19 U/L
ANION GAP SERPL CALC-SCNC: 8 MMOL/L (ref 0–18)
AST SERPL-CCNC: 11 U/L (ref 15–37)
BASOPHILS # BLD AUTO: 0.02 X10(3) UL (ref 0–0.2)
BASOPHILS NFR BLD AUTO: 0.2 %
BILIRUB SERPL-MCNC: 0.7 MG/DL (ref 0.1–2)
BUN BLD-MCNC: 32 MG/DL (ref 7–18)
CALCIUM BLD-MCNC: 10 MG/DL (ref 8.5–10.1)
CHLORIDE SERPL-SCNC: 108 MMOL/L (ref 98–112)
CO2 SERPL-SCNC: 22 MMOL/L (ref 21–32)
CREAT BLD-MCNC: 1.79 MG/DL
EOSINOPHIL # BLD AUTO: 0 X10(3) UL (ref 0–0.7)
EOSINOPHIL NFR BLD AUTO: 0 %
ERYTHROCYTE [DISTWIDTH] IN BLOOD BY AUTOMATED COUNT: 13.1 %
FASTING STATUS PATIENT QL REPORTED: NO
GFR SERPLBLD BASED ON 1.73 SQ M-ARVRAT: 39 ML/MIN/1.73M2 (ref 60–?)
GLOBULIN PLAS-MCNC: 2.6 G/DL (ref 2.8–4.4)
GLUCOSE BLD-MCNC: 247 MG/DL (ref 70–99)
HCT VFR BLD AUTO: 41 %
HGB BLD-MCNC: 13.5 G/DL
IGA SERPL-MCNC: <7.83 MG/DL (ref 70–312)
IGM SERPL-MCNC: <5.3 MG/DL (ref 43–279)
IMM GRANULOCYTES # BLD AUTO: 0.03 X10(3) UL (ref 0–1)
IMM GRANULOCYTES NFR BLD: 0.3 %
IMMUNOGLOBULIN PNL SER-MCNC: 150 MG/DL (ref 791–1643)
LYMPHOCYTES # BLD AUTO: 0.13 X10(3) UL (ref 1–4)
LYMPHOCYTES NFR BLD AUTO: 1.2 %
MCH RBC QN AUTO: 34.4 PG (ref 26–34)
MCHC RBC AUTO-ENTMCNC: 32.9 G/DL (ref 31–37)
MCV RBC AUTO: 104.6 FL
MONOCYTES # BLD AUTO: 0.04 X10(3) UL (ref 0.1–1)
MONOCYTES NFR BLD AUTO: 0.4 %
NEUTROPHILS # BLD AUTO: 10.84 X10 (3) UL (ref 1.5–7.7)
NEUTROPHILS # BLD AUTO: 10.84 X10(3) UL (ref 1.5–7.7)
NEUTROPHILS NFR BLD AUTO: 97.9 %
OSMOLALITY SERPL CALC.SUM OF ELEC: 301 MOSM/KG (ref 275–295)
PLATELET # BLD AUTO: 277 10(3)UL (ref 150–450)
POTASSIUM SERPL-SCNC: 4.3 MMOL/L (ref 3.5–5.1)
PROT SERPL-MCNC: 6.5 G/DL (ref 6.4–8.2)
RBC # BLD AUTO: 3.92 X10(6)UL
SODIUM SERPL-SCNC: 138 MMOL/L (ref 136–145)
WBC # BLD AUTO: 11.1 X10(3) UL (ref 4–11)

## 2022-08-05 PROCEDURE — 96409 CHEMO IV PUSH SNGL DRUG: CPT

## 2022-08-05 PROCEDURE — 96401 CHEMO ANTI-NEOPL SQ/IM: CPT

## 2022-08-05 PROCEDURE — 99214 OFFICE O/P EST MOD 30 MIN: CPT | Performed by: INTERNAL MEDICINE

## 2022-08-05 RX ORDER — ACETAMINOPHEN 325 MG/1
650 TABLET ORAL ONCE
Status: CANCELLED | OUTPATIENT
Start: 2022-08-05

## 2022-08-05 RX ORDER — DIPHENHYDRAMINE HYDROCHLORIDE 50 MG/ML
25 INJECTION INTRAMUSCULAR; INTRAVENOUS ONCE
Status: DISCONTINUED | OUTPATIENT
Start: 2022-08-05 | End: 2022-08-05

## 2022-08-05 RX ORDER — ACETAMINOPHEN 325 MG/1
650 TABLET ORAL ONCE
Status: DISCONTINUED | OUTPATIENT
Start: 2022-08-05 | End: 2022-08-05

## 2022-08-05 RX ORDER — DIPHENHYDRAMINE HYDROCHLORIDE 50 MG/ML
25 INJECTION INTRAMUSCULAR; INTRAVENOUS ONCE
Status: CANCELLED | OUTPATIENT
Start: 2022-08-05

## 2022-08-05 NOTE — PROGRESS NOTES
Patient is here for follow up for myeloma on C21 kyprolis and darzalex. He is feeling fairly well. He saw Dr. Brenda Zimmerman at [facility] yesterday and was told that possibly his dexamethasone dose could be lowered. He denies any fevers. He has no increase in pain. He took premeds at [phone] today.      Education Record    Learner:  Patient    Disease / Diagnosis: myeloma    Barriers / Limitations:  None   Comments:    Method:  Discussion   Comments:    General Topics:  Side effects and symptom management   Comments:    Outcome:  Shows understanding   Comments:

## 2022-08-05 NOTE — PROGRESS NOTES
Pt here for C21D1 Kyprolis/faspro. Arrives Ambulating independently, accompanied by Self           Pregnancy screening: Not applicable    Modifications in dose or schedule: No    Drugs/infusions dual verified for appearance and physical integrity.  IV pump settings were dual verified: yes     Frequency of blood return and site check throughout administration: Prior to administration   Discharged to Home, Ambulating independently, accompanied by:Self    Outpatient Oncology Care Plan  Problem list:  fatigue  knowledge deficit  Problems related to:  disease/disease progression  Interventions:  provided general teaching  Expected outcomes:  safe in environment  symptoms relieved/minimized  understands plan of care  Progress towards outcome:  making progress    Education Record    Learner:  Patient  Barriers / Limitations:  None  Method:  Reinforcement  Outcome:  Shows understanding  Comments:

## 2022-08-09 LAB
ALBUMIN SERPL ELPH-MCNC: 4.28 G/DL (ref 3.75–5.21)
ALBUMIN/GLOB SERPL: 2.24 {RATIO} (ref 1–2)
ALPHA1 GLOB SERPL ELPH-MCNC: 0.29 G/DL (ref 0.19–0.46)
ALPHA2 GLOB SERPL ELPH-MCNC: 0.76 G/DL (ref 0.48–1.05)
B-GLOBULIN SERPL ELPH-MCNC: 0.68 G/DL (ref 0.68–1.23)
GAMMA GLOB SERPL ELPH-MCNC: 0.19 G/DL (ref 0.62–1.7)
KAPPA LC FREE SER-MCNC: 0.16 MG/DL (ref 0.33–1.94)
LAMBDA LC FREE SERPL-MCNC: <0.137 MG/DL (ref 0.57–2.63)
M PROTEIN 1 SERPL ELPH-MCNC: 0.04 G/DL (ref ?–0)
PROT SERPL-MCNC: 6.2 G/DL (ref 6.4–8.2)

## 2022-08-11 ENCOUNTER — APPOINTMENT (OUTPATIENT)
Dept: HEMATOLOGY/ONCOLOGY | Age: 72
End: 2022-08-11
Attending: INTERNAL MEDICINE
Payer: MEDICARE

## 2022-08-12 ENCOUNTER — OFFICE VISIT (OUTPATIENT)
Dept: HEMATOLOGY/ONCOLOGY | Age: 72
End: 2022-08-12
Attending: INTERNAL MEDICINE
Payer: MEDICARE

## 2022-08-12 VITALS
SYSTOLIC BLOOD PRESSURE: 98 MMHG | OXYGEN SATURATION: 97 % | TEMPERATURE: 97 F | DIASTOLIC BLOOD PRESSURE: 63 MMHG | RESPIRATION RATE: 18 BRPM | WEIGHT: 261.38 LBS | HEART RATE: 56 BPM | HEIGHT: 72.01 IN | BODY MASS INDEX: 35.4 KG/M2

## 2022-08-12 DIAGNOSIS — C90.00 MULTIPLE MYELOMA NOT HAVING ACHIEVED REMISSION (HCC): Primary | ICD-10-CM

## 2022-08-12 LAB
BASOPHILS # BLD AUTO: 0.02 X10(3) UL (ref 0–0.2)
BASOPHILS NFR BLD AUTO: 0.2 %
EOSINOPHIL # BLD AUTO: 0.01 X10(3) UL (ref 0–0.7)
EOSINOPHIL NFR BLD AUTO: 0.1 %
ERYTHROCYTE [DISTWIDTH] IN BLOOD BY AUTOMATED COUNT: 13.2 %
HCT VFR BLD AUTO: 41.2 %
HGB BLD-MCNC: 13.7 G/DL
IMM GRANULOCYTES # BLD AUTO: 0.03 X10(3) UL (ref 0–1)
IMM GRANULOCYTES NFR BLD: 0.2 %
LYMPHOCYTES # BLD AUTO: 0.2 X10(3) UL (ref 1–4)
LYMPHOCYTES NFR BLD AUTO: 1.7 %
MCH RBC QN AUTO: 34.6 PG (ref 26–34)
MCHC RBC AUTO-ENTMCNC: 33.3 G/DL (ref 31–37)
MCV RBC AUTO: 104 FL
MONOCYTES # BLD AUTO: 0.11 X10(3) UL (ref 0.1–1)
MONOCYTES NFR BLD AUTO: 0.9 %
NEUTROPHILS # BLD AUTO: 11.72 X10 (3) UL (ref 1.5–7.7)
NEUTROPHILS # BLD AUTO: 11.72 X10(3) UL (ref 1.5–7.7)
NEUTROPHILS NFR BLD AUTO: 96.9 %
PLATELET # BLD AUTO: 135 10(3)UL (ref 150–450)
RBC # BLD AUTO: 3.96 X10(6)UL
WBC # BLD AUTO: 12.1 X10(3) UL (ref 4–11)

## 2022-08-12 PROCEDURE — 85025 COMPLETE CBC W/AUTO DIFF WBC: CPT

## 2022-08-12 PROCEDURE — 36415 COLL VENOUS BLD VENIPUNCTURE: CPT

## 2022-08-12 PROCEDURE — 96413 CHEMO IV INFUSION 1 HR: CPT

## 2022-08-12 NOTE — PROGRESS NOTES
Pt here for C21D8 Kyprolis. Arrives Ambulating independently, accompanied by Self           Pregnancy screening: Not applicable    Modifications in dose or schedule: No    Drugs/infusions dual verified for appearance and physical integrity. IV pump settings were dual verified: yes     Frequency of blood return and site check throughout administration: Prior to administration and At completion of therapy   Discharged to Home, Ambulating independently, accompanied by:Self    Outpatient Oncology Care Plan  Problem list:  knowledge deficit  Problems related to:  chemotherapy  side effect of treatment  Interventions:  provided general teaching  Expected outcomes:  understands plan of care  Progress towards outcome:  making progress    Education Record    Learner:  Patient  Barriers / Limitations:  None  Method:  Discussion  Outcome:  Shows understanding  Comments:    Patient tolerated Kyprolis without incident. AVS given. Patient discharged in stable condition.

## 2022-08-16 ENCOUNTER — TELEPHONE (OUTPATIENT)
Dept: HEMATOLOGY/ONCOLOGY | Age: 72
End: 2022-08-16

## 2022-08-19 ENCOUNTER — OFFICE VISIT (OUTPATIENT)
Dept: HEMATOLOGY/ONCOLOGY | Age: 72
End: 2022-08-19
Attending: INTERNAL MEDICINE
Payer: MEDICARE

## 2022-08-19 VITALS
BODY MASS INDEX: 35.96 KG/M2 | HEIGHT: 72.01 IN | OXYGEN SATURATION: 97 % | TEMPERATURE: 98 F | DIASTOLIC BLOOD PRESSURE: 67 MMHG | WEIGHT: 265.5 LBS | SYSTOLIC BLOOD PRESSURE: 100 MMHG | HEART RATE: 70 BPM

## 2022-08-19 DIAGNOSIS — C90.00 MULTIPLE MYELOMA NOT HAVING ACHIEVED REMISSION (HCC): Primary | ICD-10-CM

## 2022-08-19 DIAGNOSIS — C90.00 MULTIPLE MYELOMA, REMISSION STATUS UNSPECIFIED (HCC): ICD-10-CM

## 2022-08-19 LAB
ALBUMIN SERPL-MCNC: 3.6 G/DL (ref 3.4–5)
BASOPHILS # BLD AUTO: 0.01 X10(3) UL (ref 0–0.2)
BASOPHILS NFR BLD AUTO: 0.1 %
CALCIUM BLD-MCNC: 9.1 MG/DL (ref 8.5–10.1)
CREAT BLD-MCNC: 1.54 MG/DL
EOSINOPHIL # BLD AUTO: 0 X10(3) UL (ref 0–0.7)
EOSINOPHIL NFR BLD AUTO: 0 %
ERYTHROCYTE [DISTWIDTH] IN BLOOD BY AUTOMATED COUNT: 13.1 %
GFR SERPLBLD BASED ON 1.73 SQ M-ARVRAT: 47 ML/MIN/1.73M2 (ref 60–?)
HCT VFR BLD AUTO: 38 %
HGB BLD-MCNC: 12.7 G/DL
IMM GRANULOCYTES # BLD AUTO: 0.06 X10(3) UL (ref 0–1)
IMM GRANULOCYTES NFR BLD: 0.4 %
LYMPHOCYTES # BLD AUTO: 0.2 X10(3) UL (ref 1–4)
LYMPHOCYTES NFR BLD AUTO: 1.5 %
MCH RBC QN AUTO: 34.9 PG (ref 26–34)
MCHC RBC AUTO-ENTMCNC: 33.4 G/DL (ref 31–37)
MCV RBC AUTO: 104.4 FL
MONOCYTES # BLD AUTO: 0.08 X10(3) UL (ref 0.1–1)
MONOCYTES NFR BLD AUTO: 0.6 %
NEUTROPHILS # BLD AUTO: 13.17 X10 (3) UL (ref 1.5–7.7)
NEUTROPHILS # BLD AUTO: 13.17 X10(3) UL (ref 1.5–7.7)
NEUTROPHILS NFR BLD AUTO: 97.4 %
PLATELET # BLD AUTO: 133 10(3)UL (ref 150–450)
RBC # BLD AUTO: 3.64 X10(6)UL
WBC # BLD AUTO: 13.5 X10(3) UL (ref 4–11)

## 2022-08-19 PROCEDURE — 82040 ASSAY OF SERUM ALBUMIN: CPT

## 2022-08-19 PROCEDURE — 82565 ASSAY OF CREATININE: CPT

## 2022-08-19 PROCEDURE — 82310 ASSAY OF CALCIUM: CPT

## 2022-08-19 PROCEDURE — 96375 TX/PRO/DX INJ NEW DRUG ADDON: CPT

## 2022-08-19 PROCEDURE — 96413 CHEMO IV INFUSION 1 HR: CPT

## 2022-08-19 PROCEDURE — 85025 COMPLETE CBC W/AUTO DIFF WBC: CPT

## 2022-08-19 NOTE — PROGRESS NOTES
Pt here for C21D15. Arrives Ambulating independently, accompanied by Self           Pregnancy screening: Not applicable    Modifications in dose or schedule: No    Drugs/infusions dual verified for appearance and physical integrity. IV pump settings were dual verified: yes     Frequency of blood return and site check throughout administration: Prior to administration and At completion of therapy   Discharged to Home, Ambulating independently, accompanied by:Self    Outpatient Oncology Care Plan  Problem list:  fatigue  Problems related to:  chemotherapy  Interventions:  emotional support given  maintain safe environment  patient/family supported  chemotherapy teaching  monitor effect of therapy  monitor lab values  promoted rest  provided general teaching  Expected outcomes:  understands plan of care  Progress towards outcome:  making progress    Education Record    Learner:  Patient  Barriers / Limitations:  None  Method:  Discussion  Outcome:  Shows understanding  Comments: patient ambulatory. No complaints. Tolerated treatment today. Discharged in stable condition.

## 2022-08-25 ENCOUNTER — MED REC SCAN ONLY (OUTPATIENT)
Dept: FAMILY MEDICINE CLINIC | Facility: CLINIC | Age: 72
End: 2022-08-25

## 2022-08-25 ENCOUNTER — OFFICE VISIT (OUTPATIENT)
Dept: FAMILY MEDICINE CLINIC | Facility: CLINIC | Age: 72
End: 2022-08-25
Payer: MEDICARE

## 2022-08-25 ENCOUNTER — LAB ENCOUNTER (OUTPATIENT)
Dept: LAB | Age: 72
End: 2022-08-25
Attending: FAMILY MEDICINE
Payer: MEDICARE

## 2022-08-25 VITALS
RESPIRATION RATE: 20 BRPM | TEMPERATURE: 97 F | DIASTOLIC BLOOD PRESSURE: 60 MMHG | WEIGHT: 260 LBS | BODY MASS INDEX: 35.21 KG/M2 | OXYGEN SATURATION: 97 % | SYSTOLIC BLOOD PRESSURE: 122 MMHG | HEIGHT: 72.1 IN | HEART RATE: 56 BPM

## 2022-08-25 DIAGNOSIS — E11.9 TYPE 2 DIABETES MELLITUS WITHOUT COMPLICATION, WITHOUT LONG-TERM CURRENT USE OF INSULIN (HCC): Primary | ICD-10-CM

## 2022-08-25 DIAGNOSIS — E11.9 TYPE 2 DIABETES MELLITUS WITHOUT COMPLICATION, WITHOUT LONG-TERM CURRENT USE OF INSULIN (HCC): ICD-10-CM

## 2022-08-25 LAB
ALBUMIN SERPL-MCNC: 3.8 G/DL (ref 3.4–5)
ALBUMIN/GLOB SERPL: 1.3 {RATIO} (ref 1–2)
ALP LIVER SERPL-CCNC: 73 U/L
ALT SERPL-CCNC: 24 U/L
ANION GAP SERPL CALC-SCNC: 7 MMOL/L (ref 0–18)
AST SERPL-CCNC: 13 U/L (ref 15–37)
BILIRUB SERPL-MCNC: 1.2 MG/DL (ref 0.1–2)
BUN BLD-MCNC: 32 MG/DL (ref 7–18)
BUN/CREAT SERPL: 19.6 (ref 10–20)
CALCIUM BLD-MCNC: 9.8 MG/DL (ref 8.5–10.1)
CHLORIDE SERPL-SCNC: 106 MMOL/L (ref 98–112)
CO2 SERPL-SCNC: 25 MMOL/L (ref 21–32)
CREAT BLD-MCNC: 1.63 MG/DL
EST. AVERAGE GLUCOSE BLD GHB EST-MCNC: 111 MG/DL (ref 68–126)
FASTING STATUS PATIENT QL REPORTED: NO
GFR SERPLBLD BASED ON 1.73 SQ M-ARVRAT: 44 ML/MIN/1.73M2 (ref 60–?)
GLOBULIN PLAS-MCNC: 2.9 G/DL (ref 2.8–4.4)
GLUCOSE BLD-MCNC: 111 MG/DL (ref 70–99)
HBA1C MFR BLD: 5.5 % (ref ?–5.7)
OSMOLALITY SERPL CALC.SUM OF ELEC: 294 MOSM/KG (ref 275–295)
POTASSIUM SERPL-SCNC: 4.7 MMOL/L (ref 3.5–5.1)
PROT SERPL-MCNC: 6.7 G/DL (ref 6.4–8.2)
SODIUM SERPL-SCNC: 138 MMOL/L (ref 136–145)

## 2022-08-25 PROCEDURE — 99214 OFFICE O/P EST MOD 30 MIN: CPT | Performed by: FAMILY MEDICINE

## 2022-08-25 PROCEDURE — 83036 HEMOGLOBIN GLYCOSYLATED A1C: CPT

## 2022-08-25 PROCEDURE — 36415 COLL VENOUS BLD VENIPUNCTURE: CPT

## 2022-08-25 PROCEDURE — 80053 COMPREHEN METABOLIC PANEL: CPT

## 2022-08-27 NOTE — PROGRESS NOTES
Hemoglobin A1c 5.5, continue current medications unchangedYour creatinine is 1.6 slightly better than 3 weeks ago

## 2022-09-01 ENCOUNTER — OFFICE VISIT (OUTPATIENT)
Dept: HEMATOLOGY/ONCOLOGY | Age: 72
End: 2022-09-01
Attending: INTERNAL MEDICINE
Payer: MEDICARE

## 2022-09-01 VITALS
RESPIRATION RATE: 18 BRPM | OXYGEN SATURATION: 95 % | TEMPERATURE: 98 F | DIASTOLIC BLOOD PRESSURE: 65 MMHG | HEART RATE: 54 BPM | SYSTOLIC BLOOD PRESSURE: 110 MMHG

## 2022-09-01 DIAGNOSIS — C90.00 MULTIPLE MYELOMA NOT HAVING ACHIEVED REMISSION (HCC): ICD-10-CM

## 2022-09-01 DIAGNOSIS — C90.00 MULTIPLE MYELOMA, REMISSION STATUS UNSPECIFIED (HCC): Primary | ICD-10-CM

## 2022-09-01 LAB
ALBUMIN SERPL-MCNC: 3.8 G/DL (ref 3.4–5)
ALBUMIN/GLOB SERPL: 1.5 {RATIO} (ref 1–2)
ALP LIVER SERPL-CCNC: 62 U/L
ALT SERPL-CCNC: 23 U/L
ANION GAP SERPL CALC-SCNC: 7 MMOL/L (ref 0–18)
AST SERPL-CCNC: 19 U/L (ref 15–37)
BASOPHILS # BLD AUTO: 0.04 X10(3) UL (ref 0–0.2)
BASOPHILS NFR BLD AUTO: 0.4 %
BILIRUB SERPL-MCNC: 0.9 MG/DL (ref 0.1–2)
BUN BLD-MCNC: 38 MG/DL (ref 7–18)
CALCIUM BLD-MCNC: 9.7 MG/DL (ref 8.5–10.1)
CHLORIDE SERPL-SCNC: 108 MMOL/L (ref 98–112)
CO2 SERPL-SCNC: 23 MMOL/L (ref 21–32)
CREAT BLD-MCNC: 1.89 MG/DL
EOSINOPHIL # BLD AUTO: 0.16 X10(3) UL (ref 0–0.7)
EOSINOPHIL NFR BLD AUTO: 1.7 %
ERYTHROCYTE [DISTWIDTH] IN BLOOD BY AUTOMATED COUNT: 12.8 %
FASTING STATUS PATIENT QL REPORTED: NO
GFR SERPLBLD BASED ON 1.73 SQ M-ARVRAT: 37 ML/MIN/1.73M2 (ref 60–?)
GLOBULIN PLAS-MCNC: 2.5 G/DL (ref 2.8–4.4)
GLUCOSE BLD-MCNC: 111 MG/DL (ref 70–99)
HCT VFR BLD AUTO: 39.2 %
HGB BLD-MCNC: 13.1 G/DL
IGA SERPL-MCNC: <7.83 MG/DL (ref 70–312)
IGM SERPL-MCNC: <5.3 MG/DL (ref 43–279)
IMM GRANULOCYTES # BLD AUTO: 0.03 X10(3) UL (ref 0–1)
IMM GRANULOCYTES NFR BLD: 0.3 %
IMMUNOGLOBULIN PNL SER-MCNC: 151 MG/DL (ref 791–1643)
LYMPHOCYTES # BLD AUTO: 0.65 X10(3) UL (ref 1–4)
LYMPHOCYTES NFR BLD AUTO: 6.9 %
MCH RBC QN AUTO: 35.1 PG (ref 26–34)
MCHC RBC AUTO-ENTMCNC: 33.4 G/DL (ref 31–37)
MCV RBC AUTO: 105.1 FL
MONOCYTES # BLD AUTO: 0.84 X10(3) UL (ref 0.1–1)
MONOCYTES NFR BLD AUTO: 8.9 %
NEUTROPHILS # BLD AUTO: 7.67 X10 (3) UL (ref 1.5–7.7)
NEUTROPHILS # BLD AUTO: 7.67 X10(3) UL (ref 1.5–7.7)
NEUTROPHILS NFR BLD AUTO: 81.8 %
OSMOLALITY SERPL CALC.SUM OF ELEC: 296 MOSM/KG (ref 275–295)
PLATELET # BLD AUTO: 272 10(3)UL (ref 150–450)
POTASSIUM SERPL-SCNC: 4.2 MMOL/L (ref 3.5–5.1)
PROT SERPL-MCNC: 6.3 G/DL (ref 6.4–8.2)
RBC # BLD AUTO: 3.73 X10(6)UL
SODIUM SERPL-SCNC: 138 MMOL/L (ref 136–145)
WBC # BLD AUTO: 9.4 X10(3) UL (ref 4–11)

## 2022-09-01 PROCEDURE — 84165 PROTEIN E-PHORESIS SERUM: CPT

## 2022-09-01 PROCEDURE — 80053 COMPREHEN METABOLIC PANEL: CPT

## 2022-09-01 PROCEDURE — 86334 IMMUNOFIX E-PHORESIS SERUM: CPT

## 2022-09-01 PROCEDURE — 36415 COLL VENOUS BLD VENIPUNCTURE: CPT

## 2022-09-01 PROCEDURE — 83521 IG LIGHT CHAINS FREE EACH: CPT

## 2022-09-01 PROCEDURE — 82784 ASSAY IGA/IGD/IGG/IGM EACH: CPT

## 2022-09-01 PROCEDURE — 85025 COMPLETE CBC W/AUTO DIFF WBC: CPT

## 2022-09-02 ENCOUNTER — OFFICE VISIT (OUTPATIENT)
Dept: HEMATOLOGY/ONCOLOGY | Age: 72
End: 2022-09-02
Attending: INTERNAL MEDICINE
Payer: MEDICARE

## 2022-09-02 ENCOUNTER — APPOINTMENT (OUTPATIENT)
Dept: HEMATOLOGY/ONCOLOGY | Age: 72
End: 2022-09-02
Attending: INTERNAL MEDICINE
Payer: MEDICARE

## 2022-09-02 VITALS — OXYGEN SATURATION: 96 % | DIASTOLIC BLOOD PRESSURE: 56 MMHG | HEART RATE: 47 BPM | SYSTOLIC BLOOD PRESSURE: 95 MMHG

## 2022-09-02 VITALS
DIASTOLIC BLOOD PRESSURE: 69 MMHG | HEART RATE: 60 BPM | WEIGHT: 264.81 LBS | OXYGEN SATURATION: 97 % | SYSTOLIC BLOOD PRESSURE: 111 MMHG | TEMPERATURE: 98 F | HEIGHT: 72.01 IN | BODY MASS INDEX: 35.87 KG/M2

## 2022-09-02 DIAGNOSIS — M89.9 LYTIC BONE LESIONS ON XRAY: ICD-10-CM

## 2022-09-02 DIAGNOSIS — C90.00 MULTIPLE MYELOMA NOT HAVING ACHIEVED REMISSION (HCC): Primary | ICD-10-CM

## 2022-09-02 DIAGNOSIS — N08 MYELOMA KIDNEY DISEASE (HCC): ICD-10-CM

## 2022-09-02 DIAGNOSIS — C90.00 MULTIPLE MYELOMA, REMISSION STATUS UNSPECIFIED (HCC): Primary | ICD-10-CM

## 2022-09-02 DIAGNOSIS — C90.00 MYELOMA KIDNEY DISEASE (HCC): ICD-10-CM

## 2022-09-02 PROCEDURE — 96413 CHEMO IV INFUSION 1 HR: CPT

## 2022-09-02 PROCEDURE — 96401 CHEMO ANTI-NEOPL SQ/IM: CPT

## 2022-09-02 PROCEDURE — 99215 OFFICE O/P EST HI 40 MIN: CPT | Performed by: NURSE PRACTITIONER

## 2022-09-02 RX ORDER — ACETAMINOPHEN 325 MG/1
650 TABLET ORAL ONCE
Status: DISCONTINUED | OUTPATIENT
Start: 2022-09-02 | End: 2022-09-02

## 2022-09-02 RX ORDER — DIPHENHYDRAMINE HYDROCHLORIDE 50 MG/ML
25 INJECTION INTRAMUSCULAR; INTRAVENOUS ONCE
Status: CANCELLED | OUTPATIENT
Start: 2022-09-02

## 2022-09-02 RX ORDER — ACETAMINOPHEN 325 MG/1
650 TABLET ORAL ONCE
Status: CANCELLED | OUTPATIENT
Start: 2022-09-02

## 2022-09-02 RX ORDER — DIPHENHYDRAMINE HYDROCHLORIDE 50 MG/ML
25 INJECTION INTRAMUSCULAR; INTRAVENOUS ONCE
Status: DISCONTINUED | OUTPATIENT
Start: 2022-09-02 | End: 2022-09-02

## 2022-09-02 NOTE — PROGRESS NOTES
Pt here for C22D1. Arrives Ambulating independently, accompanied by Self           Pregnancy screening: Not applicable    Modifications in dose or schedule: No    Drugs/infusions dual verified for appearance and physical integrity.  IV pump settings were dual verified: yes     Frequency of blood return and site check throughout administration: Prior to administration   Discharged to Home, Ambulating independently, accompanied by:Self    Outpatient Oncology Care Plan  Problem list:  fatigue  Problems related to:  chemotherapy  Interventions:  encourage activity as tolerated  Expected outcomes:  understands plan of care  Progress towards outcome:  making progress    Education Record    Learner:  Patient  Barriers / Limitations:  None  Method:  Reinforcement  Outcome:  Shows understanding  Comments:  No c/o

## 2022-09-02 NOTE — PROGRESS NOTES
Outpatient Oncology Care Plan  Problem list:  fatigue  knowledge deficit    Problems related to:    chemotherapy  side effect of treatment    Interventions:  provided general teaching    Expected outcomes:  understands plan of care    Progress towards outcome:  making progress    Education Record    Learner:  Patient  Barriers / Limitations:  None  Method:  Brief focused  Outcome:  Shows understanding  Comments:OTV D1C22 kyprolis/fastpro expected. Pt states he is feeling well. States he received his evushield yesterday and took his premeds this morning.

## 2022-09-06 LAB
ALBUMIN SERPL ELPH-MCNC: 4.18 G/DL (ref 3.75–5.21)
ALBUMIN/GLOB SERPL: 2.3 {RATIO} (ref 1–2)
ALPHA1 GLOB SERPL ELPH-MCNC: 0.28 G/DL (ref 0.19–0.46)
ALPHA2 GLOB SERPL ELPH-MCNC: 0.74 G/DL (ref 0.48–1.05)
B-GLOBULIN SERPL ELPH-MCNC: 0.64 G/DL (ref 0.68–1.23)
GAMMA GLOB SERPL ELPH-MCNC: 0.16 G/DL (ref 0.62–1.7)
KAPPA LC FREE SER-MCNC: 0.2 MG/DL (ref 0.33–1.94)
KAPPA LC FREE/LAMBDA FREE SER NEPH: 1.47 {RATIO} (ref 0.26–1.65)
LAMBDA LC FREE SERPL-MCNC: 0.14 MG/DL (ref 0.57–2.63)
M PROTEIN 1 SERPL ELPH-MCNC: 0.04 G/DL (ref ?–0)
PROT SERPL-MCNC: 6 G/DL (ref 6.4–8.2)

## 2022-09-09 ENCOUNTER — OFFICE VISIT (OUTPATIENT)
Dept: HEMATOLOGY/ONCOLOGY | Age: 72
End: 2022-09-09
Attending: INTERNAL MEDICINE
Payer: MEDICARE

## 2022-09-09 VITALS
BODY MASS INDEX: 35.78 KG/M2 | SYSTOLIC BLOOD PRESSURE: 107 MMHG | DIASTOLIC BLOOD PRESSURE: 70 MMHG | HEART RATE: 54 BPM | OXYGEN SATURATION: 97 % | TEMPERATURE: 98 F | WEIGHT: 264.13 LBS | HEIGHT: 72.01 IN

## 2022-09-09 DIAGNOSIS — C90.00 MULTIPLE MYELOMA NOT HAVING ACHIEVED REMISSION (HCC): Primary | ICD-10-CM

## 2022-09-09 LAB
ANION GAP SERPL CALC-SCNC: 8 MMOL/L (ref 0–18)
BASOPHILS # BLD AUTO: 0.01 X10(3) UL (ref 0–0.2)
BASOPHILS NFR BLD AUTO: 0.1 %
BUN BLD-MCNC: 28 MG/DL (ref 7–18)
CALCIUM BLD-MCNC: 9.9 MG/DL (ref 8.5–10.1)
CHLORIDE SERPL-SCNC: 106 MMOL/L (ref 98–112)
CO2 SERPL-SCNC: 21 MMOL/L (ref 21–32)
CREAT BLD-MCNC: 1.54 MG/DL
EOSINOPHIL # BLD AUTO: 0 X10(3) UL (ref 0–0.7)
EOSINOPHIL NFR BLD AUTO: 0 %
ERYTHROCYTE [DISTWIDTH] IN BLOOD BY AUTOMATED COUNT: 13 %
FASTING STATUS PATIENT QL REPORTED: NO
GFR SERPLBLD BASED ON 1.73 SQ M-ARVRAT: 47 ML/MIN/1.73M2 (ref 60–?)
GLUCOSE BLD-MCNC: 170 MG/DL (ref 70–99)
HCT VFR BLD AUTO: 39.3 %
HGB BLD-MCNC: 13.4 G/DL
IMM GRANULOCYTES # BLD AUTO: 0.05 X10(3) UL (ref 0–1)
IMM GRANULOCYTES NFR BLD: 0.4 %
LYMPHOCYTES # BLD AUTO: 0.17 X10(3) UL (ref 1–4)
LYMPHOCYTES NFR BLD AUTO: 1.3 %
MCH RBC QN AUTO: 35.7 PG (ref 26–34)
MCHC RBC AUTO-ENTMCNC: 34.1 G/DL (ref 31–37)
MCV RBC AUTO: 104.8 FL
MONOCYTES # BLD AUTO: 0.12 X10(3) UL (ref 0.1–1)
MONOCYTES NFR BLD AUTO: 0.9 %
NEUTROPHILS # BLD AUTO: 12.67 X10 (3) UL (ref 1.5–7.7)
NEUTROPHILS # BLD AUTO: 12.67 X10(3) UL (ref 1.5–7.7)
NEUTROPHILS NFR BLD AUTO: 97.3 %
OSMOLALITY SERPL CALC.SUM OF ELEC: 289 MOSM/KG (ref 275–295)
PLATELET # BLD AUTO: 133 10(3)UL (ref 150–450)
POTASSIUM SERPL-SCNC: 4.2 MMOL/L (ref 3.5–5.1)
RBC # BLD AUTO: 3.75 X10(6)UL
SODIUM SERPL-SCNC: 135 MMOL/L (ref 136–145)
WBC # BLD AUTO: 13 X10(3) UL (ref 4–11)

## 2022-09-09 PROCEDURE — 96413 CHEMO IV INFUSION 1 HR: CPT

## 2022-09-09 PROCEDURE — 85025 COMPLETE CBC W/AUTO DIFF WBC: CPT

## 2022-09-09 PROCEDURE — 36415 COLL VENOUS BLD VENIPUNCTURE: CPT

## 2022-09-09 PROCEDURE — 80048 BASIC METABOLIC PNL TOTAL CA: CPT

## 2022-09-09 NOTE — PROGRESS NOTES
Pt here for C22D8. Arrives Ambulating independently, accompanied by Self           Pregnancy screening: Not applicable    Modifications in dose or schedule: No    Drugs/infusions dual verified for appearance and physical integrity.  IV pump settings were dual verified: yes     Frequency of blood return and site check throughout administration: Prior to administration   Discharged to Home, Ambulating independently, accompanied by:Self    Outpatient Oncology Care Plan  Problem list:  fatigue  Problems related to:  chemotherapy  Interventions:  encourage activity as tolerated  Expected outcomes:  understands plan of care  Progress towards outcome:  making progress    Education Record    Learner:  Patient  Barriers / Limitations:  None  Method:  Reinforcement  Outcome:  Shows understanding  Comments:  No c/o

## 2022-09-13 ENCOUNTER — LAB ENCOUNTER (OUTPATIENT)
Dept: LAB | Age: 72
End: 2022-09-13
Attending: INTERNAL MEDICINE
Payer: MEDICARE

## 2022-09-13 ENCOUNTER — OFFICE VISIT (OUTPATIENT)
Dept: NEPHROLOGY | Facility: CLINIC | Age: 72
End: 2022-09-13
Payer: MEDICARE

## 2022-09-13 VITALS — BODY MASS INDEX: 36 KG/M2 | SYSTOLIC BLOOD PRESSURE: 108 MMHG | DIASTOLIC BLOOD PRESSURE: 62 MMHG | WEIGHT: 265 LBS

## 2022-09-13 DIAGNOSIS — C90.00 MYELOMA KIDNEY (HCC): ICD-10-CM

## 2022-09-13 DIAGNOSIS — N18.30 STAGE 3 CHRONIC KIDNEY DISEASE, UNSPECIFIED WHETHER STAGE 3A OR 3B CKD (HCC): Primary | ICD-10-CM

## 2022-09-13 DIAGNOSIS — N08 MYELOMA KIDNEY (HCC): ICD-10-CM

## 2022-09-13 LAB
ANION GAP SERPL CALC-SCNC: 5 MMOL/L (ref 0–18)
BILIRUB UR QL STRIP.AUTO: NEGATIVE
BUN BLD-MCNC: 32 MG/DL (ref 7–18)
CALCIUM BLD-MCNC: 9.1 MG/DL (ref 8.5–10.1)
CHLORIDE SERPL-SCNC: 109 MMOL/L (ref 98–112)
CLARITY UR REFRACT.AUTO: CLEAR
CO2 SERPL-SCNC: 24 MMOL/L (ref 21–32)
COLOR UR AUTO: YELLOW
CREAT BLD-MCNC: 1.45 MG/DL
CREAT UR-SCNC: <13 MG/DL
FASTING STATUS PATIENT QL REPORTED: NO
GFR SERPLBLD BASED ON 1.73 SQ M-ARVRAT: 51 ML/MIN/1.73M2 (ref 60–?)
GLUCOSE BLD-MCNC: 109 MG/DL (ref 70–99)
GLUCOSE UR STRIP.AUTO-MCNC: 500 MG/DL
KETONES UR STRIP.AUTO-MCNC: NEGATIVE MG/DL
MAGNESIUM SERPL-MCNC: 2.4 MG/DL (ref 1.6–2.6)
NITRITE UR QL STRIP.AUTO: NEGATIVE
OSMOLALITY SERPL CALC.SUM OF ELEC: 293 MOSM/KG (ref 275–295)
PH UR STRIP.AUTO: 5.5 [PH] (ref 5–8)
POTASSIUM SERPL-SCNC: 4.8 MMOL/L (ref 3.5–5.1)
PROT UR STRIP.AUTO-MCNC: NEGATIVE MG/DL
PROT UR-MCNC: 6.1 MG/DL
SODIUM SERPL-SCNC: 138 MMOL/L (ref 136–145)
SP GR UR STRIP.AUTO: <=1.005 (ref 1–1.03)
UROBILINOGEN UR STRIP.AUTO-MCNC: 0.2 MG/DL

## 2022-09-13 PROCEDURE — 99213 OFFICE O/P EST LOW 20 MIN: CPT | Performed by: INTERNAL MEDICINE

## 2022-09-13 PROCEDURE — 36415 COLL VENOUS BLD VENIPUNCTURE: CPT

## 2022-09-13 PROCEDURE — 81001 URINALYSIS AUTO W/SCOPE: CPT

## 2022-09-13 PROCEDURE — 84156 ASSAY OF PROTEIN URINE: CPT

## 2022-09-13 PROCEDURE — 83735 ASSAY OF MAGNESIUM: CPT

## 2022-09-13 PROCEDURE — 82570 ASSAY OF URINE CREATININE: CPT

## 2022-09-13 PROCEDURE — 80048 BASIC METABOLIC PNL TOTAL CA: CPT

## 2022-09-13 PROCEDURE — 81015 MICROSCOPIC EXAM OF URINE: CPT

## 2022-09-16 ENCOUNTER — OFFICE VISIT (OUTPATIENT)
Dept: HEMATOLOGY/ONCOLOGY | Age: 72
End: 2022-09-16
Attending: INTERNAL MEDICINE
Payer: MEDICARE

## 2022-09-16 VITALS
HEART RATE: 57 BPM | TEMPERATURE: 98 F | DIASTOLIC BLOOD PRESSURE: 70 MMHG | OXYGEN SATURATION: 97 % | HEIGHT: 72.01 IN | BODY MASS INDEX: 35.73 KG/M2 | WEIGHT: 263.81 LBS | SYSTOLIC BLOOD PRESSURE: 115 MMHG | RESPIRATION RATE: 18 BRPM

## 2022-09-16 DIAGNOSIS — C90.00 MULTIPLE MYELOMA, REMISSION STATUS UNSPECIFIED (HCC): ICD-10-CM

## 2022-09-16 DIAGNOSIS — C90.00 MULTIPLE MYELOMA NOT HAVING ACHIEVED REMISSION (HCC): Primary | ICD-10-CM

## 2022-09-16 LAB
BASOPHILS # BLD AUTO: 0.01 X10(3) UL (ref 0–0.2)
BASOPHILS NFR BLD AUTO: 0.1 %
EOSINOPHIL # BLD AUTO: 0.01 X10(3) UL (ref 0–0.7)
EOSINOPHIL NFR BLD AUTO: 0.1 %
ERYTHROCYTE [DISTWIDTH] IN BLOOD BY AUTOMATED COUNT: 12.8 %
HCT VFR BLD AUTO: 39 %
HGB BLD-MCNC: 13.1 G/DL
IMM GRANULOCYTES # BLD AUTO: 0.05 X10(3) UL (ref 0–1)
IMM GRANULOCYTES NFR BLD: 0.4 %
LYMPHOCYTES # BLD AUTO: 0.14 X10(3) UL (ref 1–4)
LYMPHOCYTES NFR BLD AUTO: 1 %
MCH RBC QN AUTO: 35 PG (ref 26–34)
MCHC RBC AUTO-ENTMCNC: 33.6 G/DL (ref 31–37)
MCV RBC AUTO: 104.3 FL
MONOCYTES # BLD AUTO: 0.06 X10(3) UL (ref 0.1–1)
MONOCYTES NFR BLD AUTO: 0.4 %
NEUTROPHILS # BLD AUTO: 13.88 X10 (3) UL (ref 1.5–7.7)
NEUTROPHILS # BLD AUTO: 13.88 X10(3) UL (ref 1.5–7.7)
NEUTROPHILS NFR BLD AUTO: 98 %
PLATELET # BLD AUTO: 148 10(3)UL (ref 150–450)
RBC # BLD AUTO: 3.74 X10(6)UL
WBC # BLD AUTO: 14.2 X10(3) UL (ref 4–11)

## 2022-09-16 PROCEDURE — 36415 COLL VENOUS BLD VENIPUNCTURE: CPT

## 2022-09-16 PROCEDURE — 96413 CHEMO IV INFUSION 1 HR: CPT

## 2022-09-16 PROCEDURE — 85025 COMPLETE CBC W/AUTO DIFF WBC: CPT

## 2022-09-16 PROCEDURE — 96375 TX/PRO/DX INJ NEW DRUG ADDON: CPT

## 2022-09-16 NOTE — PROGRESS NOTES
Pt here for C22D15 kyprolis, zometa. Arrives Ambulating independently, accompanied by Self           Pregnancy screening: Not applicable    Modifications in dose or schedule: No    Drugs/infusions dual verified for appearance and physical integrity. IV pump settings were dual verified: yes     Frequency of blood return and site check throughout administration: Prior to administration, Prior to each drug and At completion of therapy   Discharged to Home, Ambulating independently, accompanied by:Self    Outpatient Oncology Care Plan  Problem list:  fatigue  Problems related to:  chemotherapy  Interventions:  emotional support given  maintain safe environment  patient/family supported  encourage activity as tolerated  monitor lab values  promoted rest  Expected outcomes:  adequate sleep/rest  optimal lab values  patient supported/coping well  safe in environment  understands plan of care  Progress towards outcome:  making progress    Education Record    Learner:  Patient  Barriers / Limitations:  None  Method:  Discussion  Outcome:  Shows understanding  Comments:  Chemo and zometa infused per MD order without incident. Ok to use 9/13 labs for zometa per [place], pharmacy. Reviewed next appointment. Discharged home in stable condition, no new complaints.

## 2022-09-20 NOTE — PROGRESS NOTES
hemoglobin A1c 5.9 rest of the blood work is in the acceptable limits Central Lesion Excision - No Text: The central lesion was not removed so as to minimize granulation prior to repair. It will be evaluated once peripheral margin control is obtained by serial bread-loaf cutting to rule out an invasive component.

## 2022-09-26 ENCOUNTER — PATIENT OUTREACH (OUTPATIENT)
Dept: CASE MANAGEMENT | Age: 72
End: 2022-09-26

## 2022-09-30 ENCOUNTER — SOCIAL WORK SERVICES (OUTPATIENT)
Dept: HEMATOLOGY/ONCOLOGY | Facility: HOSPITAL | Age: 72
End: 2022-09-30

## 2022-09-30 ENCOUNTER — OFFICE VISIT (OUTPATIENT)
Dept: HEMATOLOGY/ONCOLOGY | Age: 72
End: 2022-09-30
Attending: INTERNAL MEDICINE
Payer: MEDICARE

## 2022-09-30 VITALS
OXYGEN SATURATION: 99 % | BODY MASS INDEX: 36.42 KG/M2 | HEIGHT: 72.01 IN | DIASTOLIC BLOOD PRESSURE: 71 MMHG | TEMPERATURE: 98 F | HEART RATE: 60 BPM | WEIGHT: 268.88 LBS | SYSTOLIC BLOOD PRESSURE: 113 MMHG

## 2022-09-30 DIAGNOSIS — C90.00 MULTIPLE MYELOMA NOT HAVING ACHIEVED REMISSION (HCC): Primary | ICD-10-CM

## 2022-09-30 DIAGNOSIS — C90.00 MULTIPLE MYELOMA, REMISSION STATUS UNSPECIFIED (HCC): ICD-10-CM

## 2022-09-30 DIAGNOSIS — C90.00 MULTIPLE MYELOMA NOT HAVING ACHIEVED REMISSION (HCC): ICD-10-CM

## 2022-09-30 LAB
ALBUMIN SERPL-MCNC: 3.6 G/DL (ref 3.4–5)
ALBUMIN SERPL-MCNC: 3.6 G/DL (ref 3.4–5)
ALBUMIN/GLOB SERPL: 1.3 {RATIO} (ref 1–2)
ALP LIVER SERPL-CCNC: 82 U/L
ALT SERPL-CCNC: 21 U/L
ANION GAP SERPL CALC-SCNC: 11 MMOL/L (ref 0–18)
AST SERPL-CCNC: 11 U/L (ref 15–37)
BASOPHILS # BLD AUTO: 0.01 X10(3) UL (ref 0–0.2)
BASOPHILS NFR BLD AUTO: 0.1 %
BILIRUB SERPL-MCNC: 0.7 MG/DL (ref 0.1–2)
BUN BLD-MCNC: 36 MG/DL (ref 7–18)
CALCIUM BLD-MCNC: 9.4 MG/DL (ref 8.5–10.1)
CALCIUM BLD-MCNC: 9.4 MG/DL (ref 8.5–10.1)
CHLORIDE SERPL-SCNC: 107 MMOL/L (ref 98–112)
CO2 SERPL-SCNC: 20 MMOL/L (ref 21–32)
CREAT BLD-MCNC: 1.7 MG/DL
CREAT BLD-MCNC: 1.7 MG/DL
EOSINOPHIL # BLD AUTO: 0 X10(3) UL (ref 0–0.7)
EOSINOPHIL NFR BLD AUTO: 0 %
ERYTHROCYTE [DISTWIDTH] IN BLOOD BY AUTOMATED COUNT: 12.7 %
FASTING STATUS PATIENT QL REPORTED: NO
GFR SERPLBLD BASED ON 1.73 SQ M-ARVRAT: 42 ML/MIN/1.73M2 (ref 60–?)
GFR SERPLBLD BASED ON 1.73 SQ M-ARVRAT: 42 ML/MIN/1.73M2 (ref 60–?)
GLOBULIN PLAS-MCNC: 2.8 G/DL (ref 2.8–4.4)
GLUCOSE BLD-MCNC: 199 MG/DL (ref 70–99)
HCT VFR BLD AUTO: 37.9 %
HGB BLD-MCNC: 12.8 G/DL
IGA SERPL-MCNC: <7.83 MG/DL (ref 70–312)
IGM SERPL-MCNC: <5.3 MG/DL (ref 43–279)
IMM GRANULOCYTES # BLD AUTO: 0.06 X10(3) UL (ref 0–1)
IMM GRANULOCYTES NFR BLD: 0.5 %
IMMUNOGLOBULIN PNL SER-MCNC: 158 MG/DL (ref 791–1643)
LYMPHOCYTES # BLD AUTO: 0.14 X10(3) UL (ref 1–4)
LYMPHOCYTES NFR BLD AUTO: 1.1 %
MCH RBC QN AUTO: 35.5 PG (ref 26–34)
MCHC RBC AUTO-ENTMCNC: 33.8 G/DL (ref 31–37)
MCV RBC AUTO: 105 FL
MONOCYTES # BLD AUTO: 0.08 X10(3) UL (ref 0.1–1)
MONOCYTES NFR BLD AUTO: 0.7 %
NEUTROPHILS # BLD AUTO: 11.97 X10 (3) UL (ref 1.5–7.7)
NEUTROPHILS # BLD AUTO: 11.97 X10(3) UL (ref 1.5–7.7)
NEUTROPHILS NFR BLD AUTO: 97.6 %
OSMOLALITY SERPL CALC.SUM OF ELEC: 300 MOSM/KG (ref 275–295)
PLATELET # BLD AUTO: 273 10(3)UL (ref 150–450)
POTASSIUM SERPL-SCNC: 4.3 MMOL/L (ref 3.5–5.1)
PROT SERPL-MCNC: 6.4 G/DL (ref 6.4–8.2)
RBC # BLD AUTO: 3.61 X10(6)UL
SODIUM SERPL-SCNC: 138 MMOL/L (ref 136–145)
WBC # BLD AUTO: 12.3 X10(3) UL (ref 4–11)

## 2022-09-30 PROCEDURE — 96413 CHEMO IV INFUSION 1 HR: CPT

## 2022-09-30 PROCEDURE — 96401 CHEMO ANTI-NEOPL SQ/IM: CPT

## 2022-09-30 PROCEDURE — 99214 OFFICE O/P EST MOD 30 MIN: CPT | Performed by: INTERNAL MEDICINE

## 2022-09-30 RX ORDER — ACETAMINOPHEN 325 MG/1
650 TABLET ORAL ONCE
OUTPATIENT
Start: 2022-10-28

## 2022-09-30 RX ORDER — ACETAMINOPHEN 325 MG/1
650 TABLET ORAL ONCE
Status: CANCELLED | OUTPATIENT
Start: 2022-09-30

## 2022-09-30 RX ORDER — DIPHENHYDRAMINE HYDROCHLORIDE 50 MG/ML
25 INJECTION INTRAMUSCULAR; INTRAVENOUS ONCE
Status: CANCELLED | OUTPATIENT
Start: 2022-09-30

## 2022-09-30 RX ORDER — DIPHENHYDRAMINE HYDROCHLORIDE 50 MG/ML
25 INJECTION INTRAMUSCULAR; INTRAVENOUS ONCE
OUTPATIENT
Start: 2022-10-28

## 2022-09-30 NOTE — PROGRESS NOTES
SW met with pt to provide support and encouragement. Pt is good spirits today. Pt participated in a [employer] [place] and won a [employer] boubacar bag. Support given. SW encouraged pt to contact SW for any further SW needs. SAVANNAH Del RioW   at [employer] [facility] [address], [address] [facility], [address] [facility][[address] , [place], [facility] [address] Ivan Bedoya  Ph: [phone]. [email or web address]  Fax: [phone]

## 2022-09-30 NOTE — PROGRESS NOTES
Pt observed 30min post fastpro. Pt denies complaints. Pt dc home ambulatory in stable condition, no complaints. AVS provided.

## 2022-10-05 DIAGNOSIS — I10 DIABETES MELLITUS WITH COINCIDENT HYPERTENSION (HCC): ICD-10-CM

## 2022-10-05 DIAGNOSIS — E11.9 DIABETES MELLITUS WITH COINCIDENT HYPERTENSION (HCC): ICD-10-CM

## 2022-10-05 LAB
ALBUMIN SERPL ELPH-MCNC: 4.09 G/DL (ref 3.75–5.21)
ALBUMIN/GLOB SERPL: 2.13 {RATIO} (ref 1–2)
ALPHA1 GLOB SERPL ELPH-MCNC: 0.31 G/DL (ref 0.19–0.46)
ALPHA2 GLOB SERPL ELPH-MCNC: 0.75 G/DL (ref 0.48–1.05)
B-GLOBULIN SERPL ELPH-MCNC: 0.67 G/DL (ref 0.68–1.23)
GAMMA GLOB SERPL ELPH-MCNC: 0.19 G/DL (ref 0.62–1.7)
KAPPA LC FREE SER-MCNC: 0.42 MG/DL (ref 0.33–1.94)
KAPPA LC FREE/LAMBDA FREE SER NEPH: 2.64 {RATIO} (ref 0.26–1.65)
LAMBDA LC FREE SERPL-MCNC: 0.16 MG/DL (ref 0.57–2.63)
PROT SERPL-MCNC: 6 G/DL (ref 6.4–8.2)

## 2022-10-05 RX ORDER — LANCETS
EACH MISCELLANEOUS
Qty: 200 EACH | Refills: 2 | Status: SHIPPED | OUTPATIENT
Start: 2022-10-05

## 2022-10-05 NOTE — TELEPHONE ENCOUNTER
Diabetic Supplies Protocol Passed 10/05/2022 10:21 AM    Appointment in the past 12 or next 3 months

## 2022-10-07 ENCOUNTER — OFFICE VISIT (OUTPATIENT)
Dept: HEMATOLOGY/ONCOLOGY | Age: 72
End: 2022-10-07
Attending: INTERNAL MEDICINE
Payer: MEDICARE

## 2022-10-07 VITALS
SYSTOLIC BLOOD PRESSURE: 114 MMHG | OXYGEN SATURATION: 99 % | BODY MASS INDEX: 35.65 KG/M2 | TEMPERATURE: 97 F | HEART RATE: 56 BPM | WEIGHT: 263.19 LBS | HEIGHT: 72.01 IN | DIASTOLIC BLOOD PRESSURE: 71 MMHG

## 2022-10-07 DIAGNOSIS — C90.00 MULTIPLE MYELOMA NOT HAVING ACHIEVED REMISSION (HCC): Primary | ICD-10-CM

## 2022-10-07 DIAGNOSIS — Z85.46 H/O PROSTATE CANCER: ICD-10-CM

## 2022-10-07 LAB
BASOPHILS # BLD AUTO: 0.01 X10(3) UL (ref 0–0.2)
BASOPHILS NFR BLD AUTO: 0.1 %
COMPLEXED PSA SERPL-MCNC: <0.01 NG/ML (ref ?–4)
EOSINOPHIL # BLD AUTO: 0 X10(3) UL (ref 0–0.7)
EOSINOPHIL NFR BLD AUTO: 0 %
ERYTHROCYTE [DISTWIDTH] IN BLOOD BY AUTOMATED COUNT: 13.2 %
HCT VFR BLD AUTO: 38.7 %
HGB BLD-MCNC: 13.1 G/DL
IMM GRANULOCYTES # BLD AUTO: 0.03 X10(3) UL (ref 0–1)
IMM GRANULOCYTES NFR BLD: 0.3 %
LYMPHOCYTES # BLD AUTO: 0.16 X10(3) UL (ref 1–4)
LYMPHOCYTES NFR BLD AUTO: 1.6 %
MCH RBC QN AUTO: 35.4 PG (ref 26–34)
MCHC RBC AUTO-ENTMCNC: 33.9 G/DL (ref 31–37)
MCV RBC AUTO: 104.6 FL
MONOCYTES # BLD AUTO: 0.09 X10(3) UL (ref 0.1–1)
MONOCYTES NFR BLD AUTO: 0.9 %
NEUTROPHILS # BLD AUTO: 9.82 X10 (3) UL (ref 1.5–7.7)
NEUTROPHILS # BLD AUTO: 9.82 X10(3) UL (ref 1.5–7.7)
NEUTROPHILS NFR BLD AUTO: 97.1 %
PLATELET # BLD AUTO: 119 10(3)UL (ref 150–450)
RBC # BLD AUTO: 3.7 X10(6)UL
WBC # BLD AUTO: 10.1 X10(3) UL (ref 4–11)

## 2022-10-07 PROCEDURE — 36415 COLL VENOUS BLD VENIPUNCTURE: CPT

## 2022-10-07 PROCEDURE — 85025 COMPLETE CBC W/AUTO DIFF WBC: CPT

## 2022-10-07 PROCEDURE — 96413 CHEMO IV INFUSION 1 HR: CPT

## 2022-10-07 NOTE — PROGRESS NOTES
Pt here for C23D8. Pt denies any complaints today. Arrives Ambulating independently, accompanied by Self           Pregnancy screening: Not applicable    Modifications in dose or schedule: no    Drugs/infusions dual verified for appearance and physical integrity. IV pump settings were dual verified: yes     Frequency of blood return and site check throughout administration: Prior to administration and At completion of therapy   Discharged to Home, Ambulating independently, accompanied by:Self    Outpatient Oncology Care Plan  Problem list:  knowledge deficit  Problems related to:  chemotherapy  side effect of treatment  Interventions:  provided general teaching  Expected outcomes:  understands plan of care  Progress towards outcome:  making progress    Education Record    Learner:  Patient  Barriers / Limitations:  None  Method:  Brief focused and Printed material  Outcome:  Shows understanding  Comments:  Pt tolerated infusion w/out incident. Pt dc home ambulatory in stable condition, no complaints. AVS provided.

## 2022-10-12 ENCOUNTER — PATIENT OUTREACH (OUTPATIENT)
Dept: CASE MANAGEMENT | Age: 72
End: 2022-10-12

## 2022-10-14 ENCOUNTER — OFFICE VISIT (OUTPATIENT)
Dept: HEMATOLOGY/ONCOLOGY | Age: 72
End: 2022-10-14
Attending: INTERNAL MEDICINE
Payer: MEDICARE

## 2022-10-14 VITALS
OXYGEN SATURATION: 98 % | TEMPERATURE: 96 F | WEIGHT: 265.5 LBS | SYSTOLIC BLOOD PRESSURE: 129 MMHG | RESPIRATION RATE: 18 BRPM | HEART RATE: 54 BPM | BODY MASS INDEX: 35.96 KG/M2 | DIASTOLIC BLOOD PRESSURE: 78 MMHG | HEIGHT: 72.01 IN

## 2022-10-14 DIAGNOSIS — C90.00 MULTIPLE MYELOMA NOT HAVING ACHIEVED REMISSION (HCC): Primary | ICD-10-CM

## 2022-10-14 DIAGNOSIS — C90.00 MULTIPLE MYELOMA, REMISSION STATUS UNSPECIFIED (HCC): ICD-10-CM

## 2022-10-14 LAB
ALBUMIN SERPL-MCNC: 3.7 G/DL (ref 3.4–5)
BASOPHILS # BLD AUTO: 0.01 X10(3) UL (ref 0–0.2)
BASOPHILS NFR BLD AUTO: 0.1 %
CALCIUM BLD-MCNC: 9.6 MG/DL (ref 8.5–10.1)
CREAT BLD-MCNC: 1.54 MG/DL
EOSINOPHIL # BLD AUTO: 0 X10(3) UL (ref 0–0.7)
EOSINOPHIL NFR BLD AUTO: 0 %
ERYTHROCYTE [DISTWIDTH] IN BLOOD BY AUTOMATED COUNT: 13.3 %
GFR SERPLBLD BASED ON 1.73 SQ M-ARVRAT: 47 ML/MIN/1.73M2 (ref 60–?)
HCT VFR BLD AUTO: 38.9 %
HGB BLD-MCNC: 13.2 G/DL
IMM GRANULOCYTES # BLD AUTO: 0.04 X10(3) UL (ref 0–1)
IMM GRANULOCYTES NFR BLD: 0.3 %
LYMPHOCYTES # BLD AUTO: 0.12 X10(3) UL (ref 1–4)
LYMPHOCYTES NFR BLD AUTO: 0.9 %
MCH RBC QN AUTO: 35.6 PG (ref 26–34)
MCHC RBC AUTO-ENTMCNC: 33.9 G/DL (ref 31–37)
MCV RBC AUTO: 104.9 FL
MONOCYTES # BLD AUTO: 0.08 X10(3) UL (ref 0.1–1)
MONOCYTES NFR BLD AUTO: 0.6 %
NEUTROPHILS # BLD AUTO: 13.5 X10 (3) UL (ref 1.5–7.7)
NEUTROPHILS # BLD AUTO: 13.5 X10(3) UL (ref 1.5–7.7)
NEUTROPHILS NFR BLD AUTO: 98.1 %
PLATELET # BLD AUTO: 135 10(3)UL (ref 150–450)
RBC # BLD AUTO: 3.71 X10(6)UL
WBC # BLD AUTO: 13.8 X10(3) UL (ref 4–11)

## 2022-10-14 PROCEDURE — 96375 TX/PRO/DX INJ NEW DRUG ADDON: CPT

## 2022-10-14 PROCEDURE — 96413 CHEMO IV INFUSION 1 HR: CPT

## 2022-10-14 PROCEDURE — 36415 COLL VENOUS BLD VENIPUNCTURE: CPT

## 2022-10-14 PROCEDURE — 82565 ASSAY OF CREATININE: CPT

## 2022-10-14 PROCEDURE — 82310 ASSAY OF CALCIUM: CPT

## 2022-10-14 PROCEDURE — 82040 ASSAY OF SERUM ALBUMIN: CPT

## 2022-10-14 PROCEDURE — 85025 COMPLETE CBC W/AUTO DIFF WBC: CPT

## 2022-10-14 NOTE — PROGRESS NOTES
Pt here for C23 D15. Arrives Ambulating independently, accompanied by Self           Pregnancy screening: Not applicable    Modifications in dose or schedule: No    Drugs/infusions dual verified for appearance and physical integrity. IV pump settings were dual verified: yes     Frequency of blood return and site check throughout administration: Prior to administration   Discharged to Home, Ambulating independently, accompanied by:Self    Outpatient Oncology Care Plan  Problem list:  fatigue  Problems related to:  side effect of treatment  Interventions:  encourage activity as tolerated  promoted rest  Expected outcomes:  symptoms relieved/minimized  Progress towards outcome:  making progress    Education Record    Learner:  Patient  Barriers / Limitations:  None  Method:  Discussion  Outcome:  Shows understanding  Comments: Pt tolerated treatment without incident. Pt discharged home in stable condition.

## 2022-10-17 RX ORDER — ATORVASTATIN CALCIUM 20 MG/1
TABLET, FILM COATED ORAL
Qty: 90 TABLET | Refills: 1 | Status: SHIPPED | OUTPATIENT
Start: 2022-10-17

## 2022-10-17 NOTE — TELEPHONE ENCOUNTER
LOV 8/25/2022      LAST LAB 5/31/22    LAST RX   atorvastatin 20 MG Oral Tab 90 tablet 1 4/23/2022         Next OV   Future Appointments   Date Time Provider Bo Ratliff   10/28/2022 11:30 AM ITA Sapp PF HEM ONC [place]   10/28/2022 11:45 AM PF TX RN1 PF CHEMO I [place]   12/1/2022  2:30 PM Christy Tirado MD [facility] [address] [facility] [address]   1/10/2023  1:40 PM Kaitlin Reis MD [id number] [facility] [address]   3/2/2023  3:00 PM PF TX RN2 PF CHEMO I [place]         PROTOCOL pass

## 2022-10-28 ENCOUNTER — OFFICE VISIT (OUTPATIENT)
Dept: HEMATOLOGY/ONCOLOGY | Age: 72
End: 2022-10-28
Attending: INTERNAL MEDICINE
Payer: MEDICARE

## 2022-10-28 VITALS
OXYGEN SATURATION: 97 % | RESPIRATION RATE: 20 BRPM | HEART RATE: 60 BPM | TEMPERATURE: 96 F | WEIGHT: 262.19 LBS | HEIGHT: 72.01 IN | BODY MASS INDEX: 35.51 KG/M2 | SYSTOLIC BLOOD PRESSURE: 111 MMHG | DIASTOLIC BLOOD PRESSURE: 68 MMHG

## 2022-10-28 VITALS
HEART RATE: 59 BPM | TEMPERATURE: 98 F | RESPIRATION RATE: 16 BRPM | DIASTOLIC BLOOD PRESSURE: 69 MMHG | SYSTOLIC BLOOD PRESSURE: 91 MMHG | OXYGEN SATURATION: 97 %

## 2022-10-28 DIAGNOSIS — C90.00 MULTIPLE MYELOMA NOT HAVING ACHIEVED REMISSION (HCC): Primary | ICD-10-CM

## 2022-10-28 DIAGNOSIS — R05.8 PRODUCTIVE COUGH: ICD-10-CM

## 2022-10-28 DIAGNOSIS — Z51.11 ENCOUNTER FOR CHEMOTHERAPY MANAGEMENT: ICD-10-CM

## 2022-10-28 LAB
ALBUMIN SERPL-MCNC: 3.4 G/DL (ref 3.4–5)
ALBUMIN/GLOB SERPL: 1.1 {RATIO} (ref 1–2)
ALP LIVER SERPL-CCNC: 72 U/L
ALT SERPL-CCNC: 23 U/L
ANION GAP SERPL CALC-SCNC: 9 MMOL/L (ref 0–18)
AST SERPL-CCNC: 17 U/L (ref 15–37)
BASOPHILS # BLD AUTO: 0.01 X10(3) UL (ref 0–0.2)
BASOPHILS NFR BLD AUTO: 0.1 %
BILIRUB SERPL-MCNC: 1 MG/DL (ref 0.1–2)
BUN BLD-MCNC: 33 MG/DL (ref 7–18)
CALCIUM BLD-MCNC: 9.2 MG/DL (ref 8.5–10.1)
CHLORIDE SERPL-SCNC: 108 MMOL/L (ref 98–112)
CO2 SERPL-SCNC: 20 MMOL/L (ref 21–32)
CREAT BLD-MCNC: 1.66 MG/DL
EOSINOPHIL # BLD AUTO: 0 X10(3) UL (ref 0–0.7)
EOSINOPHIL NFR BLD AUTO: 0 %
ERYTHROCYTE [DISTWIDTH] IN BLOOD BY AUTOMATED COUNT: 13 %
FASTING STATUS PATIENT QL REPORTED: NO
GFR SERPLBLD BASED ON 1.73 SQ M-ARVRAT: 43 ML/MIN/1.73M2 (ref 60–?)
GLOBULIN PLAS-MCNC: 3.1 G/DL (ref 2.8–4.4)
GLUCOSE BLD-MCNC: 218 MG/DL (ref 70–99)
HCT VFR BLD AUTO: 37.2 %
HGB BLD-MCNC: 12.6 G/DL
IGA SERPL-MCNC: <7.83 MG/DL (ref 70–312)
IGM SERPL-MCNC: <5.3 MG/DL (ref 43–279)
IMM GRANULOCYTES # BLD AUTO: 0.04 X10(3) UL (ref 0–1)
IMM GRANULOCYTES NFR BLD: 0.4 %
IMMUNOGLOBULIN PNL SER-MCNC: 152 MG/DL (ref 791–1643)
LYMPHOCYTES # BLD AUTO: 0.11 X10(3) UL (ref 1–4)
LYMPHOCYTES NFR BLD AUTO: 1.1 %
MCH RBC QN AUTO: 35.4 PG (ref 26–34)
MCHC RBC AUTO-ENTMCNC: 33.9 G/DL (ref 31–37)
MCV RBC AUTO: 104.5 FL
MONOCYTES # BLD AUTO: 0.16 X10(3) UL (ref 0.1–1)
MONOCYTES NFR BLD AUTO: 1.6 %
NEUTROPHILS # BLD AUTO: 9.75 X10 (3) UL (ref 1.5–7.7)
NEUTROPHILS # BLD AUTO: 9.75 X10(3) UL (ref 1.5–7.7)
NEUTROPHILS NFR BLD AUTO: 96.8 %
OSMOLALITY SERPL CALC.SUM OF ELEC: 298 MOSM/KG (ref 275–295)
PLATELET # BLD AUTO: 218 10(3)UL (ref 150–450)
POTASSIUM SERPL-SCNC: 4.3 MMOL/L (ref 3.5–5.1)
PROT SERPL-MCNC: 6.5 G/DL (ref 6.4–8.2)
RBC # BLD AUTO: 3.56 X10(6)UL
SODIUM SERPL-SCNC: 137 MMOL/L (ref 136–145)
WBC # BLD AUTO: 10.1 X10(3) UL (ref 4–11)

## 2022-10-28 PROCEDURE — 99214 OFFICE O/P EST MOD 30 MIN: CPT | Performed by: NURSE PRACTITIONER

## 2022-10-28 PROCEDURE — 96401 CHEMO ANTI-NEOPL SQ/IM: CPT

## 2022-10-28 PROCEDURE — 96413 CHEMO IV INFUSION 1 HR: CPT

## 2022-10-31 ENCOUNTER — PATIENT OUTREACH (OUTPATIENT)
Dept: CASE MANAGEMENT | Age: 72
End: 2022-10-31

## 2022-11-01 ENCOUNTER — TELEPHONE (OUTPATIENT)
Dept: FAMILY MEDICINE CLINIC | Facility: CLINIC | Age: 72
End: 2022-11-01

## 2022-11-01 LAB
ALBUMIN SERPL ELPH-MCNC: 3.83 G/DL (ref 3.75–5.21)
ALBUMIN/GLOB SERPL: 1.77 {RATIO} (ref 1–2)
ALPHA1 GLOB SERPL ELPH-MCNC: 0.4 G/DL (ref 0.19–0.46)
ALPHA2 GLOB SERPL ELPH-MCNC: 0.85 G/DL (ref 0.48–1.05)
B-GLOBULIN SERPL ELPH-MCNC: 0.72 G/DL (ref 0.68–1.23)
GAMMA GLOB SERPL ELPH-MCNC: 0.19 G/DL (ref 0.62–1.7)
KAPPA LC FREE SER-MCNC: 0.43 MG/DL (ref 0.33–1.94)
KAPPA LC FREE/LAMBDA FREE SER NEPH: 2.46 {RATIO} (ref 0.26–1.65)
LAMBDA LC FREE SERPL-MCNC: 0.18 MG/DL (ref 0.57–2.63)
M PROTEIN 1 SERPL ELPH-MCNC: 0.04 G/DL (ref ?–0)
PROT SERPL-MCNC: 6 G/DL (ref 6.4–8.2)

## 2022-11-04 ENCOUNTER — OFFICE VISIT (OUTPATIENT)
Dept: HEMATOLOGY/ONCOLOGY | Age: 72
End: 2022-11-04
Attending: INTERNAL MEDICINE
Payer: MEDICARE

## 2022-11-04 VITALS
SYSTOLIC BLOOD PRESSURE: 124 MMHG | HEART RATE: 69 BPM | TEMPERATURE: 96 F | RESPIRATION RATE: 16 BRPM | HEIGHT: 72.01 IN | OXYGEN SATURATION: 99 % | DIASTOLIC BLOOD PRESSURE: 71 MMHG | BODY MASS INDEX: 34.38 KG/M2 | WEIGHT: 253.81 LBS

## 2022-11-04 DIAGNOSIS — C90.00 MULTIPLE MYELOMA NOT HAVING ACHIEVED REMISSION (HCC): Primary | ICD-10-CM

## 2022-11-04 LAB
BASOPHILS # BLD AUTO: 0.01 X10(3) UL (ref 0–0.2)
BASOPHILS NFR BLD AUTO: 0.1 %
EOSINOPHIL # BLD AUTO: 0 X10(3) UL (ref 0–0.7)
EOSINOPHIL NFR BLD AUTO: 0 %
ERYTHROCYTE [DISTWIDTH] IN BLOOD BY AUTOMATED COUNT: 13.1 %
HCT VFR BLD AUTO: 36.7 %
HGB BLD-MCNC: 12.5 G/DL
IMM GRANULOCYTES # BLD AUTO: 0.1 X10(3) UL (ref 0–1)
IMM GRANULOCYTES NFR BLD: 0.9 %
LYMPHOCYTES # BLD AUTO: 0.1 X10(3) UL (ref 1–4)
LYMPHOCYTES NFR BLD AUTO: 0.9 %
MCH RBC QN AUTO: 34.8 PG (ref 26–34)
MCHC RBC AUTO-ENTMCNC: 34.1 G/DL (ref 31–37)
MCV RBC AUTO: 102.2 FL
MONOCYTES # BLD AUTO: 0.19 X10(3) UL (ref 0.1–1)
MONOCYTES NFR BLD AUTO: 1.8 %
NEUTROPHILS # BLD AUTO: 10.29 X10 (3) UL (ref 1.5–7.7)
NEUTROPHILS # BLD AUTO: 10.29 X10(3) UL (ref 1.5–7.7)
NEUTROPHILS NFR BLD AUTO: 96.3 %
PLATELET # BLD AUTO: 153 10(3)UL (ref 150–450)
RBC # BLD AUTO: 3.59 X10(6)UL
WBC # BLD AUTO: 10.7 X10(3) UL (ref 4–11)

## 2022-11-04 PROCEDURE — 96413 CHEMO IV INFUSION 1 HR: CPT

## 2022-11-04 PROCEDURE — 85025 COMPLETE CBC W/AUTO DIFF WBC: CPT

## 2022-11-04 PROCEDURE — 36415 COLL VENOUS BLD VENIPUNCTURE: CPT

## 2022-11-04 NOTE — PROGRESS NOTES
Pt here for C24 D8. Arrives Ambulating independently, accompanied by Self           Pregnancy screening: Not applicable    Modifications in dose or schedule: No    Drugs/infusions dual verified for appearance and physical integrity. IV pump settings were dual verified: yes     Frequency of blood return and site check throughout administration: Prior to administration   Discharged to Home, Ambulating independently, accompanied by:Self    Outpatient Oncology Care Plan  Problem list:  fatigue  Problems related to:  side effect of treatment  Interventions:  encourage activity as tolerated  promoted rest  Expected outcomes:  adequate sleep/rest  symptoms relieved/minimized  Progress towards outcome:  making progress    Education Record    Learner:  Patient  Barriers / Limitations:  None  Method:  Discussion  Outcome:  Shows understanding  Comments: Pt tolerated treatment without complication. Pt discharged home in stable condition.

## 2022-11-11 ENCOUNTER — OFFICE VISIT (OUTPATIENT)
Dept: HEMATOLOGY/ONCOLOGY | Age: 72
End: 2022-11-11
Attending: INTERNAL MEDICINE
Payer: MEDICARE

## 2022-11-11 VITALS
DIASTOLIC BLOOD PRESSURE: 72 MMHG | HEIGHT: 72.01 IN | OXYGEN SATURATION: 100 % | BODY MASS INDEX: 34.29 KG/M2 | HEART RATE: 50 BPM | WEIGHT: 253.19 LBS | TEMPERATURE: 96 F | SYSTOLIC BLOOD PRESSURE: 106 MMHG

## 2022-11-11 DIAGNOSIS — C90.00 MULTIPLE MYELOMA NOT HAVING ACHIEVED REMISSION (HCC): ICD-10-CM

## 2022-11-11 DIAGNOSIS — C90.00 MULTIPLE MYELOMA, REMISSION STATUS UNSPECIFIED (HCC): Primary | ICD-10-CM

## 2022-11-11 LAB
ALBUMIN SERPL-MCNC: 2.8 G/DL (ref 3.4–5)
BASOPHILS # BLD AUTO: 0.01 X10(3) UL (ref 0–0.2)
BASOPHILS NFR BLD AUTO: 0.1 %
CALCIUM BLD-MCNC: 9.9 MG/DL (ref 8.5–10.1)
CREAT BLD-MCNC: 1.46 MG/DL
EOSINOPHIL # BLD AUTO: 0.01 X10(3) UL (ref 0–0.7)
EOSINOPHIL NFR BLD AUTO: 0.1 %
ERYTHROCYTE [DISTWIDTH] IN BLOOD BY AUTOMATED COUNT: 13.7 %
GFR SERPLBLD BASED ON 1.73 SQ M-ARVRAT: 50 ML/MIN/1.73M2 (ref 60–?)
HCT VFR BLD AUTO: 33 %
HGB BLD-MCNC: 11.4 G/DL
IMM GRANULOCYTES # BLD AUTO: 0.09 X10(3) UL (ref 0–1)
IMM GRANULOCYTES NFR BLD: 0.7 %
LYMPHOCYTES # BLD AUTO: 0.25 X10(3) UL (ref 1–4)
LYMPHOCYTES NFR BLD AUTO: 2.1 %
MCH RBC QN AUTO: 33.8 PG (ref 26–34)
MCHC RBC AUTO-ENTMCNC: 34.5 G/DL (ref 31–37)
MCV RBC AUTO: 97.9 FL
MONOCYTES # BLD AUTO: 0.12 X10(3) UL (ref 0.1–1)
MONOCYTES NFR BLD AUTO: 1 %
NEUTROPHILS # BLD AUTO: 11.54 X10 (3) UL (ref 1.5–7.7)
NEUTROPHILS # BLD AUTO: 11.54 X10(3) UL (ref 1.5–7.7)
NEUTROPHILS NFR BLD AUTO: 96 %
PLATELET # BLD AUTO: 292 10(3)UL (ref 150–450)
RBC # BLD AUTO: 3.37 X10(6)UL
WBC # BLD AUTO: 12 X10(3) UL (ref 4–11)

## 2022-11-11 PROCEDURE — 96413 CHEMO IV INFUSION 1 HR: CPT

## 2022-11-11 PROCEDURE — 96375 TX/PRO/DX INJ NEW DRUG ADDON: CPT

## 2022-11-11 PROCEDURE — 82040 ASSAY OF SERUM ALBUMIN: CPT

## 2022-11-11 PROCEDURE — 82565 ASSAY OF CREATININE: CPT

## 2022-11-11 PROCEDURE — 82310 ASSAY OF CALCIUM: CPT

## 2022-11-11 PROCEDURE — 85025 COMPLETE CBC W/AUTO DIFF WBC: CPT

## 2022-11-11 PROCEDURE — 36415 COLL VENOUS BLD VENIPUNCTURE: CPT

## 2022-11-11 NOTE — PROGRESS NOTES
Pt here for C24D15. Arrives Ambulating independently, accompanied by Self           Pregnancy screening: Not applicable    Modifications in dose or schedule: No    Drugs/infusions dual verified for appearance and physical integrity. IV pump settings were dual verified: yes     Frequency of blood return and site check throughout administration: Prior to administration and At completion of therapy   Discharged to Home, Ambulating independently, accompanied by:Self    Outpatient Oncology Care Plan  Problem list:  fatigue  Problems related to:  chemotherapy  Interventions:  emotional support given  maintain safe environment  patient/family supported  chemotherapy teaching  monitor effect of therapy  monitor lab values  promoted rest  provided general teaching  Expected outcomes:  understands plan of care  Progress towards outcome:  making progress    Education Record    Learner:  Patient  Barriers / Limitations:  None  Method:  Discussion  Outcome:  Shows understanding  Comments: patient ambulatory. No complaints. Tolerated treatment and zometa. Discharged in stable condition.

## 2022-11-15 ENCOUNTER — PATIENT OUTREACH (OUTPATIENT)
Dept: CASE MANAGEMENT | Age: 72
End: 2022-11-15

## 2022-11-25 ENCOUNTER — HOSPITAL ENCOUNTER (OUTPATIENT)
Dept: CV DIAGNOSTICS | Facility: HOSPITAL | Age: 72
Discharge: HOME | End: 2022-11-25
Attending: INTERNAL MEDICINE
Payer: MEDICARE

## 2022-11-25 ENCOUNTER — OFFICE VISIT (OUTPATIENT)
Dept: HEMATOLOGY/ONCOLOGY | Age: 72
End: 2022-11-25
Attending: INTERNAL MEDICINE
Payer: MEDICARE

## 2022-11-25 VITALS
DIASTOLIC BLOOD PRESSURE: 59 MMHG | HEART RATE: 59 BPM | OXYGEN SATURATION: 97 % | WEIGHT: 251.38 LBS | TEMPERATURE: 97 F | BODY MASS INDEX: 34.05 KG/M2 | HEIGHT: 72.01 IN | SYSTOLIC BLOOD PRESSURE: 118 MMHG | RESPIRATION RATE: 18 BRPM

## 2022-11-25 DIAGNOSIS — M89.9 LYTIC BONE LESIONS ON XRAY: ICD-10-CM

## 2022-11-25 DIAGNOSIS — I42.9 CARDIOMYOPATHY, UNSPECIFIED TYPE (HCC): ICD-10-CM

## 2022-11-25 DIAGNOSIS — C90.00 MULTIPLE MYELOMA NOT HAVING ACHIEVED REMISSION (HCC): Primary | ICD-10-CM

## 2022-11-25 DIAGNOSIS — R79.89 ELEVATED LFTS: ICD-10-CM

## 2022-11-25 DIAGNOSIS — Z51.81 ENCOUNTER FOR THERAPEUTIC DRUG MONITORING: ICD-10-CM

## 2022-11-25 DIAGNOSIS — Z51.11 ENCOUNTER FOR CHEMOTHERAPY MANAGEMENT: ICD-10-CM

## 2022-11-25 LAB
ALBUMIN SERPL-MCNC: 3.3 G/DL (ref 3.4–5)
ALBUMIN/GLOB SERPL: 1.1 {RATIO} (ref 1–2)
ALP LIVER SERPL-CCNC: 98 U/L
ALT SERPL-CCNC: 75 U/L
ANION GAP SERPL CALC-SCNC: 9 MMOL/L (ref 0–18)
AST SERPL-CCNC: 58 U/L (ref 15–37)
BASOPHILS # BLD AUTO: 0.03 X10(3) UL (ref 0–0.2)
BASOPHILS NFR BLD AUTO: 0.3 %
BILIRUB SERPL-MCNC: 0.6 MG/DL (ref 0.1–2)
BUN BLD-MCNC: 19 MG/DL (ref 7–18)
CALCIUM BLD-MCNC: 9.5 MG/DL (ref 8.5–10.1)
CHLORIDE SERPL-SCNC: 112 MMOL/L (ref 98–112)
CO2 SERPL-SCNC: 19 MMOL/L (ref 21–32)
CREAT BLD-MCNC: 1.17 MG/DL
EOSINOPHIL # BLD AUTO: 0.01 X10(3) UL (ref 0–0.7)
EOSINOPHIL NFR BLD AUTO: 0.1 %
ERYTHROCYTE [DISTWIDTH] IN BLOOD BY AUTOMATED COUNT: 17.2 %
FASTING STATUS PATIENT QL REPORTED: NO
GFR SERPLBLD BASED ON 1.73 SQ M-ARVRAT: 65 ML/MIN/1.73M2 (ref 60–?)
GLOBULIN PLAS-MCNC: 3.1 G/DL (ref 2.8–4.4)
GLUCOSE BLD-MCNC: 162 MG/DL (ref 70–99)
HCT VFR BLD AUTO: 36.1 %
HGB BLD-MCNC: 11.7 G/DL
IGA SERPL-MCNC: 15.6 MG/DL (ref 70–312)
IGM SERPL-MCNC: 19.2 MG/DL (ref 43–279)
IMM GRANULOCYTES # BLD AUTO: 0.03 X10(3) UL (ref 0–1)
IMM GRANULOCYTES NFR BLD: 0.3 %
IMMUNOGLOBULIN PNL SER-MCNC: 245 MG/DL (ref 791–1643)
LYMPHOCYTES # BLD AUTO: 0.35 X10(3) UL (ref 1–4)
LYMPHOCYTES NFR BLD AUTO: 3.2 %
MCH RBC QN AUTO: 34.4 PG (ref 26–34)
MCHC RBC AUTO-ENTMCNC: 32.4 G/DL (ref 31–37)
MCV RBC AUTO: 106.2 FL
MONOCYTES # BLD AUTO: 0.08 X10(3) UL (ref 0.1–1)
MONOCYTES NFR BLD AUTO: 0.7 %
NEUTROPHILS # BLD AUTO: 10.5 X10 (3) UL (ref 1.5–7.7)
NEUTROPHILS # BLD AUTO: 10.5 X10(3) UL (ref 1.5–7.7)
NEUTROPHILS NFR BLD AUTO: 95.4 %
OSMOLALITY SERPL CALC.SUM OF ELEC: 296 MOSM/KG (ref 275–295)
PLATELET # BLD AUTO: 337 10(3)UL (ref 150–450)
POTASSIUM SERPL-SCNC: 4.2 MMOL/L (ref 3.5–5.1)
PROT SERPL-MCNC: 6.4 G/DL (ref 6.4–8.2)
RBC # BLD AUTO: 3.4 X10(6)UL
SODIUM SERPL-SCNC: 140 MMOL/L (ref 136–145)
WBC # BLD AUTO: 11 X10(3) UL (ref 4–11)

## 2022-11-25 PROCEDURE — 93306 TTE W/DOPPLER COMPLETE: CPT | Performed by: INTERNAL MEDICINE

## 2022-11-25 PROCEDURE — 96401 CHEMO ANTI-NEOPL SQ/IM: CPT

## 2022-11-25 PROCEDURE — 99214 OFFICE O/P EST MOD 30 MIN: CPT | Performed by: CLINICAL NURSE SPECIALIST

## 2022-11-25 PROCEDURE — 96413 CHEMO IV INFUSION 1 HR: CPT

## 2022-11-25 RX ORDER — DIPHENHYDRAMINE HYDROCHLORIDE 50 MG/ML
25 INJECTION INTRAMUSCULAR; INTRAVENOUS ONCE
Status: CANCELLED | OUTPATIENT
Start: 2022-11-25

## 2022-11-25 RX ORDER — ACETAMINOPHEN 325 MG/1
650 TABLET ORAL ONCE
Status: CANCELLED | OUTPATIENT
Start: 2022-11-25

## 2022-11-25 NOTE — PROGRESS NOTES
Patient is here for follow up for myeloma on C25 kyprolis and darzalex. He took his PO premeds today at 1030. He reports that he is feeling well. He had a good holiday yesterday. He denies any fever, cough or shortness of breath. He denies any diarrhea or constipation. He is having pain on rib cage below his right scapula. This began two days ago. He has been taking tylenol to manage this and it is effective. It is stronger when he sits for a long time.      Education Record    Learner:  Patient    Disease / Diagnosis: myeloma    Barriers / Limitations:  None   Comments:    Method:  Discussion   Comments:    General Topics:  Side effects and symptom management   Comments:    Outcome:  Shows understanding   Comments:

## 2022-11-25 NOTE — PROGRESS NOTES
Pt here for C25 D1. Arrives Ambulating independently, accompanied by Self           Pregnancy screening: Not applicable    Modifications in dose or schedule: No    Drugs/infusions dual verified for appearance and physical integrity. IV pump settings were dual verified: yes     Frequency of blood return and site check throughout administration: Prior to administration and At completion of therapy   Discharged to Home, Ambulating independently, accompanied by:Self    Outpatient Oncology Care Plan  Problem list:  pain  fatigue  Problems related to:  disease/disease progression  side effect of treatment  Interventions:  monitor effect of pain management  encourage activity as tolerated  promoted rest  Expected outcomes:  adequate sleep/rest  symptoms relieved/minimized  Progress towards outcome:  making progress    Education Record    Learner:  Patient  Barriers / Limitations:  None  Method:  Discussion  Outcome:  Shows understanding  Comments: Pt tolerated treatment without complication. Faspro 30 min post VSS - see chart. Pt discharged home in stable condition.

## 2022-12-01 LAB
ALBUMIN SERPL ELPH-MCNC: 3.57 G/DL (ref 3.75–5.21)
ALBUMIN/GLOB SERPL: 1.6 {RATIO} (ref 1–2)
ALPHA1 GLOB SERPL ELPH-MCNC: 0.38 G/DL (ref 0.19–0.46)
ALPHA2 GLOB SERPL ELPH-MCNC: 0.85 G/DL (ref 0.48–1.05)
B-GLOBULIN SERPL ELPH-MCNC: 0.71 G/DL (ref 0.68–1.23)
GAMMA GLOB SERPL ELPH-MCNC: 0.3 G/DL (ref 0.62–1.7)
KAPPA LC FREE SER-MCNC: 0.84 MG/DL (ref 0.33–1.94)
KAPPA LC FREE/LAMBDA FREE SER NEPH: 0.43 {RATIO} (ref 0.26–1.65)
LAMBDA LC FREE SERPL-MCNC: 1.95 MG/DL (ref 0.57–2.63)
PROT SERPL-MCNC: 5.8 G/DL (ref 6.4–8.2)

## 2022-12-02 ENCOUNTER — OFFICE VISIT (OUTPATIENT)
Dept: HEMATOLOGY/ONCOLOGY | Age: 72
End: 2022-12-02
Attending: INTERNAL MEDICINE
Payer: MEDICARE

## 2022-12-02 VITALS
SYSTOLIC BLOOD PRESSURE: 118 MMHG | WEIGHT: 247.19 LBS | BODY MASS INDEX: 33.48 KG/M2 | RESPIRATION RATE: 18 BRPM | HEIGHT: 72.01 IN | HEART RATE: 60 BPM | DIASTOLIC BLOOD PRESSURE: 72 MMHG | OXYGEN SATURATION: 97 % | TEMPERATURE: 97 F

## 2022-12-02 DIAGNOSIS — R79.89 ELEVATED LFTS: Primary | ICD-10-CM

## 2022-12-02 DIAGNOSIS — C90.00 MULTIPLE MYELOMA NOT HAVING ACHIEVED REMISSION (HCC): ICD-10-CM

## 2022-12-02 LAB
ALBUMIN SERPL-MCNC: 3.6 G/DL (ref 3.4–5)
ALP LIVER SERPL-CCNC: 95 U/L
ALT SERPL-CCNC: 34 U/L
AST SERPL-CCNC: 21 U/L (ref 15–37)
BASOPHILS # BLD AUTO: 0.01 X10(3) UL (ref 0–0.2)
BASOPHILS NFR BLD AUTO: 0.1 %
BILIRUB DIRECT SERPL-MCNC: 0.3 MG/DL (ref 0–0.2)
BILIRUB SERPL-MCNC: 1 MG/DL (ref 0.1–2)
EOSINOPHIL # BLD AUTO: 0.01 X10(3) UL (ref 0–0.7)
EOSINOPHIL NFR BLD AUTO: 0.1 %
ERYTHROCYTE [DISTWIDTH] IN BLOOD BY AUTOMATED COUNT: 17.1 %
HCT VFR BLD AUTO: 38.9 %
HGB BLD-MCNC: 12.6 G/DL
IMM GRANULOCYTES # BLD AUTO: 0.04 X10(3) UL (ref 0–1)
IMM GRANULOCYTES NFR BLD: 0.4 %
LYMPHOCYTES # BLD AUTO: 0.31 X10(3) UL (ref 1–4)
LYMPHOCYTES NFR BLD AUTO: 2.9 %
MCH RBC QN AUTO: 34.6 PG (ref 26–34)
MCHC RBC AUTO-ENTMCNC: 32.4 G/DL (ref 31–37)
MCV RBC AUTO: 106.9 FL
MONOCYTES # BLD AUTO: 0.1 X10(3) UL (ref 0.1–1)
MONOCYTES NFR BLD AUTO: 0.9 %
NEUTROPHILS # BLD AUTO: 10.35 X10 (3) UL (ref 1.5–7.7)
NEUTROPHILS # BLD AUTO: 10.35 X10(3) UL (ref 1.5–7.7)
NEUTROPHILS NFR BLD AUTO: 95.6 %
PLATELET # BLD AUTO: 146 10(3)UL (ref 150–450)
PROT SERPL-MCNC: 6.7 G/DL (ref 6.4–8.2)
RBC # BLD AUTO: 3.64 X10(6)UL
WBC # BLD AUTO: 10.8 X10(3) UL (ref 4–11)

## 2022-12-02 PROCEDURE — 80076 HEPATIC FUNCTION PANEL: CPT | Performed by: CLINICAL NURSE SPECIALIST

## 2022-12-02 PROCEDURE — 36415 COLL VENOUS BLD VENIPUNCTURE: CPT

## 2022-12-02 PROCEDURE — 85025 COMPLETE CBC W/AUTO DIFF WBC: CPT

## 2022-12-02 PROCEDURE — 96413 CHEMO IV INFUSION 1 HR: CPT

## 2022-12-02 NOTE — PROGRESS NOTES
Pt here for C25D8. Arrives Ambulating independently, accompanied by Self           Pregnancy screening: Not applicable    Modifications in dose or schedule: No    Drugs/infusions dual verified for appearance and physical integrity. IV pump settings were dual verified: yes     Frequency of blood return and site check throughout administration: Prior to administration and At completion of therapy   Discharged to Home, Ambulating independently, accompanied by:Self    Outpatient Oncology Care Plan  Problem list:  fatigue  Problems related to:  chemotherapy  Interventions:  emotional support given  maintain safe environment  patient/family supported  chemotherapy teaching  monitor effect of therapy  monitor lab values  promoted rest  provided general teaching  Expected outcomes:  understands plan of care  Progress towards outcome:  making progress    Education Record    Learner:  Patient  Barriers / Limitations:  None  Method:  Discussion  Outcome:  Shows understanding  Comments: patient ambulatory. No complaints. Tolerated treatment.  Discharged in stable condition

## 2022-12-08 ENCOUNTER — OFFICE VISIT (OUTPATIENT)
Dept: FAMILY MEDICINE CLINIC | Facility: CLINIC | Age: 72
End: 2022-12-08
Payer: MEDICARE

## 2022-12-08 VITALS
TEMPERATURE: 97 F | WEIGHT: 244 LBS | HEART RATE: 45 BPM | OXYGEN SATURATION: 98 % | HEIGHT: 72 IN | DIASTOLIC BLOOD PRESSURE: 60 MMHG | BODY MASS INDEX: 33.05 KG/M2 | SYSTOLIC BLOOD PRESSURE: 114 MMHG | RESPIRATION RATE: 16 BRPM

## 2022-12-08 DIAGNOSIS — I10 DIABETES MELLITUS WITH COINCIDENT HYPERTENSION (HCC): Primary | ICD-10-CM

## 2022-12-08 DIAGNOSIS — E11.9 DIABETES MELLITUS WITH COINCIDENT HYPERTENSION (HCC): Primary | ICD-10-CM

## 2022-12-08 PROCEDURE — 99213 OFFICE O/P EST LOW 20 MIN: CPT | Performed by: FAMILY MEDICINE

## 2022-12-09 ENCOUNTER — OFFICE VISIT (OUTPATIENT)
Dept: HEMATOLOGY/ONCOLOGY | Age: 72
End: 2022-12-09
Attending: INTERNAL MEDICINE
Payer: MEDICARE

## 2022-12-09 VITALS
DIASTOLIC BLOOD PRESSURE: 69 MMHG | HEIGHT: 72.01 IN | TEMPERATURE: 97 F | BODY MASS INDEX: 33.13 KG/M2 | OXYGEN SATURATION: 98 % | WEIGHT: 244.63 LBS | HEART RATE: 62 BPM | SYSTOLIC BLOOD PRESSURE: 103 MMHG

## 2022-12-09 DIAGNOSIS — R79.89 ELEVATED LFTS: Primary | ICD-10-CM

## 2022-12-09 DIAGNOSIS — C90.00 MULTIPLE MYELOMA, REMISSION STATUS UNSPECIFIED (HCC): ICD-10-CM

## 2022-12-09 DIAGNOSIS — C90.00 MULTIPLE MYELOMA NOT HAVING ACHIEVED REMISSION (HCC): ICD-10-CM

## 2022-12-09 LAB
ALBUMIN SERPL-MCNC: 3.7 G/DL (ref 3.4–5)
ALBUMIN SERPL-MCNC: 3.7 G/DL (ref 3.4–5)
ALP LIVER SERPL-CCNC: 89 U/L
ALT SERPL-CCNC: 23 U/L
AST SERPL-CCNC: 14 U/L (ref 15–37)
BASOPHILS # BLD AUTO: 0.01 X10(3) UL (ref 0–0.2)
BASOPHILS NFR BLD AUTO: 0.1 %
BILIRUB DIRECT SERPL-MCNC: 0.2 MG/DL (ref 0–0.2)
BILIRUB SERPL-MCNC: 1 MG/DL (ref 0.1–2)
CALCIUM BLD-MCNC: 10 MG/DL (ref 8.5–10.1)
CREAT BLD-MCNC: 1.3 MG/DL
EOSINOPHIL # BLD AUTO: 0.01 X10(3) UL (ref 0–0.7)
EOSINOPHIL NFR BLD AUTO: 0.1 %
ERYTHROCYTE [DISTWIDTH] IN BLOOD BY AUTOMATED COUNT: 16 %
GFR SERPLBLD BASED ON 1.73 SQ M-ARVRAT: 58 ML/MIN/1.73M2 (ref 60–?)
HCT VFR BLD AUTO: 39.1 %
HGB BLD-MCNC: 12.9 G/DL
IMM GRANULOCYTES # BLD AUTO: 0.04 X10(3) UL (ref 0–1)
IMM GRANULOCYTES NFR BLD: 0.3 %
LYMPHOCYTES # BLD AUTO: 0.28 X10(3) UL (ref 1–4)
LYMPHOCYTES NFR BLD AUTO: 2.4 %
MCH RBC QN AUTO: 34.4 PG (ref 26–34)
MCHC RBC AUTO-ENTMCNC: 33 G/DL (ref 31–37)
MCV RBC AUTO: 104.3 FL
MONOCYTES # BLD AUTO: 0.08 X10(3) UL (ref 0.1–1)
MONOCYTES NFR BLD AUTO: 0.7 %
NEUTROPHILS # BLD AUTO: 11.19 X10 (3) UL (ref 1.5–7.7)
NEUTROPHILS # BLD AUTO: 11.19 X10(3) UL (ref 1.5–7.7)
NEUTROPHILS NFR BLD AUTO: 96.4 %
PLATELET # BLD AUTO: 184 10(3)UL (ref 150–450)
PROT SERPL-MCNC: 6.7 G/DL (ref 6.4–8.2)
RBC # BLD AUTO: 3.75 X10(6)UL
WBC # BLD AUTO: 11.6 X10(3) UL (ref 4–11)

## 2022-12-09 PROCEDURE — 96413 CHEMO IV INFUSION 1 HR: CPT

## 2022-12-09 PROCEDURE — 82565 ASSAY OF CREATININE: CPT

## 2022-12-09 PROCEDURE — 80076 HEPATIC FUNCTION PANEL: CPT | Performed by: INTERNAL MEDICINE

## 2022-12-09 PROCEDURE — 96367 TX/PROPH/DG ADDL SEQ IV INF: CPT

## 2022-12-09 PROCEDURE — 82310 ASSAY OF CALCIUM: CPT

## 2022-12-09 PROCEDURE — 82040 ASSAY OF SERUM ALBUMIN: CPT

## 2022-12-09 PROCEDURE — 85025 COMPLETE CBC W/AUTO DIFF WBC: CPT

## 2022-12-09 PROCEDURE — 36415 COLL VENOUS BLD VENIPUNCTURE: CPT

## 2022-12-19 ENCOUNTER — PATIENT OUTREACH (OUTPATIENT)
Dept: CASE MANAGEMENT | Age: 72
End: 2022-12-19

## 2022-12-23 ENCOUNTER — OFFICE VISIT (OUTPATIENT)
Dept: HEMATOLOGY/ONCOLOGY | Age: 72
End: 2022-12-23
Attending: INTERNAL MEDICINE
Payer: MEDICARE

## 2022-12-23 VITALS — WEIGHT: 243.19 LBS | HEIGHT: 72.01 IN | BODY MASS INDEX: 32.94 KG/M2

## 2022-12-23 VITALS
DIASTOLIC BLOOD PRESSURE: 66 MMHG | HEART RATE: 52 BPM | RESPIRATION RATE: 16 BRPM | TEMPERATURE: 98 F | SYSTOLIC BLOOD PRESSURE: 102 MMHG | OXYGEN SATURATION: 100 %

## 2022-12-23 DIAGNOSIS — C90.00 MULTIPLE MYELOMA NOT HAVING ACHIEVED REMISSION (HCC): ICD-10-CM

## 2022-12-23 DIAGNOSIS — E11.9 DIABETES MELLITUS WITH COINCIDENT HYPERTENSION (HCC): ICD-10-CM

## 2022-12-23 DIAGNOSIS — R79.89 ELEVATED LFTS: Primary | ICD-10-CM

## 2022-12-23 DIAGNOSIS — E55.9 VITAMIN D DEFICIENCY: ICD-10-CM

## 2022-12-23 DIAGNOSIS — C90.00 MULTIPLE MYELOMA NOT HAVING ACHIEVED REMISSION (HCC): Primary | ICD-10-CM

## 2022-12-23 DIAGNOSIS — I10 DIABETES MELLITUS WITH COINCIDENT HYPERTENSION (HCC): ICD-10-CM

## 2022-12-23 DIAGNOSIS — R79.89 ELEVATED LFTS: ICD-10-CM

## 2022-12-23 LAB
ALBUMIN SERPL-MCNC: 3.7 G/DL (ref 3.4–5)
ALBUMIN/GLOB SERPL: 1.3 {RATIO} (ref 1–2)
ALP LIVER SERPL-CCNC: 81 U/L
ALT SERPL-CCNC: 17 U/L
ANION GAP SERPL CALC-SCNC: 7 MMOL/L (ref 0–18)
AST SERPL-CCNC: 12 U/L (ref 15–37)
BASOPHILS # BLD AUTO: 0.03 X10(3) UL (ref 0–0.2)
BASOPHILS NFR BLD AUTO: 0.4 %
BILIRUB SERPL-MCNC: 1 MG/DL (ref 0.1–2)
BUN BLD-MCNC: 26 MG/DL (ref 7–18)
CALCIUM BLD-MCNC: 9.1 MG/DL (ref 8.5–10.1)
CHLORIDE SERPL-SCNC: 107 MMOL/L (ref 98–112)
CO2 SERPL-SCNC: 26 MMOL/L (ref 21–32)
CREAT BLD-MCNC: 1.3 MG/DL
EOSINOPHIL # BLD AUTO: 0.14 X10(3) UL (ref 0–0.7)
EOSINOPHIL NFR BLD AUTO: 1.8 %
ERYTHROCYTE [DISTWIDTH] IN BLOOD BY AUTOMATED COUNT: 14.7 %
EST. AVERAGE GLUCOSE BLD GHB EST-MCNC: 100 MG/DL (ref 68–126)
FASTING STATUS PATIENT QL REPORTED: NO
GFR SERPLBLD BASED ON 1.73 SQ M-ARVRAT: 58 ML/MIN/1.73M2 (ref 60–?)
GLOBULIN PLAS-MCNC: 2.9 G/DL (ref 2.8–4.4)
GLUCOSE BLD-MCNC: 117 MG/DL (ref 70–99)
HBA1C MFR BLD: 5.1 % (ref ?–5.7)
HCT VFR BLD AUTO: 39.1 %
HGB BLD-MCNC: 12.8 G/DL
IGA SERPL-MCNC: <7.83 MG/DL (ref 70–312)
IGM SERPL-MCNC: 5.6 MG/DL (ref 43–279)
IMM GRANULOCYTES # BLD AUTO: 0.02 X10(3) UL (ref 0–1)
IMM GRANULOCYTES NFR BLD: 0.3 %
IMMUNOGLOBULIN PNL SER-MCNC: 223 MG/DL (ref 791–1643)
LYMPHOCYTES # BLD AUTO: 0.71 X10(3) UL (ref 1–4)
LYMPHOCYTES NFR BLD AUTO: 9.2 %
MCH RBC QN AUTO: 34.8 PG (ref 26–34)
MCHC RBC AUTO-ENTMCNC: 32.7 G/DL (ref 31–37)
MCV RBC AUTO: 106.3 FL
MONOCYTES # BLD AUTO: 0.67 X10(3) UL (ref 0.1–1)
MONOCYTES NFR BLD AUTO: 8.6 %
NEUTROPHILS # BLD AUTO: 6.18 X10 (3) UL (ref 1.5–7.7)
NEUTROPHILS # BLD AUTO: 6.18 X10(3) UL (ref 1.5–7.7)
NEUTROPHILS NFR BLD AUTO: 79.7 %
OSMOLALITY SERPL CALC.SUM OF ELEC: 296 MOSM/KG (ref 275–295)
PLATELET # BLD AUTO: 298 10(3)UL (ref 150–450)
POTASSIUM SERPL-SCNC: 3.9 MMOL/L (ref 3.5–5.1)
PROT SERPL-MCNC: 6.6 G/DL (ref 6.4–8.2)
RBC # BLD AUTO: 3.68 X10(6)UL
SODIUM SERPL-SCNC: 140 MMOL/L (ref 136–145)
VIT D+METAB SERPL-MCNC: 47.4 NG/ML (ref 30–100)
WBC # BLD AUTO: 7.8 X10(3) UL (ref 4–11)

## 2022-12-23 PROCEDURE — 96401 CHEMO ANTI-NEOPL SQ/IM: CPT

## 2022-12-23 PROCEDURE — 99214 OFFICE O/P EST MOD 30 MIN: CPT | Performed by: INTERNAL MEDICINE

## 2022-12-23 PROCEDURE — 96413 CHEMO IV INFUSION 1 HR: CPT

## 2022-12-23 RX ORDER — ACETAMINOPHEN 325 MG/1
650 TABLET ORAL ONCE
Status: CANCELLED | OUTPATIENT
Start: 2022-12-23

## 2022-12-23 RX ORDER — DIPHENHYDRAMINE HYDROCHLORIDE 50 MG/ML
25 INJECTION INTRAMUSCULAR; INTRAVENOUS ONCE
Status: DISCONTINUED | OUTPATIENT
Start: 2022-12-23 | End: 2022-12-23

## 2022-12-23 RX ORDER — DIPHENHYDRAMINE HYDROCHLORIDE 50 MG/ML
25 INJECTION INTRAMUSCULAR; INTRAVENOUS ONCE
Status: CANCELLED | OUTPATIENT
Start: 2022-12-23

## 2022-12-23 RX ORDER — ACETAMINOPHEN 325 MG/1
650 TABLET ORAL ONCE
Status: DISCONTINUED | OUTPATIENT
Start: 2022-12-23 | End: 2022-12-23

## 2022-12-23 NOTE — PROGRESS NOTES
Pt here for C26D1. Arrives Ambulating independently, accompanied by Self           Pregnancy screening: Not applicable    Modifications in dose or schedule: No    Drugs/infusions dual verified for appearance and physical integrity. IV pump settings were dual verified: yes     Frequency of blood return and site check throughout administration: Prior to administration and At completion of therapy   Discharged to Home, Ambulating independently, accompanied by:Self    Outpatient Oncology Care Plan  Problem list:  fatigue  Problems related to:  chemotherapy  Interventions:  emotional support given  maintain safe environment  patient/family supported  chemotherapy teaching  monitor effect of therapy  monitor lab values  promoted rest  provided general teaching  Expected outcomes:  understands plan of care  Progress towards outcome:  making progress    Education Record    Learner:  Patient  Barriers / Limitations:  None  Method:  Discussion  Outcome:  Shows understanding  Comments: patient ambulatory. No complaints. Tolerated treatment. AVS reviewed.  Discharged in stable condition

## 2022-12-23 NOTE — PROGRESS NOTES
Patient is here for follow up for myeloma on C26 kyprolis and darzalex. He is having pain in lumbar spine area that radiates out to the side. This has gotten worse since the cold weather. He denies any impact on his walking or balance. He denies problem from his medication. He denies any fever, cough or shortness of breath.      Education Record    Learner:  Patient    Disease / Diagnosis: myeloma    Barriers / Limitations:  None   Comments:    Method:  Discussion   Comments:    General Topics:  Side effects and symptom management   Comments:    Outcome:  Shows understanding   Comments:

## 2022-12-25 NOTE — PROGRESS NOTES
Your hemoglobin A1c is 5.1,, well under control.   No change in medications  Continue your diet and exercise and carb intake

## 2022-12-29 LAB
ALBUMIN SERPL ELPH-MCNC: 4.1 G/DL (ref 3.75–5.21)
ALBUMIN/GLOB SERPL: 1.86 {RATIO} (ref 1–2)
ALPHA1 GLOB SERPL ELPH-MCNC: 0.32 G/DL (ref 0.19–0.46)
ALPHA2 GLOB SERPL ELPH-MCNC: 0.99 G/DL (ref 0.48–1.05)
B-GLOBULIN SERPL ELPH-MCNC: 0.65 G/DL (ref 0.68–1.23)
GAMMA GLOB SERPL ELPH-MCNC: 0.25 G/DL (ref 0.62–1.7)
KAPPA LC FREE SER-MCNC: 0.68 MG/DL (ref 0.33–1.94)
KAPPA LC FREE/LAMBDA FREE SER NEPH: 2.59 {RATIO} (ref 0.26–1.65)
LAMBDA LC FREE SERPL-MCNC: 0.26 MG/DL (ref 0.57–2.63)
PROT SERPL-MCNC: 6.3 G/DL (ref 6.4–8.2)

## 2022-12-30 ENCOUNTER — OFFICE VISIT (OUTPATIENT)
Dept: HEMATOLOGY/ONCOLOGY | Age: 72
End: 2022-12-30
Attending: INTERNAL MEDICINE
Payer: MEDICARE

## 2022-12-30 VITALS
OXYGEN SATURATION: 98 % | HEART RATE: 56 BPM | TEMPERATURE: 97 F | WEIGHT: 244.5 LBS | BODY MASS INDEX: 33.12 KG/M2 | HEIGHT: 72.01 IN | RESPIRATION RATE: 16 BRPM | DIASTOLIC BLOOD PRESSURE: 64 MMHG | SYSTOLIC BLOOD PRESSURE: 100 MMHG

## 2022-12-30 DIAGNOSIS — R79.89 ELEVATED LFTS: Primary | ICD-10-CM

## 2022-12-30 DIAGNOSIS — C90.00 MULTIPLE MYELOMA NOT HAVING ACHIEVED REMISSION (HCC): ICD-10-CM

## 2022-12-30 LAB
BASOPHILS # BLD AUTO: 0.01 X10(3) UL (ref 0–0.2)
BASOPHILS NFR BLD AUTO: 0.1 %
EOSINOPHIL # BLD AUTO: 0 X10(3) UL (ref 0–0.7)
EOSINOPHIL NFR BLD AUTO: 0 %
ERYTHROCYTE [DISTWIDTH] IN BLOOD BY AUTOMATED COUNT: 14.8 %
HCT VFR BLD AUTO: 40.9 %
HGB BLD-MCNC: 13.6 G/DL
IMM GRANULOCYTES # BLD AUTO: 0.04 X10(3) UL (ref 0–1)
IMM GRANULOCYTES NFR BLD: 0.4 %
LYMPHOCYTES # BLD AUTO: 0.24 X10(3) UL (ref 1–4)
LYMPHOCYTES NFR BLD AUTO: 2.3 %
MCH RBC QN AUTO: 34.3 PG (ref 26–34)
MCHC RBC AUTO-ENTMCNC: 33.3 G/DL (ref 31–37)
MCV RBC AUTO: 103.3 FL
MONOCYTES # BLD AUTO: 0.1 X10(3) UL (ref 0.1–1)
MONOCYTES NFR BLD AUTO: 1 %
NEUTROPHILS # BLD AUTO: 10.08 X10 (3) UL (ref 1.5–7.7)
NEUTROPHILS # BLD AUTO: 10.08 X10(3) UL (ref 1.5–7.7)
NEUTROPHILS NFR BLD AUTO: 96.2 %
PLATELET # BLD AUTO: 141 10(3)UL (ref 150–450)
RBC # BLD AUTO: 3.96 X10(6)UL
WBC # BLD AUTO: 10.5 X10(3) UL (ref 4–11)

## 2022-12-30 PROCEDURE — 36415 COLL VENOUS BLD VENIPUNCTURE: CPT

## 2022-12-30 PROCEDURE — 96413 CHEMO IV INFUSION 1 HR: CPT

## 2022-12-30 PROCEDURE — 85025 COMPLETE CBC W/AUTO DIFF WBC: CPT

## 2022-12-30 NOTE — PROGRESS NOTES
Pt here for C26D8 Kyprolis. Arrives Ambulating independently, accompanied by Self           Pregnancy screening: Not applicable    Modifications in dose or schedule: No    Drugs/infusions dual verified for appearance and physical integrity. IV pump settings were dual verified: yes     Frequency of blood return and site check throughout administration: Prior to administration and At completion of therapy   Discharged to Home, Ambulating independently, accompanied by:Self    Outpatient Oncology Care Plan  Problem list:  fatigue  Problems related to:  chemotherapy  side effect of treatment  Interventions:  emotional support given  maintain safe environment  patient/family supported  encourage activity as tolerated  monitor lab values  promoted rest  Expected outcomes:  adequate sleep/rest  optimal lab values  patient supported/coping well  safe in environment  understands plan of care  Progress towards outcome:  making progress    Education Record    Learner:  Patient  Barriers / Limitations:  None  Method:  Discussion  Outcome:  Shows understanding  Comments: Tolerated treatment without incident. Printed AVS given and reviewed. Discharged home in stable condition, no new complaints.

## 2023-01-06 ENCOUNTER — OFFICE VISIT (OUTPATIENT)
Dept: HEMATOLOGY/ONCOLOGY | Age: 73
End: 2023-01-06
Attending: INTERNAL MEDICINE
Payer: MEDICARE

## 2023-01-06 VITALS
WEIGHT: 243.31 LBS | TEMPERATURE: 97 F | OXYGEN SATURATION: 98 % | HEIGHT: 72.01 IN | DIASTOLIC BLOOD PRESSURE: 64 MMHG | HEART RATE: 54 BPM | BODY MASS INDEX: 32.95 KG/M2 | RESPIRATION RATE: 18 BRPM | SYSTOLIC BLOOD PRESSURE: 99 MMHG

## 2023-01-06 DIAGNOSIS — R79.89 ELEVATED LFTS: Primary | ICD-10-CM

## 2023-01-06 DIAGNOSIS — C90.00 MULTIPLE MYELOMA NOT HAVING ACHIEVED REMISSION (HCC): ICD-10-CM

## 2023-01-06 DIAGNOSIS — C90.00 MULTIPLE MYELOMA, REMISSION STATUS UNSPECIFIED (HCC): ICD-10-CM

## 2023-01-06 LAB
ALBUMIN SERPL-MCNC: 3.7 G/DL (ref 3.4–5)
BASOPHILS # BLD AUTO: 0.01 X10(3) UL (ref 0–0.2)
BASOPHILS NFR BLD AUTO: 0.1 %
CALCIUM BLD-MCNC: 10.3 MG/DL (ref 8.5–10.1)
CREAT BLD-MCNC: 1.41 MG/DL
EOSINOPHIL # BLD AUTO: 0.01 X10(3) UL (ref 0–0.7)
EOSINOPHIL NFR BLD AUTO: 0.1 %
ERYTHROCYTE [DISTWIDTH] IN BLOOD BY AUTOMATED COUNT: 14.6 %
GFR SERPLBLD BASED ON 1.73 SQ M-ARVRAT: 52 ML/MIN/1.73M2 (ref 60–?)
HCT VFR BLD AUTO: 40.1 %
HGB BLD-MCNC: 13.3 G/DL
IMM GRANULOCYTES # BLD AUTO: 0.04 X10(3) UL (ref 0–1)
IMM GRANULOCYTES NFR BLD: 0.3 %
LYMPHOCYTES # BLD AUTO: 0.22 X10(3) UL (ref 1–4)
LYMPHOCYTES NFR BLD AUTO: 1.6 %
MCH RBC QN AUTO: 34.1 PG (ref 26–34)
MCHC RBC AUTO-ENTMCNC: 33.2 G/DL (ref 31–37)
MCV RBC AUTO: 102.8 FL
MONOCYTES # BLD AUTO: 0.09 X10(3) UL (ref 0.1–1)
MONOCYTES NFR BLD AUTO: 0.7 %
NEUTROPHILS # BLD AUTO: 13.36 X10 (3) UL (ref 1.5–7.7)
NEUTROPHILS # BLD AUTO: 13.36 X10(3) UL (ref 1.5–7.7)
NEUTROPHILS NFR BLD AUTO: 97.2 %
PLATELET # BLD AUTO: 176 10(3)UL (ref 150–450)
RBC # BLD AUTO: 3.9 X10(6)UL
WBC # BLD AUTO: 13.7 X10(3) UL (ref 4–11)

## 2023-01-06 PROCEDURE — 96413 CHEMO IV INFUSION 1 HR: CPT

## 2023-01-06 PROCEDURE — 85025 COMPLETE CBC W/AUTO DIFF WBC: CPT

## 2023-01-06 PROCEDURE — 36415 COLL VENOUS BLD VENIPUNCTURE: CPT

## 2023-01-06 PROCEDURE — 82565 ASSAY OF CREATININE: CPT

## 2023-01-06 PROCEDURE — 96375 TX/PRO/DX INJ NEW DRUG ADDON: CPT

## 2023-01-06 PROCEDURE — 82310 ASSAY OF CALCIUM: CPT

## 2023-01-06 PROCEDURE — 82040 ASSAY OF SERUM ALBUMIN: CPT

## 2023-01-06 NOTE — PROGRESS NOTES
Pt here for [id number]. Arrives Ambulating independently, accompanied by Self           Pregnancy screening: Not applicable    Modifications in dose or schedule: No    Drugs/infusions dual verified for appearance and physical integrity. IV pump settings were dual verified: yes     Frequency of blood return and site check throughout administration: Prior to administration and At completion of therapy   Discharged to Home, Ambulating independently, accompanied by:Self    Outpatient Oncology Care Plan  Problem list:  fatigue  Problems related to:  chemotherapy  Interventions:  emotional support given  maintain safe environment  patient/family supported  chemotherapy teaching  monitor effect of therapy  monitor lab values  promoted rest  provided general teaching  Expected outcomes:  understands plan of care  Progress towards outcome:  making progress    Education Record    Learner:  Patient  Barriers / Limitations:  None  Method:  Discussion  Outcome:  Shows understanding  Comments: patient ambulatory. No complaints. Tolerated treatment.  Discharged in stable condition

## 2023-01-19 ENCOUNTER — PATIENT OUTREACH (OUTPATIENT)
Dept: CASE MANAGEMENT | Age: 73
End: 2023-01-19

## 2023-01-19 DIAGNOSIS — N08 MYELOMA KIDNEY DISEASE (HCC): ICD-10-CM

## 2023-01-19 DIAGNOSIS — C90.00 MULTIPLE MYELOMA NOT HAVING ACHIEVED REMISSION (HCC): Primary | ICD-10-CM

## 2023-01-19 DIAGNOSIS — I15.9 SECONDARY HYPERTENSION: ICD-10-CM

## 2023-01-19 DIAGNOSIS — C90.00 MULTIPLE MYELOMA, REMISSION STATUS UNSPECIFIED (HCC): ICD-10-CM

## 2023-01-19 DIAGNOSIS — E11.65 TYPE 2 DIABETES MELLITUS WITH HYPERGLYCEMIA, WITHOUT LONG-TERM CURRENT USE OF INSULIN (HCC): ICD-10-CM

## 2023-01-19 DIAGNOSIS — I10 ESSENTIAL HYPERTENSION: ICD-10-CM

## 2023-01-19 DIAGNOSIS — E66.01 MORBID OBESITY (HCC): ICD-10-CM

## 2023-01-19 DIAGNOSIS — C90.00 MYELOMA KIDNEY DISEASE (HCC): ICD-10-CM

## 2023-01-19 DIAGNOSIS — M15.9 GENERALIZED OSTEOARTHROSIS OF HAND: ICD-10-CM

## 2023-01-20 ENCOUNTER — OFFICE VISIT (OUTPATIENT)
Dept: HEMATOLOGY/ONCOLOGY | Age: 73
End: 2023-01-20
Attending: INTERNAL MEDICINE
Payer: MEDICARE

## 2023-01-20 VITALS
HEART RATE: 71 BPM | WEIGHT: 245.63 LBS | OXYGEN SATURATION: 95 % | DIASTOLIC BLOOD PRESSURE: 57 MMHG | SYSTOLIC BLOOD PRESSURE: 89 MMHG | RESPIRATION RATE: 18 BRPM | HEIGHT: 72.01 IN | TEMPERATURE: 97 F | BODY MASS INDEX: 33.27 KG/M2

## 2023-01-20 VITALS
HEART RATE: 59 BPM | RESPIRATION RATE: 16 BRPM | DIASTOLIC BLOOD PRESSURE: 60 MMHG | SYSTOLIC BLOOD PRESSURE: 97 MMHG | TEMPERATURE: 97 F

## 2023-01-20 DIAGNOSIS — C90.00 MULTIPLE MYELOMA NOT HAVING ACHIEVED REMISSION (HCC): ICD-10-CM

## 2023-01-20 DIAGNOSIS — R79.89 ELEVATED LFTS: Primary | ICD-10-CM

## 2023-01-20 LAB
ALBUMIN SERPL-MCNC: 3.6 G/DL (ref 3.4–5)
ALBUMIN/GLOB SERPL: 1.1 {RATIO} (ref 1–2)
ALP LIVER SERPL-CCNC: 86 U/L
ALT SERPL-CCNC: 16 U/L
ANION GAP SERPL CALC-SCNC: 7 MMOL/L (ref 0–18)
AST SERPL-CCNC: 10 U/L (ref 15–37)
BASOPHILS # BLD AUTO: 0.03 X10(3) UL (ref 0–0.2)
BASOPHILS NFR BLD AUTO: 0.2 %
BILIRUB SERPL-MCNC: 0.9 MG/DL (ref 0.1–2)
BUN BLD-MCNC: 30 MG/DL (ref 7–18)
CALCIUM BLD-MCNC: 10.4 MG/DL (ref 8.5–10.1)
CHLORIDE SERPL-SCNC: 105 MMOL/L (ref 98–112)
CO2 SERPL-SCNC: 25 MMOL/L (ref 21–32)
CREAT BLD-MCNC: 1.63 MG/DL
EOSINOPHIL # BLD AUTO: 0.01 X10(3) UL (ref 0–0.7)
EOSINOPHIL NFR BLD AUTO: 0.1 %
ERYTHROCYTE [DISTWIDTH] IN BLOOD BY AUTOMATED COUNT: 14.2 %
FASTING STATUS PATIENT QL REPORTED: NO
GFR SERPLBLD BASED ON 1.73 SQ M-ARVRAT: 44 ML/MIN/1.73M2 (ref 60–?)
GLOBULIN PLAS-MCNC: 3.3 G/DL (ref 2.8–4.4)
GLUCOSE BLD-MCNC: 214 MG/DL (ref 70–99)
HCT VFR BLD AUTO: 41.2 %
HGB BLD-MCNC: 13.7 G/DL
IGA SERPL-MCNC: <7.83 MG/DL (ref 70–312)
IGM SERPL-MCNC: 6.6 MG/DL (ref 43–279)
IMM GRANULOCYTES # BLD AUTO: 0.07 X10(3) UL (ref 0–1)
IMM GRANULOCYTES NFR BLD: 0.5 %
IMMUNOGLOBULIN PNL SER-MCNC: 183 MG/DL (ref 791–1643)
LYMPHOCYTES # BLD AUTO: 0.27 X10(3) UL (ref 1–4)
LYMPHOCYTES NFR BLD AUTO: 1.8 %
MCH RBC QN AUTO: 34.5 PG (ref 26–34)
MCHC RBC AUTO-ENTMCNC: 33.3 G/DL (ref 31–37)
MCV RBC AUTO: 103.8 FL
MONOCYTES # BLD AUTO: 0.13 X10(3) UL (ref 0.1–1)
MONOCYTES NFR BLD AUTO: 0.9 %
NEUTROPHILS # BLD AUTO: 14.3 X10 (3) UL (ref 1.5–7.7)
NEUTROPHILS # BLD AUTO: 14.3 X10(3) UL (ref 1.5–7.7)
NEUTROPHILS NFR BLD AUTO: 96.5 %
OSMOLALITY SERPL CALC.SUM OF ELEC: 297 MOSM/KG (ref 275–295)
PLATELET # BLD AUTO: 360 10(3)UL (ref 150–450)
POTASSIUM SERPL-SCNC: 3.9 MMOL/L (ref 3.5–5.1)
PROT SERPL-MCNC: 6.9 G/DL (ref 6.4–8.2)
RBC # BLD AUTO: 3.97 X10(6)UL
SODIUM SERPL-SCNC: 137 MMOL/L (ref 136–145)
WBC # BLD AUTO: 14.8 X10(3) UL (ref 4–11)

## 2023-01-20 PROCEDURE — 96401 CHEMO ANTI-NEOPL SQ/IM: CPT

## 2023-01-20 PROCEDURE — 99214 OFFICE O/P EST MOD 30 MIN: CPT | Performed by: INTERNAL MEDICINE

## 2023-01-20 PROCEDURE — 96413 CHEMO IV INFUSION 1 HR: CPT

## 2023-01-20 RX ORDER — DIPHENHYDRAMINE HYDROCHLORIDE 50 MG/ML
25 INJECTION INTRAMUSCULAR; INTRAVENOUS ONCE
Status: CANCELLED | OUTPATIENT
Start: 2023-01-20

## 2023-01-20 RX ORDER — ACETAMINOPHEN 325 MG/1
650 TABLET ORAL ONCE
Status: CANCELLED | OUTPATIENT
Start: 2023-01-20

## 2023-01-20 NOTE — PROGRESS NOTES
Patient is here for follow up for myeloma. He is feeling alright. He reports that since being on eplerenone his blood pressure has been low. He denies any dizziness. This was prescribed by Dr. Demi Zarco. He has his chronic pain from ribs to R scapula and the L4-5 area. He tries not to take pain medication but uses tramadol is this gets severe. Last time was 2 weeks ago. He denies any numbness or tingling in his hands or feet. He took his tylenol and benadryl premedication at 9am this morning.      Education Record    Learner:  Patient    Disease / Diagnosis: myeloma    Barriers / Limitations:  None   Comments:    Method:  Discussion   Comments:    General Topics:  Side effects and symptom management   Comments:    Outcome:  Shows understanding   Comments:

## 2023-01-20 NOTE — PROGRESS NOTES
Pt here for C27D1. Arrives Ambulating independently, accompanied by Self           Pregnancy screening: Not applicable    Modifications in dose or schedule: No    Drugs/infusions dual verified for appearance and physical integrity. IV pump settings were dual verified: yes     Frequency of blood return and site check throughout administration: Prior to administration   Discharged to Home, Ambulating independently, accompanied by:Self    Outpatient Oncology Care Plan  Problem list:  pt denies   Problems related to:  chemotherapy  Interventions:  emotional support given  Expected outcomes:  patient supported/coping well  Progress towards outcome:  making progress    Education Record    Learner:  Patient  Barriers / Limitations:  None  Method:  Discussion  Outcome:  Shows understanding  Comments:observed pt 30 m in post faspro inj. No c/o. VSS.  Tolerated kyprolis as well

## 2023-01-23 LAB
ALBUMIN SERPL ELPH-MCNC: 3.99 G/DL (ref 3.75–5.21)
ALBUMIN/GLOB SERPL: 1.66 {RATIO} (ref 1–2)
ALPHA1 GLOB SERPL ELPH-MCNC: 0.4 G/DL (ref 0.19–0.46)
ALPHA2 GLOB SERPL ELPH-MCNC: 1.08 G/DL (ref 0.48–1.05)
B-GLOBULIN SERPL ELPH-MCNC: 0.7 G/DL (ref 0.68–1.23)
GAMMA GLOB SERPL ELPH-MCNC: 0.24 G/DL (ref 0.62–1.7)
KAPPA LC FREE SER-MCNC: 0.8 MG/DL (ref 0.33–1.94)
KAPPA LC FREE/LAMBDA FREE SER NEPH: 3.7 {RATIO} (ref 0.26–1.65)
LAMBDA LC FREE SERPL-MCNC: 0.22 MG/DL (ref 0.57–2.63)
PROT SERPL-MCNC: 6.4 G/DL (ref 6.4–8.2)

## 2023-01-27 ENCOUNTER — OFFICE VISIT (OUTPATIENT)
Dept: HEMATOLOGY/ONCOLOGY | Age: 73
End: 2023-01-27
Attending: INTERNAL MEDICINE
Payer: MEDICARE

## 2023-01-27 VITALS
OXYGEN SATURATION: 90 % | WEIGHT: 245.38 LBS | DIASTOLIC BLOOD PRESSURE: 60 MMHG | HEIGHT: 72.01 IN | RESPIRATION RATE: 16 BRPM | TEMPERATURE: 99 F | HEART RATE: 73 BPM | BODY MASS INDEX: 33.23 KG/M2 | SYSTOLIC BLOOD PRESSURE: 91 MMHG

## 2023-01-27 DIAGNOSIS — C90.00 MULTIPLE MYELOMA NOT HAVING ACHIEVED REMISSION (HCC): ICD-10-CM

## 2023-01-27 DIAGNOSIS — R79.89 ELEVATED LFTS: Primary | ICD-10-CM

## 2023-01-27 LAB
BASOPHILS # BLD AUTO: 0.01 X10(3) UL (ref 0–0.2)
BASOPHILS NFR BLD AUTO: 0.1 %
EOSINOPHIL # BLD AUTO: 0 X10(3) UL (ref 0–0.7)
EOSINOPHIL NFR BLD AUTO: 0 %
ERYTHROCYTE [DISTWIDTH] IN BLOOD BY AUTOMATED COUNT: 13.7 %
HCT VFR BLD AUTO: 41.7 %
HGB BLD-MCNC: 13.6 G/DL
IMM GRANULOCYTES # BLD AUTO: 0.06 X10(3) UL (ref 0–1)
IMM GRANULOCYTES NFR BLD: 0.5 %
LYMPHOCYTES # BLD AUTO: 0.19 X10(3) UL (ref 1–4)
LYMPHOCYTES NFR BLD AUTO: 1.5 %
MCH RBC QN AUTO: 33.9 PG (ref 26–34)
MCHC RBC AUTO-ENTMCNC: 32.6 G/DL (ref 31–37)
MCV RBC AUTO: 104 FL
MONOCYTES # BLD AUTO: 0.09 X10(3) UL (ref 0.1–1)
MONOCYTES NFR BLD AUTO: 0.7 %
NEUTROPHILS # BLD AUTO: 12.23 X10 (3) UL (ref 1.5–7.7)
NEUTROPHILS # BLD AUTO: 12.23 X10(3) UL (ref 1.5–7.7)
NEUTROPHILS NFR BLD AUTO: 97.2 %
PLATELET # BLD AUTO: 188 10(3)UL (ref 150–450)
RBC # BLD AUTO: 4.01 X10(6)UL
WBC # BLD AUTO: 12.6 X10(3) UL (ref 4–11)

## 2023-01-27 PROCEDURE — 36415 COLL VENOUS BLD VENIPUNCTURE: CPT

## 2023-01-27 PROCEDURE — 85025 COMPLETE CBC W/AUTO DIFF WBC: CPT

## 2023-01-27 PROCEDURE — 96413 CHEMO IV INFUSION 1 HR: CPT

## 2023-01-30 RX ORDER — ACYCLOVIR 400 MG/1
TABLET ORAL
Qty: 180 TABLET | Refills: 1 | Status: SHIPPED | OUTPATIENT
Start: 2023-01-30

## 2023-01-31 ENCOUNTER — OFFICE VISIT (OUTPATIENT)
Dept: NEPHROLOGY | Facility: CLINIC | Age: 73
End: 2023-01-31
Payer: MEDICARE

## 2023-01-31 VITALS — SYSTOLIC BLOOD PRESSURE: 108 MMHG | BODY MASS INDEX: 33 KG/M2 | WEIGHT: 247 LBS | DIASTOLIC BLOOD PRESSURE: 64 MMHG

## 2023-01-31 DIAGNOSIS — C90.00 MULTIPLE MYELOMA, REMISSION STATUS UNSPECIFIED (HCC): Primary | ICD-10-CM

## 2023-01-31 DIAGNOSIS — E83.52 HYPERCALCEMIA: ICD-10-CM

## 2023-01-31 DIAGNOSIS — N17.9 AKI (ACUTE KIDNEY INJURY) (HCC): ICD-10-CM

## 2023-01-31 PROCEDURE — 99214 OFFICE O/P EST MOD 30 MIN: CPT | Performed by: INTERNAL MEDICINE

## 2023-02-01 ENCOUNTER — HOSPITAL ENCOUNTER (OUTPATIENT)
Dept: NUCLEAR MEDICINE | Facility: HOSPITAL | Age: 73
Discharge: HOME | End: 2023-02-01
Attending: INTERNAL MEDICINE
Payer: MEDICARE

## 2023-02-01 DIAGNOSIS — C90.00 MULTIPLE MYELOMA NOT HAVING ACHIEVED REMISSION (HCC): ICD-10-CM

## 2023-02-01 LAB — GLUCOSE BLD-MCNC: 98 MG/DL (ref 70–99)

## 2023-02-01 PROCEDURE — 82962 GLUCOSE BLOOD TEST: CPT

## 2023-02-01 PROCEDURE — 78816 PET IMAGE W/CT FULL BODY: CPT | Performed by: INTERNAL MEDICINE

## 2023-02-03 ENCOUNTER — OFFICE VISIT (OUTPATIENT)
Dept: HEMATOLOGY/ONCOLOGY | Age: 73
End: 2023-02-03
Attending: INTERNAL MEDICINE
Payer: MEDICARE

## 2023-02-03 VITALS
WEIGHT: 242.13 LBS | OXYGEN SATURATION: 99 % | SYSTOLIC BLOOD PRESSURE: 92 MMHG | BODY MASS INDEX: 32.8 KG/M2 | DIASTOLIC BLOOD PRESSURE: 62 MMHG | HEART RATE: 74 BPM | HEIGHT: 72.01 IN | TEMPERATURE: 97 F

## 2023-02-03 DIAGNOSIS — C90.00 MULTIPLE MYELOMA NOT HAVING ACHIEVED REMISSION (HCC): ICD-10-CM

## 2023-02-03 DIAGNOSIS — C90.00 MULTIPLE MYELOMA, REMISSION STATUS UNSPECIFIED (HCC): ICD-10-CM

## 2023-02-03 DIAGNOSIS — R79.89 ELEVATED LFTS: Primary | ICD-10-CM

## 2023-02-03 LAB
ALBUMIN SERPL-MCNC: 3.3 G/DL (ref 3.4–5)
BASOPHILS # BLD AUTO: 0.01 X10(3) UL (ref 0–0.2)
BASOPHILS NFR BLD AUTO: 0.1 %
CALCIUM BLD-MCNC: 9.1 MG/DL (ref 8.5–10.1)
CREAT BLD-MCNC: 1.49 MG/DL
EOSINOPHIL # BLD AUTO: 0.01 X10(3) UL (ref 0–0.7)
EOSINOPHIL NFR BLD AUTO: 0.1 %
ERYTHROCYTE [DISTWIDTH] IN BLOOD BY AUTOMATED COUNT: 13.5 %
GFR SERPLBLD BASED ON 1.73 SQ M-ARVRAT: 49 ML/MIN/1.73M2 (ref 60–?)
HCT VFR BLD AUTO: 39.4 %
HGB BLD-MCNC: 13.1 G/DL
IMM GRANULOCYTES # BLD AUTO: 0.06 X10(3) UL (ref 0–1)
IMM GRANULOCYTES NFR BLD: 0.4 %
LYMPHOCYTES # BLD AUTO: 0.2 X10(3) UL (ref 1–4)
LYMPHOCYTES NFR BLD AUTO: 1.3 %
MCH RBC QN AUTO: 34.1 PG (ref 26–34)
MCHC RBC AUTO-ENTMCNC: 33.2 G/DL (ref 31–37)
MCV RBC AUTO: 102.6 FL
MONOCYTES # BLD AUTO: 0.12 X10(3) UL (ref 0.1–1)
MONOCYTES NFR BLD AUTO: 0.8 %
NEUTROPHILS # BLD AUTO: 15.21 X10 (3) UL (ref 1.5–7.7)
NEUTROPHILS # BLD AUTO: 15.21 X10(3) UL (ref 1.5–7.7)
NEUTROPHILS NFR BLD AUTO: 97.3 %
PLATELET # BLD AUTO: 183 10(3)UL (ref 150–450)
RBC # BLD AUTO: 3.84 X10(6)UL
WBC # BLD AUTO: 15.6 X10(3) UL (ref 4–11)

## 2023-02-03 PROCEDURE — 36415 COLL VENOUS BLD VENIPUNCTURE: CPT

## 2023-02-03 PROCEDURE — 82040 ASSAY OF SERUM ALBUMIN: CPT

## 2023-02-03 PROCEDURE — 82310 ASSAY OF CALCIUM: CPT

## 2023-02-03 PROCEDURE — 96413 CHEMO IV INFUSION 1 HR: CPT

## 2023-02-03 PROCEDURE — 85025 COMPLETE CBC W/AUTO DIFF WBC: CPT

## 2023-02-03 PROCEDURE — 96375 TX/PRO/DX INJ NEW DRUG ADDON: CPT

## 2023-02-03 PROCEDURE — 82565 ASSAY OF CREATININE: CPT

## 2023-02-03 NOTE — PROGRESS NOTES
Pt here for C27D15. Arrives Ambulating independently, accompanied by Self           Pregnancy screening: Not applicable    Modifications in dose or schedule: No    Drugs/infusions dual verified for appearance and physical integrity. IV pump settings were dual verified: yes     Frequency of blood return and site check throughout administration: Prior to administration   Discharged to Home, Ambulating independently, accompanied by:Self    Outpatient Oncology Care Plan  Problem list:  fatigue  Problems related to:  chemotherapy  Interventions:  encourage activity as tolerated  Expected outcomes:  understands plan of care  Progress towards outcome:  making progress    Education Record    Learner:  Patient  Barriers / Limitations:  None  Method:  Reinforcement  Outcome:  Shows understanding  Comments:  Patient tolerated treatment, no c/o. Discharged home in stable condition.

## 2023-02-06 ENCOUNTER — LAB ENCOUNTER (OUTPATIENT)
Dept: LAB | Age: 73
End: 2023-02-06
Attending: INTERNAL MEDICINE
Payer: MEDICARE

## 2023-02-06 DIAGNOSIS — N17.9 AKI (ACUTE KIDNEY INJURY) (HCC): ICD-10-CM

## 2023-02-06 DIAGNOSIS — E83.52 HYPERCALCEMIA: ICD-10-CM

## 2023-02-06 LAB
ALBUMIN SERPL-MCNC: 3.2 G/DL (ref 3.4–5)
ALBUMIN/GLOB SERPL: 1.1 {RATIO} (ref 1–2)
ALP LIVER SERPL-CCNC: 85 U/L
ALT SERPL-CCNC: 15 U/L
ANION GAP SERPL CALC-SCNC: 4 MMOL/L (ref 0–18)
AST SERPL-CCNC: 6 U/L (ref 15–37)
BASOPHILS # BLD AUTO: 0.01 X10(3) UL (ref 0–0.2)
BASOPHILS NFR BLD AUTO: 0.1 %
BILIRUB SERPL-MCNC: 0.8 MG/DL (ref 0.1–2)
BILIRUB UR QL STRIP.AUTO: NEGATIVE
BUN BLD-MCNC: 24 MG/DL (ref 7–18)
CALCIUM BLD-MCNC: 8.9 MG/DL (ref 8.5–10.1)
CHLORIDE SERPL-SCNC: 110 MMOL/L (ref 98–112)
CO2 SERPL-SCNC: 22 MMOL/L (ref 21–32)
COLOR UR AUTO: YELLOW
CREAT BLD-MCNC: 1.15 MG/DL
CREAT UR-SCNC: 52.9 MG/DL
EOSINOPHIL # BLD AUTO: 0.02 X10(3) UL (ref 0–0.7)
EOSINOPHIL NFR BLD AUTO: 0.3 %
ERYTHROCYTE [DISTWIDTH] IN BLOOD BY AUTOMATED COUNT: 13.3 %
FASTING STATUS PATIENT QL REPORTED: YES
GFR SERPLBLD BASED ON 1.73 SQ M-ARVRAT: 67 ML/MIN/1.73M2 (ref 60–?)
GLOBULIN PLAS-MCNC: 2.8 G/DL (ref 2.8–4.4)
GLUCOSE BLD-MCNC: 127 MG/DL (ref 70–99)
GLUCOSE UR STRIP.AUTO-MCNC: >=500 MG/DL
HCT VFR BLD AUTO: 38.1 %
HGB BLD-MCNC: 12.2 G/DL
IMM GRANULOCYTES # BLD AUTO: 0.06 X10(3) UL (ref 0–1)
IMM GRANULOCYTES NFR BLD: 0.8 %
KETONES UR STRIP.AUTO-MCNC: NEGATIVE MG/DL
LYMPHOCYTES # BLD AUTO: 0.57 X10(3) UL (ref 1–4)
LYMPHOCYTES NFR BLD AUTO: 7.7 %
MAGNESIUM SERPL-MCNC: 2.7 MG/DL (ref 1.6–2.6)
MCH RBC QN AUTO: 34.2 PG (ref 26–34)
MCHC RBC AUTO-ENTMCNC: 32 G/DL (ref 31–37)
MCV RBC AUTO: 106.7 FL
MONOCYTES # BLD AUTO: 1.16 X10(3) UL (ref 0.1–1)
MONOCYTES NFR BLD AUTO: 15.7 %
NEUTROPHILS # BLD AUTO: 5.57 X10 (3) UL (ref 1.5–7.7)
NEUTROPHILS # BLD AUTO: 5.57 X10(3) UL (ref 1.5–7.7)
NEUTROPHILS NFR BLD AUTO: 75.4 %
NITRITE UR QL STRIP.AUTO: NEGATIVE
OSMOLALITY SERPL CALC.SUM OF ELEC: 288 MOSM/KG (ref 275–295)
PH UR STRIP.AUTO: 5 [PH] (ref 5–8)
PHOSPHATE SERPL-MCNC: 2.2 MG/DL (ref 2.5–4.9)
PLATELET # BLD AUTO: 159 10(3)UL (ref 150–450)
POTASSIUM SERPL-SCNC: 4.1 MMOL/L (ref 3.5–5.1)
PROT SERPL-MCNC: 6 G/DL (ref 6.4–8.2)
PROT UR STRIP.AUTO-MCNC: NEGATIVE MG/DL
PROT UR-MCNC: 34.3 MG/DL
PTH-INTACT SERPL-MCNC: 86.6 PG/ML (ref 18.5–88)
RBC # BLD AUTO: 3.57 X10(6)UL
RBC UR QL AUTO: NEGATIVE
SODIUM SERPL-SCNC: 136 MMOL/L (ref 136–145)
SP GR UR STRIP.AUTO: 1.02 (ref 1–1.03)
UROBILINOGEN UR STRIP.AUTO-MCNC: <2 MG/DL
VIT D+METAB SERPL-MCNC: 30.7 NG/ML (ref 30–100)
WBC # BLD AUTO: 7.4 X10(3) UL (ref 4–11)

## 2023-02-06 PROCEDURE — 80053 COMPREHEN METABOLIC PANEL: CPT

## 2023-02-06 PROCEDURE — 81001 URINALYSIS AUTO W/SCOPE: CPT

## 2023-02-06 PROCEDURE — 84100 ASSAY OF PHOSPHORUS: CPT

## 2023-02-06 PROCEDURE — 83970 ASSAY OF PARATHORMONE: CPT

## 2023-02-06 PROCEDURE — 82570 ASSAY OF URINE CREATININE: CPT

## 2023-02-06 PROCEDURE — 85025 COMPLETE CBC W/AUTO DIFF WBC: CPT

## 2023-02-06 PROCEDURE — 83735 ASSAY OF MAGNESIUM: CPT

## 2023-02-06 PROCEDURE — 82306 VITAMIN D 25 HYDROXY: CPT

## 2023-02-06 PROCEDURE — 84156 ASSAY OF PROTEIN URINE: CPT

## 2023-02-06 PROCEDURE — 36415 COLL VENOUS BLD VENIPUNCTURE: CPT

## 2023-02-09 ENCOUNTER — LAB ENCOUNTER (OUTPATIENT)
Dept: LAB | Age: 73
End: 2023-02-09
Attending: INTERNAL MEDICINE
Payer: MEDICARE

## 2023-02-09 DIAGNOSIS — C90.00 MULTIPLE MYELOMA, REMISSION STATUS UNSPECIFIED (HCC): ICD-10-CM

## 2023-02-09 LAB
M PROTEIN 24H UR ELPH-MRATE: 564 MG/24 HR (ref ?–149.1)
SPECIMEN VOL UR: 3000 ML

## 2023-02-09 PROCEDURE — 84166 PROTEIN E-PHORESIS/URINE/CSF: CPT

## 2023-02-09 PROCEDURE — 84156 ASSAY OF PROTEIN URINE: CPT

## 2023-02-09 PROCEDURE — 86335 IMMUNFIX E-PHORSIS/URINE/CSF: CPT

## 2023-02-17 ENCOUNTER — OFFICE VISIT (OUTPATIENT)
Dept: HEMATOLOGY/ONCOLOGY | Age: 73
End: 2023-02-17
Attending: INTERNAL MEDICINE
Payer: MEDICARE

## 2023-02-17 VITALS — DIASTOLIC BLOOD PRESSURE: 60 MMHG | HEART RATE: 51 BPM | SYSTOLIC BLOOD PRESSURE: 91 MMHG | RESPIRATION RATE: 16 BRPM

## 2023-02-17 VITALS
HEART RATE: 51 BPM | OXYGEN SATURATION: 100 % | WEIGHT: 248.5 LBS | BODY MASS INDEX: 33.66 KG/M2 | TEMPERATURE: 96 F | SYSTOLIC BLOOD PRESSURE: 105 MMHG | DIASTOLIC BLOOD PRESSURE: 73 MMHG | HEIGHT: 72.01 IN

## 2023-02-17 DIAGNOSIS — C90.00 MULTIPLE MYELOMA, REMISSION STATUS UNSPECIFIED (HCC): ICD-10-CM

## 2023-02-17 DIAGNOSIS — R79.89 ELEVATED LFTS: ICD-10-CM

## 2023-02-17 DIAGNOSIS — C90.00 MULTIPLE MYELOMA NOT HAVING ACHIEVED REMISSION (HCC): ICD-10-CM

## 2023-02-17 DIAGNOSIS — C90.00 MULTIPLE MYELOMA NOT HAVING ACHIEVED REMISSION (HCC): Primary | ICD-10-CM

## 2023-02-17 DIAGNOSIS — R79.89 ELEVATED LFTS: Primary | ICD-10-CM

## 2023-02-17 LAB
ALBUMIN SERPL-MCNC: 3.1 G/DL (ref 3.4–5)
ALBUMIN/GLOB SERPL: 0.9 {RATIO} (ref 1–2)
ALP LIVER SERPL-CCNC: 98 U/L
ALT SERPL-CCNC: 15 U/L
ANION GAP SERPL CALC-SCNC: 7 MMOL/L (ref 0–18)
AST SERPL-CCNC: 10 U/L (ref 15–37)
BASOPHILS # BLD AUTO: 0.02 X10(3) UL (ref 0–0.2)
BASOPHILS NFR BLD AUTO: 0.1 %
BILIRUB SERPL-MCNC: 0.8 MG/DL (ref 0.1–2)
BUN BLD-MCNC: 26 MG/DL (ref 7–18)
CALCIUM BLD-MCNC: 9.2 MG/DL (ref 8.5–10.1)
CHLORIDE SERPL-SCNC: 105 MMOL/L (ref 98–112)
CO2 SERPL-SCNC: 23 MMOL/L (ref 21–32)
CREAT BLD-MCNC: 1.37 MG/DL
EOSINOPHIL # BLD AUTO: 0.01 X10(3) UL (ref 0–0.7)
EOSINOPHIL NFR BLD AUTO: 0.1 %
ERYTHROCYTE [DISTWIDTH] IN BLOOD BY AUTOMATED COUNT: 13.4 %
FASTING STATUS PATIENT QL REPORTED: NO
GFR SERPLBLD BASED ON 1.73 SQ M-ARVRAT: 54 ML/MIN/1.73M2 (ref 60–?)
GLOBULIN PLAS-MCNC: 3.5 G/DL (ref 2.8–4.4)
GLUCOSE BLD-MCNC: 190 MG/DL (ref 70–99)
HCT VFR BLD AUTO: 35.4 %
HGB BLD-MCNC: 11.3 G/DL
IGA SERPL-MCNC: <7.83 MG/DL (ref 70–312)
IGM SERPL-MCNC: <5.3 MG/DL (ref 43–279)
IMM GRANULOCYTES # BLD AUTO: 0.06 X10(3) UL (ref 0–1)
IMM GRANULOCYTES NFR BLD: 0.4 %
IMMUNOGLOBULIN PNL SER-MCNC: 190 MG/DL (ref 791–1643)
LYMPHOCYTES # BLD AUTO: 0.21 X10(3) UL (ref 1–4)
LYMPHOCYTES NFR BLD AUTO: 1.4 %
MCH RBC QN AUTO: 32.9 PG (ref 26–34)
MCHC RBC AUTO-ENTMCNC: 31.9 G/DL (ref 31–37)
MCV RBC AUTO: 103.2 FL
MONOCYTES # BLD AUTO: 0.11 X10(3) UL (ref 0.1–1)
MONOCYTES NFR BLD AUTO: 0.7 %
NEUTROPHILS # BLD AUTO: 15.03 X10 (3) UL (ref 1.5–7.7)
NEUTROPHILS # BLD AUTO: 15.03 X10(3) UL (ref 1.5–7.7)
NEUTROPHILS NFR BLD AUTO: 97.3 %
OSMOLALITY SERPL CALC.SUM OF ELEC: 290 MOSM/KG (ref 275–295)
PLATELET # BLD AUTO: 344 10(3)UL (ref 150–450)
POTASSIUM SERPL-SCNC: 4.1 MMOL/L (ref 3.5–5.1)
PROT SERPL-MCNC: 6.6 G/DL (ref 6.4–8.2)
RBC # BLD AUTO: 3.43 X10(6)UL
SODIUM SERPL-SCNC: 135 MMOL/L (ref 136–145)
WBC # BLD AUTO: 15.4 X10(3) UL (ref 4–11)

## 2023-02-17 PROCEDURE — 99214 OFFICE O/P EST MOD 30 MIN: CPT | Performed by: INTERNAL MEDICINE

## 2023-02-17 PROCEDURE — 96413 CHEMO IV INFUSION 1 HR: CPT

## 2023-02-17 PROCEDURE — 96401 CHEMO ANTI-NEOPL SQ/IM: CPT

## 2023-02-17 RX ORDER — ACETAMINOPHEN 325 MG/1
650 TABLET ORAL ONCE
Status: CANCELLED | OUTPATIENT
Start: 2023-02-17

## 2023-02-17 RX ORDER — DIPHENHYDRAMINE HYDROCHLORIDE 50 MG/ML
25 INJECTION INTRAMUSCULAR; INTRAVENOUS ONCE
Status: CANCELLED | OUTPATIENT
Start: 2023-02-17

## 2023-02-17 NOTE — PROGRESS NOTES
Patient is here for follow up for myeloma on C28 of kyprolis and darzalex. He has a bad cold but denies cough or fever. He got this about a week ago. He always feels cold but denies any neuropathy symptoms. He denies diarrhea or constipation. He took his premeds at 930 this morning.      Education Record    Learner:  Patient    Disease / Diagnosis: myeloma    Barriers / Limitations:  None   Comments:    Method:  Discussion   Comments:    General Topics:  Side effects and symptom management   Comments:    Outcome:  Shows understanding   Comments:

## 2023-02-17 NOTE — PROGRESS NOTES
Pt here for C28D1. Pt seen by MD today. Arrives Ambulating independently, accompanied by Self           Pregnancy screening: Not applicable    Modifications in dose or schedule: No    Drugs/infusions dual verified for appearance and physical integrity. IV pump settings were dual verified: yes     Frequency of blood return and site check throughout administration: Prior to administration and At completion of therapy   Discharged to Home, Ambulating independently, accompanied by:Self    Outpatient Oncology Care Plan  Problem list:  knowledge deficit  Problems related to:  chemotherapy  disease/disease progression  side effect of treatment  therapeutic effects  Interventions:  provided general teaching  Expected outcomes:  understands plan of care  Progress towards outcome:  making progress    Education Record    Learner:  Patient  Barriers / Limitations:  None  Method:  Brief focused and Printed material  Outcome:  Shows understanding  Comments:  Pt tolerated infusion. Pt observed 30min post fastpro. Pt dc home ambulatory in stable condition, no new complaints. AVs provided.

## 2023-02-20 ENCOUNTER — PATIENT OUTREACH (OUTPATIENT)
Dept: CASE MANAGEMENT | Age: 73
End: 2023-02-20

## 2023-02-21 ENCOUNTER — OFFICE VISIT (OUTPATIENT)
Dept: NEPHROLOGY | Facility: CLINIC | Age: 73
End: 2023-02-21
Payer: MEDICARE

## 2023-02-21 VITALS — BODY MASS INDEX: 33 KG/M2 | SYSTOLIC BLOOD PRESSURE: 96 MMHG | WEIGHT: 246 LBS | DIASTOLIC BLOOD PRESSURE: 60 MMHG

## 2023-02-21 DIAGNOSIS — N08 MYELOMA KIDNEY (HCC): Primary | ICD-10-CM

## 2023-02-21 DIAGNOSIS — C90.00 MYELOMA KIDNEY (HCC): Primary | ICD-10-CM

## 2023-02-21 PROCEDURE — 99213 OFFICE O/P EST LOW 20 MIN: CPT | Performed by: INTERNAL MEDICINE

## 2023-02-23 LAB
ALBUMIN SERPL ELPH-MCNC: 3.49 G/DL (ref 3.75–5.21)
ALBUMIN/GLOB SERPL: 1.39 {RATIO} (ref 1–2)
ALPHA1 GLOB SERPL ELPH-MCNC: 0.44 G/DL (ref 0.19–0.46)
ALPHA2 GLOB SERPL ELPH-MCNC: 1.08 G/DL (ref 0.48–1.05)
B-GLOBULIN SERPL ELPH-MCNC: 0.76 G/DL (ref 0.68–1.23)
GAMMA GLOB SERPL ELPH-MCNC: 0.23 G/DL (ref 0.62–1.7)
KAPPA LC FREE SER-MCNC: 1.07 MG/DL (ref 0.33–1.94)
KAPPA LC FREE/LAMBDA FREE SER NEPH: 4.33 {RATIO} (ref 0.26–1.65)
LAMBDA LC FREE SERPL-MCNC: 0.25 MG/DL (ref 0.57–2.63)
PROT SERPL-MCNC: 6 G/DL (ref 6.4–8.2)

## 2023-02-24 ENCOUNTER — APPOINTMENT (OUTPATIENT)
Dept: HEMATOLOGY/ONCOLOGY | Age: 73
End: 2023-02-24
Attending: INTERNAL MEDICINE
Payer: MEDICARE

## 2023-02-24 ENCOUNTER — OFFICE VISIT (OUTPATIENT)
Dept: HEMATOLOGY/ONCOLOGY | Age: 73
End: 2023-02-24
Attending: INTERNAL MEDICINE
Payer: MEDICARE

## 2023-02-24 VITALS
WEIGHT: 246.19 LBS | RESPIRATION RATE: 16 BRPM | TEMPERATURE: 97 F | DIASTOLIC BLOOD PRESSURE: 70 MMHG | SYSTOLIC BLOOD PRESSURE: 106 MMHG | HEIGHT: 72.01 IN | BODY MASS INDEX: 33.35 KG/M2 | HEART RATE: 56 BPM

## 2023-02-24 DIAGNOSIS — R79.89 ELEVATED LFTS: Primary | ICD-10-CM

## 2023-02-24 DIAGNOSIS — C90.00 MULTIPLE MYELOMA NOT HAVING ACHIEVED REMISSION (HCC): ICD-10-CM

## 2023-02-24 LAB
BASOPHILS # BLD AUTO: 0.02 X10(3) UL (ref 0–0.2)
BASOPHILS NFR BLD AUTO: 0.1 %
EOSINOPHIL # BLD AUTO: 0 X10(3) UL (ref 0–0.7)
EOSINOPHIL NFR BLD AUTO: 0 %
ERYTHROCYTE [DISTWIDTH] IN BLOOD BY AUTOMATED COUNT: 14 %
HCT VFR BLD AUTO: 38.7 %
HGB BLD-MCNC: 12.5 G/DL
IMM GRANULOCYTES # BLD AUTO: 0.07 X10(3) UL (ref 0–1)
IMM GRANULOCYTES NFR BLD: 0.5 %
LYMPHOCYTES # BLD AUTO: 0.2 X10(3) UL (ref 1–4)
LYMPHOCYTES NFR BLD AUTO: 1.4 %
MCH RBC QN AUTO: 33.2 PG (ref 26–34)
MCHC RBC AUTO-ENTMCNC: 32.3 G/DL (ref 31–37)
MCV RBC AUTO: 102.7 FL
MONOCYTES # BLD AUTO: 0.09 X10(3) UL (ref 0.1–1)
MONOCYTES NFR BLD AUTO: 0.6 %
NEUTROPHILS # BLD AUTO: 14.37 X10 (3) UL (ref 1.5–7.7)
NEUTROPHILS # BLD AUTO: 14.37 X10(3) UL (ref 1.5–7.7)
NEUTROPHILS NFR BLD AUTO: 97.4 %
PLATELET # BLD AUTO: 198 10(3)UL (ref 150–450)
RBC # BLD AUTO: 3.77 X10(6)UL
WBC # BLD AUTO: 14.8 X10(3) UL (ref 4–11)

## 2023-02-24 PROCEDURE — 36415 COLL VENOUS BLD VENIPUNCTURE: CPT

## 2023-02-24 PROCEDURE — 85025 COMPLETE CBC W/AUTO DIFF WBC: CPT

## 2023-02-24 PROCEDURE — 96413 CHEMO IV INFUSION 1 HR: CPT

## 2023-02-24 NOTE — PROGRESS NOTES
Education Record    Learner:  Patient    Disease / Diagnosis:pt here for d8c28    Barriers / Limitations:  None    Method:  Brief focused, printed material and  reinforcement    General Topics:  Plan of care reviewed    Outcome:pt tolerated treatment with no c/o

## 2023-03-02 ENCOUNTER — APPOINTMENT (OUTPATIENT)
Dept: HEMATOLOGY/ONCOLOGY | Age: 73
End: 2023-03-02
Attending: INTERNAL MEDICINE
Payer: MEDICARE

## 2023-03-03 ENCOUNTER — OFFICE VISIT (OUTPATIENT)
Dept: HEMATOLOGY/ONCOLOGY | Age: 73
End: 2023-03-03
Attending: INTERNAL MEDICINE
Payer: MEDICARE

## 2023-03-03 VITALS
RESPIRATION RATE: 18 BRPM | DIASTOLIC BLOOD PRESSURE: 66 MMHG | SYSTOLIC BLOOD PRESSURE: 101 MMHG | BODY MASS INDEX: 33.39 KG/M2 | OXYGEN SATURATION: 98 % | WEIGHT: 246.5 LBS | HEIGHT: 72.01 IN | HEART RATE: 57 BPM | TEMPERATURE: 97 F

## 2023-03-03 DIAGNOSIS — R79.89 ELEVATED LFTS: ICD-10-CM

## 2023-03-03 DIAGNOSIS — C90.00 MULTIPLE MYELOMA, REMISSION STATUS UNSPECIFIED (HCC): Primary | ICD-10-CM

## 2023-03-03 DIAGNOSIS — C90.00 MULTIPLE MYELOMA NOT HAVING ACHIEVED REMISSION (HCC): ICD-10-CM

## 2023-03-03 LAB
ALBUMIN SERPL-MCNC: 3.5 G/DL (ref 3.4–5)
BASOPHILS # BLD AUTO: 0.01 X10(3) UL (ref 0–0.2)
BASOPHILS NFR BLD AUTO: 0.1 %
CALCIUM BLD-MCNC: 8.9 MG/DL (ref 8.5–10.1)
CREAT BLD-MCNC: 1.31 MG/DL
EOSINOPHIL # BLD AUTO: 0 X10(3) UL (ref 0–0.7)
EOSINOPHIL NFR BLD AUTO: 0 %
ERYTHROCYTE [DISTWIDTH] IN BLOOD BY AUTOMATED COUNT: 14.6 %
GFR SERPLBLD BASED ON 1.73 SQ M-ARVRAT: 57 ML/MIN/1.73M2 (ref 60–?)
HCT VFR BLD AUTO: 37.2 %
HGB BLD-MCNC: 12.2 G/DL
IMM GRANULOCYTES # BLD AUTO: 0.05 X10(3) UL (ref 0–1)
IMM GRANULOCYTES NFR BLD: 0.4 %
LYMPHOCYTES # BLD AUTO: 0.16 X10(3) UL (ref 1–4)
LYMPHOCYTES NFR BLD AUTO: 1.2 %
MCH RBC QN AUTO: 33.8 PG (ref 26–34)
MCHC RBC AUTO-ENTMCNC: 32.8 G/DL (ref 31–37)
MCV RBC AUTO: 103 FL
MONOCYTES # BLD AUTO: 0.1 X10(3) UL (ref 0.1–1)
MONOCYTES NFR BLD AUTO: 0.7 %
NEUTROPHILS # BLD AUTO: 13.04 X10 (3) UL (ref 1.5–7.7)
NEUTROPHILS # BLD AUTO: 13.04 X10(3) UL (ref 1.5–7.7)
NEUTROPHILS NFR BLD AUTO: 97.6 %
PLATELET # BLD AUTO: 189 10(3)UL (ref 150–450)
RBC # BLD AUTO: 3.61 X10(6)UL
WBC # BLD AUTO: 13.4 X10(3) UL (ref 4–11)

## 2023-03-03 PROCEDURE — 96375 TX/PRO/DX INJ NEW DRUG ADDON: CPT

## 2023-03-03 PROCEDURE — 36415 COLL VENOUS BLD VENIPUNCTURE: CPT

## 2023-03-03 PROCEDURE — 82310 ASSAY OF CALCIUM: CPT

## 2023-03-03 PROCEDURE — 82565 ASSAY OF CREATININE: CPT

## 2023-03-03 PROCEDURE — 82040 ASSAY OF SERUM ALBUMIN: CPT

## 2023-03-03 PROCEDURE — 96413 CHEMO IV INFUSION 1 HR: CPT

## 2023-03-03 PROCEDURE — 85025 COMPLETE CBC W/AUTO DIFF WBC: CPT

## 2023-03-03 NOTE — PROGRESS NOTES
Pt here for C28D8. Arrives Ambulating independently, accompanied by Self           Pregnancy screening: Not applicable    Modifications in dose or schedule: No    Drugs/infusions dual verified for appearance and physical integrity. IV pump settings were dual verified: yes     Frequency of blood return and site check throughout administration: Prior to administration, Prior to each drug and At completion of therapy   Discharged to Home, Ambulating independently, accompanied by:Self    Outpatient Oncology Care Plan  Problem list:  fatigue  Problems related to:  chemotherapy  disease/disease progression  Interventions:  encourage activity as tolerated  promoted rest  Expected outcomes:  understands plan of care  Progress towards outcome:  making progress    Education Record    Learner:  Patient  Barriers / Limitations:  None  Method:  Reinforcement  Outcome:  Shows understanding  Comments:  Patient tolerated treatment, no c/o. Discharged home in stable condition.

## 2023-03-09 ENCOUNTER — OFFICE VISIT (OUTPATIENT)
Dept: FAMILY MEDICINE CLINIC | Facility: CLINIC | Age: 73
End: 2023-03-09
Payer: MEDICARE

## 2023-03-09 VITALS
WEIGHT: 248 LBS | HEIGHT: 72 IN | TEMPERATURE: 97 F | DIASTOLIC BLOOD PRESSURE: 52 MMHG | HEART RATE: 60 BPM | OXYGEN SATURATION: 95 % | BODY MASS INDEX: 33.59 KG/M2 | RESPIRATION RATE: 16 BRPM | SYSTOLIC BLOOD PRESSURE: 104 MMHG

## 2023-03-09 DIAGNOSIS — Z94.84 HX OF STEM CELL TRANSPLANT (HCC): ICD-10-CM

## 2023-03-09 DIAGNOSIS — I42.9 CARDIOMYOPATHY, UNSPECIFIED TYPE (HCC): ICD-10-CM

## 2023-03-09 DIAGNOSIS — D69.6 PLATELETS DECREASED (HCC): ICD-10-CM

## 2023-03-09 DIAGNOSIS — I10 DIABETES MELLITUS WITH COINCIDENT HYPERTENSION (HCC): Primary | ICD-10-CM

## 2023-03-09 DIAGNOSIS — E11.9 DIABETES MELLITUS WITH COINCIDENT HYPERTENSION (HCC): Primary | ICD-10-CM

## 2023-03-09 PROCEDURE — 99214 OFFICE O/P EST MOD 30 MIN: CPT | Performed by: FAMILY MEDICINE

## 2023-03-10 ENCOUNTER — MED REC SCAN ONLY (OUTPATIENT)
Dept: FAMILY MEDICINE CLINIC | Facility: CLINIC | Age: 73
End: 2023-03-10

## 2023-03-10 PROBLEM — D69.6 PLATELETS DECREASED: Status: RESOLVED | Noted: 2022-05-26 | Resolved: 2023-03-10

## 2023-03-10 PROBLEM — D69.6 PLATELETS DECREASED (HCC): Status: RESOLVED | Noted: 2022-05-26 | Resolved: 2023-03-10

## 2023-03-17 ENCOUNTER — OFFICE VISIT (OUTPATIENT)
Dept: HEMATOLOGY/ONCOLOGY | Age: 73
End: 2023-03-17
Attending: INTERNAL MEDICINE
Payer: MEDICARE

## 2023-03-17 VITALS
HEART RATE: 50 BPM | RESPIRATION RATE: 16 BRPM | DIASTOLIC BLOOD PRESSURE: 47 MMHG | SYSTOLIC BLOOD PRESSURE: 81 MMHG | OXYGEN SATURATION: 96 %

## 2023-03-17 VITALS
TEMPERATURE: 97 F | SYSTOLIC BLOOD PRESSURE: 107 MMHG | OXYGEN SATURATION: 97 % | WEIGHT: 251.69 LBS | RESPIRATION RATE: 20 BRPM | HEIGHT: 72.01 IN | BODY MASS INDEX: 34.09 KG/M2 | HEART RATE: 56 BPM | DIASTOLIC BLOOD PRESSURE: 70 MMHG

## 2023-03-17 DIAGNOSIS — E11.9 DIABETES MELLITUS WITH COINCIDENT HYPERTENSION (HCC): ICD-10-CM

## 2023-03-17 DIAGNOSIS — C90.00 MULTIPLE MYELOMA NOT HAVING ACHIEVED REMISSION (HCC): Primary | ICD-10-CM

## 2023-03-17 DIAGNOSIS — I10 DIABETES MELLITUS WITH COINCIDENT HYPERTENSION (HCC): ICD-10-CM

## 2023-03-17 DIAGNOSIS — C90.00 MULTIPLE MYELOMA NOT HAVING ACHIEVED REMISSION (HCC): ICD-10-CM

## 2023-03-17 DIAGNOSIS — R79.89 ELEVATED LFTS: Primary | ICD-10-CM

## 2023-03-17 LAB
ALBUMIN SERPL-MCNC: 3.6 G/DL (ref 3.4–5)
ALBUMIN/GLOB SERPL: 1.2 {RATIO} (ref 1–2)
ALP LIVER SERPL-CCNC: 90 U/L
ALT SERPL-CCNC: 18 U/L
ANION GAP SERPL CALC-SCNC: 8 MMOL/L (ref 0–18)
AST SERPL-CCNC: 11 U/L (ref 15–37)
BASOPHILS # BLD AUTO: 0.03 X10(3) UL (ref 0–0.2)
BASOPHILS NFR BLD AUTO: 0.2 %
BILIRUB SERPL-MCNC: 0.6 MG/DL (ref 0.1–2)
BUN BLD-MCNC: 32 MG/DL (ref 7–18)
CALCIUM BLD-MCNC: 9.4 MG/DL (ref 8.5–10.1)
CHLORIDE SERPL-SCNC: 109 MMOL/L (ref 98–112)
CO2 SERPL-SCNC: 21 MMOL/L (ref 21–32)
CREAT BLD-MCNC: 1.38 MG/DL
EOSINOPHIL # BLD AUTO: 0.01 X10(3) UL (ref 0–0.7)
EOSINOPHIL NFR BLD AUTO: 0.1 %
ERYTHROCYTE [DISTWIDTH] IN BLOOD BY AUTOMATED COUNT: 14.8 %
FASTING STATUS PATIENT QL REPORTED: NO
GFR SERPLBLD BASED ON 1.73 SQ M-ARVRAT: 54 ML/MIN/1.73M2 (ref 60–?)
GLOBULIN PLAS-MCNC: 3 G/DL (ref 2.8–4.4)
GLUCOSE BLD-MCNC: 147 MG/DL (ref 70–99)
HCT VFR BLD AUTO: 38.4 %
HGB BLD-MCNC: 12.5 G/DL
IGA SERPL-MCNC: <7.83 MG/DL (ref 70–312)
IGM SERPL-MCNC: <5.3 MG/DL (ref 43–279)
IMM GRANULOCYTES # BLD AUTO: 0.04 X10(3) UL (ref 0–1)
IMM GRANULOCYTES NFR BLD: 0.3 %
IMMUNOGLOBULIN PNL SER-MCNC: 206 MG/DL (ref 791–1643)
LYMPHOCYTES # BLD AUTO: 0.22 X10(3) UL (ref 1–4)
LYMPHOCYTES NFR BLD AUTO: 1.7 %
MCH RBC QN AUTO: 34.4 PG (ref 26–34)
MCHC RBC AUTO-ENTMCNC: 32.6 G/DL (ref 31–37)
MCV RBC AUTO: 105.8 FL
MONOCYTES # BLD AUTO: 0.1 X10(3) UL (ref 0.1–1)
MONOCYTES NFR BLD AUTO: 0.8 %
NEUTROPHILS # BLD AUTO: 12.25 X10 (3) UL (ref 1.5–7.7)
NEUTROPHILS # BLD AUTO: 12.25 X10(3) UL (ref 1.5–7.7)
NEUTROPHILS NFR BLD AUTO: 96.9 %
OSMOLALITY SERPL CALC.SUM OF ELEC: 296 MOSM/KG (ref 275–295)
PLATELET # BLD AUTO: 339 10(3)UL (ref 150–450)
POTASSIUM SERPL-SCNC: 4.2 MMOL/L (ref 3.5–5.1)
PROT SERPL-MCNC: 6.6 G/DL (ref 6.4–8.2)
RBC # BLD AUTO: 3.63 X10(6)UL
SODIUM SERPL-SCNC: 138 MMOL/L (ref 136–145)
WBC # BLD AUTO: 12.7 X10(3) UL (ref 4–11)

## 2023-03-17 PROCEDURE — 96401 CHEMO ANTI-NEOPL SQ/IM: CPT

## 2023-03-17 PROCEDURE — 99214 OFFICE O/P EST MOD 30 MIN: CPT | Performed by: INTERNAL MEDICINE

## 2023-03-17 PROCEDURE — 96413 CHEMO IV INFUSION 1 HR: CPT

## 2023-03-17 RX ORDER — ACETAMINOPHEN 325 MG/1
650 TABLET ORAL ONCE
Status: CANCELLED | OUTPATIENT
Start: 2023-03-17

## 2023-03-17 RX ORDER — DIPHENHYDRAMINE HYDROCHLORIDE 50 MG/ML
25 INJECTION INTRAMUSCULAR; INTRAVENOUS ONCE
Status: CANCELLED | OUTPATIENT
Start: 2023-03-17

## 2023-03-17 NOTE — PROGRESS NOTES
Pt here for C29D1. Arrives Ambulating independently, accompanied by Self           Pregnancy screening: Not applicable    Modifications in dose or schedule: No    Drugs/infusions dual verified for appearance and physical integrity. IV pump settings were dual verified: yes     Frequency of blood return and site check throughout administration: Prior to administration and At completion of therapy   Discharged to Home, Ambulating independently, accompanied by:Self      Education Record    Learner:  Patient  Barriers / Limitations:  None  Method:  Discussion  Outcome:  Shows understanding  Comments: patient ambulatory. No complaints. Took his premeds at 9:15 this morning. Tolerated injection and infusion. Monitored for 30 min post. BP lower. Patient was sleeping during wait time. Denies dizziness. States he feels good. AVS reviewed. Discharged in stable condition.

## 2023-03-17 NOTE — PROGRESS NOTES
Patient is here for follow up for myeloma. He is feeling well. He has no new pain. He is eating well. He denies any fever, cough or shortness of breath. He denies any diarrhea or constipation.    Premeds of tylenol and benadryl were taken by patient at 915am.    Education Record    Learner:  Patient    Disease / Diagnosis: myeloma    Barriers / Limitations:  None   Comments:    Method:  Discussion   Comments:    General Topics:  Side effects and symptom management   Comments:    Outcome:  Shows understanding   Comments:

## 2023-03-20 ENCOUNTER — PATIENT OUTREACH (OUTPATIENT)
Dept: CASE MANAGEMENT | Age: 73
End: 2023-03-20

## 2023-03-21 LAB
ALBUMIN SERPL ELPH-MCNC: 3.99 G/DL (ref 3.75–5.21)
ALBUMIN/GLOB SERPL: 1.72 {RATIO} (ref 1–2)
ALPHA1 GLOB SERPL ELPH-MCNC: 0.37 G/DL (ref 0.19–0.46)
ALPHA2 GLOB SERPL ELPH-MCNC: 1.03 G/DL (ref 0.48–1.05)
B-GLOBULIN SERPL ELPH-MCNC: 0.7 G/DL (ref 0.68–1.23)
GAMMA GLOB SERPL ELPH-MCNC: 0.22 G/DL (ref 0.62–1.7)
KAPPA LC FREE SER-MCNC: 1.64 MG/DL (ref 0.33–1.94)
LAMBDA LC FREE SERPL-MCNC: <0.143 MG/DL (ref 0.57–2.63)
M PROTEIN 1 SERPL ELPH-MCNC: 0.06 G/DL (ref ?–0)
PROT SERPL-MCNC: 6.3 G/DL (ref 6.4–8.2)

## 2023-03-24 ENCOUNTER — OFFICE VISIT (OUTPATIENT)
Dept: HEMATOLOGY/ONCOLOGY | Age: 73
End: 2023-03-24
Attending: INTERNAL MEDICINE
Payer: MEDICARE

## 2023-03-24 VITALS
DIASTOLIC BLOOD PRESSURE: 72 MMHG | RESPIRATION RATE: 20 BRPM | TEMPERATURE: 97 F | SYSTOLIC BLOOD PRESSURE: 114 MMHG | BODY MASS INDEX: 33.93 KG/M2 | WEIGHT: 250.5 LBS | HEART RATE: 62 BPM | HEIGHT: 72.01 IN

## 2023-03-24 DIAGNOSIS — C90.00 MULTIPLE MYELOMA NOT HAVING ACHIEVED REMISSION (HCC): ICD-10-CM

## 2023-03-24 DIAGNOSIS — R79.89 ELEVATED LFTS: Primary | ICD-10-CM

## 2023-03-24 LAB
BASOPHILS # BLD AUTO: 0.02 X10(3) UL (ref 0–0.2)
BASOPHILS NFR BLD AUTO: 0.2 %
EOSINOPHIL # BLD AUTO: 0 X10(3) UL (ref 0–0.7)
EOSINOPHIL NFR BLD AUTO: 0 %
ERYTHROCYTE [DISTWIDTH] IN BLOOD BY AUTOMATED COUNT: 14.8 %
HCT VFR BLD AUTO: 40.6 %
HGB BLD-MCNC: 13.2 G/DL
IMM GRANULOCYTES # BLD AUTO: 0.05 X10(3) UL (ref 0–1)
IMM GRANULOCYTES NFR BLD: 0.4 %
LYMPHOCYTES # BLD AUTO: 0.17 X10(3) UL (ref 1–4)
LYMPHOCYTES NFR BLD AUTO: 1.3 %
MCH RBC QN AUTO: 34 PG (ref 26–34)
MCHC RBC AUTO-ENTMCNC: 32.5 G/DL (ref 31–37)
MCV RBC AUTO: 104.6 FL
MONOCYTES # BLD AUTO: 0.08 X10(3) UL (ref 0.1–1)
MONOCYTES NFR BLD AUTO: 0.6 %
NEUTROPHILS # BLD AUTO: 12.55 X10 (3) UL (ref 1.5–7.7)
NEUTROPHILS # BLD AUTO: 12.55 X10(3) UL (ref 1.5–7.7)
NEUTROPHILS NFR BLD AUTO: 97.5 %
PLATELET # BLD AUTO: 156 10(3)UL (ref 150–450)
RBC # BLD AUTO: 3.88 X10(6)UL
WBC # BLD AUTO: 12.9 X10(3) UL (ref 4–11)

## 2023-03-24 PROCEDURE — 85025 COMPLETE CBC W/AUTO DIFF WBC: CPT

## 2023-03-24 PROCEDURE — 36415 COLL VENOUS BLD VENIPUNCTURE: CPT

## 2023-03-24 PROCEDURE — 96413 CHEMO IV INFUSION 1 HR: CPT

## 2023-03-24 NOTE — PROGRESS NOTES
Pt here for C29D8 kyprolis. Arrives Ambulating independently, accompanied by Self           Pregnancy screening: Not applicable    Modifications in dose or schedule: No    Drugs/infusions dual verified for appearance and physical integrity. IV pump settings were dual verified: yes     Frequency of blood return and site check throughout administration: Prior to administration and At completion of therapy   Discharged to Home, Ambulating independently, accompanied by:Self    Outpatient Oncology Care Plan  Problem list:  fatigue  Problems related to:  chemotherapy  side effect of treatment  Interventions:  emotional support given  maintain safe environment  patient/family supported  encourage activity as tolerated  monitor lab values  promoted rest  Expected outcomes:  adequate sleep/rest  optimal lab values  patient supported/coping well  safe in environment  understands plan of care  Progress towards outcome:  making progress    Education Record    Learner:  Patient  Barriers / Limitations:  None  Method:  Discussion  Outcome:  Shows understanding  Comments:    Kyprolis infused per MD order without incident. Tolerated well. Printed AVS given. Discharged home in stable condition, no new complaints.

## 2023-03-31 ENCOUNTER — OFFICE VISIT (OUTPATIENT)
Dept: HEMATOLOGY/ONCOLOGY | Age: 73
End: 2023-03-31
Attending: INTERNAL MEDICINE
Payer: MEDICARE

## 2023-03-31 VITALS
HEART RATE: 60 BPM | TEMPERATURE: 98 F | DIASTOLIC BLOOD PRESSURE: 65 MMHG | HEIGHT: 72.01 IN | OXYGEN SATURATION: 97 % | BODY MASS INDEX: 34.13 KG/M2 | SYSTOLIC BLOOD PRESSURE: 103 MMHG | RESPIRATION RATE: 18 BRPM | WEIGHT: 252 LBS

## 2023-03-31 DIAGNOSIS — C90.00 MULTIPLE MYELOMA, REMISSION STATUS UNSPECIFIED (HCC): ICD-10-CM

## 2023-03-31 DIAGNOSIS — C90.00 MULTIPLE MYELOMA NOT HAVING ACHIEVED REMISSION (HCC): ICD-10-CM

## 2023-03-31 DIAGNOSIS — R79.89 ELEVATED LFTS: Primary | ICD-10-CM

## 2023-03-31 LAB
ALBUMIN SERPL-MCNC: 3.6 G/DL (ref 3.4–5)
BASOPHILS # BLD AUTO: 0.01 X10(3) UL (ref 0–0.2)
BASOPHILS NFR BLD AUTO: 0.1 %
CALCIUM BLD-MCNC: 9.3 MG/DL (ref 8.5–10.1)
CREAT BLD-MCNC: 1.55 MG/DL
EOSINOPHIL # BLD AUTO: 0.01 X10(3) UL (ref 0–0.7)
EOSINOPHIL NFR BLD AUTO: 0.1 %
ERYTHROCYTE [DISTWIDTH] IN BLOOD BY AUTOMATED COUNT: 14.8 %
GFR SERPLBLD BASED ON 1.73 SQ M-ARVRAT: 47 ML/MIN/1.73M2 (ref 60–?)
HCT VFR BLD AUTO: 39.2 %
HGB BLD-MCNC: 12.9 G/DL
IMM GRANULOCYTES # BLD AUTO: 0.03 X10(3) UL (ref 0–1)
IMM GRANULOCYTES NFR BLD: 0.3 %
LYMPHOCYTES # BLD AUTO: 0.11 X10(3) UL (ref 1–4)
LYMPHOCYTES NFR BLD AUTO: 1.1 %
MCH RBC QN AUTO: 34.1 PG (ref 26–34)
MCHC RBC AUTO-ENTMCNC: 32.9 G/DL (ref 31–37)
MCV RBC AUTO: 103.7 FL
MONOCYTES # BLD AUTO: 0.09 X10(3) UL (ref 0.1–1)
MONOCYTES NFR BLD AUTO: 0.9 %
NEUTROPHILS # BLD AUTO: 9.82 X10 (3) UL (ref 1.5–7.7)
NEUTROPHILS # BLD AUTO: 9.82 X10(3) UL (ref 1.5–7.7)
NEUTROPHILS NFR BLD AUTO: 97.5 %
PLATELET # BLD AUTO: 187 10(3)UL (ref 150–450)
RBC # BLD AUTO: 3.78 X10(6)UL
WBC # BLD AUTO: 10.1 X10(3) UL (ref 4–11)

## 2023-03-31 PROCEDURE — 85025 COMPLETE CBC W/AUTO DIFF WBC: CPT

## 2023-03-31 PROCEDURE — 96367 TX/PROPH/DG ADDL SEQ IV INF: CPT

## 2023-03-31 PROCEDURE — 82040 ASSAY OF SERUM ALBUMIN: CPT

## 2023-03-31 PROCEDURE — 82565 ASSAY OF CREATININE: CPT

## 2023-03-31 PROCEDURE — 96413 CHEMO IV INFUSION 1 HR: CPT

## 2023-03-31 PROCEDURE — 82310 ASSAY OF CALCIUM: CPT

## 2023-03-31 NOTE — PROGRESS NOTES
Pt here for C29D15. Arrives Ambulating independently, accompanied by Self           Pregnancy screening: Not applicable    Modifications in dose or schedule: No    Drugs/infusions dual verified for appearance and physical integrity. IV pump settings were dual verified: yes     Frequency of blood return and site check throughout administration: Prior to administration, Prior to each drug and At completion of therapy   Discharged to Home, Ambulating independently, accompanied by:Self    Outpatient Oncology Care Plan  Problem list:  fatigue  Problems related to:  chemotherapy  disease/disease progression  Interventions:  encourage activity as tolerated  Expected outcomes:  symptoms relieved/minimized  understands plan of care  Progress towards outcome:  making progress    Education Record    Learner:  Patient  Barriers / Limitations:  None  Method:  Reinforcement  Outcome:  Shows understanding  Comments:  Patient  Tolerated treatment, no c/o, discharged home in stable condition.

## 2023-04-14 ENCOUNTER — OFFICE VISIT (OUTPATIENT)
Dept: HEMATOLOGY/ONCOLOGY | Age: 73
End: 2023-04-14
Attending: INTERNAL MEDICINE
Payer: MEDICARE

## 2023-04-14 VITALS
BODY MASS INDEX: 34.59 KG/M2 | OXYGEN SATURATION: 95 % | TEMPERATURE: 97 F | DIASTOLIC BLOOD PRESSURE: 65 MMHG | SYSTOLIC BLOOD PRESSURE: 99 MMHG | WEIGHT: 255.38 LBS | HEIGHT: 72.01 IN | HEART RATE: 52 BPM | RESPIRATION RATE: 18 BRPM

## 2023-04-14 DIAGNOSIS — R79.89 ELEVATED LFTS: ICD-10-CM

## 2023-04-14 DIAGNOSIS — C90.00 MULTIPLE MYELOMA NOT HAVING ACHIEVED REMISSION (HCC): Primary | ICD-10-CM

## 2023-04-14 DIAGNOSIS — R79.89 ELEVATED LFTS: Primary | ICD-10-CM

## 2023-04-14 DIAGNOSIS — C90.00 MULTIPLE MYELOMA NOT HAVING ACHIEVED REMISSION (HCC): ICD-10-CM

## 2023-04-14 LAB
ALBUMIN SERPL-MCNC: 3.6 G/DL (ref 3.4–5)
ALBUMIN/GLOB SERPL: 1.2 {RATIO} (ref 1–2)
ALP LIVER SERPL-CCNC: 83 U/L
ALT SERPL-CCNC: 20 U/L
ANION GAP SERPL CALC-SCNC: 6 MMOL/L (ref 0–18)
AST SERPL-CCNC: 15 U/L (ref 15–37)
BASOPHILS # BLD AUTO: 0.03 X10(3) UL (ref 0–0.2)
BASOPHILS NFR BLD AUTO: 0.2 %
BILIRUB SERPL-MCNC: 0.6 MG/DL (ref 0.1–2)
BUN BLD-MCNC: 27 MG/DL (ref 7–18)
CALCIUM BLD-MCNC: 8.9 MG/DL (ref 8.5–10.1)
CHLORIDE SERPL-SCNC: 111 MMOL/L (ref 98–112)
CO2 SERPL-SCNC: 19 MMOL/L (ref 21–32)
CREAT BLD-MCNC: 1.37 MG/DL
EOSINOPHIL # BLD AUTO: 0 X10(3) UL (ref 0–0.7)
EOSINOPHIL NFR BLD AUTO: 0 %
ERYTHROCYTE [DISTWIDTH] IN BLOOD BY AUTOMATED COUNT: 14.2 %
FASTING STATUS PATIENT QL REPORTED: NO
GFR SERPLBLD BASED ON 1.73 SQ M-ARVRAT: 54 ML/MIN/1.73M2 (ref 60–?)
GLOBULIN PLAS-MCNC: 2.9 G/DL (ref 2.8–4.4)
GLUCOSE BLD-MCNC: 171 MG/DL (ref 70–99)
HCT VFR BLD AUTO: 40.5 %
HGB BLD-MCNC: 12.8 G/DL
IGA SERPL-MCNC: <7.83 MG/DL (ref 70–312)
IGM SERPL-MCNC: <5.3 MG/DL (ref 43–279)
IMM GRANULOCYTES # BLD AUTO: 0.06 X10(3) UL (ref 0–1)
IMM GRANULOCYTES NFR BLD: 0.5 %
IMMUNOGLOBULIN PNL SER-MCNC: 167 MG/DL (ref 791–1643)
LYMPHOCYTES # BLD AUTO: 0.17 X10(3) UL (ref 1–4)
LYMPHOCYTES NFR BLD AUTO: 1.3 %
MCH RBC QN AUTO: 33.5 PG (ref 26–34)
MCHC RBC AUTO-ENTMCNC: 31.6 G/DL (ref 31–37)
MCV RBC AUTO: 106 FL
MONOCYTES # BLD AUTO: 0.08 X10(3) UL (ref 0.1–1)
MONOCYTES NFR BLD AUTO: 0.6 %
NEUTROPHILS # BLD AUTO: 12.54 X10 (3) UL (ref 1.5–7.7)
NEUTROPHILS # BLD AUTO: 12.54 X10(3) UL (ref 1.5–7.7)
NEUTROPHILS NFR BLD AUTO: 97.4 %
OSMOLALITY SERPL CALC.SUM OF ELEC: 291 MOSM/KG (ref 275–295)
PLATELET # BLD AUTO: 312 10(3)UL (ref 150–450)
POTASSIUM SERPL-SCNC: 4 MMOL/L (ref 3.5–5.1)
PROT SERPL-MCNC: 6.5 G/DL (ref 6.4–8.2)
RBC # BLD AUTO: 3.82 X10(6)UL
SODIUM SERPL-SCNC: 136 MMOL/L (ref 136–145)
WBC # BLD AUTO: 12.9 X10(3) UL (ref 4–11)

## 2023-04-14 PROCEDURE — 96413 CHEMO IV INFUSION 1 HR: CPT

## 2023-04-14 PROCEDURE — 96401 CHEMO ANTI-NEOPL SQ/IM: CPT

## 2023-04-14 PROCEDURE — 99214 OFFICE O/P EST MOD 30 MIN: CPT | Performed by: INTERNAL MEDICINE

## 2023-04-14 RX ORDER — ACETAMINOPHEN 325 MG/1
650 TABLET ORAL ONCE
Status: CANCELLED | OUTPATIENT
Start: 2023-04-14

## 2023-04-14 RX ORDER — DIPHENHYDRAMINE HYDROCHLORIDE 50 MG/ML
25 INJECTION INTRAMUSCULAR; INTRAVENOUS ONCE
Status: CANCELLED | OUTPATIENT
Start: 2023-04-14

## 2023-04-14 NOTE — PROGRESS NOTES
Pt here for C30D1 . Arrives Ambulating independently, accompanied by Self       Oral medications included in this regimen:  yes - tylenol, benadryl    Patient confirms comprehension of cancer treatment schedule:  yes    Pregnancy screening:  Not applicable    Modifications in dose or schedule:  No    Medications appearance and physical integrity checked by RN. Chemotherapy IV pump settings verified by 2 RNs:  yes     Frequency of blood return and site check throughout administration: Prior to administration and At completion of therapy     Infusion/treatment outcome:  patient tolerated treatment without incident    Education Record    Learner:  Patient  Barriers / Limitations:  None  Method:  Discussion  Education / instructions given:  yes  Outcome:  Shows understanding    Discharged Home, Ambulating independently, accompanied by:Self    Patient/family verbalized understanding of future appointments: by printed AVS     Patient monitored 30 min post faspro. Covering RN forgot to get vital signs.  Patient discharged in stable condition

## 2023-04-14 NOTE — PROGRESS NOTES
Patient is here for follow up for myeloma on C30 of darzalex and kyprolis. He is doing well. He is eating well. He had an episode of sternal pain about 2 weeks ago. He used aspercreme and it went away. He denies any shortness of breath or increasing pain. He denies any bowel changes. Premeds were taken at 1015 today.      Education Record    Learner:  Patient    Disease / Diagnosis: myeloma    Barriers / Limitations:  None   Comments:    Method:  Discussion   Comments:    General Topics:  Side effects and symptom management   Comments:    Outcome:  Shows understanding   Comments:

## 2023-04-19 RX ORDER — ATORVASTATIN CALCIUM 20 MG/1
TABLET, FILM COATED ORAL
Qty: 90 TABLET | Refills: 0 | Status: SHIPPED | OUTPATIENT
Start: 2023-04-19

## 2023-04-20 ENCOUNTER — PATIENT OUTREACH (OUTPATIENT)
Dept: CASE MANAGEMENT | Age: 73
End: 2023-04-20

## 2023-04-21 ENCOUNTER — OFFICE VISIT (OUTPATIENT)
Dept: HEMATOLOGY/ONCOLOGY | Age: 73
End: 2023-04-21
Attending: INTERNAL MEDICINE
Payer: MEDICARE

## 2023-04-21 VITALS
RESPIRATION RATE: 20 BRPM | TEMPERATURE: 97 F | BODY MASS INDEX: 34.13 KG/M2 | HEART RATE: 64 BPM | SYSTOLIC BLOOD PRESSURE: 107 MMHG | WEIGHT: 252 LBS | HEIGHT: 72.01 IN | OXYGEN SATURATION: 98 % | DIASTOLIC BLOOD PRESSURE: 68 MMHG

## 2023-04-21 DIAGNOSIS — R79.89 ELEVATED LFTS: Primary | ICD-10-CM

## 2023-04-21 DIAGNOSIS — C90.00 MULTIPLE MYELOMA NOT HAVING ACHIEVED REMISSION (HCC): ICD-10-CM

## 2023-04-21 LAB
BASOPHILS # BLD AUTO: 0.01 X10(3) UL (ref 0–0.2)
BASOPHILS NFR BLD AUTO: 0.1 %
EOSINOPHIL # BLD AUTO: 0.01 X10(3) UL (ref 0–0.7)
EOSINOPHIL NFR BLD AUTO: 0.1 %
ERYTHROCYTE [DISTWIDTH] IN BLOOD BY AUTOMATED COUNT: 14.5 %
HCT VFR BLD AUTO: 40.4 %
HGB BLD-MCNC: 13.2 G/DL
IMM GRANULOCYTES # BLD AUTO: 0.05 X10(3) UL (ref 0–1)
IMM GRANULOCYTES NFR BLD: 0.4 %
LYMPHOCYTES # BLD AUTO: 0.11 X10(3) UL (ref 1–4)
LYMPHOCYTES NFR BLD AUTO: 0.8 %
MCH RBC QN AUTO: 34.2 PG (ref 26–34)
MCHC RBC AUTO-ENTMCNC: 32.7 G/DL (ref 31–37)
MCV RBC AUTO: 104.7 FL
MONOCYTES # BLD AUTO: 0.14 X10(3) UL (ref 0.1–1)
MONOCYTES NFR BLD AUTO: 1 %
NEUTROPHILS # BLD AUTO: 13.18 X10 (3) UL (ref 1.5–7.7)
NEUTROPHILS # BLD AUTO: 13.18 X10(3) UL (ref 1.5–7.7)
NEUTROPHILS NFR BLD AUTO: 97.6 %
PLATELET # BLD AUTO: 123 10(3)UL (ref 150–450)
RBC # BLD AUTO: 3.86 X10(6)UL
WBC # BLD AUTO: 13.5 X10(3) UL (ref 4–11)

## 2023-04-21 PROCEDURE — 85025 COMPLETE CBC W/AUTO DIFF WBC: CPT

## 2023-04-21 PROCEDURE — 36415 COLL VENOUS BLD VENIPUNCTURE: CPT

## 2023-04-21 PROCEDURE — 96413 CHEMO IV INFUSION 1 HR: CPT

## 2023-04-21 NOTE — PROGRESS NOTES
Pt here for C30D8 . Arrives Ambulating independently, accompanied by Self       Oral medications included in this regimen:  no    Patient confirms comprehension of cancer treatment schedule:  yes    Pregnancy screening:  Not applicable    Modifications in dose or schedule:  No    Medications appearance and physical integrity checked by RN. Chemotherapy IV pump settings verified by 2 RNs:  yes     Frequency of blood return and site check throughout administration: Prior to administration     Infusion/treatment outcome:  patient tolerated treatment without incident    Education Record    Learner:  Patient  Barriers / Limitations:  None  Method:  Discussion  Education / instructions given:   Instructed pt on drug administration  Outcome:  Shows understanding    Discharged Home, Ambulating independently, accompanied by:Self    Patient/family verbalized understanding of future appointments: by printed AVS

## 2023-04-23 DIAGNOSIS — C90.00 MULTIPLE MYELOMA, REMISSION STATUS UNSPECIFIED (HCC): Primary | ICD-10-CM

## 2023-04-24 RX ORDER — DEXAMETHASONE 4 MG/1
TABLET ORAL
Qty: 36 TABLET | Refills: 3 | Status: SHIPPED | OUTPATIENT
Start: 2023-04-24

## 2023-04-26 LAB
ALBUMIN SERPL ELPH-MCNC: 3.98 G/DL (ref 3.75–5.21)
ALBUMIN/GLOB SERPL: 1.88 {RATIO} (ref 1–2)
ALPHA1 GLOB SERPL ELPH-MCNC: 0.34 G/DL (ref 0.19–0.46)
ALPHA2 GLOB SERPL ELPH-MCNC: 0.9 G/DL (ref 0.48–1.05)
B-GLOBULIN SERPL ELPH-MCNC: 0.67 G/DL (ref 0.68–1.23)
GAMMA GLOB SERPL ELPH-MCNC: 0.21 G/DL (ref 0.62–1.7)
KAPPA LC FREE SER-MCNC: 1.51 MG/DL (ref 0.33–1.94)
KAPPA LC FREE/LAMBDA FREE SER NEPH: 9.69 {RATIO} (ref 0.26–1.65)
LAMBDA LC FREE SERPL-MCNC: 0.16 MG/DL (ref 0.57–2.63)
PROT SERPL-MCNC: 6.1 G/DL (ref 6.4–8.2)

## 2023-04-28 ENCOUNTER — SOCIAL WORK SERVICES (OUTPATIENT)
Dept: HEMATOLOGY/ONCOLOGY | Facility: HOSPITAL | Age: 73
End: 2023-04-28

## 2023-04-28 ENCOUNTER — OFFICE VISIT (OUTPATIENT)
Dept: HEMATOLOGY/ONCOLOGY | Age: 73
End: 2023-04-28
Attending: INTERNAL MEDICINE
Payer: MEDICARE

## 2023-04-28 VITALS
TEMPERATURE: 97 F | SYSTOLIC BLOOD PRESSURE: 93 MMHG | HEIGHT: 72.01 IN | HEART RATE: 59 BPM | WEIGHT: 248.81 LBS | OXYGEN SATURATION: 98 % | BODY MASS INDEX: 33.7 KG/M2 | DIASTOLIC BLOOD PRESSURE: 61 MMHG | RESPIRATION RATE: 20 BRPM

## 2023-04-28 DIAGNOSIS — C90.00 MULTIPLE MYELOMA NOT HAVING ACHIEVED REMISSION (HCC): ICD-10-CM

## 2023-04-28 DIAGNOSIS — C90.00 MULTIPLE MYELOMA, REMISSION STATUS UNSPECIFIED (HCC): Primary | ICD-10-CM

## 2023-04-28 DIAGNOSIS — R79.89 ELEVATED LFTS: ICD-10-CM

## 2023-04-28 LAB
ALBUMIN SERPL-MCNC: 3.3 G/DL (ref 3.4–5)
BASOPHILS # BLD AUTO: 0 X10(3) UL (ref 0–0.2)
BASOPHILS NFR BLD AUTO: 0 %
CALCIUM BLD-MCNC: 9.8 MG/DL (ref 8.5–10.1)
CREAT BLD-MCNC: 1.32 MG/DL
EOSINOPHIL # BLD AUTO: 0 X10(3) UL (ref 0–0.7)
EOSINOPHIL NFR BLD AUTO: 0 %
ERYTHROCYTE [DISTWIDTH] IN BLOOD BY AUTOMATED COUNT: 13.7 %
GFR SERPLBLD BASED ON 1.73 SQ M-ARVRAT: 56 ML/MIN/1.73M2 (ref 60–?)
HCT VFR BLD AUTO: 41 %
HGB BLD-MCNC: 13.1 G/DL
IMM GRANULOCYTES # BLD AUTO: 0.08 X10(3) UL (ref 0–1)
IMM GRANULOCYTES NFR BLD: 0.6 %
LYMPHOCYTES # BLD AUTO: 0.19 X10(3) UL (ref 1–4)
LYMPHOCYTES NFR BLD AUTO: 1.4 %
MCH RBC QN AUTO: 33 PG (ref 26–34)
MCHC RBC AUTO-ENTMCNC: 32 G/DL (ref 31–37)
MCV RBC AUTO: 103.3 FL
MONOCYTES # BLD AUTO: 0.08 X10(3) UL (ref 0.1–1)
MONOCYTES NFR BLD AUTO: 0.6 %
NEUTROPHILS # BLD AUTO: 13.4 X10 (3) UL (ref 1.5–7.7)
NEUTROPHILS # BLD AUTO: 13.4 X10(3) UL (ref 1.5–7.7)
NEUTROPHILS NFR BLD AUTO: 97.4 %
PLATELET # BLD AUTO: 228 10(3)UL (ref 150–450)
RBC # BLD AUTO: 3.97 X10(6)UL
WBC # BLD AUTO: 13.8 X10(3) UL (ref 4–11)

## 2023-04-28 PROCEDURE — 82040 ASSAY OF SERUM ALBUMIN: CPT

## 2023-04-28 PROCEDURE — 96413 CHEMO IV INFUSION 1 HR: CPT

## 2023-04-28 PROCEDURE — 82310 ASSAY OF CALCIUM: CPT

## 2023-04-28 PROCEDURE — 36415 COLL VENOUS BLD VENIPUNCTURE: CPT

## 2023-04-28 PROCEDURE — 96367 TX/PROPH/DG ADDL SEQ IV INF: CPT

## 2023-04-28 PROCEDURE — 82565 ASSAY OF CREATININE: CPT

## 2023-04-28 PROCEDURE — 85025 COMPLETE CBC W/AUTO DIFF WBC: CPT

## 2023-04-28 NOTE — PROGRESS NOTES
Pt here for C30D15 Kyprolis, Zometa. Arrives Ambulating independently, accompanied by Self       Oral medications included in this regimen:  yes - dexamethasone    Patient confirms comprehension of cancer treatment schedule:  yes    Pregnancy screening:  Not applicable    Modifications in dose or schedule:  No    Medications appearance and physical integrity checked by RN. Chemotherapy IV pump settings verified by 2 RNs:  yes     Frequency of blood return and site check throughout administration: Prior to administration, Prior to each drug and At completion of therapy     Infusion/treatment outcome:  patient tolerated treatment without incident    Education Record    Learner:  Patient  Barriers / Limitations:  None  Method:  Brief focused and Discussion  Education / instructions given:  Plan of care reviewed. Pt instructed to update Dr Sukhwinder Zhang in 2 weeks regarding pain. Pt instructed to call MD or go to ER if pain worsens. Outcome:  Shows understanding    Discharged Home, Ambulating independently, accompanied by:Self    Patient/family verbalized understanding of future appointments: by Yee Caret messaging     Pt c/o pain 6/10 to right shoulder. Pt normally doesn't c/o pain on treatment days. Pt states he has been taking tylenol and using heat packs with little relief. Pt states tramadol doesn't help. Pt states he will just continue present management and didn't want to alert anyone. TRUDY Hdz notified. Per Kelsie Shields, pt to continue to monitor pain and to update Dr Sukhwinder Zhang in 2 weeks.

## 2023-04-28 NOTE — PROGRESS NOTES
SW met with pt to provide a support resource. Pt appreciative of the support and no additional needs identified. SAVANNAH AllenW   at [employer] [facility]    [address] [facility], [address] [place] [address], [place], [address] Isael SloanR[facility] Martina [address] [place] [address] Aureliano  Ph: [phone]. [email or web address]  Fax: [phone]

## 2023-05-12 ENCOUNTER — OFFICE VISIT (OUTPATIENT)
Dept: HEMATOLOGY/ONCOLOGY | Age: 73
End: 2023-05-12
Attending: INTERNAL MEDICINE
Payer: MEDICARE

## 2023-05-12 VITALS
OXYGEN SATURATION: 97 % | TEMPERATURE: 97 F | WEIGHT: 250.81 LBS | HEIGHT: 72.01 IN | BODY MASS INDEX: 33.97 KG/M2 | HEART RATE: 64 BPM | DIASTOLIC BLOOD PRESSURE: 60 MMHG | RESPIRATION RATE: 20 BRPM | SYSTOLIC BLOOD PRESSURE: 95 MMHG

## 2023-05-12 DIAGNOSIS — R79.89 ELEVATED LFTS: Primary | ICD-10-CM

## 2023-05-12 DIAGNOSIS — C90.00 MULTIPLE MYELOMA NOT HAVING ACHIEVED REMISSION (HCC): ICD-10-CM

## 2023-05-12 DIAGNOSIS — R79.89 ELEVATED LFTS: ICD-10-CM

## 2023-05-12 DIAGNOSIS — C90.00 MULTIPLE MYELOMA NOT HAVING ACHIEVED REMISSION (HCC): Primary | ICD-10-CM

## 2023-05-12 LAB
ALBUMIN SERPL-MCNC: 3.6 G/DL (ref 3.4–5)
ALBUMIN/GLOB SERPL: 1.2 {RATIO} (ref 1–2)
ALP LIVER SERPL-CCNC: 99 U/L
ALT SERPL-CCNC: 17 U/L
ANION GAP SERPL CALC-SCNC: 5 MMOL/L (ref 0–18)
AST SERPL-CCNC: 14 U/L (ref 15–37)
BASOPHILS # BLD AUTO: 0.01 X10(3) UL (ref 0–0.2)
BASOPHILS NFR BLD AUTO: 0.1 %
BILIRUB SERPL-MCNC: 0.6 MG/DL (ref 0.1–2)
BUN BLD-MCNC: 21 MG/DL (ref 7–18)
CALCIUM BLD-MCNC: 9.5 MG/DL (ref 8.5–10.1)
CHLORIDE SERPL-SCNC: 110 MMOL/L (ref 98–112)
CO2 SERPL-SCNC: 22 MMOL/L (ref 21–32)
CREAT BLD-MCNC: 1.48 MG/DL
EOSINOPHIL # BLD AUTO: 0 X10(3) UL (ref 0–0.7)
EOSINOPHIL NFR BLD AUTO: 0 %
ERYTHROCYTE [DISTWIDTH] IN BLOOD BY AUTOMATED COUNT: 13.4 %
FASTING STATUS PATIENT QL REPORTED: NO
GFR SERPLBLD BASED ON 1.73 SQ M-ARVRAT: 49 ML/MIN/1.73M2 (ref 60–?)
GLOBULIN PLAS-MCNC: 3 G/DL (ref 2.8–4.4)
GLUCOSE BLD-MCNC: 181 MG/DL (ref 70–99)
HCT VFR BLD AUTO: 41.1 %
HGB BLD-MCNC: 13.1 G/DL
IGA SERPL-MCNC: <7.83 MG/DL (ref 70–312)
IGM SERPL-MCNC: <5.3 MG/DL (ref 43–279)
IMM GRANULOCYTES # BLD AUTO: 0.04 X10(3) UL (ref 0–1)
IMM GRANULOCYTES NFR BLD: 0.4 %
IMMUNOGLOBULIN PNL SER-MCNC: 193 MG/DL (ref 791–1643)
LYMPHOCYTES # BLD AUTO: 0.15 X10(3) UL (ref 1–4)
LYMPHOCYTES NFR BLD AUTO: 1.4 %
MCH RBC QN AUTO: 33.1 PG (ref 26–34)
MCHC RBC AUTO-ENTMCNC: 31.9 G/DL (ref 31–37)
MCV RBC AUTO: 103.8 FL
MONOCYTES # BLD AUTO: 0.07 X10(3) UL (ref 0.1–1)
MONOCYTES NFR BLD AUTO: 0.6 %
NEUTROPHILS # BLD AUTO: 10.74 X10 (3) UL (ref 1.5–7.7)
NEUTROPHILS # BLD AUTO: 10.74 X10(3) UL (ref 1.5–7.7)
NEUTROPHILS NFR BLD AUTO: 97.5 %
OSMOLALITY SERPL CALC.SUM OF ELEC: 292 MOSM/KG (ref 275–295)
PLATELET # BLD AUTO: 305 10(3)UL (ref 150–450)
POTASSIUM SERPL-SCNC: 4.1 MMOL/L (ref 3.5–5.1)
PROT SERPL-MCNC: 6.6 G/DL (ref 6.4–8.2)
RBC # BLD AUTO: 3.96 X10(6)UL
SODIUM SERPL-SCNC: 137 MMOL/L (ref 136–145)
WBC # BLD AUTO: 11 X10(3) UL (ref 4–11)

## 2023-05-12 PROCEDURE — 99214 OFFICE O/P EST MOD 30 MIN: CPT | Performed by: INTERNAL MEDICINE

## 2023-05-12 PROCEDURE — 96401 CHEMO ANTI-NEOPL SQ/IM: CPT

## 2023-05-12 PROCEDURE — 96413 CHEMO IV INFUSION 1 HR: CPT

## 2023-05-12 RX ORDER — ACETAMINOPHEN 325 MG/1
650 TABLET ORAL ONCE
Status: CANCELLED | OUTPATIENT
Start: 2023-05-12

## 2023-05-12 RX ORDER — DIPHENHYDRAMINE HYDROCHLORIDE 50 MG/ML
25 INJECTION INTRAMUSCULAR; INTRAVENOUS ONCE
Status: CANCELLED | OUTPATIENT
Start: 2023-05-12

## 2023-05-12 NOTE — PROGRESS NOTES
Pt here for C31D1 . Arrives Ambulating independently, accompanied by Self       Oral medications included in this regimen:  Pt took oral premeds at home    Patient confirms comprehension of cancer treatment schedule:  yes    Pregnancy screening:  Not applicable    Modifications in dose or schedule:  No    Medications appearance and physical integrity checked by RN. Chemotherapy IV pump settings verified by 2 RNs:  yes     Frequency of blood return and site check throughout administration: Prior to administration     Infusion/treatment outcome:  patient tolerated treatment without incident    Education Record    Learner:  Patient  Barriers / Limitations:  None  Method:  Discussion  Education / instructions given:   Answered pt questions  Outcome:  Shows understanding    Discharged Home, Ambulating independently, accompanied by:Self    Patient/family verbalized understanding of future appointments: by printed AVS

## 2023-05-12 NOTE — PROGRESS NOTES
Patient is here for follow up for myeloma on C31 of kyprolis and darzalex. He has been having pain in her right should for about the past 1-2 weeks. It is a constant pain that feels like a \"stitch\". It does not impair his ability to move. He feels a stronger pain below his scapula when he is lying down and moves his arm above his head. He takes tylenol. He is not having pain now because he took his steroids today. There has been no injury. He took his benadryl and tylenol at 1000 today. He denies any fever, cough or shortness of breath.      Education Record    Learner:  Patient    Disease / Diagnosis: myeloma    Barriers / Limitations:  None   Comments:    Method:  Discussion   Comments:    General Topics:  Side effects and symptom management   Comments:    Outcome:  Shows understanding   Comments:

## 2023-05-16 LAB
ALBUMIN SERPL ELPH-MCNC: 4.02 G/DL (ref 3.75–5.21)
ALBUMIN/GLOB SERPL: 1.84 {RATIO} (ref 1–2)
ALPHA1 GLOB SERPL ELPH-MCNC: 0.33 G/DL (ref 0.19–0.46)
ALPHA2 GLOB SERPL ELPH-MCNC: 1.02 G/DL (ref 0.48–1.05)
B-GLOBULIN SERPL ELPH-MCNC: 0.64 G/DL (ref 0.68–1.23)
GAMMA GLOB SERPL ELPH-MCNC: 0.2 G/DL (ref 0.62–1.7)
KAPPA LC FREE SER-MCNC: 2.05 MG/DL (ref 0.33–1.94)
KAPPA LC FREE/LAMBDA FREE SER NEPH: 10.3 {RATIO} (ref 0.26–1.65)
LAMBDA LC FREE SERPL-MCNC: 0.2 MG/DL (ref 0.57–2.63)
M PROTEIN 1 SERPL ELPH-MCNC: 0.05 G/DL (ref ?–0)
PROT SERPL-MCNC: 6.2 G/DL (ref 6.4–8.2)

## 2023-05-19 ENCOUNTER — OFFICE VISIT (OUTPATIENT)
Dept: HEMATOLOGY/ONCOLOGY | Age: 73
End: 2023-05-19
Attending: INTERNAL MEDICINE
Payer: MEDICARE

## 2023-05-19 VITALS
DIASTOLIC BLOOD PRESSURE: 67 MMHG | OXYGEN SATURATION: 97 % | RESPIRATION RATE: 20 BRPM | WEIGHT: 250.13 LBS | BODY MASS INDEX: 33.88 KG/M2 | HEIGHT: 72.01 IN | SYSTOLIC BLOOD PRESSURE: 101 MMHG | HEART RATE: 50 BPM | TEMPERATURE: 96 F

## 2023-05-19 DIAGNOSIS — R79.89 ELEVATED LFTS: Primary | ICD-10-CM

## 2023-05-19 DIAGNOSIS — C90.00 MULTIPLE MYELOMA NOT HAVING ACHIEVED REMISSION (HCC): ICD-10-CM

## 2023-05-19 LAB
BASOPHILS # BLD AUTO: 0.01 X10(3) UL (ref 0–0.2)
BASOPHILS NFR BLD AUTO: 0.1 %
EOSINOPHIL # BLD AUTO: 0.01 X10(3) UL (ref 0–0.7)
EOSINOPHIL NFR BLD AUTO: 0.1 %
ERYTHROCYTE [DISTWIDTH] IN BLOOD BY AUTOMATED COUNT: 13.8 %
HCT VFR BLD AUTO: 41.4 %
HGB BLD-MCNC: 13.4 G/DL
IMM GRANULOCYTES # BLD AUTO: 0.03 X10(3) UL (ref 0–1)
IMM GRANULOCYTES NFR BLD: 0.2 %
LYMPHOCYTES # BLD AUTO: 0.17 X10(3) UL (ref 1–4)
LYMPHOCYTES NFR BLD AUTO: 1.4 %
MCH RBC QN AUTO: 33.4 PG (ref 26–34)
MCHC RBC AUTO-ENTMCNC: 32.4 G/DL (ref 31–37)
MCV RBC AUTO: 103.2 FL
MONOCYTES # BLD AUTO: 0.09 X10(3) UL (ref 0.1–1)
MONOCYTES NFR BLD AUTO: 0.7 %
NEUTROPHILS # BLD AUTO: 11.7 X10 (3) UL (ref 1.5–7.7)
NEUTROPHILS # BLD AUTO: 11.7 X10(3) UL (ref 1.5–7.7)
NEUTROPHILS NFR BLD AUTO: 97.5 %
PLATELET # BLD AUTO: 136 10(3)UL (ref 150–450)
RBC # BLD AUTO: 4.01 X10(6)UL
WBC # BLD AUTO: 12 X10(3) UL (ref 4–11)

## 2023-05-19 PROCEDURE — 85025 COMPLETE CBC W/AUTO DIFF WBC: CPT

## 2023-05-19 PROCEDURE — 36415 COLL VENOUS BLD VENIPUNCTURE: CPT

## 2023-05-19 PROCEDURE — 96413 CHEMO IV INFUSION 1 HR: CPT

## 2023-05-19 NOTE — PROGRESS NOTES
Pt here for C31D8 . Arrives Ambulating independently, accompanied by Self       Oral medications included in this regimen:  yes - dexamethasone    Patient confirms comprehension of cancer treatment schedule:  yes    Pregnancy screening:  Not applicable    Modifications in dose or schedule:  No    Medications appearance and physical integrity checked by RN.  Chemotherapy IV pump settings verified by 2 RNs:  yes     Frequency of blood return and site check throughout administration: Prior to administration     Infusion/treatment outcome:  patient tolerated treatment without incident    Education Record    Learner:  Patient  Barriers / Limitations:  None  Method:  Reinforcement  Education / instructions given:  general  Outcome:  Shows understanding    Discharged Home, Ambulating independently, accompanied by:Self    Patient/family verbalized understanding of future appointments: by printed AVS

## 2023-05-22 ENCOUNTER — HOSPITAL ENCOUNTER (OUTPATIENT)
Dept: CV DIAGNOSTICS | Facility: HOSPITAL | Age: 73
Discharge: HOME | End: 2023-05-22
Attending: INTERNAL MEDICINE
Payer: MEDICARE

## 2023-05-22 DIAGNOSIS — Z85.79 H/O MULTIPLE MYELOMA: ICD-10-CM

## 2023-05-22 DIAGNOSIS — I42.9 CARDIOMYOPATHY (HCC): ICD-10-CM

## 2023-05-22 DIAGNOSIS — Z51.81 ENCOUNTER FOR MONITORING CARDIOTOXIC DRUG THERAPY: ICD-10-CM

## 2023-05-22 DIAGNOSIS — Z79.899 ENCOUNTER FOR MONITORING CARDIOTOXIC DRUG THERAPY: ICD-10-CM

## 2023-05-22 PROCEDURE — 93306 TTE W/DOPPLER COMPLETE: CPT | Performed by: INTERNAL MEDICINE

## 2023-05-23 ENCOUNTER — OFFICE VISIT (OUTPATIENT)
Dept: NEPHROLOGY | Facility: CLINIC | Age: 73
End: 2023-05-23
Payer: MEDICARE

## 2023-05-23 VITALS — BODY MASS INDEX: 34 KG/M2 | SYSTOLIC BLOOD PRESSURE: 98 MMHG | DIASTOLIC BLOOD PRESSURE: 64 MMHG | WEIGHT: 248 LBS

## 2023-05-23 DIAGNOSIS — C90.00 MYELOMA KIDNEY (HCC): ICD-10-CM

## 2023-05-23 DIAGNOSIS — N18.9 VITAMIN D DEFICIENCY DUE TO CHRONIC KIDNEY DISEASE: ICD-10-CM

## 2023-05-23 DIAGNOSIS — E55.9 VITAMIN D DEFICIENCY DUE TO CHRONIC KIDNEY DISEASE: ICD-10-CM

## 2023-05-23 DIAGNOSIS — N08 MYELOMA KIDNEY (HCC): ICD-10-CM

## 2023-05-23 DIAGNOSIS — N18.30 STAGE 3 CHRONIC KIDNEY DISEASE, UNSPECIFIED WHETHER STAGE 3A OR 3B CKD (HCC): Primary | ICD-10-CM

## 2023-05-23 PROCEDURE — 99213 OFFICE O/P EST LOW 20 MIN: CPT | Performed by: INTERNAL MEDICINE

## 2023-05-26 ENCOUNTER — OFFICE VISIT (OUTPATIENT)
Dept: HEMATOLOGY/ONCOLOGY | Age: 73
End: 2023-05-26
Attending: INTERNAL MEDICINE
Payer: MEDICARE

## 2023-05-26 VITALS
HEIGHT: 72.01 IN | SYSTOLIC BLOOD PRESSURE: 96 MMHG | HEART RATE: 51 BPM | WEIGHT: 251.5 LBS | OXYGEN SATURATION: 100 % | RESPIRATION RATE: 20 BRPM | TEMPERATURE: 97 F | BODY MASS INDEX: 34.06 KG/M2 | DIASTOLIC BLOOD PRESSURE: 60 MMHG

## 2023-05-26 DIAGNOSIS — C90.00 MULTIPLE MYELOMA, REMISSION STATUS UNSPECIFIED (HCC): ICD-10-CM

## 2023-05-26 DIAGNOSIS — R79.89 ELEVATED LFTS: Primary | ICD-10-CM

## 2023-05-26 DIAGNOSIS — C90.00 MULTIPLE MYELOMA NOT HAVING ACHIEVED REMISSION (HCC): ICD-10-CM

## 2023-05-26 LAB
ALBUMIN SERPL-MCNC: 3.7 G/DL (ref 3.4–5)
BASOPHILS # BLD AUTO: 0.01 X10(3) UL (ref 0–0.2)
BASOPHILS NFR BLD AUTO: 0.1 %
CALCIUM BLD-MCNC: 9.5 MG/DL (ref 8.5–10.1)
CREAT BLD-MCNC: 1.55 MG/DL
EOSINOPHIL # BLD AUTO: 0 X10(3) UL (ref 0–0.7)
EOSINOPHIL NFR BLD AUTO: 0 %
ERYTHROCYTE [DISTWIDTH] IN BLOOD BY AUTOMATED COUNT: 14 %
GFR SERPLBLD BASED ON 1.73 SQ M-ARVRAT: 46 ML/MIN/1.73M2 (ref 60–?)
HCT VFR BLD AUTO: 42.3 %
HGB BLD-MCNC: 13.8 G/DL
IMM GRANULOCYTES # BLD AUTO: 0.06 X10(3) UL (ref 0–1)
IMM GRANULOCYTES NFR BLD: 0.4 %
LYMPHOCYTES # BLD AUTO: 0.16 X10(3) UL (ref 1–4)
LYMPHOCYTES NFR BLD AUTO: 1.2 %
MCH RBC QN AUTO: 33.9 PG (ref 26–34)
MCHC RBC AUTO-ENTMCNC: 32.6 G/DL (ref 31–37)
MCV RBC AUTO: 103.9 FL
MONOCYTES # BLD AUTO: 0.11 X10(3) UL (ref 0.1–1)
MONOCYTES NFR BLD AUTO: 0.8 %
NEUTROPHILS # BLD AUTO: 13.37 X10 (3) UL (ref 1.5–7.7)
NEUTROPHILS # BLD AUTO: 13.37 X10(3) UL (ref 1.5–7.7)
NEUTROPHILS NFR BLD AUTO: 97.5 %
PLATELET # BLD AUTO: 154 10(3)UL (ref 150–450)
RBC # BLD AUTO: 4.07 X10(6)UL
WBC # BLD AUTO: 13.7 X10(3) UL (ref 4–11)

## 2023-05-26 PROCEDURE — 96367 TX/PROPH/DG ADDL SEQ IV INF: CPT

## 2023-05-26 PROCEDURE — 82310 ASSAY OF CALCIUM: CPT

## 2023-05-26 PROCEDURE — 96413 CHEMO IV INFUSION 1 HR: CPT

## 2023-05-26 PROCEDURE — 82040 ASSAY OF SERUM ALBUMIN: CPT

## 2023-05-26 PROCEDURE — 85025 COMPLETE CBC W/AUTO DIFF WBC: CPT

## 2023-05-26 PROCEDURE — 36415 COLL VENOUS BLD VENIPUNCTURE: CPT

## 2023-05-26 PROCEDURE — 82565 ASSAY OF CREATININE: CPT

## 2023-05-26 NOTE — PROGRESS NOTES
Pt here for C2D15 kyprolis. Arrives Ambulating independently, accompanied by Self       Oral medications included in this regimen:  no    Patient confirms comprehension of cancer treatment schedule:  yes    Pregnancy screening:  Denies possibility of pregnancy    Modifications in dose or schedule:  No    Medications appearance and physical integrity checked by RN.  Chemotherapy IV pump settings verified by 2 RNs:  yes     Frequency of blood return and site check throughout administration: Prior to administration     Infusion/treatment outcome:  patient tolerated treatment without incident    Education Record    Learner:  Patient  Barriers / Limitations:  None  Method:  Brief focused  Education / instructions given:  Chemo admin  Outcome:  Shows understanding    Discharged Home, Ambulating independently, accompanied by:Self    Patient/family verbalized understanding of future appointments: by printed AVS

## 2023-05-31 ENCOUNTER — PATIENT OUTREACH (OUTPATIENT)
Dept: CASE MANAGEMENT | Age: 73
End: 2023-05-31

## 2023-06-08 ENCOUNTER — OFFICE VISIT (OUTPATIENT)
Dept: FAMILY MEDICINE CLINIC | Facility: CLINIC | Age: 73
End: 2023-06-08
Payer: MEDICARE

## 2023-06-08 VITALS
OXYGEN SATURATION: 96 % | WEIGHT: 252 LBS | DIASTOLIC BLOOD PRESSURE: 46 MMHG | SYSTOLIC BLOOD PRESSURE: 118 MMHG | RESPIRATION RATE: 18 BRPM | HEIGHT: 72 IN | TEMPERATURE: 97 F | BODY MASS INDEX: 34.13 KG/M2 | HEART RATE: 63 BPM

## 2023-06-08 DIAGNOSIS — E66.01 MORBID OBESITY (HCC): ICD-10-CM

## 2023-06-08 DIAGNOSIS — E78.2 MIXED HYPERLIPIDEMIA: ICD-10-CM

## 2023-06-08 DIAGNOSIS — Z94.84 HX OF STEM CELL TRANSPLANT (HCC): ICD-10-CM

## 2023-06-08 DIAGNOSIS — E11.9 DIABETES MELLITUS WITH COINCIDENT HYPERTENSION (HCC): ICD-10-CM

## 2023-06-08 DIAGNOSIS — I10 DIABETES MELLITUS WITH COINCIDENT HYPERTENSION (HCC): ICD-10-CM

## 2023-06-08 DIAGNOSIS — C90.00 MULTIPLE MYELOMA NOT HAVING ACHIEVED REMISSION (HCC): ICD-10-CM

## 2023-06-08 DIAGNOSIS — I42.9 CARDIOMYOPATHY, UNSPECIFIED TYPE (HCC): ICD-10-CM

## 2023-06-08 DIAGNOSIS — C90.00 MYELOMA KIDNEY DISEASE (HCC): ICD-10-CM

## 2023-06-08 DIAGNOSIS — Z00.00 ENCOUNTER FOR ANNUAL HEALTH EXAMINATION: Primary | ICD-10-CM

## 2023-06-08 DIAGNOSIS — N08 MYELOMA KIDNEY DISEASE (HCC): ICD-10-CM

## 2023-06-08 DIAGNOSIS — I10 ESSENTIAL HYPERTENSION: ICD-10-CM

## 2023-06-08 PROCEDURE — G0439 PPPS, SUBSEQ VISIT: HCPCS | Performed by: FAMILY MEDICINE

## 2023-06-08 PROCEDURE — 1125F AMNT PAIN NOTED PAIN PRSNT: CPT | Performed by: FAMILY MEDICINE

## 2023-06-08 PROCEDURE — 99214 OFFICE O/P EST MOD 30 MIN: CPT | Performed by: FAMILY MEDICINE

## 2023-06-09 ENCOUNTER — OFFICE VISIT (OUTPATIENT)
Dept: HEMATOLOGY/ONCOLOGY | Age: 73
End: 2023-06-09
Attending: INTERNAL MEDICINE
Payer: MEDICARE

## 2023-06-09 VITALS
HEART RATE: 53 BPM | OXYGEN SATURATION: 97 % | DIASTOLIC BLOOD PRESSURE: 59 MMHG | RESPIRATION RATE: 18 BRPM | SYSTOLIC BLOOD PRESSURE: 96 MMHG

## 2023-06-09 VITALS
HEART RATE: 52 BPM | SYSTOLIC BLOOD PRESSURE: 90 MMHG | RESPIRATION RATE: 20 BRPM | DIASTOLIC BLOOD PRESSURE: 63 MMHG | TEMPERATURE: 98 F | HEIGHT: 72.01 IN | OXYGEN SATURATION: 96 % | BODY MASS INDEX: 33.97 KG/M2 | WEIGHT: 250.81 LBS

## 2023-06-09 DIAGNOSIS — R79.89 ELEVATED LFTS: Primary | ICD-10-CM

## 2023-06-09 DIAGNOSIS — C90.00 MYELOMA KIDNEY DISEASE (HCC): ICD-10-CM

## 2023-06-09 DIAGNOSIS — C90.00 MULTIPLE MYELOMA NOT HAVING ACHIEVED REMISSION (HCC): ICD-10-CM

## 2023-06-09 DIAGNOSIS — N08 MYELOMA KIDNEY DISEASE (HCC): ICD-10-CM

## 2023-06-09 DIAGNOSIS — R79.89 ELEVATED LFTS: ICD-10-CM

## 2023-06-09 DIAGNOSIS — I42.9 CARDIOMYOPATHY, UNSPECIFIED TYPE (HCC): Primary | ICD-10-CM

## 2023-06-09 DIAGNOSIS — Z00.00 ENCOUNTER FOR ANNUAL HEALTH EXAMINATION: ICD-10-CM

## 2023-06-09 DIAGNOSIS — E78.2 MIXED HYPERLIPIDEMIA: ICD-10-CM

## 2023-06-09 DIAGNOSIS — Z13.1 SCREENING FOR DIABETES MELLITUS (DM): ICD-10-CM

## 2023-06-09 DIAGNOSIS — I10 DIABETES MELLITUS WITH COINCIDENT HYPERTENSION (HCC): ICD-10-CM

## 2023-06-09 DIAGNOSIS — E11.9 DIABETES MELLITUS WITH COINCIDENT HYPERTENSION (HCC): ICD-10-CM

## 2023-06-09 LAB
ALBUMIN SERPL-MCNC: 3.7 G/DL (ref 3.4–5)
ALBUMIN/GLOB SERPL: 1.3 {RATIO} (ref 1–2)
ALP LIVER SERPL-CCNC: 87 U/L
ALT SERPL-CCNC: 19 U/L
ANION GAP SERPL CALC-SCNC: 5 MMOL/L (ref 0–18)
AST SERPL-CCNC: 10 U/L (ref 15–37)
BASOPHILS # BLD AUTO: 0.03 X10(3) UL (ref 0–0.2)
BASOPHILS NFR BLD AUTO: 0.2 %
BILIRUB SERPL-MCNC: 0.7 MG/DL (ref 0.1–2)
BUN BLD-MCNC: 33 MG/DL (ref 7–18)
CALCIUM BLD-MCNC: 9.1 MG/DL (ref 8.5–10.1)
CHLORIDE SERPL-SCNC: 110 MMOL/L (ref 98–112)
CHOLEST SERPL-MCNC: 141 MG/DL (ref ?–200)
CO2 SERPL-SCNC: 23 MMOL/L (ref 21–32)
CREAT BLD-MCNC: 1.59 MG/DL
CREAT UR-SCNC: 61.4 MG/DL
EOSINOPHIL # BLD AUTO: 0.02 X10(3) UL (ref 0–0.7)
EOSINOPHIL NFR BLD AUTO: 0.2 %
ERYTHROCYTE [DISTWIDTH] IN BLOOD BY AUTOMATED COUNT: 13.8 %
EST. AVERAGE GLUCOSE BLD GHB EST-MCNC: 117 MG/DL (ref 68–126)
FASTING PATIENT LIPID ANSWER: YES
FASTING STATUS PATIENT QL REPORTED: YES
GFR SERPLBLD BASED ON 1.73 SQ M-ARVRAT: 45 ML/MIN/1.73M2 (ref 60–?)
GLOBULIN PLAS-MCNC: 2.9 G/DL (ref 2.8–4.4)
GLUCOSE BLD-MCNC: 152 MG/DL (ref 70–99)
HBA1C MFR BLD: 5.7 % (ref ?–5.7)
HCT VFR BLD AUTO: 41.1 %
HDLC SERPL-MCNC: 63 MG/DL (ref 40–59)
HGB BLD-MCNC: 13.3 G/DL
IGA SERPL-MCNC: <7.83 MG/DL (ref 70–312)
IGM SERPL-MCNC: <5.3 MG/DL (ref 43–279)
IMM GRANULOCYTES # BLD AUTO: 0.03 X10(3) UL (ref 0–1)
IMM GRANULOCYTES NFR BLD: 0.2 %
IMMUNOGLOBULIN PNL SER-MCNC: 173 MG/DL (ref 791–1643)
LDLC SERPL CALC-MCNC: 59 MG/DL (ref ?–100)
LYMPHOCYTES # BLD AUTO: 0.14 X10(3) UL (ref 1–4)
LYMPHOCYTES NFR BLD AUTO: 1.1 %
MCH RBC QN AUTO: 34 PG (ref 26–34)
MCHC RBC AUTO-ENTMCNC: 32.4 G/DL (ref 31–37)
MCV RBC AUTO: 105.1 FL
MICROALBUMIN UR-MCNC: 8.86 MG/DL
MICROALBUMIN/CREAT 24H UR-RTO: 144.3 UG/MG (ref ?–30)
MONOCYTES # BLD AUTO: 0.1 X10(3) UL (ref 0.1–1)
MONOCYTES NFR BLD AUTO: 0.8 %
NEUTROPHILS # BLD AUTO: 12.74 X10 (3) UL (ref 1.5–7.7)
NEUTROPHILS # BLD AUTO: 12.74 X10(3) UL (ref 1.5–7.7)
NEUTROPHILS NFR BLD AUTO: 97.5 %
NONHDLC SERPL-MCNC: 78 MG/DL (ref ?–130)
OSMOLALITY SERPL CALC.SUM OF ELEC: 296 MOSM/KG (ref 275–295)
PLATELET # BLD AUTO: 290 10(3)UL (ref 150–450)
POTASSIUM SERPL-SCNC: 4.2 MMOL/L (ref 3.5–5.1)
PROT SERPL-MCNC: 6.6 G/DL (ref 6.4–8.2)
RBC # BLD AUTO: 3.91 X10(6)UL
SODIUM SERPL-SCNC: 138 MMOL/L (ref 136–145)
TRIGL SERPL-MCNC: 105 MG/DL (ref 30–149)
TSI SER-ACNC: 0.62 MIU/ML (ref 0.36–3.74)
VLDLC SERPL CALC-MCNC: 15 MG/DL (ref 0–30)
WBC # BLD AUTO: 13.1 X10(3) UL (ref 4–11)

## 2023-06-09 PROCEDURE — 96401 CHEMO ANTI-NEOPL SQ/IM: CPT

## 2023-06-09 PROCEDURE — 96413 CHEMO IV INFUSION 1 HR: CPT

## 2023-06-09 PROCEDURE — 99215 OFFICE O/P EST HI 40 MIN: CPT | Performed by: CLINICAL NURSE SPECIALIST

## 2023-06-09 RX ORDER — ACETAMINOPHEN 325 MG/1
650 TABLET ORAL ONCE
Status: CANCELLED | OUTPATIENT
Start: 2023-06-09

## 2023-06-09 RX ORDER — DIPHENHYDRAMINE HYDROCHLORIDE 50 MG/ML
25 INJECTION INTRAMUSCULAR; INTRAVENOUS ONCE
Status: CANCELLED | OUTPATIENT
Start: 2023-06-09

## 2023-06-09 NOTE — PROGRESS NOTES
Pt here for continuation of Kyprolis and Ann. Pt is tolerating well. Denies any side effects or concerns.

## 2023-06-09 NOTE — PROGRESS NOTES
Pt here for C32D1 . Arrives Ambulating independently, accompanied by Self       Oral medications included in this regimen:  no    Patient confirms comprehension of cancer treatment schedule:  yes    Pregnancy screening:  Not applicable    Modifications in dose or schedule:  No    Medications appearance and physical integrity checked by RN.  Chemotherapy IV pump settings verified by 2 RNs:  yes     Frequency of blood return and site check throughout administration: Prior to administration and At completion of therapy     Infusion/treatment outcome:  patient tolerated treatment without incident    Education Record    Learner:  Patient  Barriers / Limitations:  None  Method:  Discussion  Education / instructions given:  yes  Outcome:  Shows understanding    Discharged Home, Ambulating independently, accompanied by:Self    Patient/family verbalized understanding of future appointments: by printed AVS     Patient took his premeds prior to arrival

## 2023-06-12 LAB
ALBUMIN SERPL ELPH-MCNC: 4.11 G/DL (ref 3.75–5.21)
ALBUMIN/GLOB SERPL: 1.87 {RATIO} (ref 1–2)
ALPHA1 GLOB SERPL ELPH-MCNC: 0.36 G/DL (ref 0.19–0.46)
ALPHA2 GLOB SERPL ELPH-MCNC: 0.9 G/DL (ref 0.48–1.05)
B-GLOBULIN SERPL ELPH-MCNC: 0.71 G/DL (ref 0.68–1.23)
GAMMA GLOB SERPL ELPH-MCNC: 0.23 G/DL (ref 0.62–1.7)
KAPPA LC FREE SER-MCNC: 2.88 MG/DL (ref 0.33–1.94)
KAPPA LC FREE/LAMBDA FREE SER NEPH: 17.59 {RATIO} (ref 0.26–1.65)
LAMBDA LC FREE SERPL-MCNC: 0.16 MG/DL (ref 0.57–2.63)
M PROTEIN 1 SERPL ELPH-MCNC: 0.06 G/DL (ref ?–0)
PROT SERPL-MCNC: 6.3 G/DL (ref 6.4–8.2)

## 2023-06-12 NOTE — PROGRESS NOTES
Your lipid profile is normal, your good cholesterol HDL is 63, continue atorvastatin 20 mg daily  Your hemoglobin A1c is slightly increased compared to last time at 5.7  No change in medications  Rest of your labs are in acceptable limits

## 2023-06-16 ENCOUNTER — OFFICE VISIT (OUTPATIENT)
Dept: HEMATOLOGY/ONCOLOGY | Age: 73
End: 2023-06-16
Attending: INTERNAL MEDICINE
Payer: MEDICARE

## 2023-06-16 VITALS
RESPIRATION RATE: 18 BRPM | WEIGHT: 244.69 LBS | HEART RATE: 65 BPM | HEIGHT: 72.01 IN | DIASTOLIC BLOOD PRESSURE: 60 MMHG | OXYGEN SATURATION: 96 % | BODY MASS INDEX: 33.14 KG/M2 | TEMPERATURE: 97 F | SYSTOLIC BLOOD PRESSURE: 94 MMHG

## 2023-06-16 DIAGNOSIS — C90.00 MULTIPLE MYELOMA NOT HAVING ACHIEVED REMISSION (HCC): Primary | ICD-10-CM

## 2023-06-16 DIAGNOSIS — R79.89 ELEVATED LFTS: ICD-10-CM

## 2023-06-16 LAB
BASOPHILS # BLD AUTO: 0.01 X10(3) UL (ref 0–0.2)
BASOPHILS NFR BLD AUTO: 0.1 %
EOSINOPHIL # BLD AUTO: 0.01 X10(3) UL (ref 0–0.7)
EOSINOPHIL NFR BLD AUTO: 0.1 %
ERYTHROCYTE [DISTWIDTH] IN BLOOD BY AUTOMATED COUNT: 13.9 %
HCT VFR BLD AUTO: 39.1 %
HGB BLD-MCNC: 12.9 G/DL
IMM GRANULOCYTES # BLD AUTO: 0.04 X10(3) UL (ref 0–1)
IMM GRANULOCYTES NFR BLD: 0.5 %
LYMPHOCYTES # BLD AUTO: 0.13 X10(3) UL (ref 1–4)
LYMPHOCYTES NFR BLD AUTO: 1.6 %
MCH RBC QN AUTO: 33.9 PG (ref 26–34)
MCHC RBC AUTO-ENTMCNC: 33 G/DL (ref 31–37)
MCV RBC AUTO: 102.9 FL
MONOCYTES # BLD AUTO: 0.2 X10(3) UL (ref 0.1–1)
MONOCYTES NFR BLD AUTO: 2.4 %
NEUTROPHILS # BLD AUTO: 7.88 X10 (3) UL (ref 1.5–7.7)
NEUTROPHILS # BLD AUTO: 7.88 X10(3) UL (ref 1.5–7.7)
NEUTROPHILS NFR BLD AUTO: 95.3 %
PLATELET # BLD AUTO: 121 10(3)UL (ref 150–450)
RBC # BLD AUTO: 3.8 X10(6)UL
WBC # BLD AUTO: 8.3 X10(3) UL (ref 4–11)

## 2023-06-16 PROCEDURE — 36415 COLL VENOUS BLD VENIPUNCTURE: CPT

## 2023-06-16 PROCEDURE — 96413 CHEMO IV INFUSION 1 HR: CPT

## 2023-06-16 PROCEDURE — 85025 COMPLETE CBC W/AUTO DIFF WBC: CPT

## 2023-06-16 NOTE — PROGRESS NOTES
Pt here for C32D8 kyprolis. Arrives Ambulating independently, accompanied by Self       Oral medications included in this regimen:  no    Patient confirms comprehension of cancer treatment schedule:  yes    Pregnancy screening:  Not applicable    Modifications in dose or schedule:  No    Medications appearance and physical integrity checked by RN. Chemotherapy IV pump settings verified by 2 RNs:  yes     Frequency of blood return and site check throughout administration: Prior to administration     Infusion/treatment outcome:  patient tolerated treatment without incident    Education Record    Learner:  Patient  Barriers / Limitations:  None  Method:  Brief focused  Education / instructions given:  Chemo admin  Outcome:  Needs reinforcement    Discharged Home, Ambulating independently, accompanied by:Self    Patient/family verbalized understanding of future appointments: by printed AVS      Did the patient use oral cryotherapy during their cancer treatment?  No. On cycle 32 and did not want to

## 2023-06-20 ENCOUNTER — TELEPHONE (OUTPATIENT)
Dept: FAMILY MEDICINE CLINIC | Facility: CLINIC | Age: 73
End: 2023-06-20

## 2023-06-20 NOTE — TELEPHONE ENCOUNTER
Returned call to patient's wife who states patient is having a very loose congested sounding cough for over a week. He is unable to cough up any phlegm. Was taking mucinex but stopped because he said it made his nose bleed. Is very weak and fatigued. Denies fever. States he takes it several times a day. Is taking tylenol and is drinking lg amt fluids. Offered appt with Dr. Liudmila Morales tomorrow. Asked if he could do a home covid test to Rule that out before appt. Wife states they have home tests. Patient wants to go to [facility] to be tested. Advised if he goes out to be tested he should go to [facility] where they can do testing and evaluation. They will call and let us know if they decide to go to [facility]. Advised to push fluids, start Delsym or robitussin cough syrup whichever they have at home, steam tx, bedside humidifier. If symptoms worsen with and CP/tightness, SOB, breathing difficulty, go to [facility]/ED.     Future Appointments   Date Time Provider Bo Ratliff   6/21/2023 11:45 AM Tristan Godfrey MD [facility]

## 2023-06-20 NOTE — TELEPHONE ENCOUNTER
Pt has had a cough for over 1 week. Pt's spouse stating that it is not dry, its \"soupy\" but nothing coming up when he coughs. Yesterday he was very weak from it that they had to get 3 people to help him get up into the truck. Asking for an appt today or medication.  Please advise

## 2023-06-21 ENCOUNTER — OFFICE VISIT (OUTPATIENT)
Dept: FAMILY MEDICINE CLINIC | Facility: CLINIC | Age: 73
End: 2023-06-21
Payer: MEDICARE

## 2023-06-21 ENCOUNTER — HOSPITAL ENCOUNTER (OUTPATIENT)
Dept: GENERAL RADIOLOGY | Age: 73
Discharge: HOME | End: 2023-06-21
Attending: FAMILY MEDICINE
Payer: MEDICARE

## 2023-06-21 VITALS
SYSTOLIC BLOOD PRESSURE: 114 MMHG | BODY MASS INDEX: 32.23 KG/M2 | HEART RATE: 44 BPM | DIASTOLIC BLOOD PRESSURE: 46 MMHG | RESPIRATION RATE: 20 BRPM | WEIGHT: 238 LBS | TEMPERATURE: 97 F | HEIGHT: 72 IN | OXYGEN SATURATION: 92 %

## 2023-06-21 DIAGNOSIS — R06.02 SOBOE (SHORTNESS OF BREATH ON EXERTION): Primary | ICD-10-CM

## 2023-06-21 DIAGNOSIS — R06.02 SOBOE (SHORTNESS OF BREATH ON EXERTION): ICD-10-CM

## 2023-06-21 DIAGNOSIS — R05.1 ACUTE COUGH: ICD-10-CM

## 2023-06-21 DIAGNOSIS — H60.501 ACUTE OTITIS EXTERNA OF RIGHT EAR, UNSPECIFIED TYPE: ICD-10-CM

## 2023-06-21 PROCEDURE — 71046 X-RAY EXAM CHEST 2 VIEWS: CPT | Performed by: FAMILY MEDICINE

## 2023-06-21 PROCEDURE — 99214 OFFICE O/P EST MOD 30 MIN: CPT | Performed by: FAMILY MEDICINE

## 2023-06-21 RX ORDER — NEOMYCIN SULFATE, POLYMYXIN B SULFATE AND HYDROCORTISONE 10; 3.5; 1 MG/ML; MG/ML; [USP'U]/ML
3 SUSPENSION/ DROPS AURICULAR (OTIC) 4 TIMES DAILY
Qty: 1 EACH | Refills: 0 | Status: SHIPPED | OUTPATIENT
Start: 2023-06-21

## 2023-06-21 RX ORDER — LEVOFLOXACIN 500 MG/1
500 TABLET, FILM COATED ORAL DAILY
Qty: 10 TABLET | Refills: 0 | Status: SHIPPED | OUTPATIENT
Start: 2023-06-21 | End: 2023-07-01

## 2023-06-21 RX ORDER — CIPROFLOXACIN AND DEXAMETHASONE 3; 1 MG/ML; MG/ML
4 SUSPENSION/ DROPS AURICULAR (OTIC) 2 TIMES DAILY
Qty: 1 EACH | Refills: 0 | Status: SHIPPED | OUTPATIENT
Start: 2023-06-21 | End: 2023-06-21

## 2023-06-21 RX ORDER — CODEINE PHOSPHATE AND GUAIFENESIN 10; 100 MG/5ML; MG/5ML
5 SOLUTION ORAL 3 TIMES DAILY PRN
Qty: 180 ML | Refills: 0 | Status: SHIPPED | OUTPATIENT
Start: 2023-06-21

## 2023-06-21 NOTE — PROGRESS NOTES
Results of the chest x-ray discussed with the patient and his wife  No pneumonia but interstitial pneumonitis  If his symptoms do not get better in 2 days, I will start him on prednisone

## 2023-06-23 ENCOUNTER — OFFICE VISIT (OUTPATIENT)
Dept: HEMATOLOGY/ONCOLOGY | Age: 73
End: 2023-06-23
Attending: INTERNAL MEDICINE
Payer: MEDICARE

## 2023-06-23 VITALS
HEART RATE: 68 BPM | BODY MASS INDEX: 32.19 KG/M2 | HEIGHT: 72.01 IN | DIASTOLIC BLOOD PRESSURE: 55 MMHG | SYSTOLIC BLOOD PRESSURE: 83 MMHG | TEMPERATURE: 97 F | WEIGHT: 237.63 LBS | RESPIRATION RATE: 18 BRPM | OXYGEN SATURATION: 93 %

## 2023-06-23 DIAGNOSIS — C90.00 MULTIPLE MYELOMA, REMISSION STATUS UNSPECIFIED (HCC): ICD-10-CM

## 2023-06-23 DIAGNOSIS — R79.89 ELEVATED LFTS: ICD-10-CM

## 2023-06-23 DIAGNOSIS — C90.00 MULTIPLE MYELOMA NOT HAVING ACHIEVED REMISSION (HCC): Primary | ICD-10-CM

## 2023-06-23 LAB
ALBUMIN SERPL-MCNC: 2.9 G/DL (ref 3.4–5)
BASOPHILS # BLD AUTO: 0.02 X10(3) UL (ref 0–0.2)
BASOPHILS NFR BLD AUTO: 0.1 %
CALCIUM BLD-MCNC: 9.6 MG/DL (ref 8.5–10.1)
CREAT BLD-MCNC: 2.15 MG/DL
EOSINOPHIL # BLD AUTO: 0 X10(3) UL (ref 0–0.7)
EOSINOPHIL NFR BLD AUTO: 0 %
ERYTHROCYTE [DISTWIDTH] IN BLOOD BY AUTOMATED COUNT: 13.9 %
GFR SERPLBLD BASED ON 1.73 SQ M-ARVRAT: 31 ML/MIN/1.73M2 (ref 60–?)
HCT VFR BLD AUTO: 36.6 %
HGB BLD-MCNC: 12.4 G/DL
IMM GRANULOCYTES # BLD AUTO: 0.17 X10(3) UL (ref 0–1)
IMM GRANULOCYTES NFR BLD: 1 %
LYMPHOCYTES # BLD AUTO: 0.13 X10(3) UL (ref 1–4)
LYMPHOCYTES NFR BLD AUTO: 0.8 %
MCH RBC QN AUTO: 33.7 PG (ref 26–34)
MCHC RBC AUTO-ENTMCNC: 33.9 G/DL (ref 31–37)
MCV RBC AUTO: 99.5 FL
MONOCYTES # BLD AUTO: 0.21 X10(3) UL (ref 0.1–1)
MONOCYTES NFR BLD AUTO: 1.3 %
NEUTROPHILS # BLD AUTO: 15.87 X10 (3) UL (ref 1.5–7.7)
NEUTROPHILS # BLD AUTO: 15.87 X10(3) UL (ref 1.5–7.7)
NEUTROPHILS NFR BLD AUTO: 96.8 %
PLATELET # BLD AUTO: 303 10(3)UL (ref 150–450)
RBC # BLD AUTO: 3.68 X10(6)UL
WBC # BLD AUTO: 16.4 X10(3) UL (ref 4–11)

## 2023-06-23 PROCEDURE — 82565 ASSAY OF CREATININE: CPT

## 2023-06-23 PROCEDURE — 82310 ASSAY OF CALCIUM: CPT

## 2023-06-23 PROCEDURE — 82040 ASSAY OF SERUM ALBUMIN: CPT

## 2023-06-23 PROCEDURE — 96413 CHEMO IV INFUSION 1 HR: CPT

## 2023-06-23 PROCEDURE — 36415 COLL VENOUS BLD VENIPUNCTURE: CPT

## 2023-06-23 PROCEDURE — 96375 TX/PRO/DX INJ NEW DRUG ADDON: CPT

## 2023-06-23 PROCEDURE — 85025 COMPLETE CBC W/AUTO DIFF WBC: CPT

## 2023-06-27 ENCOUNTER — PATIENT OUTREACH (OUTPATIENT)
Dept: CASE MANAGEMENT | Age: 73
End: 2023-06-27

## 2023-07-06 ENCOUNTER — LAB ENCOUNTER (OUTPATIENT)
Dept: LAB | Age: 73
End: 2023-07-06
Attending: INTERNAL MEDICINE
Payer: MEDICARE

## 2023-07-06 DIAGNOSIS — E11.9 DIABETES MELLITUS (HCC): ICD-10-CM

## 2023-07-06 DIAGNOSIS — Z51.81 ENCOUNTER FOR THERAPEUTIC DRUG MONITORING: ICD-10-CM

## 2023-07-06 DIAGNOSIS — E55.9 VITAMIN D DEFICIENCY: ICD-10-CM

## 2023-07-06 DIAGNOSIS — Z85.79 PERSONAL HISTORY OF MULTIPLE MYELOMA: ICD-10-CM

## 2023-07-06 DIAGNOSIS — I42.9 SECONDARY CARDIOMYOPATHY, UNSPECIFIED (HCC): Primary | ICD-10-CM

## 2023-07-06 DIAGNOSIS — Z85.79 HISTORY OF MULTIPLE MYELOMA: ICD-10-CM

## 2023-07-06 DIAGNOSIS — R53.83 FATIGUE: ICD-10-CM

## 2023-07-06 DIAGNOSIS — E78.49 FAMILIAL COMBINED HYPERLIPIDEMIA: ICD-10-CM

## 2023-07-06 LAB
ALBUMIN SERPL-MCNC: 3.2 G/DL (ref 3.4–5)
ALBUMIN/GLOB SERPL: 1 {RATIO} (ref 1–2)
ALP LIVER SERPL-CCNC: 86 U/L
ALT SERPL-CCNC: 17 U/L
ANION GAP SERPL CALC-SCNC: 7 MMOL/L (ref 0–18)
AST SERPL-CCNC: 15 U/L (ref 15–37)
BASOPHILS # BLD AUTO: 0.04 X10(3) UL (ref 0–0.2)
BASOPHILS NFR BLD AUTO: 0.5 %
BILIRUB SERPL-MCNC: 0.5 MG/DL (ref 0.1–2)
BUN BLD-MCNC: 29 MG/DL (ref 7–18)
CALCIUM BLD-MCNC: 8.8 MG/DL (ref 8.5–10.1)
CHLORIDE SERPL-SCNC: 110 MMOL/L (ref 98–112)
CHOLEST SERPL-MCNC: 143 MG/DL (ref ?–200)
CO2 SERPL-SCNC: 22 MMOL/L (ref 21–32)
CREAT BLD-MCNC: 1.47 MG/DL
EOSINOPHIL # BLD AUTO: 0.1 X10(3) UL (ref 0–0.7)
EOSINOPHIL NFR BLD AUTO: 1.2 %
ERYTHROCYTE [DISTWIDTH] IN BLOOD BY AUTOMATED COUNT: 13.7 %
EST. AVERAGE GLUCOSE BLD GHB EST-MCNC: 123 MG/DL (ref 68–126)
FASTING PATIENT LIPID ANSWER: YES
FASTING STATUS PATIENT QL REPORTED: YES
GFR SERPLBLD BASED ON 1.73 SQ M-ARVRAT: 49 ML/MIN/1.73M2 (ref 60–?)
GLOBULIN PLAS-MCNC: 3.3 G/DL (ref 2.8–4.4)
GLUCOSE BLD-MCNC: 121 MG/DL (ref 70–99)
HBA1C MFR BLD: 5.9 % (ref ?–5.7)
HCT VFR BLD AUTO: 36.3 %
HDLC SERPL-MCNC: 58 MG/DL (ref 40–59)
HGB BLD-MCNC: 11.4 G/DL
IMM GRANULOCYTES # BLD AUTO: 0.04 X10(3) UL (ref 0–1)
IMM GRANULOCYTES NFR BLD: 0.5 %
LDLC SERPL CALC-MCNC: 61 MG/DL (ref ?–100)
LYMPHOCYTES # BLD AUTO: 0.43 X10(3) UL (ref 1–4)
LYMPHOCYTES NFR BLD AUTO: 5.3 %
MCH RBC QN AUTO: 33.3 PG (ref 26–34)
MCHC RBC AUTO-ENTMCNC: 31.4 G/DL (ref 31–37)
MCV RBC AUTO: 106.1 FL
MONOCYTES # BLD AUTO: 0.84 X10(3) UL (ref 0.1–1)
MONOCYTES NFR BLD AUTO: 10.4 %
NEUTROPHILS # BLD AUTO: 6.6 X10 (3) UL (ref 1.5–7.7)
NEUTROPHILS # BLD AUTO: 6.6 X10(3) UL (ref 1.5–7.7)
NEUTROPHILS NFR BLD AUTO: 82.1 %
NONHDLC SERPL-MCNC: 85 MG/DL (ref ?–130)
NT-PROBNP SERPL-MCNC: 90 PG/ML (ref ?–450)
OSMOLALITY SERPL CALC.SUM OF ELEC: 295 MOSM/KG (ref 275–295)
PLATELET # BLD AUTO: 363 10(3)UL (ref 150–450)
POTASSIUM SERPL-SCNC: 3.9 MMOL/L (ref 3.5–5.1)
PROT SERPL-MCNC: 6.5 G/DL (ref 6.4–8.2)
RBC # BLD AUTO: 3.42 X10(6)UL
SODIUM SERPL-SCNC: 139 MMOL/L (ref 136–145)
TRIGL SERPL-MCNC: 139 MG/DL (ref 30–149)
TROPONIN I HIGH SENSITIVITY: 8 NG/L
TSI SER-ACNC: 1.23 MIU/ML (ref 0.36–3.74)
VIT D+METAB SERPL-MCNC: 30.8 NG/ML (ref 30–100)
VLDLC SERPL CALC-MCNC: 21 MG/DL (ref 0–30)
WBC # BLD AUTO: 8.1 X10(3) UL (ref 4–11)

## 2023-07-06 PROCEDURE — 85025 COMPLETE CBC W/AUTO DIFF WBC: CPT

## 2023-07-06 PROCEDURE — 36415 COLL VENOUS BLD VENIPUNCTURE: CPT

## 2023-07-06 PROCEDURE — 80061 LIPID PANEL: CPT

## 2023-07-06 PROCEDURE — 83036 HEMOGLOBIN GLYCOSYLATED A1C: CPT

## 2023-07-06 PROCEDURE — 83880 ASSAY OF NATRIURETIC PEPTIDE: CPT

## 2023-07-06 PROCEDURE — 84443 ASSAY THYROID STIM HORMONE: CPT

## 2023-07-06 PROCEDURE — 84484 ASSAY OF TROPONIN QUANT: CPT

## 2023-07-06 PROCEDURE — 82306 VITAMIN D 25 HYDROXY: CPT

## 2023-07-06 PROCEDURE — 80053 COMPREHEN METABOLIC PANEL: CPT

## 2023-07-07 ENCOUNTER — OFFICE VISIT (OUTPATIENT)
Dept: HEMATOLOGY/ONCOLOGY | Age: 73
End: 2023-07-07
Attending: INTERNAL MEDICINE
Payer: MEDICARE

## 2023-07-07 VITALS
DIASTOLIC BLOOD PRESSURE: 50 MMHG | HEART RATE: 56 BPM | HEIGHT: 72.01 IN | BODY MASS INDEX: 32.32 KG/M2 | WEIGHT: 238.63 LBS | TEMPERATURE: 97 F | OXYGEN SATURATION: 97 % | RESPIRATION RATE: 18 BRPM | SYSTOLIC BLOOD PRESSURE: 86 MMHG

## 2023-07-07 DIAGNOSIS — C90.00 MULTIPLE MYELOMA NOT HAVING ACHIEVED REMISSION (HCC): Primary | ICD-10-CM

## 2023-07-07 DIAGNOSIS — M89.9 LYTIC BONE LESIONS ON XRAY: ICD-10-CM

## 2023-07-07 DIAGNOSIS — R79.89 ELEVATED LFTS: ICD-10-CM

## 2023-07-07 LAB
ALBUMIN SERPL-MCNC: 3.3 G/DL (ref 3.4–5)
ALBUMIN/GLOB SERPL: 1 {RATIO} (ref 1–2)
ALP LIVER SERPL-CCNC: 88 U/L
ALT SERPL-CCNC: 17 U/L
ANION GAP SERPL CALC-SCNC: 7 MMOL/L (ref 0–18)
AST SERPL-CCNC: 12 U/L (ref 15–37)
BASOPHILS # BLD AUTO: 0.02 X10(3) UL (ref 0–0.2)
BASOPHILS NFR BLD AUTO: 0.2 %
BILIRUB SERPL-MCNC: 0.6 MG/DL (ref 0.1–2)
BUN BLD-MCNC: 30 MG/DL (ref 7–18)
CALCIUM BLD-MCNC: 9 MG/DL (ref 8.5–10.1)
CHLORIDE SERPL-SCNC: 106 MMOL/L (ref 98–112)
CO2 SERPL-SCNC: 21 MMOL/L (ref 21–32)
CREAT BLD-MCNC: 1.6 MG/DL
EOSINOPHIL # BLD AUTO: 0.01 X10(3) UL (ref 0–0.7)
EOSINOPHIL NFR BLD AUTO: 0.1 %
ERYTHROCYTE [DISTWIDTH] IN BLOOD BY AUTOMATED COUNT: 14.2 %
FASTING STATUS PATIENT QL REPORTED: NO
GFR SERPLBLD BASED ON 1.73 SQ M-ARVRAT: 45 ML/MIN/1.73M2 (ref 60–?)
GLOBULIN PLAS-MCNC: 3.2 G/DL (ref 2.8–4.4)
GLUCOSE BLD-MCNC: 173 MG/DL (ref 70–99)
HCT VFR BLD AUTO: 35.5 %
HGB BLD-MCNC: 11.5 G/DL
IMM GRANULOCYTES # BLD AUTO: 0.05 X10(3) UL (ref 0–1)
IMM GRANULOCYTES NFR BLD: 0.4 %
LYMPHOCYTES # BLD AUTO: 0.18 X10(3) UL (ref 1–4)
LYMPHOCYTES NFR BLD AUTO: 1.4 %
MCH RBC QN AUTO: 33.4 PG (ref 26–34)
MCHC RBC AUTO-ENTMCNC: 32.4 G/DL (ref 31–37)
MCV RBC AUTO: 103.2 FL
MONOCYTES # BLD AUTO: 0.07 X10(3) UL (ref 0.1–1)
MONOCYTES NFR BLD AUTO: 0.5 %
NEUTROPHILS # BLD AUTO: 12.47 X10 (3) UL (ref 1.5–7.7)
NEUTROPHILS # BLD AUTO: 12.47 X10(3) UL (ref 1.5–7.7)
NEUTROPHILS NFR BLD AUTO: 97.4 %
OSMOLALITY SERPL CALC.SUM OF ELEC: 288 MOSM/KG (ref 275–295)
PLATELET # BLD AUTO: 366 10(3)UL (ref 150–450)
POTASSIUM SERPL-SCNC: 3.9 MMOL/L (ref 3.5–5.1)
PROT SERPL-MCNC: 6.5 G/DL (ref 6.4–8.2)
RBC # BLD AUTO: 3.44 X10(6)UL
SODIUM SERPL-SCNC: 134 MMOL/L (ref 136–145)
TSI SER-ACNC: 0.63 MIU/ML (ref 0.36–3.74)
WBC # BLD AUTO: 12.8 X10(3) UL (ref 4–11)

## 2023-07-07 PROCEDURE — 96413 CHEMO IV INFUSION 1 HR: CPT

## 2023-07-07 PROCEDURE — 99214 OFFICE O/P EST MOD 30 MIN: CPT | Performed by: INTERNAL MEDICINE

## 2023-07-07 PROCEDURE — 96401 CHEMO ANTI-NEOPL SQ/IM: CPT

## 2023-07-07 RX ORDER — ACETAMINOPHEN 325 MG/1
650 TABLET ORAL ONCE
Status: CANCELLED | OUTPATIENT
Start: 2023-07-07

## 2023-07-07 RX ORDER — DIPHENHYDRAMINE HYDROCHLORIDE 50 MG/ML
25 INJECTION INTRAMUSCULAR; INTRAVENOUS ONCE
Status: CANCELLED | OUTPATIENT
Start: 2023-07-07

## 2023-07-07 NOTE — PROGRESS NOTES
Pt here for C33 D1 Faspro/Kyprolis. Arrives Ambulating independently, accompanied by Self       Oral medications included in this regimen:  no    Patient confirms comprehension of cancer treatment schedule:  yes    Pregnancy screening:  Not applicable    Modifications in dose or schedule:  No    Medications appearance and physical integrity checked by RN. Chemotherapy IV pump settings verified by 2 RNs:  yes     Frequency of blood return and site check throughout administration: Prior to administration, At completion of therapy, and Other subcutaneous injection      Infusion/treatment outcome:  patient tolerated treatment without incident    Education Record    Learner:  Patient  Barriers / Limitations:  None  Method:  Discussion  Education / instructions given:  Reviewed plan of care and follow up appts.   Outcome:  Shows understanding    Discharged Home, Ambulating independently, accompanied by:Self    Patient/family verbalized understanding of future appointments: by printed AVS

## 2023-07-07 NOTE — PROGRESS NOTES
Patient is here for follow up for myeloma on treatment. He took his premeds at 930 this morning. He has some fatigue. He says that he is eating well. His A1C has been a lit higher then he would like on labs ordered by his PCP. He has some constipation but is managing this without medication.       Education Record    Learner:  Patient    Disease / Diagnosis: myeloma    Barriers / Limitations:  None   Comments:    Method:  Discussion   Comments:    General Topics:  Side effects and symptom management   Comments:    Outcome:  Shows understanding   Comments:

## 2023-07-10 ENCOUNTER — OFFICE VISIT (OUTPATIENT)
Dept: FAMILY MEDICINE CLINIC | Facility: CLINIC | Age: 73
End: 2023-07-10
Payer: MEDICARE

## 2023-07-10 VITALS
BODY MASS INDEX: 31.97 KG/M2 | TEMPERATURE: 97 F | HEART RATE: 54 BPM | WEIGHT: 236 LBS | DIASTOLIC BLOOD PRESSURE: 60 MMHG | SYSTOLIC BLOOD PRESSURE: 104 MMHG | RESPIRATION RATE: 18 BRPM | OXYGEN SATURATION: 96 % | HEIGHT: 72 IN

## 2023-07-10 DIAGNOSIS — H60.501 ACUTE OTITIS EXTERNA OF RIGHT EAR, UNSPECIFIED TYPE: ICD-10-CM

## 2023-07-10 DIAGNOSIS — R05.1 ACUTE COUGH: Primary | ICD-10-CM

## 2023-07-10 DIAGNOSIS — R06.02 SOBOE (SHORTNESS OF BREATH ON EXERTION): ICD-10-CM

## 2023-07-10 PROCEDURE — 99213 OFFICE O/P EST LOW 20 MIN: CPT | Performed by: FAMILY MEDICINE

## 2023-07-14 ENCOUNTER — OFFICE VISIT (OUTPATIENT)
Dept: HEMATOLOGY/ONCOLOGY | Age: 73
End: 2023-07-14
Attending: INTERNAL MEDICINE
Payer: MEDICARE

## 2023-07-14 VITALS
WEIGHT: 238 LBS | SYSTOLIC BLOOD PRESSURE: 100 MMHG | HEART RATE: 64 BPM | TEMPERATURE: 98 F | HEIGHT: 72.01 IN | DIASTOLIC BLOOD PRESSURE: 68 MMHG | OXYGEN SATURATION: 96 % | BODY MASS INDEX: 32.23 KG/M2 | RESPIRATION RATE: 18 BRPM

## 2023-07-14 DIAGNOSIS — C90.00 MULTIPLE MYELOMA NOT HAVING ACHIEVED REMISSION (HCC): Primary | ICD-10-CM

## 2023-07-14 DIAGNOSIS — R79.89 ELEVATED LFTS: ICD-10-CM

## 2023-07-14 LAB
BASOPHILS # BLD AUTO: 0.01 X10(3) UL (ref 0–0.2)
BASOPHILS NFR BLD AUTO: 0.1 %
EOSINOPHIL # BLD AUTO: 0.14 X10(3) UL (ref 0–0.7)
EOSINOPHIL NFR BLD AUTO: 1.6 %
ERYTHROCYTE [DISTWIDTH] IN BLOOD BY AUTOMATED COUNT: 14.6 %
HCT VFR BLD AUTO: 34.1 %
HGB BLD-MCNC: 11.2 G/DL
IMM GRANULOCYTES # BLD AUTO: 0.02 X10(3) UL (ref 0–1)
IMM GRANULOCYTES NFR BLD: 0.2 %
LYMPHOCYTES # BLD AUTO: 0.67 X10(3) UL (ref 1–4)
LYMPHOCYTES NFR BLD AUTO: 7.5 %
MCH RBC QN AUTO: 33.9 PG (ref 26–34)
MCHC RBC AUTO-ENTMCNC: 32.8 G/DL (ref 31–37)
MCV RBC AUTO: 103.3 FL
MONOCYTES # BLD AUTO: 0.75 X10(3) UL (ref 0.1–1)
MONOCYTES NFR BLD AUTO: 8.4 %
NEUTROPHILS # BLD AUTO: 7.37 X10 (3) UL (ref 1.5–7.7)
NEUTROPHILS # BLD AUTO: 7.37 X10(3) UL (ref 1.5–7.7)
NEUTROPHILS NFR BLD AUTO: 82.2 %
PLATELET # BLD AUTO: 145 10(3)UL (ref 150–450)
RBC # BLD AUTO: 3.3 X10(6)UL
WBC # BLD AUTO: 9 X10(3) UL (ref 4–11)

## 2023-07-14 PROCEDURE — 96413 CHEMO IV INFUSION 1 HR: CPT

## 2023-07-14 PROCEDURE — 85025 COMPLETE CBC W/AUTO DIFF WBC: CPT

## 2023-07-14 NOTE — PROGRESS NOTES
Pt here for C33D8 kyprolis. Arrives Ambulating independently, accompanied by Self       Oral medications included in this regimen:  no    Patient confirms comprehension of cancer treatment schedule:  yes    Pregnancy screening:  Not applicable    Modifications in dose or schedule:  No    Medications appearance and physical integrity checked by RN.  Chemotherapy IV pump settings verified by 2 RNs:  yes     Frequency of blood return and site check throughout administration: Prior to administration, Prior to each drug, and At completion of therapy     Infusion/treatment outcome:  patient tolerated treatment without incident    Education Record    Learner:  Patient  Barriers / Limitations:  None  Method:  Reinforcement  Education / instructions given:  schedule  Outcome:  Shows understanding    Discharged Home, Ambulating independently, accompanied by:Self    Patient/family verbalized understanding of future appointments: by printed AVS

## 2023-07-21 ENCOUNTER — OFFICE VISIT (OUTPATIENT)
Dept: HEMATOLOGY/ONCOLOGY | Age: 73
End: 2023-07-21
Attending: INTERNAL MEDICINE
Payer: MEDICARE

## 2023-07-21 VITALS
TEMPERATURE: 97 F | DIASTOLIC BLOOD PRESSURE: 66 MMHG | SYSTOLIC BLOOD PRESSURE: 98 MMHG | HEIGHT: 72.01 IN | WEIGHT: 238.81 LBS | BODY MASS INDEX: 32.34 KG/M2 | RESPIRATION RATE: 18 BRPM | HEART RATE: 66 BPM | OXYGEN SATURATION: 96 %

## 2023-07-21 DIAGNOSIS — C90.00 MULTIPLE MYELOMA NOT HAVING ACHIEVED REMISSION (HCC): Primary | ICD-10-CM

## 2023-07-21 DIAGNOSIS — C90.00 MULTIPLE MYELOMA, REMISSION STATUS UNSPECIFIED (HCC): ICD-10-CM

## 2023-07-21 DIAGNOSIS — R79.89 ELEVATED LFTS: ICD-10-CM

## 2023-07-21 LAB
ALBUMIN SERPL-MCNC: 3.6 G/DL (ref 3.4–5)
BASOPHILS # BLD AUTO: 0.01 X10(3) UL (ref 0–0.2)
BASOPHILS NFR BLD AUTO: 0.1 %
CALCIUM BLD-MCNC: 9.8 MG/DL (ref 8.5–10.1)
CREAT BLD-MCNC: 1.69 MG/DL
EGFRCR SERPLBLD CKD-EPI 2021: 42 ML/MIN/1.73M2 (ref 60–?)
EOSINOPHIL # BLD AUTO: 0.01 X10(3) UL (ref 0–0.7)
EOSINOPHIL NFR BLD AUTO: 0.1 %
ERYTHROCYTE [DISTWIDTH] IN BLOOD BY AUTOMATED COUNT: 14.7 %
HCT VFR BLD AUTO: 37.1 %
HGB BLD-MCNC: 11.9 G/DL
IMM GRANULOCYTES # BLD AUTO: 0.11 X10(3) UL (ref 0–1)
IMM GRANULOCYTES NFR BLD: 0.9 %
LYMPHOCYTES # BLD AUTO: 0.23 X10(3) UL (ref 1–4)
LYMPHOCYTES NFR BLD AUTO: 1.8 %
MCH RBC QN AUTO: 33.8 PG (ref 26–34)
MCHC RBC AUTO-ENTMCNC: 32.1 G/DL (ref 31–37)
MCV RBC AUTO: 105.4 FL
MONOCYTES # BLD AUTO: 0.08 X10(3) UL (ref 0.1–1)
MONOCYTES NFR BLD AUTO: 0.6 %
NEUTROPHILS # BLD AUTO: 12.13 X10 (3) UL (ref 1.5–7.7)
NEUTROPHILS # BLD AUTO: 12.13 X10(3) UL (ref 1.5–7.7)
NEUTROPHILS NFR BLD AUTO: 96.5 %
PLATELET # BLD AUTO: 172 10(3)UL (ref 150–450)
RBC # BLD AUTO: 3.52 X10(6)UL
WBC # BLD AUTO: 12.6 X10(3) UL (ref 4–11)

## 2023-07-21 PROCEDURE — 85025 COMPLETE CBC W/AUTO DIFF WBC: CPT

## 2023-07-21 PROCEDURE — 36415 COLL VENOUS BLD VENIPUNCTURE: CPT

## 2023-07-21 PROCEDURE — 82565 ASSAY OF CREATININE: CPT

## 2023-07-21 PROCEDURE — 96375 TX/PRO/DX INJ NEW DRUG ADDON: CPT

## 2023-07-21 PROCEDURE — 96413 CHEMO IV INFUSION 1 HR: CPT

## 2023-07-21 PROCEDURE — 82040 ASSAY OF SERUM ALBUMIN: CPT

## 2023-07-21 PROCEDURE — 82310 ASSAY OF CALCIUM: CPT

## 2023-07-21 NOTE — PROGRESS NOTES
Pt here for C33D15 Kyprolis, Zometa. Arrives Ambulating independently, accompanied by Self       Oral medications included in this regimen:  yes - dexamethasone    Patient confirms comprehension of cancer treatment schedule:  yes    Pregnancy screening:  Not applicable    Modifications in dose or schedule:  No    Medications appearance and physical integrity checked by RN. Chemotherapy IV pump settings verified by 2 RNs:  yes     Frequency of blood return and site check throughout administration: Prior to administration, Prior to each drug, and At completion of therapy     Infusion/treatment outcome:  patient tolerated treatment without incident    Education Record    Learner:  Patient  Barriers / Limitations:  None  Method:  Brief focused and Discussion  Education / instructions given:  Plan of care and next appointment. Outcome:  Shows understanding    Discharged Home, Ambulating independently, accompanied by:Self in stable condition, no new complaints.     Patient/family verbalized understanding of future appointments: by printed AVS

## 2023-07-24 RX ORDER — ACYCLOVIR 400 MG/1
TABLET ORAL
Qty: 180 TABLET | Refills: 1 | Status: SHIPPED | OUTPATIENT
Start: 2023-07-24

## 2023-07-24 NOTE — TELEPHONE ENCOUNTER
Herpes Agent Protocol [id number]/23/2023 07:34 AM    Appointment in the past 12 or next 3 months   1/30/23 180 with 1

## 2023-07-28 ENCOUNTER — PATIENT OUTREACH (OUTPATIENT)
Dept: CASE MANAGEMENT | Age: 73
End: 2023-07-28

## 2023-07-28 NOTE — PROGRESS NOTES
Attempted to contact pt no answer left detailed message for pt to call back.     Total time spent with patient including chart review: 2  Time spent with patient this month: 2    Total time spent with communication and chart review this month to date: 2

## 2023-07-31 RX ORDER — ATORVASTATIN CALCIUM 20 MG/1
TABLET, FILM COATED ORAL
Qty: 90 TABLET | Refills: 0 | Status: SHIPPED | OUTPATIENT
Start: 2023-07-31

## 2023-07-31 NOTE — TELEPHONE ENCOUNTER
LOV 7/10/2023      LAST LAB 7/14/23    LAST RX 4/19/23 90 TAB    Next OV   Future Appointments   Date Time Provider [place]   8/4/2023 10:30 AM Lori Marquez MD PF HEM ONC [place]   8/4/2023 11:00 AM PF TX RN2 PF CHEMO I [place]   8/22/2023  2:00 PM Lamont Greenberg MD [id number] [address]   9/7/2023  2:30 PM Catarina Neves MD [facility] [address]   10/9/2023 11:30 AM Catarina Neves MD [facility] [address] [facility] [address]         PROTOCOL PASS

## 2023-08-04 ENCOUNTER — OFFICE VISIT (OUTPATIENT)
Dept: HEMATOLOGY/ONCOLOGY | Age: 73
End: 2023-08-04
Attending: INTERNAL MEDICINE
Payer: MEDICARE

## 2023-08-04 VITALS
WEIGHT: 243.81 LBS | SYSTOLIC BLOOD PRESSURE: 91 MMHG | OXYGEN SATURATION: 96 % | RESPIRATION RATE: 18 BRPM | TEMPERATURE: 97 F | HEART RATE: 53 BPM | DIASTOLIC BLOOD PRESSURE: 59 MMHG | HEIGHT: 72.01 IN | BODY MASS INDEX: 33.02 KG/M2

## 2023-08-04 VITALS
OXYGEN SATURATION: 98 % | RESPIRATION RATE: 16 BRPM | SYSTOLIC BLOOD PRESSURE: 95 MMHG | TEMPERATURE: 97 F | HEART RATE: 48 BPM | DIASTOLIC BLOOD PRESSURE: 59 MMHG

## 2023-08-04 DIAGNOSIS — C90.00 MULTIPLE MYELOMA NOT HAVING ACHIEVED REMISSION (HCC): Primary | ICD-10-CM

## 2023-08-04 DIAGNOSIS — C90.00 MULTIPLE MYELOMA NOT HAVING ACHIEVED REMISSION (HCC): ICD-10-CM

## 2023-08-04 DIAGNOSIS — R79.89 ELEVATED LFTS: ICD-10-CM

## 2023-08-04 LAB
ALBUMIN SERPL-MCNC: 3.7 G/DL (ref 3.4–5)
ALBUMIN/GLOB SERPL: 1.3 {RATIO} (ref 1–2)
ALP LIVER SERPL-CCNC: 77 U/L
ALT SERPL-CCNC: 15 U/L
ANION GAP SERPL CALC-SCNC: 3 MMOL/L (ref 0–18)
AST SERPL-CCNC: 12 U/L (ref 15–37)
BASOPHILS # BLD AUTO: 0.01 X10(3) UL (ref 0–0.2)
BASOPHILS NFR BLD AUTO: 0.1 %
BILIRUB SERPL-MCNC: 0.8 MG/DL (ref 0.1–2)
BUN BLD-MCNC: 19 MG/DL (ref 7–18)
CALCIUM BLD-MCNC: 8.7 MG/DL (ref 8.5–10.1)
CHLORIDE SERPL-SCNC: 112 MMOL/L (ref 98–112)
CO2 SERPL-SCNC: 22 MMOL/L (ref 21–32)
CREAT BLD-MCNC: 1.66 MG/DL
EGFRCR SERPLBLD CKD-EPI 2021: 43 ML/MIN/1.73M2 (ref 60–?)
EOSINOPHIL # BLD AUTO: 0.01 X10(3) UL (ref 0–0.7)
EOSINOPHIL NFR BLD AUTO: 0.1 %
ERYTHROCYTE [DISTWIDTH] IN BLOOD BY AUTOMATED COUNT: 14.6 %
FASTING STATUS PATIENT QL REPORTED: NO
GLOBULIN PLAS-MCNC: 2.8 G/DL (ref 2.8–4.4)
GLUCOSE BLD-MCNC: 143 MG/DL (ref 70–99)
HCT VFR BLD AUTO: 36 %
HGB BLD-MCNC: 11.6 G/DL
IGA SERPL-MCNC: <7.83 MG/DL (ref 70–312)
IGM SERPL-MCNC: <5.3 MG/DL (ref 43–279)
IMM GRANULOCYTES # BLD AUTO: 0.03 X10(3) UL (ref 0–1)
IMM GRANULOCYTES NFR BLD: 0.3 %
IMMUNOGLOBULIN PNL SER-MCNC: 211 MG/DL (ref 791–1643)
LYMPHOCYTES # BLD AUTO: 0.16 X10(3) UL (ref 1–4)
LYMPHOCYTES NFR BLD AUTO: 1.4 %
MCH RBC QN AUTO: 34.7 PG (ref 26–34)
MCHC RBC AUTO-ENTMCNC: 32.2 G/DL (ref 31–37)
MCV RBC AUTO: 107.8 FL
MONOCYTES # BLD AUTO: 0.09 X10(3) UL (ref 0.1–1)
MONOCYTES NFR BLD AUTO: 0.8 %
NEUTROPHILS # BLD AUTO: 11.48 X10 (3) UL (ref 1.5–7.7)
NEUTROPHILS # BLD AUTO: 11.48 X10(3) UL (ref 1.5–7.7)
NEUTROPHILS NFR BLD AUTO: 97.3 %
OSMOLALITY SERPL CALC.SUM OF ELEC: 289 MOSM/KG (ref 275–295)
PLATELET # BLD AUTO: 265 10(3)UL (ref 150–450)
POTASSIUM SERPL-SCNC: 4.3 MMOL/L (ref 3.5–5.1)
PROT SERPL-MCNC: 6.5 G/DL (ref 6.4–8.2)
RBC # BLD AUTO: 3.34 X10(6)UL
SODIUM SERPL-SCNC: 137 MMOL/L (ref 136–145)
WBC # BLD AUTO: 11.8 X10(3) UL (ref 4–11)

## 2023-08-04 PROCEDURE — 96413 CHEMO IV INFUSION 1 HR: CPT

## 2023-08-04 PROCEDURE — 96401 CHEMO ANTI-NEOPL SQ/IM: CPT

## 2023-08-04 PROCEDURE — 99214 OFFICE O/P EST MOD 30 MIN: CPT | Performed by: INTERNAL MEDICINE

## 2023-08-04 RX ORDER — ERGOCALCIFEROL 1.25 MG/1
CAPSULE, LIQUID FILLED ORAL
COMMUNITY
Start: 2023-07-13

## 2023-08-04 RX ORDER — DIPHENHYDRAMINE HYDROCHLORIDE 50 MG/ML
25 INJECTION INTRAMUSCULAR; INTRAVENOUS ONCE
Status: CANCELLED | OUTPATIENT
Start: 2023-08-04

## 2023-08-04 RX ORDER — ACETAMINOPHEN 325 MG/1
650 TABLET ORAL ONCE
Status: CANCELLED | OUTPATIENT
Start: 2023-08-04

## 2023-08-04 NOTE — PROGRESS NOTES
Patient is here for follow up for myeloma on C34D1 of kyprolis and darzalex. He is doing well. He reports that he took his premeds today at 930 am.   He is eating well and his energy is good. He has no new pain, fever, cough or shortness of breath.      Education Record    Learner:  Patient    Disease / Diagnosis: myeloma    Barriers / Limitations:  None   Comments:    Method:  Discussion   Comments:    General Topics:  Side effects and symptom management   Comments:    Outcome:  Shows understanding   Comments:

## 2023-08-04 NOTE — PROGRESS NOTES
Pt here for C34 D1 Kyprolis/Faspro. Arrives Ambulating independently, accompanied by Self       Oral medications included in this regimen:  no    Patient confirms comprehension of cancer treatment schedule:  yes    Pregnancy screening:  Not applicable    Modifications in dose or schedule:  No    Medications appearance and physical integrity checked by RN. Chemotherapy IV pump settings verified by 2 RNs:  yes     Frequency of blood return and site check throughout administration: Prior to administration and At completion of therapy     Infusion/treatment outcome:  patient tolerated treatment without incident. 30 min post injection VSS - see charting.     Education Record    Learner:  Patient  Barriers / Limitations:  None  Method:  Discussion  Education / instructions given:  Reviewed plan of care and follow up appts  Outcome:  Shows understanding    Discharged Home, Ambulating independently, accompanied by:Self    Patient/family verbalized understanding of future appointments: by printed AVS

## 2023-08-09 LAB
ALBUMIN SERPL ELPH-MCNC: 4.18 G/DL (ref 3.75–5.21)
ALBUMIN/GLOB SERPL: 1.98 {RATIO} (ref 1–2)
ALPHA1 GLOB SERPL ELPH-MCNC: 0.33 G/DL (ref 0.19–0.46)
ALPHA2 GLOB SERPL ELPH-MCNC: 0.91 G/DL (ref 0.48–1.05)
B-GLOBULIN SERPL ELPH-MCNC: 0.66 G/DL (ref 0.68–1.23)
GAMMA GLOB SERPL ELPH-MCNC: 0.23 G/DL (ref 0.62–1.7)
KAPPA LC FREE SER-MCNC: 6.95 MG/DL (ref 0.33–1.94)
KAPPA LC FREE/LAMBDA FREE SER NEPH: 36.6 {RATIO} (ref 0.26–1.65)
LAMBDA LC FREE SERPL-MCNC: 0.19 MG/DL (ref 0.57–2.63)
M PROTEIN 1 SERPL ELPH-MCNC: 0.05 G/DL (ref ?–0)
PROT SERPL-MCNC: 6.3 G/DL (ref 6.4–8.2)

## 2023-08-11 ENCOUNTER — OFFICE VISIT (OUTPATIENT)
Dept: HEMATOLOGY/ONCOLOGY | Age: 73
End: 2023-08-11
Attending: INTERNAL MEDICINE
Payer: MEDICARE

## 2023-08-11 VITALS
WEIGHT: 243 LBS | OXYGEN SATURATION: 99 % | HEART RATE: 53 BPM | HEIGHT: 72.01 IN | TEMPERATURE: 98 F | RESPIRATION RATE: 18 BRPM | DIASTOLIC BLOOD PRESSURE: 65 MMHG | SYSTOLIC BLOOD PRESSURE: 96 MMHG | BODY MASS INDEX: 32.91 KG/M2

## 2023-08-11 DIAGNOSIS — C90.00 MULTIPLE MYELOMA NOT HAVING ACHIEVED REMISSION (HCC): Primary | ICD-10-CM

## 2023-08-11 DIAGNOSIS — R79.89 ELEVATED LFTS: ICD-10-CM

## 2023-08-11 LAB
BASOPHILS # BLD AUTO: 0.01 X10(3) UL (ref 0–0.2)
BASOPHILS NFR BLD AUTO: 0.1 %
EOSINOPHIL # BLD AUTO: 0 X10(3) UL (ref 0–0.7)
EOSINOPHIL NFR BLD AUTO: 0 %
ERYTHROCYTE [DISTWIDTH] IN BLOOD BY AUTOMATED COUNT: 14.6 %
HCT VFR BLD AUTO: 37.3 %
HGB BLD-MCNC: 12 G/DL
IMM GRANULOCYTES # BLD AUTO: 0.03 X10(3) UL (ref 0–1)
IMM GRANULOCYTES NFR BLD: 0.3 %
LYMPHOCYTES # BLD AUTO: 0.16 X10(3) UL (ref 1–4)
LYMPHOCYTES NFR BLD AUTO: 1.5 %
MCH RBC QN AUTO: 34.6 PG (ref 26–34)
MCHC RBC AUTO-ENTMCNC: 32.2 G/DL (ref 31–37)
MCV RBC AUTO: 107.5 FL
MONOCYTES # BLD AUTO: 0.08 X10(3) UL (ref 0.1–1)
MONOCYTES NFR BLD AUTO: 0.8 %
NEUTROPHILS # BLD AUTO: 10.23 X10 (3) UL (ref 1.5–7.7)
NEUTROPHILS # BLD AUTO: 10.23 X10(3) UL (ref 1.5–7.7)
NEUTROPHILS NFR BLD AUTO: 97.3 %
PLATELET # BLD AUTO: 129 10(3)UL (ref 150–450)
RBC # BLD AUTO: 3.47 X10(6)UL
WBC # BLD AUTO: 10.5 X10(3) UL (ref 4–11)

## 2023-08-11 PROCEDURE — 36415 COLL VENOUS BLD VENIPUNCTURE: CPT

## 2023-08-11 PROCEDURE — 85025 COMPLETE CBC W/AUTO DIFF WBC: CPT

## 2023-08-11 PROCEDURE — 96413 CHEMO IV INFUSION 1 HR: CPT

## 2023-08-11 NOTE — PROGRESS NOTES
Pt here for C34D8 Kyprolis. Arrives Ambulating independently, accompanied by Self       Oral medications included in this regimen:  no    Patient confirms comprehension of cancer treatment schedule:  yes    Pregnancy screening:  Not applicable    Modifications in dose or schedule:  No    Medications appearance and physical integrity checked by RN. Chemotherapy IV pump settings verified by 2 RNs:  yes     Frequency of blood return and site check throughout administration: Prior to administration and At completion of therapy     Infusion/treatment outcome:  patient tolerated treatment without incident    Education Record    Learner:  Patient  Barriers / Limitations:  None  Method:  Discussion  Education / instructions given:  Plan of care and next appointments. Outcome:  Shows understanding    Discharged Home, Ambulating independently, accompanied by:Self in stable condition, no new complaints.     Patient/family verbalized understanding of future appointments: by printed AVS

## 2023-08-18 ENCOUNTER — OFFICE VISIT (OUTPATIENT)
Dept: HEMATOLOGY/ONCOLOGY | Age: 73
End: 2023-08-18
Attending: INTERNAL MEDICINE
Payer: MEDICARE

## 2023-08-18 VITALS
SYSTOLIC BLOOD PRESSURE: 99 MMHG | WEIGHT: 240.81 LBS | TEMPERATURE: 98 F | HEIGHT: 72.01 IN | RESPIRATION RATE: 18 BRPM | OXYGEN SATURATION: 98 % | HEART RATE: 49 BPM | BODY MASS INDEX: 32.62 KG/M2 | DIASTOLIC BLOOD PRESSURE: 63 MMHG

## 2023-08-18 DIAGNOSIS — R79.89 ELEVATED LFTS: ICD-10-CM

## 2023-08-18 DIAGNOSIS — E55.9 VITAMIN D DEFICIENCY DUE TO CHRONIC KIDNEY DISEASE: ICD-10-CM

## 2023-08-18 DIAGNOSIS — C90.00 MYELOMA KIDNEY: ICD-10-CM

## 2023-08-18 DIAGNOSIS — C90.00 MULTIPLE MYELOMA, REMISSION STATUS UNSPECIFIED (HCC): Primary | ICD-10-CM

## 2023-08-18 DIAGNOSIS — C90.00 MULTIPLE MYELOMA NOT HAVING ACHIEVED REMISSION (HCC): ICD-10-CM

## 2023-08-18 DIAGNOSIS — N18.30 STAGE 3 CHRONIC KIDNEY DISEASE, UNSPECIFIED WHETHER STAGE 3A OR 3B CKD (HCC): ICD-10-CM

## 2023-08-18 DIAGNOSIS — N18.9 VITAMIN D DEFICIENCY DUE TO CHRONIC KIDNEY DISEASE: ICD-10-CM

## 2023-08-18 DIAGNOSIS — N08 MYELOMA KIDNEY: ICD-10-CM

## 2023-08-18 LAB
ALBUMIN SERPL-MCNC: 3.6 G/DL (ref 3.4–5)
ANION GAP SERPL CALC-SCNC: 4 MMOL/L (ref 0–18)
BASOPHILS # BLD AUTO: 0.02 X10(3) UL (ref 0–0.2)
BASOPHILS NFR BLD AUTO: 0.3 %
BILIRUB UR QL STRIP.AUTO: NEGATIVE
BUN BLD-MCNC: 27 MG/DL (ref 7–18)
CALCIUM BLD-MCNC: 8.9 MG/DL (ref 8.5–10.1)
CHLORIDE SERPL-SCNC: 111 MMOL/L (ref 98–112)
CLARITY UR REFRACT.AUTO: CLEAR
CO2 SERPL-SCNC: 21 MMOL/L (ref 21–32)
COLOR UR AUTO: YELLOW
CREAT BLD-MCNC: 1.43 MG/DL
CREAT UR-SCNC: 29.9 MG/DL
EGFRCR SERPLBLD CKD-EPI 2021: 51 ML/MIN/1.73M2 (ref 60–?)
EOSINOPHIL # BLD AUTO: 0.15 X10(3) UL (ref 0–0.7)
EOSINOPHIL NFR BLD AUTO: 1.9 %
ERYTHROCYTE [DISTWIDTH] IN BLOOD BY AUTOMATED COUNT: 14.2 %
FASTING STATUS PATIENT QL REPORTED: NO
GLUCOSE BLD-MCNC: 101 MG/DL (ref 70–99)
GLUCOSE UR STRIP.AUTO-MCNC: 500 MG/DL
HCT VFR BLD AUTO: 35.1 %
HGB BLD-MCNC: 11.4 G/DL
IMM GRANULOCYTES # BLD AUTO: 0.02 X10(3) UL (ref 0–1)
IMM GRANULOCYTES NFR BLD: 0.3 %
KETONES UR STRIP.AUTO-MCNC: NEGATIVE MG/DL
LEUKOCYTE ESTERASE UR QL STRIP.AUTO: NEGATIVE
LYMPHOCYTES # BLD AUTO: 0.52 X10(3) UL (ref 1–4)
LYMPHOCYTES NFR BLD AUTO: 6.6 %
MAGNESIUM SERPL-MCNC: 2.3 MG/DL (ref 1.6–2.6)
MCH RBC QN AUTO: 35 PG (ref 26–34)
MCHC RBC AUTO-ENTMCNC: 32.5 G/DL (ref 31–37)
MCV RBC AUTO: 107.7 FL
MONOCYTES # BLD AUTO: 0.74 X10(3) UL (ref 0.1–1)
MONOCYTES NFR BLD AUTO: 9.4 %
NEUTROPHILS # BLD AUTO: 6.39 X10 (3) UL (ref 1.5–7.7)
NEUTROPHILS # BLD AUTO: 6.39 X10(3) UL (ref 1.5–7.7)
NEUTROPHILS NFR BLD AUTO: 81.5 %
NITRITE UR QL STRIP.AUTO: NEGATIVE
OSMOLALITY SERPL CALC.SUM OF ELEC: 287 MOSM/KG (ref 275–295)
PH UR STRIP.AUTO: 5 [PH] (ref 5–8)
PHOSPHATE SERPL-MCNC: 3 MG/DL (ref 2.5–4.9)
PLATELET # BLD AUTO: 157 10(3)UL (ref 150–450)
POTASSIUM SERPL-SCNC: 4.1 MMOL/L (ref 3.5–5.1)
PROT UR-MCNC: 21.9 MG/DL
PTH-INTACT SERPL-MCNC: 42.1 PG/ML (ref 18.5–88)
RBC # BLD AUTO: 3.26 X10(6)UL
SODIUM SERPL-SCNC: 136 MMOL/L (ref 136–145)
SP GR UR STRIP.AUTO: 1.01 (ref 1–1.03)
UROBILINOGEN UR STRIP.AUTO-MCNC: 0.2 MG/DL
VIT D+METAB SERPL-MCNC: 32.7 NG/ML (ref 30–100)
WBC # BLD AUTO: 7.8 X10(3) UL (ref 4–11)

## 2023-08-18 PROCEDURE — 85025 COMPLETE CBC W/AUTO DIFF WBC: CPT

## 2023-08-18 PROCEDURE — 83735 ASSAY OF MAGNESIUM: CPT

## 2023-08-18 PROCEDURE — 82570 ASSAY OF URINE CREATININE: CPT

## 2023-08-18 PROCEDURE — 81001 URINALYSIS AUTO W/SCOPE: CPT

## 2023-08-18 PROCEDURE — 96413 CHEMO IV INFUSION 1 HR: CPT

## 2023-08-18 PROCEDURE — 36415 COLL VENOUS BLD VENIPUNCTURE: CPT

## 2023-08-18 PROCEDURE — 81015 MICROSCOPIC EXAM OF URINE: CPT

## 2023-08-18 PROCEDURE — 82040 ASSAY OF SERUM ALBUMIN: CPT

## 2023-08-18 PROCEDURE — 84100 ASSAY OF PHOSPHORUS: CPT

## 2023-08-18 PROCEDURE — 96375 TX/PRO/DX INJ NEW DRUG ADDON: CPT

## 2023-08-18 PROCEDURE — 82306 VITAMIN D 25 HYDROXY: CPT

## 2023-08-18 PROCEDURE — 83970 ASSAY OF PARATHORMONE: CPT

## 2023-08-18 PROCEDURE — 80048 BASIC METABOLIC PNL TOTAL CA: CPT

## 2023-08-18 PROCEDURE — 84156 ASSAY OF PROTEIN URINE: CPT

## 2023-08-18 NOTE — PROGRESS NOTES
Pt here for C34D15 kyprolis. Arrives Ambulating independently, accompanied by Self       Oral medications included in this regimen:  no    Patient confirms comprehension of cancer treatment schedule:  yes    Pregnancy screening:  Not applicable    Modifications in dose or schedule:  No    Medications appearance and physical integrity checked by RN.  Chemotherapy IV pump settings verified by 2 RNs:  yes     Frequency of blood return and site check throughout administration: Prior to administration     Infusion/treatment outcome:  patient tolerated treatment without incident    Education Record    Learner:  Patient  Barriers / Limitations:  None  Method:  Brief focused  Education / instructions given:  chemo admin  Outcome:  Shows understanding    Discharged Home, Ambulating independently, accompanied by:Self    Patient/family verbalized understanding of future appointments: by [facility]

## 2023-08-22 ENCOUNTER — OFFICE VISIT (OUTPATIENT)
Dept: NEPHROLOGY | Facility: CLINIC | Age: 73
End: 2023-08-22
Payer: MEDICARE

## 2023-08-22 VITALS — DIASTOLIC BLOOD PRESSURE: 54 MMHG | SYSTOLIC BLOOD PRESSURE: 112 MMHG | BODY MASS INDEX: 33 KG/M2 | WEIGHT: 245.5 LBS

## 2023-08-22 DIAGNOSIS — N18.9 VITAMIN D DEFICIENCY DUE TO CHRONIC KIDNEY DISEASE: ICD-10-CM

## 2023-08-22 DIAGNOSIS — N18.30 STAGE 3 CHRONIC KIDNEY DISEASE, UNSPECIFIED WHETHER STAGE 3A OR 3B CKD (HCC): Primary | ICD-10-CM

## 2023-08-22 DIAGNOSIS — E55.9 VITAMIN D DEFICIENCY DUE TO CHRONIC KIDNEY DISEASE: ICD-10-CM

## 2023-08-22 PROCEDURE — 99213 OFFICE O/P EST LOW 20 MIN: CPT | Performed by: INTERNAL MEDICINE

## 2023-08-28 ENCOUNTER — PATIENT OUTREACH (OUTPATIENT)
Dept: CASE MANAGEMENT | Age: 73
End: 2023-08-28

## 2023-09-01 ENCOUNTER — OFFICE VISIT (OUTPATIENT)
Dept: HEMATOLOGY/ONCOLOGY | Age: 73
End: 2023-09-01
Attending: INTERNAL MEDICINE
Payer: MEDICARE

## 2023-09-01 VITALS
SYSTOLIC BLOOD PRESSURE: 103 MMHG | TEMPERATURE: 97 F | OXYGEN SATURATION: 98 % | HEART RATE: 49 BPM | RESPIRATION RATE: 18 BRPM | WEIGHT: 246.5 LBS | HEIGHT: 72.01 IN | DIASTOLIC BLOOD PRESSURE: 66 MMHG | BODY MASS INDEX: 33.39 KG/M2

## 2023-09-01 VITALS
SYSTOLIC BLOOD PRESSURE: 94 MMHG | DIASTOLIC BLOOD PRESSURE: 50 MMHG | OXYGEN SATURATION: 98 % | RESPIRATION RATE: 16 BRPM | HEART RATE: 43 BPM

## 2023-09-01 DIAGNOSIS — N18.30 STAGE 3 CHRONIC KIDNEY DISEASE, UNSPECIFIED WHETHER STAGE 3A OR 3B CKD (HCC): ICD-10-CM

## 2023-09-01 DIAGNOSIS — C90.00 MULTIPLE MYELOMA NOT HAVING ACHIEVED REMISSION (HCC): Primary | ICD-10-CM

## 2023-09-01 DIAGNOSIS — M89.9 LYTIC BONE LESIONS ON XRAY: ICD-10-CM

## 2023-09-01 LAB
ALBUMIN SERPL-MCNC: 3.8 G/DL (ref 3.4–5)
ALBUMIN/GLOB SERPL: 1.3 {RATIO} (ref 1–2)
ALP LIVER SERPL-CCNC: 78 U/L
ALT SERPL-CCNC: 18 U/L
ANION GAP SERPL CALC-SCNC: 5 MMOL/L (ref 0–18)
AST SERPL-CCNC: 10 U/L (ref 15–37)
BASOPHILS # BLD AUTO: 0.02 X10(3) UL (ref 0–0.2)
BASOPHILS NFR BLD AUTO: 0.2 %
BILIRUB SERPL-MCNC: 0.6 MG/DL (ref 0.1–2)
BUN BLD-MCNC: 36 MG/DL (ref 7–18)
CALCIUM BLD-MCNC: 9 MG/DL (ref 8.5–10.1)
CHLORIDE SERPL-SCNC: 111 MMOL/L (ref 98–112)
CO2 SERPL-SCNC: 21 MMOL/L (ref 21–32)
CREAT BLD-MCNC: 1.99 MG/DL
EGFRCR SERPLBLD CKD-EPI 2021: 34 ML/MIN/1.73M2 (ref 60–?)
EOSINOPHIL # BLD AUTO: 0.01 X10(3) UL (ref 0–0.7)
EOSINOPHIL NFR BLD AUTO: 0.1 %
ERYTHROCYTE [DISTWIDTH] IN BLOOD BY AUTOMATED COUNT: 13.5 %
FASTING STATUS PATIENT QL REPORTED: NO
GLOBULIN PLAS-MCNC: 2.9 G/DL (ref 2.8–4.4)
GLUCOSE BLD-MCNC: 162 MG/DL (ref 70–99)
HCT VFR BLD AUTO: 38 %
HGB BLD-MCNC: 12.4 G/DL
IGA SERPL-MCNC: <7.83 MG/DL (ref 70–312)
IGM SERPL-MCNC: <5.3 MG/DL (ref 43–279)
IMM GRANULOCYTES # BLD AUTO: 0.05 X10(3) UL (ref 0–1)
IMM GRANULOCYTES NFR BLD: 0.4 %
IMMUNOGLOBULIN PNL SER-MCNC: 186 MG/DL (ref 791–1643)
LYMPHOCYTES # BLD AUTO: 0.2 X10(3) UL (ref 1–4)
LYMPHOCYTES NFR BLD AUTO: 1.7 %
MCH RBC QN AUTO: 35.5 PG (ref 26–34)
MCHC RBC AUTO-ENTMCNC: 32.6 G/DL (ref 31–37)
MCV RBC AUTO: 108.9 FL
MONOCYTES # BLD AUTO: 0.1 X10(3) UL (ref 0.1–1)
MONOCYTES NFR BLD AUTO: 0.9 %
NEUTROPHILS # BLD AUTO: 11.21 X10 (3) UL (ref 1.5–7.7)
NEUTROPHILS # BLD AUTO: 11.21 X10(3) UL (ref 1.5–7.7)
NEUTROPHILS NFR BLD AUTO: 96.7 %
OSMOLALITY SERPL CALC.SUM OF ELEC: 296 MOSM/KG (ref 275–295)
PLATELET # BLD AUTO: 270 10(3)UL (ref 150–450)
POTASSIUM SERPL-SCNC: 4.5 MMOL/L (ref 3.5–5.1)
PROT SERPL-MCNC: 6.7 G/DL (ref 6.4–8.2)
RBC # BLD AUTO: 3.49 X10(6)UL
SODIUM SERPL-SCNC: 137 MMOL/L (ref 136–145)
WBC # BLD AUTO: 11.6 X10(3) UL (ref 4–11)

## 2023-09-01 PROCEDURE — 99215 OFFICE O/P EST HI 40 MIN: CPT | Performed by: NURSE PRACTITIONER

## 2023-09-01 PROCEDURE — 96413 CHEMO IV INFUSION 1 HR: CPT

## 2023-09-01 PROCEDURE — 96401 CHEMO ANTI-NEOPL SQ/IM: CPT

## 2023-09-01 RX ORDER — DIPHENHYDRAMINE HYDROCHLORIDE 50 MG/ML
25 INJECTION INTRAMUSCULAR; INTRAVENOUS ONCE
Status: CANCELLED | OUTPATIENT
Start: 2023-09-01

## 2023-09-01 RX ORDER — ACETAMINOPHEN 325 MG/1
650 TABLET ORAL ONCE
Status: CANCELLED | OUTPATIENT
Start: 2023-09-01

## 2023-09-06 LAB
ALBUMIN SERPL ELPH-MCNC: 4.27 G/DL (ref 3.75–5.21)
ALBUMIN/GLOB SERPL: 2.11 {RATIO} (ref 1–2)
ALPHA1 GLOB SERPL ELPH-MCNC: 0.3 G/DL (ref 0.19–0.46)
ALPHA2 GLOB SERPL ELPH-MCNC: 0.92 G/DL (ref 0.48–1.05)
B-GLOBULIN SERPL ELPH-MCNC: 0.61 G/DL (ref 0.68–1.23)
GAMMA GLOB SERPL ELPH-MCNC: 0.2 G/DL (ref 0.62–1.7)
KAPPA LC FREE SER-MCNC: 8.09 MG/DL (ref 0.33–1.94)
KAPPA LC FREE/LAMBDA FREE SER NEPH: 40.45 {RATIO} (ref 0.26–1.65)
LAMBDA LC FREE SERPL-MCNC: 0.2 MG/DL (ref 0.57–2.63)
M PROTEIN 1 SERPL ELPH-MCNC: 0.04 G/DL (ref ?–0)
PROT SERPL-MCNC: 6.3 G/DL (ref 6.4–8.2)

## 2023-09-07 ENCOUNTER — OFFICE VISIT (OUTPATIENT)
Dept: FAMILY MEDICINE CLINIC | Facility: CLINIC | Age: 73
End: 2023-09-07
Payer: MEDICARE

## 2023-09-07 VITALS
HEIGHT: 72 IN | BODY MASS INDEX: 33.32 KG/M2 | RESPIRATION RATE: 18 BRPM | SYSTOLIC BLOOD PRESSURE: 108 MMHG | TEMPERATURE: 97 F | DIASTOLIC BLOOD PRESSURE: 48 MMHG | HEART RATE: 51 BPM | OXYGEN SATURATION: 100 % | WEIGHT: 246 LBS

## 2023-09-07 DIAGNOSIS — E11.9 DIABETES MELLITUS WITH COINCIDENT HYPERTENSION: Primary | ICD-10-CM

## 2023-09-07 DIAGNOSIS — I10 DIABETES MELLITUS WITH COINCIDENT HYPERTENSION: Primary | ICD-10-CM

## 2023-09-07 PROCEDURE — 99214 OFFICE O/P EST MOD 30 MIN: CPT | Performed by: FAMILY MEDICINE

## 2023-09-08 ENCOUNTER — OFFICE VISIT (OUTPATIENT)
Dept: HEMATOLOGY/ONCOLOGY | Age: 73
End: 2023-09-08
Attending: INTERNAL MEDICINE
Payer: MEDICARE

## 2023-09-08 VITALS
OXYGEN SATURATION: 98 % | BODY MASS INDEX: 33 KG/M2 | SYSTOLIC BLOOD PRESSURE: 90 MMHG | HEART RATE: 54 BPM | DIASTOLIC BLOOD PRESSURE: 60 MMHG | HEIGHT: 72.01 IN | WEIGHT: 243.63 LBS | RESPIRATION RATE: 18 BRPM | TEMPERATURE: 97 F

## 2023-09-08 DIAGNOSIS — R79.89 ELEVATED LFTS: Primary | ICD-10-CM

## 2023-09-08 DIAGNOSIS — C90.00 MULTIPLE MYELOMA NOT HAVING ACHIEVED REMISSION (HCC): ICD-10-CM

## 2023-09-08 LAB
ANION GAP SERPL CALC-SCNC: 4 MMOL/L (ref 0–18)
BASOPHILS # BLD AUTO: 0.01 X10(3) UL (ref 0–0.2)
BASOPHILS NFR BLD AUTO: 0.1 %
BUN BLD-MCNC: 36 MG/DL (ref 7–18)
CALCIUM BLD-MCNC: 9.4 MG/DL (ref 8.5–10.1)
CHLORIDE SERPL-SCNC: 114 MMOL/L (ref 98–112)
CO2 SERPL-SCNC: 20 MMOL/L (ref 21–32)
CREAT BLD-MCNC: 1.51 MG/DL
EGFRCR SERPLBLD CKD-EPI 2021: 48 ML/MIN/1.73M2 (ref 60–?)
EOSINOPHIL # BLD AUTO: 0 X10(3) UL (ref 0–0.7)
EOSINOPHIL NFR BLD AUTO: 0 %
ERYTHROCYTE [DISTWIDTH] IN BLOOD BY AUTOMATED COUNT: 13.7 %
GLUCOSE BLD-MCNC: 153 MG/DL (ref 70–99)
HCT VFR BLD AUTO: 41.1 %
HGB BLD-MCNC: 13.3 G/DL
IMM GRANULOCYTES # BLD AUTO: 0.05 X10(3) UL (ref 0–1)
IMM GRANULOCYTES NFR BLD: 0.4 %
LYMPHOCYTES # BLD AUTO: 0.17 X10(3) UL (ref 1–4)
LYMPHOCYTES NFR BLD AUTO: 1.4 %
MCH RBC QN AUTO: 35.3 PG (ref 26–34)
MCHC RBC AUTO-ENTMCNC: 32.4 G/DL (ref 31–37)
MCV RBC AUTO: 109 FL
MONOCYTES # BLD AUTO: 0.09 X10(3) UL (ref 0.1–1)
MONOCYTES NFR BLD AUTO: 0.8 %
NEUTROPHILS # BLD AUTO: 11.46 X10 (3) UL (ref 1.5–7.7)
NEUTROPHILS # BLD AUTO: 11.46 X10(3) UL (ref 1.5–7.7)
NEUTROPHILS NFR BLD AUTO: 97.3 %
OSMOLALITY SERPL CALC.SUM OF ELEC: 297 MOSM/KG (ref 275–295)
PLATELET # BLD AUTO: 126 10(3)UL (ref 150–450)
POTASSIUM SERPL-SCNC: 4.6 MMOL/L (ref 3.5–5.1)
RBC # BLD AUTO: 3.77 X10(6)UL
SODIUM SERPL-SCNC: 138 MMOL/L (ref 136–145)
WBC # BLD AUTO: 11.8 X10(3) UL (ref 4–11)

## 2023-09-08 PROCEDURE — 85025 COMPLETE CBC W/AUTO DIFF WBC: CPT

## 2023-09-08 PROCEDURE — 96413 CHEMO IV INFUSION 1 HR: CPT

## 2023-09-08 PROCEDURE — 80048 BASIC METABOLIC PNL TOTAL CA: CPT

## 2023-09-08 PROCEDURE — 36415 COLL VENOUS BLD VENIPUNCTURE: CPT

## 2023-09-08 NOTE — PROGRESS NOTES
Pt here for C35 D8 Kyprolis. Arrives Ambulating independently, accompanied by Self       Oral medications included in this regimen:  no    Patient confirms comprehension of cancer treatment schedule:  yes    Pregnancy screening:  Not applicable    Modifications in dose or schedule:  No    Medications appearance and physical integrity checked by RN. Chemotherapy IV pump settings verified by 2 RNs:  yes     Frequency of blood return and site check throughout administration: Prior to administration and At completion of therapy     Infusion/treatment outcome:  patient tolerated treatment without incident    Education Record    Learner:  Patient  Barriers / Limitations:  None  Method:  Discussion  Education / instructions given:  Reviewed plan of care and follow up appts.   Outcome:  Shows understanding    Discharged Home, Ambulating independently, accompanied by:Self    Patient/family verbalized understanding of future appointments: by printed AVS

## 2023-09-15 ENCOUNTER — OFFICE VISIT (OUTPATIENT)
Dept: HEMATOLOGY/ONCOLOGY | Age: 73
End: 2023-09-15
Attending: INTERNAL MEDICINE
Payer: MEDICARE

## 2023-09-15 VITALS
RESPIRATION RATE: 18 BRPM | HEIGHT: 72.01 IN | HEART RATE: 53 BPM | OXYGEN SATURATION: 98 % | SYSTOLIC BLOOD PRESSURE: 85 MMHG | WEIGHT: 244 LBS | BODY MASS INDEX: 33.05 KG/M2 | DIASTOLIC BLOOD PRESSURE: 54 MMHG | TEMPERATURE: 97 F

## 2023-09-15 DIAGNOSIS — R79.89 ELEVATED LFTS: Primary | ICD-10-CM

## 2023-09-15 DIAGNOSIS — C90.00 MULTIPLE MYELOMA NOT HAVING ACHIEVED REMISSION (HCC): ICD-10-CM

## 2023-09-15 DIAGNOSIS — C90.00 MULTIPLE MYELOMA, REMISSION STATUS UNSPECIFIED (HCC): ICD-10-CM

## 2023-09-15 LAB
ALBUMIN SERPL-MCNC: 3.9 G/DL (ref 3.4–5)
BASOPHILS # BLD AUTO: 0.01 X10(3) UL (ref 0–0.2)
BASOPHILS NFR BLD AUTO: 0.1 %
CALCIUM BLD-MCNC: 9.7 MG/DL (ref 8.5–10.1)
CREAT BLD-MCNC: 1.68 MG/DL
EGFRCR SERPLBLD CKD-EPI 2021: 42 ML/MIN/1.73M2 (ref 60–?)
EOSINOPHIL # BLD AUTO: 0.01 X10(3) UL (ref 0–0.7)
EOSINOPHIL NFR BLD AUTO: 0.1 %
ERYTHROCYTE [DISTWIDTH] IN BLOOD BY AUTOMATED COUNT: 13.3 %
HCT VFR BLD AUTO: 39.3 %
HGB BLD-MCNC: 12.8 G/DL
IMM GRANULOCYTES # BLD AUTO: 0.03 X10(3) UL (ref 0–1)
IMM GRANULOCYTES NFR BLD: 0.3 %
LYMPHOCYTES # BLD AUTO: 0.13 X10(3) UL (ref 1–4)
LYMPHOCYTES NFR BLD AUTO: 1.1 %
MCH RBC QN AUTO: 35 PG (ref 26–34)
MCHC RBC AUTO-ENTMCNC: 32.6 G/DL (ref 31–37)
MCV RBC AUTO: 107.4 FL
MONOCYTES # BLD AUTO: 0.08 X10(3) UL (ref 0.1–1)
MONOCYTES NFR BLD AUTO: 0.7 %
NEUTROPHILS # BLD AUTO: 11.56 X10 (3) UL (ref 1.5–7.7)
NEUTROPHILS # BLD AUTO: 11.56 X10(3) UL (ref 1.5–7.7)
NEUTROPHILS NFR BLD AUTO: 97.7 %
PLATELET # BLD AUTO: 152 10(3)UL (ref 150–450)
RBC # BLD AUTO: 3.66 X10(6)UL
WBC # BLD AUTO: 11.8 X10(3) UL (ref 4–11)

## 2023-09-15 PROCEDURE — 96367 TX/PROPH/DG ADDL SEQ IV INF: CPT

## 2023-09-15 PROCEDURE — 85025 COMPLETE CBC W/AUTO DIFF WBC: CPT

## 2023-09-15 PROCEDURE — 36415 COLL VENOUS BLD VENIPUNCTURE: CPT

## 2023-09-15 PROCEDURE — 96413 CHEMO IV INFUSION 1 HR: CPT

## 2023-09-15 PROCEDURE — 82565 ASSAY OF CREATININE: CPT

## 2023-09-15 PROCEDURE — 82040 ASSAY OF SERUM ALBUMIN: CPT

## 2023-09-15 PROCEDURE — 82310 ASSAY OF CALCIUM: CPT

## 2023-09-22 ENCOUNTER — PATIENT OUTREACH (OUTPATIENT)
Dept: CASE MANAGEMENT | Age: 73
End: 2023-09-22

## 2023-09-29 ENCOUNTER — OFFICE VISIT (OUTPATIENT)
Dept: HEMATOLOGY/ONCOLOGY | Age: 73
End: 2023-09-29
Attending: INTERNAL MEDICINE
Payer: MEDICARE

## 2023-09-29 ENCOUNTER — APPOINTMENT (OUTPATIENT)
Dept: HEMATOLOGY/ONCOLOGY | Age: 73
End: 2023-09-29
Attending: INTERNAL MEDICINE
Payer: MEDICARE

## 2023-09-29 VITALS
RESPIRATION RATE: 18 BRPM | SYSTOLIC BLOOD PRESSURE: 101 MMHG | WEIGHT: 245.5 LBS | HEART RATE: 62 BPM | TEMPERATURE: 97 F | BODY MASS INDEX: 33.25 KG/M2 | OXYGEN SATURATION: 96 % | HEIGHT: 72.01 IN | DIASTOLIC BLOOD PRESSURE: 66 MMHG

## 2023-09-29 VITALS
TEMPERATURE: 98 F | SYSTOLIC BLOOD PRESSURE: 117 MMHG | RESPIRATION RATE: 16 BRPM | DIASTOLIC BLOOD PRESSURE: 69 MMHG | OXYGEN SATURATION: 99 % | HEART RATE: 51 BPM

## 2023-09-29 DIAGNOSIS — I10 DIABETES MELLITUS WITH COINCIDENT HYPERTENSION: ICD-10-CM

## 2023-09-29 DIAGNOSIS — M89.9 LYTIC BONE LESIONS ON XRAY: ICD-10-CM

## 2023-09-29 DIAGNOSIS — C90.00 MULTIPLE MYELOMA NOT HAVING ACHIEVED REMISSION (HCC): Primary | ICD-10-CM

## 2023-09-29 DIAGNOSIS — D63.0 ANEMIA COMPLICATING NEOPLASTIC DISEASE: ICD-10-CM

## 2023-09-29 DIAGNOSIS — R79.89 ELEVATED LFTS: ICD-10-CM

## 2023-09-29 DIAGNOSIS — N18.30 STAGE 3 CHRONIC KIDNEY DISEASE, UNSPECIFIED WHETHER STAGE 3A OR 3B CKD (HCC): ICD-10-CM

## 2023-09-29 DIAGNOSIS — E11.9 DIABETES MELLITUS WITH COINCIDENT HYPERTENSION: ICD-10-CM

## 2023-09-29 LAB
ALBUMIN SERPL-MCNC: 3.7 G/DL (ref 3.4–5)
ALBUMIN/GLOB SERPL: 1.2 {RATIO} (ref 1–2)
ALP LIVER SERPL-CCNC: 80 U/L
ALT SERPL-CCNC: 22 U/L
ANION GAP SERPL CALC-SCNC: 6 MMOL/L (ref 0–18)
AST SERPL-CCNC: 14 U/L (ref 15–37)
BASOPHILS # BLD AUTO: 0.01 X10(3) UL (ref 0–0.2)
BASOPHILS NFR BLD AUTO: 0.1 %
BILIRUB SERPL-MCNC: 0.8 MG/DL (ref 0.1–2)
BUN BLD-MCNC: 25 MG/DL (ref 7–18)
CALCIUM BLD-MCNC: 9.2 MG/DL (ref 8.5–10.1)
CHLORIDE SERPL-SCNC: 111 MMOL/L (ref 98–112)
CO2 SERPL-SCNC: 22 MMOL/L (ref 21–32)
CREAT BLD-MCNC: 1.53 MG/DL
EGFRCR SERPLBLD CKD-EPI 2021: 47 ML/MIN/1.73M2 (ref 60–?)
EOSINOPHIL # BLD AUTO: 0 X10(3) UL (ref 0–0.7)
EOSINOPHIL NFR BLD AUTO: 0 %
ERYTHROCYTE [DISTWIDTH] IN BLOOD BY AUTOMATED COUNT: 13 %
EST. AVERAGE GLUCOSE BLD GHB EST-MCNC: 103 MG/DL (ref 68–126)
GLOBULIN PLAS-MCNC: 3 G/DL (ref 2.8–4.4)
GLUCOSE BLD-MCNC: 143 MG/DL (ref 70–99)
HBA1C MFR BLD: 5.2 % (ref ?–5.7)
HCT VFR BLD AUTO: 37.6 %
HGB BLD-MCNC: 12.4 G/DL
IGA SERPL-MCNC: <7.83 MG/DL (ref 70–312)
IGM SERPL-MCNC: <5.3 MG/DL (ref 43–279)
IMM GRANULOCYTES # BLD AUTO: 0.02 X10(3) UL (ref 0–1)
IMM GRANULOCYTES NFR BLD: 0.2 %
IMMUNOGLOBULIN PNL SER-MCNC: 195 MG/DL (ref 791–1643)
LYMPHOCYTES # BLD AUTO: 0.13 X10(3) UL (ref 1–4)
LYMPHOCYTES NFR BLD AUTO: 1.3 %
MCH RBC QN AUTO: 35.2 PG (ref 26–34)
MCHC RBC AUTO-ENTMCNC: 33 G/DL (ref 31–37)
MCV RBC AUTO: 106.8 FL
MONOCYTES # BLD AUTO: 0.11 X10(3) UL (ref 0.1–1)
MONOCYTES NFR BLD AUTO: 1.1 %
NEUTROPHILS # BLD AUTO: 9.71 X10 (3) UL (ref 1.5–7.7)
NEUTROPHILS # BLD AUTO: 9.71 X10(3) UL (ref 1.5–7.7)
NEUTROPHILS NFR BLD AUTO: 97.3 %
OSMOLALITY SERPL CALC.SUM OF ELEC: 295 MOSM/KG (ref 275–295)
PLATELET # BLD AUTO: 251 10(3)UL (ref 150–450)
POTASSIUM SERPL-SCNC: 4.1 MMOL/L (ref 3.5–5.1)
PROT SERPL-MCNC: 6.7 G/DL (ref 6.4–8.2)
RBC # BLD AUTO: 3.52 X10(6)UL
SODIUM SERPL-SCNC: 139 MMOL/L (ref 136–145)
WBC # BLD AUTO: 10 X10(3) UL (ref 4–11)

## 2023-09-29 PROCEDURE — 96413 CHEMO IV INFUSION 1 HR: CPT

## 2023-09-29 PROCEDURE — 96401 CHEMO ANTI-NEOPL SQ/IM: CPT

## 2023-09-29 PROCEDURE — 99214 OFFICE O/P EST MOD 30 MIN: CPT | Performed by: INTERNAL MEDICINE

## 2023-09-29 RX ORDER — ACETAMINOPHEN 325 MG/1
650 TABLET ORAL ONCE
Status: CANCELLED | OUTPATIENT
Start: 2023-09-29

## 2023-09-29 RX ORDER — DIPHENHYDRAMINE HYDROCHLORIDE 50 MG/ML
25 INJECTION INTRAMUSCULAR; INTRAVENOUS ONCE
Status: CANCELLED | OUTPATIENT
Start: 2023-09-29

## 2023-09-29 NOTE — PROGRESS NOTES
Patient is here for follow up for myeloma on C36 of kyprolis and darzalex. He took his premeds at 1000 this morning. He is feeling fairly well. He is eating well. He continues to be active. He rests in the afternoon. He denies any fever, cough or shortness of breath. He had his RSV, covid and flu vaccines.      Education Record    Learner:  Patient    Disease / Diagnosis: myeloma    Barriers / Limitations:  None   Comments:    Method:  Discussion   Comments:    General Topics:  Side effects and symptom management   Comments:    Outcome:  Shows understanding   Comments:

## 2023-09-29 NOTE — PROGRESS NOTES
Pt here for C36 D1 Kyprolis/Faspro. Arrives Ambulating independently, accompanied by Self       Oral medications included in this regimen:  no    Patient confirms comprehension of cancer treatment schedule:  yes    Pregnancy screening:  Not applicable    Modifications in dose or schedule:  No    Medications appearance and physical integrity checked by RN. Chemotherapy IV pump settings verified by 2 RNs:  yes     Frequency of blood return and site check throughout administration: Prior to administration and At completion of therapy     Infusion/treatment outcome:  patient tolerated treatment without incident    Education Record    Learner:  Patient  Barriers / Limitations:  None  Method:  Discussion  Education / instructions given:  Reviewed plan of care and follow up appts.   Outcome:  Shows understanding    Discharged Home, Ambulating independently, accompanied by:Self    Patient/family verbalized understanding of future appointments: by printed AVS

## 2023-10-02 LAB
ALBUMIN SERPL ELPH-MCNC: 4.05 G/DL (ref 3.75–5.21)
ALBUMIN/GLOB SERPL: 1.89 {RATIO} (ref 1–2)
ALPHA1 GLOB SERPL ELPH-MCNC: 0.34 G/DL (ref 0.19–0.46)
ALPHA2 GLOB SERPL ELPH-MCNC: 0.95 G/DL (ref 0.48–1.05)
B-GLOBULIN SERPL ELPH-MCNC: 0.64 G/DL (ref 0.68–1.23)
GAMMA GLOB SERPL ELPH-MCNC: 0.21 G/DL (ref 0.62–1.7)
KAPPA LC FREE SER-MCNC: 8.77 MG/DL (ref 0.33–1.94)
KAPPA LC FREE/LAMBDA FREE SER NEPH: 53.49 {RATIO} (ref 0.26–1.65)
LAMBDA LC FREE SERPL-MCNC: 0.16 MG/DL (ref 0.57–2.63)
M PROTEIN 1 SERPL ELPH-MCNC: 0.04 G/DL (ref ?–0)
PROT SERPL-MCNC: 6.2 G/DL (ref 6.4–8.2)

## 2023-10-06 ENCOUNTER — OFFICE VISIT (OUTPATIENT)
Dept: HEMATOLOGY/ONCOLOGY | Age: 73
End: 2023-10-06
Attending: INTERNAL MEDICINE
Payer: MEDICARE

## 2023-10-06 VITALS
HEART RATE: 53 BPM | WEIGHT: 241.5 LBS | DIASTOLIC BLOOD PRESSURE: 60 MMHG | RESPIRATION RATE: 18 BRPM | HEIGHT: 72.01 IN | OXYGEN SATURATION: 96 % | BODY MASS INDEX: 32.71 KG/M2 | SYSTOLIC BLOOD PRESSURE: 109 MMHG | TEMPERATURE: 97 F

## 2023-10-06 DIAGNOSIS — R79.89 ELEVATED LFTS: ICD-10-CM

## 2023-10-06 DIAGNOSIS — C90.00 MULTIPLE MYELOMA NOT HAVING ACHIEVED REMISSION (HCC): Primary | ICD-10-CM

## 2023-10-06 LAB
BASOPHILS # BLD AUTO: 0.01 X10(3) UL (ref 0–0.2)
BASOPHILS NFR BLD AUTO: 0.1 %
EOSINOPHIL # BLD AUTO: 0.01 X10(3) UL (ref 0–0.7)
EOSINOPHIL NFR BLD AUTO: 0.1 %
ERYTHROCYTE [DISTWIDTH] IN BLOOD BY AUTOMATED COUNT: 13.1 %
HCT VFR BLD AUTO: 39.1 %
HGB BLD-MCNC: 13 G/DL
IMM GRANULOCYTES # BLD AUTO: 0.04 X10(3) UL (ref 0–1)
IMM GRANULOCYTES NFR BLD: 0.3 %
LYMPHOCYTES # BLD AUTO: 0.11 X10(3) UL (ref 1–4)
LYMPHOCYTES NFR BLD AUTO: 0.8 %
MCH RBC QN AUTO: 35.3 PG (ref 26–34)
MCHC RBC AUTO-ENTMCNC: 33.2 G/DL (ref 31–37)
MCV RBC AUTO: 106.3 FL
MONOCYTES # BLD AUTO: 0.11 X10(3) UL (ref 0.1–1)
MONOCYTES NFR BLD AUTO: 0.8 %
NEUTROPHILS # BLD AUTO: 12.78 X10 (3) UL (ref 1.5–7.7)
NEUTROPHILS # BLD AUTO: 12.78 X10(3) UL (ref 1.5–7.7)
NEUTROPHILS NFR BLD AUTO: 97.9 %
PLATELET # BLD AUTO: 136 10(3)UL (ref 150–450)
RBC # BLD AUTO: 3.68 X10(6)UL
WBC # BLD AUTO: 13.1 X10(3) UL (ref 4–11)

## 2023-10-06 PROCEDURE — 96413 CHEMO IV INFUSION 1 HR: CPT

## 2023-10-06 PROCEDURE — 85025 COMPLETE CBC W/AUTO DIFF WBC: CPT

## 2023-10-06 NOTE — PROGRESS NOTES
Pt here for C36D8 . Pt voices no complaints today. Arrives Ambulating independently, accompanied by Self        Oral medications included in this regimen:  no     Patient confirms comprehension of cancer treatment schedule:  yes     Pregnancy screening:  Not applicable     Modifications in dose or schedule:  No     Medications appearance and physical integrity checked by RN. Chemotherapy IV pump settings verified by 2 RNs:  yes      Frequency of blood return and site check throughout administration: Prior to administration      Infusion/treatment outcome:  patient tolerated treatment without incident     Education Record     Learner:  Patient  Barriers / Limitations:  None  Method:  Discussion  Education / instructions given: Instructed pt on drug administration  Outcome:  Shows understanding     Discharged Home, Ambulating independently, accompanied by:Self     Patient/family verbalized understanding of future appointments: by printed AVS  Pt dc home ambulatory in stable condition, no complaints. AVS provided.

## 2023-10-13 ENCOUNTER — OFFICE VISIT (OUTPATIENT)
Dept: HEMATOLOGY/ONCOLOGY | Age: 73
End: 2023-10-13
Attending: INTERNAL MEDICINE
Payer: MEDICARE

## 2023-10-13 VITALS
DIASTOLIC BLOOD PRESSURE: 57 MMHG | HEART RATE: 57 BPM | SYSTOLIC BLOOD PRESSURE: 92 MMHG | BODY MASS INDEX: 33.12 KG/M2 | TEMPERATURE: 97 F | RESPIRATION RATE: 18 BRPM | OXYGEN SATURATION: 100 % | WEIGHT: 244.5 LBS | HEIGHT: 72.01 IN

## 2023-10-13 DIAGNOSIS — R79.89 ELEVATED LFTS: ICD-10-CM

## 2023-10-13 DIAGNOSIS — C90.00 MULTIPLE MYELOMA, REMISSION STATUS UNSPECIFIED (HCC): Primary | ICD-10-CM

## 2023-10-13 DIAGNOSIS — C90.00 MULTIPLE MYELOMA NOT HAVING ACHIEVED REMISSION (HCC): ICD-10-CM

## 2023-10-13 LAB
ALBUMIN SERPL-MCNC: 3.8 G/DL (ref 3.4–5)
BASOPHILS # BLD AUTO: 0.02 X10(3) UL (ref 0–0.2)
BASOPHILS NFR BLD AUTO: 0.1 %
CALCIUM BLD-MCNC: 9.9 MG/DL (ref 8.5–10.1)
CREAT BLD-MCNC: 1.74 MG/DL
EGFRCR SERPLBLD CKD-EPI 2021: 40 ML/MIN/1.73M2 (ref 60–?)
EOSINOPHIL # BLD AUTO: 0.01 X10(3) UL (ref 0–0.7)
EOSINOPHIL NFR BLD AUTO: 0.1 %
ERYTHROCYTE [DISTWIDTH] IN BLOOD BY AUTOMATED COUNT: 13.1 %
HCT VFR BLD AUTO: 39.1 %
HGB BLD-MCNC: 13.2 G/DL
IMM GRANULOCYTES # BLD AUTO: 0.08 X10(3) UL (ref 0–1)
IMM GRANULOCYTES NFR BLD: 0.5 %
LYMPHOCYTES # BLD AUTO: 0.19 X10(3) UL (ref 1–4)
LYMPHOCYTES NFR BLD AUTO: 1.2 %
MCH RBC QN AUTO: 35.9 PG (ref 26–34)
MCHC RBC AUTO-ENTMCNC: 33.8 G/DL (ref 31–37)
MCV RBC AUTO: 106.3 FL
MONOCYTES # BLD AUTO: 0.08 X10(3) UL (ref 0.1–1)
MONOCYTES NFR BLD AUTO: 0.5 %
NEUTROPHILS # BLD AUTO: 14.93 X10 (3) UL (ref 1.5–7.7)
NEUTROPHILS # BLD AUTO: 14.93 X10(3) UL (ref 1.5–7.7)
NEUTROPHILS NFR BLD AUTO: 97.6 %
PLATELET # BLD AUTO: 185 10(3)UL (ref 150–450)
RBC # BLD AUTO: 3.68 X10(6)UL
WBC # BLD AUTO: 15.3 X10(3) UL (ref 4–11)

## 2023-10-13 PROCEDURE — 82040 ASSAY OF SERUM ALBUMIN: CPT

## 2023-10-13 PROCEDURE — 82565 ASSAY OF CREATININE: CPT

## 2023-10-13 PROCEDURE — 82310 ASSAY OF CALCIUM: CPT

## 2023-10-13 PROCEDURE — 96367 TX/PROPH/DG ADDL SEQ IV INF: CPT

## 2023-10-13 PROCEDURE — 85025 COMPLETE CBC W/AUTO DIFF WBC: CPT

## 2023-10-13 PROCEDURE — 36415 COLL VENOUS BLD VENIPUNCTURE: CPT

## 2023-10-13 PROCEDURE — 96413 CHEMO IV INFUSION 1 HR: CPT

## 2023-10-13 NOTE — PROGRESS NOTES
Pt here for C36 D15 Kyprolis/Zometa. Arrives Ambulating independently, accompanied by Self       Oral medications included in this regimen:  yes - dexamethasone    Patient confirms comprehension of cancer treatment schedule:  yes    Pregnancy screening:  Not applicable    Modifications in dose or schedule:  No    Medications appearance and physical integrity checked by RN. Chemotherapy IV pump settings verified by 2 RNs:  yes     Frequency of blood return and site check throughout administration: Prior to administration and At completion of therapy     Infusion/treatment outcome:  patient tolerated treatment without incident    Education Record    Learner:  Patient  Barriers / Limitations:  None  Method:  Discussion  Education / instructions given:  Reviewed plan of care and follow up appts.   Outcome:  Shows understanding    Discharged Home, Ambulating independently, accompanied by:Self    Patient/family verbalized understanding of future appointments: by printed AVS

## 2023-10-23 ENCOUNTER — PATIENT OUTREACH (OUTPATIENT)
Dept: CASE MANAGEMENT | Age: 73
End: 2023-10-23

## 2023-10-23 NOTE — PROGRESS NOTES
Reviewed pt's chart including recent visits. Attempted to contact pt for monthly outreach no answer left detailed message for pt to call back. Sending letter.     Total time spent with patient including chart review: 2  Time spent with patient this month: 2    Total time spent with communication and chart review this month to date: 2

## 2023-10-24 ENCOUNTER — TELEPHONE (OUTPATIENT)
Dept: FAMILY MEDICINE CLINIC | Facility: CLINIC | Age: 73
End: 2023-10-24

## 2023-10-27 ENCOUNTER — OFFICE VISIT (OUTPATIENT)
Dept: HEMATOLOGY/ONCOLOGY | Age: 73
End: 2023-10-27
Attending: INTERNAL MEDICINE

## 2023-10-27 VITALS
TEMPERATURE: 97 F | DIASTOLIC BLOOD PRESSURE: 60 MMHG | WEIGHT: 246 LBS | HEIGHT: 72.01 IN | RESPIRATION RATE: 18 BRPM | OXYGEN SATURATION: 97 % | HEART RATE: 60 BPM | BODY MASS INDEX: 33.32 KG/M2 | SYSTOLIC BLOOD PRESSURE: 97 MMHG

## 2023-10-27 DIAGNOSIS — C90.00 MULTIPLE MYELOMA NOT HAVING ACHIEVED REMISSION (HCC): Primary | ICD-10-CM

## 2023-10-27 DIAGNOSIS — M89.9 LYTIC BONE LESIONS ON XRAY: ICD-10-CM

## 2023-10-27 DIAGNOSIS — R79.89 ELEVATED LFTS: ICD-10-CM

## 2023-10-27 DIAGNOSIS — D63.0 ANEMIA COMPLICATING NEOPLASTIC DISEASE: ICD-10-CM

## 2023-10-27 DIAGNOSIS — N18.30 STAGE 3 CHRONIC KIDNEY DISEASE, UNSPECIFIED WHETHER STAGE 3A OR 3B CKD (HCC): ICD-10-CM

## 2023-10-27 LAB
ALBUMIN SERPL-MCNC: 3.8 G/DL (ref 3.4–5)
ALBUMIN/GLOB SERPL: 1.4 {RATIO} (ref 1–2)
ALP LIVER SERPL-CCNC: 80 U/L
ALT SERPL-CCNC: 18 U/L
ANION GAP SERPL CALC-SCNC: 5 MMOL/L (ref 0–18)
AST SERPL-CCNC: 8 U/L (ref 15–37)
BASOPHILS # BLD AUTO: 0.03 X10(3) UL (ref 0–0.2)
BASOPHILS NFR BLD AUTO: 0.2 %
BILIRUB SERPL-MCNC: 1 MG/DL (ref 0.1–2)
BUN BLD-MCNC: 35 MG/DL (ref 7–18)
CALCIUM BLD-MCNC: 8.7 MG/DL (ref 8.5–10.1)
CHLORIDE SERPL-SCNC: 111 MMOL/L (ref 98–112)
CO2 SERPL-SCNC: 20 MMOL/L (ref 21–32)
CREAT BLD-MCNC: 1.73 MG/DL
EGFRCR SERPLBLD CKD-EPI 2021: 41 ML/MIN/1.73M2 (ref 60–?)
EOSINOPHIL # BLD AUTO: 0.01 X10(3) UL (ref 0–0.7)
EOSINOPHIL NFR BLD AUTO: 0.1 %
ERYTHROCYTE [DISTWIDTH] IN BLOOD BY AUTOMATED COUNT: 12.9 %
FASTING STATUS PATIENT QL REPORTED: NO
GLOBULIN PLAS-MCNC: 2.8 G/DL (ref 2.8–4.4)
GLUCOSE BLD-MCNC: 149 MG/DL (ref 70–99)
HCT VFR BLD AUTO: 37.7 %
HGB BLD-MCNC: 12.3 G/DL
IGA SERPL-MCNC: <7.83 MG/DL (ref 70–312)
IGM SERPL-MCNC: <5.3 MG/DL (ref 43–279)
IMM GRANULOCYTES # BLD AUTO: 0.05 X10(3) UL (ref 0–1)
IMM GRANULOCYTES NFR BLD: 0.4 %
IMMUNOGLOBULIN PNL SER-MCNC: 227 MG/DL (ref 791–1643)
LYMPHOCYTES # BLD AUTO: 0.2 X10(3) UL (ref 1–4)
LYMPHOCYTES NFR BLD AUTO: 1.4 %
MCH RBC QN AUTO: 34.5 PG (ref 26–34)
MCHC RBC AUTO-ENTMCNC: 32.6 G/DL (ref 31–37)
MCV RBC AUTO: 105.6 FL
MONOCYTES # BLD AUTO: 0.09 X10(3) UL (ref 0.1–1)
MONOCYTES NFR BLD AUTO: 0.6 %
NEUTROPHILS # BLD AUTO: 13.53 X10 (3) UL (ref 1.5–7.7)
NEUTROPHILS # BLD AUTO: 13.53 X10(3) UL (ref 1.5–7.7)
NEUTROPHILS NFR BLD AUTO: 97.3 %
OSMOLALITY SERPL CALC.SUM OF ELEC: 293 MOSM/KG (ref 275–295)
PLATELET # BLD AUTO: 278 10(3)UL (ref 150–450)
POTASSIUM SERPL-SCNC: 4.4 MMOL/L (ref 3.5–5.1)
PROT SERPL-MCNC: 6.6 G/DL (ref 6.4–8.2)
RBC # BLD AUTO: 3.57 X10(6)UL
SODIUM SERPL-SCNC: 136 MMOL/L (ref 136–145)
WBC # BLD AUTO: 13.9 X10(3) UL (ref 4–11)

## 2023-10-27 PROCEDURE — 96401 CHEMO ANTI-NEOPL SQ/IM: CPT

## 2023-10-27 PROCEDURE — 99214 OFFICE O/P EST MOD 30 MIN: CPT | Performed by: INTERNAL MEDICINE

## 2023-10-27 PROCEDURE — 96413 CHEMO IV INFUSION 1 HR: CPT

## 2023-10-27 RX ORDER — ACETAMINOPHEN 325 MG/1
650 TABLET ORAL ONCE
Status: CANCELLED | OUTPATIENT
Start: 2023-10-27

## 2023-10-27 RX ORDER — DIPHENHYDRAMINE HYDROCHLORIDE 50 MG/ML
25 INJECTION INTRAMUSCULAR; INTRAVENOUS ONCE
Status: CANCELLED | OUTPATIENT
Start: 2023-10-27

## 2023-10-27 RX ORDER — CYCLOBENZAPRINE HCL 10 MG
10 TABLET ORAL 3 TIMES DAILY PRN
Qty: 10 TABLET | Refills: 0 | Status: SHIPPED | OUTPATIENT
Start: 2023-10-27

## 2023-10-27 NOTE — PROGRESS NOTES
Pt here for C37D1 Drug(s)kyprolis/fastpro. Arrives Ambulating independently, accompanied by Self     Patient was evaluated today by MD.    Oral medications included in this regimen:  yes - dexamethasone, tylenol benadryl    Patient confirms comprehension of cancer treatment schedule:  yes    Pregnancy screening:  Not applicable    Modifications in dose or schedule:  No    Medications appearance and physical integrity checked by RN: yes. Chemotherapy IV pump settings verified by 2 RNs:  Yes. Frequency of blood return and site check throughout administration: Prior to administration and At completion of therapy     Infusion/treatment outcome:  patient tolerated treatment without incident    Education Record    Learner:  Patient  Barriers / Limitations:  None  Method:  Brief focused  Education / instructions given:  reviewed chemo and fu  Outcome:  Shows understanding    Discharged Home, Ambulating independently, accompanied by:Self    Patient/family verbalized understanding of future appointments: by printed AVS  Pt dc home ambulatory in stable condition, no complaints.

## 2023-10-27 NOTE — PROGRESS NOTES
Patient is here for follow up for myeloma on C37 of kyprolis and darzalex. He took his premeds this morning at 915am.  He has been having increasing right side low back pain. He associates this with where he had his back surgery. It has been present a while but worse the past week. He denies any numbness in his feet. He denies any fever, cough or shortness of breath. He is eating well.      Education Record    Learner:  Patient    Disease / Diagnosis: myeloma    Barriers / Limitations:  None   Comments:    Method:  Discussion   Comments:    General Topics:  Side effects and symptom management   Comments:    Outcome:  Shows understanding   Comments:

## 2023-10-31 LAB
ALBUMIN SERPL ELPH-MCNC: 4.3 G/DL (ref 3.75–5.21)
ALBUMIN/GLOB SERPL: 1.96 {RATIO} (ref 1–2)
ALPHA1 GLOB SERPL ELPH-MCNC: 0.34 G/DL (ref 0.19–0.46)
ALPHA2 GLOB SERPL ELPH-MCNC: 0.9 G/DL (ref 0.48–1.05)
B-GLOBULIN SERPL ELPH-MCNC: 0.68 G/DL (ref 0.68–1.23)
GAMMA GLOB SERPL ELPH-MCNC: 0.27 G/DL (ref 0.62–1.7)
KAPPA LC FREE SER-MCNC: 12.25 MG/DL (ref 0.33–1.94)
KAPPA LC FREE/LAMBDA FREE SER NEPH: 71.23 {RATIO} (ref 0.26–1.65)
LAMBDA LC FREE SERPL-MCNC: 0.17 MG/DL (ref 0.57–2.63)
M PROTEIN 1 SERPL ELPH-MCNC: 0.07 G/DL (ref ?–0)
PROT SERPL-MCNC: 6.5 G/DL (ref 5.7–8.2)

## 2023-11-03 ENCOUNTER — OFFICE VISIT (OUTPATIENT)
Dept: HEMATOLOGY/ONCOLOGY | Age: 73
End: 2023-11-03
Attending: INTERNAL MEDICINE
Payer: MEDICARE

## 2023-11-03 VITALS
BODY MASS INDEX: 33.59 KG/M2 | TEMPERATURE: 97 F | SYSTOLIC BLOOD PRESSURE: 107 MMHG | OXYGEN SATURATION: 95 % | HEART RATE: 53 BPM | RESPIRATION RATE: 18 BRPM | HEIGHT: 72.01 IN | DIASTOLIC BLOOD PRESSURE: 67 MMHG | WEIGHT: 248 LBS

## 2023-11-03 DIAGNOSIS — C90.00 MULTIPLE MYELOMA NOT HAVING ACHIEVED REMISSION (HCC): Primary | ICD-10-CM

## 2023-11-03 DIAGNOSIS — R79.89 ELEVATED LFTS: ICD-10-CM

## 2023-11-03 LAB
BASOPHILS # BLD AUTO: 0.01 X10(3) UL (ref 0–0.2)
BASOPHILS NFR BLD AUTO: 0.1 %
EOSINOPHIL # BLD AUTO: 0.02 X10(3) UL (ref 0–0.7)
EOSINOPHIL NFR BLD AUTO: 0.1 %
ERYTHROCYTE [DISTWIDTH] IN BLOOD BY AUTOMATED COUNT: 13.5 %
HCT VFR BLD AUTO: 39 %
HGB BLD-MCNC: 12.8 G/DL
IMM GRANULOCYTES # BLD AUTO: 0.04 X10(3) UL (ref 0–1)
IMM GRANULOCYTES NFR BLD: 0.3 %
LYMPHOCYTES # BLD AUTO: 0.21 X10(3) UL (ref 1–4)
LYMPHOCYTES NFR BLD AUTO: 1.5 %
MCH RBC QN AUTO: 35 PG (ref 26–34)
MCHC RBC AUTO-ENTMCNC: 32.8 G/DL (ref 31–37)
MCV RBC AUTO: 106.6 FL
MONOCYTES # BLD AUTO: 0.15 X10(3) UL (ref 0.1–1)
MONOCYTES NFR BLD AUTO: 1 %
NEUTROPHILS # BLD AUTO: 13.87 X10 (3) UL (ref 1.5–7.7)
NEUTROPHILS # BLD AUTO: 13.87 X10(3) UL (ref 1.5–7.7)
NEUTROPHILS NFR BLD AUTO: 97 %
PLATELET # BLD AUTO: 136 10(3)UL (ref 150–450)
RBC # BLD AUTO: 3.66 X10(6)UL
WBC # BLD AUTO: 14.3 X10(3) UL (ref 4–11)

## 2023-11-03 PROCEDURE — 96413 CHEMO IV INFUSION 1 HR: CPT

## 2023-11-03 PROCEDURE — 36415 COLL VENOUS BLD VENIPUNCTURE: CPT

## 2023-11-03 PROCEDURE — 85025 COMPLETE CBC W/AUTO DIFF WBC: CPT

## 2023-11-03 NOTE — PROGRESS NOTES
Pt here for C37 D8 Drug(s): Kyprolis. Arrives Ambulating independently, accompanied by Self     Patient was evaluated today by Treatment Nurse. Oral medications included in this regimen:  yes - dexamethasone    Patient confirms comprehension of cancer treatment schedule:  yes    Pregnancy screening:  Not applicable    Modifications in dose or schedule:  No    Medications appearance and physical integrity checked by RN: yes. Chemotherapy IV pump settings verified by 2 RNs:  Yes. Frequency of blood return and site check throughout administration: Prior to administration and At completion of therapy     Infusion/treatment outcome:  patient tolerated treatment without incident    Education Record    Learner:  Patient  Barriers / Limitations:  None  Method:  Discussion  Education / instructions given:  Reviewed plan of care and follow up appts.   Outcome:  Shows understanding    Discharged Home, Ambulating independently, accompanied by:Self    Patient/family verbalized understanding of future appointments: by printed AVS

## 2023-11-06 RX ORDER — ATORVASTATIN CALCIUM 20 MG/1
TABLET, FILM COATED ORAL
Qty: 90 TABLET | Refills: 0 | Status: SHIPPED | OUTPATIENT
Start: 2023-11-06

## 2023-11-06 NOTE — TELEPHONE ENCOUNTER
Cholesterol Medication Protocol [id number]/04/2023 03:58 PM   Protocol Details ALT < 80    ALT resulted within past year    Lipid panel within past 12 months    Appointment within past 12 or next 3 months     LOV 9/7/23     LAST LAB  7/6/23    LAST RX  7/31/23 90     Next OV   Future Appointments   Date Time Provider [place]   11/10/2023 10:30 AM PF TX RN3 PF CHEMO I [place]   11/24/2023 10:30 AM ITA Cárdenas PF HEM ONC [place]   11/24/2023 11:00 AM PF TX RN3 PF CHEMO I [place]   11/28/2023  2:00 PM Sharon Cruz MD [id number] [address]   12/7/2023  2:30 PM Maggie King MD [facility] [address] [facility] [address]         PROTOCOL pass

## 2023-11-10 ENCOUNTER — OFFICE VISIT (OUTPATIENT)
Dept: HEMATOLOGY/ONCOLOGY | Age: 73
End: 2023-11-10
Attending: INTERNAL MEDICINE
Payer: MEDICARE

## 2023-11-10 VITALS
HEART RATE: 65 BPM | WEIGHT: 248.5 LBS | RESPIRATION RATE: 18 BRPM | HEIGHT: 72.01 IN | OXYGEN SATURATION: 96 % | DIASTOLIC BLOOD PRESSURE: 64 MMHG | SYSTOLIC BLOOD PRESSURE: 99 MMHG | TEMPERATURE: 97 F | BODY MASS INDEX: 33.66 KG/M2

## 2023-11-10 DIAGNOSIS — R79.89 ELEVATED LFTS: ICD-10-CM

## 2023-11-10 DIAGNOSIS — C90.00 MULTIPLE MYELOMA, REMISSION STATUS UNSPECIFIED (HCC): ICD-10-CM

## 2023-11-10 DIAGNOSIS — C90.00 MULTIPLE MYELOMA NOT HAVING ACHIEVED REMISSION (HCC): Primary | ICD-10-CM

## 2023-11-10 LAB
ALBUMIN SERPL-MCNC: 3.5 G/DL (ref 3.4–5)
BASOPHILS # BLD AUTO: 0.01 X10(3) UL (ref 0–0.2)
BASOPHILS NFR BLD AUTO: 0.1 %
CALCIUM BLD-MCNC: 8.8 MG/DL (ref 8.5–10.1)
CREAT BLD-MCNC: 1.68 MG/DL
EGFRCR SERPLBLD CKD-EPI 2021: 42 ML/MIN/1.73M2 (ref 60–?)
EOSINOPHIL # BLD AUTO: 0.02 X10(3) UL (ref 0–0.7)
EOSINOPHIL NFR BLD AUTO: 0.2 %
ERYTHROCYTE [DISTWIDTH] IN BLOOD BY AUTOMATED COUNT: 13.7 %
HCT VFR BLD AUTO: 37.7 %
HGB BLD-MCNC: 12.4 G/DL
IMM GRANULOCYTES # BLD AUTO: 0.04 X10(3) UL (ref 0–1)
IMM GRANULOCYTES NFR BLD: 0.3 %
LYMPHOCYTES # BLD AUTO: 0.2 X10(3) UL (ref 1–4)
LYMPHOCYTES NFR BLD AUTO: 1.7 %
MCH RBC QN AUTO: 35.2 PG (ref 26–34)
MCHC RBC AUTO-ENTMCNC: 32.9 G/DL (ref 31–37)
MCV RBC AUTO: 107.1 FL
MONOCYTES # BLD AUTO: 0.08 X10(3) UL (ref 0.1–1)
MONOCYTES NFR BLD AUTO: 0.7 %
NEUTROPHILS # BLD AUTO: 11.62 X10 (3) UL (ref 1.5–7.7)
NEUTROPHILS # BLD AUTO: 11.62 X10(3) UL (ref 1.5–7.7)
NEUTROPHILS NFR BLD AUTO: 97 %
PLATELET # BLD AUTO: 151 10(3)UL (ref 150–450)
RBC # BLD AUTO: 3.52 X10(6)UL
WBC # BLD AUTO: 12 X10(3) UL (ref 4–11)

## 2023-11-10 PROCEDURE — 82310 ASSAY OF CALCIUM: CPT

## 2023-11-10 PROCEDURE — 85025 COMPLETE CBC W/AUTO DIFF WBC: CPT

## 2023-11-10 PROCEDURE — 96367 TX/PROPH/DG ADDL SEQ IV INF: CPT

## 2023-11-10 PROCEDURE — 82565 ASSAY OF CREATININE: CPT

## 2023-11-10 PROCEDURE — 82040 ASSAY OF SERUM ALBUMIN: CPT

## 2023-11-10 PROCEDURE — 36415 COLL VENOUS BLD VENIPUNCTURE: CPT

## 2023-11-10 PROCEDURE — 96413 CHEMO IV INFUSION 1 HR: CPT

## 2023-11-10 NOTE — PROGRESS NOTES
Pt here for C37 D15 Drug(s): Kyprolis/Zometa. Arrives Ambulating independently, accompanied by Self     Patient was evaluated today by Treatment Nurse. Oral medications included in this regimen:  no    Patient confirms comprehension of cancer treatment schedule:  yes    Pregnancy screening:  Not applicable    Modifications in dose or schedule:  No    Medications appearance and physical integrity checked by RN: yes. Chemotherapy IV pump settings verified by 2 RNs:  Yes. Frequency of blood return and site check throughout administration: Prior to administration and At completion of therapy     Infusion/treatment outcome:  patient tolerated treatment without incident    Education Record    Learner:  Patient  Barriers / Limitations:  None  Method:  Discussion  Education / instructions given:  Reviewed plan of care and follow up appts.   Outcome:  Shows understanding    Discharged Home, Ambulating independently, accompanied by:Self    Patient/family verbalized understanding of future appointments: by printed AVS

## 2023-11-18 DIAGNOSIS — C90.00 MULTIPLE MYELOMA NOT HAVING ACHIEVED REMISSION (HCC): Primary | ICD-10-CM

## 2023-11-20 ENCOUNTER — PATIENT OUTREACH (OUTPATIENT)
Dept: CASE MANAGEMENT | Age: 73
End: 2023-11-20

## 2023-11-20 ENCOUNTER — TELEPHONE (OUTPATIENT)
Dept: HEMATOLOGY/ONCOLOGY | Facility: HOSPITAL | Age: 73
End: 2023-11-20

## 2023-11-20 RX ORDER — CYCLOBENZAPRINE HCL 10 MG
10 TABLET ORAL 3 TIMES DAILY PRN
Qty: 10 TABLET | Refills: 1 | Status: SHIPPED | OUTPATIENT
Start: 2023-11-20

## 2023-11-20 NOTE — PROGRESS NOTES
After several unsuccessful attempts and sending a letter I am un enrolling pt from CCM program do to him being unengaged.

## 2023-11-24 ENCOUNTER — OFFICE VISIT (OUTPATIENT)
Dept: HEMATOLOGY/ONCOLOGY | Age: 73
End: 2023-11-24
Attending: INTERNAL MEDICINE
Payer: MEDICARE

## 2023-11-24 VITALS
RESPIRATION RATE: 18 BRPM | DIASTOLIC BLOOD PRESSURE: 51 MMHG | OXYGEN SATURATION: 99 % | SYSTOLIC BLOOD PRESSURE: 96 MMHG | HEART RATE: 49 BPM

## 2023-11-24 VITALS
HEIGHT: 72.01 IN | TEMPERATURE: 97 F | HEART RATE: 51 BPM | OXYGEN SATURATION: 98 % | BODY MASS INDEX: 34.27 KG/M2 | RESPIRATION RATE: 18 BRPM | DIASTOLIC BLOOD PRESSURE: 66 MMHG | SYSTOLIC BLOOD PRESSURE: 111 MMHG | WEIGHT: 253 LBS

## 2023-11-24 DIAGNOSIS — C90.00 MULTIPLE MYELOMA NOT HAVING ACHIEVED REMISSION (HCC): Primary | ICD-10-CM

## 2023-11-24 DIAGNOSIS — N18.9 VITAMIN D DEFICIENCY DUE TO CHRONIC KIDNEY DISEASE: ICD-10-CM

## 2023-11-24 DIAGNOSIS — C90.00 MULTIPLE MYELOMA NOT HAVING ACHIEVED REMISSION (HCC): ICD-10-CM

## 2023-11-24 DIAGNOSIS — N18.30 STAGE 3 CHRONIC KIDNEY DISEASE, UNSPECIFIED WHETHER STAGE 3A OR 3B CKD (HCC): ICD-10-CM

## 2023-11-24 DIAGNOSIS — E55.9 VITAMIN D DEFICIENCY DUE TO CHRONIC KIDNEY DISEASE: ICD-10-CM

## 2023-11-24 DIAGNOSIS — R79.89 ELEVATED LFTS: Primary | ICD-10-CM

## 2023-11-24 LAB
ALBUMIN SERPL-MCNC: 3.7 G/DL (ref 3.4–5)
ALBUMIN/GLOB SERPL: 1.3 {RATIO} (ref 1–2)
ALP LIVER SERPL-CCNC: 87 U/L
ALT SERPL-CCNC: 16 U/L
ANION GAP SERPL CALC-SCNC: 5 MMOL/L (ref 0–18)
ANION GAP SERPL CALC-SCNC: 6 MMOL/L (ref 0–18)
AST SERPL-CCNC: 12 U/L (ref 15–37)
BASOPHILS # BLD AUTO: 0.02 X10(3) UL (ref 0–0.2)
BASOPHILS # BLD AUTO: 0.03 X10(3) UL (ref 0–0.2)
BASOPHILS NFR BLD AUTO: 0.2 %
BASOPHILS NFR BLD AUTO: 0.2 %
BILIRUB SERPL-MCNC: 0.8 MG/DL (ref 0.1–2)
BILIRUB UR QL STRIP.AUTO: NEGATIVE
BUN BLD-MCNC: 27 MG/DL (ref 9–23)
BUN BLD-MCNC: 29 MG/DL (ref 9–23)
CALCIUM BLD-MCNC: 9 MG/DL (ref 8.5–10.1)
CALCIUM BLD-MCNC: 9.1 MG/DL (ref 8.5–10.1)
CHLORIDE SERPL-SCNC: 108 MMOL/L (ref 98–112)
CHLORIDE SERPL-SCNC: 109 MMOL/L (ref 98–112)
CLARITY UR REFRACT.AUTO: CLEAR
CO2 SERPL-SCNC: 22 MMOL/L (ref 21–32)
CO2 SERPL-SCNC: 22 MMOL/L (ref 21–32)
COLOR UR AUTO: YELLOW
CREAT BLD-MCNC: 1.68 MG/DL
CREAT BLD-MCNC: 1.74 MG/DL
CREAT UR-SCNC: 42.7 MG/DL
EGFRCR SERPLBLD CKD-EPI 2021: 40 ML/MIN/1.73M2 (ref 60–?)
EGFRCR SERPLBLD CKD-EPI 2021: 42 ML/MIN/1.73M2 (ref 60–?)
EOSINOPHIL # BLD AUTO: 0.01 X10(3) UL (ref 0–0.7)
EOSINOPHIL # BLD AUTO: 0.02 X10(3) UL (ref 0–0.7)
EOSINOPHIL NFR BLD AUTO: 0.1 %
EOSINOPHIL NFR BLD AUTO: 0.2 %
ERYTHROCYTE [DISTWIDTH] IN BLOOD BY AUTOMATED COUNT: 13.3 %
ERYTHROCYTE [DISTWIDTH] IN BLOOD BY AUTOMATED COUNT: 13.4 %
GLOBULIN PLAS-MCNC: 2.8 G/DL (ref 2.8–4.4)
GLUCOSE BLD-MCNC: 147 MG/DL (ref 70–99)
GLUCOSE BLD-MCNC: 150 MG/DL (ref 70–99)
GLUCOSE UR STRIP.AUTO-MCNC: 500 MG/DL
HCT VFR BLD AUTO: 39.3 %
HCT VFR BLD AUTO: 39.4 %
HGB BLD-MCNC: 12.8 G/DL
HGB BLD-MCNC: 12.8 G/DL
IGA SERPL-MCNC: <7.83 MG/DL (ref 70–312)
IGM SERPL-MCNC: <5.3 MG/DL (ref 43–279)
IMM GRANULOCYTES # BLD AUTO: 0.04 X10(3) UL (ref 0–1)
IMM GRANULOCYTES # BLD AUTO: 0.05 X10(3) UL (ref 0–1)
IMM GRANULOCYTES NFR BLD: 0.3 %
IMM GRANULOCYTES NFR BLD: 0.4 %
IMMUNOGLOBULIN PNL SER-MCNC: 208 MG/DL (ref 791–1643)
KETONES UR STRIP.AUTO-MCNC: NEGATIVE MG/DL
LEUKOCYTE ESTERASE UR QL STRIP.AUTO: NEGATIVE
LYMPHOCYTES # BLD AUTO: 0.21 X10(3) UL (ref 1–4)
LYMPHOCYTES # BLD AUTO: 0.21 X10(3) UL (ref 1–4)
LYMPHOCYTES NFR BLD AUTO: 1.6 %
LYMPHOCYTES NFR BLD AUTO: 1.6 %
MAGNESIUM SERPL-MCNC: 2.4 MG/DL (ref 1.6–2.6)
MCH RBC QN AUTO: 35.3 PG (ref 26–34)
MCH RBC QN AUTO: 35.6 PG (ref 26–34)
MCHC RBC AUTO-ENTMCNC: 32.5 G/DL (ref 31–37)
MCHC RBC AUTO-ENTMCNC: 32.6 G/DL (ref 31–37)
MCV RBC AUTO: 108.5 FL
MCV RBC AUTO: 109.2 FL
MONOCYTES # BLD AUTO: 0.08 X10(3) UL (ref 0.1–1)
MONOCYTES # BLD AUTO: 0.08 X10(3) UL (ref 0.1–1)
MONOCYTES NFR BLD AUTO: 0.6 %
MONOCYTES NFR BLD AUTO: 0.6 %
NEUTROPHILS # BLD AUTO: 12.49 X10 (3) UL (ref 1.5–7.7)
NEUTROPHILS # BLD AUTO: 12.49 X10(3) UL (ref 1.5–7.7)
NEUTROPHILS # BLD AUTO: 12.88 X10 (3) UL (ref 1.5–7.7)
NEUTROPHILS # BLD AUTO: 12.88 X10(3) UL (ref 1.5–7.7)
NEUTROPHILS NFR BLD AUTO: 97 %
NEUTROPHILS NFR BLD AUTO: 97.2 %
NITRITE UR QL STRIP.AUTO: NEGATIVE
OSMOLALITY SERPL CALC.SUM OF ELEC: 290 MOSM/KG (ref 275–295)
OSMOLALITY SERPL CALC.SUM OF ELEC: 291 MOSM/KG (ref 275–295)
PH UR STRIP.AUTO: 5.5 [PH] (ref 5–8)
PHOSPHATE SERPL-MCNC: 1.8 MG/DL (ref 2.5–4.9)
PLATELET # BLD AUTO: 256 10(3)UL (ref 150–450)
PLATELET # BLD AUTO: 268 10(3)UL (ref 150–450)
POTASSIUM SERPL-SCNC: 4.3 MMOL/L (ref 3.5–5.1)
POTASSIUM SERPL-SCNC: 4.4 MMOL/L (ref 3.5–5.1)
PROT SERPL-MCNC: 6.5 G/DL (ref 6.4–8.2)
PROT UR-MCNC: 38.1 MG/DL
RBC # BLD AUTO: 3.6 X10(6)UL
RBC # BLD AUTO: 3.63 X10(6)UL
SODIUM SERPL-SCNC: 136 MMOL/L (ref 136–145)
SODIUM SERPL-SCNC: 136 MMOL/L (ref 136–145)
SP GR UR STRIP.AUTO: 1.01 (ref 1–1.03)
UROBILINOGEN UR STRIP.AUTO-MCNC: 0.2 MG/DL
VIT D+METAB SERPL-MCNC: 24.7 NG/ML (ref 30–100)
WBC # BLD AUTO: 12.9 X10(3) UL (ref 4–11)
WBC # BLD AUTO: 13.3 X10(3) UL (ref 4–11)

## 2023-11-24 PROCEDURE — 96401 CHEMO ANTI-NEOPL SQ/IM: CPT

## 2023-11-24 PROCEDURE — 96413 CHEMO IV INFUSION 1 HR: CPT

## 2023-11-24 PROCEDURE — 99215 OFFICE O/P EST HI 40 MIN: CPT | Performed by: NURSE PRACTITIONER

## 2023-11-24 NOTE — PROGRESS NOTES
Outpatient Oncology Care Plan   Problem list:   fatigue   Problems related to:   side effect of treatment   Interventions:   provided general teaching   Expected outcomes:   understands plan of care   Progress towards outcome: making progress     Education Record   Learner: Patient   Barriers / Limitations: None   Method: Discussion   Outcome: Shows understanding   Comments:  Patient here for follow-up and planned C38D1 treatment. States he feels well overall. Took premeds this morning as instructed.

## 2023-11-24 NOTE — PROGRESS NOTES
Pt here for C38D1 Drug(s)kyprolis/faspro. Arrives Ambulating independently, accompanied by Self     Patient was evaluated today by TOSHA. Oral medications included in this regimen:  no    Patient confirms comprehension of cancer treatment schedule:  yes    Pregnancy screening:  Not applicable    Modifications in dose or schedule:  No    Medications appearance and physical integrity checked by RN: yes. Chemotherapy IV pump settings verified by 2 RNs:  Yes. Frequency of blood return and site check throughout administration: Prior to administration and At completion of therapy     Infusion/treatment outcome:  patient tolerated treatment without incident    Education Record    Learner:  Patient  Barriers / Limitations:  None  Method:  Discussion  Education / instructions given:  appts  Outcome:  Shows understanding    Discharged Home, Ambulating independently, accompanied by:Self    Patient/family verbalized understanding of future appointments: by printed AVS    Patient monitored for 30 min post faspro. VSS.

## 2023-11-27 LAB
ALBUMIN SERPL ELPH-MCNC: 4.02 G/DL (ref 3.75–5.21)
ALBUMIN/GLOB SERPL: 2.03 {RATIO} (ref 1–2)
ALPHA1 GLOB SERPL ELPH-MCNC: 0.3 G/DL (ref 0.19–0.46)
ALPHA2 GLOB SERPL ELPH-MCNC: 0.86 G/DL (ref 0.48–1.05)
B-GLOBULIN SERPL ELPH-MCNC: 0.59 G/DL (ref 0.68–1.23)
GAMMA GLOB SERPL ELPH-MCNC: 0.22 G/DL (ref 0.62–1.7)
KAPPA LC FREE SER-MCNC: 13.95 MG/DL (ref 0.33–1.94)
KAPPA LC FREE/LAMBDA FREE SER NEPH: 91.78 {RATIO} (ref 0.26–1.65)
LAMBDA LC FREE SERPL-MCNC: 0.15 MG/DL (ref 0.57–2.63)
M PROTEIN 1 SERPL ELPH-MCNC: 0.04 G/DL (ref ?–0)
PROT SERPL-MCNC: 6 G/DL (ref 5.7–8.2)

## 2023-11-28 ENCOUNTER — OFFICE VISIT (OUTPATIENT)
Dept: NEPHROLOGY | Facility: CLINIC | Age: 73
End: 2023-11-28
Payer: MEDICARE

## 2023-11-28 VITALS — BODY MASS INDEX: 34 KG/M2 | DIASTOLIC BLOOD PRESSURE: 64 MMHG | WEIGHT: 253.25 LBS | SYSTOLIC BLOOD PRESSURE: 118 MMHG

## 2023-11-28 DIAGNOSIS — D89.2 PARAPROTEINEMIA: Primary | ICD-10-CM

## 2023-11-28 DIAGNOSIS — E55.9 VITAMIN D DEFICIENCY DUE TO CHRONIC KIDNEY DISEASE: ICD-10-CM

## 2023-11-28 DIAGNOSIS — N18.30 STAGE 3 CHRONIC KIDNEY DISEASE, UNSPECIFIED WHETHER STAGE 3A OR 3B CKD (HCC): ICD-10-CM

## 2023-11-28 DIAGNOSIS — N18.9 VITAMIN D DEFICIENCY DUE TO CHRONIC KIDNEY DISEASE: ICD-10-CM

## 2023-11-28 PROCEDURE — 99213 OFFICE O/P EST LOW 20 MIN: CPT | Performed by: INTERNAL MEDICINE

## 2023-11-28 NOTE — PROGRESS NOTES
Nephrology Progress Note      Irma Deng is a 74year old male. 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 He was treated in hospital with pamidronate/fluids/plasmapheresis. He also required few treatments of HD. S/p stem cell tx @ [facility]-  did well for few years  Patient is now on salvage chemo (diagnosed w/ recurrent dx in Jan 2021)  He follows w/ Dr. Fouzia Avalos and Dr. Jacky Marinelli [facility])- has been on salvage chemo as documented in oncology notes    Recent f/u with oncology reviewed. Cr stable  No CHF issues       Denies any n/v  No urinary complains  Denies any LE edema       Medications (Active prior to today's visit):  Current Outpatient Medications   Medication Sig Dispense Refill    cyclobenzaprine 10 MG Oral Tab TAKE 1 TABLET(10 MG) BY MOUTH THREE TIMES DAILY AS NEEDED FOR MUSCLE SPASMS 10 tablet 1    ATORVASTATIN 20 MG Oral Tab TAKE 1 TABLET(20 MG) BY MOUTH DAILY 90 tablet 0    DRISDOL 1.25 MG (88299 UT) Oral Cap DrisdoL 1,250 mcg (50,000 unit) capsule, [RxNorm: 5775100]      ACYCLOVIR 400 MG Oral Tab TAKE 1 TABLET(400 MG) BY MOUTH TWICE DAILY 180 tablet 1    dexamethasone (DECADRON) 4 MG tablet Take 3 tablets (12mg) PO one time per week. 36 tablet 3    ONETOUCH ULTRASOFT LANCETS Does not apply Misc TEST BLOOD SUGAR TWICE DAILY 200 each 2    ENTRESTO  MG Oral Tab Take 1 tablet by mouth daily. empagliflozin 10 MG Oral Tab Take 1 tablet (10 mg total) by mouth daily. JARDIANCE      Glucose Blood (ONETOUCH ULTRA) In Vitro Strip CHECK SUGARS THREE TIMES DAILY AS DIRECTED 300 strip 2    Metoprolol Succinate  MG Oral Tablet 24 Hr Take 1 tablet (200 mg total) by mouth every evening. eplerenone 25 MG Oral Tab Take 1 tablet (25 mg total) by mouth daily.       omega-3 fatty acids 1000 MG Oral Cap Take 1,000 mg by mouth 2 (two) times daily.      Multiple Vitamin Oral Tab Take 1 tablet by mouth. acetaminophen 500 MG Oral Tab Take 2 tablets (1,000 mg total) by mouth nightly. aspirin (ASPIR-81) 81 MG Oral Tab EC Take 1 tablet by mouth daily. 1 tablet 0       Allergies: Allergies   Allergen Reactions    Pcn [Penicillins] ANAPHYLAXIS, HIVES, HALLUCINATION and SHORTNESS OF BREATH     Respiratory distress    Beta Adrenergic Blockers     Latex      Surgical tape  Welts, burning of skin, itching    Statins          ROS:     10 systems reviewed and otherwise unremarkable       PHYSICAL EXAM:   There were no vitals taken for this visit. Wt Readings from Last 6 Encounters:   11/24/23 253 lb (114.8 kg)   11/10/23 248 lb 8 oz (112.7 kg)   11/03/23 248 lb (112.5 kg)   10/27/23 246 lb (111.6 kg)   10/13/23 244 lb 8 oz (110.9 kg)   10/06/23 241 lb 8 oz (109.5 kg)       General: Alert and oriented in no apparent distress. HEENT: No scleral icterus, MMM  Neck: Supple, no ELLIOT or thyromegaly  Cardiac: Regular rate and rhythm, S1, S2 normal, no murmur or rub  Lungs: Clear without wheezes, rales, rhonchi.     Abdomen: Soft, non-tender. + bowel sounds, no palpable organomegaly  Extremities: Without clubbing, cyanosis ; no edema  Neurologic: Alert and oriented, normal affect, cranial nerves grossly intact, moving all extremities  Skin: Warm and dry, no rashes    Component      Latest Ref Rng 11/24/2023   WBC      4.0 - 11.0 x10(3) uL 12.9 (H)    WBC       13.3 (H)    RBC      3.80 - 5.80 x10(6)uL 3.60 (L)    RBC       3.63 (L)    Hemoglobin      13.0 - 17.5 g/dL 12.8 (L)    Hemoglobin       12.8 (L)    Hematocrit      39.0 - 53.0 % 39.3    Hematocrit       39.4    Platelet Count      117.0 - 450.0 10(3)uL 268.0    Platelet Count       256.0    MCV      80.0 - 100.0 fL 109.2 (H)    MCV       108.5 (H)    MCH      26.0 - 34.0 pg 35.6 (H)    MCH       35.3 (H)    MCHC      31.0 - 37.0 g/dL 32.6    MCHC       32.5    RDW      % 13.3    RDW       13.4    Prelim Neutrophil Abs      1.50 - 7.70 x10 (3) uL 12.49 (H)    Prelim Neutrophil Abs       12.88 (H)    Neutrophils Absolute      1.50 - 7.70 x10(3) uL 12.49 (H)    Neutrophils Absolute       12.88 (H)    Lymphocytes Absolute      1.00 - 4.00 x10(3) uL 0.21 (L)    Lymphocytes Absolute       0.21 (L)    Monocytes Absolute      0.10 - 1.00 x10(3) uL 0.08 (L)    Monocytes Absolute       0.08 (L)    Eosinophils Absolute      0.00 - 0.70 x10(3) uL 0.02    Eosinophils Absolute       0.01    Basophils Absolute      0.00 - 0.20 x10(3) uL 0.02    Basophils Absolute       0.03    Immature Granulocyte Absolute      0.00 - 1.00 x10(3) uL 0.05    Immature Granulocyte Absolute       0.04    Neutrophils %      % 97.0    Neutrophils %       97.2    Lymphocytes %      % 1.6    Lymphocytes %       1.6    Monocytes %      % 0.6    Monocytes %       0.6    Eosinophils %      % 0.2    Eosinophils %       0.1    Basophils %      % 0.2    Basophils %       0.2    Immature Granulocyte %      % 0.4    Immature Granulocyte %       0.3    Glucose      70 - 99 mg/dL 147 (H)    Glucose       150 (H)    Sodium      136 - 145 mmol/L 136    Sodium       136    Potassium      3.5 - 5.1 mmol/L 4.3    Potassium       4.4    Chloride      98 - 112 mmol/L 108    Chloride       109    Carbon Dioxide, Total      21.0 - 32.0 mmol/L 22.0    Carbon Dioxide, Total       22.0    ANION GAP      0 - 18 mmol/L 6    ANION GAP       5    BUN      9 - 23 mg/dL 27 (H)    BUN       29 (H)    CREATININE      0.70 - 1.30 mg/dL 1.74 (H)    CREATININE       1.68 (H)    CALCIUM      8.5 - 10.1 mg/dL 9.0    CALCIUM       9.1    CALCULATED OSMOLALITY      275 - 295 mOsm/kg 290    CALCULATED OSMOLALITY       291    EGFR      >=60 mL/min/1.73m2 40 (L)    EGFR       42 (L)    AST (SGOT)      15 - 37 U/L 12 (L)    ALT (SGPT)      16 - 61 U/L 16    ALKALINE PHOSPHATASE      45 - 117 U/L 87    Total Bilirubin      0.1 - 2.0 mg/dL 0.8    PROTEIN, TOTAL      6.4 - 8.2 g/dL 6.5    PROTEIN, TOTAL      5.7 - 8.2 g/dL 6.0    Albumin      3.4 - 5.0 g/dL 3.7    Albumin      3.75 - 5.21 g/dL 4.02    Globulin      2.8 - 4.4 g/dL 2.8    A/G Ratio      1.0 - 2.0  1.3    Patient Fasting for CMP? Patient not present    ALPHA-1-GLOBULINS      0.19 - 0.46 g/dL 0.30    ALPHA-2-GLOBULINS      0.48 - 1.05 g/dL 0.86    BETA GLOBULINS      0.68 - 1.23 g/dL 0.59 (L)    GAMMA GLOBULINS      0.62 - 1.70 g/dL 0.22 (L)    ALBUMIN/GLOBULIN RATIO      1.00 - 2.00  2.03 (H)    SPE INTERPRETATION Small abnormality in the gamma region. IMMUNOFIXATION Small Monoclonal IgG kappa. If clinically indicated, 24 hour urine monoclonal protein studies is suggested. KAPPA FREE LIGHT CHAIN      0.330 - 1.940 mg/dL 13.950 (H)    LAMBDA FREE LIGHT CHAIN      0.571 - 2.630 mg/dL 0.152 (L)    KAPPA/LAMBDA FLC RATIO      0.26 - 1.65  91.78 (H)    M-Cameron      <=0.00 g/dL 0.04 (H)    Reviewed By: Reviewed by Lena Jama M.D. Pathology 11/27/23 at 4:59 PM    Patient Fasting for BMP? Patient not present    Color Urine      Yellow  Yellow    Clarity Urine      Clear  Clear    Spec Gravity      1.005 - 1.030  1.015    Glucose Urine      Negative mg/dL 500 ! Bilirubin Urine      Negative  Negative    Ketones, UA      Negative mg/dL Negative    Blood Urine      Negative  Trace-Intact ! PH Urine      5.0 - 8.0  5.5    Protein Urine      Negative mg/dL 30 mg/dL ! Urobilinogen Urine      <2.0 mg/dL 0.2    Nitrite Urine      Negative  Negative    Leukocyte Esterase       Negative  Negative    WBC Urine      0 - 5 /HPF 1-5    RBC Urine      0 - 2 /HPF 0-2    Bacteria Urine      None Seen /HPF Rare ! SQUAM EPI CELLS UR      None Seen /HPF Few !     RENAL TUBULAR EPITHELIAL CELLS      None Seen /HPF None Seen    TRANSITIONAL EPI CELLS      None Seen /HPF None Seen    YEAST URINE      None Seen /HPF None Seen    TOTAL PROTEIN URINE RANDOM      mg/dL 38.1    CREATININE UR RANDOM      mg/dL 42.70    PHOSPHORUS      2.5 - 4.9 mg/dL 1.8 (L) Magnesium, Serum      1.6 - 2.6 mg/dL 2.4    VITAMIN D, 25-OH, TOTAL      30.0 - 100.0 ng/mL 24.7 (L)       Legend:  (H) High  (L) Low  ! Abnormal    ASSESSMENT/PLAN:       1. Hx of Myeloma kidney disease -   Required plasmapheresis/HD treatments in the hospital  His renal function had recovered and stable despite the recurrence of MM: + microalbuminuria     Volume is good; labs stable as well    - monitor labs  - further MM w/u per heme/onc; he is currently on salvage chemo    2. MM- s/p stem cell;  w/ relapse- on tx per oncology    3. Cardiomyopathy/HTN- stable/compensated; monitor ; cardiac bx negative for amyloid; follows w/ cardiology  ; pt is on jardiance/inspra,/entresto  - appears compensated     4.  BMD- will start otc vit D 1000 international units daily     F/U in 3mos     Pollo Burroughs MD  11/28/2023

## 2023-12-01 ENCOUNTER — OFFICE VISIT (OUTPATIENT)
Dept: HEMATOLOGY/ONCOLOGY | Age: 73
End: 2023-12-01
Attending: INTERNAL MEDICINE
Payer: MEDICARE

## 2023-12-01 VITALS
BODY MASS INDEX: 34.13 KG/M2 | HEART RATE: 59 BPM | WEIGHT: 252 LBS | OXYGEN SATURATION: 96 % | TEMPERATURE: 97 F | SYSTOLIC BLOOD PRESSURE: 105 MMHG | HEIGHT: 72.01 IN | RESPIRATION RATE: 18 BRPM | DIASTOLIC BLOOD PRESSURE: 67 MMHG

## 2023-12-01 DIAGNOSIS — R79.89 ELEVATED LFTS: Primary | ICD-10-CM

## 2023-12-01 DIAGNOSIS — C90.00 MULTIPLE MYELOMA NOT HAVING ACHIEVED REMISSION (HCC): ICD-10-CM

## 2023-12-01 LAB
BASOPHILS # BLD AUTO: 0.01 X10(3) UL (ref 0–0.2)
BASOPHILS NFR BLD AUTO: 0.1 %
EOSINOPHIL # BLD AUTO: 0 X10(3) UL (ref 0–0.7)
EOSINOPHIL NFR BLD AUTO: 0 %
ERYTHROCYTE [DISTWIDTH] IN BLOOD BY AUTOMATED COUNT: 13.5 %
HCT VFR BLD AUTO: 41.4 %
HGB BLD-MCNC: 13.6 G/DL
IMM GRANULOCYTES # BLD AUTO: 0.03 X10(3) UL (ref 0–1)
IMM GRANULOCYTES NFR BLD: 0.2 %
LYMPHOCYTES # BLD AUTO: 0.19 X10(3) UL (ref 1–4)
LYMPHOCYTES NFR BLD AUTO: 1.4 %
MCH RBC QN AUTO: 34.9 PG (ref 26–34)
MCHC RBC AUTO-ENTMCNC: 32.9 G/DL (ref 31–37)
MCV RBC AUTO: 106.2 FL
MONOCYTES # BLD AUTO: 0.08 X10(3) UL (ref 0.1–1)
MONOCYTES NFR BLD AUTO: 0.6 %
NEUTROPHILS # BLD AUTO: 12.87 X10 (3) UL (ref 1.5–7.7)
NEUTROPHILS # BLD AUTO: 12.87 X10(3) UL (ref 1.5–7.7)
NEUTROPHILS NFR BLD AUTO: 97.7 %
PLATELET # BLD AUTO: 133 10(3)UL (ref 150–450)
RBC # BLD AUTO: 3.9 X10(6)UL
WBC # BLD AUTO: 13.2 X10(3) UL (ref 4–11)

## 2023-12-01 PROCEDURE — 36415 COLL VENOUS BLD VENIPUNCTURE: CPT

## 2023-12-01 PROCEDURE — 85025 COMPLETE CBC W/AUTO DIFF WBC: CPT

## 2023-12-01 PROCEDURE — 96413 CHEMO IV INFUSION 1 HR: CPT

## 2023-12-01 NOTE — PROGRESS NOTES
Pt here for F26D3 Drug kyprolis. Arrives Ambulating independently, accompanied by Self     Patient was evaluated today by Treatment Nurse. Oral medications included in this regimen:  no    Patient confirms comprehension of cancer treatment schedule:  yes    Pregnancy screening:  Denies possibility of pregnancy    Modifications in dose or schedule:  No    Medications appearance and physical integrity checked by RN: yes. Chemotherapy IV pump settings verified by 2 RNs:  Yes.   Frequency of blood return and site check throughout administration: Prior to administration     Infusion/treatment outcome:  patient tolerated treatment without incident    Education Record    Learner:  Patient  Barriers / Limitations:  None  Method:  Brief focused  Education / instructions given:  chemo admin  Outcome:  Shows understanding    Discharged Home, Ambulating independently, accompanied by:Self    Patient/family verbalized understanding of future appointments: by [facility] [employer]

## 2023-12-05 NOTE — PROGRESS NOTES
Outpatient Oncology Care Plan  Problem list:  knowledge deficit    Problems related to:    disease/disease progression    Interventions:  provided general teaching    Expected outcomes:  understands plan of care    Progress towards outcome:  making progres No

## 2023-12-07 ENCOUNTER — OFFICE VISIT (OUTPATIENT)
Dept: FAMILY MEDICINE CLINIC | Facility: CLINIC | Age: 73
End: 2023-12-07
Payer: MEDICARE

## 2023-12-07 VITALS
OXYGEN SATURATION: 96 % | BODY MASS INDEX: 34.13 KG/M2 | RESPIRATION RATE: 16 BRPM | SYSTOLIC BLOOD PRESSURE: 104 MMHG | TEMPERATURE: 97 F | HEART RATE: 59 BPM | HEIGHT: 72 IN | DIASTOLIC BLOOD PRESSURE: 56 MMHG | WEIGHT: 252 LBS

## 2023-12-07 DIAGNOSIS — E11.9 DIABETES MELLITUS WITH COINCIDENT HYPERTENSION: ICD-10-CM

## 2023-12-07 DIAGNOSIS — C90.00 MULTIPLE MYELOMA NOT HAVING ACHIEVED REMISSION (HCC): ICD-10-CM

## 2023-12-07 DIAGNOSIS — J01.90 ACUTE SINUSITIS, RECURRENCE NOT SPECIFIED, UNSPECIFIED LOCATION: Primary | ICD-10-CM

## 2023-12-07 DIAGNOSIS — I10 DIABETES MELLITUS WITH COINCIDENT HYPERTENSION: ICD-10-CM

## 2023-12-07 PROCEDURE — 99214 OFFICE O/P EST MOD 30 MIN: CPT | Performed by: FAMILY MEDICINE

## 2023-12-07 RX ORDER — BENZONATATE 200 MG/1
200 CAPSULE ORAL 3 TIMES DAILY PRN
Qty: 30 CAPSULE | Refills: 0 | Status: SHIPPED | OUTPATIENT
Start: 2023-12-07

## 2023-12-07 RX ORDER — AZITHROMYCIN 250 MG/1
TABLET, FILM COATED ORAL
Qty: 6 TABLET | Refills: 0 | Status: SHIPPED | OUTPATIENT
Start: 2023-12-07 | End: 2023-12-12

## 2023-12-08 ENCOUNTER — OFFICE VISIT (OUTPATIENT)
Dept: HEMATOLOGY/ONCOLOGY | Age: 73
End: 2023-12-08
Attending: INTERNAL MEDICINE
Payer: MEDICARE

## 2023-12-08 VITALS
WEIGHT: 252.5 LBS | DIASTOLIC BLOOD PRESSURE: 65 MMHG | HEART RATE: 57 BPM | RESPIRATION RATE: 16 BRPM | BODY MASS INDEX: 34.2 KG/M2 | OXYGEN SATURATION: 98 % | HEIGHT: 72.01 IN | SYSTOLIC BLOOD PRESSURE: 102 MMHG | TEMPERATURE: 96 F

## 2023-12-08 DIAGNOSIS — R79.89 ELEVATED LFTS: Primary | ICD-10-CM

## 2023-12-08 DIAGNOSIS — C90.00 MULTIPLE MYELOMA, REMISSION STATUS UNSPECIFIED (HCC): ICD-10-CM

## 2023-12-08 DIAGNOSIS — C90.00 MULTIPLE MYELOMA NOT HAVING ACHIEVED REMISSION (HCC): ICD-10-CM

## 2023-12-08 LAB
ALBUMIN SERPL-MCNC: 3.7 G/DL (ref 3.4–5)
BASOPHILS # BLD AUTO: 0.01 X10(3) UL (ref 0–0.2)
BASOPHILS NFR BLD AUTO: 0.1 %
CALCIUM BLD-MCNC: 8.7 MG/DL (ref 8.5–10.1)
CREAT BLD-MCNC: 1.9 MG/DL
EGFRCR SERPLBLD CKD-EPI 2021: 36 ML/MIN/1.73M2 (ref 60–?)
EOSINOPHIL # BLD AUTO: 0.04 X10(3) UL (ref 0–0.7)
EOSINOPHIL NFR BLD AUTO: 0.4 %
ERYTHROCYTE [DISTWIDTH] IN BLOOD BY AUTOMATED COUNT: 13.6 %
HCT VFR BLD AUTO: 38.9 %
HGB BLD-MCNC: 12.8 G/DL
IMM GRANULOCYTES # BLD AUTO: 0.04 X10(3) UL (ref 0–1)
IMM GRANULOCYTES NFR BLD: 0.4 %
LYMPHOCYTES # BLD AUTO: 0.16 X10(3) UL (ref 1–4)
LYMPHOCYTES NFR BLD AUTO: 1.4 %
MCH RBC QN AUTO: 35 PG (ref 26–34)
MCHC RBC AUTO-ENTMCNC: 32.9 G/DL (ref 31–37)
MCV RBC AUTO: 106.3 FL
MONOCYTES # BLD AUTO: 0.14 X10(3) UL (ref 0.1–1)
MONOCYTES NFR BLD AUTO: 1.2 %
NEUTROPHILS # BLD AUTO: 10.95 X10 (3) UL (ref 1.5–7.7)
NEUTROPHILS # BLD AUTO: 10.95 X10(3) UL (ref 1.5–7.7)
NEUTROPHILS NFR BLD AUTO: 96.5 %
PLATELET # BLD AUTO: 141 10(3)UL (ref 150–450)
RBC # BLD AUTO: 3.66 X10(6)UL
WBC # BLD AUTO: 11.3 X10(3) UL (ref 4–11)

## 2023-12-08 PROCEDURE — 82565 ASSAY OF CREATININE: CPT

## 2023-12-08 PROCEDURE — 82310 ASSAY OF CALCIUM: CPT

## 2023-12-08 PROCEDURE — 36415 COLL VENOUS BLD VENIPUNCTURE: CPT

## 2023-12-08 PROCEDURE — 96413 CHEMO IV INFUSION 1 HR: CPT

## 2023-12-08 PROCEDURE — 96375 TX/PRO/DX INJ NEW DRUG ADDON: CPT

## 2023-12-08 PROCEDURE — 82040 ASSAY OF SERUM ALBUMIN: CPT

## 2023-12-08 PROCEDURE — 85025 COMPLETE CBC W/AUTO DIFF WBC: CPT

## 2023-12-08 NOTE — PROGRESS NOTES
Pt here for [id number] Drug(s)her/per. Arrives Ambulating independently, accompanied by Self    Patient was evaluated today by Treatment Nurse. Oral medications included in this regimen:  no    Patient confirms comprehension of cancer treatment schedule:  yes    Pregnancy screening:  Denies possibility of pregnancy    Modifications in dose or schedule:  No    Medications appearance and physical integrity checked by RN: yes. Chemotherapy IV pump settings verified by 2 RNs:  Yes.   Frequency of blood return and site check throughout administration: Prior to administration     Infusion/treatment outcome:  patient tolerated treatment without incident    Education Record    Learner:  Patient  Barriers / Limitations:  None  Method:  Brief focused  Education / instructions given:  chemo admin  Outcome:  Shows understanding    Discharged Home, Ambulating independently, accompanied by:Self    Patient/family verbalized understanding of future appointments: by [facility] [employer]

## 2023-12-22 ENCOUNTER — OFFICE VISIT (OUTPATIENT)
Dept: HEMATOLOGY/ONCOLOGY | Age: 73
End: 2023-12-22
Attending: INTERNAL MEDICINE
Payer: MEDICARE

## 2023-12-22 VITALS
SYSTOLIC BLOOD PRESSURE: 109 MMHG | TEMPERATURE: 97 F | OXYGEN SATURATION: 98 % | BODY MASS INDEX: 34.4 KG/M2 | DIASTOLIC BLOOD PRESSURE: 67 MMHG | HEIGHT: 72.01 IN | WEIGHT: 254 LBS | HEART RATE: 55 BPM | RESPIRATION RATE: 18 BRPM

## 2023-12-22 DIAGNOSIS — C90.00 MULTIPLE MYELOMA NOT HAVING ACHIEVED REMISSION (HCC): Primary | ICD-10-CM

## 2023-12-22 DIAGNOSIS — R79.89 ELEVATED LFTS: ICD-10-CM

## 2023-12-22 LAB
ALBUMIN SERPL-MCNC: 3.6 G/DL (ref 3.4–5)
ALBUMIN/GLOB SERPL: 1.2 {RATIO} (ref 1–2)
ALP LIVER SERPL-CCNC: 84 U/L
ALT SERPL-CCNC: 17 U/L
ANION GAP SERPL CALC-SCNC: 5 MMOL/L (ref 0–18)
AST SERPL-CCNC: 13 U/L (ref 15–37)
BASOPHILS # BLD AUTO: 0.04 X10(3) UL (ref 0–0.2)
BASOPHILS NFR BLD AUTO: 0.3 %
BILIRUB SERPL-MCNC: 0.6 MG/DL (ref 0.1–2)
BUN BLD-MCNC: 31 MG/DL (ref 9–23)
CALCIUM BLD-MCNC: 8.3 MG/DL (ref 8.5–10.1)
CHLORIDE SERPL-SCNC: 111 MMOL/L (ref 98–112)
CO2 SERPL-SCNC: 21 MMOL/L (ref 21–32)
CREAT BLD-MCNC: 1.76 MG/DL
EGFRCR SERPLBLD CKD-EPI 2021: 40 ML/MIN/1.73M2 (ref 60–?)
EOSINOPHIL # BLD AUTO: 0 X10(3) UL (ref 0–0.7)
EOSINOPHIL NFR BLD AUTO: 0 %
ERYTHROCYTE [DISTWIDTH] IN BLOOD BY AUTOMATED COUNT: 13.1 %
FASTING STATUS PATIENT QL REPORTED: NO
GLOBULIN PLAS-MCNC: 2.9 G/DL (ref 2.8–4.4)
GLUCOSE BLD-MCNC: 147 MG/DL (ref 70–99)
HCT VFR BLD AUTO: 38.5 %
HGB BLD-MCNC: 12.7 G/DL
IGA SERPL-MCNC: <7.83 MG/DL (ref 70–312)
IGM SERPL-MCNC: <5.3 MG/DL (ref 43–279)
IMM GRANULOCYTES # BLD AUTO: 0.09 X10(3) UL (ref 0–1)
IMM GRANULOCYTES NFR BLD: 0.6 %
IMMUNOGLOBULIN PNL SER-MCNC: 192 MG/DL (ref 791–1643)
LYMPHOCYTES # BLD AUTO: 0.28 X10(3) UL (ref 1–4)
LYMPHOCYTES NFR BLD AUTO: 1.9 %
MCH RBC QN AUTO: 35.1 PG (ref 26–34)
MCHC RBC AUTO-ENTMCNC: 33 G/DL (ref 31–37)
MCV RBC AUTO: 106.4 FL
MONOCYTES # BLD AUTO: 0.14 X10(3) UL (ref 0.1–1)
MONOCYTES NFR BLD AUTO: 0.9 %
NEUTROPHILS # BLD AUTO: 14.4 X10 (3) UL (ref 1.5–7.7)
NEUTROPHILS # BLD AUTO: 14.4 X10(3) UL (ref 1.5–7.7)
NEUTROPHILS NFR BLD AUTO: 96.3 %
OSMOLALITY SERPL CALC.SUM OF ELEC: 293 MOSM/KG (ref 275–295)
PLATELET # BLD AUTO: 336 10(3)UL (ref 150–450)
POTASSIUM SERPL-SCNC: 4.3 MMOL/L (ref 3.5–5.1)
PROT SERPL-MCNC: 6.5 G/DL (ref 6.4–8.2)
RBC # BLD AUTO: 3.62 X10(6)UL
SODIUM SERPL-SCNC: 137 MMOL/L (ref 136–145)
WBC # BLD AUTO: 15 X10(3) UL (ref 4–11)

## 2023-12-22 PROCEDURE — 96413 CHEMO IV INFUSION 1 HR: CPT

## 2023-12-22 PROCEDURE — 96401 CHEMO ANTI-NEOPL SQ/IM: CPT

## 2023-12-22 PROCEDURE — 99214 OFFICE O/P EST MOD 30 MIN: CPT | Performed by: INTERNAL MEDICINE

## 2023-12-22 RX ORDER — TRAMADOL HYDROCHLORIDE 50 MG/1
50 TABLET ORAL EVERY 6 HOURS PRN
Qty: 40 TABLET | Refills: 0 | Status: SHIPPED | OUTPATIENT
Start: 2023-12-22

## 2023-12-22 NOTE — PROGRESS NOTES
Patient is here for follow up for myeloma on C39 of kyprolis and darzalex. He has had a sharp, constant pain in his left scapula that has been present for 2 weeks. It had previously been intermittent. He has used tylenol and some topical cream for pain. He rates his pain at 8/10. This causes him trouble sleeping. He has been continuing his work in spite of the pain. He denies shortness of breath. He does not have numbness or tingling in his arm or hand. Dr. Dena Antoine ordered EKG last week for routine check. He has not heard results.      Education Record    Learner:  Patient    Disease / Diagnosis: myeloma    Barriers / Limitations:  None   Comments:    Method:  Discussion   Comments:    General Topics:  Side effects and symptom management   Comments:    Outcome:  Shows understanding   Comments:

## 2023-12-22 NOTE — PROGRESS NOTES
Pt here for C39D1 kyprolis/faspro. Arrives Ambulating independently, accompanied by Self     Patient was evaluated today by Treatment Nurse. Oral medications included in this regimen:  no    Patient confirms comprehension of cancer treatment schedule:  yes    Pregnancy screening:  Denies possibility of pregnancy    Modifications in dose or schedule:  No    Medications appearance and physical integrity checked by RN: yes. Chemotherapy IV pump settings verified by 2 RNs:  Yes.   Frequency of blood return and site check throughout administration: Prior to administration     Infusion/treatment outcome:  patient tolerated treatment without incident    Education Record    Learner:  Patient  Barriers / Limitations:  None  Method:  Brief focused  Education / instructions given:  chemo admin  Outcome:  Shows understanding    Discharged Home, Ambulating independently, accompanied by:Self    Patient/family verbalized understanding of future appointments: by [facility] [employer]

## 2023-12-27 LAB
ALBUMIN SERPL ELPH-MCNC: 4.09 G/DL (ref 3.75–5.21)
ALBUMIN/GLOB SERPL: 1.93 {RATIO} (ref 1–2)
ALPHA1 GLOB SERPL ELPH-MCNC: 0.32 G/DL (ref 0.19–0.46)
ALPHA2 GLOB SERPL ELPH-MCNC: 0.92 G/DL (ref 0.48–1.05)
B-GLOBULIN SERPL ELPH-MCNC: 0.63 G/DL (ref 0.68–1.23)
GAMMA GLOB SERPL ELPH-MCNC: 0.24 G/DL (ref 0.62–1.7)
KAPPA LC FREE SER-MCNC: 18.9 MG/DL (ref 0.33–1.94)
LAMBDA LC FREE SERPL-MCNC: <0.133 MG/DL (ref 0.57–2.63)
M PROTEIN 1 SERPL ELPH-MCNC: 0.06 G/DL (ref ?–0)
PROT SERPL-MCNC: 6.2 G/DL (ref 5.7–8.2)

## 2023-12-29 ENCOUNTER — OFFICE VISIT (OUTPATIENT)
Dept: HEMATOLOGY/ONCOLOGY | Age: 73
End: 2023-12-29
Attending: INTERNAL MEDICINE
Payer: MEDICARE

## 2023-12-29 VITALS
TEMPERATURE: 97 F | HEIGHT: 72.01 IN | SYSTOLIC BLOOD PRESSURE: 102 MMHG | WEIGHT: 252 LBS | DIASTOLIC BLOOD PRESSURE: 65 MMHG | BODY MASS INDEX: 34.13 KG/M2 | HEART RATE: 57 BPM | RESPIRATION RATE: 18 BRPM | OXYGEN SATURATION: 97 %

## 2023-12-29 DIAGNOSIS — R79.89 ELEVATED LFTS: ICD-10-CM

## 2023-12-29 DIAGNOSIS — C90.00 MULTIPLE MYELOMA NOT HAVING ACHIEVED REMISSION (HCC): Primary | ICD-10-CM

## 2023-12-29 LAB
BASOPHILS # BLD AUTO: 0.01 X10(3) UL (ref 0–0.2)
BASOPHILS NFR BLD AUTO: 0.1 %
EOSINOPHIL # BLD AUTO: 0 X10(3) UL (ref 0–0.7)
EOSINOPHIL NFR BLD AUTO: 0 %
ERYTHROCYTE [DISTWIDTH] IN BLOOD BY AUTOMATED COUNT: 14 %
HCT VFR BLD AUTO: 39.7 %
HGB BLD-MCNC: 13.1 G/DL
IMM GRANULOCYTES # BLD AUTO: 0.04 X10(3) UL (ref 0–1)
IMM GRANULOCYTES NFR BLD: 0.3 %
LYMPHOCYTES # BLD AUTO: 0.2 X10(3) UL (ref 1–4)
LYMPHOCYTES NFR BLD AUTO: 1.6 %
MCH RBC QN AUTO: 34.7 PG (ref 26–34)
MCHC RBC AUTO-ENTMCNC: 33 G/DL (ref 31–37)
MCV RBC AUTO: 105.3 FL
MONOCYTES # BLD AUTO: 0.09 X10(3) UL (ref 0.1–1)
MONOCYTES NFR BLD AUTO: 0.7 %
NEUTROPHILS # BLD AUTO: 12.1 X10 (3) UL (ref 1.5–7.7)
NEUTROPHILS # BLD AUTO: 12.1 X10(3) UL (ref 1.5–7.7)
NEUTROPHILS NFR BLD AUTO: 97.3 %
PLATELET # BLD AUTO: 135 10(3)UL (ref 150–450)
RBC # BLD AUTO: 3.77 X10(6)UL
WBC # BLD AUTO: 12.4 X10(3) UL (ref 4–11)

## 2023-12-29 PROCEDURE — 96413 CHEMO IV INFUSION 1 HR: CPT

## 2023-12-29 PROCEDURE — 36415 COLL VENOUS BLD VENIPUNCTURE: CPT

## 2023-12-29 PROCEDURE — 85025 COMPLETE CBC W/AUTO DIFF WBC: CPT

## 2023-12-29 NOTE — PROGRESS NOTES
Pt here for [id number] kyprolis Arrives Ambulating independently, accompanied by Self     Patient was evaluated today by Treatment Nurse. Oral medications included in this regimen:  no    Patient confirms comprehension of cancer treatment schedule:  yes    Pregnancy screening:  Denies possibility of pregnancy    Modifications in dose or schedule:  No    Medications appearance and physical integrity checked by RN: yes. Chemotherapy IV pump settings verified by 2 RNs:  Yes.   Frequency of blood return and site check throughout administration: Prior to administration     Infusion/treatment outcome:  patient tolerated treatment without incident    Education Record    Learner:  Patient  Barriers / Limitations:  None  Method:  Brief focused  Education / instructions given:  chemo admin  Outcome:  Shows understanding    Discharged Home, Ambulating independently, accompanied by:Self    Patient/family verbalized understanding of future appointments: by [facility] [employer]

## 2024-01-03 ENCOUNTER — HOSPITAL ENCOUNTER (OUTPATIENT)
Dept: NUCLEAR MEDICINE | Facility: HOSPITAL | Age: 74
Discharge: HOME | End: 2024-01-03
Attending: INTERNAL MEDICINE
Payer: MEDICARE

## 2024-01-03 DIAGNOSIS — C90.00 MULTIPLE MYELOMA NOT HAVING ACHIEVED REMISSION (HCC): ICD-10-CM

## 2024-01-03 LAB — GLUCOSE BLD-MCNC: 90 MG/DL (ref 70–99)

## 2024-01-03 PROCEDURE — 78816 PET IMAGE W/CT FULL BODY: CPT | Performed by: INTERNAL MEDICINE

## 2024-01-03 PROCEDURE — 82962 GLUCOSE BLOOD TEST: CPT

## 2024-01-05 ENCOUNTER — OFFICE VISIT (OUTPATIENT)
Dept: HEMATOLOGY/ONCOLOGY | Age: 74
End: 2024-01-05
Attending: INTERNAL MEDICINE
Payer: MEDICARE

## 2024-01-05 VITALS
DIASTOLIC BLOOD PRESSURE: 62 MMHG | HEIGHT: 72.01 IN | WEIGHT: 251.5 LBS | RESPIRATION RATE: 18 BRPM | TEMPERATURE: 97 F | BODY MASS INDEX: 34.06 KG/M2 | SYSTOLIC BLOOD PRESSURE: 125 MMHG | OXYGEN SATURATION: 93 % | HEART RATE: 60 BPM

## 2024-01-05 DIAGNOSIS — C90.00 MULTIPLE MYELOMA, REMISSION STATUS UNSPECIFIED (HCC): ICD-10-CM

## 2024-01-05 DIAGNOSIS — C90.00 MULTIPLE MYELOMA NOT HAVING ACHIEVED REMISSION (HCC): Primary | ICD-10-CM

## 2024-01-05 DIAGNOSIS — R79.89 ELEVATED LFTS: ICD-10-CM

## 2024-01-05 LAB
ALBUMIN SERPL-MCNC: 3.5 G/DL (ref 3.4–5)
BASOPHILS # BLD AUTO: 0.01 X10(3) UL (ref 0–0.2)
BASOPHILS NFR BLD AUTO: 0.1 %
CALCIUM BLD-MCNC: 9 MG/DL (ref 8.5–10.1)
CREAT BLD-MCNC: 2.25 MG/DL
EGFRCR SERPLBLD CKD-EPI 2021: 30 ML/MIN/1.73M2 (ref 60–?)
EOSINOPHIL # BLD AUTO: 0.02 X10(3) UL (ref 0–0.7)
EOSINOPHIL NFR BLD AUTO: 0.1 %
ERYTHROCYTE [DISTWIDTH] IN BLOOD BY AUTOMATED COUNT: 13.8 %
HCT VFR BLD AUTO: 40.9 %
HGB BLD-MCNC: 13.4 G/DL
IMM GRANULOCYTES # BLD AUTO: 0.05 X10(3) UL (ref 0–1)
IMM GRANULOCYTES NFR BLD: 0.4 %
LYMPHOCYTES # BLD AUTO: 0.18 X10(3) UL (ref 1–4)
LYMPHOCYTES NFR BLD AUTO: 1.3 %
MCH RBC QN AUTO: 34.5 PG (ref 26–34)
MCHC RBC AUTO-ENTMCNC: 32.8 G/DL (ref 31–37)
MCV RBC AUTO: 105.4 FL
MONOCYTES # BLD AUTO: 0.11 X10(3) UL (ref 0.1–1)
MONOCYTES NFR BLD AUTO: 0.8 %
NEUTROPHILS # BLD AUTO: 13 X10 (3) UL (ref 1.5–7.7)
NEUTROPHILS # BLD AUTO: 13 X10(3) UL (ref 1.5–7.7)
NEUTROPHILS NFR BLD AUTO: 97.3 %
PLATELET # BLD AUTO: 164 10(3)UL (ref 150–450)
RBC # BLD AUTO: 3.88 X10(6)UL
WBC # BLD AUTO: 13.4 X10(3) UL (ref 4–11)

## 2024-01-05 PROCEDURE — 82310 ASSAY OF CALCIUM: CPT

## 2024-01-05 PROCEDURE — 85025 COMPLETE CBC W/AUTO DIFF WBC: CPT

## 2024-01-05 PROCEDURE — 82565 ASSAY OF CREATININE: CPT

## 2024-01-05 PROCEDURE — 82040 ASSAY OF SERUM ALBUMIN: CPT

## 2024-01-05 PROCEDURE — 96367 TX/PROPH/DG ADDL SEQ IV INF: CPT

## 2024-01-05 PROCEDURE — 96413 CHEMO IV INFUSION 1 HR: CPT

## 2024-01-05 PROCEDURE — 36415 COLL VENOUS BLD VENIPUNCTURE: CPT

## 2024-01-05 NOTE — PROGRESS NOTES
Pt here for C39 D15 Drug(s): Kyprolis/Zometa.  Arrives Ambulating independently, accompanied by Self     Patient was evaluated today by Treatment Nurse.    Oral medications included in this regimen:  no    Patient confirms comprehension of cancer treatment schedule:  yes    Pregnancy screening:  Not applicable    Modifications in dose or schedule:  No    Medications appearance and physical integrity checked by RN: yes.    Chemotherapy IV pump settings verified by 2 RNs:  Yes.  Frequency of blood return and site check throughout administration: Prior to administration and At completion of therapy     Infusion/treatment outcome:  patient tolerated treatment without incident    Education Record    Learner:  Patient  Barriers / Limitations:  None  Method:  Discussion  Education / instructions given:  Reviewed plan of care and follow up appts.  Outcome:  Shows understanding    Discharged Home, Ambulating independently, accompanied by:Self    Patient/family verbalized understanding of future appointments: by printed AVS

## 2024-01-12 ENCOUNTER — LAB ENCOUNTER (OUTPATIENT)
Dept: LAB | Age: 74
End: 2024-01-12
Attending: INTERNAL MEDICINE
Payer: MEDICARE

## 2024-01-12 DIAGNOSIS — I42.9 SECONDARY CARDIOMYOPATHY, UNSPECIFIED (HCC): Primary | ICD-10-CM

## 2024-01-12 DIAGNOSIS — Z51.81 ENCOUNTER FOR THERAPEUTIC DRUG MONITORING: ICD-10-CM

## 2024-01-12 DIAGNOSIS — E11.9 DIABETES MELLITUS (HCC): ICD-10-CM

## 2024-01-12 DIAGNOSIS — Z85.79 PERSONAL HISTORY OF MULTIPLE MYELOMA: ICD-10-CM

## 2024-01-12 DIAGNOSIS — E55.9 AVITAMINOSIS D: ICD-10-CM

## 2024-01-12 LAB
ALBUMIN SERPL-MCNC: 3.6 G/DL (ref 3.4–5)
ALBUMIN/GLOB SERPL: 1.2 {RATIO} (ref 1–2)
ALP LIVER SERPL-CCNC: 76 U/L
ALT SERPL-CCNC: 17 U/L
ANION GAP SERPL CALC-SCNC: 8 MMOL/L (ref 0–18)
AST SERPL-CCNC: 9 U/L (ref 15–37)
BASOPHILS # BLD AUTO: 0.02 X10(3) UL (ref 0–0.2)
BASOPHILS NFR BLD AUTO: 0.2 %
BILIRUB SERPL-MCNC: 0.8 MG/DL (ref 0.1–2)
BUN BLD-MCNC: 35 MG/DL (ref 9–23)
CALCIUM BLD-MCNC: 9.2 MG/DL (ref 8.5–10.1)
CHLORIDE SERPL-SCNC: 110 MMOL/L (ref 98–112)
CHOLEST SERPL-MCNC: 135 MG/DL (ref ?–200)
CO2 SERPL-SCNC: 22 MMOL/L (ref 21–32)
CREAT BLD-MCNC: 1.87 MG/DL
EGFRCR SERPLBLD CKD-EPI 2021: 37 ML/MIN/1.73M2 (ref 60–?)
EOSINOPHIL # BLD AUTO: 0.13 X10(3) UL (ref 0–0.7)
EOSINOPHIL NFR BLD AUTO: 1.2 %
ERYTHROCYTE [DISTWIDTH] IN BLOOD BY AUTOMATED COUNT: 13.7 %
EST. AVERAGE GLUCOSE BLD GHB EST-MCNC: 117 MG/DL (ref 68–126)
FASTING PATIENT LIPID ANSWER: YES
FASTING STATUS PATIENT QL REPORTED: YES
GLOBULIN PLAS-MCNC: 2.9 G/DL (ref 2.8–4.4)
GLUCOSE BLD-MCNC: 128 MG/DL (ref 70–99)
HBA1C MFR BLD: 5.7 % (ref ?–5.7)
HCT VFR BLD AUTO: 38.9 %
HDLC SERPL-MCNC: 65 MG/DL (ref 40–59)
HGB BLD-MCNC: 12.5 G/DL
IMM GRANULOCYTES # BLD AUTO: 0.03 X10(3) UL (ref 0–1)
IMM GRANULOCYTES NFR BLD: 0.3 %
LDLC SERPL CALC-MCNC: 45 MG/DL (ref ?–100)
LYMPHOCYTES # BLD AUTO: 0.41 X10(3) UL (ref 1–4)
LYMPHOCYTES NFR BLD AUTO: 3.9 %
MCH RBC QN AUTO: 34.5 PG (ref 26–34)
MCHC RBC AUTO-ENTMCNC: 32.1 G/DL (ref 31–37)
MCV RBC AUTO: 107.5 FL
MONOCYTES # BLD AUTO: 0.77 X10(3) UL (ref 0.1–1)
MONOCYTES NFR BLD AUTO: 7.3 %
NEUTROPHILS # BLD AUTO: 9.25 X10 (3) UL (ref 1.5–7.7)
NEUTROPHILS # BLD AUTO: 9.25 X10(3) UL (ref 1.5–7.7)
NEUTROPHILS NFR BLD AUTO: 87.1 %
NONHDLC SERPL-MCNC: 70 MG/DL (ref ?–130)
NT-PROBNP SERPL-MCNC: 127 PG/ML (ref ?–450)
OSMOLALITY SERPL CALC.SUM OF ELEC: 300 MOSM/KG (ref 275–295)
PLATELET # BLD AUTO: 191 10(3)UL (ref 150–450)
POTASSIUM SERPL-SCNC: 4.5 MMOL/L (ref 3.5–5.1)
PROT SERPL-MCNC: 6.5 G/DL (ref 6.4–8.2)
RBC # BLD AUTO: 3.62 X10(6)UL
SODIUM SERPL-SCNC: 140 MMOL/L (ref 136–145)
TRIGL SERPL-MCNC: 147 MG/DL (ref 30–149)
TROPONIN I SERPL HS-MCNC: 8 NG/L
TSI SER-ACNC: 1.48 MIU/ML (ref 0.36–3.74)
VIT D+METAB SERPL-MCNC: 34.3 NG/ML (ref 30–100)
VLDLC SERPL CALC-MCNC: 21 MG/DL (ref 0–30)
WBC # BLD AUTO: 10.6 X10(3) UL (ref 4–11)

## 2024-01-12 PROCEDURE — 83880 ASSAY OF NATRIURETIC PEPTIDE: CPT

## 2024-01-12 PROCEDURE — 84443 ASSAY THYROID STIM HORMONE: CPT

## 2024-01-12 PROCEDURE — 80061 LIPID PANEL: CPT

## 2024-01-12 PROCEDURE — 36415 COLL VENOUS BLD VENIPUNCTURE: CPT

## 2024-01-12 PROCEDURE — 80053 COMPREHEN METABOLIC PANEL: CPT

## 2024-01-12 PROCEDURE — 85025 COMPLETE CBC W/AUTO DIFF WBC: CPT

## 2024-01-12 PROCEDURE — 83036 HEMOGLOBIN GLYCOSYLATED A1C: CPT

## 2024-01-12 PROCEDURE — 82306 VITAMIN D 25 HYDROXY: CPT

## 2024-01-12 PROCEDURE — 84484 ASSAY OF TROPONIN QUANT: CPT

## 2024-01-19 ENCOUNTER — OFFICE VISIT (OUTPATIENT)
Dept: HEMATOLOGY/ONCOLOGY | Age: 74
End: 2024-01-19
Attending: INTERNAL MEDICINE
Payer: MEDICARE

## 2024-01-19 VITALS
OXYGEN SATURATION: 98 % | DIASTOLIC BLOOD PRESSURE: 68 MMHG | BODY MASS INDEX: 34.27 KG/M2 | WEIGHT: 253 LBS | HEIGHT: 72.01 IN | TEMPERATURE: 97 F | SYSTOLIC BLOOD PRESSURE: 113 MMHG | RESPIRATION RATE: 18 BRPM | HEART RATE: 51 BPM

## 2024-01-19 VITALS
DIASTOLIC BLOOD PRESSURE: 51 MMHG | RESPIRATION RATE: 18 BRPM | SYSTOLIC BLOOD PRESSURE: 95 MMHG | TEMPERATURE: 98 F | HEART RATE: 50 BPM

## 2024-01-19 DIAGNOSIS — D63.0 ANEMIA COMPLICATING NEOPLASTIC DISEASE: ICD-10-CM

## 2024-01-19 DIAGNOSIS — C90.00 MULTIPLE MYELOMA NOT HAVING ACHIEVED REMISSION (HCC): ICD-10-CM

## 2024-01-19 DIAGNOSIS — R79.89 ELEVATED LFTS: Primary | ICD-10-CM

## 2024-01-19 DIAGNOSIS — R79.89 ELEVATED LFTS: ICD-10-CM

## 2024-01-19 DIAGNOSIS — C90.00 MULTIPLE MYELOMA NOT HAVING ACHIEVED REMISSION (HCC): Primary | ICD-10-CM

## 2024-01-19 LAB
ALBUMIN SERPL-MCNC: 3.7 G/DL (ref 3.4–5)
ALBUMIN/GLOB SERPL: 1.2 {RATIO} (ref 1–2)
ALP LIVER SERPL-CCNC: 87 U/L
ANION GAP SERPL CALC-SCNC: 7 MMOL/L (ref 0–18)
AST SERPL-CCNC: 11 U/L (ref 15–37)
BASOPHILS # BLD AUTO: 0.03 X10(3) UL (ref 0–0.2)
BASOPHILS NFR BLD AUTO: 0.2 %
BILIRUB SERPL-MCNC: 0.9 MG/DL (ref 0.1–2)
BUN BLD-MCNC: 27 MG/DL (ref 9–23)
CALCIUM BLD-MCNC: 9.5 MG/DL (ref 8.5–10.1)
CHLORIDE SERPL-SCNC: 111 MMOL/L (ref 98–112)
CO2 SERPL-SCNC: 22 MMOL/L (ref 21–32)
CREAT BLD-MCNC: 1.87 MG/DL
DEPRECATED HBV CORE AB SER IA-ACNC: 162.4 NG/ML
EGFRCR SERPLBLD CKD-EPI 2021: 37 ML/MIN/1.73M2 (ref 60–?)
EOSINOPHIL # BLD AUTO: 0.01 X10(3) UL (ref 0–0.7)
EOSINOPHIL NFR BLD AUTO: 0.1 %
ERYTHROCYTE [DISTWIDTH] IN BLOOD BY AUTOMATED COUNT: 13.4 %
GLOBULIN PLAS-MCNC: 3 G/DL (ref 2.8–4.4)
GLUCOSE BLD-MCNC: 142 MG/DL (ref 70–99)
HCT VFR BLD AUTO: 38.4 %
HGB BLD-MCNC: 12.5 G/DL
IGA SERPL-MCNC: <7.83 MG/DL (ref 70–312)
IGM SERPL-MCNC: <5.3 MG/DL (ref 43–279)
IMM GRANULOCYTES # BLD AUTO: 0.05 X10(3) UL (ref 0–1)
IMM GRANULOCYTES NFR BLD: 0.3 %
IMMUNOGLOBULIN PNL SER-MCNC: 222 MG/DL (ref 791–1643)
IRON SATN MFR SERPL: 25 %
IRON SERPL-MCNC: 93 UG/DL
LYMPHOCYTES # BLD AUTO: 0.18 X10(3) UL (ref 1–4)
LYMPHOCYTES NFR BLD AUTO: 1.2 %
MCH RBC QN AUTO: 34.6 PG (ref 26–34)
MCHC RBC AUTO-ENTMCNC: 32.6 G/DL (ref 31–37)
MCV RBC AUTO: 106.4 FL
MONOCYTES # BLD AUTO: 0.09 X10(3) UL (ref 0.1–1)
MONOCYTES NFR BLD AUTO: 0.6 %
NEUTROPHILS # BLD AUTO: 14.77 X10 (3) UL (ref 1.5–7.7)
NEUTROPHILS # BLD AUTO: 14.77 X10(3) UL (ref 1.5–7.7)
NEUTROPHILS NFR BLD AUTO: 97.6 %
OSMOLALITY SERPL CALC.SUM OF ELEC: 298 MOSM/KG (ref 275–295)
PLATELET # BLD AUTO: 272 10(3)UL (ref 150–450)
POTASSIUM SERPL-SCNC: 3.8 MMOL/L (ref 3.5–5.1)
PROT SERPL-MCNC: 6.7 G/DL (ref 6.4–8.2)
RBC # BLD AUTO: 3.61 X10(6)UL
SODIUM SERPL-SCNC: 140 MMOL/L (ref 136–145)
TIBC SERPL-MCNC: 378 UG/DL (ref 240–450)
TRANSFERRIN SERPL-MCNC: 254 MG/DL (ref 200–360)
WBC # BLD AUTO: 15.1 X10(3) UL (ref 4–11)

## 2024-01-19 PROCEDURE — 99214 OFFICE O/P EST MOD 30 MIN: CPT | Performed by: INTERNAL MEDICINE

## 2024-01-19 PROCEDURE — 96401 CHEMO ANTI-NEOPL SQ/IM: CPT

## 2024-01-19 PROCEDURE — 96413 CHEMO IV INFUSION 1 HR: CPT

## 2024-01-19 NOTE — PROGRESS NOTES
Patient is here for follow up for myeloma on C40 of kyprolis and darzalex.   He continues to have some pain in his left scapula. He takes tylenol at night and this helps him sleep.  He took his premeds at 945 today.   He saw Dr. Weeks for follow up and his heart was good.       Education Record    Learner:  Patient    Disease / Diagnosis: myeloma    Barriers / Limitations:  None   Comments:    Method:  Discussion   Comments:    General Topics:  Side effects and symptom management   Comments:    Outcome:  Shows understanding   Comments:

## 2024-01-19 NOTE — PROGRESS NOTES
Cancer Center Progress Note    Problem List:      Patient Active Problem List   Diagnosis    Impotence of organic origin    Generalized osteoarthrosis of hand    Morbid obesity (HCC)    Essential hypertension    Hypertension    Multiple myeloma (HCC)    Myeloma kidney disease  (HCC)    Hx of stem cell transplant (HCC)    Hypoxia    Multiple myeloma, remission status unspecified (HCC)    Type 2 diabetes mellitus with hyperglycemia, without long-term current use of insulin (HCC)    Cardiomyopathy, unspecified type (HCC)    Morbid (severe) obesity due to excess calories (HCC)    Elevated LFTs       Interim History:    Nacho Valle presents today for evaluation and management of a diagnosis of multiple myeloma.     He is here for cycle 40 day 1 of Ann/Carf/Dex. He continues to have some intermittent left upper back pain over the left scapula. This seems to be less frequent. He had a PET scan with no abnormality in this region.      He is otherwise doing well. He is taking dex 12 mg weekly three of four weeks. He has no fever or sweats. He has no dyspnea or cough. He has no abdominal pain. He has no other bone pain.    He had an echo on 5/22/2023 that had LVEF 50%.     He had right heart cath to r/o amyloid on 2/2/20.     He originally had 5 cycles of Cybord. He now has had high dose therapy. He was getting Zometa monthly infusions. His last Zometa was in 6/16/2017.    Pathology from cardiac biopsy on 2/2/2021:  Addendum 1   The endomyocardial biopsy  was sent to [facility], [place], [place] for processing and examination where the case was reviewed by Jaswinder Torre MD.   The full report has been scanned and is available as a hyperlink within this report (under the final diagnoses section).     \"Final Pathologic Diagnosis:      A.  Heart, endomyocardial biopsy:   -Fragments of myocardium with mild myocyte hypertrophy, no evidence of cardiac amyloidosis, see comment.   -Portion of  fibrosis tissue.      B.  Heart, endomyocardial biopsy for electron microscopy:   -Negative for any significant ultrastructural changes, see addendum below for details.      Comment:     The clinical concern for amyloidosis is noted.  A Congo red stain performed with appropriate controls is negative for amyloid deposition, and a trichrome stain with appropriate controls shows focal interstitial and subendocardial fibrosis.  [place]ussian zeyad stain is negative for iron deposition (controls appropriate). There is no evidence of active myocarditis, giant cells, or granulomas. Electron microscopy studies will be reported as an addendum. The clinical team was notified of the results on 2/3/2021 at 3:35 PM\".          Review of Systems:   Constitutional: Negative for anorexia, fatigue, fevers, chills, night sweats and weight loss.  Psychiatric: The patient's mood was calm and appropriate for this visit.  The pertinent positives and negatives were described. All other systems were negative.    PMH/PSH:  Past Medical History:   Diagnosis Date    Allergic rhinitis, cause unspecified     BCC (basal cell carcinoma of skin) 1997    s/p surgical excision    BPH (benign prostatic hyperplasia)     Colon polyps     Generalized osteoarthrosis, involving hand     Herniation of intervertebral disc between L4 and L5 2000    s/p surgical repair    High blood pressure     High cholesterol     Impotence of organic origin     Morbid obesity (HCC)     Osteoarthritis     Osteoarthrosis, unspecified whether generalized or localized, lower leg 6/20/2013    Log Date: 08/21/2012     Other and unspecified hyperlipidemia     Pneumonia due to organism     Prostate cancer (HCC) 2007    s/p total prostatectomy and radiation    Trigger finger (acquired)     Type II or unspecified type diabetes mellitus without mention of complication, not stated as uncontrolled 10/2007    Unspecified essential hypertension     Unspecified internal derangement of knee  6/20/2013    Log Date: 08/21/2012        Past Surgical History:   Procedure Laterality Date    BACK SURGERY  1/2001    lower back L4 L5 for herniated disc    COLONOSCOPY      202, 2012 Dr. Wolf, polyps    OTHER SURGICAL HISTORY  1/31/2007    total prostatectomy    SKIN SURGERY  1997    BCC       Family History Reviewed:  Family History   Problem Relation Age of Onset    Diabetes Other         family hx    Hypertension Other         family hx    Prostate Cancer Father        Allergies:     Allergies   Allergen Reactions    Pcn [Penicillins] ANAPHYLAXIS, HIVES, HALLUCINATION and SHORTNESS OF BREATH     Respiratory distress    Beta Adrenergic Blockers     Latex      Surgical tape  Welts, burning of skin, itching    Statins        Medications:  No outpatient medications have been marked as taking for the 1/19/24 encounter (Office Visit) with Ruiz Frias MD.     Vital Signs:      Height: 182.9 cm (6' 0.01\") (01/19 1013)  Weight: 114.8 kg (253 lb) (01/19 1013)  BSA (Calculated - sq m): 2.35 sq meters (01/19 1013)  Pulse: 51 (01/19 1013)  BP: 113/68 (01/19 1013)  Temp: 96.6 °F (35.9 °C) (01/19 1013)  Do Not Use - Resp Rate: --  SpO2: 98 % (01/19 1013)      Performance Status:  ECOG 0: Fully active, able to carry on all pre-disease performance without restriction     Physical Examination:    Constitutional: Patient is alert and not in acute distress.  Respiratory: Clear to auscultation and percussion. No rales.  No wheezes.  Cardiovascular: Regular rate and rhythm. No murmurs.  Gastrointestinal: Soft, non tender with good bowel sounds.  Musculoskeletal: No edema. No calf tenderness.  Psychiatric: The patient's mood is calm and appropriate for this visit.       Labs reviewed at this visit:     Lab Results   Component Value Date    WBC 15.1 (H) 01/19/2024    RBC 3.61 (L) 01/19/2024    HGB 12.5 (L) 01/19/2024    HCT 38.4 (L) 01/19/2024    .4 (H) 01/19/2024    MCH 34.6 (H) 01/19/2024    MCHC 32.6 01/19/2024    RDW  13.4 01/19/2024    .0 01/19/2024     Lab Results   Component Value Date     01/19/2024    K 3.8 01/19/2024     01/19/2024    CO2 22.0 01/19/2024    BUN 27 (H) 01/19/2024    CREATSERUM 1.87 (H) 01/19/2024     (H) 01/19/2024    CA 9.5 01/19/2024    ALKPHO 87 01/19/2024    ALT  01/19/2024      Comment:      Due to  backorder we are temporarily unable to offer hospital-based ALT testing at [facility].   If urgently needed, please order ALT test code [id number].   The new order will need a new venipuncture and will be sent to [facility] for testing.   The expected turnaround time will be within 24 hours.     AST 11 (L) 01/19/2024    BILT 0.9 01/19/2024    ALB 3.7 01/19/2024    TP 6.7 01/19/2024     Lab Component   Component Value Date/Time     01/08/2021 1028     Component  Ref Range & Units 1/19/24 1012   Immunoglobulin A  70.00 - 312.00 mg/dL <7.83 Low    Immunoglobulin G  791 - 1,643 mg/dL 222 Low    Immunoglobulin M  43.0 - 279.0 mg/dL <5.3 Low      Component  Ref Range & Units 12/22/23 1020   Total Protein  5.7 - 8.2 g/dL 6.2   Albumin  3.75 - 5.21 g/dL 4.09   Alpha-1-Globulins  0.19 - 0.46 g/dL 0.32   Alpha-2-Globulins  0.48 - 1.05 g/dL 0.92   Beta Globulins  0.68 - 1.23 g/dL 0.63 Low    Gamma Globulins  0.62 - 1.70 g/dL 0.24 Low    Albumin/Globulin Ratio  1.00 - 2.00 1.93   SPE Interpretation Small monoclonal spike in the gamma region.   Immunofixation Small monoclonal IgG kappa. If clinically indicated, 24 hour urine monoclonal  protein studies is suggested.   [place] Free Light Chain  0.330 - 1.940 mg/dL 18.904 High    Lambda Free Light Chain  0.571 - 2.630 mg/dL <0.133 Low    Kappa/Lambda Flc Ratio       M-Cameron  <=0.00 g/dL 0.06 High          Radiologic imaging reviewed at this visit:      PET/CT scan on 1/3/2024:  FINDINGS:    ABNORMAL FOCI:    Diffuse metabolic activity is noted throughout the osseous structures.      Metabolic activity in the anterior right 2nd rib  with associated pathologic fracture demonstrates a maximum SUV of 11.0 (series 6, image 167), previously demonstrating a maximum SUV of 6.4.     Activity in the posterior left iliac wing demonstrates a maximum SUV of 3.2 (image 343), has not significantly changed from the prior exam.  Activity in the sacrum demonstrates a maximum SUV of 3.5 (image 342), previously demonstrating a maximum SUV of  3.3.     Degenerative activity involving right anterior lateral osteophytes in the thoracic spine likely represents DISH.  Metabolic activity around the right hip joint capsule may represent bursitis.  Superinfection cannot be excluded.  This is relatively  unchanged from the prior exam.     OTHER:         Calcified pleural plaque along the right hemidiaphragm is noted with minimal calcification along the left hemidiaphragm.  Coronary artery atherosclerosis is noted.  Calcific tendinosis of the right greater than left Achilles tendon is  noted.     Impression   CONCLUSION:    1. Diffuse low-level metabolic activity throughout the axial skeleton has not significantly changed with the exception of the anterior right 2nd rib which demonstrates increased metabolic activity compared to the prior examination.          Assessment/Plan:     Multiple myeloma IgG kappa:  Hypercalcemia  Acute renal failure  Anemia  Diffuse bone lytic lesions  Beta-2 Microglobulin 18.5 mg/L    ISS stage III  5 cycles of (VCd) Dexamethasone, Bortezomib and cyclophosphamide  High dose therapy with stem cell infusion on 1/27/2017  Revlimid maintenance from 6/2017 to 12/202  Progression with repeat bone marrow biopsy on 1/12/2021  50% plasma cells  FISH study was not done  Chromosomes at relapse:   45,X,-Y[7]/46,XY[13]  Carfilzomib/Ann/Dex started on 2/18/2021       He is on salvage treatment with ann/carfilzomib/Dex.  He continues to have good control of his disease. We will continue with the current treatment. The PET scan is overall stable  stable except for focal right rib activity. I am not convinced that this is progression. We will follow this for now.     He will continue with cycle 40, day 1. He is tolerating the treatment well. He will continue the Dex 12 mg weekly. He will continue with the current carfilzomib. The quant Ig levels are low. He will return in 4 weeks with repeat labs.      Lytic bone disease:   back pain:  Hypercalcemia    He has been on Zometa. The PET scan had diffuse bony changes consistent with myeloma.      Chronic Kidney Disease:     Overall stable.    Cardiomyopathy:    Continue cardiology follow up. He is s/p myocardial biopsy that is negative for amyloid. He will continue follow up with Dr. Weeks.    Anemia complicating neoplastic disease:    Borderline low now. Continue to follow.        Ruiz Frias MD

## 2024-01-19 NOTE — PROGRESS NOTES
Pt here for C40D1 Drug(s)kyprolis/fastpro.  Arrives Ambulating independently, accompanied by Self      Patient was evaluated today by MD.     Oral medications included in this regimen:  yes - dexamethasone, tylenol benadryl     Patient confirms comprehension of cancer treatment schedule:  yes     Pregnancy screening:  Not applicable     Modifications in dose or schedule:  No     Medications appearance and physical integrity checked by RN: yes.     Chemotherapy IV pump settings verified by 2 RNs:  Yes.  Frequency of blood return and site check throughout administration: Prior to administration and At completion of therapy      Infusion/treatment outcome:  patient tolerated treatment without incident     Education Record     Learner:  Patient  Barriers / Limitations:  None  Method:  Brief focused  Education / instructions given:  reviewed chemo and fu  Outcome:  Shows understanding     Discharged Home, Ambulating independently, accompanied by:Self     Patient/family verbalized understanding of future appointments: by printed AVS  Pt dc home ambulatory in stable condition, no complaints.

## 2024-01-22 LAB
ALBUMIN SERPL ELPH-MCNC: 4.09 G/DL (ref 3.75–5.21)
ALBUMIN/GLOB SERPL: 1.93 {RATIO} (ref 1–2)
ALPHA1 GLOB SERPL ELPH-MCNC: 0.33 G/DL (ref 0.19–0.46)
ALPHA2 GLOB SERPL ELPH-MCNC: 0.92 G/DL (ref 0.48–1.05)
B-GLOBULIN SERPL ELPH-MCNC: 0.63 G/DL (ref 0.68–1.23)
GAMMA GLOB SERPL ELPH-MCNC: 0.22 G/DL (ref 0.62–1.7)
KAPPA LC FREE SER-MCNC: 27.04 MG/DL (ref 0.33–1.94)
LAMBDA LC FREE SERPL-MCNC: <0.133 MG/DL (ref 0.57–2.63)
M PROTEIN 1 SERPL ELPH-MCNC: 0.06 G/DL (ref ?–0)
PROT SERPL-MCNC: 6.2 G/DL (ref 5.7–8.2)

## 2024-01-26 ENCOUNTER — OFFICE VISIT (OUTPATIENT)
Dept: HEMATOLOGY/ONCOLOGY | Age: 74
End: 2024-01-26
Attending: INTERNAL MEDICINE
Payer: MEDICARE

## 2024-01-26 VITALS
DIASTOLIC BLOOD PRESSURE: 55 MMHG | SYSTOLIC BLOOD PRESSURE: 94 MMHG | BODY MASS INDEX: 34.54 KG/M2 | OXYGEN SATURATION: 95 % | TEMPERATURE: 97 F | HEIGHT: 72.01 IN | HEART RATE: 57 BPM | WEIGHT: 255 LBS | RESPIRATION RATE: 18 BRPM

## 2024-01-26 DIAGNOSIS — R79.89 ELEVATED LFTS: ICD-10-CM

## 2024-01-26 DIAGNOSIS — C90.00 MULTIPLE MYELOMA NOT HAVING ACHIEVED REMISSION (HCC): Primary | ICD-10-CM

## 2024-01-26 LAB
BASOPHILS # BLD AUTO: 0 X10(3) UL (ref 0–0.2)
BASOPHILS NFR BLD AUTO: 0 %
EOSINOPHIL # BLD AUTO: 0 X10(3) UL (ref 0–0.7)
EOSINOPHIL NFR BLD AUTO: 0 %
ERYTHROCYTE [DISTWIDTH] IN BLOOD BY AUTOMATED COUNT: 14.5 %
HCT VFR BLD AUTO: 37.7 %
HGB BLD-MCNC: 12.3 G/DL
IMM GRANULOCYTES # BLD AUTO: 0.04 X10(3) UL (ref 0–1)
IMM GRANULOCYTES NFR BLD: 0.4 %
LYMPHOCYTES # BLD AUTO: 0.15 X10(3) UL (ref 1–4)
LYMPHOCYTES NFR BLD AUTO: 1.5 %
MCH RBC QN AUTO: 34.9 PG (ref 26–34)
MCHC RBC AUTO-ENTMCNC: 32.6 G/DL (ref 31–37)
MCV RBC AUTO: 107.1 FL
MONOCYTES # BLD AUTO: 0.07 X10(3) UL (ref 0.1–1)
MONOCYTES NFR BLD AUTO: 0.7 %
NEUTROPHILS # BLD AUTO: 9.73 X10 (3) UL (ref 1.5–7.7)
NEUTROPHILS # BLD AUTO: 9.73 X10(3) UL (ref 1.5–7.7)
NEUTROPHILS NFR BLD AUTO: 97.4 %
PLATELET # BLD AUTO: 125 10(3)UL (ref 150–450)
RBC # BLD AUTO: 3.52 X10(6)UL
WBC # BLD AUTO: 10 X10(3) UL (ref 4–11)

## 2024-01-26 PROCEDURE — 36415 COLL VENOUS BLD VENIPUNCTURE: CPT

## 2024-01-26 PROCEDURE — 85025 COMPLETE CBC W/AUTO DIFF WBC: CPT

## 2024-01-26 PROCEDURE — 96413 CHEMO IV INFUSION 1 HR: CPT

## 2024-01-26 NOTE — PROGRESS NOTES
Pt here for C40D8 Drug(s)Kyprolis.  Arrives Ambulating independently, accompanied by Self     Patient was evaluated today by Treatment Nurse.    Oral medications included in this regimen:  yes - dexamethasone, tylenol, benadryl    Patient confirms comprehension of cancer treatment schedule:  yes    Pregnancy screening:  Not applicable    Modifications in dose or schedule:  No    Medications appearance and physical integrity checked by RN: yes.    Chemotherapy IV pump settings verified by 2 RNs:  Yes.  Frequency of blood return and site check throughout administration: Prior to administration and At completion of therapy     Infusion/treatment outcome:  patient tolerated treatment without incident    Education Record    Learner:  Patient  Barriers / Limitations:  None  Method:  Brief focused and Discussion  Education / instructions given:  Plan of care and next appointments reviewed.  Outcome:  Shows understanding    Discharged Home, Ambulating independently, accompanied by:Self in stable condition, no new complaints.    Patient/family verbalized understanding of future appointments: by printed AVS

## 2024-01-29 RX ORDER — ATORVASTATIN CALCIUM 20 MG/1
20 TABLET, FILM COATED ORAL DAILY
Qty: 90 TABLET | Refills: 0 | Status: SHIPPED | OUTPATIENT
Start: 2024-01-29

## 2024-01-29 RX ORDER — ACYCLOVIR 400 MG/1
TABLET ORAL
Qty: 180 TABLET | Refills: 1 | Status: SHIPPED | OUTPATIENT
Start: 2024-01-29

## 2024-01-29 NOTE — TELEPHONE ENCOUNTER
LOV 12/7/2023      LAST LAB 1/19/2024    LAST RX   ATORVASTATIN 20 MG Oral Tab 90 tablet 0 11/6/2023       Next OV   Future Appointments   Date Time Provider Department Center   2/2/2024 11:00 AM PF TX RN2 PF CHEMO I [facility]   2/16/2024 11:15 AM Ruiz Frias MD PF HEM ONC [facility]   2/16/2024 11:30 AM PF TX RN1 PF CHEMO I [facility]   3/5/2024  1:40 PM Francisco Silva MD [id number] [address]   3/7/2024  2:30 PM Dagoberto Bell MD [facility] [address] [facility] [address]         PROTOCOL failed

## 2024-02-02 ENCOUNTER — OFFICE VISIT (OUTPATIENT)
Dept: HEMATOLOGY/ONCOLOGY | Age: 74
End: 2024-02-02
Attending: INTERNAL MEDICINE
Payer: MEDICARE

## 2024-02-02 VITALS
WEIGHT: 254 LBS | SYSTOLIC BLOOD PRESSURE: 102 MMHG | TEMPERATURE: 97 F | OXYGEN SATURATION: 96 % | RESPIRATION RATE: 18 BRPM | DIASTOLIC BLOOD PRESSURE: 63 MMHG | HEART RATE: 64 BPM | BODY MASS INDEX: 34.4 KG/M2 | HEIGHT: 72.01 IN

## 2024-02-02 DIAGNOSIS — C90.00 MULTIPLE MYELOMA NOT HAVING ACHIEVED REMISSION (HCC): ICD-10-CM

## 2024-02-02 DIAGNOSIS — C90.00 MULTIPLE MYELOMA, REMISSION STATUS UNSPECIFIED (HCC): Primary | ICD-10-CM

## 2024-02-02 DIAGNOSIS — R79.89 ELEVATED LFTS: ICD-10-CM

## 2024-02-02 LAB
ALBUMIN SERPL-MCNC: 3.7 G/DL (ref 3.4–5)
BASOPHILS # BLD AUTO: 0.01 X10(3) UL (ref 0–0.2)
BASOPHILS NFR BLD AUTO: 0.1 %
CALCIUM BLD-MCNC: 8.6 MG/DL (ref 8.5–10.1)
CREAT BLD-MCNC: 2.06 MG/DL
EGFRCR SERPLBLD CKD-EPI 2021: 33 ML/MIN/1.73M2 (ref 60–?)
EOSINOPHIL # BLD AUTO: 0 X10(3) UL (ref 0–0.7)
EOSINOPHIL NFR BLD AUTO: 0 %
ERYTHROCYTE [DISTWIDTH] IN BLOOD BY AUTOMATED COUNT: 14.2 %
HCT VFR BLD AUTO: 39.7 %
HGB BLD-MCNC: 13 G/DL
IMM GRANULOCYTES # BLD AUTO: 0.03 X10(3) UL (ref 0–1)
IMM GRANULOCYTES NFR BLD: 0.3 %
LYMPHOCYTES # BLD AUTO: 0.12 X10(3) UL (ref 1–4)
LYMPHOCYTES NFR BLD AUTO: 1.1 %
MCH RBC QN AUTO: 35.6 PG (ref 26–34)
MCHC RBC AUTO-ENTMCNC: 32.7 G/DL (ref 31–37)
MCV RBC AUTO: 108.8 FL
MONOCYTES # BLD AUTO: 0.05 X10(3) UL (ref 0.1–1)
MONOCYTES NFR BLD AUTO: 0.5 %
NEUTROPHILS # BLD AUTO: 10.84 X10 (3) UL (ref 1.5–7.7)
NEUTROPHILS # BLD AUTO: 10.84 X10(3) UL (ref 1.5–7.7)
NEUTROPHILS NFR BLD AUTO: 98 %
PLATELET # BLD AUTO: 151 10(3)UL (ref 150–450)
RBC # BLD AUTO: 3.65 X10(6)UL
WBC # BLD AUTO: 11.1 X10(3) UL (ref 4–11)

## 2024-02-02 PROCEDURE — 82040 ASSAY OF SERUM ALBUMIN: CPT

## 2024-02-02 PROCEDURE — 96375 TX/PRO/DX INJ NEW DRUG ADDON: CPT

## 2024-02-02 PROCEDURE — 82565 ASSAY OF CREATININE: CPT

## 2024-02-02 PROCEDURE — 85025 COMPLETE CBC W/AUTO DIFF WBC: CPT

## 2024-02-02 PROCEDURE — 96413 CHEMO IV INFUSION 1 HR: CPT

## 2024-02-02 PROCEDURE — 36415 COLL VENOUS BLD VENIPUNCTURE: CPT

## 2024-02-02 PROCEDURE — 82310 ASSAY OF CALCIUM: CPT

## 2024-02-02 NOTE — PROGRESS NOTES
Pt here for C40D15 Drug(s)kyprolis, zometa.  Arrives Ambulating independently, accompanied by Self     Patient was evaluated today by Treatment Nurse.    Oral medications included in this regimen:  no    Patient confirms comprehension of cancer treatment schedule:  yes    Pregnancy screening:  Not applicable    Modifications in dose or schedule:  Yes    Medications appearance and physical integrity checked by RN: yes.    Chemotherapy IV pump settings verified by 2 RNs:  Yes.  Frequency of blood return and site check throughout administration: Prior to administration and At completion of therapy     Infusion/treatment outcome:  patient tolerated treatment without incident    Education Record    Learner:  Patient  Barriers / Limitations:  None  Method:  Discussion  Education / instructions given:  appts  Outcome:  Shows understanding    Discharged Home, Ambulating independently, accompanied by:Self    Patient/family verbalized understanding of future appointments: by printed AVS

## 2024-02-16 ENCOUNTER — OFFICE VISIT (OUTPATIENT)
Dept: HEMATOLOGY/ONCOLOGY | Age: 74
End: 2024-02-16
Attending: INTERNAL MEDICINE
Payer: MEDICARE

## 2024-02-16 VITALS
RESPIRATION RATE: 18 BRPM | TEMPERATURE: 98 F | OXYGEN SATURATION: 98 % | DIASTOLIC BLOOD PRESSURE: 59 MMHG | HEART RATE: 54 BPM | SYSTOLIC BLOOD PRESSURE: 94 MMHG

## 2024-02-16 VITALS
OXYGEN SATURATION: 98 % | RESPIRATION RATE: 18 BRPM | WEIGHT: 258 LBS | HEART RATE: 46 BPM | BODY MASS INDEX: 34.95 KG/M2 | HEIGHT: 72.01 IN | TEMPERATURE: 97 F | SYSTOLIC BLOOD PRESSURE: 100 MMHG | DIASTOLIC BLOOD PRESSURE: 66 MMHG

## 2024-02-16 DIAGNOSIS — R79.89 ELEVATED LFTS: Primary | ICD-10-CM

## 2024-02-16 DIAGNOSIS — C90.00 MULTIPLE MYELOMA NOT HAVING ACHIEVED REMISSION (HCC): ICD-10-CM

## 2024-02-16 DIAGNOSIS — D63.0 ANEMIA COMPLICATING NEOPLASTIC DISEASE: ICD-10-CM

## 2024-02-16 DIAGNOSIS — E11.9 DIABETES MELLITUS WITH COINCIDENT HYPERTENSION  (HCC): ICD-10-CM

## 2024-02-16 DIAGNOSIS — R79.89 ELEVATED LFTS: ICD-10-CM

## 2024-02-16 DIAGNOSIS — N18.30 STAGE 3 CHRONIC KIDNEY DISEASE, UNSPECIFIED WHETHER STAGE 3A OR 3B CKD (HCC): ICD-10-CM

## 2024-02-16 DIAGNOSIS — N08 MYELOMA KIDNEY (HCC): ICD-10-CM

## 2024-02-16 DIAGNOSIS — C90.00 MYELOMA KIDNEY (HCC): ICD-10-CM

## 2024-02-16 DIAGNOSIS — C90.00 MULTIPLE MYELOMA, REMISSION STATUS UNSPECIFIED (HCC): Primary | ICD-10-CM

## 2024-02-16 DIAGNOSIS — I10 DIABETES MELLITUS WITH COINCIDENT HYPERTENSION  (HCC): ICD-10-CM

## 2024-02-16 LAB
ALBUMIN SERPL-MCNC: 3.6 G/DL (ref 3.4–5)
ALBUMIN SERPL-MCNC: 3.6 G/DL (ref 3.4–5)
ALBUMIN/GLOB SERPL: 1.2 {RATIO} (ref 1–2)
ALP LIVER SERPL-CCNC: 72 U/L
ALT SERPL-CCNC: 15 U/L
ANION GAP SERPL CALC-SCNC: 3 MMOL/L (ref 0–18)
AST SERPL-CCNC: 12 U/L (ref 15–37)
BASOPHILS # BLD AUTO: 0.02 X10(3) UL (ref 0–0.2)
BASOPHILS NFR BLD AUTO: 0.1 %
BILIRUB SERPL-MCNC: 0.8 MG/DL (ref 0.1–2)
BUN BLD-MCNC: 34 MG/DL (ref 9–23)
CALCIUM BLD-MCNC: 8.7 MG/DL (ref 8.5–10.1)
CALCIUM BLD-MCNC: 8.7 MG/DL (ref 8.5–10.1)
CHLORIDE SERPL-SCNC: 112 MMOL/L (ref 98–112)
CO2 SERPL-SCNC: 23 MMOL/L (ref 21–32)
CREAT BLD-MCNC: 2.15 MG/DL
CREAT BLD-MCNC: 2.15 MG/DL
EGFRCR SERPLBLD CKD-EPI 2021: 31 ML/MIN/1.73M2 (ref 60–?)
EGFRCR SERPLBLD CKD-EPI 2021: 31 ML/MIN/1.73M2 (ref 60–?)
EOSINOPHIL # BLD AUTO: 0.01 X10(3) UL (ref 0–0.7)
EOSINOPHIL NFR BLD AUTO: 0.1 %
ERYTHROCYTE [DISTWIDTH] IN BLOOD BY AUTOMATED COUNT: 13.8 %
FASTING STATUS PATIENT QL REPORTED: NO
GLOBULIN PLAS-MCNC: 2.9 G/DL (ref 2.8–4.4)
GLUCOSE BLD-MCNC: 158 MG/DL (ref 70–99)
HCT VFR BLD AUTO: 36.9 %
HGB BLD-MCNC: 12.2 G/DL
IGA SERPL-MCNC: <7.83 MG/DL (ref 70–312)
IGM SERPL-MCNC: 34.2 MG/DL (ref 43–279)
IMM GRANULOCYTES # BLD AUTO: 0.05 X10(3) UL (ref 0–1)
IMM GRANULOCYTES NFR BLD: 0.4 %
IMMUNOGLOBULIN PNL SER-MCNC: 186 MG/DL (ref 791–1643)
LYMPHOCYTES # BLD AUTO: 0.17 X10(3) UL (ref 1–4)
LYMPHOCYTES NFR BLD AUTO: 1.2 %
MCH RBC QN AUTO: 35.9 PG (ref 26–34)
MCHC RBC AUTO-ENTMCNC: 33.1 G/DL (ref 31–37)
MCV RBC AUTO: 108.5 FL
MONOCYTES # BLD AUTO: 0.08 X10(3) UL (ref 0.1–1)
MONOCYTES NFR BLD AUTO: 0.6 %
NEUTROPHILS # BLD AUTO: 13.43 X10 (3) UL (ref 1.5–7.7)
NEUTROPHILS # BLD AUTO: 13.43 X10(3) UL (ref 1.5–7.7)
NEUTROPHILS NFR BLD AUTO: 97.6 %
OSMOLALITY SERPL CALC.SUM OF ELEC: 297 MOSM/KG (ref 275–295)
PLATELET # BLD AUTO: 244 10(3)UL (ref 150–450)
POTASSIUM SERPL-SCNC: 4.4 MMOL/L (ref 3.5–5.1)
PROT SERPL-MCNC: 6.5 G/DL (ref 6.4–8.2)
RBC # BLD AUTO: 3.4 X10(6)UL
SODIUM SERPL-SCNC: 138 MMOL/L (ref 136–145)
WBC # BLD AUTO: 13.8 X10(3) UL (ref 4–11)

## 2024-02-16 PROCEDURE — 99214 OFFICE O/P EST MOD 30 MIN: CPT | Performed by: INTERNAL MEDICINE

## 2024-02-16 PROCEDURE — 96401 CHEMO ANTI-NEOPL SQ/IM: CPT

## 2024-02-16 PROCEDURE — 96413 CHEMO IV INFUSION 1 HR: CPT

## 2024-02-16 NOTE — PROGRESS NOTES
Cancer Center Progress Note    Problem List:      Patient Active Problem List   Diagnosis    Impotence of organic origin    Generalized osteoarthrosis of hand    Morbid obesity (HCC)    Essential hypertension    Hypertension    Multiple myeloma (HCC)    Myeloma kidney disease  (HCC)    Hx of stem cell transplant (HCC)    Hypoxia    Multiple myeloma, remission status unspecified (HCC)    Type 2 diabetes mellitus with hyperglycemia, without long-term current use of insulin (HCC)    Cardiomyopathy, unspecified type (HCC)    Morbid (severe) obesity due to excess calories (HCC)    Elevated LFTs       Interim History:    Nacho Valle presents today for evaluation and management of a diagnosis of multiple myeloma.     He is here for cycle 41 day 1 of Ann/Carf/Dex. He continues to have some intermittent left upper back pain over the left scapula. This is bothering him more at this visit. He had a PET scan with no abnormality in this region.    He is otherwise doing well. He is taking dex 12 mg weekly three of four weeks. He has no fever or sweats. He has no dyspnea or cough. He has no abdominal pain. He has no other bone pain.    He had an echo on 5/22/2023 that had LVEF 50%.     He had right heart cath to r/o amyloid on 2/2/20.     He originally had 5 cycles of Cybord. He now has had high dose therapy. He was getting Zometa monthly infusions. His last Zometa was in 6/16/2017.    Pathology from cardiac biopsy on 2/2/2021:  Addendum 1   The endomyocardial biopsy  was sent to [facility], [place], [place] for processing and examination where the case was reviewed by Jaswinder Torre MD.   The full report has been scanned and is available as a hyperlink within this report (under the final diagnoses section).     \"Final Pathologic Diagnosis:      A.  Heart, endomyocardial biopsy:   -Fragments of myocardium with mild myocyte hypertrophy, no evidence of cardiac amyloidosis, see comment.   -Portion  of fibrosis tissue.      B.  Heart, endomyocardial biopsy for electron microscopy:   -Negative for any significant ultrastructural changes, see addendum below for details.      Comment:     The clinical concern for amyloidosis is noted.  A Congo red stain performed with appropriate controls is negative for amyloid deposition, and a trichrome stain with appropriate controls shows focal interstitial and subendocardial fibrosis.  Prussian zeyad stain is negative for iron deposition (controls appropriate). There is no evidence of active myocarditis, giant cells, or granulomas. Electron microscopy studies will be reported as an addendum. The clinical team was notified of the results on 2/3/2021 at 3:35 PM\".          Review of Systems:   Constitutional: Negative for anorexia, fatigue, fevers, chills, night sweats and weight loss.  Psychiatric: The patient's mood was calm and appropriate for this visit.  The pertinent positives and negatives were described. All other systems were negative.    PMH/PSH:  Past Medical History:   Diagnosis Date    Allergic rhinitis, cause unspecified     BCC (basal cell carcinoma of skin) 1997    s/p surgical excision    BPH (benign prostatic hyperplasia)     Colon polyps     Generalized osteoarthrosis, involving hand     Herniation of intervertebral disc between L4 and L5 2000    s/p surgical repair    High blood pressure     High cholesterol     Impotence of organic origin     Morbid obesity (HCC)     Osteoarthritis     Osteoarthrosis, unspecified whether generalized or localized, lower leg 6/20/2013    Log Date: 08/21/2012     Other and unspecified hyperlipidemia     Pneumonia due to organism     Prostate cancer (HCC) 2007    s/p total prostatectomy and radiation    Trigger finger (acquired)     Type II or unspecified type diabetes mellitus without mention of complication, not stated as uncontrolled 10/2007    Unspecified essential hypertension     Unspecified internal derangement of knee  6/20/2013    Log Date: 08/21/2012        Past Surgical History:   Procedure Laterality Date    BACK SURGERY  1/2001    lower back L4 L5 for herniated disc    COLONOSCOPY      202, 2012 Dr. Wolf, polyps    OTHER SURGICAL HISTORY  1/31/2007    total prostatectomy    SKIN SURGERY  1997    [facility]       Family History Reviewed:  Family History   Problem Relation Age of Onset    Diabetes Other         family hx    Hypertension Other         family hx    Prostate Cancer Father        Allergies:     Allergies   Allergen Reactions    Pcn [Penicillins] ANAPHYLAXIS, HIVES, HALLUCINATION and SHORTNESS OF BREATH     Respiratory distress    Beta Adrenergic Blockers     Latex      Surgical tape  Welts, burning of skin, itching    Statins        Medications:   ACYCLOVIR 400 MG Oral Tab TAKE 1 TABLET(400 MG) BY MOUTH TWICE DAILY 180 tablet 1    atorvastatin 20 MG Oral Tab Take 1 tablet (20 mg total) by mouth daily. 90 tablet 0    traMADol 50 MG Oral Tab Take 1 tablet (50 mg total) by mouth every 6 (six) hours as needed for Pain. 40 tablet 0    benzonatate 200 MG Oral Cap Take 1 capsule (200 mg total) by mouth 3 (three) times daily as needed for cough. 30 capsule 0    cyclobenzaprine 10 MG Oral Tab TAKE 1 TABLET(10 MG) BY MOUTH THREE TIMES DAILY AS NEEDED FOR MUSCLE SPASMS 10 tablet 1    DRISDOL 1.25 MG (38731 UT) Oral Cap       dexamethasone (DECADRON) 4 MG tablet Take 3 tablets (12mg) PO one time per week. 36 tablet 3    ONETOUCH ULTRASOFT LANCETS Does not apply Misc TEST BLOOD SUGAR TWICE DAILY 200 each 2    ENTRESTO  MG Oral Tab Take 1 tablet by mouth daily.      empagliflozin 10 MG Oral Tab Take 1 tablet (10 mg total) by mouth daily. JARDIANCE      Glucose Blood (ONETOUCH ULTRA) In Vitro Strip CHECK SUGARS THREE TIMES DAILY AS DIRECTED 300 strip 2    Metoprolol Succinate  MG Oral Tablet 24 Hr Take 1 tablet (200 mg total) by mouth every evening.      eplerenone 25 MG Oral Tab Take 1 tablet (25 mg total) by mouth daily.       omega-3 fatty acids 1000 MG Oral Cap Take 1,000 mg by mouth 2 (two) times daily.      Multiple Vitamin Oral Tab Take 1 tablet by mouth.      acetaminophen 500 MG Oral Tab Take 2 tablets (1,000 mg total) by mouth nightly.      aspirin (ASPIR-81) 81 MG Oral Tab EC Take 1 tablet by mouth daily. 1 tablet 0     Vital Signs:      Height: 182.9 cm (6' 0.01\") (02/16 1107)  Weight: 117 kg (258 lb) (02/16 1107)  BSA (Calculated - sq m): 2.37 sq meters (02/16 1107)  Pulse: 46 (02/16 1107)  BP: 100/66 (02/16 1107)  Temp: 96.9 °F (36.1 °C) (02/16 1107)  Do Not Use - Resp Rate: --  SpO2: 98 % (02/16 1107)      Performance Status:  ECOG 0: Fully active, able to carry on all pre-disease performance without restriction     Physical Examination:    Constitutional: Patient is alert and not in acute distress.  Respiratory: Clear to auscultation and percussion. No rales.  No wheezes.  Cardiovascular: Regular rate and rhythm. No murmurs.  Gastrointestinal: Soft, non tender with good bowel sounds.  Musculoskeletal: No edema. No calf tenderness.  Psychiatric: The patient's mood is calm and appropriate for this visit.       Labs reviewed at this visit:     Lab Results   Component Value Date    WBC 13.8 (H) 02/16/2024    RBC 3.40 (L) 02/16/2024    HGB 12.2 (L) 02/16/2024    HCT 36.9 (L) 02/16/2024    .5 (H) 02/16/2024    MCH 35.9 (H) 02/16/2024    MCHC 33.1 02/16/2024    RDW 13.8 02/16/2024    .0 02/16/2024     Lab Results   Component Value Date     02/16/2024    K 4.4 02/16/2024     02/16/2024    CO2 23.0 02/16/2024    BUN 34 (H) 02/16/2024    CREATSERUM 2.15 (H) 02/16/2024    CREATSERUM 2.15 (H) 02/16/2024     (H) 02/16/2024    CA 8.7 02/16/2024    CA 8.7 02/16/2024    ALKPHO 72 02/16/2024    ALT 15 (L) 02/16/2024    AST 12 (L) 02/16/2024    BILT 0.8 02/16/2024    ALB 3.6 02/16/2024    ALB 3.6 02/16/2024    TP 6.5 02/16/2024     Lab Component   Component Value Date/Time     01/08/2021 1028          Component  Ref Range & Units 1/19/24 1012   Immunoglobulin A  70.00 - 312.00 mg/dL <7.83 Low    Immunoglobulin G  791 - 1,643 mg/dL 222 Low    Immunoglobulin M  43.0 - 279.0 mg/dL <5.3 Low             Component  Ref Range & Units 1/19/24 1012   Total Protein  5.7 - 8.2 g/dL 6.2   Albumin  3.75 - 5.21 g/dL 4.09   Alpha-1-Globulins  0.19 - 0.46 g/dL 0.33   Alpha-2-Globulins  0.48 - 1.05 g/dL 0.92   Beta Globulins  0.68 - 1.23 g/dL 0.63 Low    Gamma Globulins  0.62 - 1.70 g/dL 0.22 Low    Albumin/Globulin Ratio  1.00 - 2.00 1.93   SPE Interpretation Monoclonal spike in the gamma region.    Reviewed by Cathryn Goldberg, M.D. Pathology 01/22/24 at 4:14 PM   Immunofixation Monoclonal IgG kappa. If clinically indicated, 24 hour urine monoclonal  protein studies is suggested.      Reviewed by Cathryn Goldberg, M.D. Pathology 01/22/24 at 4:14 PM   [place] Free Light Chain  0.330 - 1.940 mg/dL 27.040 High    Lambda Free Light Chain  0.571 - 2.630 mg/dL <0.133 Low           Radiologic imaging reviewed at this visit:      PET/CT scan on 1/3/2024:  FINDINGS:    ABNORMAL FOCI:    Diffuse metabolic activity is noted throughout the osseous structures.      Metabolic activity in the anterior right 2nd rib with associated pathologic fracture demonstrates a maximum SUV of 11.0 (series 6, image 167), previously demonstrating a maximum SUV of 6.4.     Activity in the posterior left iliac wing demonstrates a maximum SUV of 3.2 (image 343), has not significantly changed from the prior exam.  Activity in the sacrum demonstrates a maximum SUV of 3.5 (image 342), previously demonstrating a maximum SUV of  3.3.     Degenerative activity involving right anterior lateral osteophytes in the thoracic spine likely represents DISH.  Metabolic activity around the right hip joint capsule may represent bursitis.  Superinfection cannot be excluded.  This is relatively  unchanged from the prior exam.     OTHER:         Calcified pleural plaque along the  right hemidiaphragm is noted with minimal calcification along the left hemidiaphragm.  Coronary artery atherosclerosis is noted.  Calcific tendinosis of the right greater than left Achilles tendon is  noted.     Impression   CONCLUSION:    1. Diffuse low-level metabolic activity throughout the axial skeleton has not significantly changed with the exception of the anterior right 2nd rib which demonstrates increased metabolic activity compared to the prior examination.          Assessment/Plan:     Multiple myeloma IgG kappa:  Hypercalcemia  Acute renal failure  Anemia  Diffuse bone lytic lesions  Beta-2 Microglobulin 18.5 mg/L    ISS stage III  5 cycles of (VCd) Dexamethasone, Bortezomib and cyclophosphamide  High dose therapy with stem cell infusion on 1/27/2017  Revlimid maintenance from 6/2017 to 12/202  Progression with repeat bone marrow biopsy on 1/12/2021  50% plasma cells  FISH study was not done  Chromosomes at relapse:   45,X,-Y[7]/46,XY[13]  Carfilzomib/Ann/Dex started on 2/18/2021       He is on salvage treatment with ann/carfilzomib/Dex.  He continues to have good control of his disease. We will continue with the current treatment. The PET scan has been overall stable stable except for focal right rib activity. I am not convinced that this is progression. He has had continued back pain. I will have him arrange consultation with neurosurgery.     He will continue with cycle 41, day 1. He is tolerating the treatment well. He will continue the Dex 12 mg weekly. He will continue with the current carfilzomib. The quant Ig levels are low. He will return in 4 weeks with repeat labs.      Lytic bone disease:   back pain:  Hypercalcemia    He has been on Zometa. The PET scan had diffuse bony changes consistent with myeloma.      Chronic Kidney Disease:     Overall stable.    Cardiomyopathy:    Continue cardiology follow up. He is s/p myocardial biopsy that is negative for amyloid. He will continue follow  up with cardiology.    Anemia complicating neoplastic disease:    Borderline low now. Continue to follow.        Ruiz Frias MD

## 2024-02-16 NOTE — PROGRESS NOTES
Patient is here for follow up for myeloma on C41 of kyprolis and darzalex.   He has back pain L4-5 that he rates at 8/10.   He saw Dr. Good for follow up earlier in the week.  He is on annual follow up there.  He was told he is in remission.     Education Record    Learner:  Patient    Disease / Diagnosis: myeloma    Barriers / Limitations:  None   Comments:    Method:  Discussion   Comments:    General Topics:  Side effects and symptom management   Comments:    Outcome:  Shows understanding   Comments:

## 2024-02-16 NOTE — PROGRESS NOTES
Pt here for C41D1 Drug(s)kyprolis/fastpro.  Arrives Ambulating independently, accompanied by Self      Patient was evaluated today by MD.     Oral medications included in this regimen:  yes - dexamethasone, tylenol benadryl     Patient confirms comprehension of cancer treatment schedule:  yes     Pregnancy screening:  Not applicable     Modifications in dose or schedule:  No     Medications appearance and physical integrity checked by RN: yes.     Chemotherapy IV pump settings verified by 2 RNs:  Yes.  Frequency of blood return and site check throughout administration: Prior to administration and At completion of therapy      Infusion/treatment outcome:  patient tolerated treatment without incident     Education Record     Learner:  Patient  Barriers / Limitations:  None  Method:  Brief focused  Education / instructions given:  reviewed chemo and fu  Outcome:  Shows understanding     Discharged Home, Ambulating independently, accompanied by:Self     Patient/family verbalized understanding of future appointments: by printed AVS  Pt dc home ambulatory in stable condition, no complaints.

## 2024-02-19 ENCOUNTER — TELEPHONE (OUTPATIENT)
Dept: SURGERY | Facility: CLINIC | Age: 74
End: 2024-02-19

## 2024-02-19 NOTE — TELEPHONE ENCOUNTER
Pt states he was referred by Dr Frias to see Dr Mina due to L4/ L5 severe pain. Pt has mutilple myeloma. Pt does not have MRI but has recent PET Scan. Please advise if Dr Mina will see pt or if pt should be scheduled with mid level. Pt aware DEJAH will reach out to schedule.

## 2024-02-20 NOTE — TELEPHONE ENCOUNTER
Noted the front office message listed below.    Clinical staff is aware Dr. Mina needs updated MRI imaging in order to do a complete assessment and determine if the patient is an appropriate surgical candidate.       Routed to the front office to schedule patient with a mid-level provider to evaluate patient.

## 2024-02-20 NOTE — TELEPHONE ENCOUNTER
LVMTCB to have patient call office to schedule with mid level provider per previous note from clinical staff.     Please assist patient when retuning call to office.

## 2024-02-22 LAB
ALBUMIN SERPL ELPH-MCNC: 4.15 G/DL (ref 3.75–5.21)
ALBUMIN/GLOB SERPL: 2.13 {RATIO} (ref 1–2)
ALPHA1 GLOB SERPL ELPH-MCNC: 0.31 G/DL (ref 0.19–0.46)
ALPHA2 GLOB SERPL ELPH-MCNC: 0.84 G/DL (ref 0.48–1.05)
B-GLOBULIN SERPL ELPH-MCNC: 0.6 G/DL (ref 0.68–1.23)
GAMMA GLOB SERPL ELPH-MCNC: 0.21 G/DL (ref 0.62–1.7)
KAPPA LC FREE SER-MCNC: 27.89 MG/DL (ref 0.33–1.94)
LAMBDA LC FREE SERPL-MCNC: <0.137 MG/DL (ref 0.57–2.63)
M PROTEIN 1 SERPL ELPH-MCNC: 0.05 G/DL (ref ?–0)
PROT SERPL-MCNC: 6.1 G/DL (ref 5.7–8.2)

## 2024-02-23 ENCOUNTER — OFFICE VISIT (OUTPATIENT)
Dept: HEMATOLOGY/ONCOLOGY | Age: 74
End: 2024-02-23
Attending: INTERNAL MEDICINE
Payer: MEDICARE

## 2024-02-23 VITALS
SYSTOLIC BLOOD PRESSURE: 96 MMHG | DIASTOLIC BLOOD PRESSURE: 61 MMHG | RESPIRATION RATE: 18 BRPM | WEIGHT: 258 LBS | TEMPERATURE: 97 F | OXYGEN SATURATION: 97 % | HEIGHT: 72.01 IN | BODY MASS INDEX: 34.95 KG/M2 | HEART RATE: 56 BPM

## 2024-02-23 DIAGNOSIS — R79.89 ELEVATED LFTS: ICD-10-CM

## 2024-02-23 DIAGNOSIS — C90.00 MULTIPLE MYELOMA NOT HAVING ACHIEVED REMISSION (HCC): Primary | ICD-10-CM

## 2024-02-23 LAB
BASOPHILS # BLD AUTO: 0.01 X10(3) UL (ref 0–0.2)
BASOPHILS NFR BLD AUTO: 0.1 %
EOSINOPHIL # BLD AUTO: 0 X10(3) UL (ref 0–0.7)
EOSINOPHIL NFR BLD AUTO: 0 %
ERYTHROCYTE [DISTWIDTH] IN BLOOD BY AUTOMATED COUNT: 13.7 %
HCT VFR BLD AUTO: 39.1 %
HGB BLD-MCNC: 12.9 G/DL
IMM GRANULOCYTES # BLD AUTO: 0.05 X10(3) UL (ref 0–1)
IMM GRANULOCYTES NFR BLD: 0.4 %
LYMPHOCYTES # BLD AUTO: 0.17 X10(3) UL (ref 1–4)
LYMPHOCYTES NFR BLD AUTO: 1.3 %
MCH RBC QN AUTO: 35.3 PG (ref 26–34)
MCHC RBC AUTO-ENTMCNC: 33 G/DL (ref 31–37)
MCV RBC AUTO: 107.1 FL
MONOCYTES # BLD AUTO: 0.07 X10(3) UL (ref 0.1–1)
MONOCYTES NFR BLD AUTO: 0.5 %
NEUTROPHILS # BLD AUTO: 12.92 X10 (3) UL (ref 1.5–7.7)
NEUTROPHILS # BLD AUTO: 12.92 X10(3) UL (ref 1.5–7.7)
NEUTROPHILS NFR BLD AUTO: 97.7 %
PLATELET # BLD AUTO: 127 10(3)UL (ref 150–450)
RBC # BLD AUTO: 3.65 X10(6)UL
WBC # BLD AUTO: 13.2 X10(3) UL (ref 4–11)

## 2024-02-23 PROCEDURE — 36415 COLL VENOUS BLD VENIPUNCTURE: CPT

## 2024-02-23 PROCEDURE — 85025 COMPLETE CBC W/AUTO DIFF WBC: CPT

## 2024-02-23 PROCEDURE — 96413 CHEMO IV INFUSION 1 HR: CPT

## 2024-02-23 NOTE — PROGRESS NOTES
Pt here for C41D8 Drug(s)kyprolis.  Arrives Ambulating independently, accompanied by Self     Patient was evaluated today by Treatment Nurse.    Oral medications included in this regimen:  no    Patient confirms comprehension of cancer treatment schedule:  yes    Pregnancy screening:  Not applicable    Modifications in dose or schedule:  No    Medications appearance and physical integrity checked by RN: yes.    Chemotherapy IV pump settings verified by 2 RNs:  Yes.  Frequency of blood return and site check throughout administration: Prior to administration and At completion of therapy     Infusion/treatment outcome:  patient tolerated treatment without incident    Education Record    Learner:  Patient  Barriers / Limitations:  None  Method:  Discussion  Education / instructions given:  avs  Outcome:  Shows understanding    Discharged Home, Ambulating independently, accompanied by:Self    Patient/family verbalized understanding of future appointments: by printed AVS

## 2024-03-01 ENCOUNTER — OFFICE VISIT (OUTPATIENT)
Dept: HEMATOLOGY/ONCOLOGY | Age: 74
End: 2024-03-01
Attending: INTERNAL MEDICINE
Payer: MEDICARE

## 2024-03-01 ENCOUNTER — SOCIAL WORK SERVICES (OUTPATIENT)
Dept: HEMATOLOGY/ONCOLOGY | Facility: HOSPITAL | Age: 74
End: 2024-03-01

## 2024-03-01 VITALS
WEIGHT: 254 LBS | SYSTOLIC BLOOD PRESSURE: 92 MMHG | DIASTOLIC BLOOD PRESSURE: 51 MMHG | HEART RATE: 55 BPM | HEIGHT: 72.01 IN | RESPIRATION RATE: 20 BRPM | TEMPERATURE: 98 F | BODY MASS INDEX: 34.4 KG/M2 | OXYGEN SATURATION: 97 %

## 2024-03-01 DIAGNOSIS — N18.30 STAGE 3 CHRONIC KIDNEY DISEASE, UNSPECIFIED WHETHER STAGE 3A OR 3B CKD (HCC): ICD-10-CM

## 2024-03-01 DIAGNOSIS — N18.9 VITAMIN D DEFICIENCY DUE TO CHRONIC KIDNEY DISEASE: ICD-10-CM

## 2024-03-01 DIAGNOSIS — R79.89 ELEVATED LFTS: ICD-10-CM

## 2024-03-01 DIAGNOSIS — E55.9 VITAMIN D DEFICIENCY DUE TO CHRONIC KIDNEY DISEASE: ICD-10-CM

## 2024-03-01 DIAGNOSIS — C90.00 MULTIPLE MYELOMA, REMISSION STATUS UNSPECIFIED (HCC): ICD-10-CM

## 2024-03-01 DIAGNOSIS — C90.00 MULTIPLE MYELOMA NOT HAVING ACHIEVED REMISSION (HCC): Primary | ICD-10-CM

## 2024-03-01 DIAGNOSIS — D89.2 PARAPROTEINEMIA: ICD-10-CM

## 2024-03-01 LAB
ALBUMIN SERPL-MCNC: 3.7 G/DL (ref 3.4–5)
ANION GAP SERPL CALC-SCNC: 6 MMOL/L (ref 0–18)
BASOPHILS # BLD AUTO: 0.02 X10(3) UL (ref 0–0.2)
BASOPHILS NFR BLD AUTO: 0.1 %
BILIRUB UR QL STRIP.AUTO: NEGATIVE
BUN BLD-MCNC: 39 MG/DL (ref 9–23)
CALCIUM BLD-MCNC: 8.4 MG/DL (ref 8.5–10.1)
CALCIUM BLD-MCNC: 8.5 MG/DL (ref 8.5–10.1)
CHLORIDE SERPL-SCNC: 112 MMOL/L (ref 98–112)
CLARITY UR REFRACT.AUTO: CLEAR
CO2 SERPL-SCNC: 20 MMOL/L (ref 21–32)
COLOR UR AUTO: YELLOW
CREAT BLD-MCNC: 2.17 MG/DL
CREAT BLD-MCNC: 2.18 MG/DL
CREAT UR-SCNC: 81.6 MG/DL
EGFRCR SERPLBLD CKD-EPI 2021: 31 ML/MIN/1.73M2 (ref 60–?)
EGFRCR SERPLBLD CKD-EPI 2021: 31 ML/MIN/1.73M2 (ref 60–?)
EOSINOPHIL # BLD AUTO: 0 X10(3) UL (ref 0–0.7)
EOSINOPHIL NFR BLD AUTO: 0 %
ERYTHROCYTE [DISTWIDTH] IN BLOOD BY AUTOMATED COUNT: 13.7 %
FASTING STATUS PATIENT QL REPORTED: YES
GLUCOSE BLD-MCNC: 163 MG/DL (ref 70–99)
GLUCOSE UR STRIP.AUTO-MCNC: 500 MG/DL
HCT VFR BLD AUTO: 38.9 %
HGB BLD-MCNC: 12.9 G/DL
IMM GRANULOCYTES # BLD AUTO: 0.04 X10(3) UL (ref 0–1)
IMM GRANULOCYTES NFR BLD: 0.3 %
KETONES UR STRIP.AUTO-MCNC: NEGATIVE MG/DL
LEUKOCYTE ESTERASE UR QL STRIP.AUTO: NEGATIVE
LYMPHOCYTES # BLD AUTO: 0.18 X10(3) UL (ref 1–4)
LYMPHOCYTES NFR BLD AUTO: 1.3 %
MAGNESIUM SERPL-MCNC: 2.7 MG/DL (ref 1.6–2.6)
MCH RBC QN AUTO: 36.2 PG (ref 26–34)
MCHC RBC AUTO-ENTMCNC: 33.2 G/DL (ref 31–37)
MCV RBC AUTO: 109.3 FL
MONOCYTES # BLD AUTO: 0.06 X10(3) UL (ref 0.1–1)
MONOCYTES NFR BLD AUTO: 0.4 %
NEUTROPHILS # BLD AUTO: 13.68 X10 (3) UL (ref 1.5–7.7)
NEUTROPHILS # BLD AUTO: 13.68 X10(3) UL (ref 1.5–7.7)
NEUTROPHILS NFR BLD AUTO: 97.9 %
NITRITE UR QL STRIP.AUTO: NEGATIVE
OSMOLALITY SERPL CALC.SUM OF ELEC: 299 MOSM/KG (ref 275–295)
PH UR STRIP.AUTO: 5 [PH] (ref 5–8)
PHOSPHATE SERPL-MCNC: 2.4 MG/DL (ref 2.5–4.9)
PLATELET # BLD AUTO: 140 10(3)UL (ref 150–450)
POTASSIUM SERPL-SCNC: 4.9 MMOL/L (ref 3.5–5.1)
PROT UR-MCNC: 88.3 MG/DL
RBC # BLD AUTO: 3.56 X10(6)UL
RBC #/AREA URNS AUTO: >10 /HPF
SODIUM SERPL-SCNC: 138 MMOL/L (ref 136–145)
SP GR UR STRIP.AUTO: 1.02 (ref 1–1.03)
UROBILINOGEN UR STRIP.AUTO-MCNC: 0.2 MG/DL
VIT D+METAB SERPL-MCNC: 30.5 NG/ML (ref 30–100)
WBC # BLD AUTO: 14 X10(3) UL (ref 4–11)

## 2024-03-01 PROCEDURE — 82040 ASSAY OF SERUM ALBUMIN: CPT

## 2024-03-01 PROCEDURE — 81001 URINALYSIS AUTO W/SCOPE: CPT

## 2024-03-01 PROCEDURE — 82565 ASSAY OF CREATININE: CPT

## 2024-03-01 PROCEDURE — 96413 CHEMO IV INFUSION 1 HR: CPT

## 2024-03-01 PROCEDURE — 96367 TX/PROPH/DG ADDL SEQ IV INF: CPT

## 2024-03-01 PROCEDURE — 83735 ASSAY OF MAGNESIUM: CPT

## 2024-03-01 PROCEDURE — 84100 ASSAY OF PHOSPHORUS: CPT

## 2024-03-01 PROCEDURE — 80048 BASIC METABOLIC PNL TOTAL CA: CPT

## 2024-03-01 PROCEDURE — 36415 COLL VENOUS BLD VENIPUNCTURE: CPT

## 2024-03-01 PROCEDURE — 81015 MICROSCOPIC EXAM OF URINE: CPT

## 2024-03-01 PROCEDURE — 85025 COMPLETE CBC W/AUTO DIFF WBC: CPT

## 2024-03-01 PROCEDURE — 82570 ASSAY OF URINE CREATININE: CPT

## 2024-03-01 PROCEDURE — 84156 ASSAY OF PROTEIN URINE: CPT

## 2024-03-01 PROCEDURE — 82306 VITAMIN D 25 HYDROXY: CPT

## 2024-03-01 PROCEDURE — 82310 ASSAY OF CALCIUM: CPT

## 2024-03-01 NOTE — PROGRESS NOTES
Pt here for C41D15 Drug(s)kyprolis/zometa.  Arrives Ambulating independently, accompanied by Self     Patient was evaluated today by Treatment Nurse.    Oral medications included in this regimen:  no    Patient confirms comprehension of cancer treatment schedule:  yes    Pregnancy screening:  Not applicable    Modifications in dose or schedule:  No    Medications appearance and physical integrity checked by RN: yes.    Chemotherapy IV pump settings verified by 2 RNs:  Yes.  Frequency of blood return and site check throughout administration: Prior to administration     Infusion/treatment outcome:  patient tolerated treatment without incident    Education Record    Learner:  Patient  Barriers / Limitations:  None  Method:  Brief focused  Education / instructions given:  chemo admin  Outcome:  Shows understanding    Discharged Home, Ambulating independently, accompanied by:Self    Patient/family verbalized understanding of future appointments: by printed AVS

## 2024-03-05 ENCOUNTER — OFFICE VISIT (OUTPATIENT)
Dept: NEPHROLOGY | Facility: CLINIC | Age: 74
End: 2024-03-05
Payer: MEDICARE

## 2024-03-05 VITALS — SYSTOLIC BLOOD PRESSURE: 110 MMHG | BODY MASS INDEX: 35 KG/M2 | DIASTOLIC BLOOD PRESSURE: 60 MMHG | WEIGHT: 255.38 LBS

## 2024-03-05 DIAGNOSIS — N18.30 STAGE 3 CHRONIC KIDNEY DISEASE, UNSPECIFIED WHETHER STAGE 3A OR 3B CKD (HCC): Primary | ICD-10-CM

## 2024-03-05 PROCEDURE — 99213 OFFICE O/P EST LOW 20 MIN: CPT | Performed by: INTERNAL MEDICINE

## 2024-03-05 NOTE — PROGRESS NOTES
Nephrology Progress Note      Nacho Valle is a 73 year old male.    HPI:     No chief complaint on file.      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 He was treated in hospital with pamidronate/fluids/plasmapheresis. He also required few treatments of HD.    S/p stem cell tx @ [facility]-  did well for few years  Patient is now on salvage chemo (diagnosed w/ recurrent dx in Jan 2021)  He follows w/ Dr. Frias and Dr. Barragan ([facility])- has been on salvage chemo as documented in oncology notes    Recent f/u with oncology reviewed.       No CHF issues       Denies any n/v  No urinary complains  Denies any LE edema       Medications (Active prior to today's visit):  Current Outpatient Medications   Medication Sig Dispense Refill    ACYCLOVIR 400 MG Oral Tab TAKE 1 TABLET(400 MG) BY MOUTH TWICE DAILY 180 tablet 1    atorvastatin 20 MG Oral Tab Take 1 tablet (20 mg total) by mouth daily. 90 tablet 0    traMADol 50 MG Oral Tab Take 1 tablet (50 mg total) by mouth every 6 (six) hours as needed for Pain. 40 tablet 0    benzonatate 200 MG Oral Cap Take 1 capsule (200 mg total) by mouth 3 (three) times daily as needed for cough. 30 capsule 0    cyclobenzaprine 10 MG Oral Tab TAKE 1 TABLET(10 MG) BY MOUTH THREE TIMES DAILY AS NEEDED FOR MUSCLE SPASMS 10 tablet 1    DRISDOL 1.25 MG (16761 UT) Oral Cap       dexamethasone (DECADRON) 4 MG tablet Take 3 tablets (12mg) PO one time per week. 36 tablet 3    ONETOUCH ULTRASOFT LANCETS Does not apply Misc TEST BLOOD SUGAR TWICE DAILY 200 each 2    ENTRESTO  MG Oral Tab Take 1 tablet by mouth daily.      empagliflozin 10 MG Oral Tab Take 1 tablet (10 mg total) by mouth daily. JARDIANCE      Glucose Blood (ONETOUCH ULTRA) In Vitro Strip CHECK SUGARS THREE TIMES DAILY AS DIRECTED 300 strip 2    Metoprolol Succinate  MG Oral Tablet 24 Hr Take 1 tablet (200 mg total) by mouth every evening.      eplerenone 25 MG  Oral Tab Take 1 tablet (25 mg total) by mouth daily.      omega-3 fatty acids 1000 MG Oral Cap Take 1,000 mg by mouth 2 (two) times daily.      Multiple Vitamin Oral Tab Take 1 tablet by mouth.      acetaminophen 500 MG Oral Tab Take 2 tablets (1,000 mg total) by mouth nightly.      aspirin (ASPIR-81) 81 MG Oral Tab EC Take 1 tablet by mouth daily. 1 tablet 0       Allergies:  Allergies   Allergen Reactions    Pcn [Penicillins] ANAPHYLAXIS, HIVES, HALLUCINATION and SHORTNESS OF BREATH     Respiratory distress    Beta Adrenergic Blockers     Latex      Surgical tape  Welts, burning of skin, itching    Statins          ROS:     10 systems reviewed and otherwise unremarkable       PHYSICAL EXAM:   There were no vitals taken for this visit.  Wt Readings from Last 6 Encounters:   03/01/24 254 lb (115.2 kg)   02/23/24 258 lb (117 kg)   02/16/24 258 lb (117 kg)   02/02/24 254 lb (115.2 kg)   01/26/24 255 lb (115.7 kg)   01/19/24 253 lb (114.8 kg)       General: Alert and oriented in no apparent distress.  HEENT: No scleral icterus, MMM  Neck: Supple, no ELLIOT or thyromegaly  Cardiac: Regular rate and rhythm, S1, S2 normal, no murmur or rub  Lungs: Clear without wheezes, rales, rhonchi.    Abdomen: Soft, non-tender. + bowel sounds, no palpable organomegaly  Extremities: Without clubbing, cyanosis ; no edema  Neurologic: Alert and oriented, normal affect, cranial nerves grossly intact, moving all extremities  Skin: Warm and dry, no rashes    Component      Latest Ref Rn 11/24/2023   WBC      4.0 - 11.0 x10(3) uL 12.9 (H)    WBC       13.3 (H)    RBC      3.80 - 5.80 x10(6)uL 3.60 (L)    RBC       3.63 (L)    Hemoglobin      13.0 - 17.5 g/dL 12.8 (L)    Hemoglobin       12.8 (L)    Hematocrit      39.0 - 53.0 % 39.3    Hematocrit       39.4    Platelet Count      150.0 - 450.0 10(3)uL 268.0    Platelet Count       256.0    MCV      80.0 - 100.0 fL 109.2 (H)    MCV       108.5 (H)    MCH      26.0 - 34.0 pg 35.6 (H)    MCH        35.3 (H)    MCHC      31.0 - 37.0 g/dL 32.6    MCHC       32.5    RDW      % 13.3    RDW       13.4    Prelim Neutrophil Abs      1.50 - 7.70 x10 (3) uL 12.49 (H)    Prelim Neutrophil Abs       12.88 (H)    Neutrophils Absolute      1.50 - 7.70 x10(3) uL 12.49 (H)    Neutrophils Absolute       12.88 (H)    Lymphocytes Absolute      1.00 - 4.00 x10(3) uL 0.21 (L)    Lymphocytes Absolute       0.21 (L)    Monocytes Absolute      0.10 - 1.00 x10(3) uL 0.08 (L)    Monocytes Absolute       0.08 (L)    Eosinophils Absolute      0.00 - 0.70 x10(3) uL 0.02    Eosinophils Absolute       0.01    Basophils Absolute      0.00 - 0.20 x10(3) uL 0.02    Basophils Absolute       0.03    Immature Granulocyte Absolute      0.00 - 1.00 x10(3) uL 0.05    Immature Granulocyte Absolute       0.04    Neutrophils %      % 97.0    Neutrophils %       97.2    Lymphocytes %      % 1.6    Lymphocytes %       1.6    Monocytes %      % 0.6    Monocytes %       0.6    Eosinophils %      % 0.2    Eosinophils %       0.1    Basophils %      % 0.2    Basophils %       0.2    Immature Granulocyte %      % 0.4    Immature Granulocyte %       0.3    Glucose      70 - 99 mg/dL 147 (H)    Glucose       150 (H)    Sodium      136 - 145 mmol/L 136    Sodium       136    Potassium      3.5 - 5.1 mmol/L 4.3    Potassium       4.4    Chloride      98 - 112 mmol/L 108    Chloride       109    Carbon Dioxide, Total      21.0 - 32.0 mmol/L 22.0    Carbon Dioxide, Total       22.0    ANION GAP      0 - 18 mmol/L 6    ANION GAP       5    BUN      9 - 23 mg/dL 27 (H)    BUN       29 (H)    CREATININE      0.70 - 1.30 mg/dL 1.74 (H)    CREATININE       1.68 (H)    CALCIUM      8.5 - 10.1 mg/dL 9.0    CALCIUM       9.1    CALCULATED OSMOLALITY      275 - 295 mOsm/kg 290    CALCULATED OSMOLALITY       291    EGFR      >=60 mL/min/1.73m2 40 (L)    EGFR       42 (L)    AST (SGOT)      15 - 37 U/L 12 (L)    ALT (SGPT)      16 - 61 U/L 16    ALKALINE PHOSPHATASE      45  - 117 U/L 87    Total Bilirubin      0.1 - 2.0 mg/dL 0.8    PROTEIN, TOTAL      6.4 - 8.2 g/dL 6.5    PROTEIN, TOTAL      5.7 - 8.2 g/dL 6.0    Albumin      3.4 - 5.0 g/dL 3.7    Albumin      3.75 - 5.21 g/dL 4.02    Globulin      2.8 - 4.4 g/dL 2.8    A/G Ratio      1.0 - 2.0  1.3    Patient Fasting for CMP? Patient not present    ALPHA-1-GLOBULINS      0.19 - 0.46 g/dL 0.30    ALPHA-2-GLOBULINS      0.48 - 1.05 g/dL 0.86    BETA GLOBULINS      0.68 - 1.23 g/dL 0.59 (L)    GAMMA GLOBULINS      0.62 - 1.70 g/dL 0.22 (L)    ALBUMIN/GLOBULIN RATIO      1.00 - 2.00  2.03 (H)    SPE INTERPRETATION Small abnormality in the gamma region.    IMMUNOFIXATION Small Monoclonal IgG kappa. If clinically indicated, 24 hour urine monoclonal protein studies is suggested.    KAPPA FREE LIGHT CHAIN      0.330 - 1.940 mg/dL 13.950 (H)    LAMBDA FREE LIGHT CHAIN      0.571 - 2.630 mg/dL 0.152 (L)    KAPPA/LAMBDA FLC RATIO      0.26 - 1.65  91.78 (H)    M-Cameron      <=0.00 g/dL 0.04 (H)    Reviewed By: Reviewed by Carolina Lucio M.D. Pathology 11/27/23 at 4:59 PM    Patient Fasting for BMP? Patient not present    Color Urine      Yellow  Yellow    Clarity Urine      Clear  Clear    Spec Gravity      1.005 - 1.030  1.015    Glucose Urine      Negative mg/dL 500 !    Bilirubin Urine      Negative  Negative    Ketones, UA      Negative mg/dL Negative    Blood Urine      Negative  Trace-Intact !    PH Urine      5.0 - 8.0  5.5    Protein Urine      Negative mg/dL 30 mg/dL !    Urobilinogen Urine      <2.0 mg/dL 0.2    Nitrite Urine      Negative  Negative    Leukocyte Esterase       Negative  Negative    WBC Urine      0 - 5 /HPF 1-5    RBC Urine      0 - 2 /HPF 0-2    Bacteria Urine      None Seen /HPF Rare !    SQUAM EPI CELLS UR      None Seen /HPF Few !    RENAL TUBULAR EPITHELIAL CELLS      None Seen /HPF None Seen    TRANSITIONAL EPI CELLS      None Seen /HPF None Seen    YEAST URINE      None Seen /HPF None Seen    TOTAL PROTEIN URINE  RANDOM      mg/dL 38.1    CREATININE UR RANDOM      mg/dL 42.70    PHOSPHORUS      2.5 - 4.9 mg/dL 1.8 (L)    Magnesium, Serum      1.6 - 2.6 mg/dL 2.4    VITAMIN D, 25-OH, TOTAL      30.0 - 100.0 ng/mL 24.7 (L)       Legend:  (H) High  (L) Low  ! Abnormal    ASSESSMENT/PLAN:       1. Hx of Myeloma kidney disease -   Required plasmapheresis/HD treatments in the hospital  His renal function had recovered and stable despite the recurrence of MM: + microalbuminuria     Volume is good;; Cr has gradually risen over the past few years; more recently baseline ~ 1.8-2.0    - monitor labs  - further MM w/u per heme/onc; he is currently on salvage chemo    2. MM- s/p stem cell;  w/ relapse- on tx per oncology    3. Cardiomyopathy/HTN- stable/compensated; monitor ; cardiac bx negative for amyloid; follows w/ cardiology  ; pt is on jardiance/inspra,/entresto  - appears compensated     4. BMD- Ca OK; vit D 30; pth (8/23)- 42     F/U in 3mos     Francisco Silva MD  3/5/2024

## 2024-03-07 ENCOUNTER — OFFICE VISIT (OUTPATIENT)
Dept: FAMILY MEDICINE CLINIC | Facility: CLINIC | Age: 74
End: 2024-03-07
Payer: MEDICARE

## 2024-03-07 VITALS
RESPIRATION RATE: 18 BRPM | SYSTOLIC BLOOD PRESSURE: 110 MMHG | HEART RATE: 53 BPM | BODY MASS INDEX: 34.54 KG/M2 | TEMPERATURE: 97 F | WEIGHT: 255 LBS | OXYGEN SATURATION: 98 % | HEIGHT: 72 IN | DIASTOLIC BLOOD PRESSURE: 52 MMHG

## 2024-03-07 DIAGNOSIS — I10 DIABETES MELLITUS WITH COINCIDENT HYPERTENSION (HCC): Primary | ICD-10-CM

## 2024-03-07 DIAGNOSIS — E11.9 DIABETES MELLITUS WITH COINCIDENT HYPERTENSION (HCC): Primary | ICD-10-CM

## 2024-03-07 PROCEDURE — 99214 OFFICE O/P EST MOD 30 MIN: CPT | Performed by: FAMILY MEDICINE

## 2024-03-07 NOTE — PROGRESS NOTES
/52   Pulse 53   Temp 97.2 °F (36.2 °C) (Temporal)   Resp 18   Ht 6' (1.829 m)   Wt 255 lb (115.7 kg)   SpO2 98%   BMI 34.58 kg/m²               Chief Complaint   Patient presents with    Medication Follow-Up        HPI;    Nacho Valle is a 73 year old male.    Patient is here for follow-up of Non-Insulin Dependent Diabetes Mellitus.   Patient has no complaints.   Tolerating medication well.     Reviewed diet and dietary compliance.     Reinforced diet and exercise as important part of blood sugar regulation.     Review of systems performed with emphasis on changes in vision, intercurrent illness, chest pain, diarrhea, constipation, dysuria, frequency, peripheral neuropathy or foot trauma.   Denies any hypoglycemia,  Currently taking jardiance   Last blood work ,hba1c  Of 5.7       Multiple myloma in remission -patient is on 41st cycle of his chemo  Patient sees [facility]  He is doing well otherwise    Patient has a history of  hypertension.    Feels well without any adverse medication problems, and reports taking medication as prescribed.  Patient reports no new symptoms associated with hypertension, specifically:    No angina pectoris.  No MCDOWELL/SOB.  No palpitations.  No headaches.  No visual symptoms.  No CNS symptoms.  Patient denies ankle edema, constitutional symptoms, change in bowel or bladder function, ataxia or other neurological changes.    No untoward reactions to medications.  Patient following dietary restrictions of caffeine and salt.  medications include metoprolol 200 mg daily as well as Entresto       Persisting lower back pain--- patient has appointment to see Dr. Mina    Wt Readings from Last 3 Encounters:   03/07/24 255 lb (115.7 kg)   03/05/24 255 lb 6.4 oz (115.8 kg)   03/01/24 254 lb (115.2 kg)     BP Readings from Last 3 Encounters:   03/07/24 110/52   03/05/24 110/60   03/01/24 92/51         ALLERGIES:  Allergies   Allergen Reactions    Pcn [Penicillins] ANAPHYLAXIS,  HIVES, HALLUCINATION and SHORTNESS OF BREATH     Respiratory distress    Beta Adrenergic Blockers     Latex      Surgical tape  Welts, burning of skin, itching    Statins      Current Outpatient Medications   Medication Sig Dispense Refill    ACYCLOVIR 400 MG Oral Tab TAKE 1 TABLET(400 MG) BY MOUTH TWICE DAILY 180 tablet 1    atorvastatin 20 MG Oral Tab Take 1 tablet (20 mg total) by mouth daily. 90 tablet 0    traMADol 50 MG Oral Tab Take 1 tablet (50 mg total) by mouth every 6 (six) hours as needed for Pain. 40 tablet 0    benzonatate 200 MG Oral Cap Take 1 capsule (200 mg total) by mouth 3 (three) times daily as needed for cough. 30 capsule 0    cyclobenzaprine 10 MG Oral Tab TAKE 1 TABLET(10 MG) BY MOUTH THREE TIMES DAILY AS NEEDED FOR MUSCLE SPASMS 10 tablet 1    DRISDOL 1.25 MG (96125 UT) Oral Cap       dexamethasone (DECADRON) 4 MG tablet Take 3 tablets (12mg) PO one time per week. 36 tablet 3    ONETOUCH ULTRASOFT LANCETS Does not apply Misc TEST BLOOD SUGAR TWICE DAILY 200 each 2    ENTRESTO  MG Oral Tab Take 1 tablet by mouth daily.      empagliflozin 10 MG Oral Tab Take 1 tablet (10 mg total) by mouth daily. JARDIANCE      Glucose Blood (ONETOUCH ULTRA) In Vitro Strip CHECK SUGARS THREE TIMES DAILY AS DIRECTED 300 strip 2    Metoprolol Succinate  MG Oral Tablet 24 Hr Take 1 tablet (200 mg total) by mouth every evening.      eplerenone 25 MG Oral Tab Take 1 tablet (25 mg total) by mouth daily.      omega-3 fatty acids 1000 MG Oral Cap Take 1,000 mg by mouth 2 (two) times daily.      Multiple Vitamin Oral Tab Take 1 tablet by mouth.      acetaminophen 500 MG Oral Tab Take 2 tablets (1,000 mg total) by mouth nightly.      aspirin (ASPIR-81) 81 MG Oral Tab EC Take 1 tablet by mouth daily. 1 tablet 0      Past Medical History:   Diagnosis Date    Allergic rhinitis, cause unspecified     BCC (basal cell carcinoma of skin) 1997    s/p surgical excision    BPH (benign prostatic hyperplasia)     Colon  polyps     Generalized osteoarthrosis, involving hand     Herniation of intervertebral disc between L4 and L5 2000    s/p surgical repair    High blood pressure     High cholesterol     Impotence of organic origin     Morbid obesity (HCC)     Osteoarthritis     Osteoarthrosis, unspecified whether generalized or localized, lower leg 6/20/2013    Log Date: 08/21/2012     Other and unspecified hyperlipidemia     Pneumonia due to organism     Prostate cancer (HCC) 2007    s/p total prostatectomy and radiation    Trigger finger (acquired)     Type II or unspecified type diabetes mellitus without mention of complication, not stated as uncontrolled 10/2007    Unspecified essential hypertension     Unspecified internal derangement of knee 6/20/2013    Log Date: 08/21/2012       Social History:  Social History     Socioeconomic History    Marital status:     Number of children: 3   Occupational History    Occupation: Retired      Comment:    Tobacco Use    Smoking status: Never    Smokeless tobacco: Never   Vaping Use    Vaping Use: Never used   Substance and Sexual Activity    Alcohol use: No     Alcohol/week: 0.0 standard drinks of alcohol    Drug use: No     Social Determinants of Health     Financial Resource Strain: Low Risk  (2/20/2023)    Financial Resource Strain     Difficulty of Paying Living Expenses: Not hard at all     Med Affordability: No   Food Insecurity: No Food Insecurity (2/20/2023)    Food Insecurity     Food Insecurity: Never true   Transportation Needs: No Transportation Needs (2/20/2023)    Transportation Needs     Lack of Transportation: No   Physical Activity: Sufficiently Active (2/20/2023)    Exercise Vital Sign     Days of Exercise per Week: 7 days     Minutes of Exercise per Session: 150+ min   Stress: No Stress Concern Present (2/20/2023)    Stress     Feeling of Stress : No   Social Connections: Socially Integrated (2/20/2023)    Social Connections      Frequency of Socialization with Friends and Family: 3   Housing Stability: Low Risk  (2/20/2023)    Housing Stability     Housing Instability: No        REVIEW OF SYSTEMS:   GENERAL HEALTH: feels well otherwise  SKIN: denies any unusual skin lesions or rashes  RESPIRATORY: denies shortness of breath with exertion  CARDIOVASCULAR: denies chest pain on exertion  GI: denies abdominal pain and denies heartburn  NEURO: denies headaches  EXAM:   /52   Pulse 53   Temp 97.2 °F (36.2 °C) (Temporal)   Resp 18   Ht 6' (1.829 m)   Wt 255 lb (115.7 kg)   SpO2 98%   BMI 34.58 kg/m²   GENERAL: well developed, well nourished,in no apparent distress  SKIN: no rashes,no suspicious lesions  HEENT: atraumatic, normocephalic,ears and throat are clear  NECK: supple,no adenopathy,no bruits  LUNGS: clear to auscultation  CARDIO: RRR without murmur  GI: good BS's,no masses, HSM or tenderness  EXTREMITIES: no cyanosis, clubbing or edema    ASSESSMENT AND PLAN:   Diagnoses and all orders for this visit:    Diabetes mellitus with coincident hypertension (HCC)  Continue checking sugars and blood pressures at least 3 times a week-     Increase physical activity  Medications and previous labs reviewed  Continue Jardiance  Check A1c next month  Hemoglobin A1C; Future    Follow-up in 3 months    The patient indicates understanding of these issues and agrees to the plan.  Imaging & Consults:  None  Meds & Refills for this Visit:  Requested Prescriptions      No prescriptions requested or ordered in this encounter     Orders Placed This Encounter   Procedures    Hemoglobin A1C             Dagoberto Bell MD   [facility]  [address] [address]    Electronically signed    This dictation was performed with a verbal recognition program (DRAGON) and it was checked for errors. It is possible that there are still dictated errors within this office note. If so, please bring any errors to my attention for an  addendum. All efforts were made to ensure that this office note is accurate

## 2024-03-12 ENCOUNTER — OFFICE VISIT (OUTPATIENT)
Dept: SURGERY | Facility: CLINIC | Age: 74
End: 2024-03-12
Payer: MEDICARE

## 2024-03-12 VITALS
HEIGHT: 72 IN | SYSTOLIC BLOOD PRESSURE: 128 MMHG | DIASTOLIC BLOOD PRESSURE: 84 MMHG | BODY MASS INDEX: 33.46 KG/M2 | HEART RATE: 48 BPM | WEIGHT: 247 LBS

## 2024-03-12 DIAGNOSIS — S22.000A COMPRESSION FRACTURE OF BODY OF THORACIC VERTEBRA (HCC): ICD-10-CM

## 2024-03-12 DIAGNOSIS — R29.898 WEAKNESS OF BOTH LOWER EXTREMITIES: ICD-10-CM

## 2024-03-12 DIAGNOSIS — M54.6 CHRONIC LEFT-SIDED THORACIC BACK PAIN: ICD-10-CM

## 2024-03-12 DIAGNOSIS — R29.898 LEG HEAVINESS: ICD-10-CM

## 2024-03-12 DIAGNOSIS — G89.29 CHRONIC LEFT-SIDED THORACIC BACK PAIN: ICD-10-CM

## 2024-03-12 DIAGNOSIS — C90.00 MULTIPLE MYELOMA, REMISSION STATUS UNSPECIFIED (HCC): ICD-10-CM

## 2024-03-12 DIAGNOSIS — M62.81 MUSCLE WEAKNESS ON EXAMINATION: ICD-10-CM

## 2024-03-12 DIAGNOSIS — M54.50 LUMBAR PAIN: Primary | ICD-10-CM

## 2024-03-12 DIAGNOSIS — R29.2 HYPERREFLEXIA: ICD-10-CM

## 2024-03-12 PROCEDURE — 99205 OFFICE O/P NEW HI 60 MIN: CPT | Performed by: PHYSICIAN ASSISTANT

## 2024-03-12 NOTE — PROGRESS NOTES
New patient:  Reason for visit:   Low back pain    Estimated time of onset: years     Numeric Rating Scale:        Pain at Present:  2/10                                                                                                              Prior diagnostic testing related to this condition:    No recent imaging

## 2024-03-12 NOTE — PATIENT INSTRUCTIONS
Refill policies:    Allow 2-3 business days for refills; controlled substances may take longer.  Contact your pharmacy at least 5 days prior to running out of medication and have them send an electronic request or submit request through the “request refill” option in your [employer] account.  Refills are not addressed on weekends; covering physicians do not authorize routine medications on weekends.  No narcotics or controlled substances are refilled after noon on Fridays or by on call physicians.  By law, narcotics must be electronically prescribed.  A 30 day supply with no refills is the maximum allowed.  If your prescription is due for a refill, you may be due for a follow up appointment.  To best provide you care, patients receiving routine medications need to be seen at least once a year.  Patients receiving narcotic/controlled substance medications need to be seen at least once every 3 months.  In the event that your preferred pharmacy does not have the requested medication in stock (e.g. Backordered), it is your responsibility to find another pharmacy that has the requested medication available.  We will gladly send a new prescription to that pharmacy at your request.    Scheduling Tests:    If your physician has ordered radiology tests such as MRI or CT scans, please contact Central Scheduling at [phone] right away to schedule the test.  Once scheduled, the [facility] Centralized Referral Team will work with your insurance carrier to obtain pre-certification or prior authorization.  Depending on your insurance carrier, approval may take 3-10 days.  It is highly recommended patients assure they have received an authorization before having a test performed.  If test is done without insurance authorization, patient may be responsible for the entire amount billed.      Precertification and Prior Authorizations:  If your physician has recommended that you have a procedure or additional testing performed the [facility]  Centralized Referral Team will contact your insurance carrier to obtain pre-certification or prior authorization.    You are strongly encouraged to contact your insurance carrier to verify that your procedure/test has been approved and is a COVERED benefit.  Although the [facility] Centralized Referral Team does its due diligence, the insurance carrier gives the disclaimer that \"Although the procedure is authorized, this does not guarantee payment.\"    Ultimately the patient is responsible for payment.   Thank you for your understanding in this matter.  Paperwork Completion:  If you require FMLA or disability paperwork for your recovery, please make sure to either drop it off or have it faxed to our office at [phone]. Be sure the form has your name and date of birth on it.  The form will be faxed to our Forms Department and they will complete it for you.  There is a 25$ fee for all forms that need to be filled out.  Please be aware there is a 10-14 day turnaround time.  You will need to sign a release of information (TEMI) form if your paperwork does not come with one.  You may call the Forms Department with any questions at [phone].  Their fax number is [phone].

## 2024-03-12 NOTE — H&P
Patient: Nacho Valle  Medical Record Number: [id number]  YOB: 1950  PCP: Dagoberto Bell MD    Referring Physician: Dr. Frias  Reason for visit:   Chief Complaint   Patient presents with    New Patient       Dear Dr. Frias,   Thank you very much for requesting this consultation. I had the opportunity to evaluate and initiate care for your patient today, as per your request.    HISTORY OF CHIEF COMPLAINT:    Nacho Valle is a very pleasant 73 year old male, with a pertinent PMH of lumbar surgery in 2002 at [facility]. Unsure which surgeon. Hx of multiple myeloma in remission per patient, obesity, DM type II without insulin use, HTN, cardiomyopathy. No hx of MI or stroke. Compression fracture of T6.   Presents today for a complaint of: Lumbar pain  Onset: Symptoms began decades prior. Prior to surgery in 2002. States improvement of lumbar pain with surgery.   With multiple myeloma, having rib pain and scapular pain.   Patient's main complaint \"L4/L5\" pain.   Location: R>L low back pain. No radiating leg pain. No leg numbness/tingling. No leg weakness. Leg heaviness with climbing stairs in the afternoon.  Duration/Timing: Symptoms have been occurring for years.   Character: Feels like a \"stitch\"  Rate: Patient rates the pain  3/10.   Pain is constant.   Severity: Improved with Decadron. \"Popping\" or cracking his back makes it feel improved. Back pain is aggravated with prolonged sitting.   Patient able to walk far distances. No leg burning or fatigue with walking.   Patient Denies bladder/bowel incontinence/retention.   No neck pain. No arm or hand weakness, numbness, tingling or pain. No trips or falls over flat surfaces, no gait instability. Not dropping items. No difficulty opening jars or buttoning a shirt.   Patient takes 81 mg ASA daily, for prevention he states. No anticoagulation use endorses. No pacemaker or defibrillator.   No recent PT or injections. No HEP.   No torso numbness/tingling.      Past Medical History:   Diagnosis Date    Allergic rhinitis, cause unspecified     BCC (basal cell carcinoma of skin) 1997    s/p surgical excision    BPH (benign prostatic hyperplasia)     Colon polyps     Generalized osteoarthrosis, involving hand     Herniation of intervertebral disc between L4 and L5 2000    s/p surgical repair    High blood pressure     High cholesterol     Impotence of organic origin     Morbid obesity (HCC)     Osteoarthritis     Osteoarthrosis, unspecified whether generalized or localized, lower leg 6/20/2013    Log Date: 08/21/2012     Other and unspecified hyperlipidemia     Pneumonia due to organism     Prostate cancer (HCC) 2007    s/p total prostatectomy and radiation    Trigger finger (acquired)     Type II or unspecified type diabetes mellitus without mention of complication, not stated as uncontrolled 10/2007    Unspecified essential hypertension     Unspecified internal derangement of knee 6/20/2013    Log Date: 08/21/2012       Past Surgical History:   Procedure Laterality Date    BACK SURGERY  1/2001    lower back L4 L5 for herniated disc    COLONOSCOPY      202, 2012 Dr. Wolf, polyps    OTHER SURGICAL HISTORY  1/31/2007    total prostatectomy    SKIN SURGERY  1997    BCC      Family History   Problem Relation Age of Onset    Diabetes Other         family hx    Hypertension Other         family hx    Prostate Cancer Father       Social History     Socioeconomic History    Marital status:     Number of children: 3   Occupational History    Occupation: Retired      Comment:    Tobacco Use    Smoking status: Never    Smokeless tobacco: Never   Vaping Use    Vaping Use: Never used   Substance and Sexual Activity    Alcohol use: No     Alcohol/week: 0.0 standard drinks of alcohol    Drug use: No      Allergies   Allergen Reactions    Pcn [Penicillins] ANAPHYLAXIS, HIVES, HALLUCINATION and SHORTNESS OF BREATH     Respiratory distress    Beta  Adrenergic Blockers     Latex      Surgical tape  Welts, burning of skin, itching    Statins       Current Medications:  Current Outpatient Medications   Medication Sig Dispense Refill    ACYCLOVIR 400 MG Oral Tab TAKE 1 TABLET(400 MG) BY MOUTH TWICE DAILY 180 tablet 1    atorvastatin 20 MG Oral Tab Take 1 tablet (20 mg total) by mouth daily. 90 tablet 0    traMADol 50 MG Oral Tab Take 1 tablet (50 mg total) by mouth every 6 (six) hours as needed for Pain. 40 tablet 0    DRISDOL 1.25 MG (05989 UT) Oral Cap       dexamethasone (DECADRON) 4 MG tablet Take 3 tablets (12mg) PO one time per week. 36 tablet 3    ONETOUCH ULTRASOFT LANCETS Does not apply Misc TEST BLOOD SUGAR TWICE DAILY 200 each 2    ENTRESTO  MG Oral Tab Take 1 tablet by mouth daily.      empagliflozin 10 MG Oral Tab Take 1 tablet (10 mg total) by mouth daily. JARDIANCE      Glucose Blood (ONETOUCH ULTRA) In Vitro Strip CHECK SUGARS THREE TIMES DAILY AS DIRECTED 300 strip 2    Metoprolol Succinate  MG Oral Tablet 24 Hr Take 1 tablet (200 mg total) by mouth every evening.      omega-3 fatty acids 1000 MG Oral Cap Take 1,000 mg by mouth 2 (two) times daily.      Multiple Vitamin Oral Tab Take 1 tablet by mouth.      acetaminophen 500 MG Oral Tab Take 2 tablets (1,000 mg total) by mouth nightly.      aspirin (ASPIR-81) 81 MG Oral Tab EC Take 1 tablet by mouth daily. 1 tablet 0    benzonatate 200 MG Oral Cap Take 1 capsule (200 mg total) by mouth 3 (three) times daily as needed for cough. (Patient not taking: Reported on 3/12/2024) 30 capsule 0    cyclobenzaprine 10 MG Oral Tab TAKE 1 TABLET(10 MG) BY MOUTH THREE TIMES DAILY AS NEEDED FOR MUSCLE SPASMS (Patient not taking: Reported on 3/12/2024) 10 tablet 1    eplerenone 25 MG Oral Tab Take 1 tablet (25 mg total) by mouth daily. (Patient not taking: Reported on 3/12/2024)          REVIEW OF SYSTEMS   Comprehensive review of systems done. Negative except what is outlined in the above HPI.      PHYSICAL EXAMIMATION    height is 72\" and weight is 247 lb (112 kg). His blood pressure is 128/84 and his pulse is 48 (abnormal).   GENERAL: Very pleasant patient is in no apparent distress. Sitting comfortably in the examination chair.   HEENT: Normocephalic, atraumatic.  RESPIRATORY RATE: Easy and Even  SKIN: Warm and dry  NEURO: Awake, alert and orientated. Speech fluent, comprehension intact, answering questions appropriately.     SPINE:  Gait/Coordination: Gait deferred. Thoracic kyphotic posture noted. Visible difficulty coming, likely 2/2 proximal leg weakness as noted in below strength assessment.    Palpation: + tenderness over the left thoracic paraspinal musculature. + tenderness at L4, L5 and S1  Palpation Right   (POS or NEG) Left   (POS or NEG)   Sacroiliac Joint Region Neg Neg     Upper Extremity Strength:    Deltoid  Biceps  Triceps     Intrinsics      Right 5 5 5 5 5     Left 5 5 5 5 5   Lower Extremity Strength:     Iliopsoas  Hamstrings   Quads    D-flexion P-flexion Great Toe   Right       4         5       5         5 5 2   Left       4         5       5         5 5 5   DTRs:       Biceps    Triceps   Brachioradialis     Patellar     Ankle    Right       1+         1+            2+         2+        2+    Left       1+         1+             2+         3+        2+      Tests:   Test Right   (POS or NEG) Left   (POS or NEG)   Carballo's Sign Neg Neg   Clonus Neg Neg     DATA:   None    IMAGING:   Study Result    Narrative   PROCEDURE:  MRI CERVICAL+THORACIC+LUMBAR SPINE (ALL W+WO) (CPT=72156/83575/7     COMPARISON:  [facility] , GLORIA BONE SURVEY, COMPLETE (CPT=77075), 7/21/2016, 14:59.     INDICATIONS:  C90.01 Multiple myeloma in remission     TECHNIQUE:  A comprehensive examination was performed utilizing a variety of imaging planes and imaging parameters to optimize visualization of suspected pathology.  Images were performed before and after the administration of intravenous  gadolinium  contrast.     PATIENT STATED HISTORY:(As transcribed by Technologist)  Patient has pain below the right kidney and a compression fracture in the lumbar region.  Patient has history of multiple myeloma.      CONTRAST USED:  20  cc of paramagnetic gadolinium-based contrast was injected intravenously.     FINDINGS:    CERVICAL DISC LEVELS:  C2-C3:   Minimal posterior osteophyte disc complex with bilateral facet hypertrophy.  No spinal canal or neural foraminal stenosis.  C3-C4:   Minimal posterior osteophyte disc complex with bilateral facet hypertrophy.  No spinal canal or neural foraminal stenosis.  C4-C5:   Minimal posterior osteophyte disc complex with bilateral facet hypertrophy.  No spinal canal or neural foraminal stenosis.  C5-C6:   Mild posterior osteophyte disc complex with bilateral facet hypertrophy.  No spinal canal or neural foraminal stenosis.  C6-C7:   Minimal posterior osteophyte disc complex with bilateral facet hypertrophy.  No spinal canal or neural foraminal stenosis.  C7-T1:   Mild right paracentral disc protrusion with bilateral facet hypertrophy.  No spinal canal or neural foraminal stenosis.     CRANIOCERVICAL AREA:  Normal foramen magnum with no Chiari malformation.    PARASPINAL AREA:  Normal with no visible mass.    BONES:  Minimal retrolisthesis of C4 on C5 and C5 on C6.  Multiple lesions are noted within the cervical spine the demonstrate heterogeneous T1 and T2 characteristics most prominent within the C2 through C5 vertebral bodies.  These do not demonstrate  enhancement.  CORD:  Normal caliber, contour and signal intensity.       THORACIC DISC LEVELS  CORD:  Normal caliber, contour, and signal.  The conus terminates at a normal level.  No abnormal contrast enhancement.    BONES:  Compression deformity of the T6 vertebral body.  Heterogeneous appearance of the thoracic spine with probable lesions corresponding to known lytic lesions.  DISCS:  No significant disc/facet  abnormality, spinal stenosis, or foraminal narrowing.    Unremarkable paraspinous region with no visible mass.       LUMBAR DISC LEVELS  L1-L2:  Mild diffuse disc bulge with bilateral facet hypertrophy and ligamentum flavum thickening.  Mild flattening of the ventral aspect of thecal sac.  No significant neural foraminal stenosis.  L2-L3:  Mild diffuse disc bulge with bilateral facet hypertrophy and ligamentum flavum thickening.  Mild flattening the ventral aspect of thecal sac.  Narrowing the subarticular zones bilaterally.  No significant neural foraminal stenosis.  L3-L4:  Mild diffuse disc bulge with bilateral facet hypertrophy and ligamentum flavum thickening.  No spinal canal or neural foraminal stenosis.  L4-L5:  Loss of disc height.  Minimal diffuse disc bulge with bilateral facet hypertrophy and ligamentum flavum thickening.  Narrowing of the subarticular zones bilaterally.  No significant spinal canal or neural foraminal stenosis.  L5-S1:  Minimal diffuse disc bulge with bilateral facet hypertrophy and ligamentum flavum thickening.  Narrowing of the subarticular zones, right greater than left.  Mild bilateral neural foraminal stenosis.  No spinal canal stenosis.     PARASPINAL AREA:  Normal with no visible mass.    BONES:  No acute fractures.  Heterogeneous appearance to the vertebral bodies full  CORD/CAUDA EQUINA:  Normal caliber, contour, and signal intensity.    OTHER:  None.       Impression   CONCLUSION:    1.  Heterogeneous appearance to the vertebral bodies within the cervical, thoracic, and lumbar spine consistent with known lytic lesions from prior myeloma.  No enhancing lesions are appreciated.  2.  Chronic appearing compression deformity of the T6 vertebral body.  3.  Degenerative disc disease as described above.              Dictated by: Dorie Richard MD on 6/08/2018 at 18:25      Approved by: Dorie Richard MD       Study Result    Narrative   PROCEDURE:  XR LUMBAR SPINE (MIN 4 VIEWS)  (CPT=72110)     TECHNIQUE:  AP, lateral, oblique, and coned down L5-S1 views were obtained.     COMPARISON:  [facility] , XR, XR BONE SURVEY, COMPLETE (CPT=77075), 7/21/2016, 2:59 PM.     INDICATIONS:  M54.5 Low back pain     PATIENT STATED HISTORY: (As transcribed by Technologist)  Chronic lower back pain, midline and bilateral, since 2006. Patient diagnosed with multiple myeloma in 2006.         FINDINGS:      BONES:  Multilevel endplate hypertrophic changes throughout the lumbar spine.  Minimal anterior wedging of L1 and L2 vertebral bodies unchanged.  There are degenerative changes in the lower lumbar facets bilaterally.  DISC SPACES:  Disc space narrowing L4-5 and L5-S1 levels.                   Impression   CONCLUSION:  Hypertrophic degenerative changes lumbar spine with degenerative disc disease and disc space narrowing L4-5 and L5-S1 levels.        Dictated by (CST): Alma Portillo MD on 11/08/2020 at 2:38 PM      Finalized by (CST): Alma Portillo MD on 11/08/2020 at 2:41 PM      MEDICAL DECISION MAKING:     ASSESSMENT and PLAN:    ICD-10-CM    1. Lumbar pain  M54.50 MRI CERVICAL+THOR+LUMB SPINE (ALL W+WO) (CPT=72156/82893/47302)     XR LUMBAR SPINE (MIN 4 VIEWS) (CPT=72110)     XR SCOLIOSIS SPINE 2-3 VIEWS SH(CPT=72082)     OP REFERRAL TO [facility]      2. Chronic left-sided thoracic back pain  M54.6 MRI CERVICAL+THOR+LUMB SPINE (ALL W+WO) (CPT=72156/50837/40192)    G89.29 XR LUMBAR SPINE (MIN 4 VIEWS) (CPT=72110)     XR SCOLIOSIS SPINE 2-3 VIEWS SH(CPT=72082)     OP REFERRAL TO [facility]      3. Muscle weakness on examination  M62.81 MRI CERVICAL+THOR+LUMB SPINE (ALL W+WO) (CPT=72156/63784/46632)     XR LUMBAR SPINE (MIN 4 VIEWS) (CPT=72110)     XR SCOLIOSIS SPINE 2-3 VIEWS SH(CPT=72082)     OP REFERRAL TO [facility]      4. Leg heaviness  R29.898 MRI CERVICAL+THOR+LUMB SPINE (ALL W+WO) (CPT=72156/31485/99303)     XR LUMBAR SPINE (MIN 4 VIEWS)  (CPT=72110)     XR SCOLIOSIS SPINE 2-3 VIEWS SH(CPT=72082)     OP REFERRAL TO [facility]      5. Weakness of both lower extremities  R29.898 MRI CERVICAL+THOR+LUMB SPINE (ALL W+WO) (CPT=72156/94739/75773)     XR LUMBAR SPINE (MIN 4 VIEWS) (CPT=72110)     XR SCOLIOSIS SPINE 2-3 VIEWS SH(CPT=72082)     OP REFERRAL TO [facility]      6. Hyperreflexia  R29.2 MRI CERVICAL+THOR+LUMB SPINE (ALL W+WO) (CPT=72156/30634/99549)     XR LUMBAR SPINE (MIN 4 VIEWS) (CPT=72110)     XR SCOLIOSIS SPINE 2-3 VIEWS SH(CPT=72082)     OP REFERRAL TO [facility]      7. Multiple myeloma  C90.00 MRI CERVICAL+THOR+LUMB SPINE (ALL W+WO) (CPT=72156/36522/14496)     XR LUMBAR SPINE (MIN 4 VIEWS) (CPT=72110)     XR SCOLIOSIS SPINE 2-3 VIEWS SH(CPT=72082)     OP REFERRAL TO [facility]      8. Compression fracture of body of T6, chronic  S22.000A MRI CERVICAL+THOR+LUMB SPINE (ALL W+WO) (CPT=72156/33748/49002)     XR LUMBAR SPINE (MIN 4 VIEWS) (CPT=72110)     XR SCOLIOSIS SPINE 2-3 VIEWS SH(CPT=72082)     OP REFERRAL TO [facility]        PLAN:   1. Medication: None prescribed  2. Imaging:    - Reviewed today:    - MRI cervical, thoracic and lumbar spine, 2018 & XR lumbar spine 2020:     - Cervical DDD most prominent at C5-6 with spinal stenosis noted. Thoracic DDD with chronic T6 compression fracture without significant spinal stenosis. Lumbar DDD with multi level foraminal narrowing. Lytic lesions noted throughout the cervical, thoracic and lumbar spine.    - Ordered today:    - MRI cervical, thoracic, lumbar spine w + wo:     - Further assess chronic thoracolumbar pain, bilateral LE weakness/heaviness and noted hyper reflexia and muscle weakness on examination. Patient with visible difficulty coming to a stand likely 2/2 to proximal leg weakness. Recommend MRI imaging to further assess. Recommend w + wo 2/2 to pertinent history of multiple  myeloma and history of spine surgery    - XR scoliosis and XR lumbar with flex/ext:     - Further assess chronic thoracolumbar pain  3. Activity:    - PT ordered, patient declined PT at this time and would like to begin with imaging and discussing further with Dr. Mina  4. Follow up with Dr. Mina when imaging is complete or call or follow up sooner or go to the ED for any new, worsening or concerning signs or symptoms    -This is a pleasant 73-year-old male with a pertinent past medical history of cardiomyopathy, diabetes type 2, multiple myeloma and lumbar surgery (2002, unsure surgeon), takes 81 mg ASA daily which patient endorses is for prevention.  The patient presents for a main complaint of lumbar pain without apparent radiculopathy or neurogenic claudication.  He also endorses left thoracic back pain.  He has a pertinent history of a T6 compression fracture, noted on 2018 imaging.  My main concern is his proximal leg weakness and right EHL weakness. Visible difficulty coming to a stand 2/2 to his weakness. Hyper reflexia noted on examination. I recommend MRI imaging as noted above to further assess.  Given his chronic back pain and prior history of lumbar surgery, will assess with x-ray scoliosis and x-ray lumbar films with flexion/extension views.  The patient will then follow-up with Dr. Mina for imaging review.    I reviewed imaging. I discussed the plan and reviewed imaging with the patient. The patient agrees with the plan, verbalized understanding and is appreciative. All questions were sought out and thoroughly answered to satisfaction.       Total visit time: 60 min  More than 50% spent coordinating care, providing patient education, reviewing imaging, discussing further imaging, discussing physical therapy and counseling.    Thank you very much for the kind referral.  Respectfully yours,    Nohemi Tirado M.S., ALEXI  [address],  [address]  [place] [address]  [phone]  3/12/2024 1:25 PM    Dragon speech recognition software was used to prepare this note. If a word or phrase is confusing, it is likely due to a failure of recognition. Please contact me with any questions or clarifications.

## 2024-03-15 ENCOUNTER — HOSPITAL ENCOUNTER (OUTPATIENT)
Dept: GENERAL RADIOLOGY | Age: 74
Discharge: HOME | End: 2024-03-15
Attending: PHYSICIAN ASSISTANT
Payer: MEDICARE

## 2024-03-15 ENCOUNTER — OFFICE VISIT (OUTPATIENT)
Dept: HEMATOLOGY/ONCOLOGY | Age: 74
End: 2024-03-15
Attending: INTERNAL MEDICINE
Payer: MEDICARE

## 2024-03-15 VITALS
DIASTOLIC BLOOD PRESSURE: 58 MMHG | TEMPERATURE: 97 F | HEART RATE: 54 BPM | WEIGHT: 254.5 LBS | OXYGEN SATURATION: 98 % | RESPIRATION RATE: 20 BRPM | HEIGHT: 72.01 IN | BODY MASS INDEX: 34.47 KG/M2 | SYSTOLIC BLOOD PRESSURE: 94 MMHG

## 2024-03-15 VITALS
RESPIRATION RATE: 16 BRPM | OXYGEN SATURATION: 98 % | HEART RATE: 47 BPM | SYSTOLIC BLOOD PRESSURE: 87 MMHG | DIASTOLIC BLOOD PRESSURE: 55 MMHG | TEMPERATURE: 97 F

## 2024-03-15 DIAGNOSIS — G89.29 CHRONIC LEFT-SIDED THORACIC BACK PAIN: ICD-10-CM

## 2024-03-15 DIAGNOSIS — M54.50 LUMBAR PAIN: ICD-10-CM

## 2024-03-15 DIAGNOSIS — R29.898 WEAKNESS OF BOTH LOWER EXTREMITIES: ICD-10-CM

## 2024-03-15 DIAGNOSIS — C90.00 MULTIPLE MYELOMA NOT HAVING ACHIEVED REMISSION (HCC): Primary | ICD-10-CM

## 2024-03-15 DIAGNOSIS — R29.898 LEG HEAVINESS: ICD-10-CM

## 2024-03-15 DIAGNOSIS — R29.2 HYPERREFLEXIA: ICD-10-CM

## 2024-03-15 DIAGNOSIS — M54.6 CHRONIC LEFT-SIDED THORACIC BACK PAIN: ICD-10-CM

## 2024-03-15 DIAGNOSIS — M89.9 LYTIC BONE LESIONS ON XRAY: ICD-10-CM

## 2024-03-15 DIAGNOSIS — C90.00 MULTIPLE MYELOMA, REMISSION STATUS UNSPECIFIED (HCC): ICD-10-CM

## 2024-03-15 DIAGNOSIS — S22.000A COMPRESSION FRACTURE OF BODY OF THORACIC VERTEBRA (HCC): ICD-10-CM

## 2024-03-15 DIAGNOSIS — M62.81 MUSCLE WEAKNESS ON EXAMINATION: ICD-10-CM

## 2024-03-15 LAB
ALBUMIN SERPL-MCNC: 3.7 G/DL (ref 3.4–5)
ALBUMIN/GLOB SERPL: 1.3 {RATIO} (ref 1–2)
ALP LIVER SERPL-CCNC: 84 U/L
ALT SERPL-CCNC: 21 U/L
ANION GAP SERPL CALC-SCNC: 8 MMOL/L (ref 0–18)
AST SERPL-CCNC: 12 U/L (ref 15–37)
BASOPHILS # BLD AUTO: 0.02 X10(3) UL (ref 0–0.2)
BASOPHILS NFR BLD AUTO: 0.1 %
BILIRUB SERPL-MCNC: 0.9 MG/DL (ref 0.1–2)
BUN BLD-MCNC: 40 MG/DL (ref 9–23)
CALCIUM BLD-MCNC: 9.3 MG/DL (ref 8.5–10.1)
CHLORIDE SERPL-SCNC: 112 MMOL/L (ref 98–112)
CO2 SERPL-SCNC: 18 MMOL/L (ref 21–32)
CREAT BLD-MCNC: 1.92 MG/DL
EGFRCR SERPLBLD CKD-EPI 2021: 36 ML/MIN/1.73M2 (ref 60–?)
EOSINOPHIL # BLD AUTO: 0 X10(3) UL (ref 0–0.7)
EOSINOPHIL NFR BLD AUTO: 0 %
ERYTHROCYTE [DISTWIDTH] IN BLOOD BY AUTOMATED COUNT: 13 %
FASTING STATUS PATIENT QL REPORTED: NO
GLOBULIN PLAS-MCNC: 2.9 G/DL (ref 2.8–4.4)
GLUCOSE BLD-MCNC: 153 MG/DL (ref 70–99)
HCT VFR BLD AUTO: 38.8 %
HGB BLD-MCNC: 12.7 G/DL
IGA SERPL-MCNC: <7.83 MG/DL (ref 70–312)
IGM SERPL-MCNC: <5.3 MG/DL (ref 43–279)
IMM GRANULOCYTES # BLD AUTO: 0.06 X10(3) UL (ref 0–1)
IMM GRANULOCYTES NFR BLD: 0.4 %
IMMUNOGLOBULIN PNL SER-MCNC: 177 MG/DL (ref 791–1643)
LYMPHOCYTES # BLD AUTO: 0.16 X10(3) UL (ref 1–4)
LYMPHOCYTES NFR BLD AUTO: 1.1 %
MCH RBC QN AUTO: 35 PG (ref 26–34)
MCHC RBC AUTO-ENTMCNC: 32.7 G/DL (ref 31–37)
MCV RBC AUTO: 106.9 FL
MONOCYTES # BLD AUTO: 0.06 X10(3) UL (ref 0.1–1)
MONOCYTES NFR BLD AUTO: 0.4 %
NEUTROPHILS # BLD AUTO: 14.07 X10 (3) UL (ref 1.5–7.7)
NEUTROPHILS # BLD AUTO: 14.07 X10(3) UL (ref 1.5–7.7)
NEUTROPHILS NFR BLD AUTO: 98 %
OSMOLALITY SERPL CALC.SUM OF ELEC: 299 MOSM/KG (ref 275–295)
PLATELET # BLD AUTO: 271 10(3)UL (ref 150–450)
POTASSIUM SERPL-SCNC: 4.2 MMOL/L (ref 3.5–5.1)
PROT SERPL-MCNC: 6.6 G/DL (ref 6.4–8.2)
RBC # BLD AUTO: 3.63 X10(6)UL
SODIUM SERPL-SCNC: 138 MMOL/L (ref 136–145)
WBC # BLD AUTO: 14.4 X10(3) UL (ref 4–11)

## 2024-03-15 PROCEDURE — 72110 X-RAY EXAM L-2 SPINE 4/>VWS: CPT | Performed by: PHYSICIAN ASSISTANT

## 2024-03-15 PROCEDURE — 72082 X-RAY EXAM ENTIRE SPI 2/3 VW: CPT | Performed by: PHYSICIAN ASSISTANT

## 2024-03-15 PROCEDURE — 99215 OFFICE O/P EST HI 40 MIN: CPT | Performed by: NURSE PRACTITIONER

## 2024-03-15 PROCEDURE — 96401 CHEMO ANTI-NEOPL SQ/IM: CPT

## 2024-03-15 PROCEDURE — 96413 CHEMO IV INFUSION 1 HR: CPT

## 2024-03-15 NOTE — PROGRESS NOTES
Patient is here for follow up for myeloma on C42 of kyprolis and darzalex.   He had an evaluation with ortho for lumbar pain.  He is having some repeat xrays and an MRI. they are recommending PT.  He will know more after the xrays.     Education Record    Learner:  Patient    Disease / Diagnosis: myeloma    Barriers / Limitations:  None   Comments:    Method:  Discussion   Comments:    General Topics:  Side effects and symptom management   Comments:    Outcome:  Shows understanding   Comments:

## 2024-03-15 NOTE — PROGRESS NOTES
Pt here for C42D1 Drug(s)kyprolis/faspro.  Arrives Ambulating independently, accompanied by Self     Patient was evaluated today by MD.    Oral medications included in this regimen:  no    Patient confirms comprehension of cancer treatment schedule:  yes    Pregnancy screening:  Not applicable    Modifications in dose or schedule:  No    Medications appearance and physical integrity checked by RN: yes.    Chemotherapy IV pump settings verified by 2 RNs:  Yes.  Frequency of blood return and site check throughout administration: Prior to administration, Prior to each drug, and At completion of therapy     Infusion/treatment outcome:  patient tolerated treatment without incident    Education Record    Learner:  Patient  Barriers / Limitations:  None  Method:  Brief focused and Discussion  Education / instructions given:  schedule  Outcome:  Shows understanding    Discharged Home, Ambulating independently, accompanied by:Self    Patient/family verbalized understanding of future appointments: by printed AVS

## 2024-03-15 NOTE — PROGRESS NOTES
Cancer Center Progress Note    Patient Name: Nacho Valle   YOB: 1950   Medical Record Number: [id number]    Date of visit: 3/15/2024    Chief Complaint/Reason for Visit:  Chief Complaint   Patient presents with    Follow - Up      History of Present Illness: Arron presents today for follow up and chemotherapy. He has multiple myeloma and his oncologist is Dr. Frias. He was initially diagnosed in 7/2016. He received induction chemotherapy followed by high dose stem cell infusoin in 1/2017. He was on revlimid maintenance but developed progression. He started salvage chemotherapy daratumumab, cafilzomib and dexamethasone on 2/18/2021. Today is cycle 42.     Nacho denies any new side effects from the chemotherapy. Denies recent fever or chills, cough or SOB, swelling. He has ongoing lumbar pain for which he is seeing neurosurgery for.     Problem List:  Patient Active Problem List   Diagnosis    Impotence of organic origin    Generalized osteoarthrosis of hand    Morbid obesity (HCC)    Essential hypertension    Hypertension    Multiple myeloma (HCC)    Myeloma kidney disease (HCC)    Hx of stem cell transplant (HCC)    Hypoxia    Multiple myeloma, remission status unspecified (HCC)    Type 2 diabetes mellitus with hyperglycemia, without long-term current use of insulin (HCC)    Cardiomyopathy, unspecified type (HCC)    Morbid (severe) obesity due to excess calories (HCC)    Elevated LFTs        Medical History:  Past Medical History:   Diagnosis Date    Allergic rhinitis, cause unspecified     BCC (basal cell carcinoma of skin) 1997    s/p surgical excision    BPH (benign prostatic hyperplasia)     Colon polyps     Generalized osteoarthrosis, involving hand     Herniation of intervertebral disc between L4 and L5 2000    s/p surgical repair    High blood pressure     High cholesterol     Impotence of organic origin     Morbid obesity (HCC)     Osteoarthritis     Osteoarthrosis, unspecified whether  generalized or localized, lower leg 6/20/2013    Log Date: 08/21/2012     Other and unspecified hyperlipidemia     Pneumonia due to organism     Prostate cancer (HCC) 2007    s/p total prostatectomy and radiation    Trigger finger (acquired)     Type II or unspecified type diabetes mellitus without mention of complication, not stated as uncontrolled 10/2007    Unspecified essential hypertension     Unspecified internal derangement of knee 6/20/2013    Log Date: 08/21/2012        Surgical History:  Past Surgical History:   Procedure Laterality Date    BACK SURGERY  1/2001    lower back L4 L5 for herniated disc    COLONOSCOPY      202, 2012 Dr. Wolf, polyps    OTHER SURGICAL HISTORY  1/31/2007    total prostatectomy    SKIN SURGERY  1997    BCC       Allergies:  Allergies   Allergen Reactions    Pcn [Penicillins] ANAPHYLAXIS, HIVES, HALLUCINATION and SHORTNESS OF BREATH     Respiratory distress    Beta Adrenergic Blockers     Latex      Surgical tape  Welts, burning of skin, itching    Statins        Family History:  Family History   Problem Relation Age of Onset    Diabetes Other         family hx    Hypertension Other         family hx    Prostate Cancer Father        Social History:  Social History     Socioeconomic History    Marital status:      Spouse name: Not on file    Number of children: 3    Years of education: Not on file    Highest education level: Not on file   Occupational History    Occupation: Retired      Comment:    Tobacco Use    Smoking status: Never    Smokeless tobacco: Never   Vaping Use    Vaping Use: Never used   Substance and Sexual Activity    Alcohol use: No     Alcohol/week: 0.0 standard drinks of alcohol    Drug use: No    Sexual activity: Not on file   Other Topics Concern    Not on file   Social History Narrative    Not on file     Social Determinants of Health     Financial Resource Strain: Low Risk  (2/20/2023)    Financial Resource Strain      Difficulty of Paying Living Expenses: Not hard at all     Med Affordability: No   Food Insecurity: No Food Insecurity (2/20/2023)    Food Insecurity     Food Insecurity: Never true   Transportation Needs: No Transportation Needs (2/20/2023)    Transportation Needs     Lack of Transportation: No   Physical Activity: Sufficiently Active (2/20/2023)    Exercise Vital Sign     Days of Exercise per Week: 7 days     Minutes of Exercise per Session: 150+ min   Stress: No Stress Concern Present (2/20/2023)    Stress     Feeling of Stress : No   Social Connections: Socially Integrated (2/20/2023)    Social Connections     Frequency of Socialization with Friends and Family: 3   Housing Stability: Low Risk  (2/20/2023)    Housing Stability     Housing Instability: No     Housing Instability Emergency: Not on file       Medications:    Current Outpatient Medications:     ACYCLOVIR 400 MG Oral Tab, TAKE 1 TABLET(400 MG) BY MOUTH TWICE DAILY, Disp: 180 tablet, Rfl: 1    atorvastatin 20 MG Oral Tab, Take 1 tablet (20 mg total) by mouth daily., Disp: 90 tablet, Rfl: 0    traMADol 50 MG Oral Tab, Take 1 tablet (50 mg total) by mouth every 6 (six) hours as needed for Pain., Disp: 40 tablet, Rfl: 0    benzonatate 200 MG Oral Cap, Take 1 capsule (200 mg total) by mouth 3 (three) times daily as needed for cough., Disp: 30 capsule, Rfl: 0    cyclobenzaprine 10 MG Oral Tab, TAKE 1 TABLET(10 MG) BY MOUTH THREE TIMES DAILY AS NEEDED FOR MUSCLE SPASMS, Disp: 10 tablet, Rfl: 1    DRISDOL 1.25 MG (17361 UT) Oral Cap, , Disp: , Rfl:     dexamethasone (DECADRON) 4 MG tablet, Take 3 tablets (12mg) PO one time per week., Disp: 36 tablet, Rfl: 3    ONETOUCH ULTRASOFT LANCETS Does not apply Misc, TEST BLOOD SUGAR TWICE DAILY, Disp: 200 each, Rfl: 2    ENTRESTO  MG Oral Tab, Take 1 tablet by mouth daily., Disp: , Rfl:     empagliflozin 10 MG Oral Tab, Take 1 tablet (10 mg total) by mouth daily. JARDIANCE, Disp: , Rfl:     Glucose Blood  (ONETOUCH ULTRA) In Vitro Strip, CHECK SUGARS THREE TIMES DAILY AS DIRECTED, Disp: 300 strip, Rfl: 2    Metoprolol Succinate  MG Oral Tablet 24 Hr, Take 1 tablet (200 mg total) by mouth every evening., Disp: , Rfl:     eplerenone 25 MG Oral Tab, Take 1 tablet (25 mg total) by mouth daily., Disp: , Rfl:     omega-3 fatty acids 1000 MG Oral Cap, Take 1,000 mg by mouth 2 (two) times daily., Disp: , Rfl:     Multiple Vitamin Oral Tab, Take 1 tablet by mouth., Disp: , Rfl:     acetaminophen 500 MG Oral Tab, Take 2 tablets (1,000 mg total) by mouth nightly., Disp: , Rfl:     aspirin (ASPIR-81) 81 MG Oral Tab EC, Take 1 tablet by mouth daily., Disp: 1 tablet, Rfl: 0    Review of Systems:  A comprehensive 14 point review of systems was completed.  Pertinent positives and negatives noted in the HPI.    Performance Status: ECOG 0 - Fully active, able to carry on all predisease activities without restrictions.    Physical Examination:  General: Patient is alert and oriented x 3, not in acute distress.  Vital Signs: Height: 182.9 cm (6' 0.01\") (03/15 1024)  Weight: 115.4 kg (254 lb 8 oz) (03/15 1024)  BSA (Calculated - sq m): 2.36 sq meters (03/15 1024)  Pulse: 47 (03/15 1235)  BP: 87/55 (03/15 1235)  Temp: 96.8 °F (36 °C) (03/15 1235)  Do Not Use - Resp Rate: --  SpO2: 98 % (03/15 1235)  HEENT: Anicteric, conjunctivae and sclerae clear, no oropharyngeal lesion/thrush, mucous membranes are moist   Chest: Clear to auscultation. Respirations unlabored.   Heart: Regular rate and rhythm.   Abdomen: Soft, non-distended, non-tender with present bowel sounds.  Extremities: No edema.  Neurological: Grossly intact.   Lymphatics: There is no palpable lymphadenopathy throughout in the cervical or supraclavicular regions.   Skin: warm, dry, no erythema or rash   Psych/Depression: mood and affect are appropriate.     Labs:     Recent Results (from the past 72 hour(s))   Comp Metabolic Panel (14)    Collection Time: 03/15/24 10:28 AM    Result Value Ref Range    Glucose 153 (H) 70 - 99 mg/dL    Sodium 138 136 - 145 mmol/L    Potassium 4.2 3.5 - 5.1 mmol/L    Chloride 112 98 - 112 mmol/L    CO2 18.0 (L) 21.0 - 32.0 mmol/L    Anion Gap 8 0 - 18 mmol/L    BUN 40 (H) 9 - 23 mg/dL    Creatinine 1.92 (H) 0.70 - 1.30 mg/dL    Calcium, Total 9.3 8.5 - 10.1 mg/dL    Calculated Osmolality 299 (H) 275 - 295 mOsm/kg    eGFR-Cr 36 (L) >=60 mL/min/1.73m2    AST 12 (L) 15 - 37 U/L    ALT 21 16 - 61 U/L    Alkaline Phosphatase 84 45 - 117 U/L    Bilirubin, Total 0.9 0.1 - 2.0 mg/dL    Total Protein 6.6 6.4 - 8.2 g/dL    Albumin 3.7 3.4 - 5.0 g/dL    Globulin  2.9 2.8 - 4.4 g/dL    A/G Ratio 1.3 1.0 - 2.0    Patient Fasting for CMP? No    CBC W/ DIFFERENTIAL    Collection Time: 03/15/24 10:28 AM   Result Value Ref Range    WBC 14.4 (H) 4.0 - 11.0 x10(3) uL    RBC 3.63 (L) 3.80 - 5.80 x10(6)uL    HGB 12.7 (L) 13.0 - 17.5 g/dL    HCT 38.8 (L) 39.0 - 53.0 %    .0 150.0 - 450.0 10(3)uL    .9 (H) 80.0 - 100.0 fL    MCH 35.0 (H) 26.0 - 34.0 pg    MCHC 32.7 31.0 - 37.0 g/dL    RDW 13.0 %    Neutrophil Absolute Prelim 14.07 (H) 1.50 - 7.70 x10 (3) uL    Neutrophil Absolute 14.07 (H) 1.50 - 7.70 x10(3) uL    Lymphocyte Absolute 0.16 (L) 1.00 - 4.00 x10(3) uL    Monocyte Absolute 0.06 (L) 0.10 - 1.00 x10(3) uL    Eosinophil Absolute 0.00 0.00 - 0.70 x10(3) uL    Basophil Absolute 0.02 0.00 - 0.20 x10(3) uL    Immature Granulocyte Absolute 0.06 0.00 - 1.00 x10(3) uL    Neutrophil % 98.0 %    Lymphocyte % 1.1 %    Monocyte % 0.4 %    Eosinophil % 0.0 %    Basophil % 0.1 %    Immature Granulocyte % 0.4 %       Impression/Plan    Multiple myeloma:  initially diagnosed in 7/2016. He received induction chemotherapy followed by high dose stem cell infusion in 1/2017. He was on revlimid maintenance but developed progression. He started salvage chemotherapy daratumumab, cafilzomib and dexamethasone on 2/18/2021. He is tolerating treatment well. Labs reviewed, proceed  with cycle 42 today. Monoclonal protein study pending.     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 Frias, labs and chemotherapy    Risk Level: HIGH multiple myeloma receiving chemotherapy requiring close monitoring     Electronically Signed by:    ITA Sanford, NP-C  Nurse Practitioner  [facility]

## 2024-03-20 LAB
ALBUMIN SERPL ELPH-MCNC: 4.12 G/DL (ref 3.75–5.21)
ALBUMIN/GLOB SERPL: 2.09 {RATIO} (ref 1–2)
ALPHA1 GLOB SERPL ELPH-MCNC: 0.31 G/DL (ref 0.19–0.46)
ALPHA2 GLOB SERPL ELPH-MCNC: 0.82 G/DL (ref 0.48–1.05)
B-GLOBULIN SERPL ELPH-MCNC: 0.63 G/DL (ref 0.68–1.23)
GAMMA GLOB SERPL ELPH-MCNC: 0.22 G/DL (ref 0.62–1.7)
KAPPA LC FREE SER-MCNC: 31.03 MG/DL (ref 0.33–1.94)
LAMBDA LC FREE SERPL-MCNC: <0.137 MG/DL (ref 0.57–2.63)
M PROTEIN 1 SERPL ELPH-MCNC: 0.04 G/DL (ref ?–0)
PROT SERPL-MCNC: 6.1 G/DL (ref 5.7–8.2)

## 2024-03-22 ENCOUNTER — OFFICE VISIT (OUTPATIENT)
Dept: HEMATOLOGY/ONCOLOGY | Age: 74
End: 2024-03-22
Attending: INTERNAL MEDICINE
Payer: MEDICARE

## 2024-03-22 ENCOUNTER — APPOINTMENT (OUTPATIENT)
Dept: HEMATOLOGY/ONCOLOGY | Age: 74
End: 2024-03-22
Attending: INTERNAL MEDICINE
Payer: MEDICARE

## 2024-03-22 VITALS
HEART RATE: 49 BPM | HEIGHT: 72.01 IN | SYSTOLIC BLOOD PRESSURE: 94 MMHG | DIASTOLIC BLOOD PRESSURE: 56 MMHG | WEIGHT: 256 LBS | OXYGEN SATURATION: 94 % | BODY MASS INDEX: 34.67 KG/M2 | TEMPERATURE: 97 F | RESPIRATION RATE: 16 BRPM

## 2024-03-22 DIAGNOSIS — R79.89 ELEVATED LFTS: Primary | ICD-10-CM

## 2024-03-22 DIAGNOSIS — C90.00 MULTIPLE MYELOMA NOT HAVING ACHIEVED REMISSION (HCC): ICD-10-CM

## 2024-03-22 LAB
BASOPHILS # BLD AUTO: 0.01 X10(3) UL (ref 0–0.2)
BASOPHILS NFR BLD AUTO: 0.1 %
EOSINOPHIL # BLD AUTO: 0.12 X10(3) UL (ref 0–0.7)
EOSINOPHIL NFR BLD AUTO: 1.2 %
ERYTHROCYTE [DISTWIDTH] IN BLOOD BY AUTOMATED COUNT: 13.4 %
HCT VFR BLD AUTO: 38.7 %
HGB BLD-MCNC: 12.9 G/DL
IMM GRANULOCYTES # BLD AUTO: 0.03 X10(3) UL (ref 0–1)
IMM GRANULOCYTES NFR BLD: 0.3 %
LYMPHOCYTES # BLD AUTO: 0.61 X10(3) UL (ref 1–4)
LYMPHOCYTES NFR BLD AUTO: 6 %
MCH RBC QN AUTO: 35.3 PG (ref 26–34)
MCHC RBC AUTO-ENTMCNC: 33.3 G/DL (ref 31–37)
MCV RBC AUTO: 106 FL
MONOCYTES # BLD AUTO: 1.01 X10(3) UL (ref 0.1–1)
MONOCYTES NFR BLD AUTO: 9.9 %
NEUTROPHILS # BLD AUTO: 8.47 X10 (3) UL (ref 1.5–7.7)
NEUTROPHILS # BLD AUTO: 8.47 X10(3) UL (ref 1.5–7.7)
NEUTROPHILS NFR BLD AUTO: 82.5 %
PLATELET # BLD AUTO: 135 10(3)UL (ref 150–450)
PLATELETS.RETICULATED NFR BLD AUTO: 6.1 % (ref 0–7)
RBC # BLD AUTO: 3.65 X10(6)UL
WBC # BLD AUTO: 10.3 X10(3) UL (ref 4–11)

## 2024-03-22 PROCEDURE — 36415 COLL VENOUS BLD VENIPUNCTURE: CPT

## 2024-03-22 PROCEDURE — 96413 CHEMO IV INFUSION 1 HR: CPT

## 2024-03-22 PROCEDURE — 85025 COMPLETE CBC W/AUTO DIFF WBC: CPT

## 2024-03-22 NOTE — PROGRESS NOTES
Pt here for C42D8 Drug(s)kyprolis.  Arrives Ambulating independently, accompanied by Self     Patient was evaluated today by Treatment Nurse.    Oral medications included in this regimen:  no    Patient confirms comprehension of cancer treatment schedule:  yes    Pregnancy screening:  Not applicable    Modifications in dose or schedule:  No    Medications appearance and physical integrity checked by RN: yes.    Chemotherapy IV pump settings verified by 2 RNs:  Yes.  Frequency of blood return and site check throughout administration: Prior to administration     Infusion/treatment outcome:  patient tolerated treatment without incident    Education Record    Learner:  Patient  Barriers / Limitations:  None  Method:  Brief focused  Education / instructions given:  pt's BP and pulse low. Pt asymptomatic. Janie smith notified. Pt to call Dr Ribeiro and notify her of VS. RN sent pt home with 3 VS from today including orthostatic VS. Pt verbalized understanding to call.   Outcome:  Shows understanding    Discharged Home, Ambulating independently, accompanied by:Self    Patient/family verbalized understanding of future appointments: by printed AVS

## 2024-03-29 ENCOUNTER — APPOINTMENT (OUTPATIENT)
Dept: HEMATOLOGY/ONCOLOGY | Age: 74
End: 2024-03-29
Attending: INTERNAL MEDICINE
Payer: MEDICARE

## 2024-03-29 ENCOUNTER — OFFICE VISIT (OUTPATIENT)
Dept: HEMATOLOGY/ONCOLOGY | Age: 74
End: 2024-03-29
Attending: INTERNAL MEDICINE
Payer: MEDICARE

## 2024-03-29 VITALS
OXYGEN SATURATION: 95 % | HEART RATE: 57 BPM | DIASTOLIC BLOOD PRESSURE: 58 MMHG | RESPIRATION RATE: 16 BRPM | TEMPERATURE: 98 F | HEIGHT: 72.01 IN | WEIGHT: 256.5 LBS | SYSTOLIC BLOOD PRESSURE: 93 MMHG | BODY MASS INDEX: 34.74 KG/M2

## 2024-03-29 DIAGNOSIS — R79.89 ELEVATED LFTS: ICD-10-CM

## 2024-03-29 DIAGNOSIS — C90.00 MULTIPLE MYELOMA NOT HAVING ACHIEVED REMISSION (HCC): ICD-10-CM

## 2024-03-29 DIAGNOSIS — C90.00 MULTIPLE MYELOMA, REMISSION STATUS UNSPECIFIED (HCC): Primary | ICD-10-CM

## 2024-03-29 LAB
ALBUMIN SERPL-MCNC: 3.6 G/DL (ref 3.4–5)
BASOPHILS # BLD AUTO: 0.02 X10(3) UL (ref 0–0.2)
BASOPHILS NFR BLD AUTO: 0.2 %
CALCIUM BLD-MCNC: 9.4 MG/DL (ref 8.5–10.1)
CREAT BLD-MCNC: 1.79 MG/DL
EGFRCR SERPLBLD CKD-EPI 2021: 39 ML/MIN/1.73M2 (ref 60–?)
EOSINOPHIL # BLD AUTO: 0 X10(3) UL (ref 0–0.7)
EOSINOPHIL NFR BLD AUTO: 0 %
ERYTHROCYTE [DISTWIDTH] IN BLOOD BY AUTOMATED COUNT: 13.3 %
HCT VFR BLD AUTO: 36.9 %
HGB BLD-MCNC: 12.4 G/DL
IMM GRANULOCYTES # BLD AUTO: 0.04 X10(3) UL (ref 0–1)
IMM GRANULOCYTES NFR BLD: 0.4 %
LYMPHOCYTES # BLD AUTO: 0.16 X10(3) UL (ref 1–4)
LYMPHOCYTES NFR BLD AUTO: 1.5 %
MCH RBC QN AUTO: 35.5 PG (ref 26–34)
MCHC RBC AUTO-ENTMCNC: 33.6 G/DL (ref 31–37)
MCV RBC AUTO: 105.7 FL
MONOCYTES # BLD AUTO: 0.08 X10(3) UL (ref 0.1–1)
MONOCYTES NFR BLD AUTO: 0.7 %
NEUTROPHILS # BLD AUTO: 10.6 X10 (3) UL (ref 1.5–7.7)
NEUTROPHILS # BLD AUTO: 10.6 X10(3) UL (ref 1.5–7.7)
NEUTROPHILS NFR BLD AUTO: 97.2 %
PLATELET # BLD AUTO: 155 10(3)UL (ref 150–450)
RBC # BLD AUTO: 3.49 X10(6)UL
WBC # BLD AUTO: 10.9 X10(3) UL (ref 4–11)

## 2024-03-29 PROCEDURE — 96413 CHEMO IV INFUSION 1 HR: CPT

## 2024-03-29 PROCEDURE — 85025 COMPLETE CBC W/AUTO DIFF WBC: CPT

## 2024-03-29 PROCEDURE — 82310 ASSAY OF CALCIUM: CPT

## 2024-03-29 PROCEDURE — 82565 ASSAY OF CREATININE: CPT

## 2024-03-29 PROCEDURE — 82040 ASSAY OF SERUM ALBUMIN: CPT

## 2024-03-29 PROCEDURE — 96375 TX/PRO/DX INJ NEW DRUG ADDON: CPT

## 2024-03-29 NOTE — PROGRESS NOTES
Pt here for C42 D1 Drug(s): Kyprolis/Zometa.  Arrives Ambulating independently, accompanied by Self     Patient was evaluated today by Treatment Nurse.    Oral medications included in this regimen:  no    Patient confirms comprehension of cancer treatment schedule:  yes    Pregnancy screening:  Not applicable    Modifications in dose or schedule:  No    Medications appearance and physical integrity checked by RN: yes.    Chemotherapy IV pump settings verified by 2 RNs:  Yes.  Frequency of blood return and site check throughout administration: Prior to administration and At completion of therapy     Infusion/treatment outcome:  patient tolerated treatment without incident    Education Record    Learner:  Patient  Barriers / Limitations:  None  Method:  Discussion  Education / instructions given:  Reviewed plan of care and follow up appts.  Outcome:  Shows understanding    Discharged Home, Ambulating independently, accompanied by:Self    Patient/family verbalized understanding of future appointments: by printed AVS

## 2024-04-01 ENCOUNTER — TELEPHONE (OUTPATIENT)
Dept: SURGERY | Facility: CLINIC | Age: 74
End: 2024-04-01

## 2024-04-01 NOTE — TELEPHONE ENCOUNTER
LVMTCB; Dr Mina would like to see pt has completed all imaging. Pt has MRI scheduled 04/16/24. Please assist pt with rescheduling when he calls back.

## 2024-04-04 ENCOUNTER — OFFICE VISIT (OUTPATIENT)
Dept: PHYSICAL THERAPY | Age: 74
End: 2024-04-04
Attending: PHYSICIAN ASSISTANT
Payer: MEDICARE

## 2024-04-04 DIAGNOSIS — R29.2 HYPERREFLEXIA: ICD-10-CM

## 2024-04-04 DIAGNOSIS — C90.00 MULTIPLE MYELOMA, REMISSION STATUS UNSPECIFIED (HCC): ICD-10-CM

## 2024-04-04 DIAGNOSIS — S22.000A COMPRESSION FRACTURE OF BODY OF THORACIC VERTEBRA (HCC): ICD-10-CM

## 2024-04-04 DIAGNOSIS — G89.29 CHRONIC LEFT-SIDED THORACIC BACK PAIN: ICD-10-CM

## 2024-04-04 DIAGNOSIS — M54.50 LUMBAR PAIN: Primary | ICD-10-CM

## 2024-04-04 DIAGNOSIS — M62.81 MUSCLE WEAKNESS ON EXAMINATION: ICD-10-CM

## 2024-04-04 DIAGNOSIS — R29.898 WEAKNESS OF BOTH LOWER EXTREMITIES: ICD-10-CM

## 2024-04-04 DIAGNOSIS — R29.898 LEG HEAVINESS: ICD-10-CM

## 2024-04-04 DIAGNOSIS — M54.6 CHRONIC LEFT-SIDED THORACIC BACK PAIN: ICD-10-CM

## 2024-04-04 PROCEDURE — 97163 PT EVAL HIGH COMPLEX 45 MIN: CPT | Performed by: PHYSICAL THERAPIST

## 2024-04-04 NOTE — PROGRESS NOTES
LOWER EXTREMITY EVALUATION:     Diagnosis:    Lumbar pain (M54.50)  Chronic left-sided thoracic back pain (M54.6,G89.29)  Muscle weakness on examination (M62.81)  Leg heaviness (R29.898)  Weakness of both lower extremities (R29.898)  Hyperreflexia (R29.2)  Multiple myeloma, remission status unspecified (HCC) (C90.00)  Compression fracture of body of thoracic vertebra (HCC) (S22.000A)      Referring Provider: Nohemi Tirado  Date of Evaluation:    4/4/2024    Precautions:  Cancer, Immunocompromised Next MD visit:   none scheduled  Date of Surgery: n/a     PATIENT SUMMARY   Nacho Valle is a 73 year old male who presents to therapy today with complaints of weakness on his legs. 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 Has low back and midback pain. He is constant pain anyway because of the multiple myeloma. At end of the day, he has to lay down and stretch his back to feel better. In the mornings he has more energy. No history of falls. Denies numbness and tingling sensation.  Pt describes pain level current 0/10, at best 0/10, at worst 5-6/10.   Current functional limitations include decreased energy at the end of the day.    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 Pt goals include legs get stronger and stabilize.  Past medical history was reviewed with Nacho. Significant findings include  has a past medical history of Allergic rhinitis, cause unspecified, BCC (basal cell carcinoma of skin) (1997), BPH (benign prostatic hyperplasia), Colon polyps, Generalized osteoarthrosis, involving hand, Herniation of intervertebral disc between L4 and L5 (2000), High blood pressure, High cholesterol, Impotence of organic origin, Morbid obesity (HCC), Osteoarthritis, Osteoarthrosis, unspecified whether generalized or localized, lower leg (6/20/2013), Other and  unspecified hyperlipidemia, Pneumonia due to organism, Prostate cancer (HCC) (2007), Trigger finger (acquired), Type II or unspecified type diabetes mellitus without mention of complication, not stated as uncontrolled (10/2007), Unspecified essential hypertension, and Unspecified internal derangement of knee (6/20/2013).     ASSESSMENT  Nacho presents to physical therapy evaluation with primary c/o weakness of Both lower extremities and decreased energy. The results of the objective tests and measures show Decreased muscles strength.  Functional deficits include but are not limited to prolonged walking, stranding, lifting, decreased activity tolerance.  Signs and symptoms are consistent with diagnosis of  Lumbar pain (M54.50) Chronic left-sided thoracic back pain (M54.6,G89.29), Muscle weakness on examination (M62.81), Leg heaviness (R29.898), Weakness of both lower extremities (R29.898), Hyperreflexia (R29.2), Multiple myeloma, remission status unspecified ([facility]) (C90.00), Compression fracture of body of thoracic vertebra ([facility]) (S22.000A). Pt and PT discussed evaluation findings, pathology, POC and HEP.  Pt voiced understanding and performs HEP correctly without reported pain. Skilled Physical Therapy is medically necessary to address the above impairments and reach functional goals.     OBJECTIVE:   Observation: Posture: FHP, L shoulder higher than R, rounded shoulders, Increased Thoracic kyphosis  Palpation: Tenderness and tightness on paralumbar muscles  Sensation: Intact  Proprioception: Foot positioning - Intact  Coordination: heel to shin - Intact    AROM: (* denotes performed with pain)  Hip Knee Foot/Ankle   WFL  WFL    WFL       Flexibility:  Hip Flexor: R Severe restrictions , L Severe restrictions   Hamstrings: R Severe restrictions ; L Severe restrictions   Piriformis: R Severe restrictions ; L Severe restrictions   Quads: R Mild restrictions ; L Mild restrictions   Gastroc-soleus: R Moderate  restrictions ; L Moderate restrictions     Strength/MMT: (* denotes performed with pain)  Hip Knee Foot/Ankle   Flexion: R 3+/5; L 3+/5  Extension: R 3+/5; L 3+/5  Abduction: R 3+/5; L 3+/5  ER: R 4-/5; L 4-/5  IR: R 4-/5; L 4-/5 Flexion: R 5/5; L 5/5  Extension: R 5/5; L 5/5    DF: R 3/5; L 3/5  PF: R 3/5; L 3/5         Gait: pt ambulates on level ground with decreased foot clearance B, R UE decreased arm swing, and stooped posture/forward lean  Balance: SLS: R 2 sec, L 2 sec  5 STST - 17 secs with UE support, unable without UE- decreased mobility  45 secs - bridge- WNL  TUG- 15 secs - high risk for falls  6 minute walk test : Vital signs before test: /72, O2 sat - 99, [place] 55, Distance - 1,137, Vital signs after: BP- 109/64, O2 sat 99, [place] -77      Today’s Treatment and Response:   Pt education was provided on exam findings, treatment diagnosis, treatment plan, expectations, and prognosis. Pt was also provided recommendations for possible soreness after evaluation, postural corrections, and importance of remaining active.  Patient was instructed in and issued a HEP for: mobility, safety    Charges: PT Eval High Complexity,       Total Timed Treatment: 45 min     Total Treatment Time: 45 min     Based on clinical rationale and outcome measures, this evaluation involved High Complexity decision making due to 3+ personal factors/comorbidities, 3 body structures involved/activity limitations, and evolving symptoms including changing pain levels and fatigue .  PLAN OF CARE:      Goals: (to be met in 8 visits)   Pt will improve transversus abdominis recruitment to perform proper isometric contraction without requiring verbal or tactile cuing to promote advancement of therex, improvement of core muscles strength to promote lumbar stability with activities that include bending, lifting and increased LE activity  Pt will demonstrate good understanding of proper posture and body mechanics to decrease pain and improve  spinal safety   Pt will have improvement of muscles flexibility to allow increase ease with movement and improve positional tolerance  Pt will demonstrate improved core strength, hips and abdominal muscles strength to 4/5 and improve kinsesthetic and proprioceptive awareness to be able decreased pain to <2/10 with sitting, standing, walking, bending, carrying and lifting, especially toward end of the day  Patient will have improvement with overall mobility with decreased risk for falls, with score of less than 15 secs for 5 STST and less than 14 secs for TUG  Patient will be able to tolerate 45-60 mins of NMR, therapeutic exercises and therapeutic activities with good stability, stable vital signs, and no evidence of fatigue.  Pt will be independent and compliant with comprehensive HEP to maintain progress achieved in PT, and be able to utilize HEP to manage future exacerbations       Frequency / Duration: Patient will be seen for 2 x/week or a total of 8 visits over a 90 day period. Treatment will include: Gait training, Manual Therapy, Neuromuscular Re-education, Therapeutic Activities, Therapeutic Exercise, and Home Exercise Program instruction    Education or treatment limitation: None  Rehab Potential:good    LEFS Score  LEFS Score: 81.25 % (3/28/2024  7:11 PM)      Patient/Family/Caregiver was advised of these findings, precautions, and treatment options and has agreed to actively participate in planning and for this course of care.    Thank you for your referral. Please co-sign or sign and return this letter via fax as soon as possible to [phone]. If you have any questions, please contact me at Dept: [phone]    Sincerely,  Electronically signed by therapist: Fang Hein PT  Physician's certification required: Yes  I certify the need for these services furnished under this plan of treatment and while under my care.    X___________________________________________________  Date____________________    Certification From: 4/4/2024  To:7/3/2024

## 2024-04-09 ENCOUNTER — OFFICE VISIT (OUTPATIENT)
Dept: PHYSICAL THERAPY | Age: 74
End: 2024-04-09
Attending: PHYSICIAN ASSISTANT
Payer: MEDICARE

## 2024-04-09 PROCEDURE — 97110 THERAPEUTIC EXERCISES: CPT | Performed by: PHYSICAL THERAPIST

## 2024-04-12 ENCOUNTER — OFFICE VISIT (OUTPATIENT)
Dept: HEMATOLOGY/ONCOLOGY | Age: 74
End: 2024-04-12
Attending: INTERNAL MEDICINE
Payer: MEDICARE

## 2024-04-12 VITALS
SYSTOLIC BLOOD PRESSURE: 94 MMHG | TEMPERATURE: 97 F | RESPIRATION RATE: 16 BRPM | DIASTOLIC BLOOD PRESSURE: 57 MMHG | HEART RATE: 50 BPM | OXYGEN SATURATION: 98 %

## 2024-04-12 VITALS
HEART RATE: 55 BPM | RESPIRATION RATE: 18 BRPM | HEIGHT: 72.01 IN | DIASTOLIC BLOOD PRESSURE: 54 MMHG | OXYGEN SATURATION: 90 % | WEIGHT: 255.5 LBS | BODY MASS INDEX: 34.61 KG/M2 | SYSTOLIC BLOOD PRESSURE: 81 MMHG | TEMPERATURE: 98 F

## 2024-04-12 DIAGNOSIS — R79.89 ELEVATED LFTS: ICD-10-CM

## 2024-04-12 DIAGNOSIS — C90.00 MULTIPLE MYELOMA NOT HAVING ACHIEVED REMISSION (HCC): ICD-10-CM

## 2024-04-12 DIAGNOSIS — E11.9 DIABETES MELLITUS WITH COINCIDENT HYPERTENSION (HCC): ICD-10-CM

## 2024-04-12 DIAGNOSIS — I10 DIABETES MELLITUS WITH COINCIDENT HYPERTENSION (HCC): ICD-10-CM

## 2024-04-12 DIAGNOSIS — C90.00 MULTIPLE MYELOMA NOT HAVING ACHIEVED REMISSION (HCC): Primary | ICD-10-CM

## 2024-04-12 LAB
ALBUMIN SERPL-MCNC: 3.5 G/DL (ref 3.4–5)
ALBUMIN/GLOB SERPL: 1.2 {RATIO} (ref 1–2)
ALP LIVER SERPL-CCNC: 81 U/L
ALT SERPL-CCNC: 19 U/L
ANION GAP SERPL CALC-SCNC: 8 MMOL/L (ref 0–18)
AST SERPL-CCNC: 11 U/L (ref 15–37)
BASOPHILS # BLD AUTO: 0.02 X10(3) UL (ref 0–0.2)
BASOPHILS NFR BLD AUTO: 0.1 %
BILIRUB SERPL-MCNC: 0.8 MG/DL (ref 0.1–2)
BUN BLD-MCNC: 34 MG/DL (ref 9–23)
CALCIUM BLD-MCNC: 8.1 MG/DL (ref 8.5–10.1)
CHLORIDE SERPL-SCNC: 113 MMOL/L (ref 98–112)
CO2 SERPL-SCNC: 17 MMOL/L (ref 21–32)
CREAT BLD-MCNC: 1.93 MG/DL
EGFRCR SERPLBLD CKD-EPI 2021: 36 ML/MIN/1.73M2 (ref 60–?)
EOSINOPHIL # BLD AUTO: 0.01 X10(3) UL (ref 0–0.7)
EOSINOPHIL NFR BLD AUTO: 0.1 %
ERYTHROCYTE [DISTWIDTH] IN BLOOD BY AUTOMATED COUNT: 13.3 %
EST. AVERAGE GLUCOSE BLD GHB EST-MCNC: 111 MG/DL (ref 68–126)
FASTING STATUS PATIENT QL REPORTED: YES
GLOBULIN PLAS-MCNC: 3 G/DL (ref 2.8–4.4)
GLUCOSE BLD-MCNC: 176 MG/DL (ref 70–99)
HBA1C MFR BLD: 5.5 % (ref ?–5.7)
HCT VFR BLD AUTO: 37.6 %
HGB BLD-MCNC: 12.4 G/DL
IGA SERPL-MCNC: <7.83 MG/DL (ref 70–312)
IGM SERPL-MCNC: <5.3 MG/DL (ref 43–279)
IMM GRANULOCYTES # BLD AUTO: 0.04 X10(3) UL (ref 0–1)
IMM GRANULOCYTES NFR BLD: 0.2 %
IMMUNOGLOBULIN PNL SER-MCNC: 164 MG/DL (ref 791–1643)
LYMPHOCYTES # BLD AUTO: 0.18 X10(3) UL (ref 1–4)
LYMPHOCYTES NFR BLD AUTO: 1.1 %
MCH RBC QN AUTO: 35.5 PG (ref 26–34)
MCHC RBC AUTO-ENTMCNC: 33 G/DL (ref 31–37)
MCV RBC AUTO: 107.7 FL
MONOCYTES # BLD AUTO: 0.12 X10(3) UL (ref 0.1–1)
MONOCYTES NFR BLD AUTO: 0.7 %
NEUTROPHILS # BLD AUTO: 15.72 X10 (3) UL (ref 1.5–7.7)
NEUTROPHILS # BLD AUTO: 15.72 X10(3) UL (ref 1.5–7.7)
NEUTROPHILS NFR BLD AUTO: 97.8 %
OSMOLALITY SERPL CALC.SUM OF ELEC: 298 MOSM/KG (ref 275–295)
PLATELET # BLD AUTO: 278 10(3)UL (ref 150–450)
POTASSIUM SERPL-SCNC: 4.2 MMOL/L (ref 3.5–5.1)
PROT SERPL-MCNC: 6.5 G/DL (ref 6.4–8.2)
RBC # BLD AUTO: 3.49 X10(6)UL
SODIUM SERPL-SCNC: 138 MMOL/L (ref 136–145)
WBC # BLD AUTO: 16.1 X10(3) UL (ref 4–11)

## 2024-04-12 PROCEDURE — 99214 OFFICE O/P EST MOD 30 MIN: CPT | Performed by: INTERNAL MEDICINE

## 2024-04-12 PROCEDURE — 96401 CHEMO ANTI-NEOPL SQ/IM: CPT

## 2024-04-12 PROCEDURE — 96413 CHEMO IV INFUSION 1 HR: CPT

## 2024-04-12 NOTE — PROGRESS NOTES
Cancer Center Progress Note    Problem List:      Patient Active Problem List   Diagnosis    Impotence of organic origin    Generalized osteoarthrosis of hand    Morbid obesity (HCC)    Essential hypertension    Hypertension    Multiple myeloma (HCC)    Myeloma kidney disease (HCC)    Hx of stem cell transplant (HCC)    Hypoxia    Multiple myeloma, remission status unspecified (HCC)    Type 2 diabetes mellitus with hyperglycemia, without long-term current use of insulin (HCC)    Cardiomyopathy, unspecified type (HCC)    Morbid (severe) obesity due to excess calories (HCC)    Elevated LFTs       Interim History:    Nacho Valle presents today for evaluation and management of a diagnosis of multiple myeloma.     He is here for cycle 43 day 1 of Ann/Carf/Dex. He continues to have some intermittent left upper back pain over the left scapula. This has been stable. He has had a PET scan with no abnormality in this region.    He is otherwise doing well. He is taking dex 12 mg weekly three of four weeks. He has no fever or sweats. He has no dyspnea or cough. He has no abdominal pain. He has no other bone pain.    He had an echo on 5/22/2023 that had LVEF 50%.     He had right heart cath to r/o amyloid on 2/2/20.     He originally had 5 cycles of Cybord. He now has had high dose therapy. He was getting Zometa monthly infusions. His last Zometa was in 6/16/2017.    Pathology from cardiac biopsy on 2/2/2021:  Addendum 1   The endomyocardial biopsy  was sent to [facility], [place], [place] for processing and examination where the case was reviewed by Jaswinder Torre MD.   The full report has been scanned and is available as a hyperlink within this report (under the final diagnoses section).     \"Final Pathologic Diagnosis:      A.  Heart, endomyocardial biopsy:   -Fragments of myocardium with mild myocyte hypertrophy, no evidence of cardiac amyloidosis, see comment.   -Portion of fibrosis tissue.       B.  Heart, endomyocardial biopsy for electron microscopy:   -Negative for any significant ultrastructural changes, see addendum below for details.      Comment:     The clinical concern for amyloidosis is noted.  A Congo red stain performed with appropriate controls is negative for amyloid deposition, and a trichrome stain with appropriate controls shows focal interstitial and subendocardial fibrosis.  Prussian zeyad stain is negative for iron deposition (controls appropriate). There is no evidence of active myocarditis, giant cells, or granulomas. Electron microscopy studies will be reported as an addendum. The clinical team was notified of the results on 2/3/2021 at 3:35 PM\".          Review of Systems:   Constitutional: Negative for anorexia, fatigue, fevers, chills, night sweats and weight loss.  Psychiatric: The patient's mood was calm and appropriate for this visit.  The pertinent positives and negatives were described. All other systems were negative.    PMH/PSH:  Past Medical History:    Allergic rhinitis, cause unspecified    BCC (basal cell carcinoma of skin)    s/p surgical excision    BPH (benign prostatic hyperplasia)    Colon polyps    Generalized osteoarthrosis, involving hand    Herniation of intervertebral disc between L4 and L5    s/p surgical repair    High blood pressure    High cholesterol    Impotence of organic origin    Morbid obesity (HCC)    Osteoarthritis    Osteoarthrosis, unspecified whether generalized or localized, lower leg    Log Date: 08/21/2012     Other and unspecified hyperlipidemia    Pneumonia due to organism    Prostate cancer (HCC)    s/p total prostatectomy and radiation    Trigger finger (acquired)    Type II or unspecified type diabetes mellitus without mention of complication, not stated as uncontrolled    Unspecified essential hypertension    Unspecified internal derangement of knee    Log Date: 08/21/2012        Past Surgical History:   Procedure Laterality Date     Back surgery  1/2001    lower back L4 L5 for herniated disc    Colonoscopy      202, 2012 Dr. Wolf, polyps    Other surgical history  1/31/2007    total prostatectomy    Skin surgery  1997    [facility]       Family History Reviewed:  Family History   Problem Relation Age of Onset    Diabetes Other         family hx    Hypertension Other         family hx    Prostate Cancer Father        Allergies:     Allergies   Allergen Reactions    Pcn [Penicillins] ANAPHYLAXIS, HIVES, HALLUCINATION and SHORTNESS OF BREATH     Respiratory distress    Beta Adrenergic Blockers     Latex      Surgical tape  Welts, burning of skin, itching    Statins        Medications:   ACYCLOVIR 400 MG Oral Tab TAKE 1 TABLET(400 MG) BY MOUTH TWICE DAILY 180 tablet 1    atorvastatin 20 MG Oral Tab Take 1 tablet (20 mg total) by mouth daily. 90 tablet 0    traMADol 50 MG Oral Tab Take 1 tablet (50 mg total) by mouth every 6 (six) hours as needed for Pain. 40 tablet 0    benzonatate 200 MG Oral Cap Take 1 capsule (200 mg total) by mouth 3 (three) times daily as needed for cough. 30 capsule 0    cyclobenzaprine 10 MG Oral Tab TAKE 1 TABLET(10 MG) BY MOUTH THREE TIMES DAILY AS NEEDED FOR MUSCLE SPASMS 10 tablet 1    DRISDOL 1.25 MG (89265 UT) Oral Cap       dexamethasone (DECADRON) 4 MG tablet Take 3 tablets (12mg) PO one time per week. 36 tablet 3    ONETOUCH ULTRASOFT LANCETS Does not apply Misc TEST BLOOD SUGAR TWICE DAILY 200 each 2    ENTRESTO  MG Oral Tab Take 1 tablet by mouth daily.      empagliflozin 10 MG Oral Tab Take 1 tablet (10 mg total) by mouth daily. JARDIANCE      Glucose Blood (ONETOUCH ULTRA) In Vitro Strip CHECK SUGARS THREE TIMES DAILY AS DIRECTED 300 strip 2    Metoprolol Succinate  MG Oral Tablet 24 Hr Take 1 tablet (200 mg total) by mouth every evening.      eplerenone 25 MG Oral Tab Take 1 tablet (25 mg total) by mouth daily.      omega-3 fatty acids 1000 MG Oral Cap Take 1,000 mg by mouth 2 (two) times daily.       Multiple Vitamin Oral Tab Take 1 tablet by mouth.      acetaminophen 500 MG Oral Tab Take 2 tablets (1,000 mg total) by mouth nightly.      aspirin (ASPIR-81) 81 MG Oral Tab EC Take 1 tablet by mouth daily. 1 tablet 0     Vital Signs:      Height: 182.9 cm (6' 0.01\") (04/12 0940)  Weight: 115.9 kg (255 lb 8 oz) (04/12 0940)  BSA (Calculated - sq m): 2.36 sq meters (04/12 0940)  Pulse: 55 (04/12 0940)  BP: 81/54 (04/12 0940)  Temp: 97.5 °F (36.4 °C) (04/12 0940)  Do Not Use - Resp Rate: --  SpO2: 90 % (04/12 0940)      Performance Status:  ECOG 0: Fully active, able to carry on all pre-disease performance without restriction     Physical Examination:    Constitutional: Patient is alert and not in acute distress.  Respiratory: Clear to auscultation and percussion. No rales.  No wheezes.  Cardiovascular: Regular rate and rhythm. No murmurs.  Gastrointestinal: Soft, non tender with good bowel sounds.  Musculoskeletal: No edema. No calf tenderness.  Psychiatric: The patient's mood is calm and appropriate for this visit.       Labs reviewed at this visit:     Lab Results   Component Value Date    WBC 16.1 (H) 04/12/2024    RBC 3.49 (L) 04/12/2024    HGB 12.4 (L) 04/12/2024    HCT 37.6 (L) 04/12/2024    .7 (H) 04/12/2024    MCH 35.5 (H) 04/12/2024    MCHC 33.0 04/12/2024    RDW 13.3 04/12/2024    .0 04/12/2024     Lab Results   Component Value Date     04/12/2024    K 4.2 04/12/2024     (H) 04/12/2024    CO2 17.0 (L) 04/12/2024    BUN 34 (H) 04/12/2024    CREATSERUM 1.93 (H) 04/12/2024     (H) 04/12/2024    CA 8.1 (L) 04/12/2024    ALKPHO 81 04/12/2024    ALT 19 04/12/2024    AST 11 (L) 04/12/2024    BILT 0.8 04/12/2024    ALB 3.5 04/12/2024    TP 6.5 04/12/2024     Lab Component   Component Value Date/Time     01/08/2021 1028         Component  Ref Range & Units 4/12/24 0939   Immunoglobulin A  70.00 - 312.00 mg/dL <7.83 Low    Immunoglobulin G  791 - 1,643 mg/dL 164 Low     Immunoglobulin M  43.0 - 279.0 mg/dL <5.3 Low           Radiologic imaging reviewed at this visit:      PET/CT scan on 1/3/2024:  FINDINGS:    ABNORMAL FOCI:    Diffuse metabolic activity is noted throughout the osseous structures.      Metabolic activity in the anterior right 2nd rib with associated pathologic fracture demonstrates a maximum SUV of 11.0 (series 6, image 167), previously demonstrating a maximum SUV of 6.4.     Activity in the posterior left iliac wing demonstrates a maximum SUV of 3.2 (image 343), has not significantly changed from the prior exam.  Activity in the sacrum demonstrates a maximum SUV of 3.5 (image 342), previously demonstrating a maximum SUV of  3.3.     Degenerative activity involving right anterior lateral osteophytes in the thoracic spine likely represents DISH.  Metabolic activity around the right hip joint capsule may represent bursitis.  Superinfection cannot be excluded.  This is relatively  unchanged from the prior exam.     OTHER:         Calcified pleural plaque along the right hemidiaphragm is noted with minimal calcification along the left hemidiaphragm.  Coronary artery atherosclerosis is noted.  Calcific tendinosis of the right greater than left Achilles tendon is  noted.     Impression   CONCLUSION:    1. Diffuse low-level metabolic activity throughout the axial skeleton has not significantly changed with the exception of the anterior right 2nd rib which demonstrates increased metabolic activity compared to the prior examination.          Assessment/Plan:     Multiple myeloma IgG kappa:  Hypercalcemia  Acute renal failure  Anemia  Diffuse bone lytic lesions  Beta-2 Microglobulin 18.5 mg/L    ISS stage III  5 cycles of (VCd) Dexamethasone, Bortezomib and cyclophosphamide  High dose therapy with stem cell infusion on 1/27/2017  Revlimid maintenance from 6/2017 to 12/202  Progression with repeat bone marrow biopsy on 1/12/2021  50% plasma cells  FISH study was not  done  Chromosomes at relapse:   45,X,-Y[7]/46,XY[13]  Carfilzomib/Ann/Dex started on 2/18/2021       He is on salvage treatment with ann/carfilzomib/Dex.  He continues to have good control of his disease. We will continue with the current treatment. His pain has been stable.    He will continue with cycle 43, day 1. He is tolerating the treatment well. He will continue the Dex 12 mg weekly. He will continue with the current carfilzomib. The quant Ig levels are low. He will return in 4 weeks with repeat labs.      Lytic bone disease:   back pain:  Hypercalcemia    He has been on Zometa. The PET scan had diffuse bony changes consistent with myeloma.      Chronic Kidney Disease:     Overall stable.    Cardiomyopathy:    Continue cardiology follow up. He is s/p myocardial biopsy that is negative for amyloid. He will continue follow up with cardiology.    Anemia complicating neoplastic disease:    Borderline low now. Continue to follow.        Ruiz Frias MD

## 2024-04-12 NOTE — PROGRESS NOTES
Patient is here for follow up for myeloma on C42 of kyprolis and darzalex.   His blood pressure was low at clinic today.  He denies any dizziness or feeling weak.   His back pain is chronic with a spike yesterday as he was using a different truck at work.   He is otherwise feeling the same.   He took his premedications at 9am today.     Education Record    Learner:  Patient    Disease / Diagnosis: myeloma    Barriers / Limitations:  None   Comments:    Method:  Discussion   Comments:    General Topics:  Side effects and symptom management   Comments:    Outcome:  Shows understanding   Comments:

## 2024-04-12 NOTE — PROGRESS NOTES
Pt here for C43D1 Drug(s)faspro/kyprolis.  Arrives Ambulating independently, accompanied by Self     Patient was evaluated today by MD.    Oral medications included in this regimen:  no    Patient confirms comprehension of cancer treatment schedule:  yes    Pregnancy screening:  Not applicable    Modifications in dose or schedule:  No    Medications appearance and physical integrity checked by RN: yes.    Chemotherapy IV pump settings verified by 2 RNs:  Yes.  Frequency of blood return and site check throughout administration: Prior to administration and At completion of therapy     Infusion/treatment outcome:  patient tolerated treatment without incident    Education Record    Learner:  Patient  Barriers / Limitations:  None  Method:  Discussion  Education / instructions given:  avs  Outcome:  Shows understanding    Discharged Home, Ambulating independently, accompanied by:Self    Patient/family verbalized understanding of future appointments: by printed AVS    Patient took his premeds around 9am this morning. Monitored for 30 min post. VSS

## 2024-04-16 ENCOUNTER — HOSPITAL ENCOUNTER (OUTPATIENT)
Dept: MRI IMAGING | Age: 74
Discharge: HOME | End: 2024-04-16
Attending: PHYSICIAN ASSISTANT
Payer: MEDICARE

## 2024-04-16 DIAGNOSIS — S22.000A COMPRESSION FRACTURE OF BODY OF THORACIC VERTEBRA (HCC): ICD-10-CM

## 2024-04-16 DIAGNOSIS — G89.29 CHRONIC LEFT-SIDED THORACIC BACK PAIN: ICD-10-CM

## 2024-04-16 DIAGNOSIS — C90.00 MULTIPLE MYELOMA, REMISSION STATUS UNSPECIFIED (HCC): ICD-10-CM

## 2024-04-16 DIAGNOSIS — M62.81 MUSCLE WEAKNESS ON EXAMINATION: ICD-10-CM

## 2024-04-16 DIAGNOSIS — M54.6 CHRONIC LEFT-SIDED THORACIC BACK PAIN: ICD-10-CM

## 2024-04-16 DIAGNOSIS — R29.898 WEAKNESS OF BOTH LOWER EXTREMITIES: ICD-10-CM

## 2024-04-16 DIAGNOSIS — M54.50 LUMBAR PAIN: ICD-10-CM

## 2024-04-16 DIAGNOSIS — R29.2 HYPERREFLEXIA: ICD-10-CM

## 2024-04-16 DIAGNOSIS — R29.898 LEG HEAVINESS: ICD-10-CM

## 2024-04-16 PROCEDURE — 72156 MRI NECK SPINE W/O & W/DYE: CPT | Performed by: PHYSICIAN ASSISTANT

## 2024-04-16 PROCEDURE — 72158 MRI LUMBAR SPINE W/O & W/DYE: CPT | Performed by: PHYSICIAN ASSISTANT

## 2024-04-16 PROCEDURE — 72157 MRI CHEST SPINE W/O & W/DYE: CPT | Performed by: PHYSICIAN ASSISTANT

## 2024-04-16 PROCEDURE — A9575 INJ GADOTERATE MEGLUMI 0.1ML: HCPCS

## 2024-04-16 RX ORDER — GADOTERATE MEGLUMINE 376.9 MG/ML
20 INJECTION INTRAVENOUS
Status: COMPLETED | OUTPATIENT
Start: 2024-04-16 | End: 2024-04-16

## 2024-04-16 RX ADMIN — GADOTERATE MEGLUMINE 20 ML: 376.9 INJECTION INTRAVENOUS at 14:35:00

## 2024-04-17 LAB
ALBUMIN SERPL ELPH-MCNC: 3.95 G/DL (ref 3.75–5.21)
ALBUMIN/GLOB SERPL: 2.02 {RATIO} (ref 1–2)
ALPHA1 GLOB SERPL ELPH-MCNC: 0.31 G/DL (ref 0.19–0.46)
ALPHA2 GLOB SERPL ELPH-MCNC: 0.84 G/DL (ref 0.48–1.05)
B-GLOBULIN SERPL ELPH-MCNC: 0.61 G/DL (ref 0.68–1.23)
GAMMA GLOB SERPL ELPH-MCNC: 0.19 G/DL (ref 0.62–1.7)
KAPPA LC FREE SER-MCNC: 39.72 MG/DL (ref 0.33–1.94)
LAMBDA LC FREE SERPL-MCNC: <0.137 MG/DL (ref 0.57–2.63)
M PROTEIN 1 SERPL ELPH-MCNC: 0.04 G/DL (ref ?–0)
PROT SERPL-MCNC: 5.9 G/DL (ref 5.7–8.2)

## 2024-04-18 ENCOUNTER — OFFICE VISIT (OUTPATIENT)
Dept: PHYSICAL THERAPY | Age: 74
End: 2024-04-18
Attending: PHYSICIAN ASSISTANT
Payer: MEDICARE

## 2024-04-18 PROCEDURE — 97110 THERAPEUTIC EXERCISES: CPT | Performed by: PHYSICAL THERAPIST

## 2024-04-18 NOTE — PROGRESS NOTES
Diagnosis:   Lumbar pain (M54.50)  Chronic left-sided thoracic back pain (M54.6,G89.29)  Muscle weakness on examination (M62.81)  Leg heaviness (R29.898)  Weakness of both lower extremities (R29.898)  Hyperreflexia (R29.2)  Multiple myeloma, remission status unspecified (HCC) (C90.00)  Compression fracture of body of thoracic vertebra (HCC) (S22.000A)         Referring Provider: Nohemi Tirado  Date of Evaluation:    4/4/2024    Precautions:  None Next MD visit:   none scheduled  Date of Surgery: n/a   Insurance Primary/Secondary: MEDICARE / [employer] [place] INDEMNITY     # Auth Visits: n/a            Subjective: Patient reports achiness all over, he had MRI  last Tuesday for almost 3 hours, and he was laying down the whole time and not allowed to move. He started having chemotherapy last Friday. Patient reports that he did not feel sore after last PT session.  Pain: 3/10      Objective: Gait deviation and balance deficits. Difficulty with transitional motions. Patient had to stabilize himself when he first got up from sitting. Difficulty with bed mobility, rolling side to side.    BP before thera ex: 114/65, NM - 61  Vital signs after Thera -ex  BP: 98/65  O2 sat - 96, NM -65      Assessment: Patient tolerated treatment well. Difficulty with bed mobility, but no increase in pain. PT guided patient through treatment providing visual, verbal, and tactile cues for proper joint alignment/ body positioning SL clams, hip extension, SBA for balance exercises, exercise tempo for desired treatment effect of exercises, and assessed response to interventions. Intervention adjusted to patient's tolerance throughout session duration. Discussed importance of each intervention to maximize treatment effect.       Goals:   Goals: (to be met in 8 visits)   Pt will improve transversus abdominis recruitment to perform proper isometric contraction without requiring verbal or tactile cuing to promote advancement of therex, improvement of core  muscles strength to promote lumbar stability with activities that include bending, lifting and increased LE activity  Pt will demonstrate good understanding of proper posture and body mechanics to decrease pain and improve spinal safety   Pt will have improvement of muscles flexibility to allow increase ease with movement and improve positional tolerance  Pt will demonstrate improved core strength, hips and abdominal muscles strength to 4/5 and improve kinsesthetic and proprioceptive awareness to be able decreased pain to <2/10 with sitting, standing, walking, bending, carrying and lifting, especially toward end of the day  Patient will have improvement with overall mobility with decreased risk for falls, with score of less than 15 secs for 5 STST and less than 14 secs for TUG  Patient will be able to tolerate 45-60 mins of NMR, therapeutic exercises and therapeutic activities with good stability, stable vital signs, and no evidence of fatigue.  Pt will be independent and compliant with comprehensive HEP to maintain progress achieved in PT, and be able to utilize HEP to manage future exacerbations       Plan: Continue PT as per established POC. Assess response to today's visit, adjust to tolerance.Check vital signs before tx.  Date: 4/9/2024  TX#: 2/8 Date: 4/18/2024                TX#: 3/8 Date:                 TX#: 4/ Date:                 TX#: 5/ Date:   Tx#: 6/   Nu step L3, 5 mins  Calf stretches 2x 30 secs on incline board  Heel Raises and Toe Raises x 20  Romberg EC x 30 secs  March with 1 UE support x 1 min  Romberg EO and EC x 30 secs each, airex  March on airex x 1 min with 1 UE support  Alternating step taps on 4\" step no UE support x 10 each  3 way stepping x 10 each  Standing hip abduction x 10  Standing hip extension x 20  Hamstrings curls x 20  Sitting LAQ x 20  Supine:   Heel slides x 20  TA brace x 10 with 5 secs   Gluteal sets x 10 with 5 secs  TA brace with march x 10  TA brace with march x 10  SL  clams x 15     Nu step L3, 6 mins  Calf stretches 2x 30 secs on incline board  Heel Raises and Toe Raises x 20  Romberg EC x 30 secs  Attempted march with no UE support - LOB   March with 1 UE support x 1 min  Romberg EO and EC x 30 secs each, airex  March on airex x 1 min with 1 UE support  Alternating step taps on 4\" step no UE support x 20 each  3 way stepping x 10 each  Standing hip abduction x 20  Standing hip extension x 20  Hamstrings curls x 20  Sitting LAQ x 20  Sit to stand from elevated mat   Supine:   TA brace x 10 with 5 secs   TA brace with bridge  x 10 with 5 secs  TA brace with march x 20  Heel slides x 20  SL clams x 20                           HEP: Access Code: [email or web address]  URL: [email or web address]  Date: 04/09/2024  Prepared by: Fang Hein    Exercises  - Heel Toe Raises with Counter Support  - 1 x daily - 7 x weekly - 2 sets - 10 reps  - Standing March with Counter Support  - 1 x daily - 7 x weekly - 2 sets - 10 reps  - Standing Knee Flexion with Counter Support  - 1 x daily - 7 x weekly - 2 sets - 10 reps  - Standing Hip Extension with Unilateral Counter Support  - 1 x daily - 7 x weekly - 2 sets - 10 reps  - Standing Hip Abduction with Counter Support  - 1 x daily - 7 x weekly - 2 sets - 10 reps  - Seated Long Arc Quad  - 1 x daily - 7 x weekly - 2 sets - 10 reps  - Supine Heel Slide  - 1 x daily - 7 x weekly - 1-2 sets - 10 reps  - Supine Transversus Abdominis Bracing - Hands on Stomach  - 1 x daily - 7 x weekly - 1-2 sets - 10 reps - 5 secs hold  - Clamshell  - 2 x daily - 7 x weekly - 1-2 sets - 10 reps    Charges: Thera ex -3       Total Timed Treatment: 45 min  Total Treatment Time: 45 min

## 2024-04-19 ENCOUNTER — OFFICE VISIT (OUTPATIENT)
Dept: HEMATOLOGY/ONCOLOGY | Age: 74
End: 2024-04-19
Attending: INTERNAL MEDICINE
Payer: MEDICARE

## 2024-04-19 VITALS
HEIGHT: 72.01 IN | RESPIRATION RATE: 18 BRPM | DIASTOLIC BLOOD PRESSURE: 69 MMHG | SYSTOLIC BLOOD PRESSURE: 116 MMHG | WEIGHT: 257.5 LBS | OXYGEN SATURATION: 98 % | HEART RATE: 65 BPM | BODY MASS INDEX: 34.88 KG/M2 | TEMPERATURE: 97 F

## 2024-04-19 DIAGNOSIS — C90.00 MULTIPLE MYELOMA NOT HAVING ACHIEVED REMISSION (HCC): Primary | ICD-10-CM

## 2024-04-19 DIAGNOSIS — R79.89 ELEVATED LFTS: ICD-10-CM

## 2024-04-19 LAB
BASOPHILS # BLD AUTO: 0.02 X10(3) UL (ref 0–0.2)
BASOPHILS NFR BLD AUTO: 0.1 %
EOSINOPHIL # BLD AUTO: 0.01 X10(3) UL (ref 0–0.7)
EOSINOPHIL NFR BLD AUTO: 0.1 %
ERYTHROCYTE [DISTWIDTH] IN BLOOD BY AUTOMATED COUNT: 13.2 %
HCT VFR BLD AUTO: 38.5 %
HGB BLD-MCNC: 12.7 G/DL
IMM GRANULOCYTES # BLD AUTO: 0.06 X10(3) UL (ref 0–1)
IMM GRANULOCYTES NFR BLD: 0.4 %
LYMPHOCYTES # BLD AUTO: 0.15 X10(3) UL (ref 1–4)
LYMPHOCYTES NFR BLD AUTO: 1.1 %
MCH RBC QN AUTO: 35.9 PG (ref 26–34)
MCHC RBC AUTO-ENTMCNC: 33 G/DL (ref 31–37)
MCV RBC AUTO: 108.8 FL
MONOCYTES # BLD AUTO: 0.09 X10(3) UL (ref 0.1–1)
MONOCYTES NFR BLD AUTO: 0.7 %
NEUTROPHILS # BLD AUTO: 13.34 X10 (3) UL (ref 1.5–7.7)
NEUTROPHILS # BLD AUTO: 13.34 X10(3) UL (ref 1.5–7.7)
NEUTROPHILS NFR BLD AUTO: 97.6 %
PLATELET # BLD AUTO: 136 10(3)UL (ref 150–450)
RBC # BLD AUTO: 3.54 X10(6)UL
WBC # BLD AUTO: 13.7 X10(3) UL (ref 4–11)

## 2024-04-19 PROCEDURE — 85025 COMPLETE CBC W/AUTO DIFF WBC: CPT

## 2024-04-19 PROCEDURE — 96413 CHEMO IV INFUSION 1 HR: CPT

## 2024-04-19 PROCEDURE — 36415 COLL VENOUS BLD VENIPUNCTURE: CPT

## 2024-04-19 NOTE — PROGRESS NOTES
Pt here for C43D8 Drug(s)kyprolis.  Arrives Ambulating independently, accompanied by Self     Patient was evaluated today by Treatment Nurse.    Oral medications included in this regimen:  no    Patient confirms comprehension of cancer treatment schedule:  yes    Pregnancy screening:  Denies possibility of pregnancy    Modifications in dose or schedule:  No    Medications appearance and physical integrity checked by RN: yes.    Chemotherapy IV pump settings verified by 2 RNs:  Yes.  Frequency of blood return and site check throughout administration: Prior to administration and At completion of therapy     Infusion/treatment outcome:  patient tolerated treatment without incident    Education Record    Learner:  Patient  Barriers / Limitations:  None  Method:  Discussion  Education / instructions given:  avs  Outcome:  Shows understanding    Discharged Home, Ambulating independently, accompanied by:Self    Patient/family verbalized understanding of future appointments: by printed AVS

## 2024-04-23 ENCOUNTER — OFFICE VISIT (OUTPATIENT)
Dept: PHYSICAL THERAPY | Age: 74
End: 2024-04-23
Attending: PHYSICIAN ASSISTANT
Payer: MEDICARE

## 2024-04-23 PROCEDURE — 97110 THERAPEUTIC EXERCISES: CPT | Performed by: PHYSICAL THERAPIST

## 2024-04-23 NOTE — PROGRESS NOTES
Diagnosis:   Lumbar pain (M54.50)  Chronic left-sided thoracic back pain (M54.6,G89.29)  Muscle weakness on examination (M62.81)  Leg heaviness (R29.898)  Weakness of both lower extremities (R29.898)  Hyperreflexia (R29.2)  Multiple myeloma, remission status unspecified (HCC) (C90.00)  Compression fracture of body of thoracic vertebra (HCC) (S22.000A)         Referring Provider: Nohemi Tirado  Date of Evaluation:    4/4/2024    Precautions:  None Next MD visit:   none scheduled  Date of Surgery: n/a   Insurance Primary/Secondary: MEDICARE / [place] INDEMN[place]     # Auth Visits: n/a            Subjective: General soreness, R shoulder also more sore. He did did lift some boxes today collecting donations for the food pantry from stores.    Pain: 3/10      Objective: Gait deviation and balance deficits. Difficulty with transitional motions. Patient had to stabilize himself when he first got up from sitting. Difficulty with bed mobility, rolling side to side.    BP before thera ex: 113/75, MI - 54, O2 sat - 99  Vital signs after Thera -ex  BP: 89/65  O2 sat - 97, MI -76      Assessment: Patient presented with mild instability, required intermittent support and was fatigued with exercises today. Modified treatment and adjusted to patient's tolerance throughout session duration. Monitored vital signs and ask patient for feedback on the intensity of exercises.  PT guided patient through treatment providing visual, verbal, and tactile cues for proper joint alignment/ body positioning, SBA for balance exercises, exercise tempo for desired treatment effect of exercises, and assessed response to interventions.     Goals:   Goals: (to be met in 8 visits)   Pt will improve transversus abdominis recruitment to perform proper isometric contraction without requiring verbal or tactile cuing to promote advancement of therex, improvement of core muscles strength to promote lumbar stability with activities that include bending, lifting  and increased LE activity  Pt will demonstrate good understanding of proper posture and body mechanics to decrease pain and improve spinal safety   Pt will have improvement of muscles flexibility to allow increase ease with movement and improve positional tolerance  Pt will demonstrate improved core strength, hips and abdominal muscles strength to 4/5 and improve kinsesthetic and proprioceptive awareness to be able decreased pain to <2/10 with sitting, standing, walking, bending, carrying and lifting, especially toward end of the day  Patient will have improvement with overall mobility with decreased risk for falls, with score of less than 15 secs for 5 STST and less than 14 secs for TUG  Patient will be able to tolerate 45-60 mins of NMR, therapeutic exercises and therapeutic activities with good stability, stable vital signs, and no evidence of fatigue.  Pt will be independent and compliant with comprehensive HEP to maintain progress achieved in PT, and be able to utilize HEP to manage future exacerbations       Plan: Continue PT as per established POC. Assess response to today's visit, adjust to tolerance.Check vital signs before tx.  Date: 4/9/2024  TX#: 2/8 Date: 4/18/2024                TX#: 3/8 Date:                 TX#: 4/ Date:                 TX#: 5/ Date:   Tx#: 6/   Nu step L3, 5 mins  Calf stretches 2x 30 secs on incline board  Heel Raises and Toe Raises x 20  Romberg EC x 30 secs  March with 1 UE support x 1 min  Romberg EO and EC x 30 secs each, airex  March on airex x 1 min with 1 UE support  Alternating step taps on 4\" step no UE support x 10 each  3 way stepping x 10 each  Standing hip abduction x 10  Standing hip extension x 20  Hamstrings curls x 20  Sitting LAQ x 20  Supine:   Heel slides x 20  TA brace x 10 with 5 secs   Gluteal sets x 10 with 5 secs  TA brace with march x 10  TA brace with march x 10  SL clams x 15     Nu step L3, 6 mins  Calf stretches 2x 30 secs on incline board  Heel Raises  and Toe Raises x 20  Romberg EC x 30 secs  March with 1 UE support x 1 min  Romberg EO x 30 secs each, airex  March on airex x 1 min with 2 UE support  3 way stepping x 10 each  Standing hip abduction x 20  Standing hip extension x 20  Standing hip and knee flexion x 20  Sitting LAQ x 20  Sit to stand from elevated mat x 10    Thera -ex -35 mins                           HEP: Access Code: [email or web address]  URL: [email or web address]  Date: 04/09/2024  Prepared by: Fang Hein    Exercises  - Heel Toe Raises with Counter Support  - 1 x daily - 7 x weekly - 2 sets - 10 reps  - Standing March with Counter Support  - 1 x daily - 7 x weekly - 2 sets - 10 reps  - Standing Knee Flexion with Counter Support  - 1 x daily - 7 x weekly - 2 sets - 10 reps  - Standing Hip Extension with Unilateral Counter Support  - 1 x daily - 7 x weekly - 2 sets - 10 reps  - Standing Hip Abduction with Counter Support  - 1 x daily - 7 x weekly - 2 sets - 10 reps  - Seated Long Arc Quad  - 1 x daily - 7 x weekly - 2 sets - 10 reps  - Supine Heel Slide  - 1 x daily - 7 x weekly - 1-2 sets - 10 reps  - Supine Transversus Abdominis Bracing - Hands on Stomach  - 1 x daily - 7 x weekly - 1-2 sets - 10 reps - 5 secs hold  - Clamshell  - 2 x daily - 7 x weekly - 1-2 sets - 10 reps    Charges: Thera ex -2       Total Timed Treatment: 35 min  Total Treatment Time: 40 min

## 2024-04-25 ENCOUNTER — OFFICE VISIT (OUTPATIENT)
Dept: PHYSICAL THERAPY | Age: 74
End: 2024-04-25
Attending: PHYSICIAN ASSISTANT
Payer: MEDICARE

## 2024-04-25 PROCEDURE — 97110 THERAPEUTIC EXERCISES: CPT | Performed by: PHYSICAL THERAPIST

## 2024-04-25 NOTE — PROGRESS NOTES
Diagnosis:   Lumbar pain (M54.50)  Chronic left-sided thoracic back pain (M54.6,G89.29)  Muscle weakness on examination (M62.81)  Leg heaviness (R29.898)  Weakness of both lower extremities (R29.898)  Hyperreflexia (R29.2)  Multiple myeloma, remission status unspecified (HCC) (C90.00)  Compression fracture of body of thoracic vertebra (HCC) (S22.000A)         Referring Provider: Nohemi Tirado  Date of Evaluation:    4/4/2024    Precautions:  None Next MD visit:   none scheduled  Date of Surgery: n/a   Insurance Primary/Secondary: MEDICARE / [place]DEMN[place]     # Auth Visits: n/a            Subjective: He is always in pain but the R ribcage area has been bothersome, he notices it toward the end of the day.    Pain: 3/10      Objective: Gait deviation and balance deficits. Difficulty with bed mobility, rolling side to side. LOB during 3 way stepping R 1/10, L 1/10.    BP before thera ex: 105/75, AR - 101, O2 sat - 90%  After Nu step O2 sat - 95%  Vital signs after Thera -ex  BP: 118/55  O2 sat - 96, AR -76  Marianna RPE - 12- Moderate      Assessment:Patient was able to tolerate exercises much better compared to last PT session, also more stable today. Monitored vital signs and ask patient for feedback on the intensity of exercises.  PT guided patient through treatment providing visual, verbal, and tactile cues for proper joint alignment/ body positioning, exercise tempo for desired treatment effect of exercises, and assessed response to interventions. Vital signs stable during and after PT.    Goals:   Goals: (to be met in 8 visits)   Pt will improve transversus abdominis recruitment to perform proper isometric contraction without requiring verbal or tactile cuing to promote advancement of therex, improvement of core muscles strength to promote lumbar stability with activities that include bending, lifting and increased LE activity  Pt will demonstrate good understanding of proper posture and body mechanics to  decrease pain and improve spinal safety   Pt will have improvement of muscles flexibility to allow increase ease with movement and improve positional tolerance  Pt will demonstrate improved core strength, hips and abdominal muscles strength to 4/5 and improve kinsesthetic and proprioceptive awareness to be able decreased pain to <2/10 with sitting, standing, walking, bending, carrying and lifting, especially toward end of the day  Patient will have improvement with overall mobility with decreased risk for falls, with score of less than 15 secs for 5 STST and less than 14 secs for TUG  Patient will be able to tolerate 45-60 mins of NMR, therapeutic exercises and therapeutic activities with good stability, stable vital signs, and no evidence of fatigue.  Pt will be independent and compliant with comprehensive HEP to maintain progress achieved in PT, and be able to utilize HEP to manage future exacerbations       Plan: Continue PT as per established POC. Assess response to today's visit, adjust to tolerance.Check vital signs before tx.  Date: 4/9/2024  TX#: 2/8 Date: 4/18/2024                TX#: 3/8 Date: 4/25/2024                 TX#: 4/8 Date:                 TX#: 5/ Date:   Tx#: 6/   Nu step L3, 5 mins  Calf stretches 2x 30 secs on incline board  Heel Raises and Toe Raises x 20  Romberg EC x 30 secs  March with 1 UE support x 1 min  Romberg EO and EC x 30 secs each, airex  March on airex x 1 min with 1 UE support  Alternating step taps on 4\" step no UE support x 10 each  3 way stepping x 10 each  Standing hip abduction x 10  Standing hip extension x 20  Hamstrings curls x 20  Sitting LAQ x 20  Supine:   Heel slides x 20  TA brace x 10 with 5 secs   Gluteal sets x 10 with 5 secs  TA brace with march x 10  TA brace with march x 10  SL clams x 15     Nu step L, 6 mins  Calf stretches 2x 30 secs on incline board  Heel Raises and Toe Raises x 20  Romberg EC x 30 secs  March with 1 UE support x 1 min  Romberg EO x 30  secs each, airex  March on airex x 1 min with 2 UE support  3 way stepping x 10 each  Standing hip abduction x 20  Standing hip extension x 20  Standing hip and knee flexion x 20  Sitting LAQ x 20  Sit to stand from elevated mat x 10    Thera -ex -35 mins Nu step L3, 5 mins  Calf stretches 2x 30 secs on incline board  Heel Raises and Toe Raises x 20  Romberg EC x 30 secs  March with 1 UE support x 1 min  Romberg EO and EC x 30 secs each, airex  March on airex x 1 min with 1 UE support  Alternating step taps on 4\" step no UE support x 10 each, 1.5# AW  3 way stepping x 10 each no UE   Standing hip abduction x 20 1.5# AW  Standing hip extension x 20 1.5# AW  Hamstrings curls x 20, 1.5# AW  Sitting LAQ x 20 1.5 AW  Sit to stand x 10  Supine:   TA brace x 20    TA  brace with bridge x 10  TA brace with march x 20  SL clams x 20                            HEP: Access Code: [email or web address]  URL: [email or web address]  Date: 04/09/2024  Prepared by: Fang Angel Luis    Exercises  - Heel Toe Raises with Counter Support  - 1 x daily - 7 x weekly - 2 sets - 10 reps  - Standing March with Counter Support  - 1 x daily - 7 x weekly - 2 sets - 10 reps  - Standing Knee Flexion with Counter Support  - 1 x daily - 7 x weekly - 2 sets - 10 reps  - Standing Hip Extension with Unilateral Counter Support  - 1 x daily - 7 x weekly - 2 sets - 10 reps  - Standing Hip Abduction with Counter Support  - 1 x daily - 7 x weekly - 2 sets - 10 reps  - Seated Long Arc Quad  - 1 x daily - 7 x weekly - 2 sets - 10 reps  - Supine Heel Slide  - 1 x daily - 7 x weekly - 1-2 sets - 10 reps  - Supine Transversus Abdominis Bracing - Hands on Stomach  - 1 x daily - 7 x weekly - 1-2 sets - 10 reps - 5 secs hold  - Clamshell  - 2 x daily - 7 x weekly - 1-2 sets - 10 reps    Charges: Thera ex -3       Total Timed Treatment: 45 min  Total Treatment Time: 45 min

## 2024-04-26 ENCOUNTER — OFFICE VISIT (OUTPATIENT)
Dept: HEMATOLOGY/ONCOLOGY | Age: 74
End: 2024-04-26
Attending: INTERNAL MEDICINE
Payer: MEDICARE

## 2024-04-26 ENCOUNTER — TELEPHONE (OUTPATIENT)
Dept: HEMATOLOGY/ONCOLOGY | Facility: HOSPITAL | Age: 74
End: 2024-04-26

## 2024-04-26 VITALS
WEIGHT: 255.5 LBS | DIASTOLIC BLOOD PRESSURE: 67 MMHG | TEMPERATURE: 97 F | SYSTOLIC BLOOD PRESSURE: 107 MMHG | HEIGHT: 72.01 IN | RESPIRATION RATE: 18 BRPM | HEART RATE: 65 BPM | OXYGEN SATURATION: 91 % | BODY MASS INDEX: 34.61 KG/M2

## 2024-04-26 DIAGNOSIS — R79.89 ELEVATED LFTS: ICD-10-CM

## 2024-04-26 DIAGNOSIS — C90.00 MULTIPLE MYELOMA, REMISSION STATUS UNSPECIFIED (HCC): ICD-10-CM

## 2024-04-26 DIAGNOSIS — C90.00 MULTIPLE MYELOMA NOT HAVING ACHIEVED REMISSION (HCC): Primary | ICD-10-CM

## 2024-04-26 LAB
ALBUMIN SERPL-MCNC: 3.6 G/DL (ref 3.4–5)
BASOPHILS # BLD AUTO: 0.01 X10(3) UL (ref 0–0.2)
BASOPHILS NFR BLD AUTO: 0.1 %
CALCIUM BLD-MCNC: 9.7 MG/DL (ref 8.5–10.1)
CREAT BLD-MCNC: 2.49 MG/DL
EGFRCR SERPLBLD CKD-EPI 2021: 26 ML/MIN/1.73M2 (ref 60–?)
EOSINOPHIL # BLD AUTO: 0 X10(3) UL (ref 0–0.7)
EOSINOPHIL NFR BLD AUTO: 0 %
ERYTHROCYTE [DISTWIDTH] IN BLOOD BY AUTOMATED COUNT: 13.3 %
HCT VFR BLD AUTO: 37.5 %
HGB BLD-MCNC: 12.4 G/DL
IMM GRANULOCYTES # BLD AUTO: 0.07 X10(3) UL (ref 0–1)
IMM GRANULOCYTES NFR BLD: 0.6 %
LYMPHOCYTES # BLD AUTO: 0.13 X10(3) UL (ref 1–4)
LYMPHOCYTES NFR BLD AUTO: 1 %
MCH RBC QN AUTO: 35.8 PG (ref 26–34)
MCHC RBC AUTO-ENTMCNC: 33.1 G/DL (ref 31–37)
MCV RBC AUTO: 108.4 FL
MONOCYTES # BLD AUTO: 0.06 X10(3) UL (ref 0.1–1)
MONOCYTES NFR BLD AUTO: 0.5 %
NEUTROPHILS # BLD AUTO: 12.44 X10 (3) UL (ref 1.5–7.7)
NEUTROPHILS # BLD AUTO: 12.44 X10(3) UL (ref 1.5–7.7)
NEUTROPHILS NFR BLD AUTO: 97.8 %
PLATELET # BLD AUTO: 172 10(3)UL (ref 150–450)
RBC # BLD AUTO: 3.46 X10(6)UL
WBC # BLD AUTO: 12.7 X10(3) UL (ref 4–11)

## 2024-04-26 PROCEDURE — 36415 COLL VENOUS BLD VENIPUNCTURE: CPT

## 2024-04-26 PROCEDURE — 85025 COMPLETE CBC W/AUTO DIFF WBC: CPT

## 2024-04-26 PROCEDURE — 96375 TX/PRO/DX INJ NEW DRUG ADDON: CPT

## 2024-04-26 PROCEDURE — 82310 ASSAY OF CALCIUM: CPT

## 2024-04-26 PROCEDURE — 96413 CHEMO IV INFUSION 1 HR: CPT

## 2024-04-26 PROCEDURE — 82565 ASSAY OF CREATININE: CPT

## 2024-04-26 PROCEDURE — 82040 ASSAY OF SERUM ALBUMIN: CPT

## 2024-04-29 ENCOUNTER — TELEPHONE (OUTPATIENT)
Dept: HEMATOLOGY/ONCOLOGY | Facility: HOSPITAL | Age: 74
End: 2024-04-29

## 2024-04-29 ENCOUNTER — OFFICE VISIT (OUTPATIENT)
Dept: PHYSICAL THERAPY | Age: 74
End: 2024-04-29
Attending: PHYSICIAN ASSISTANT
Payer: MEDICARE

## 2024-04-29 PROCEDURE — 97110 THERAPEUTIC EXERCISES: CPT

## 2024-04-29 NOTE — PROGRESS NOTES
Diagnosis:   Lumbar pain (M54.50)  Chronic left-sided thoracic back pain (M54.6,G89.29)  Muscle weakness on examination (M62.81)  Leg heaviness (R29.898)  Weakness of both lower extremities (R29.898)  Hyperreflexia (R29.2)  Multiple myeloma, remission status unspecified (HCC) (C90.00)  Compression fracture of body of thoracic vertebra (HCC) (S22.000A)         Referring Provider: Nohemi Tirado  Date of Evaluation:    4/4/2024    Precautions:  None Next MD visit:   none scheduled  Date of Surgery: n/a   Insurance Primary/Secondary: MEDICARE / [place]DEMN[place]     # Auth Visits: n/a            Subjective: \" I have constant pain all over my body \".    Pain: 3/10      Objective: Gait deviation and balance deficits. Difficulty with bed mobility, rolling side to side. LOB during 3 way stepping R 1/10, L 1/10.    BP before thera ex: 105/75, DC - 101, O2 sat - 90%  After Nu step O2 sat - 95%  Vital signs after Thera -ex  BP: 118/55  O2 sat - 96, DC -76  Marianna RPE - 12- Moderate      Assessment ;  continued with core  and lower extremities strength ex's  and added isometrics adductors / abductors strength to  core  strength ex's to further progress trunk stability with gait . Patient required verbal cueing for correct TrA activation with alt marches and bridging ex's to ensure proper form .  Patient presented with quick fatigue and required frequent rest stops but was able to perform all ex' without getting short of breath .    Goals:   Goals: (to be met in 8 visits)   Pt will improve transversus abdominis recruitment to perform proper isometric contraction without requiring verbal or tactile cuing to promote advancement of therex, improvement of core muscles strength to promote lumbar stability with activities that include bending, lifting and increased LE activity  Pt will demonstrate good understanding of proper posture and body mechanics to decrease pain and improve spinal safety   Pt will have improvement of muscles  flexibility to allow increase ease with movement and improve positional tolerance  Pt will demonstrate improved core strength, hips and abdominal muscles strength to 4/5 and improve kinsesthetic and proprioceptive awareness to be able decreased pain to <2/10 with sitting, standing, walking, bending, carrying and lifting, especially toward end of the day  Patient will have improvement with overall mobility with decreased risk for falls, with score of less than 15 secs for 5 STST and less than 14 secs for TUG  Patient will be able to tolerate 45-60 mins of NMR, therapeutic exercises and therapeutic activities with good stability, stable vital signs, and no evidence of fatigue.  Pt will be independent and compliant with comprehensive HEP to maintain progress achieved in PT, and be able to utilize HEP to manage future exacerbations       Plan: Continue PT as per established POC. Assess response to today's visit, adjust to tolerance.Check vital signs before tx.  Date: 4/9/2024  TX#: 2/8 Date: 4/18/2024                TX#: 3/8 Date: 4/25/2024                 TX#: 4/8 Date: 4/29/24                TX#: 5/8 Date:   Tx#: 6/   Nu step L3, 5 mins  Calf stretches 2x 30 secs on incline board  Heel Raises and Toe Raises x 20  Romberg EC x 30 secs  March with 1 UE support x 1 min  Romberg EO and EC x 30 secs each, airex  March on airex x 1 min with 1 UE support  Alternating step taps on 4\" step no UE support x 10 each  3 way stepping x 10 each  Standing hip abduction x 10  Standing hip extension x 20  Hamstrings curls x 20  Sitting LAQ x 20  Supine:   Heel slides x 20  TA brace x 10 with 5 secs   Gluteal sets x 10 with 5 secs  TA brace with march x 10  TA brace with march x 10  SL clams x 15     Nu step L, 6 mins  Calf stretches 2x 30 secs on incline board  Heel Raises and Toe Raises x 20  Romberg EC x 30 secs  March with 1 UE support x 1 min  Romberg EO x 30 secs each, airex  March on airex x 1 min with 2 UE support  3 way  stepping x 10 each  Standing hip abduction x 20  Standing hip extension x 20  Standing hip and knee flexion x 20  Sitting LAQ x 20  Sit to stand from elevated mat x 10    Thera -ex -35 mins Nu step L3, 5 mins  Calf stretches 2x 30 secs on incline board  Heel Raises and Toe Raises x 20  Romberg EC x 30 secs  March with 1 UE support x 1 min  Romberg EO and EC x 30 secs each, airex  March on airex x 1 min with 1 UE support  Alternating step taps on 4\" step no UE support x 10 each, 1.5# AW  3 way stepping x 10 each no UE   Standing hip abduction x 20 1.5# AW  Standing hip extension x 20 1.5# AW  Hamstrings curls x 20, 1.5# AW  Sitting LAQ x 20 1.5 AW  Sit to stand x 10  Supine:   TA brace x 20    TA  brace with bridge x 10  TA brace with march x 20  SL clams x 20   NU step x 6 min , L 3  Standing :  Calf stretch on slant board x 5  x 20 sec hold   B heel raises x 20   B toes raises x 20   Marches in // bars   30 sec x 2   Alt hip abd  2 x 10   Alt hip ext 2 x 10   Sit to stand from chair   X 10   Supine :  TA brace :  With bridging 2 x 10   Marches 2 x 10   Adductors activation with yellow ball 2 x 10   Abductors activation with fitness ring   2 x 10   Side lying :  Clam shell R/L   X 10 reps each                          HEP: Access Code: [email or web address]  URL: [email or web address]  Date: 04/09/2024  Prepared by: Fang Hein    Exercises  - Heel Toe Raises with Counter Support  - 1 x daily - 7 x weekly - 2 sets - 10 reps  - Standing March with Counter Support  - 1 x daily - 7 x weekly - 2 sets - 10 reps  - Standing Knee Flexion with Counter Support  - 1 x daily - 7 x weekly - 2 sets - 10 reps  - Standing Hip Extension with Unilateral Counter Support  - 1 x daily - 7 x weekly - 2 sets - 10 reps  - Standing Hip Abduction with Counter Support  - 1 x daily - 7 x weekly - 2 sets - 10 reps  - Seated Long Arc Quad  - 1 x daily - 7 x weekly - 2 sets - 10 reps  - Supine Heel Slide  - 1 x daily - 7 x weekly - 1-2  sets - 10 reps  - Supine Transversus Abdominis Bracing - Hands on Stomach  - 1 x daily - 7 x weekly - 1-2 sets - 10 reps - 5 secs hold  - Clamshell  - 2 x daily - 7 x weekly - 1-2 sets - 10 reps    Charges: Thera ex -3       Total Timed Treatment: 45 min  Total Treatment Time: 45 min

## 2024-04-29 NOTE — TELEPHONE ENCOUNTER
It appears as patient missed appt at 1130 on 4/26 but came later in the day for that appt at 130pm.

## 2024-04-30 ENCOUNTER — OFFICE VISIT (OUTPATIENT)
Dept: SURGERY | Facility: CLINIC | Age: 74
End: 2024-04-30
Payer: MEDICARE

## 2024-04-30 VITALS
HEART RATE: 70 BPM | WEIGHT: 247 LBS | SYSTOLIC BLOOD PRESSURE: 102 MMHG | BODY MASS INDEX: 33.46 KG/M2 | DIASTOLIC BLOOD PRESSURE: 70 MMHG | HEIGHT: 72.01 IN

## 2024-04-30 DIAGNOSIS — S22.000A COMPRESSION FRACTURE OF BODY OF THORACIC VERTEBRA (HCC): ICD-10-CM

## 2024-04-30 DIAGNOSIS — M54.50 LUMBAR PAIN: Primary | ICD-10-CM

## 2024-04-30 PROCEDURE — 99204 OFFICE O/P NEW MOD 45 MIN: CPT | Performed by: NEUROLOGICAL SURGERY

## 2024-04-30 NOTE — PATIENT INSTRUCTIONS
Refill policies:    Allow 2-3 business days for refills; controlled substances may take longer.  Contact your pharmacy at least 5 days prior to running out of medication and have them send an electronic request or submit request through the “request refill” option in your [employer] account.  Refills are not addressed on weekends; covering physicians do not authorize routine medications on weekends.  No narcotics or controlled substances are refilled after noon on Fridays or by on call physicians.  By law, narcotics must be electronically prescribed.  A 30 day supply with no refills is the maximum allowed.  If your prescription is due for a refill, you may be due for a follow up appointment.  To best provide you care, patients receiving routine medications need to be seen at least once a year.  Patients receiving narcotic/controlled substance medications need to be seen at least once every 3 months.  In the event that your preferred pharmacy does not have the requested medication in stock (e.g. Backordered), it is your responsibility to find another pharmacy that has the requested medication available.  We will gladly send a new prescription to that pharmacy at your request.    Scheduling Tests:    If your physician has ordered radiology tests such as MRI or CT scans, please contact Central Scheduling at [phone] right away to schedule the test.  Once scheduled, the [facility] Centralized Referral Team will work with your insurance carrier to obtain pre-certification or prior authorization.  Depending on your insurance carrier, approval may take 3-10 days.  It is highly recommended patients assure they have received an authorization before having a test performed.  If test is done without insurance authorization, patient may be responsible for the entire amount billed.      Precertification and Prior Authorizations:  If your physician has recommended that you have a procedure or additional testing performed the [facility]  Centralized Referral Team will contact your insurance carrier to obtain pre-certification or prior authorization.    You are strongly encouraged to contact your insurance carrier to verify that your procedure/test has been approved and is a COVERED benefit.  Although the [facility] Centralized Referral Team does its due diligence, the insurance carrier gives the disclaimer that \"Although the procedure is authorized, this does not guarantee payment.\"    Ultimately the patient is responsible for payment.   Thank you for your understanding in this matter.  Paperwork Completion:  If you require FMLA or disability paperwork for your recovery, please make sure to either drop it off or have it faxed to our office at [phone]. Be sure the form has your name and date of birth on it.  The form will be faxed to our Forms Department and they will complete it for you.  There is a 25$ fee for all forms that need to be filled out.  Please be aware there is a 10-14 day turnaround time.  You will need to sign a release of information (TEMI) form if your paperwork does not come with one.  You may call the Forms Department with any questions at [phone].  Their fax number is [phone].

## 2024-04-30 NOTE — PROGRESS NOTES
Established patient:  Reason for follow up: lower back pain     Numeric Rating Scale: (No Pain) 0  to  10 (Worst Pain)        Pain at Present:  2/10       States he has pain in his right hip. Has no N/T in ernestina legs. States he goes to PT 2 times a week for about 3 weeks.     New imaging or testing since your last office visit:      MRI in chart

## 2024-05-01 ENCOUNTER — OFFICE VISIT (OUTPATIENT)
Dept: PHYSICAL THERAPY | Age: 74
End: 2024-05-01
Attending: PHYSICIAN ASSISTANT
Payer: MEDICARE

## 2024-05-01 PROCEDURE — 97110 THERAPEUTIC EXERCISES: CPT

## 2024-05-01 NOTE — PROGRESS NOTES
Diagnosis:   Lumbar pain (M54.50)  Chronic left-sided thoracic back pain (M54.6,G89.29)  Muscle weakness on examination (M62.81)  Leg heaviness (R29.898)  Weakness of both lower extremities (R29.898)  Hyperreflexia (R29.2)  Multiple myeloma, remission status unspecified (HCC) (C90.00)  Compression fracture of body of thoracic vertebra (HCC) (S22.000A)         Referring Provider: Nohemi Tirado  Date of Evaluation:    4/4/2024    Precautions:  None Next MD visit:   none scheduled  Date of Surgery: n/a   Insurance Primary/Secondary: MEDICARE / [place]DEMN[place]     # Auth Visits: n/a            Subjective: \" I saw my MD and he would like to add 8 more visits of PT sessions .    Pain: 3/10      Objective: Gait deviation and balance deficits. Difficulty with bed mobility, rolling side to side. LOB during 3 way stepping R 1/10, L 1/10.    BP before thera ex: 105/75, SD - 101, O2 sat - 90%  After Nu step O2 sat - 95%  Vital signs after Thera -ex  BP: 118/55  O2 sat - 96, SD -76  Marianna RPE - 12- Moderate      Assessment ;  continued with core  and lower extremities strength ex's  and added isometrics adductors / abductors strength to  core  strength ex's to further progress trunk stability with gait . Patient required verbal cueing for correct TrA activation with alt marches and bridging ex's to ensure proper form .  Patient presented with quick fatigue and required frequent rest stops but was able to perform all ex' without getting short of breath .    Goals:   Goals: (to be met in 8 visits)   Pt will improve transversus abdominis recruitment to perform proper isometric contraction without requiring verbal or tactile cuing to promote advancement of therex, improvement of core muscles strength to promote lumbar stability with activities that include bending, lifting and increased LE activity  Pt will demonstrate good understanding of proper posture and body mechanics to decrease pain and improve spinal safety   Pt will  have improvement of muscles flexibility to allow increase ease with movement and improve positional tolerance  Pt will demonstrate improved core strength, hips and abdominal muscles strength to 4/5 and improve kinsesthetic and proprioceptive awareness to be able decreased pain to <2/10 with sitting, standing, walking, bending, carrying and lifting, especially toward end of the day  Patient will have improvement with overall mobility with decreased risk for falls, with score of less than 15 secs for 5 STST and less than 14 secs for TUG  Patient will be able to tolerate 45-60 mins of NMR, therapeutic exercises and therapeutic activities with good stability, stable vital signs, and no evidence of fatigue.  Pt will be independent and compliant with comprehensive HEP to maintain progress achieved in PT, and be able to utilize HEP to manage future exacerbations       Plan: Continue PT as per established POC. Assess response to today's visit, adjust to tolerance.Check vital signs before tx.  Date: 4/9/2024  TX#: 2/8 Date: 4/18/2024                TX#: 3/8 Date: 4/25/2024                 TX#: 4/8 Date: 4/29/24                TX#: 5/8 Date: 5/01/24  Tx#: 6/8   Nu step L3, 5 mins  Calf stretches 2x 30 secs on incline board  Heel Raises and Toe Raises x 20  Romberg EC x 30 secs  March with 1 UE support x 1 min  Romberg EO and EC x 30 secs each, airex  March on airex x 1 min with 1 UE support  Alternating step taps on 4\" step no UE support x 10 each  3 way stepping x 10 each  Standing hip abduction x 10  Standing hip extension x 20  Hamstrings curls x 20  Sitting LAQ x 20  Supine:   Heel slides x 20  TA brace x 10 with 5 secs   Gluteal sets x 10 with 5 secs  TA brace with march x 10  TA brace with march x 10  SL clams x 15     Nu step L, 6 mins  Calf stretches 2x 30 secs on incline board  Heel Raises and Toe Raises x 20  Romberg EC x 30 secs  March with 1 UE support x 1 min  Romberg EO x 30 secs each, airex  March on airex x 1  min with 2 UE support  3 way stepping x 10 each  Standing hip abduction x 20  Standing hip extension x 20  Standing hip and knee flexion x 20  Sitting LAQ x 20  Sit to stand from elevated mat x 10    Thera -ex -35 mins Nu step L3, 5 mins  Calf stretches 2x 30 secs on incline board  Heel Raises and Toe Raises x 20  Romberg EC x 30 secs  March with 1 UE support x 1 min  Romberg EO and EC x 30 secs each, airex  March on airex x 1 min with 1 UE support  Alternating step taps on 4\" step no UE support x 10 each, 1.5# AW  3 way stepping x 10 each no UE   Standing hip abduction x 20 1.5# AW  Standing hip extension x 20 1.5# AW  Hamstrings curls x 20, 1.5# AW  Sitting LAQ x 20 1.5 AW  Sit to stand x 10  Supine:   TA brace x 20    TA  brace with bridge x 10  TA brace with march x 20  SL clams x 20   NU step x 6 min , L 3  Standing :  Calf stretch on slant board x 5  x 20 sec hold   B heel raises x 20   B toes raises x 20   Marches in // bars   30 sec x 2   Alt hip abd  2 x 10   Alt hip ext 2 x 10   Sit to stand from chair   X 10   Supine :  TA brace :  With bridging 2 x 10   Marches 2 x 10   Adductors activation with yellow ball 2 x 10   Abductors activation with fitness ring   2 x 10   Side lying :  Clam shell R/L   X 10 reps each  NU step x 6 min , level L 4   Standing :  Gastroc stretch on slant board   5 x 20 sec hold   B heel raises x 20   B toes raises x 20  Sit to stand from chair 2 x 10   Marches in // bars   2 x 20   Alt hip abd with YTB   2 x 10   Alt hip ext with YTB   2 x 10   Sideways steps in // bars with YTB   2 x laps   Step up / lateral step on 4 inch step   Supine :  TrA activation with :  Adductors activation   2 x 10   Abductors activation   2 x 10   Bridging   2 x 10   Alt marches   2 x 10                         HEP: Access Code: [email or web address]  URL: [email or web address]  Date: 04/09/2024  Prepared by: Fang Hein    Exercises  - Heel Toe Raises with Counter Support  - 1 x daily - 7 x  weekly - 2 sets - 10 reps  - Standing March with Counter Support  - 1 x daily - 7 x weekly - 2 sets - 10 reps  - Standing Knee Flexion with Counter Support  - 1 x daily - 7 x weekly - 2 sets - 10 reps  - Standing Hip Extension with Unilateral Counter Support  - 1 x daily - 7 x weekly - 2 sets - 10 reps  - Standing Hip Abduction with Counter Support  - 1 x daily - 7 x weekly - 2 sets - 10 reps  - Seated Long Arc Quad  - 1 x daily - 7 x weekly - 2 sets - 10 reps  - Supine Heel Slide  - 1 x daily - 7 x weekly - 1-2 sets - 10 reps  - Supine Transversus Abdominis Bracing - Hands on Stomach  - 1 x daily - 7 x weekly - 1-2 sets - 10 reps - 5 secs hold  - Clamshell  - 2 x daily - 7 x weekly - 1-2 sets - 10 reps    Charges: Thera ex -3       Total Timed Treatment: 45 min  Total Treatment Time: 45 min

## 2024-05-03 NOTE — PROGRESS NOTES
Diagnosis:   Lumbar pain (M54.50)  Chronic left-sided thoracic back pain (M54.6,G89.29)  Muscle weakness on examination (M62.81)  Leg heaviness (R29.898)  Weakness of both lower extremities (R29.898)  Hyperreflexia (R29.2)  Multiple myeloma, remission status unspecified (HCC) (C90.00)  Compression fracture of body of thoracic vertebra (HCC) (S22.000A)         Referring Provider: Nohemi Triado  Date of Evaluation:    4/4/2024    Precautions:  None Next MD visit:   none scheduled  Date of Surgery: n/a   Insurance Primary/Secondary: MEDICARE / [place]DEMN[place]     # Auth Visits: n/a            Subjective: \" I saw my MD and he would like to add 8 more visits of PT sessions .    Pain: 3/10      Objective: Gait deviation and balance deficits. Difficulty with bed mobility, rolling side to side. LOB during 3 way stepping R 1/10, L 1/10.    BP before thera ex: 105/75, [place] - 101, O2 sat - 90%  After Nu step O2 sat - 95%  Vital signs after Thera -ex  BP: 118/55  O2 sat - 96, [place] -76  M[place]na RPE - 12- Moderate      Assessment ;  continued with core  and lower extremities strength ex's  and added isometrics adductors / abductors strength to  core  strength ex's to further progress trunk stability with gait . Patient required verbal cueing for correct TrA activation with alt marches and bridging ex's to ensure proper form .  Patient presented with quick fatigue and required frequent rest stops but was able to perform all ex' without getting short of breath .    Goals:   Goals: (to be met in 8 visits)   Pt will improve transversus abdominis recruitment to perform proper isometric contraction without requiring verbal or tactile cuing to promote advancement of therex, improvement of core muscles strength to promote lumbar stability with activities that include bending, lifting and increased LE activity  Pt will demonstrate good understanding of proper posture and body mechanics to decrease pain and improve spinal safety   Pt will  have improvement of muscles flexibility to allow increase ease with movement and improve positional tolerance  Pt will demonstrate improved core strength, hips and abdominal muscles strength to 4/5 and improve kinsesthetic and proprioceptive awareness to be able decreased pain to <2/10 with sitting, standing, walking, bending, carrying and lifting, especially toward end of the day  Patient will have improvement with overall mobility with decreased risk for falls, with score of less than 15 secs for 5 STST and less than 14 secs for TUG  Patient will be able to tolerate 45-60 mins of NMR, therapeutic exercises and therapeutic activities with good stability, stable vital signs, and no evidence of fatigue.  Pt will be independent and compliant with comprehensive HEP to maintain progress achieved in PT, and be able to utilize HEP to manage future exacerbations       Plan: Continue PT as per established POC. Assess response to today's visit, adjust to tolerance.Check vital signs before tx.  Date: 4/9/2024  TX#: 2/8 Date: 4/18/2024                TX#: 3/8 Date: 4/25/2024                 TX#: 4/8 Date: 4/29/24                TX#: 5/8 Date: 5/01/24  Tx#: 6/8   Nu step L3, 5 mins  Calf stretches 2x 30 secs on incline board  Heel Raises and Toe Raises x 20  Romberg EC x 30 secs  March with 1 UE support x 1 min  Romberg EO and EC x 30 secs each, airex  March on airex x 1 min with 1 UE support  Alternating step taps on 4\" step no UE support x 10 each  3 way stepping x 10 each  Standing hip abduction x 10  Standing hip extension x 20  Hamstrings curls x 20  Sitting LAQ x 20  Supine:   Heel slides x 20  TA brace x 10 with 5 secs   Gluteal sets x 10 with 5 secs  TA brace with march x 10  TA brace with march x 10  SL clams x 15     Nu step L, 6 mins  Calf stretches 2x 30 secs on incline board  Heel Raises and Toe Raises x 20  Romberg EC x 30 secs  March with 1 UE support x 1 min  Romberg EO x 30 secs each, airex  March on airex x 1  min with 2 UE support  3 way stepping x 10 each  Standing hip abduction x 20  Standing hip extension x 20  Standing hip and knee flexion x 20  Sitting LAQ x 20  Sit to stand from elevated mat x 10    Thera -ex -35 mins Nu step L3, 5 mins  Calf stretches 2x 30 secs on incline board  Heel Raises and Toe Raises x 20  Romberg EC x 30 secs  March with 1 UE support x 1 min  Romberg EO and EC x 30 secs each, airex  March on airex x 1 min with 1 UE support  Alternating step taps on 4\" step no UE support x 10 each, 1.5# AW  3 way stepping x 10 each no UE   Standing hip abduction x 20 1.5# AW  Standing hip extension x 20 1.5# AW  Hamstrings curls x 20, 1.5# AW  Sitting LAQ x 20 1.5 AW  Sit to stand x 10  Supine:   TA brace x 20    TA  brace with bridge x 10  TA brace with march x 20  SL clams x 20   NU step x 6 min , L 3  Standing :  Calf stretch on slant board x 5  x 20 sec hold   B heel raises x 20   B toes raises x 20   Marches in // bars   30 sec x 2   Alt hip abd  2 x 10   Alt hip ext 2 x 10   Sit to stand from chair   X 10   Supine :  TA brace :  With bridging 2 x 10   Marches 2 x 10   Adductors activation with yellow ball 2 x 10   Abductors activation with fitness ring   2 x 10   Side lying :  Clam shell R/L   X 10 reps each  NU step x 6 min , level L 4   Standing :  Gastroc stretch on slant board   5 x 20 sec hold   B heel raises x 20   B toes raises x 20  Sit to stand from chair 2 x 10   Marches in // bars   2 x 20   Alt hip abd with YTB   2 x 10   Alt hip ext with YTB   2 x 10   Sideways steps in // bars with YTB   2 x laps   Step up / lateral step on 4 inch step   Supine :  TrA activation with :  Adductors activation   2 x 10   Abductors activation   2 x 10   Bridging   2 x 10   Alt marches   2 x 10                         HEP: Access Code: [email or web address]  URL: [email or web address]  Date: 04/09/2024  Prepared by: Fang Hein    Exercises  - Heel Toe Raises with Counter Support  - 1 x daily - 7 x  weekly - 2 sets - 10 reps  - Standing March with Counter Support  - 1 x daily - 7 x weekly - 2 sets - 10 reps  - Standing Knee Flexion with Counter Support  - 1 x daily - 7 x weekly - 2 sets - 10 reps  - Standing Hip Extension with Unilateral Counter Support  - 1 x daily - 7 x weekly - 2 sets - 10 reps  - Standing Hip Abduction with Counter Support  - 1 x daily - 7 x weekly - 2 sets - 10 reps  - Seated Long Arc Quad  - 1 x daily - 7 x weekly - 2 sets - 10 reps  - Supine Heel Slide  - 1 x daily - 7 x weekly - 1-2 sets - 10 reps  - Supine Transversus Abdominis Bracing - Hands on Stomach  - 1 x daily - 7 x weekly - 1-2 sets - 10 reps - 5 secs hold  - Clamshell  - 2 x daily - 7 x weekly - 1-2 sets - 10 reps    Charges: Thera ex -3       Total Timed Treatment: 45 min  Total Treatment Time: 45 min

## 2024-05-06 ENCOUNTER — OFFICE VISIT (OUTPATIENT)
Dept: PHYSICAL THERAPY | Age: 74
End: 2024-05-06
Attending: PHYSICIAN ASSISTANT
Payer: MEDICARE

## 2024-05-06 PROCEDURE — 97110 THERAPEUTIC EXERCISES: CPT

## 2024-05-06 RX ORDER — ATORVASTATIN CALCIUM 20 MG/1
20 TABLET, FILM COATED ORAL DAILY
Qty: 90 TABLET | Refills: 0 | Status: SHIPPED | OUTPATIENT
Start: 2024-05-06

## 2024-05-06 NOTE — PROGRESS NOTES
Diagnosis:   Lumbar pain (M54.50)  Chronic left-sided thoracic back pain (M54.6,G89.29)  Muscle weakness on examination (M62.81)  Leg heaviness (R29.898)  Weakness of both lower extremities (R29.898)  Hyperreflexia (R29.2)  Multiple myeloma, remission status unspecified (HCC) (C90.00)  Compression fracture of body of thoracic vertebra (HCC) (S22.000A)         Referring Provider: Nohemi Tirado  Date of Evaluation:    4/4/2024    Precautions:  None Next MD visit:   none scheduled  Date of Surgery: n/a   Insurance Primary/Secondary: MEDICARE / [employer]DEMN[place]     # Auth Visits: n/a            Subjective: \" I am in the same level of pain and I am able to tolerate it well . \"   Pain: 3/10      Objective: Gait deviation and balance deficits. Difficulty with bed mobility, rolling side to side. LOB during 3 way stepping R 1/10, L 1/10.    BP before thera ex: 105/75, [place] - 101, O2 sat - 90%  After Nu step O2 sat - 95%  Vital signs after Thera -ex  BP: 118/55  O2 sat - 96, [place] -76  [place]na RPE - 12- Moderate      Assessment ;  added light resistance for all hip strength ex's in standing position to further progress trunk stability with prolonged standing activities like walking and stairs negotiation with patient good tolerance .Patient demonstrated improved tolerance for standing ex's without  a need for rest stops and without getting short of breath .      Goals:   Goals: (to be met in 8 visits)   Pt will improve transversus abdominis recruitment to perform proper isometric contraction without requiring verbal or tactile cuing to promote advancement of therex, improvement of core muscles strength to promote lumbar stability with activities that include bending, lifting and increased LE activity  Pt will demonstrate good understanding of proper posture and body mechanics to decrease pain and improve spinal safety   Pt will have improvement of muscles flexibility to allow increase ease with movement and improve positional  tolerance  Pt will demonstrate improved core strength, hips and abdominal muscles strength to 4/5 and improve kinsesthetic and proprioceptive awareness to be able decreased pain to <2/10 with sitting, standing, walking, bending, carrying and lifting, especially toward end of the day  Patient will have improvement with overall mobility with decreased risk for falls, with score of less than 15 secs for 5 STST and less than 14 secs for TUG  Patient will be able to tolerate 45-60 mins of NMR, therapeutic exercises and therapeutic activities with good stability, stable vital signs, and no evidence of fatigue.  Pt will be independent and compliant with comprehensive HEP to maintain progress achieved in PT, and be able to utilize HEP to manage future exacerbations       Plan: Continue PT as per established POC.   Date: 4/18/2024                TX#: 3/8 Date: 4/25/2024                 TX#: 4/8 Date: 4/29/24                TX#: 5/8 Date: 5/01/24  Tx#: 6/8 Date : 5/06/24  TX # 7/8    Nu step L, 6 mins  Calf stretches 2x 30 secs on incline board  Heel Raises and Toe Raises x 20  Romberg EC x 30 secs  March with 1 UE support x 1 min  Romberg EO x 30 secs each, airex  March on airex x 1 min with 2 UE support  3 way stepping x 10 each  Standing hip abduction x 20  Standing hip extension x 20  Standing hip and knee flexion x 20  Sitting LAQ x 20  Sit to stand from elevated mat x 10    Thera -ex -35 mins Nu step L3, 5 mins  Calf stretches 2x 30 secs on incline board  Heel Raises and Toe Raises x 20  Romberg EC x 30 secs  March with 1 UE support x 1 min  Romberg EO and EC x 30 secs each, airex  March on airex x 1 min with 1 UE support  Alternating step taps on 4\" step no UE support x 10 each, 1.5# AW  3 way stepping x 10 each no UE   Standing hip abduction x 20 1.5# AW  Standing hip extension x 20 1.5# AW  Hamstrings curls x 20, 1.5# AW  Sitting LAQ x 20 1.5 AW  Sit to stand x 10  Supine:   TA brace x 20    TA  brace with bridge x  10  TA brace with march x 20  SL clams x 20   NU step x 6 min , L 3  Standing :  Calf stretch on slant board x 5  x 20 sec hold   B heel raises x 20   B toes raises x 20   Marches in // bars   30 sec x 2   Alt hip abd  2 x 10   Alt hip ext 2 x 10   Sit to stand from chair   X 10   Supine :  TA brace :  With bridging 2 x 10   Marches 2 x 10   Adductors activation with yellow ball 2 x 10   Abductors activation with fitness ring   2 x 10   Side lying :  Clam shell R/L   X 10 reps each  NU step x 6 min , level L 4   Standing :  Gastroc stretch on slant board   5 x 20 sec hold   B heel raises x 20   B toes raises x 20  Sit to stand from chair 2 x 10   Marches in // bars   2 x 20   Alt hip abd with YTB   2 x 10   Alt hip ext with YTB   2 x 10   Sideways steps in // bars with YTB   2 x laps   Step up / lateral step on 4 inch step   Supine :  TrA activation with :  Adductors activation   2 x 10   Abductors activation   2 x 10   Bridging   2 x 10   Alt marches   2 x 10  NU step x 6 min , level 5  Standing :  Gastroc stretch on slant board   5 x 20 sec hold   B heel raises x 20  B toes raises x 20   Sit to stand from chair  2 x 10   Marches in // bars with 1.5 lbs around ankles   2 x 10   3 ways hip strength ex's with 1.5 lbs  2 x 10   Sideways steps in // bars with 1.5 lbs    2 x rounds   Step up / lateral stp up   Air ex :  Step up x 20  Lateral step ups x 20  Balance beam :  Sideways steps   2 x rounds   Supine :  R/L knee ext on bolster with 2 lbs x 2 x 10 each   Alt marches with TrA activation with 2 lbs   2 x 10   TrA activation with adductors 2 x 10   TrA activation with abductor with fitness ring 2 x 10                         HEP: Access Code: [email or web address]  URL: [email or web address]  Date: 04/09/2024  Prepared by: Fang Hein    Exercises  - Heel Toe Raises with Counter Support  - 1 x daily - 7 x weekly - 2 sets - 10 reps  - Standing March with Counter Support  - 1 x daily - 7 x weekly - 2 sets - 10  reps  - Standing Knee Flexion with Counter Support  - 1 x daily - 7 x weekly - 2 sets - 10 reps  - Standing Hip Extension with Unilateral Counter Support  - 1 x daily - 7 x weekly - 2 sets - 10 reps  - Standing Hip Abduction with Counter Support  - 1 x daily - 7 x weekly - 2 sets - 10 reps  - Seated Long Arc Quad  - 1 x daily - 7 x weekly - 2 sets - 10 reps  - Supine Heel Slide  - 1 x daily - 7 x weekly - 1-2 sets - 10 reps  - Supine Transversus Abdominis Bracing - Hands on Stomach  - 1 x daily - 7 x weekly - 1-2 sets - 10 reps - 5 secs hold  - Clamshell  - 2 x daily - 7 x weekly - 1-2 sets - 10 reps    Charges: Thera ex -3       Total Timed Treatment: 45 min  Total Treatment Time: 45 min

## 2024-05-06 NOTE — TELEPHONE ENCOUNTER
Cholesterol Medication Protocol [id number]/06/2024 09:28 AM   Protocol Details ALT < 80    ALT resulted within past year    Lipid panel within past 12 months    In person appointment or virtual visit in the past 12 mos or appointment in next 3 mos     LOV 3/7/2024    Future Appointments   Date Time Provider Department Center   5/6/2024  3:00 PM Edilia Graham, PTA PF PT [place]   5/10/2024 10:00 AM Ruiz Frias MD PF HEM ONC [place]   5/10/2024 10:30 AM PF TX RN3 PF CHEMO I [place]   5/10/2024  2:15 PM Fang Hein, PT [facility] [place]   5/22/2024  3:00 PM Edilia Graham, PTA [facility] [place]   5/24/2024  3:00 PM Edilia Graham, PTA [facility] [place]   5/28/2024  3:00 PM Edilia Graham, PTA [facility] [place]   5/30/2024  3:45 PM Fang Hein, PT [facility] [place]   6/5/2024  3:00 PM Edilia Graham, PTA [facility] [place]   6/14/2024  3:00 PM Edilia Graham, PTA [facility] [place]   6/17/2024  3:00 PM Edilia Graham, PTA [facility] [place]   6/18/2024  2:40 PM Francisco Silva MD [id number] [address]   7/11/2024  2:30 PM Dagoberto Bell MD [facility] [address]       LAST LAB 1/12/2024

## 2024-05-10 ENCOUNTER — OFFICE VISIT (OUTPATIENT)
Dept: HEMATOLOGY/ONCOLOGY | Age: 74
End: 2024-05-10
Attending: INTERNAL MEDICINE
Payer: MEDICARE

## 2024-05-10 ENCOUNTER — OFFICE VISIT (OUTPATIENT)
Dept: PHYSICAL THERAPY | Age: 74
End: 2024-05-10
Attending: PHYSICIAN ASSISTANT
Payer: MEDICARE

## 2024-05-10 VITALS
WEIGHT: 255.81 LBS | HEIGHT: 72.01 IN | OXYGEN SATURATION: 95 % | BODY MASS INDEX: 34.65 KG/M2 | RESPIRATION RATE: 18 BRPM | SYSTOLIC BLOOD PRESSURE: 93 MMHG | TEMPERATURE: 97 F | DIASTOLIC BLOOD PRESSURE: 58 MMHG | HEART RATE: 58 BPM

## 2024-05-10 DIAGNOSIS — C90.00 MULTIPLE MYELOMA NOT HAVING ACHIEVED REMISSION (HCC): Primary | ICD-10-CM

## 2024-05-10 DIAGNOSIS — R79.89 ELEVATED LFTS: ICD-10-CM

## 2024-05-10 LAB
ALBUMIN SERPL-MCNC: 3.5 G/DL (ref 3.4–5)
ALBUMIN/GLOB SERPL: 1.1 {RATIO} (ref 1–2)
ALP LIVER SERPL-CCNC: 82 U/L
ALT SERPL-CCNC: 15 U/L
ANION GAP SERPL CALC-SCNC: 5 MMOL/L (ref 0–18)
AST SERPL-CCNC: 13 U/L (ref 15–37)
BASOPHILS # BLD AUTO: 0.03 X10(3) UL (ref 0–0.2)
BASOPHILS NFR BLD AUTO: 0.2 %
BILIRUB SERPL-MCNC: 0.8 MG/DL (ref 0.1–2)
BUN BLD-MCNC: 29 MG/DL (ref 9–23)
CALCIUM BLD-MCNC: 9.3 MG/DL (ref 8.5–10.1)
CHLORIDE SERPL-SCNC: 111 MMOL/L (ref 98–112)
CO2 SERPL-SCNC: 20 MMOL/L (ref 21–32)
CREAT BLD-MCNC: 2 MG/DL
EGFRCR SERPLBLD CKD-EPI 2021: 34 ML/MIN/1.73M2 (ref 60–?)
EOSINOPHIL # BLD AUTO: 0.01 X10(3) UL (ref 0–0.7)
EOSINOPHIL NFR BLD AUTO: 0.1 %
ERYTHROCYTE [DISTWIDTH] IN BLOOD BY AUTOMATED COUNT: 13 %
FASTING STATUS PATIENT QL REPORTED: NO
GLOBULIN PLAS-MCNC: 3.1 G/DL (ref 2.8–4.4)
GLUCOSE BLD-MCNC: 169 MG/DL (ref 70–99)
HCT VFR BLD AUTO: 35.8 %
HGB BLD-MCNC: 12 G/DL
IGA SERPL-MCNC: <7.83 MG/DL (ref 70–312)
IGM SERPL-MCNC: <5.3 MG/DL (ref 43–279)
IMM GRANULOCYTES # BLD AUTO: 0.06 X10(3) UL (ref 0–1)
IMM GRANULOCYTES NFR BLD: 0.4 %
IMMUNOGLOBULIN PNL SER-MCNC: 172 MG/DL (ref 791–1643)
LYMPHOCYTES # BLD AUTO: 0.23 X10(3) UL (ref 1–4)
LYMPHOCYTES NFR BLD AUTO: 1.6 %
MCH RBC QN AUTO: 35.8 PG (ref 26–34)
MCHC RBC AUTO-ENTMCNC: 33.5 G/DL (ref 31–37)
MCV RBC AUTO: 106.9 FL
MONOCYTES # BLD AUTO: 0.11 X10(3) UL (ref 0.1–1)
MONOCYTES NFR BLD AUTO: 0.8 %
NEUTROPHILS # BLD AUTO: 14.14 X10 (3) UL (ref 1.5–7.7)
NEUTROPHILS # BLD AUTO: 14.14 X10(3) UL (ref 1.5–7.7)
NEUTROPHILS NFR BLD AUTO: 96.9 %
OSMOLALITY SERPL CALC.SUM OF ELEC: 292 MOSM/KG (ref 275–295)
PLATELET # BLD AUTO: 288 10(3)UL (ref 150–450)
POTASSIUM SERPL-SCNC: 4.1 MMOL/L (ref 3.5–5.1)
PROT SERPL-MCNC: 6.6 G/DL (ref 6.4–8.2)
RBC # BLD AUTO: 3.35 X10(6)UL
SODIUM SERPL-SCNC: 136 MMOL/L (ref 136–145)
WBC # BLD AUTO: 14.6 X10(3) UL (ref 4–11)

## 2024-05-10 PROCEDURE — 99214 OFFICE O/P EST MOD 30 MIN: CPT | Performed by: INTERNAL MEDICINE

## 2024-05-10 PROCEDURE — 97110 THERAPEUTIC EXERCISES: CPT | Performed by: PHYSICAL THERAPIST

## 2024-05-10 PROCEDURE — 96413 CHEMO IV INFUSION 1 HR: CPT

## 2024-05-10 PROCEDURE — 96401 CHEMO ANTI-NEOPL SQ/IM: CPT

## 2024-05-10 NOTE — PROGRESS NOTES
Diagnosis:   Lumbar pain (M54.50)  Chronic left-sided thoracic back pain (M54.6,G89.29)  Muscle weakness on examination (M62.81)  Leg heaviness (R29.898)  Weakness of both lower extremities (R29.898)  Hyperreflexia (R29.2)  Multiple myeloma, remission status unspecified (HCC) (C90.00)  Compression fracture of body of thoracic vertebra (HCC) (S22.000A)         Referring Provider: Nohemi Tirado  Date of Evaluation:    4/4/2024    Precautions:  None Next MD visit:   none scheduled  Date of Surgery: n/a   Insurance Primary/Secondary: MEDICARE / [employer] [place] INDEMNITY     # Auth Visits: n/a           Progress Summary  Pt has attended 8 visits in Physical Therapy.      Subjective: \" I just came from chemo, I am full of steroids, so I can do my exercises today. My L abdominal area is sore because they gave me a shot there \"   Pain: 3/10    Objective:   Observation: Posture: FHP, L shoulder higher than R, rounded shoulders, Increased Thoracic kyphosis  Palpation: Tenderness and tightness on paralumbar muscles  Sensation: Intact  Proprioception: Foot positioning - Intact  Coordination: heel to shin - Intact     AROM: (* denotes performed with pain)  Hip Knee Foot/Ankle   WFL  WFL    WFL         Flexibility:  Hip Flexor: R Severe restrictions , L Severe restrictions   Hamstrings: R Severe restrictions ; L Severe restrictions   Piriformis: R Severe restrictions ; L Severe restrictions   Quads: R Mild restrictions ; L Mild restrictions   Gastroc-soleus: R Moderate restrictions ; L Moderate restrictions      Strength/MMT: (* denotes performed with pain)  Hip Knee Foot/Ankle   Flexion: R 3+/5; L 3+/5+  Extension: R 3+/5; L 3+/5  Abduction: R 3+/5; L 3+/5  ER: R 4-/5; L 4-/5  IR: R 4-/5; L 4-/5 Flexion: R 5/5; L 5/5  Extension: R 5/5; L 5/5    DF: R 3/5; L 3/5  PF: R 3/5; L 3/5            Gait: pt ambulates on level ground with decreased foot clearance B, R UE decreased arm swing, and stooped posture/forward lean  Balance: SLS: R 0  sec, L 0 sec  5 STST - 23 secs with UE support, unable without UE- decreased mobility  TUG- 16 secs - high risk for falls      Assessment:Patient is progressing well with PT treatment, but modified treatment today, did not use balance beam for exercises. PT guided patient through treatment providing visual, verbal, and tactile cues for proper joint alignment/ body positioning, exercise tempo for desired treatment effect of exercises, and assessed response to interventions, exercises on compliant surfaces was challenging. Intervention adjusted to patient's tolerance throughout session duration, due to onset of fatigue and instability. Provided rest in between exercises. Patient is below their previous level of function and will benefit from skills and knowledge of PT to manage/ decrease symptoms and achieve established goals and return to PLOF.    Goals:   Goals: (to be met in 8 visits)   Pt will improve transversus abdominis recruitment to perform proper isometric contraction without requiring verbal or tactile cuing to promote advancement of therex, improvement of core muscles strength to promote lumbar stability with activities that include bending, lifting and increased LE activity- progressing  Pt will demonstrate good understanding of proper posture and body mechanics to decrease pain and improve spinal safety -progressing  Pt will have improvement of muscles flexibility to allow increase ease with movement and improve positional tolerance- progressing  Pt will demonstrate improved core strength, hips and abdominal muscles strength to 4/5 and improve kinsesthetic and proprioceptive awareness to be able decreased pain to <2/10 with sitting, standing, walking, bending, carrying and lifting, especially toward end of the day- progressing  Patient will have improvement with overall mobility with decreased risk for falls, with score of less than 15 secs for 5 STST and less than 14 secs for TUG-progressing  Patient  will be able to tolerate 45-60 mins of NMR, therapeutic exercises and therapeutic activities with good stability, stable vital signs, and no evidence of fatigue.-progressing  Pt will be independent and compliant with comprehensive HEP to maintain progress achieved in PT, and be able to utilize HEP to manage future exacerbations  -  progressing    Plan: Continue PT as per updates POC.   Date: 5/10/2024  Tx# : 7/16       NU step x 6 min , level 5  Standing :  Gastroc stretch on slant board   5 x 20 sec hold   B heel raises x 20  B toes raises x 20   Sit to stand from chair  1 x 10   Marches in // bars with 1.5 lbs around ankles   2 x 10   3 ways hip strength ex's with 1.5 lbs  2 x 10   Sideways steps in // bars with 1.5 lbs    2 x rounds   Step up / lateral step up   Air ex :  Step up x 10, 1.5 AW  Lateral step ups x 10, 1.5 AW  Supine :  TA brace with bridge x 10  Alt marches with TrA activation with 2 lbs   2 x 10   TrA activation with adductors 2 x 10   TrA activation with abductor with fitness ring 2 x 10   Thera ex -45 mins                            HEP: Access Code: [email or web address]  URL: [email or web address]  Date: 04/09/2024  Prepared by: Fang Hein    Exercises  - Heel Toe Raises with Counter Support  - 1 x daily - 7 x weekly - 2 sets - 10 reps  - Standing March with Counter Support  - 1 x daily - 7 x weekly - 2 sets - 10 reps  - Standing Knee Flexion with Counter Support  - 1 x daily - 7 x weekly - 2 sets - 10 reps  - Standing Hip Extension with Unilateral Counter Support  - 1 x daily - 7 x weekly - 2 sets - 10 reps  - Standing Hip Abduction with Counter Support  - 1 x daily - 7 x weekly - 2 sets - 10 reps  - Seated Long Arc Quad  - 1 x daily - 7 x weekly - 2 sets - 10 reps  - Supine Heel Slide  - 1 x daily - 7 x weekly - 1-2 sets - 10 reps  - Supine Transversus Abdominis Bracing - Hands on Stomach  - 1 x daily - 7 x weekly - 1-2 sets - 10 reps - 5 secs hold  - Clamshell  - 2 x daily - 7 x  weekly - 1-2 sets - 10 reps        Plan: Continue skilled Physical Therapy 1-2 x/week or a total of 8 visits over a 90 day period. Treatment will include: Therapeutic exercises, NMR, Therapeutic activities, Gait retraining, Modalities, HEP education       Patient/Family/Caregiver was advised of these findings, precautions, and treatment options and has agreed to actively participate in planning and for this course of care.    Thank you for your referral. If you have any questions, please contact me at Dept: [phone].    Sincerely,  Electronically signed by therapist: Fang Hein PT     Physician's certification required:  No  Please co-sign or sign and return this letter via fax as soon as possible to [phone].   I certify the need for these services furnished under this plan of treatment and while under my care.    X___________________________________________________ Date____________________    Certification From: 5/12/2024  To:8/10/2024      Charges: Thera ex -3       Total Timed Treatment: 45 min  Total Treatment Time: 45 min

## 2024-05-10 NOTE — PROGRESS NOTES
Patient here for f/u for multiple myeloma and Kyprolis/Faspro Darzalex. Patinet c/o intermittent headache located behind his right eye that he rates a 2/10, that started 2 weeks ago. Patient denies blurry or double vision, no vision issues noted. Patient states he has right hip pain that he rates a 2/10. Patient currently in PT. Patient has no further concerns or complaints at this time.     Education Record    Learner:  Patient    Disease / Diagnosis: multiple myeloma    Barriers / Limitations:  None   Comments:    Method:  Discussion   Comments:    General Topics:  Medication, Pain, Side effects and symptom management, and Plan of care reviewed   Comments:    Outcome:  Shows understanding   Comments:

## 2024-05-10 NOTE — PROGRESS NOTES
Cancer Center Progress Note    Problem List:      Patient Active Problem List   Diagnosis    Impotence of organic origin    Generalized osteoarthrosis of hand    Morbid obesity (HCC)    Essential hypertension    Hypertension    Multiple myeloma (HCC)    Myeloma kidney disease (HCC)    Hx of stem cell transplant (HCC)    Hypoxia    Multiple myeloma, remission status unspecified (HCC)    Type 2 diabetes mellitus with hyperglycemia, without long-term current use of insulin (HCC)    Cardiomyopathy, unspecified type (HCC)    Morbid (severe) obesity due to excess calories (HCC)    Elevated LFTs       Interim History:    Nacho Valle presents today for evaluation and management of a diagnosis of multiple myeloma.     He is here for cycle 44 day 1 of Ann/Carf/Dex. He continues to have some intermittent left upper back pain over the left scapula.  He has had a PET scan with no abnormality in this region. He has had intermittent headache in the region of the right ey that is mild. This started about two weeks ago. He has no vision change. He has had mild right hip pain.    He is otherwise doing well. He is taking dex 12 mg weekly three of four weeks. He has no fever or sweats. He has no dyspnea or cough. He has no abdominal pain. He has no other bone pain.    He had an echo on 5/22/2023 that had LVEF 50%.     He had right heart cath to r/o amyloid on 2/2/20.     He originally had 5 cycles of Cybord. He now has had high dose therapy. He was getting Zometa monthly infusions. His last Zometa was in 6/16/2017.    Pathology from cardiac biopsy on 2/2/2021:  Addendum 1   The endomyocardial biopsy  was sent to [facility], [place], [place] for processing and examination where the case was reviewed by Jaswinder Torre MD.   The full report has been scanned and is available as a hyperlink within this report (under the final diagnoses section).     \"Final Pathologic Diagnosis:      A.  Heart, endomyocardial  biopsy:   -Fragments of myocardium with mild myocyte hypertrophy, no evidence of cardiac amyloidosis, see comment.   -Portion of fibrosis tissue.      B.  Heart, endomyocardial biopsy for electron microscopy:   -Negative for any significant ultrastructural changes, see addendum below for details.      Comment:     The clinical concern for amyloidosis is noted.  A Congo red stain performed with appropriate controls is negative for amyloid deposition, and a trichrome stain with appropriate controls shows focal interstitial and subendocardial fibrosis.  Prussian zeyad stain is negative for iron deposition (controls appropriate). There is no evidence of active myocarditis, giant cells, or granulomas. Electron microscopy studies will be reported as an addendum. The clinical team was notified of the results on 2/3/2021 at 3:35 PM\".          Review of Systems:   Constitutional: Negative for anorexia, fatigue, fevers, chills, night sweats and weight loss.  Psychiatric: The patient's mood was calm and appropriate for this visit.  The pertinent positives and negatives were described. All other systems were negative.    PMH/PSH:  Past Medical History:    Allergic rhinitis, cause unspecified    BCC (basal cell carcinoma of skin)    s/p surgical excision    BPH (benign prostatic hyperplasia)    Colon polyps    Generalized osteoarthrosis, involving hand    Herniation of intervertebral disc between L4 and L5    s/p surgical repair    High blood pressure    High cholesterol    Impotence of organic origin    Morbid obesity (HCC)    Osteoarthritis    Osteoarthrosis, unspecified whether generalized or localized, lower leg    Log Date: 08/21/2012     Other and unspecified hyperlipidemia    Pneumonia due to organism    Prostate cancer (HCC)    s/p total prostatectomy and radiation    Trigger finger (acquired)    Type II or unspecified type diabetes mellitus without mention of complication, not stated as uncontrolled    Unspecified  essential hypertension    Unspecified internal derangement of knee    Log Date: 08/21/2012        Past Surgical History:   Procedure Laterality Date    Back surgery  1/2001    lower back L4 L5 for herniated disc    Colonoscopy      202, 2012 Dr. Wolf, polyps    Other surgical history  1/31/2007    total prostatectomy    Skin surgery  1997    [facility]       Family History Reviewed:  Family History   Problem Relation Age of Onset    Diabetes Other         family hx    Hypertension Other         family hx    Prostate Cancer Father        Allergies:     Allergies   Allergen Reactions    Pcn [Penicillins] ANAPHYLAXIS, HIVES, HALLUCINATION and SHORTNESS OF BREATH     Respiratory distress    Beta Adrenergic Blockers     Latex      Surgical tape  Welts, burning of skin, itching    Statins        Medications:   ATORVASTATIN 20 MG Oral Tab TAKE 1 TABLET(20 MG) BY MOUTH DAILY 90 tablet 0    ACYCLOVIR 400 MG Oral Tab TAKE 1 TABLET(400 MG) BY MOUTH TWICE DAILY 180 tablet 1    traMADol 50 MG Oral Tab Take 1 tablet (50 mg total) by mouth every 6 (six) hours as needed for Pain. 40 tablet 0    benzonatate 200 MG Oral Cap Take 1 capsule (200 mg total) by mouth 3 (three) times daily as needed for cough. 30 capsule 0    cyclobenzaprine 10 MG Oral Tab TAKE 1 TABLET(10 MG) BY MOUTH THREE TIMES DAILY AS NEEDED FOR MUSCLE SPASMS 10 tablet 1    DRISDOL 1.25 MG (43864 UT) Oral Cap       dexamethasone (DECADRON) 4 MG tablet Take 3 tablets (12mg) PO one time per week. 36 tablet 3    ONETOUCH ULTRASOFT LANCETS Does not apply Misc TEST BLOOD SUGAR TWICE DAILY 200 each 2    ENTRESTO  MG Oral Tab Take 1 tablet by mouth daily.      empagliflozin 10 MG Oral Tab Take 1 tablet (10 mg total) by mouth daily. JARDIANCE      Glucose Blood (ONETOUCH ULTRA) In Vitro Strip CHECK SUGARS THREE TIMES DAILY AS DIRECTED 300 strip 2    Metoprolol Succinate  MG Oral Tablet 24 Hr Take 1 tablet (200 mg total) by mouth every evening.      eplerenone 25 MG  Oral Tab Take 1 tablet (25 mg total) by mouth daily.      omega-3 fatty acids 1000 MG Oral Cap Take 1,000 mg by mouth 2 (two) times daily.      Multiple Vitamin Oral Tab Take 1 tablet by mouth.      acetaminophen 500 MG Oral Tab Take 2 tablets (1,000 mg total) by mouth nightly.      aspirin (ASPIR-81) 81 MG Oral Tab EC Take 1 tablet by mouth daily. 1 tablet 0     Vital Signs:      Height: 182.9 cm (6' 0.01\") (05/10 1033)  Weight: 116 kg (255 lb 12.8 oz) (05/10 1033)  BSA (Calculated - sq m): 2.37 sq meters (05/10 1033)  Pulse: 58 (05/10 1033)  BP: 93/58 (05/10 1033)  Temp: 97.1 °F (36.2 °C) (05/10 1033)  Do Not Use - Resp Rate: --  SpO2: 95 % (05/10 1033)      Performance Status:  ECOG 0: Fully active, able to carry on all pre-disease performance without restriction     Physical Examination:    Constitutional: Patient is alert and not in acute distress.  Respiratory: Clear to auscultation and percussion. No rales.  No wheezes.  Cardiovascular: Regular rate and rhythm. No murmurs.  Gastrointestinal: Soft, non tender with good bowel sounds.  Musculoskeletal: No edema. No calf tenderness.  Psychiatric: The patient's mood is calm and appropriate for this visit.       Labs reviewed at this visit:     Lab Results   Component Value Date    WBC 14.6 (H) 05/10/2024    RBC 3.35 (L) 05/10/2024    HGB 12.0 (L) 05/10/2024    HCT 35.8 (L) 05/10/2024    .9 (H) 05/10/2024    MCH 35.8 (H) 05/10/2024    MCHC 33.5 05/10/2024    RDW 13.0 05/10/2024    .0 05/10/2024     Lab Results   Component Value Date     05/10/2024    K 4.1 05/10/2024     05/10/2024    CO2 20.0 (L) 05/10/2024    BUN 29 (H) 05/10/2024    CREATSERUM 2.00 (H) 05/10/2024     (H) 05/10/2024    CA 9.3 05/10/2024    ALKPHO 82 05/10/2024    ALT 15 (L) 05/10/2024    AST 13 (L) 05/10/2024    BILT 0.8 05/10/2024    ALB 3.5 05/10/2024    TP 6.6 05/10/2024     Lab Component   Component Value Date/Time     01/08/2021 1028          Component  Ref Range & Units 4/12/24 0939   Immunoglobulin A  70.00 - 312.00 mg/dL <7.83 Low    Immunoglobulin G  791 - 1,643 mg/dL 164 Low    Immunoglobulin M  43.0 - 279.0 mg/dL <5.3 Low             Component  Ref Range & Units 4/12/24 0939   Total Protein  5.7 - 8.2 g/dL 5.9   Albumin  3.75 - 5.21 g/dL 3.95   Alpha-1-Globulins  0.19 - 0.46 g/dL 0.31   Alpha-2-Globulins  0.48 - 1.05 g/dL 0.84   Beta Globulins  0.68 - 1.23 g/dL 0.61 Low    Gamma Globulins  0.62 - 1.70 g/dL 0.19 Low    Albumin/Globulin Ratio  1.00 - 2.00 2.02 High    SPE Interpretation Small monoclonal spike in the gamma region.   Immunofixation Small monoclonal IgG kappa. If clinically indicated, 24 hour urine monoclonal  protein studies is suggested.      Reviewed by Lima Rose M.D. Pathology 04/17/24 at 2:07 PM     [place] Free Light Chain  0.330 - 1.940 mg/dL 39.718 High    Lambda Free Light Chain  0.571 - 2.630 mg/dL <0.137 Low    Kappa/Lambda Flc Ratio    Comment: Unable to calculate due to Lambda <0.133 mg/dL  Note: If renal impairment is present, use a reference range of 0.37 - 3.10 for Kappa/Lambda FLC ratio.       M-Cameron  <=0.00 g/dL 0.04 High        Radiologic imaging reviewed at this visit:      PET/CT scan on 1/3/2024:  FINDINGS:    ABNORMAL FOCI:    Diffuse metabolic activity is noted throughout the osseous structures.      Metabolic activity in the anterior right 2nd rib with associated pathologic fracture demonstrates a maximum SUV of 11.0 (series 6, image 167), previously demonstrating a maximum SUV of 6.4.     Activity in the posterior left iliac wing demonstrates a maximum SUV of 3.2 (image 343), has not significantly changed from the prior exam.  Activity in the sacrum demonstrates a maximum SUV of 3.5 (image 342), previously demonstrating a maximum SUV of  3.3.     Degenerative activity involving right anterior lateral osteophytes in the thoracic spine likely represents DISH.  Metabolic activity around the  right hip joint capsule may represent bursitis.  Superinfection cannot be excluded.  This is relatively  unchanged from the prior exam.     OTHER:         Calcified pleural plaque along the right hemidiaphragm is noted with minimal calcification along the left hemidiaphragm.  Coronary artery atherosclerosis is noted.  Calcific tendinosis of the right greater than left Achilles tendon is  noted.     Impression   CONCLUSION:    1. Diffuse low-level metabolic activity throughout the axial skeleton has not significantly changed with the exception of the anterior right 2nd rib which demonstrates increased metabolic activity compared to the prior examination.          Assessment/Plan:     Multiple myeloma IgG kappa:  Hypercalcemia  Acute renal failure  Anemia  Diffuse bone lytic lesions  Beta-2 Microglobulin 18.5 mg/L    ISS stage III  5 cycles of (VCd) Dexamethasone, Bortezomib and cyclophosphamide  High dose therapy with stem cell infusion on 1/27/2017  Revlimid maintenance from 6/2017 to 12/202  Progression with repeat bone marrow biopsy on 1/12/2021  50% plasma cells  FISH study was not done  Chromosomes at relapse:   45,X,-Y[7]/46,XY[13]  Carfilzomib/Ann/Dex started on 2/18/2021       He is on salvage treatment with ann/carfilzomib/Dex.  He continues to have good control of his disease. We will continue with the current treatment. His pain has been stable.    He will continue with cycle 44, day 1. He is tolerating the treatment well. He will continue the Dex 12 mg weekly. He will continue with the current carfilzomib. The quant Ig levels are low. He will return in 4 weeks with repeat labs.      Lytic bone disease:   back pain:  Hypercalcemia    He has been on Zometa. The PET scan had diffuse bony changes consistent with myeloma.      Chronic Kidney Disease:     Overall stable.    Cardiomyopathy:    Continue cardiology follow up. He is s/p myocardial biopsy that is negative for amyloid. He will continue  follow up with cardiology.    Anemia complicating neoplastic disease:    Borderline low now. Continue to follow.        Ruiz Frias MD

## 2024-05-15 LAB
ALBUMIN SERPL ELPH-MCNC: 3.89 G/DL (ref 3.75–5.21)
ALBUMIN/GLOB SERPL: 1.85 {RATIO} (ref 1–2)
ALPHA1 GLOB SERPL ELPH-MCNC: 0.34 G/DL (ref 0.19–0.46)
ALPHA2 GLOB SERPL ELPH-MCNC: 0.9 G/DL (ref 0.48–1.05)
B-GLOBULIN SERPL ELPH-MCNC: 0.67 G/DL (ref 0.68–1.23)
GAMMA GLOB SERPL ELPH-MCNC: 0.19 G/DL (ref 0.62–1.7)
KAPPA LC FREE SER-MCNC: 44.05 MG/DL (ref 0.33–1.94)
KAPPA LC FREE/LAMBDA FREE SER NEPH: 316.93 {RATIO} (ref 0.26–1.65)
LAMBDA LC FREE SERPL-MCNC: 0.14 MG/DL (ref 0.57–2.63)
M PROTEIN 1 SERPL ELPH-MCNC: 0.04 G/DL (ref ?–0)
PROT SERPL-MCNC: 6 G/DL (ref 5.7–8.2)

## 2024-05-17 ENCOUNTER — OFFICE VISIT (OUTPATIENT)
Dept: HEMATOLOGY/ONCOLOGY | Age: 74
End: 2024-05-17
Attending: INTERNAL MEDICINE

## 2024-05-17 VITALS
HEIGHT: 72.01 IN | OXYGEN SATURATION: 97 % | WEIGHT: 257 LBS | SYSTOLIC BLOOD PRESSURE: 106 MMHG | HEART RATE: 70 BPM | RESPIRATION RATE: 18 BRPM | BODY MASS INDEX: 34.81 KG/M2 | DIASTOLIC BLOOD PRESSURE: 64 MMHG | TEMPERATURE: 99 F

## 2024-05-17 DIAGNOSIS — C90.00 MULTIPLE MYELOMA NOT HAVING ACHIEVED REMISSION (HCC): Primary | ICD-10-CM

## 2024-05-17 DIAGNOSIS — R79.89 ELEVATED LFTS: ICD-10-CM

## 2024-05-17 LAB
BASOPHILS # BLD AUTO: 0.01 X10(3) UL (ref 0–0.2)
BASOPHILS NFR BLD AUTO: 0.1 %
EOSINOPHIL # BLD AUTO: 0.01 X10(3) UL (ref 0–0.7)
EOSINOPHIL NFR BLD AUTO: 0.1 %
ERYTHROCYTE [DISTWIDTH] IN BLOOD BY AUTOMATED COUNT: 13.1 %
HCT VFR BLD AUTO: 36.1 %
HGB BLD-MCNC: 11.7 G/DL
IMM GRANULOCYTES # BLD AUTO: 0.05 X10(3) UL (ref 0–1)
IMM GRANULOCYTES NFR BLD: 0.4 %
LYMPHOCYTES # BLD AUTO: 0.25 X10(3) UL (ref 1–4)
LYMPHOCYTES NFR BLD AUTO: 1.8 %
MCH RBC QN AUTO: 34.9 PG (ref 26–34)
MCHC RBC AUTO-ENTMCNC: 32.4 G/DL (ref 31–37)
MCV RBC AUTO: 107.8 FL
MONOCYTES # BLD AUTO: 0.08 X10(3) UL (ref 0.1–1)
MONOCYTES NFR BLD AUTO: 0.6 %
NEUTROPHILS # BLD AUTO: 13.69 X10 (3) UL (ref 1.5–7.7)
NEUTROPHILS # BLD AUTO: 13.69 X10(3) UL (ref 1.5–7.7)
NEUTROPHILS NFR BLD AUTO: 97 %
PLATELET # BLD AUTO: 138 10(3)UL (ref 150–450)
RBC # BLD AUTO: 3.35 X10(6)UL
WBC # BLD AUTO: 14.1 X10(3) UL (ref 4–11)

## 2024-05-17 PROCEDURE — 96413 CHEMO IV INFUSION 1 HR: CPT

## 2024-05-17 PROCEDURE — 85025 COMPLETE CBC W/AUTO DIFF WBC: CPT

## 2024-05-17 PROCEDURE — 36415 COLL VENOUS BLD VENIPUNCTURE: CPT

## 2024-05-17 NOTE — PROGRESS NOTES
Pt here for C44D8 Drug(s)kyprolis.  Arrives Ambulating independently, accompanied by Self     Patient was evaluated today by Treatment Nurse.    Oral medications included in this regimen:  no    Patient confirms comprehension of cancer treatment schedule:  yes    Pregnancy screening:  Not applicable    Modifications in dose or schedule:  No    Medications appearance and physical integrity checked by RN: yes.    Chemotherapy IV pump settings verified by 2 RNs:  Yes.  Frequency of blood return and site check throughout administration: Prior to administration, Prior to each drug, and At completion of therapy     Infusion/treatment outcome:  patient tolerated treatment without incident    Education Record    Learner:  Patient  Barriers / Limitations:  None  Method:  Brief focused and Discussion  Education / instructions given:  schedule  Outcome:  Shows understanding    Discharged Home, Ambulating independently, accompanied by:Self    Patient/family verbalized understanding of future appointments: by printed AVS

## 2024-05-18 DIAGNOSIS — C90.00 MULTIPLE MYELOMA, REMISSION STATUS UNSPECIFIED (HCC): ICD-10-CM

## 2024-05-20 RX ORDER — DEXAMETHASONE 4 MG/1
TABLET ORAL
Qty: 36 TABLET | Refills: 3 | Status: SHIPPED | OUTPATIENT
Start: 2024-05-20

## 2024-05-22 ENCOUNTER — OFFICE VISIT (OUTPATIENT)
Dept: PHYSICAL THERAPY | Age: 74
End: 2024-05-22
Attending: PHYSICIAN ASSISTANT
Payer: MEDICARE

## 2024-05-22 PROCEDURE — 97110 THERAPEUTIC EXERCISES: CPT

## 2024-05-22 NOTE — PROGRESS NOTES
Diagnosis:   Lumbar pain (M54.50)  Chronic left-sided thoracic back pain (M54.6,G89.29)  Muscle weakness on examination (M62.81)  Leg heaviness (R29.898)  Weakness of both lower extremities (R29.898)  Hyperreflexia (R29.2)  Multiple myeloma, remission status unspecified (HCC) (C90.00)  Compression fracture of body of thoracic vertebra (HCC) (S22.000A)         Referring Provider: Nohemi Tirado  Date of Evaluation:    4/4/2024    Precautions:  None Next MD visit:   none scheduled  Date of Surgery: n/a   Insurance Primary/Secondary: MEDICARE / [place]DEMN[place]     # Auth Visits: n/a             Subjective: \" I feel very sore and tired today\".  Pain: 2/10    Objective:   Observation: Posture: FHP, L shoulder higher than R, rounded shoulders, Increased Thoracic kyphosis  Palpation: Tenderness and tightness on paralumbar muscles  Sensation: Intact  Proprioception: Foot positioning - Intact  Coordination: heel to shin - Intact     AROM: (* denotes performed with pain)  Hip Knee Foot/Ankle   WFL  WFL    WFL         Flexibility:  Hip Flexor: R Severe restrictions , L Severe restrictions   Hamstrings: R Severe restrictions ; L Severe restrictions   Piriformis: R Severe restrictions ; L Severe restrictions   Quads: R Mild restrictions ; L Mild restrictions   Gastroc-soleus: R Moderate restrictions ; L Moderate restrictions      Strength/MMT: (* denotes performed with pain)  Hip Knee Foot/Ankle   Flexion: R 3+/5; L 3+/5+  Extension: R 3+/5; L 3+/5  Abduction: R 3+/5; L 3+/5  ER: R 4-/5; L 4-/5  IR: R 4-/5; L 4-/5 Flexion: R 5/5; L 5/5  Extension: R 5/5; L 5/5    DF: R 3/5; L 3/5  PF: R 3/5; L 3/5            Gait: pt ambulates on level ground with decreased foot clearance B, R UE decreased arm swing, and stooped posture/forward lean  Balance: SLS: R 0 sec, L 0 sec  5 STST - 23 secs with UE support, unable without UE- decreased mobility  TUG- 16 secs - high risk for falls      Assessment:     increased reps on all hip strength  ex's in standing position with patient good tolerance .    Goals:   Goals: (to be met in 8 visits)   Pt will improve transversus abdominis recruitment to perform proper isometric contraction without requiring verbal or tactile cuing to promote advancement of therex, improvement of core muscles strength to promote lumbar stability with activities that include bending, lifting and increased LE activity- progressing  Pt will demonstrate good understanding of proper posture and body mechanics to decrease pain and improve spinal safety -progressing  Pt will have improvement of muscles flexibility to allow increase ease with movement and improve positional tolerance- progressing  Pt will demonstrate improved core strength, hips and abdominal muscles strength to 4/5 and improve kinsesthetic and proprioceptive awareness to be able decreased pain to <2/10 with sitting, standing, walking, bending, carrying and lifting, especially toward end of the day- progressing  Patient will have improvement with overall mobility with decreased risk for falls, with score of less than 15 secs for 5 STST and less than 14 secs for TUG-progressing  Patient will be able to tolerate 45-60 mins of NMR, therapeutic exercises and therapeutic activities with good stability, stable vital signs, and no evidence of fatigue.-progressing  Pt will be independent and compliant with comprehensive HEP to maintain progress achieved in PT, and be able to utilize HEP to manage future exacerbations  -  progressing    Plan: Continue PT as per updates POC.   Date: 5/10/2024  Tx# : 7/16 Date : 5/22/24  TX # 8/16      NU step x 6 min , level 5  Standing :  Gastroc stretch on slant board   5 x 20 sec hold   B heel raises x 20  B toes raises x 20   Sit to stand from chair  1 x 10   Marches in // bars with 1.5 lbs around ankles   2 x 10   3 ways hip strength ex's with 1.5 lbs  2 x 10   Sideways steps in // bars with 1.5 lbs    2 x rounds   Step up / lateral step up    Air ex :  Step up x 10, 1.5 AW  Lateral step ups x 10, 1.5 AW  Supine :  TA brace with bridge x 10  Alt marches with TrA activation with 2 lbs   2 x 10   TrA activation with adductors 2 x 10   TrA activation with abductor with fitness ring 2 x 10   Thera ex -45 mins NU step x 6 min , level 4  Standing :  Gastroc stretch on slant board   5 x 20 sec hold   B heel raises x 20  B toes raises x 20  Sit to stand from chairs    1 x 10   Marches in // bars with 1.5 lbs 2 x 15   Alt hip flex , abd , ext with 1.5 lbs 2 x 15  Sideways steps in // bars with 1.5 lbs 2 x 15  Tandem walk in // bars   2 laps   Air ex :  Step up x 20  Lateral step up x 20   Steps over x 20  Supine :  TrA activation with :  Bridging x 20  Adductors activation with YTB 2 x 10  Abductors activation with fitness ring   2 x 10                            HEP: Access Code: [email or web address]  URL: [email or web address]  Date: 04/09/2024  Prepared by: Fang Hein    Exercises  - Heel Toe Raises with Counter Support  - 1 x daily - 7 x weekly - 2 sets - 10 reps  - Standing March with Counter Support  - 1 x daily - 7 x weekly - 2 sets - 10 reps  - Standing Knee Flexion with Counter Support  - 1 x daily - 7 x weekly - 2 sets - 10 reps  - Standing Hip Extension with Unilateral Counter Support  - 1 x daily - 7 x weekly - 2 sets - 10 reps  - Standing Hip Abduction with Counter Support  - 1 x daily - 7 x weekly - 2 sets - 10 reps  - Seated Long Arc Quad  - 1 x daily - 7 x weekly - 2 sets - 10 reps  - Supine Heel Slide  - 1 x daily - 7 x weekly - 1-2 sets - 10 reps  - Supine Transversus Abdominis Bracing - Hands on Stomach  - 1 x daily - 7 x weekly - 1-2 sets - 10 reps - 5 secs hold  - Clamshell  - 2 x daily - 7 x weekly - 1-2 sets - 10 reps        Charges: Thera ex -3       Total Timed Treatment: 45 min  Total Treatment Time: 45 min

## 2024-05-24 ENCOUNTER — OFFICE VISIT (OUTPATIENT)
Dept: HEMATOLOGY/ONCOLOGY | Age: 74
End: 2024-05-24
Attending: INTERNAL MEDICINE

## 2024-05-24 ENCOUNTER — OFFICE VISIT (OUTPATIENT)
Dept: PHYSICAL THERAPY | Age: 74
End: 2024-05-24
Attending: PHYSICIAN ASSISTANT
Payer: MEDICARE

## 2024-05-24 VITALS
DIASTOLIC BLOOD PRESSURE: 65 MMHG | TEMPERATURE: 98 F | SYSTOLIC BLOOD PRESSURE: 106 MMHG | OXYGEN SATURATION: 98 % | BODY MASS INDEX: 34.61 KG/M2 | HEIGHT: 72.01 IN | RESPIRATION RATE: 18 BRPM | HEART RATE: 59 BPM | WEIGHT: 255.5 LBS

## 2024-05-24 DIAGNOSIS — R79.89 ELEVATED LFTS: ICD-10-CM

## 2024-05-24 DIAGNOSIS — C90.00 MULTIPLE MYELOMA NOT HAVING ACHIEVED REMISSION (HCC): ICD-10-CM

## 2024-05-24 DIAGNOSIS — C90.00 MULTIPLE MYELOMA, REMISSION STATUS UNSPECIFIED (HCC): Primary | ICD-10-CM

## 2024-05-24 LAB
ALBUMIN SERPL-MCNC: 3.7 G/DL (ref 3.4–5)
BASOPHILS # BLD AUTO: 0.01 X10(3) UL (ref 0–0.2)
BASOPHILS NFR BLD AUTO: 0.1 %
CALCIUM BLD-MCNC: 9.7 MG/DL (ref 8.5–10.1)
CREAT BLD-MCNC: 2.13 MG/DL
EGFRCR SERPLBLD CKD-EPI 2021: 31 ML/MIN/1.73M2 (ref 60–?)
EOSINOPHIL # BLD AUTO: 0 X10(3) UL (ref 0–0.7)
EOSINOPHIL NFR BLD AUTO: 0 %
ERYTHROCYTE [DISTWIDTH] IN BLOOD BY AUTOMATED COUNT: 13.5 %
HCT VFR BLD AUTO: 35 %
HGB BLD-MCNC: 11.8 G/DL
IMM GRANULOCYTES # BLD AUTO: 0.04 X10(3) UL (ref 0–1)
IMM GRANULOCYTES NFR BLD: 0.3 %
LYMPHOCYTES # BLD AUTO: 0.16 X10(3) UL (ref 1–4)
LYMPHOCYTES NFR BLD AUTO: 1.2 %
MCH RBC QN AUTO: 35.9 PG (ref 26–34)
MCHC RBC AUTO-ENTMCNC: 33.7 G/DL (ref 31–37)
MCV RBC AUTO: 106.4 FL
MONOCYTES # BLD AUTO: 0.06 X10(3) UL (ref 0.1–1)
MONOCYTES NFR BLD AUTO: 0.5 %
NEUTROPHILS # BLD AUTO: 12.69 X10 (3) UL (ref 1.5–7.7)
NEUTROPHILS # BLD AUTO: 12.69 X10(3) UL (ref 1.5–7.7)
NEUTROPHILS NFR BLD AUTO: 97.9 %
PLATELET # BLD AUTO: 150 10(3)UL (ref 150–450)
RBC # BLD AUTO: 3.29 X10(6)UL
WBC # BLD AUTO: 13 X10(3) UL (ref 4–11)

## 2024-05-24 PROCEDURE — 97110 THERAPEUTIC EXERCISES: CPT

## 2024-05-24 PROCEDURE — 36415 COLL VENOUS BLD VENIPUNCTURE: CPT

## 2024-05-24 PROCEDURE — 82040 ASSAY OF SERUM ALBUMIN: CPT

## 2024-05-24 PROCEDURE — 82310 ASSAY OF CALCIUM: CPT

## 2024-05-24 PROCEDURE — 96413 CHEMO IV INFUSION 1 HR: CPT

## 2024-05-24 PROCEDURE — 82565 ASSAY OF CREATININE: CPT

## 2024-05-24 PROCEDURE — 85025 COMPLETE CBC W/AUTO DIFF WBC: CPT

## 2024-05-24 PROCEDURE — 96375 TX/PRO/DX INJ NEW DRUG ADDON: CPT

## 2024-05-24 NOTE — PROGRESS NOTES
Pt here for C44D15 Drug(s)kyprolis.  Arrives Ambulating independently, accompanied by Self     Patient was evaluated today by Treatment Nurse.    Oral medications included in this regimen:  no      Patient confirms comprehension of cancer treatment schedule:  yes    Pregnancy screening:  Not applicable    Modifications in dose or schedule:  No    Medications appearance and physical integrity checked by RN: yes.    Chemotherapy IV pump settings verified by 2 RNs:  Yes.  Frequency of blood return and site check throughout administration: Prior to administration     Infusion/treatment outcome:  patient tolerated treatment without incident    Education Record    Learner:  Patient  Barriers / Limitations:  None  Method:  Brief focused  Education / instructions given:  chemo admin  Outcome:  Shows understanding    Discharged Home, Ambulating independently, accompanied by:Self    Patient/family verbalized understanding of future appointments: by printed AVS

## 2024-05-24 NOTE — PROGRESS NOTES
Diagnosis:   Lumbar pain (M54.50)  Chronic left-sided thoracic back pain (M54.6,G89.29)  Muscle weakness on examination (M62.81)  Leg heaviness (R29.898)  Weakness of both lower extremities (R29.898)  Hyperreflexia (R29.2)  Multiple myeloma, remission status unspecified (HCC) (C90.00)  Compression fracture of body of thoracic vertebra (HCC) (S22.000A)         Referring Provider: Nohemi Tirado  Date of Evaluation:    4/4/2024    Precautions:  None Next MD visit:   none scheduled  Date of Surgery: n/a   Insurance Primary/Secondary: MEDICARE / [place]DEMN[place]     # Auth Visits: n/a             Subjective: \" I feel OK after last visit \"  Pain: 2/10    Objective:   Observation: Posture: FHP, L shoulder higher than R, rounded shoulders, Increased Thoracic kyphosis  Palpation: Tenderness and tightness on paralumbar muscles  Sensation: Intact  Proprioception: Foot positioning - Intact  Coordination: heel to shin - Intact     AROM: (* denotes performed with pain)  Hip Knee Foot/Ankle   WFL  WFL    WFL         Flexibility:  Hip Flexor: R Severe restrictions , L Severe restrictions   Hamstrings: R Severe restrictions ; L Severe restrictions   Piriformis: R Severe restrictions ; L Severe restrictions   Quads: R Mild restrictions ; L Mild restrictions   Gastroc-soleus: R Moderate restrictions ; L Moderate restrictions      Strength/MMT: (* denotes performed with pain)  Hip Knee Foot/Ankle   Flexion: R 3+/5; L 3+/5+  Extension: R 3+/5; L 3+/5  Abduction: R 3+/5; L 3+/5  ER: R 4-/5; L 4-/5  IR: R 4-/5; L 4-/5 Flexion: R 5/5; L 5/5  Extension: R 5/5; L 5/5    DF: R 3/5; L 3/5  PF: R 3/5; L 3/5            Gait: pt ambulates on level ground with decreased foot clearance B, R UE decreased arm swing, and stooped posture/forward lean  Balance: SLS: R 0 sec, L 0 sec  5 STST - 23 secs with UE support, unable without UE- decreased mobility  TUG- 16 secs - high risk for falls      Assessment:     added leg press on shuttle and added  balance beam to tandem / sideways walks to further progress lower extremities  stability with sustained gait , stairs negotiation and sustained standing activities . Patient was able to perform with only one rest brake . Patient was able to performed balance activities on balance beam and air ex with improved ankles stability .    Goals:   Goals: (to be met in 8 visits)   Pt will improve transversus abdominis recruitment to perform proper isometric contraction without requiring verbal or tactile cuing to promote advancement of therex, improvement of core muscles strength to promote lumbar stability with activities that include bending, lifting and increased LE activity- progressing  Pt will demonstrate good understanding of proper posture and body mechanics to decrease pain and improve spinal safety -progressing  Pt will have improvement of muscles flexibility to allow increase ease with movement and improve positional tolerance- progressing  Pt will demonstrate improved core strength, hips and abdominal muscles strength to 4/5 and improve kinsesthetic and proprioceptive awareness to be able decreased pain to <2/10 with sitting, standing, walking, bending, carrying and lifting, especially toward end of the day- progressing  Patient will have improvement with overall mobility with decreased risk for falls, with score of less than 15 secs for 5 STST and less than 14 secs for TUG-progressing  Patient will be able to tolerate 45-60 mins of NMR, therapeutic exercises and therapeutic activities with good stability, stable vital signs, and no evidence of fatigue.-progressing  Pt will be independent and compliant with comprehensive HEP to maintain progress achieved in PT, and be able to utilize HEP to manage future exacerbations  -  progressing    Plan: Continue PT as per updates POC.  Consider testing SLS R/L and TUG next session .  Date: 5/10/2024  Tx# : 7/16 Date : 5/22/24  TX # 8/16 Date : 24/24  TX # 9/16     NU step x  6 min , level 5  Standing :  Gastroc stretch on slant board   5 x 20 sec hold   B heel raises x 20  B toes raises x 20   Sit to stand from chair  1 x 10   Marches in // bars with 1.5 lbs around ankles   2 x 10   3 ways hip strength ex's with 1.5 lbs  2 x 10   Sideways steps in // bars with 1.5 lbs    2 x rounds   Step up / lateral step up   Air ex :  Step up x 10, 1.5 AW  Lateral step ups x 10, 1.5 AW  Supine :  TA brace with bridge x 10  Alt marches with TrA activation with 2 lbs   2 x 10   TrA activation with adductors 2 x 10   TrA activation with abductor with fitness ring 2 x 10   Thera ex -45 mins NU step x 6 min , level 4  Standing :  Gastroc stretch on slant board   5 x 20 sec hold   B heel raises x 20  B toes raises x 20  Sit to stand from chairs    1 x 10   Marches in // bars with 1.5 lbs 2 x 15   Alt hip flex , abd , ext with 1.5 lbs 2 x 15  Sideways steps in // bars with 1.5 lbs 2 x 15  Tandem walk in // bars   2 laps   Air ex :  Step up x 20  Lateral step up x 20   Steps over x 20  Supine :  TrA activation with :  Bridging x 20  Adductors activation with YTB 2 x 10  Abductors activation with fitness ring   2 x 10   NU step x 6 min , level 5  Standing :  B gastroc stretch on slant board   5 x 20 sec hold   B heel raises x 20  B toes raises x 20  Shuttle :  B leg press with 50 lbs   2  x 10  R/L leg press with 25 lbs   2 x 10   Step up / lateral step up on 6 inch step   2 x 10 each   Marches in // bars with 1.5 lbs around ankles    2 x 10   Alt hip abd ib // bars with 1.5 lbs around ankles 2 x 10   Sidestepping in // bars with 1.5 lbs around ankles   4 laps   Balance re - education :  Tandem walk / retro tandem walk in // bars 2 laps on balance beam   Sideways steps  on balance beam x 2 laps   Air ex :  Step ups x 20  Lateral step ups x 20                            HEP: Access Code: [email or web address]  URL: [email or web address]  Date: 04/09/2024  Prepared by: Fang Reeves  - Heel  Toe Raises with Counter Support  - 1 x daily - 7 x weekly - 2 sets - 10 reps  - Standing March with Counter Support  - 1 x daily - 7 x weekly - 2 sets - 10 reps  - Standing Knee Flexion with Counter Support  - 1 x daily - 7 x weekly - 2 sets - 10 reps  - Standing Hip Extension with Unilateral Counter Support  - 1 x daily - 7 x weekly - 2 sets - 10 reps  - Standing Hip Abduction with Counter Support  - 1 x daily - 7 x weekly - 2 sets - 10 reps  - Seated Long Arc Quad  - 1 x daily - 7 x weekly - 2 sets - 10 reps  - Supine Heel Slide  - 1 x daily - 7 x weekly - 1-2 sets - 10 reps  - Supine Transversus Abdominis Bracing - Hands on Stomach  - 1 x daily - 7 x weekly - 1-2 sets - 10 reps - 5 secs hold  - Clamshell  - 2 x daily - 7 x weekly - 1-2 sets - 10 reps        Charges: Thera ex -3       Total Timed Treatment: 45 min  Total Treatment Time: 45 min

## 2024-05-28 ENCOUNTER — OFFICE VISIT (OUTPATIENT)
Dept: PHYSICAL THERAPY | Age: 74
End: 2024-05-28
Attending: PHYSICIAN ASSISTANT
Payer: MEDICARE

## 2024-05-28 PROCEDURE — 97110 THERAPEUTIC EXERCISES: CPT

## 2024-05-28 NOTE — PROGRESS NOTES
Diagnosis:   Lumbar pain (M54.50)  Chronic left-sided thoracic back pain (M54.6,G89.29)  Muscle weakness on examination (M62.81)  Leg heaviness (R29.898)  Weakness of both lower extremities (R29.898)  Hyperreflexia (R29.2)  Multiple myeloma, remission status unspecified (HCC) (C90.00)  Compression fracture of body of thoracic vertebra (HCC) (S22.000A)         Referring Provider: Nohemi Tirado  Date of Evaluation:    4/4/2024    Precautions:  None Next MD visit:   none scheduled  Date of Surgery: n/a   Insurance Primary/Secondary: MEDICARE / [place]DEMN[place]     # Auth Visits: n/a             Subjective: \" I am doing OK today \".  Pain: 2/10    Objective:   Observation: Posture: FHP, L shoulder higher than R, rounded shoulders, Increased Thoracic kyphosis  Palpation: Tenderness and tightness on paralumbar muscles  Sensation: Intact  Proprioception: Foot positioning - Intact  Coordination: heel to shin - Intact     AROM: (* denotes performed with pain)  Hip Knee Foot/Ankle   WFL  WFL    WFL         Flexibility:  Hip Flexor: R Severe restrictions , L Severe restrictions   Hamstrings: R Severe restrictions ; L Severe restrictions   Piriformis: R Severe restrictions ; L Severe restrictions   Quads: R Mild restrictions ; L Mild restrictions   Gastroc-soleus: R Moderate restrictions ; L Moderate restrictions      Strength/MMT: (* denotes performed with pain)  Hip Knee Foot/Ankle   Flexion: R 3+/5; L 3+/5+  Extension: R 3+/5; L 3+/5  Abduction: R 3+/5; L 3+/5  ER: R 4-/5; L 4-/5  IR: R 4-/5; L 4-/5 Flexion: R 5/5; L 5/5  Extension: R 5/5; L 5/5    DF: R 3/5; L 3/5  PF: R 3/5; L 3/5            Gait: pt ambulates on level ground with decreased foot clearance B, R UE decreased arm swing, and stooped posture/forward lean  Balance: SLS: R 0 sec, L 0 sec  5 STST - 23 secs with UE support, unable without UE- decreased mobility  TUG- 16 secs - high risk for falls  ( 5/28/2024 ) TUG 12 sec                       SLS R  :  2 sec         SLS L : 3 sec      Assessment:    patient presents with improved score on TUG and improved SLS R/L since last balance re-assessment .    Goals:   Goals: (to be met in 8 visits)   Pt will improve transversus abdominis recruitment to perform proper isometric contraction without requiring verbal or tactile cuing to promote advancement of therex, improvement of core muscles strength to promote lumbar stability with activities that include bending, lifting and increased LE activity- progressing  Pt will demonstrate good understanding of proper posture and body mechanics to decrease pain and improve spinal safety -progressing  Pt will have improvement of muscles flexibility to allow increase ease with movement and improve positional tolerance- progressing  Pt will demonstrate improved core strength, hips and abdominal muscles strength to 4/5 and improve kinsesthetic and proprioceptive awareness to be able decreased pain to <2/10 with sitting, standing, walking, bending, carrying and lifting, especially toward end of the day- progressing  Patient will have improvement with overall mobility with decreased risk for falls, with score of less than 15 secs for 5 STST and less than 14 secs for TUG-progressing  Patient will be able to tolerate 45-60 mins of NMR, therapeutic exercises and therapeutic activities with good stability, stable vital signs, and no evidence of fatigue.-progressing  Pt will be independent and compliant with comprehensive HEP to maintain progress achieved in PT, and be able to utilize HEP to manage future exacerbations  -  progressing    Plan: Continue PT as per updates POC.   Date: 5/10/2024  Tx# : 7/16 Date : 5/22/24  TX # 8/16 Date : 24/24  TX # 9/16 Date : 5/28/24  TX # 10/16    NU step x 6 min , level 5  Standing :  Gastroc stretch on slant board   5 x 20 sec hold   B heel raises x 20  B toes raises x 20   Sit to stand from chair  1 x 10   Marches in // bars with 1.5 lbs around ankles   2 x 10   3  ways hip strength ex's with 1.5 lbs  2 x 10   Sideways steps in // bars with 1.5 lbs    2 x rounds   Step up / lateral step up   Air ex :  Step up x 10, 1.5 AW  Lateral step ups x 10, 1.5 AW  Supine :  TA brace with bridge x 10  Alt marches with TrA activation with 2 lbs   2 x 10   TrA activation with adductors 2 x 10   TrA activation with abductor with fitness ring 2 x 10   Thera ex -45 mins NU step x 6 min , level 4  Standing :  Gastroc stretch on slant board   5 x 20 sec hold   B heel raises x 20  B toes raises x 20  Sit to stand from chairs    1 x 10   Marches in // bars with 1.5 lbs 2 x 15   Alt hip flex , abd , ext with 1.5 lbs 2 x 15  Sideways steps in // bars with 1.5 lbs 2 x 15  Tandem walk in // bars   2 laps   Air ex :  Step up x 20  Lateral step up x 20   Steps over x 20  Supine :  TrA activation with :  Bridging x 20  Adductors activation with YTB 2 x 10  Abductors activation with fitness ring   2 x 10   NU step x 6 min , level 5  Standing :  B gastroc stretch on slant board   5 x 20 sec hold   B heel raises x 20  B toes raises x 20  Shuttle :  B leg press with 50 lbs   2  x 10  R/L leg press with 25 lbs   2 x 10   Step up / lateral step up on 6 inch step   2 x 10 each   Marches in // bars with 1.5 lbs around ankles    2 x 10   Alt hip abd ib // bars with 1.5 lbs around ankles 2 x 10   Sidestepping in // bars with 1.5 lbs around ankles   4 laps   Balance re - education :  Tandem walk / retro tandem walk in // bars 2 laps on balance beam   Sideways steps  on balance beam x 2 laps   Air ex :  Step ups x 20  Lateral step ups x 20   NU step x 6 min , level 5  Standing :  B gastroc stretch on slant board x 5 x 20 sec hold   B heel raises x 20  B toes raises x 20   Tandem stance   R/L x 5 attempts   L/R x 5 attempts   R/L singe cone tap   X 10 reps each side   Tandem walk forward / backward in // bars x 4 rounds   Rocker board :  B LE balancing A/P X 30  B LE balancing M/L x 30   High marches on air ex ( with  no arm support ) x 30   Step up / lateral step up on 6 inch step with 1.2 lbs around ankles   Alt hip abd with 1.2 lbs on air ex x 20   Alt hip ext with 1.2 lbs on air ex x 20                          HEP: Access Code: [email or web address]  URL: [email or web address]  Date: 04/09/2024  Prepared by: Fang Hein    Exercises  - Heel Toe Raises with Counter Support  - 1 x daily - 7 x weekly - 2 sets - 10 reps  - Standing March with Counter Support  - 1 x daily - 7 x weekly - 2 sets - 10 reps  - Standing Knee Flexion with Counter Support  - 1 x daily - 7 x weekly - 2 sets - 10 reps  - Standing Hip Extension with Unilateral Counter Support  - 1 x daily - 7 x weekly - 2 sets - 10 reps  - Standing Hip Abduction with Counter Support  - 1 x daily - 7 x weekly - 2 sets - 10 reps  - Seated Long Arc Quad  - 1 x daily - 7 x weekly - 2 sets - 10 reps  - Supine Heel Slide  - 1 x daily - 7 x weekly - 1-2 sets - 10 reps  - Supine Transversus Abdominis Bracing - Hands on Stomach  - 1 x daily - 7 x weekly - 1-2 sets - 10 reps - 5 secs hold  - Clamshell  - 2 x daily - 7 x weekly - 1-2 sets - 10 reps        Charges: Thera ex -3       Total Timed Treatment: 45 min  Total Treatment Time: 45 min

## 2024-05-30 ENCOUNTER — APPOINTMENT (OUTPATIENT)
Dept: PHYSICAL THERAPY | Age: 74
End: 2024-05-30
Attending: PHYSICIAN ASSISTANT
Payer: MEDICARE

## 2024-06-05 ENCOUNTER — APPOINTMENT (OUTPATIENT)
Dept: PHYSICAL THERAPY | Age: 74
End: 2024-06-05
Attending: PHYSICIAN ASSISTANT
Payer: MEDICARE

## 2024-06-06 ENCOUNTER — OFFICE VISIT (OUTPATIENT)
Dept: PHYSICAL THERAPY | Age: 74
End: 2024-06-06
Attending: PHYSICIAN ASSISTANT
Payer: MEDICARE

## 2024-06-06 PROCEDURE — 97110 THERAPEUTIC EXERCISES: CPT | Performed by: PHYSICAL THERAPIST

## 2024-06-06 NOTE — PROGRESS NOTES
Diagnosis:   Lumbar pain (M54.50)  Chronic left-sided thoracic back pain (M54.6,G89.29)  Muscle weakness on examination (M62.81)  Leg heaviness (R29.898)  Weakness of both lower extremities (R29.898)  Hyperreflexia (R29.2)  Multiple myeloma, remission status unspecified (HCC) (C90.00)  Compression fracture of body of thoracic vertebra (HCC) (S22.000A)         Referring Provider: Nohemi Tirado  Date of Evaluation:    4/4/2024    Precautions:  None Next MD visit:   none scheduled  Date of Surgery: n/a   Insurance Primary/Secondary: MEDICARE / [employer]BS [place] INDEMNITY     # Auth Visits: n/a             Subjective: Patient reports that the R hip, started bothering him at noon time, he does not know what triggered it. His back and shoulders also hurt.  Pain: 5/10    Objective:   Observation: Posture: FHP, L shoulder higher than R, rounded shoulders, Increased Thoracic kyphosis  Palpation: Tenderness on R lateral and anterior hip  Sensation: Intact  Proprioception: Foot positioning - Intact  Coordination: heel to shin - Intact     Flexibility:  Hip Flexor: R Severe restrictions , L Severe restrictions   Hamstrings: R Severe restrictions ; L Severe restrictions   Piriformis: R Severe restrictions ; L Severe restrictions   Quads: R Mild restrictions ; L Mild restrictions   Gastroc-soleus: R Moderate restrictions ; L Moderate restrictions      Strength/MMT: (* denotes performed with pain)  Hip Knee Foot/Ankle   Flexion: R 3+/5; L 3+/5+  Extension: R 3+/5; L 3+/5  Abduction: R 3+/5; L 3+/5  ER: R 4-/5; L 4-/5  IR: R 4-/5; L 4-/5 Flexion: R 5/5; L 5/5  Extension: R 5/5; L 5/5    DF: R 3/5; L 3/5  PF: R 3/5; L 3/5            Gait: pt ambulates on level ground with antalgic gait on the R.   Balance: SLS: R 0 L - 0   5 STST - 23 secs with UE support, unable without UE- decreased mobility  TUG- 16 secs - high risk for falls  ( 5/28/2024 ) TUG 12 sec                       SLS R  :  2 sec        SLS L : 3 sec      Assessment:    Modified  treatment today due to R hip pain. Patient had difficulty with sit to stand due to increased R hip pain, and was favoring the R LE, decreased WB. Performed ROM for the R hip but no clicking and no pain with scour test, pain is worse with WB. Patient was putting weight cautiously on the R and would give out on him. Concern was pointed out to patient regarding walking, and discussed about using assistive device, cane as needed. Patient verbalized that he will be taking tylenol and rest and monitor his symptoms, discussed about seeking MD consult if symptoms persist. Patient was able to walk out of PT clinic.    Goals:   Goals: (to be met in 8 visits)   Pt will improve transversus abdominis recruitment to perform proper isometric contraction without requiring verbal or tactile cuing to promote advancement of therex, improvement of core muscles strength to promote lumbar stability with activities that include bending, lifting and increased LE activity- progressing  Pt will demonstrate good understanding of proper posture and body mechanics to decrease pain and improve spinal safety -progressing  Pt will have improvement of muscles flexibility to allow increase ease with movement and improve positional tolerance- progressing  Pt will demonstrate improved core strength, hips and abdominal muscles strength to 4/5 and improve kinsesthetic and proprioceptive awareness to be able decreased pain to <2/10 with sitting, standing, walking, bending, carrying and lifting, especially toward end of the day- progressing  Patient will have improvement with overall mobility with decreased risk for falls, with score of less than 15 secs for 5 STST and less than 14 secs for TUG-progressing  Patient will be able to tolerate 45-60 mins of NMR, therapeutic exercises and therapeutic activities with good stability, stable vital signs, and no evidence of fatigue.-progressing  Pt will be independent and compliant with comprehensive HEP to  maintain progress achieved in PT, and be able to utilize HEP to manage future exacerbations  -  progressing    Plan: Continue PT as per updated POC.   Date: 5/10/2024  Tx# : 7/16 Date : 5/22/24  TX # 8/16 Date : 24/24  TX # 9/16 Date : 5/28/24  TX # 10/16 Date: 6/6/2024   Tx #: 11/16   NU step x 6 min , level 5  Standing :  Gastroc stretch on slant board   5 x 20 sec hold   B heel raises x 20  B toes raises x 20   Sit to stand from chair  1 x 10   Marches in // bars with 1.5 lbs around ankles   2 x 10   3 ways hip strength ex's with 1.5 lbs  2 x 10   Sideways steps in // bars with 1.5 lbs    2 x rounds   Step up / lateral step up   Air ex :  Step up x 10, 1.5 AW  Lateral step ups x 10, 1.5 AW  Supine :  TA brace with bridge x 10  Alt marches with TrA activation with 2 lbs   2 x 10   TrA activation with adductors 2 x 10   TrA activation with abductor with fitness ring 2 x 10   Thera ex -45 mins NU step x 6 min , level 4  Standing :  Gastroc stretch on slant board   5 x 20 sec hold   B heel raises x 20  B toes raises x 20  Sit to stand from chairs    1 x 10   Marches in // bars with 1.5 lbs 2 x 15   Alt hip flex , abd , ext with 1.5 lbs 2 x 15  Sideways steps in // bars with 1.5 lbs 2 x 15  Tandem walk in // bars   2 laps   Air ex :  Step up x 20  Lateral step up x 20   Steps over x 20  Supine :  TrA activation with :  Bridging x 20  Adductors activation with YTB 2 x 10  Abductors activation with fitness ring   2 x 10   NU step x 6 min , level 5  Standing :  B gastroc stretch on slant board   5 x 20 sec hold   B heel raises x 20  B toes raises x 20  Shuttle :  B leg press with 50 lbs   2  x 10  R/L leg press with 25 lbs   2 x 10   Step up / lateral step up on 6 inch step   2 x 10 each   Marches in // bars with 1.5 lbs around ankles    2 x 10   Alt hip abd ib // bars with 1.5 lbs around ankles 2 x 10   Sidestepping in // bars with 1.5 lbs around ankles   4 laps   Balance re - education :  Tandem walk / retro tandem  walk in // bars 2 laps on balance beam   Sideways steps  on balance beam x 2 laps   Air ex :  Step ups x 20  Lateral step ups x 20   NU step x 6 min , level 5  Standing :  B gastroc stretch on slant board x 5 x 20 sec hold   B heel raises x 20  B toes raises x 20   Tandem stance   R/L x 5 attempts   L/R x 5 attempts   R/L singe cone tap   X 10 reps each side   Tandem walk forward / backward in // bars x 4 rounds   Rocker board :  B LE balancing A/P X 30  B LE balancing M/L x 30   High marches on air ex ( with no arm support ) x 30   Step up / lateral step up on 6 inch step with 1.2 lbs around ankles   Alt hip abd with 1.2 lbs on air ex x 20   Alt hip ext with 1.2 lbs on air ex x 20  NU step x 6 min , level 5  Standing :  B gastroc stretch on slant board x 2 x 30 sec hold   B heel raises x 20  B toes raises x 20  Supine piriformis stretch 2x 30 secs (fig 4 no lift )  LTR 1x 10  Supine  R hip abd x 10  R SLR x 5  R hip ER/IR x 10  Sit to stand x 5                        HEP: Access Code: [email or web address]  URL: [email or web address]  Date: 04/09/2024  Prepared by: Fang Hein    Exercises  - Heel Toe Raises with Counter Support  - 1 x daily - 7 x weekly - 2 sets - 10 reps  - Standing March with Counter Support  - 1 x daily - 7 x weekly - 2 sets - 10 reps  - Standing Knee Flexion with Counter Support  - 1 x daily - 7 x weekly - 2 sets - 10 reps  - Standing Hip Extension with Unilateral Counter Support  - 1 x daily - 7 x weekly - 2 sets - 10 reps  - Standing Hip Abduction with Counter Support  - 1 x daily - 7 x weekly - 2 sets - 10 reps  - Seated Long Arc Quad  - 1 x daily - 7 x weekly - 2 sets - 10 reps  - Supine Heel Slide  - 1 x daily - 7 x weekly - 1-2 sets - 10 reps  - Supine Transversus Abdominis Bracing - Hands on Stomach  - 1 x daily - 7 x weekly - 1-2 sets - 10 reps - 5 secs hold  - Clamshell  - 2 x daily - 7 x weekly - 1-2 sets - 10 reps        Charges: Thera ex -2       Total Timed Treatment: 30  min  Total Treatment Time: 30 min

## 2024-06-07 ENCOUNTER — OFFICE VISIT (OUTPATIENT)
Dept: HEMATOLOGY/ONCOLOGY | Age: 74
End: 2024-06-07
Attending: INTERNAL MEDICINE
Payer: MEDICARE

## 2024-06-07 VITALS
HEIGHT: 72.01 IN | RESPIRATION RATE: 18 BRPM | TEMPERATURE: 98 F | WEIGHT: 258.5 LBS | OXYGEN SATURATION: 97 % | BODY MASS INDEX: 35.01 KG/M2 | SYSTOLIC BLOOD PRESSURE: 101 MMHG | HEART RATE: 58 BPM | DIASTOLIC BLOOD PRESSURE: 60 MMHG

## 2024-06-07 DIAGNOSIS — C90.00 MULTIPLE MYELOMA NOT HAVING ACHIEVED REMISSION (HCC): Primary | ICD-10-CM

## 2024-06-07 DIAGNOSIS — R79.89 ELEVATED LFTS: ICD-10-CM

## 2024-06-07 LAB
ALBUMIN SERPL-MCNC: 3.6 G/DL (ref 3.4–5)
ALBUMIN/GLOB SERPL: 1.2 {RATIO} (ref 1–2)
ALP LIVER SERPL-CCNC: 76 U/L
ALT SERPL-CCNC: 17 U/L
ANION GAP SERPL CALC-SCNC: 7 MMOL/L (ref 0–18)
AST SERPL-CCNC: 11 U/L (ref 15–37)
BASOPHILS # BLD AUTO: 0.02 X10(3) UL (ref 0–0.2)
BASOPHILS NFR BLD AUTO: 0.1 %
BILIRUB SERPL-MCNC: 0.7 MG/DL (ref 0.1–2)
BUN BLD-MCNC: 29 MG/DL (ref 9–23)
CALCIUM BLD-MCNC: 8.8 MG/DL (ref 8.5–10.1)
CHLORIDE SERPL-SCNC: 110 MMOL/L (ref 98–112)
CO2 SERPL-SCNC: 17 MMOL/L (ref 21–32)
CREAT BLD-MCNC: 1.91 MG/DL
EGFRCR SERPLBLD CKD-EPI 2021: 36 ML/MIN/1.73M2 (ref 60–?)
EOSINOPHIL # BLD AUTO: 0 X10(3) UL (ref 0–0.7)
EOSINOPHIL NFR BLD AUTO: 0 %
ERYTHROCYTE [DISTWIDTH] IN BLOOD BY AUTOMATED COUNT: 13.7 %
FASTING STATUS PATIENT QL REPORTED: NO
GLOBULIN PLAS-MCNC: 3 G/DL (ref 2.8–4.4)
GLUCOSE BLD-MCNC: 156 MG/DL (ref 70–99)
HCT VFR BLD AUTO: 35.5 %
HGB BLD-MCNC: 11.6 G/DL
IMM GRANULOCYTES # BLD AUTO: 0.06 X10(3) UL (ref 0–1)
IMM GRANULOCYTES NFR BLD: 0.4 %
LYMPHOCYTES # BLD AUTO: 0.17 X10(3) UL (ref 1–4)
LYMPHOCYTES NFR BLD AUTO: 1.2 %
MCH RBC QN AUTO: 35.3 PG (ref 26–34)
MCHC RBC AUTO-ENTMCNC: 32.7 G/DL (ref 31–37)
MCV RBC AUTO: 107.9 FL
MONOCYTES # BLD AUTO: 0.06 X10(3) UL (ref 0.1–1)
MONOCYTES NFR BLD AUTO: 0.4 %
NEUTROPHILS # BLD AUTO: 13.39 X10 (3) UL (ref 1.5–7.7)
NEUTROPHILS # BLD AUTO: 13.39 X10(3) UL (ref 1.5–7.7)
NEUTROPHILS NFR BLD AUTO: 97.9 %
OSMOLALITY SERPL CALC.SUM OF ELEC: 287 MOSM/KG (ref 275–295)
PLATELET # BLD AUTO: 270 10(3)UL (ref 150–450)
POTASSIUM SERPL-SCNC: 4.1 MMOL/L (ref 3.5–5.1)
PROT SERPL-MCNC: 6.6 G/DL (ref 6.4–8.2)
RBC # BLD AUTO: 3.29 X10(6)UL
SODIUM SERPL-SCNC: 134 MMOL/L (ref 136–145)
WBC # BLD AUTO: 13.7 X10(3) UL (ref 4–11)

## 2024-06-07 PROCEDURE — 99214 OFFICE O/P EST MOD 30 MIN: CPT | Performed by: INTERNAL MEDICINE

## 2024-06-07 PROCEDURE — 96413 CHEMO IV INFUSION 1 HR: CPT

## 2024-06-07 PROCEDURE — 96401 CHEMO ANTI-NEOPL SQ/IM: CPT

## 2024-06-07 NOTE — CONSULTS
Cancer Center Progress Note    Problem List:      Patient Active Problem List   Diagnosis    Impotence of organic origin    Generalized osteoarthrosis of hand    Morbid obesity (HCC)    Essential hypertension    Hypertension    Multiple myeloma (HCC)    Myeloma kidney disease (HCC)    Hx of stem cell transplant (HCC)    Hypoxia    Multiple myeloma, remission status unspecified (HCC)    Type 2 diabetes mellitus with hyperglycemia, without long-term current use of insulin (HCC)    Cardiomyopathy, unspecified type (HCC)    Morbid (severe) obesity due to excess calories (HCC)    Elevated LFTs       Interim History:    Nacho Valle presents today for evaluation and management of a diagnosis of multiple myeloma.     He is here for cycle 45 day 1 of Ann/Carf/Dex. He complains of right hip pain. This has been bothering him more with time. He no longer has upper back pain. He has no other new pain. He has no fever or sweats.     He has had a PET scan on 1/3/2024 with no abnormality in the bones to suggest active myeloma.     He has had intermittent headache in the region of the right eye that is mild. This is stable in frequency and intensity.     He is otherwise doing well. He is taking dex 12 mg weekly three of four weeks. He has no fever or sweats. He has no dyspnea or cough. He has no abdominal pain. He has no other bone pain.    He had an echo on 5/22/2023 that had LVEF 50%.     He had right heart cath to r/o amyloid on 2/2/20.     He originally had 5 cycles of Cybord. He now has had high dose therapy. He was getting Zometa monthly infusions. His last Zometa was in 6/16/2017.    Pathology from cardiac biopsy on 2/2/2021:  Addendum 1   The endomyocardial biopsy  was sent to [facility], [place], [place] for processing and examination where the case was reviewed by Jaswinder Torre MD.   The full report has been scanned and is available as a hyperlink within this report (under the final  diagnoses section).     \"Final Pathologic Diagnosis:      A.  Heart, endomyocardial biopsy:   -Fragments of myocardium with mild myocyte hypertrophy, no evidence of cardiac amyloidosis, see comment.   -Portion of fibrosis tissue.      B.  Heart, endomyocardial biopsy for electron microscopy:   -Negative for any significant ultrastructural changes, see addendum below for details.      Comment:     The clinical concern for amyloidosis is noted.  A Congo red stain performed with appropriate controls is negative for amyloid deposition, and a trichrome stain with appropriate controls shows focal interstitial and subendocardial fibrosis.  Prussian zeyad stain is negative for iron deposition (controls appropriate). There is no evidence of active myocarditis, giant cells, or granulomas. Electron microscopy studies will be reported as an addendum. The clinical team was notified of the results on 2/3/2021 at 3:35 PM\".          Review of Systems:   Constitutional: Negative for anorexia, fatigue, fevers, chills, night sweats and weight loss.  Psychiatric: The patient's mood was calm and appropriate for this visit.  The pertinent positives and negatives were described. All other systems were negative.    PMH/PSH:  Past Medical History:    Allergic rhinitis, cause unspecified    BCC (basal cell carcinoma of skin)    s/p surgical excision    BPH (benign prostatic hyperplasia)    Colon polyps    Generalized osteoarthrosis, involving hand    Herniation of intervertebral disc between L4 and L5    s/p surgical repair    High blood pressure    High cholesterol    Impotence of organic origin    Morbid obesity (HCC)    Osteoarthritis    Osteoarthrosis, unspecified whether generalized or localized, lower leg    Log Date: 08/21/2012     Other and unspecified hyperlipidemia    Pneumonia due to organism    Prostate cancer (HCC)    s/p total prostatectomy and radiation    Trigger finger (acquired)    Type II or unspecified type diabetes  mellitus without mention of complication, not stated as uncontrolled    Unspecified essential hypertension    Unspecified internal derangement of knee    Log Date: 08/21/2012        Past Surgical History:   Procedure Laterality Date    Back surgery  1/2001    lower back L4 L5 for herniated disc    Colonoscopy      202, 2012 Dr. Wolf, polyps    Other surgical history  1/31/2007    total prostatectomy    Skin surgery  1997    BCC       Family History Reviewed:  Family History   Problem Relation Age of Onset    Diabetes Other         family hx    Hypertension Other         family hx    Prostate Cancer Father        Allergies:     Allergies   Allergen Reactions    Pcn [Penicillins] ANAPHYLAXIS, HIVES, HALLUCINATION and SHORTNESS OF BREATH     Respiratory distress    Beta Adrenergic Blockers     Latex      Surgical tape  Welts, burning of skin, itching    Statins        Medications:   dexamethasone (DECADRON) 4 MG tablet TAKE 3 TABLETS(12 MG) BY MOUTH 1 TIME EVERY WEEK 36 tablet 3    ATORVASTATIN 20 MG Oral Tab TAKE 1 TABLET(20 MG) BY MOUTH DAILY 90 tablet 0    ACYCLOVIR 400 MG Oral Tab TAKE 1 TABLET(400 MG) BY MOUTH TWICE DAILY 180 tablet 1    traMADol 50 MG Oral Tab Take 1 tablet (50 mg total) by mouth every 6 (six) hours as needed for Pain. 40 tablet 0    benzonatate 200 MG Oral Cap Take 1 capsule (200 mg total) by mouth 3 (three) times daily as needed for cough. 30 capsule 0    cyclobenzaprine 10 MG Oral Tab TAKE 1 TABLET(10 MG) BY MOUTH THREE TIMES DAILY AS NEEDED FOR MUSCLE SPASMS 10 tablet 1    DRISDOL 1.25 MG (04474 UT) Oral Cap       ONETOUCH ULTRASOFT LANCETS Does not apply Misc TEST BLOOD SUGAR TWICE DAILY 200 each 2    ENTRESTO  MG Oral Tab Take 1 tablet by mouth daily.      empagliflozin 10 MG Oral Tab Take 1 tablet (10 mg total) by mouth daily. JARDIANCE      Glucose Blood (ONETOUCH ULTRA) In Vitro Strip CHECK SUGARS THREE TIMES DAILY AS DIRECTED 300 strip 2    Metoprolol Succinate  MG Oral  Tablet 24 Hr Take 1 tablet (200 mg total) by mouth every evening.      eplerenone 25 MG Oral Tab Take 1 tablet (25 mg total) by mouth daily.      omega-3 fatty acids 1000 MG Oral Cap Take 1,000 mg by mouth 2 (two) times daily.      Multiple Vitamin Oral Tab Take 1 tablet by mouth.      acetaminophen 500 MG Oral Tab Take 2 tablets (1,000 mg total) by mouth nightly.      aspirin (ASPIR-81) 81 MG Oral Tab EC Take 1 tablet by mouth daily. 1 tablet 0     Vital Signs:      Height: 182.9 cm (6' 0.01\") (06/07 1115)  Weight: 117.3 kg (258 lb 8 oz) (06/07 1115)  BSA (Calculated - sq m): 2.38 sq meters (06/07 1115)  Pulse: 58 (06/07 1115)  BP: 101/60 (06/07 1115)  Temp: 97.5 °F (36.4 °C) (06/07 1115)  Do Not Use - Resp Rate: --  SpO2: 97 % (06/07 1115)      Performance Status:  ECOG 0: Fully active, able to carry on all pre-disease performance without restriction     Physical Examination:    Constitutional: Patient is alert and not in acute distress.  Respiratory: Clear to auscultation and percussion. No rales.  No wheezes.  Cardiovascular: Regular rate and rhythm. No murmurs.  Gastrointestinal: Soft, non tender with good bowel sounds.  Musculoskeletal: No edema. No calf tenderness.  Psychiatric: The patient's mood is calm and appropriate for this visit.       Labs reviewed at this visit:     Lab Results   Component Value Date    WBC 13.0 (H) 05/24/2024    RBC 3.29 (L) 05/24/2024    HGB 11.8 (L) 05/24/2024    HCT 35.0 (L) 05/24/2024    .4 (H) 05/24/2024    MCH 35.9 (H) 05/24/2024    MCHC 33.7 05/24/2024    RDW 13.5 05/24/2024    .0 05/24/2024     Lab Results   Component Value Date     05/10/2024    K 4.1 05/10/2024     05/10/2024    CO2 20.0 (L) 05/10/2024    BUN 29 (H) 05/10/2024    CREATSERUM 2.13 (H) 05/24/2024     (H) 05/10/2024    CA 9.7 05/24/2024    ALKPHO 82 05/10/2024    ALT 15 (L) 05/10/2024    AST 13 (L) 05/10/2024    BILT 0.8 05/10/2024    ALB 3.7 05/24/2024    TP 6.6 05/10/2024     TP 6.0 05/10/2024     Lab Component   Component Value Date/Time     01/08/2021 1028         Component  Ref Range & Units 4/12/24 0939   Immunoglobulin A  70.00 - 312.00 mg/dL <7.83 Low    Immunoglobulin G  791 - 1,643 mg/dL 164 Low    Immunoglobulin M  43.0 - 279.0 mg/dL <5.3 Low             Component  Ref Range & Units 4/12/24 0939   Total Protein  5.7 - 8.2 g/dL 5.9   Albumin  3.75 - 5.21 g/dL 3.95   Alpha-1-Globulins  0.19 - 0.46 g/dL 0.31   Alpha-2-Globulins  0.48 - 1.05 g/dL 0.84   Beta Globulins  0.68 - 1.23 g/dL 0.61 Low    Gamma Globulins  0.62 - 1.70 g/dL 0.19 Low    Albumin/Globulin Ratio  1.00 - 2.00 2.02 High    SPE Interpretation Small monoclonal spike in the gamma region.   Immunofixation Small monoclonal IgG kappa. If clinically indicated, 24 hour urine monoclonal  protein studies is suggested.      Reviewed by Lima Rose M.D. Pathology 04/17/24 at 2:07 PM     [place] Free Light Chain  0.330 - 1.940 mg/dL 39.718 High    Lambda Free Light Chain  0.571 - 2.630 mg/dL <0.137 Low    Kappa/Lambda Flc Ratio    Comment: Unable to calculate due to Lambda <0.133 mg/dL  Note: If renal impairment is present, use a reference range of 0.37 - 3.10 for Kappa/Lambda FLC ratio.       M-Cameron  <=0.00 g/dL 0.04 High        Radiologic imaging reviewed at this visit:      PET/CT scan on 1/3/2024:  FINDINGS:    ABNORMAL FOCI:    Diffuse metabolic activity is noted throughout the osseous structures.      Metabolic activity in the anterior right 2nd rib with associated pathologic fracture demonstrates a maximum SUV of 11.0 (series 6, image 167), previously demonstrating a maximum SUV of 6.4.     Activity in the posterior left iliac wing demonstrates a maximum SUV of 3.2 (image 343), has not significantly changed from the prior exam.  Activity in the sacrum demonstrates a maximum SUV of 3.5 (image 342), previously demonstrating a maximum SUV of  3.3.     Degenerative activity involving right anterior  lateral osteophytes in the thoracic spine likely represents DISH.  Metabolic activity around the right hip joint capsule may represent bursitis.  Superinfection cannot be excluded.  This is relatively  unchanged from the prior exam.     OTHER:         Calcified pleural plaque along the right hemidiaphragm is noted with minimal calcification along the left hemidiaphragm.  Coronary artery atherosclerosis is noted.  Calcific tendinosis of the right greater than left Achilles tendon is  noted.     Impression   CONCLUSION:    1. Diffuse low-level metabolic activity throughout the axial skeleton has not significantly changed with the exception of the anterior right 2nd rib which demonstrates increased metabolic activity compared to the prior examination.          Assessment/Plan:     Multiple myeloma IgG kappa:  Hypercalcemia  Acute renal failure  Anemia  Diffuse bone lytic lesions  Beta-2 Microglobulin 18.5 mg/L    ISS stage III  5 cycles of (VCd) Dexamethasone, Bortezomib and cyclophosphamide  High dose therapy with stem cell infusion on 1/27/2017  Revlimid maintenance from 6/2017 to 12/202  Progression with repeat bone marrow biopsy on 1/12/2021  50% plasma cells  FISH study was not done  Chromosomes at relapse:   45,X,-Y[7]/46,XY[13]  Carfilzomib/Ann/Dex started on 2/18/2021       He is on salvage treatment with ann/carfilzomib/Dex.  He continues to have good control of his disease. We will continue with the current treatment. His pain has been stable.    He will continue with cycle 45, day 1. He is tolerating the treatment well. He will continue the Dex 12 mg weekly. He will continue with the current carfilzomib. The quant Ig levels are low. He will return in 4 weeks with repeat labs.      Lytic bone disease:   back pain:  Hypercalcemia    He has been on Zometa. The PET scan had diffuse bony changes consistent with myeloma.      Chronic Kidney Disease:     Overall stable.    Cardiomyopathy:    Continue  cardiology follow up. He is s/p myocardial biopsy that is negative for amyloid. He will continue follow up with cardiology.    Anemia complicating neoplastic disease:    Borderline low now. Continue to follow.        Ruiz Frias MD

## 2024-06-07 NOTE — PROGRESS NOTES
Pt here for C45D1 Drug(s)kyprolis, faspro.  Arrives Ambulating independently, accompanied by Self     Patient was evaluated today by MD.    Oral medications included in this regimen:  no    Patient confirms comprehension of cancer treatment schedule:  yes    Pregnancy screening:  Denies possibility of pregnancy    Modifications in dose or schedule:  No    Medications appearance and physical integrity checked by RN: yes.    Chemotherapy IV pump settings verified by 2 RNs:  Yes.  Frequency of blood return and site check throughout administration: Prior to administration and At completion of therapy     Infusion/treatment outcome:  patient tolerated treatment without incident    Education Record    Learner:  Patient  Barriers / Limitations:  None  Method:  Discussion  Education / instructions given:  appts  Outcome:  Shows understanding    Discharged Home, Ambulating independently, accompanied by:Self    Patient/family verbalized understanding of future appointments: by printed AVS    Patient took his premeds prior to arrival

## 2024-06-07 NOTE — PROGRESS NOTES
Cancer Center Progress Note    Problem List:      Patient Active Problem List   Diagnosis    Impotence of organic origin    Generalized osteoarthrosis of hand    Morbid obesity (HCC)    Essential hypertension    Hypertension    Multiple myeloma (HCC)    Myeloma kidney disease (HCC)    Hx of stem cell transplant (HCC)    Hypoxia    Multiple myeloma, remission status unspecified (HCC)    Type 2 diabetes mellitus with hyperglycemia, without long-term current use of insulin (HCC)    Cardiomyopathy, unspecified type (HCC)    Morbid (severe) obesity due to excess calories (HCC)    Elevated LFTs       Interim History:    Nacho Valle presents today for evaluation and management of a diagnosis of multiple myeloma.     He is here for cycle 45 day 1 of Ann/Carf/Dex. He complains of right hip pain. This has been bothering him more with time. He no longer has upper back pain. He has no other new pain. He has no fever or sweats.     He has had a PET scan on 1/3/2024 with no abnormality in the bones to suggest active myeloma.     He has had intermittent headache in the region of the right eye that is mild. This is stable in frequency and intensity.     He is otherwise doing well. He is taking dex 12 mg weekly three of four weeks. He has no fever or sweats. He has no dyspnea or cough. He has no abdominal pain. He has no other bone pain.    He had an echo on 5/22/2023 that had LVEF 50%.     He had right heart cath to r/o amyloid on 2/2/20.     He originally had 5 cycles of Cybord. He now has had high dose therapy. He was getting Zometa monthly infusions. His last Zometa was in 6/16/2017.    Pathology from cardiac biopsy on 2/2/2021:  Addendum 1   The endomyocardial biopsy  was sent to [facility], [place], [place] for processing and examination where the case was reviewed by Jaswinder oTrre MD.   The full report has been scanned and is available as a hyperlink within this report (under the final  diagnoses section).     \"Final Pathologic Diagnosis:      A.  Heart, endomyocardial biopsy:   -Fragments of myocardium with mild myocyte hypertrophy, no evidence of cardiac amyloidosis, see comment.   -Portion of fibrosis tissue.      B.  Heart, endomyocardial biopsy for electron microscopy:   -Negative for any significant ultrastructural changes, see addendum below for details.      Comment:     The clinical concern for amyloidosis is noted.  A Congo red stain performed with appropriate controls is negative for amyloid deposition, and a trichrome stain with appropriate controls shows focal interstitial and subendocardial fibrosis.  Prussian zeyad stain is negative for iron deposition (controls appropriate). There is no evidence of active myocarditis, giant cells, or granulomas. Electron microscopy studies will be reported as an addendum. The clinical team was notified of the results on 2/3/2021 at 3:35 PM\".          Review of Systems:   Constitutional: Negative for anorexia, fatigue, fevers, chills, night sweats and weight loss.  Psychiatric: The patient's mood was calm and appropriate for this visit.  The pertinent positives and negatives were described. All other systems were negative.    PMH/PSH:  Past Medical History:    Allergic rhinitis, cause unspecified    BCC (basal cell carcinoma of skin)    s/p surgical excision    BPH (benign prostatic hyperplasia)    Colon polyps    Generalized osteoarthrosis, involving hand    Herniation of intervertebral disc between L4 and L5    s/p surgical repair    High blood pressure    High cholesterol    Impotence of organic origin    Morbid obesity (HCC)    Osteoarthritis    Osteoarthrosis, unspecified whether generalized or localized, lower leg    Log Date: 08/21/2012     Other and unspecified hyperlipidemia    Pneumonia due to organism    Prostate cancer (HCC)    s/p total prostatectomy and radiation    Trigger finger (acquired)    Type II or unspecified type diabetes  mellitus without mention of complication, not stated as uncontrolled    Unspecified essential hypertension    Unspecified internal derangement of knee    Log Date: 08/21/2012        Past Surgical History:   Procedure Laterality Date    Back surgery  1/2001    lower back L4 L5 for herniated disc    Colonoscopy      202, 2012 Dr. Wolf, polyps    Other surgical history  1/31/2007    total prostatectomy    Skin surgery  1997    BCC       Family History Reviewed:  Family History   Problem Relation Age of Onset    Diabetes Other         family hx    Hypertension Other         family hx    Prostate Cancer Father        Allergies:     Allergies   Allergen Reactions    Pcn [Penicillins] ANAPHYLAXIS, HIVES, HALLUCINATION and SHORTNESS OF BREATH     Respiratory distress    Beta Adrenergic Blockers     Latex      Surgical tape  Welts, burning of skin, itching    Statins        Medications:   dexamethasone (DECADRON) 4 MG tablet TAKE 3 TABLETS(12 MG) BY MOUTH 1 TIME EVERY WEEK 36 tablet 3    ATORVASTATIN 20 MG Oral Tab TAKE 1 TABLET(20 MG) BY MOUTH DAILY 90 tablet 0    ACYCLOVIR 400 MG Oral Tab TAKE 1 TABLET(400 MG) BY MOUTH TWICE DAILY 180 tablet 1    traMADol 50 MG Oral Tab Take 1 tablet (50 mg total) by mouth every 6 (six) hours as needed for Pain. 40 tablet 0    benzonatate 200 MG Oral Cap Take 1 capsule (200 mg total) by mouth 3 (three) times daily as needed for cough. 30 capsule 0    cyclobenzaprine 10 MG Oral Tab TAKE 1 TABLET(10 MG) BY MOUTH THREE TIMES DAILY AS NEEDED FOR MUSCLE SPASMS 10 tablet 1    DRISDOL 1.25 MG (74501 UT) Oral Cap       ONETOUCH ULTRASOFT LANCETS Does not apply Misc TEST BLOOD SUGAR TWICE DAILY 200 each 2    ENTRESTO  MG Oral Tab Take 1 tablet by mouth daily.      empagliflozin 10 MG Oral Tab Take 1 tablet (10 mg total) by mouth daily. JARDIANCE      Glucose Blood (ONETOUCH ULTRA) In Vitro Strip CHECK SUGARS THREE TIMES DAILY AS DIRECTED 300 strip 2    Metoprolol Succinate  MG Oral  Tablet 24 Hr Take 1 tablet (200 mg total) by mouth every evening.      eplerenone 25 MG Oral Tab Take 1 tablet (25 mg total) by mouth daily.      omega-3 fatty acids 1000 MG Oral Cap Take 1,000 mg by mouth 2 (two) times daily.      Multiple Vitamin Oral Tab Take 1 tablet by mouth.      acetaminophen 500 MG Oral Tab Take 2 tablets (1,000 mg total) by mouth nightly.      aspirin (ASPIR-81) 81 MG Oral Tab EC Take 1 tablet by mouth daily. 1 tablet 0     Vital Signs:      Height: 182.9 cm (6' 0.01\") (06/07 1115)  Weight: 117.3 kg (258 lb 8 oz) (06/07 1115)  BSA (Calculated - sq m): 2.38 sq meters (06/07 1115)  Pulse: 58 (06/07 1115)  BP: 101/60 (06/07 1115)  Temp: 97.5 °F (36.4 °C) (06/07 1115)  Do Not Use - Resp Rate: --  SpO2: 97 % (06/07 1115)      Performance Status:  ECOG 0: Fully active, able to carry on all pre-disease performance without restriction     Physical Examination:    Constitutional: Patient is alert and not in acute distress.  Respiratory: Clear to auscultation and percussion. No rales.  No wheezes.  Cardiovascular: Regular rate and rhythm. No murmurs.  Gastrointestinal: Soft, non tender with good bowel sounds.  Musculoskeletal: No edema. No calf tenderness.  Psychiatric: The patient's mood is calm and appropriate for this visit.       Labs reviewed at this visit:     Lab Results   Component Value Date    WBC 13.0 (H) 05/24/2024    RBC 3.29 (L) 05/24/2024    HGB 11.8 (L) 05/24/2024    HCT 35.0 (L) 05/24/2024    .4 (H) 05/24/2024    MCH 35.9 (H) 05/24/2024    MCHC 33.7 05/24/2024    RDW 13.5 05/24/2024    .0 05/24/2024     Lab Results   Component Value Date     05/10/2024    K 4.1 05/10/2024     05/10/2024    CO2 20.0 (L) 05/10/2024    BUN 29 (H) 05/10/2024    CREATSERUM 2.13 (H) 05/24/2024     (H) 05/10/2024    CA 9.7 05/24/2024    ALKPHO 82 05/10/2024    ALT 15 (L) 05/10/2024    AST 13 (L) 05/10/2024    BILT 0.8 05/10/2024    ALB 3.7 05/24/2024    TP 6.6 05/10/2024     TP 6.0 05/10/2024     Lab Component   Component Value Date/Time     01/08/2021 1028         Component  Ref Range & Units 4/12/24 0939   Immunoglobulin A  70.00 - 312.00 mg/dL <7.83 Low    Immunoglobulin G  791 - 1,643 mg/dL 164 Low    Immunoglobulin M  43.0 - 279.0 mg/dL <5.3 Low             Component  Ref Range & Units 4/12/24 0939   Total Protein  5.7 - 8.2 g/dL 5.9   Albumin  3.75 - 5.21 g/dL 3.95   Alpha-1-Globulins  0.19 - 0.46 g/dL 0.31   Alpha-2-Globulins  0.48 - 1.05 g/dL 0.84   Beta Globulins  0.68 - 1.23 g/dL 0.61 Low    Gamma Globulins  0.62 - 1.70 g/dL 0.19 Low    Albumin/Globulin Ratio  1.00 - 2.00 2.02 High    SPE Interpretation Small monoclonal spike in the gamma region.   Immunofixation Small monoclonal IgG kappa. If clinically indicated, 24 hour urine monoclonal  protein studies is suggested.      Reviewed by Lima Rose M.D. Pathology 04/17/24 at 2:07 PM     [place] Free Light Chain  0.330 - 1.940 mg/dL 39.718 High    Lambda Free Light Chain  0.571 - 2.630 mg/dL <0.137 Low    Kappa/Lambda Flc Ratio    Comment: Unable to calculate due to Lambda <0.133 mg/dL  Note: If renal impairment is present, use a reference range of 0.37 - 3.10 for Kappa/Lambda FLC ratio.       M-Cameron  <=0.00 g/dL 0.04 High        Radiologic imaging reviewed at this visit:      PET/CT scan on 1/3/2024:  FINDINGS:    ABNORMAL FOCI:    Diffuse metabolic activity is noted throughout the osseous structures.      Metabolic activity in the anterior right 2nd rib with associated pathologic fracture demonstrates a maximum SUV of 11.0 (series 6, image 167), previously demonstrating a maximum SUV of 6.4.     Activity in the posterior left iliac wing demonstrates a maximum SUV of 3.2 (image 343), has not significantly changed from the prior exam.  Activity in the sacrum demonstrates a maximum SUV of 3.5 (image 342), previously demonstrating a maximum SUV of  3.3.     Degenerative activity involving right anterior  lateral osteophytes in the thoracic spine likely represents DISH.  Metabolic activity around the right hip joint capsule may represent bursitis.  Superinfection cannot be excluded.  This is relatively  unchanged from the prior exam.     OTHER:         Calcified pleural plaque along the right hemidiaphragm is noted with minimal calcification along the left hemidiaphragm.  Coronary artery atherosclerosis is noted.  Calcific tendinosis of the right greater than left Achilles tendon is  noted.     Impression   CONCLUSION:    1. Diffuse low-level metabolic activity throughout the axial skeleton has not significantly changed with the exception of the anterior right 2nd rib which demonstrates increased metabolic activity compared to the prior examination.          Assessment/Plan:     Multiple myeloma IgG kappa:  Hypercalcemia  Acute renal failure  Anemia  Diffuse bone lytic lesions  Beta-2 Microglobulin 18.5 mg/L    ISS stage III  5 cycles of (VCd) Dexamethasone, Bortezomib and cyclophosphamide  High dose therapy with stem cell infusion on 1/27/2017  Revlimid maintenance from 6/2017 to 12/202  Progression with repeat bone marrow biopsy on 1/12/2021  50% plasma cells  FISH study was not done  Chromosomes at relapse:   45,X,-Y[7]/46,XY[13]  Carfilzomib/Ann/Dex started on 2/18/2021       He is on salvage treatment with ann/carfilzomib/Dex.  He continues to have good control of his disease. We will continue with the current treatment. His pain has been stable.    He will continue with cycle 45, day 1. He is tolerating the treatment well. He will continue the Dex 12 mg weekly. He will continue with the current carfilzomib. The quant Ig levels are low. He will return in 4 weeks with repeat labs.      Lytic bone disease:   back pain:  Hypercalcemia    He has been on Zometa. The PET scan had diffuse bony changes consistent with myeloma.      Chronic Kidney Disease:     Overall stable.    Cardiomyopathy:    Continue  cardiology follow up. He is s/p myocardial biopsy that is negative for amyloid. He will continue follow up with cardiology.    Anemia complicating neoplastic disease:    Borderline low now. Continue to follow.        Ruiz Frias MD

## 2024-06-07 NOTE — PROGRESS NOTES
Cancer Center Progress Note    Problem List:      Patient Active Problem List   Diagnosis    Impotence of organic origin    Generalized osteoarthrosis of hand    Morbid obesity (HCC)    Essential hypertension    Hypertension    Multiple myeloma (HCC)    Myeloma kidney disease (HCC)    Hx of stem cell transplant (HCC)    Hypoxia    Multiple myeloma, remission status unspecified (HCC)    Type 2 diabetes mellitus with hyperglycemia, without long-term current use of insulin (HCC)    Cardiomyopathy, unspecified type (HCC)    Morbid (severe) obesity due to excess calories (HCC)    Elevated LFTs       Interim History:    Nacho Valle presents today for evaluation and management of a diagnosis of multiple myeloma.     He is here for cycle 44 day 1 of Ann/Carf/Dex. He continues to have some intermittent left upper back pain over the left scapula.  He has had a PET scan with no abnormality in this region. He has had intermittent headache in the region of the right ey that is mild. This started about two weeks ago. He has no vision change. He has had mild right hip pain.    He is otherwise doing well. He is taking dex 12 mg weekly three of four weeks. He has no fever or sweats. He has no dyspnea or cough. He has no abdominal pain. He has no other bone pain.    He had an echo on 5/22/2023 that had LVEF 50%.     He had right heart cath to r/o amyloid on 2/2/20.     He originally had 5 cycles of Cybord. He now has had high dose therapy. He was getting Zometa monthly infusions. His last Zometa was in 6/16/2017.    Pathology from cardiac biopsy on 2/2/2021:  Addendum 1   The endomyocardial biopsy  was sent to [facility], [place], [place] for processing and examination where the case was reviewed by Jaswinder Torre MD.   The full report has been scanned and is available as a hyperlink within this report (under the final diagnoses section).     \"Final Pathologic Diagnosis:      A.  Heart, endomyocardial  biopsy:   -Fragments of myocardium with mild myocyte hypertrophy, no evidence of cardiac amyloidosis, see comment.   -Portion of fibrosis tissue.      B.  Heart, endomyocardial biopsy for electron microscopy:   -Negative for any significant ultrastructural changes, see addendum below for details.      Comment:     The clinical concern for amyloidosis is noted.  A Congo red stain performed with appropriate controls is negative for amyloid deposition, and a trichrome stain with appropriate controls shows focal interstitial and subendocardial fibrosis.  Prussian zeyad stain is negative for iron deposition (controls appropriate). There is no evidence of active myocarditis, giant cells, or granulomas. Electron microscopy studies will be reported as an addendum. The clinical team was notified of the results on 2/3/2021 at 3:35 PM\".          Review of Systems:   Constitutional: Negative for anorexia, fatigue, fevers, chills, night sweats and weight loss.  Psychiatric: The patient's mood was calm and appropriate for this visit.  The pertinent positives and negatives were described. All other systems were negative.    PMH/PSH:  Past Medical History:    Allergic rhinitis, cause unspecified    BCC (basal cell carcinoma of skin)    s/p surgical excision    BPH (benign prostatic hyperplasia)    Colon polyps    Generalized osteoarthrosis, involving hand    Herniation of intervertebral disc between L4 and L5    s/p surgical repair    High blood pressure    High cholesterol    Impotence of organic origin    Morbid obesity (HCC)    Osteoarthritis    Osteoarthrosis, unspecified whether generalized or localized, lower leg    Log Date: 08/21/2012     Other and unspecified hyperlipidemia    Pneumonia due to organism    Prostate cancer (HCC)    s/p total prostatectomy and radiation    Trigger finger (acquired)    Type II or unspecified type diabetes mellitus without mention of complication, not stated as uncontrolled    Unspecified  essential hypertension    Unspecified internal derangement of knee    Log Date: 08/21/2012        Past Surgical History:   Procedure Laterality Date    Back surgery  1/2001    lower back L4 L5 for herniated disc    Colonoscopy      202, 2012 Dr. Wolf, polyps    Other surgical history  1/31/2007    total prostatectomy    Skin surgery  1997    [facility]       Family History Reviewed:  Family History   Problem Relation Age of Onset    Diabetes Other         family hx    Hypertension Other         family hx    Prostate Cancer Father        Allergies:     Allergies   Allergen Reactions    Pcn [Penicillins] ANAPHYLAXIS, HIVES, HALLUCINATION and SHORTNESS OF BREATH     Respiratory distress    Beta Adrenergic Blockers     Latex      Surgical tape  Welts, burning of skin, itching    Statins        Medications:   dexamethasone (DECADRON) 4 MG tablet TAKE 3 TABLETS(12 MG) BY MOUTH 1 TIME EVERY WEEK 36 tablet 3    ATORVASTATIN 20 MG Oral Tab TAKE 1 TABLET(20 MG) BY MOUTH DAILY 90 tablet 0    ACYCLOVIR 400 MG Oral Tab TAKE 1 TABLET(400 MG) BY MOUTH TWICE DAILY 180 tablet 1    traMADol 50 MG Oral Tab Take 1 tablet (50 mg total) by mouth every 6 (six) hours as needed for Pain. 40 tablet 0    benzonatate 200 MG Oral Cap Take 1 capsule (200 mg total) by mouth 3 (three) times daily as needed for cough. 30 capsule 0    cyclobenzaprine 10 MG Oral Tab TAKE 1 TABLET(10 MG) BY MOUTH THREE TIMES DAILY AS NEEDED FOR MUSCLE SPASMS 10 tablet 1    DRISDOL 1.25 MG (44134 UT) Oral Cap       ONETOUCH ULTRASOFT LANCETS Does not apply Misc TEST BLOOD SUGAR TWICE DAILY 200 each 2    ENTRESTO  MG Oral Tab Take 1 tablet by mouth daily.      empagliflozin 10 MG Oral Tab Take 1 tablet (10 mg total) by mouth daily. JARDIANCE      Glucose Blood (ONETOUCH ULTRA) In Vitro Strip CHECK SUGARS THREE TIMES DAILY AS DIRECTED 300 strip 2    Metoprolol Succinate  MG Oral Tablet 24 Hr Take 1 tablet (200 mg total) by mouth every evening.      eplerenone 25  MG Oral Tab Take 1 tablet (25 mg total) by mouth daily.      omega-3 fatty acids 1000 MG Oral Cap Take 1,000 mg by mouth 2 (two) times daily.      Multiple Vitamin Oral Tab Take 1 tablet by mouth.      acetaminophen 500 MG Oral Tab Take 2 tablets (1,000 mg total) by mouth nightly.      aspirin (ASPIR-81) 81 MG Oral Tab EC Take 1 tablet by mouth daily. 1 tablet 0     Vital Signs:      Height: 182.9 cm (6' 0.01\") (06/07 1115)  Weight: 117.3 kg (258 lb 8 oz) (06/07 1115)  BSA (Calculated - sq m): 2.38 sq meters (06/07 1115)  Pulse: 58 (06/07 1115)  BP: 101/60 (06/07 1115)  Temp: 97.5 °F (36.4 °C) (06/07 1115)  Do Not Use - Resp Rate: --  SpO2: 97 % (06/07 1115)      Performance Status:  ECOG 0: Fully active, able to carry on all pre-disease performance without restriction     Physical Examination:    Constitutional: Patient is alert and not in acute distress.  Respiratory: Clear to auscultation and percussion. No rales.  No wheezes.  Cardiovascular: Regular rate and rhythm. No murmurs.  Gastrointestinal: Soft, non tender with good bowel sounds.  Musculoskeletal: No edema. No calf tenderness.  Psychiatric: The patient's mood is calm and appropriate for this visit.       Labs reviewed at this visit:     Lab Results   Component Value Date    WBC 13.7 (H) 06/07/2024    RBC 3.29 (L) 06/07/2024    HGB 11.6 (L) 06/07/2024    HCT 35.5 (L) 06/07/2024    .9 (H) 06/07/2024    MCH 35.3 (H) 06/07/2024    MCHC 32.7 06/07/2024    RDW 13.7 06/07/2024    .0 06/07/2024     Lab Results   Component Value Date     (L) 06/07/2024    K 4.1 06/07/2024     06/07/2024    CO2 17.0 (L) 06/07/2024    BUN 29 (H) 06/07/2024    CREATSERUM 1.91 (H) 06/07/2024     (H) 06/07/2024    CA 8.8 06/07/2024    ALKPHO 76 06/07/2024    ALT 17 06/07/2024    AST 11 (L) 06/07/2024    BILT 0.7 06/07/2024    ALB 3.6 06/07/2024    TP 6.6 06/07/2024     Lab Component   Component Value Date/Time     01/08/2021 1028          Component  Ref Range & Units 4/12/24 0939   Immunoglobulin A  70.00 - 312.00 mg/dL <7.83 Low    Immunoglobulin G  791 - 1,643 mg/dL 164 Low    Immunoglobulin M  43.0 - 279.0 mg/dL <5.3 Low             Component  Ref Range & Units 4/12/24 0939   Total Protein  5.7 - 8.2 g/dL 5.9   Albumin  3.75 - 5.21 g/dL 3.95   Alpha-1-Globulins  0.19 - 0.46 g/dL 0.31   Alpha-2-Globulins  0.48 - 1.05 g/dL 0.84   Beta Globulins  0.68 - 1.23 g/dL 0.61 Low    Gamma Globulins  0.62 - 1.70 g/dL 0.19 Low    Albumin/Globulin Ratio  1.00 - 2.00 2.02 High    SPE Interpretation Small monoclonal spike in the gamma region.   Immunofixation Small monoclonal IgG kappa. If clinically indicated, 24 hour urine monoclonal  protein studies is suggested.      Reviewed by Lima Rose M.D. Pathology 04/17/24 at 2:07 PM     [place] Free Light Chain  0.330 - 1.940 mg/dL 39.718 High    Lambda Free Light Chain  0.571 - 2.630 mg/dL <0.137 Low    Kappa/Lambda Flc Ratio    Comment: Unable to calculate due to Lambda <0.133 mg/dL  Note: If renal impairment is present, use a reference range of 0.37 - 3.10 for Kappa/Lambda FLC ratio.       M-Caemron  <=0.00 g/dL 0.04 High        Radiologic imaging reviewed at this visit:      PET/CT scan on 1/3/2024:  FINDINGS:    ABNORMAL FOCI:    Diffuse metabolic activity is noted throughout the osseous structures.      Metabolic activity in the anterior right 2nd rib with associated pathologic fracture demonstrates a maximum SUV of 11.0 (series 6, image 167), previously demonstrating a maximum SUV of 6.4.     Activity in the posterior left iliac wing demonstrates a maximum SUV of 3.2 (image 343), has not significantly changed from the prior exam.  Activity in the sacrum demonstrates a maximum SUV of 3.5 (image 342), previously demonstrating a maximum SUV of  3.3.     Degenerative activity involving right anterior lateral osteophytes in the thoracic spine likely represents DISH.  Metabolic activity around the  right hip joint capsule may represent bursitis.  Superinfection cannot be excluded.  This is relatively  unchanged from the prior exam.     OTHER:         Calcified pleural plaque along the right hemidiaphragm is noted with minimal calcification along the left hemidiaphragm.  Coronary artery atherosclerosis is noted.  Calcific tendinosis of the right greater than left Achilles tendon is  noted.     Impression   CONCLUSION:    1. Diffuse low-level metabolic activity throughout the axial skeleton has not significantly changed with the exception of the anterior right 2nd rib which demonstrates increased metabolic activity compared to the prior examination.          Assessment/Plan:     Multiple myeloma IgG kappa:  Hypercalcemia  Acute renal failure  Anemia  Diffuse bone lytic lesions  Beta-2 Microglobulin 18.5 mg/L    ISS stage III  5 cycles of (VCd) Dexamethasone, Bortezomib and cyclophosphamide  High dose therapy with stem cell infusion on 1/27/2017  Revlimid maintenance from 6/2017 to 12/202  Progression with repeat bone marrow biopsy on 1/12/2021  50% plasma cells  FISH study was not done  Chromosomes at relapse:   45,X,-Y[7]/46,XY[13]  Carfilzomib/Ann/Dex started on 2/18/2021       He is on salvage treatment with ann/carfilzomib/Dex.  He continues to have good control of his disease. We will continue with the current treatment. His pain has been stable.    He will continue with cycle 45, day 1. He is tolerating the treatment well. He will continue the Dex 12 mg weekly. He will continue with the current carfilzomib. The quant Ig levels are low. He will return in 4 weeks with repeat labs.      Lytic bone disease:   back pain:  Hypercalcemia    He has been on Zometa. The PET scan had diffuse bony changes consistent with myeloma.      Chronic Kidney Disease:     Overall stable.    Cardiomyopathy:    Continue cardiology follow up. He is s/p myocardial biopsy that is negative for amyloid. He will continue  follow up with cardiology.    Anemia complicating neoplastic disease:    Borderline low now. Continue to follow.        Ruiz Frias MD

## 2024-06-07 NOTE — PROGRESS NOTES
Patient here for C45 Kyprolis/Faspro. Patient states she took his Tylenol 500 mg and Benadryl 25 mg at 0915 today. Patient denies bleeding issues, fevers, bruising or dyspnea. Patient has no further concerns or complaints at this time.    Education Record    Learner:  Patient    Disease / Diagnosis: multiple myeloma     Barriers / Limitations:  None   Comments:    Method:  Discussion   Comments:    General Topics:  Medication, Side effects and symptom management, and Plan of care reviewed   Comments:    Outcome:  Shows understanding   Comments:

## 2024-06-08 LAB
IGA SERPL-MCNC: <31.3 MG/DL (ref 70–312)
IGM SERPL-MCNC: <21 MG/DL (ref 43–279)
IMMUNOGLOBULIN PNL SER-MCNC: 153 MG/DL (ref 791–1643)

## 2024-06-11 LAB
ALBUMIN SERPL ELPH-MCNC: 4.03 G/DL (ref 3.75–5.21)
ALBUMIN/GLOB SERPL: 2.04 {RATIO} (ref 1–2)
ALPHA1 GLOB SERPL ELPH-MCNC: 0.32 G/DL (ref 0.19–0.46)
ALPHA2 GLOB SERPL ELPH-MCNC: 0.82 G/DL (ref 0.48–1.05)
B-GLOBULIN SERPL ELPH-MCNC: 0.64 G/DL (ref 0.68–1.23)
GAMMA GLOB SERPL ELPH-MCNC: 0.19 G/DL (ref 0.62–1.7)
KAPPA LC FREE SER-MCNC: 44.79 MG/DL (ref 0.33–1.94)
LAMBDA LC FREE SERPL-MCNC: <0.134 MG/DL (ref 0.57–2.63)
M PROTEIN 1 SERPL ELPH-MCNC: 0.05 G/DL (ref ?–0)
PROT SERPL-MCNC: 6 G/DL (ref 5.7–8.2)

## 2024-06-14 ENCOUNTER — OFFICE VISIT (OUTPATIENT)
Dept: PHYSICAL THERAPY | Age: 74
End: 2024-06-14
Attending: PHYSICIAN ASSISTANT
Payer: MEDICARE

## 2024-06-14 ENCOUNTER — OFFICE VISIT (OUTPATIENT)
Dept: HEMATOLOGY/ONCOLOGY | Age: 74
End: 2024-06-14
Attending: INTERNAL MEDICINE
Payer: MEDICARE

## 2024-06-14 VITALS
HEIGHT: 72.01 IN | HEART RATE: 66 BPM | RESPIRATION RATE: 18 BRPM | WEIGHT: 256.5 LBS | TEMPERATURE: 96 F | DIASTOLIC BLOOD PRESSURE: 67 MMHG | SYSTOLIC BLOOD PRESSURE: 107 MMHG | BODY MASS INDEX: 34.74 KG/M2 | OXYGEN SATURATION: 94 %

## 2024-06-14 DIAGNOSIS — R79.89 ELEVATED LFTS: ICD-10-CM

## 2024-06-14 DIAGNOSIS — C90.00 MULTIPLE MYELOMA NOT HAVING ACHIEVED REMISSION (HCC): Primary | ICD-10-CM

## 2024-06-14 DIAGNOSIS — N18.30 STAGE 3 CHRONIC KIDNEY DISEASE, UNSPECIFIED WHETHER STAGE 3A OR 3B CKD (HCC): ICD-10-CM

## 2024-06-14 LAB
ANION GAP SERPL CALC-SCNC: 8 MMOL/L (ref 0–18)
BASOPHILS # BLD AUTO: 0.01 X10(3) UL (ref 0–0.2)
BASOPHILS NFR BLD AUTO: 0.1 %
BILIRUB UR QL STRIP.AUTO: NEGATIVE
BUN BLD-MCNC: 33 MG/DL (ref 9–23)
CALCIUM BLD-MCNC: 9.5 MG/DL (ref 8.5–10.1)
CHLORIDE SERPL-SCNC: 108 MMOL/L (ref 98–112)
CLARITY UR REFRACT.AUTO: CLEAR
CO2 SERPL-SCNC: 18 MMOL/L (ref 21–32)
COLOR UR AUTO: YELLOW
CREAT BLD-MCNC: 2.12 MG/DL
EGFRCR SERPLBLD CKD-EPI 2021: 32 ML/MIN/1.73M2 (ref 60–?)
EOSINOPHIL # BLD AUTO: 0 X10(3) UL (ref 0–0.7)
EOSINOPHIL NFR BLD AUTO: 0 %
ERYTHROCYTE [DISTWIDTH] IN BLOOD BY AUTOMATED COUNT: 13.9 %
FASTING STATUS PATIENT QL REPORTED: NO
GLUCOSE BLD-MCNC: 168 MG/DL (ref 70–99)
GLUCOSE UR STRIP.AUTO-MCNC: 500 MG/DL
HCT VFR BLD AUTO: 35.4 %
HGB BLD-MCNC: 12 G/DL
IMM GRANULOCYTES # BLD AUTO: 0.03 X10(3) UL (ref 0–1)
IMM GRANULOCYTES NFR BLD: 0.3 %
KETONES UR STRIP.AUTO-MCNC: NEGATIVE MG/DL
LYMPHOCYTES # BLD AUTO: 0.13 X10(3) UL (ref 1–4)
LYMPHOCYTES NFR BLD AUTO: 1.3 %
MAGNESIUM SERPL-MCNC: 2.3 MG/DL (ref 1.6–2.6)
MCH RBC QN AUTO: 36.3 PG (ref 26–34)
MCHC RBC AUTO-ENTMCNC: 33.9 G/DL (ref 31–37)
MCV RBC AUTO: 106.9 FL
MONOCYTES # BLD AUTO: 0.06 X10(3) UL (ref 0.1–1)
MONOCYTES NFR BLD AUTO: 0.6 %
NEUTROPHILS # BLD AUTO: 9.88 X10 (3) UL (ref 1.5–7.7)
NEUTROPHILS # BLD AUTO: 9.88 X10(3) UL (ref 1.5–7.7)
NEUTROPHILS NFR BLD AUTO: 97.7 %
NITRITE UR QL STRIP.AUTO: NEGATIVE
OSMOLALITY SERPL CALC.SUM OF ELEC: 289 MOSM/KG (ref 275–295)
PH UR STRIP.AUTO: 5 [PH] (ref 5–8)
PHOSPHATE SERPL-MCNC: 1.9 MG/DL (ref 2.5–4.9)
PLATELET # BLD AUTO: 134 10(3)UL (ref 150–450)
POTASSIUM SERPL-SCNC: 4.2 MMOL/L (ref 3.5–5.1)
PTH-INTACT SERPL-MCNC: 29.2 PG/ML (ref 18.5–88)
RBC # BLD AUTO: 3.31 X10(6)UL
SODIUM SERPL-SCNC: 134 MMOL/L (ref 136–145)
SP GR UR STRIP.AUTO: <=1.005 (ref 1–1.03)
UROBILINOGEN UR STRIP.AUTO-MCNC: 0.2 MG/DL
VIT D+METAB SERPL-MCNC: 38.8 NG/ML (ref 30–100)
WBC # BLD AUTO: 10.1 X10(3) UL (ref 4–11)

## 2024-06-14 PROCEDURE — 81001 URINALYSIS AUTO W/SCOPE: CPT

## 2024-06-14 PROCEDURE — 83735 ASSAY OF MAGNESIUM: CPT

## 2024-06-14 PROCEDURE — 83970 ASSAY OF PARATHORMONE: CPT

## 2024-06-14 PROCEDURE — 84100 ASSAY OF PHOSPHORUS: CPT

## 2024-06-14 PROCEDURE — 97110 THERAPEUTIC EXERCISES: CPT

## 2024-06-14 PROCEDURE — 80048 BASIC METABOLIC PNL TOTAL CA: CPT

## 2024-06-14 PROCEDURE — 82306 VITAMIN D 25 HYDROXY: CPT

## 2024-06-14 PROCEDURE — 81015 MICROSCOPIC EXAM OF URINE: CPT

## 2024-06-14 PROCEDURE — 36415 COLL VENOUS BLD VENIPUNCTURE: CPT

## 2024-06-14 PROCEDURE — 96413 CHEMO IV INFUSION 1 HR: CPT

## 2024-06-14 PROCEDURE — 85025 COMPLETE CBC W/AUTO DIFF WBC: CPT

## 2024-06-14 NOTE — PROGRESS NOTES
Diagnosis:   Lumbar pain (M54.50)  Chronic left-sided thoracic back pain (M54.6,G89.29)  Muscle weakness on examination (M62.81)  Leg heaviness (R29.898)  Weakness of both lower extremities (R29.898)  Hyperreflexia (R29.2)  Multiple myeloma, remission status unspecified (HCC) (C90.00)  Compression fracture of body of thoracic vertebra (HCC) (S22.000A)         Referring Provider: Nohemi Tirado  Date of Evaluation:    4/4/2024    Precautions:  None Next MD visit:   none scheduled  Date of Surgery: n/a   Insurance Primary/Secondary: MEDICARE / [place]DEMN[place]     # Auth Visits: n/a             Subjective: \" I had steroid injection and I do not feel any pain today \".  Pain: 5/10    Objective:   Observation: Posture: FHP, L shoulder higher than R, rounded shoulders, Increased Thoracic kyphosis  Palpation: Tenderness on R lateral and anterior hip  Sensation: Intact  Proprioception: Foot positioning - Intact  Coordination: heel to shin - Intact     Flexibility:  Hip Flexor: R Severe restrictions , L Severe restrictions   Hamstrings: R Severe restrictions ; L Severe restrictions   Piriformis: R Severe restrictions ; L Severe restrictions   Quads: R Mild restrictions ; L Mild restrictions   Gastroc-soleus: R Moderate restrictions ; L Moderate restrictions      Strength/MMT: (* denotes performed with pain)  Hip Knee Foot/Ankle   Flexion: R 3+/5; L 3+/5+  Extension: R 3+/5; L 3+/5  Abduction: R 3+/5; L 3+/5  ER: R 4-/5; L 4-/5  IR: R 4-/5; L 4-/5 Flexion: R 5/5; L 5/5  Extension: R 5/5; L 5/5    DF: R 3/5; L 3/5  PF: R 3/5; L 3/5            Gait: pt ambulates on level ground with antalgic gait on the R.   Balance: SLS: R 0 L - 0   5 STST - 23 secs with UE support, unable without UE- decreased mobility  TUG- 16 secs - high risk for falls  ( 5/28/2024 ) TUG 12 sec                       SLS R  :  2 sec        SLS L : 3 sec      Assessment:   patient was able to perform all weight bearing ex's without report of pain or  clicking in his hip .    Goals:   Goals: (to be met in 8 visits)   Pt will improve transversus abdominis recruitment to perform proper isometric contraction without requiring verbal or tactile cuing to promote advancement of therex, improvement of core muscles strength to promote lumbar stability with activities that include bending, lifting and increased LE activity- progressing  Pt will demonstrate good understanding of proper posture and body mechanics to decrease pain and improve spinal safety -progressing  Pt will have improvement of muscles flexibility to allow increase ease with movement and improve positional tolerance- progressing  Pt will demonstrate improved core strength, hips and abdominal muscles strength to 4/5 and improve kinsesthetic and proprioceptive awareness to be able decreased pain to <2/10 with sitting, standing, walking, bending, carrying and lifting, especially toward end of the day- progressing  Patient will have improvement with overall mobility with decreased risk for falls, with score of less than 15 secs for 5 STST and less than 14 secs for TUG-progressing  Patient will be able to tolerate 45-60 mins of NMR, therapeutic exercises and therapeutic activities with good stability, stable vital signs, and no evidence of fatigue.-progressing  Pt will be independent and compliant with comprehensive HEP to maintain progress achieved in PT, and be able to utilize HEP to manage future exacerbations  -  progressing    Plan: Continue PT as per updated POC.   Date : 5/22/24  TX # 8/16 Date : 24/24  TX # 9/16 Date : 5/28/24  TX # 10/16 Date: 6/6/2024   Tx #: 11/16 Date : 6/14/24  TX # 12/16   NU step x 6 min , level 4  Standing :  Gastroc stretch on slant board   5 x 20 sec hold   B heel raises x 20  B toes raises x 20  Sit to stand from chairs    1 x 10   Marches in // bars with 1.5 lbs 2 x 15   Alt hip flex , abd , ext with 1.5 lbs 2 x 15  Sideways steps in // bars with 1.5 lbs 2 x 15  Tandem walk  in // bars   2 laps   Air ex :  Step up x 20  Lateral step up x 20   Steps over x 20  Supine :  TrA activation with :  Bridging x 20  Adductors activation with YTB 2 x 10  Abductors activation with fitness ring   2 x 10   NU step x 6 min , level 5  Standing :  B gastroc stretch on slant board   5 x 20 sec hold   B heel raises x 20  B toes raises x 20  Shuttle :  B leg press with 50 lbs   2  x 10  R/L leg press with 25 lbs   2 x 10   Step up / lateral step up on 6 inch step   2 x 10 each   Marches in // bars with 1.5 lbs around ankles    2 x 10   Alt hip abd ib // bars with 1.5 lbs around ankles 2 x 10   Sidestepping in // bars with 1.5 lbs around ankles   4 laps   Balance re - education :  Tandem walk / retro tandem walk in // bars 2 laps on balance beam   Sideways steps  on balance beam x 2 laps   Air ex :  Step ups x 20  Lateral step ups x 20   NU step x 6 min , level 5  Standing :  B gastroc stretch on slant board x 5 x 20 sec hold   B heel raises x 20  B toes raises x 20   Tandem stance   R/L x 5 attempts   L/R x 5 attempts   R/L singe cone tap   X 10 reps each side   Tandem walk forward / backward in // bars x 4 rounds   Rocker board :  B LE balancing A/P X 30  B LE balancing M/L x 30   High marches on air ex ( with no arm support ) x 30   Step up / lateral step up on 6 inch step with 1.2 lbs around ankles   Alt hip abd with 1.2 lbs on air ex x 20   Alt hip ext with 1.2 lbs on air ex x 20  NU step x 6 min , level 5  Standing :  B gastroc stretch on slant board x 2 x 30 sec hold   B heel raises x 20  B toes raises x 20  Supine piriformis stretch 2x 30 secs (fig 4 no lift )  LTR 1x 10  Supine  R hip abd x 10  R SLR x 5  R hip ER/IR x 10  Sit to stand x 5 Nu step x 6 min , level 5  Standing :  B gastroc stretch on slant board   3 x 30 sec hold   B heel raises x 20  B heel raises x 20  Supine :  R/L piriformis stretch   2 x 30 sec hold   LTR x 10   R/L SLR 2 x 10   Sit to stand x 5   Standing :  Lan monteiro with 2  lbs 2 x 10   Alt hip abd with 2 lbs   2 x 10   Alt hip ext with 2 lbs   2 x 10   Sidestepping in // bars with 2 lbs   2 laps                         HEP: Access Code: [email or web address]  URL: [email or web address]  Date: 04/09/2024  Prepared by: Fang Hein    Exercises  - Heel Toe Raises with Counter Support  - 1 x daily - 7 x weekly - 2 sets - 10 reps  - Standing March with Counter Support  - 1 x daily - 7 x weekly - 2 sets - 10 reps  - Standing Knee Flexion with Counter Support  - 1 x daily - 7 x weekly - 2 sets - 10 reps  - Standing Hip Extension with Unilateral Counter Support  - 1 x daily - 7 x weekly - 2 sets - 10 reps  - Standing Hip Abduction with Counter Support  - 1 x daily - 7 x weekly - 2 sets - 10 reps  - Seated Long Arc Quad  - 1 x daily - 7 x weekly - 2 sets - 10 reps  - Supine Heel Slide  - 1 x daily - 7 x weekly - 1-2 sets - 10 reps  - Supine Transversus Abdominis Bracing - Hands on Stomach  - 1 x daily - 7 x weekly - 1-2 sets - 10 reps - 5 secs hold  - Clamshell  - 2 x daily - 7 x weekly - 1-2 sets - 10 reps        Charges: Thera ex -3        Total Timed Treatment: 40 min  Total Treatment Time: 45 min

## 2024-06-14 NOTE — PROGRESS NOTES
Pt here for C45D8 Drug(s)kyprolis.  Arrives Ambulating independently, accompanied by Self     Patient was evaluated today by Treatment Nurse.    Oral medications included in this regimen:  no    Patient confirms comprehension of cancer treatment schedule:  yes    Pregnancy screening:  Not applicable    Modifications in dose or schedule:  No    Medications appearance and physical integrity checked by RN: yes.    Chemotherapy IV pump settings verified by 2 RNs:  Yes.  Frequency of blood return and site check throughout administration: Prior to administration and At completion of therapy     Infusion/treatment outcome:  patient tolerated treatment without incident    Education Record    Learner:  Patient  Barriers / Limitations:  None  Method:  Discussion  Education / instructions given:  avs  Outcome:  Shows understanding    Discharged Home, Ambulating independently, accompanied by:Self    Patient/family verbalized understanding of future appointments: by printed AVS

## 2024-06-17 ENCOUNTER — OFFICE VISIT (OUTPATIENT)
Dept: PHYSICAL THERAPY | Age: 74
End: 2024-06-17
Attending: PHYSICIAN ASSISTANT
Payer: MEDICARE

## 2024-06-17 PROCEDURE — 97112 NEUROMUSCULAR REEDUCATION: CPT

## 2024-06-17 PROCEDURE — 97110 THERAPEUTIC EXERCISES: CPT

## 2024-06-17 NOTE — PROGRESS NOTES
Diagnosis:   Lumbar pain (M54.50)  Chronic left-sided thoracic back pain (M54.6,G89.29)  Muscle weakness on examination (M62.81)  Leg heaviness (R29.898)  Weakness of both lower extremities (R29.898)  Hyperreflexia (R29.2)  Multiple myeloma, remission status unspecified (HCC) (C90.00)  Compression fracture of body of thoracic vertebra (HCC) (S22.000A)         Referring Provider: Nohemi Tirado  Date of Evaluation:    4/4/2024    Precautions:  None Next MD visit:   none scheduled  Date of Surgery: n/a   Insurance Primary/Secondary: MEDICARE / [place] INDEMN[place]     # Auth Visits: n/a             Subjective: \" I am OK today \".  Pain: 5/10    Objective:   Observation: Posture: FHP, L shoulder higher than R, rounded shoulders, Increased Thoracic kyphosis  Palpation: Tenderness on R lateral and anterior hip  Sensation: Intact  Proprioception: Foot positioning - Intact  Coordination: heel to shin - Intact     Flexibility:  Hip Flexor: R Severe restrictions , L Severe restrictions   Hamstrings: R Severe restrictions ; L Severe restrictions   Piriformis: R Severe restrictions ; L Severe restrictions   Quads: R Mild restrictions ; L Mild restrictions   Gastroc-soleus: R Moderate restrictions ; L Moderate restrictions      Strength/MMT: (* denotes performed with pain)  Hip Knee Foot/Ankle   Flexion: R 3+/5; L 3+/5+  Extension: R 3+/5; L 3+/5  Abduction: R 3+/5; L 3+/5  ER: R 4-/5; L 4-/5  IR: R 4-/5; L 4-/5 Flexion: R 5/5; L 5/5  Extension: R 5/5; L 5/5    DF: R 3/5; L 3/5  PF: R 3/5; L 3/5            Gait: pt ambulates on level ground with antalgic gait on the R.   Balance: SLS: R 0 L - 0   5 STST - 23 secs with UE support, unable without UE- decreased mobility  TUG- 16 secs - high risk for falls  ( 5/28/2024 ) TUG 12 sec                       SLS R  :  2 sec        SLS L : 3 sec  ( 6/17/2024 ) SLS R :  3 sec         SLS L : 4 sec       Assessment:    Increased reps on all hip strength ex's in standing position and resumed  balance activities on air ex's to further challenge lower extremities and distal  trunk stability with prolonged standing activities with patient good tolerance . Patient demonstrates improved SLS R/L since last re-assessment .    Goals:   Goals: (to be met in 8 visits)   Pt will improve transversus abdominis recruitment to perform proper isometric contraction without requiring verbal or tactile cuing to promote advancement of therex, improvement of core muscles strength to promote lumbar stability with activities that include bending, lifting and increased LE activity- progressing  Pt will demonstrate good understanding of proper posture and body mechanics to decrease pain and improve spinal safety -progressing  Pt will have improvement of muscles flexibility to allow increase ease with movement and improve positional tolerance- progressing  Pt will demonstrate improved core strength, hips and abdominal muscles strength to 4/5 and improve kinsesthetic and proprioceptive awareness to be able decreased pain to <2/10 with sitting, standing, walking, bending, carrying and lifting, especially toward end of the day- progressing  Patient will have improvement with overall mobility with decreased risk for falls, with score of less than 15 secs for 5 STST and less than 14 secs for TUG-progressing  Patient will be able to tolerate 45-60 mins of NMR, therapeutic exercises and therapeutic activities with good stability, stable vital signs, and no evidence of fatigue.-progressing  Pt will be independent and compliant with comprehensive HEP to maintain progress achieved in PT, and be able to utilize HEP to manage future exacerbations  -  progressing    Plan: Continue PT as per updated POC.   Date : 24/24  TX # 9/16 Date : 5/28/24  TX # 10/16 Date: 6/6/2024   Tx #: 11/16 Date : 6/14/24  TX # 12/16 Date : 6/17/24  TX # 13/16    NU step x 6 min , level 5  Standing :  B gastroc stretch on slant board   5 x 20 sec hold   B heel  raises x 20  B toes raises x 20  Shuttle :  B leg press with 50 lbs   2  x 10  R/L leg press with 25 lbs   2 x 10   Step up / lateral step up on 6 inch step   2 x 10 each   Marches in // bars with 1.5 lbs around ankles    2 x 10   Alt hip abd ib // bars with 1.5 lbs around ankles 2 x 10   Sidestepping in // bars with 1.5 lbs around ankles   4 laps   Balance re - education :  Tandem walk / retro tandem walk in // bars 2 laps on balance beam   Sideways steps  on balance beam x 2 laps   Air ex :  Step ups x 20  Lateral step ups x 20   NU step x 6 min , level 5  Standing :  B gastroc stretch on slant board x 5 x 20 sec hold   B heel raises x 20  B toes raises x 20   Tandem stance   R/L x 5 attempts   L/R x 5 attempts   R/L singe cone tap   X 10 reps each side   Tandem walk forward / backward in // bars x 4 rounds   Rocker board :  B LE balancing A/P X 30  B LE balancing M/L x 30   High marches on air ex ( with no arm support ) x 30   Step up / lateral step up on 6 inch step with 1.2 lbs around ankles   Alt hip abd with 1.2 lbs on air ex x 20   Alt hip ext with 1.2 lbs on air ex x 20  NU step x 6 min , level 5  Standing :  B gastroc stretch on slant board x 2 x 30 sec hold   B heel raises x 20  B toes raises x 20  Supine piriformis stretch 2x 30 secs (fig 4 no lift )  LTR 1x 10  Supine  R hip abd x 10  R SLR x 5  R hip ER/IR x 10  Sit to stand x 5 Nu step x 6 min , level 5  Standing :  B gastroc stretch on slant board   3 x 30 sec hold   B heel raises x 20  B heel raises x 20  Supine :  R/L piriformis stretch   2 x 30 sec hold   LTR x 10   R/L SLR 2 x 10   Sit to stand x 5   Standing :  Alt marches with 2 lbs 2 x 10   Alt hip abd with 2 lbs   2 x 10   Alt hip ext with 2 lbs   2 x 10   Sidestepping in // bars with 2 lbs   2 laps  NU step x 6 min , level 5  Standing :  B gastroc stretch on slant board x 5 reps x 20 sec hold   B heel raises x 20  B toes raises x 20   Sit to stand 2 x 10   Alt marches with 2 lbs 2 x 15   Alt  hip abd with 2 lbs 2 x 15   Alt hip ext with 2 lbs 2 x 15   Sidestepping in // bars with 2 lbs   2 laps   Balance re- training :  Air ex :  High marches x 20  Step up / lateral step up 2 x 10 each   Step over lateral 2 x 10   Balance beam :  Tandem walk / retro tandem walk x 2 rounds   Sidestepping x 2 rounds                           HEP: Access Code: [email or web address]  URL: [email or web address]  Date: 04/09/2024  Prepared by: Fang Hein    Exercises  - Heel Toe Raises with Counter Support  - 1 x daily - 7 x weekly - 2 sets - 10 reps  - Standing March with Counter Support  - 1 x daily - 7 x weekly - 2 sets - 10 reps  - Standing Knee Flexion with Counter Support  - 1 x daily - 7 x weekly - 2 sets - 10 reps  - Standing Hip Extension with Unilateral Counter Support  - 1 x daily - 7 x weekly - 2 sets - 10 reps  - Standing Hip Abduction with Counter Support  - 1 x daily - 7 x weekly - 2 sets - 10 reps  - Seated Long Arc Quad  - 1 x daily - 7 x weekly - 2 sets - 10 reps  - Supine Heel Slide  - 1 x daily - 7 x weekly - 1-2 sets - 10 reps  - Supine Transversus Abdominis Bracing - Hands on Stomach  - 1 x daily - 7 x weekly - 1-2 sets - 10 reps - 5 secs hold  - Clamshell  - 2 x daily - 7 x weekly - 1-2 sets - 10 reps        Charges: Thera ex x 2 , neuro - re- education x 1         Total Timed Treatment: 40 min  Total Treatment Time: 45 min

## 2024-06-18 ENCOUNTER — OFFICE VISIT (OUTPATIENT)
Dept: NEPHROLOGY | Facility: CLINIC | Age: 74
End: 2024-06-18

## 2024-06-18 VITALS — DIASTOLIC BLOOD PRESSURE: 58 MMHG | BODY MASS INDEX: 35 KG/M2 | WEIGHT: 257 LBS | SYSTOLIC BLOOD PRESSURE: 108 MMHG

## 2024-06-18 DIAGNOSIS — C90.00 MYELOMA KIDNEY (HCC): ICD-10-CM

## 2024-06-18 DIAGNOSIS — N08 MYELOMA KIDNEY (HCC): ICD-10-CM

## 2024-06-18 DIAGNOSIS — N18.30 STAGE 3 CHRONIC KIDNEY DISEASE, UNSPECIFIED WHETHER STAGE 3A OR 3B CKD (HCC): Primary | ICD-10-CM

## 2024-06-18 PROCEDURE — 99213 OFFICE O/P EST LOW 20 MIN: CPT | Performed by: INTERNAL MEDICINE

## 2024-06-18 NOTE — PROGRESS NOTES
Nephrology Progress Note      Nacho Valle is a 74 year old male.    HPI:     No chief complaint on file.      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 He was treated in hospital with pamidronate/fluids/plasmapheresis. He also required few treatments of HD.    S/p stem cell tx @ [facility]-  did well for few years  Patient is now on salvage chemo (diagnosed w/ recurrent dx in Jan 2021)  He follows w/ Dr. Frias and Dr. Barragan ([facility])- has been on salvage chemo as documented in oncology notes    Recent f/u with oncology reviewed.       No CHF issues       Denies any n/v  No urinary complains  Denies any LE edema       Medications (Active prior to today's visit):  Current Outpatient Medications   Medication Sig Dispense Refill    dexamethasone (DECADRON) 4 MG tablet TAKE 3 TABLETS(12 MG) BY MOUTH 1 TIME EVERY WEEK 36 tablet 3    ATORVASTATIN 20 MG Oral Tab TAKE 1 TABLET(20 MG) BY MOUTH DAILY 90 tablet 0    ACYCLOVIR 400 MG Oral Tab TAKE 1 TABLET(400 MG) BY MOUTH TWICE DAILY 180 tablet 1    traMADol 50 MG Oral Tab Take 1 tablet (50 mg total) by mouth every 6 (six) hours as needed for Pain. 40 tablet 0    benzonatate 200 MG Oral Cap Take 1 capsule (200 mg total) by mouth 3 (three) times daily as needed for cough. 30 capsule 0    cyclobenzaprine 10 MG Oral Tab TAKE 1 TABLET(10 MG) BY MOUTH THREE TIMES DAILY AS NEEDED FOR MUSCLE SPASMS 10 tablet 1    DRISDOL 1.25 MG (60856 UT) Oral Cap       ONETOUCH ULTRASOFT LANCETS Does not apply Misc TEST BLOOD SUGAR TWICE DAILY 200 each 2    ENTRESTO  MG Oral Tab Take 1 tablet by mouth daily.      empagliflozin 10 MG Oral Tab Take 1 tablet (10 mg total) by mouth daily. JARDIANCE      Glucose Blood (ONETOUCH ULTRA) In Vitro Strip CHECK SUGARS THREE TIMES DAILY AS DIRECTED 300 strip 2    Metoprolol Succinate  MG Oral Tablet 24 Hr Take 1 tablet (200 mg total) by mouth every evening.      eplerenone 25 MG Oral Tab  Take 1 tablet (25 mg total) by mouth daily.      omega-3 fatty acids 1000 MG Oral Cap Take 1,000 mg by mouth 2 (two) times daily.      Multiple Vitamin Oral Tab Take 1 tablet by mouth.      acetaminophen 500 MG Oral Tab Take 2 tablets (1,000 mg total) by mouth nightly.      aspirin (ASPIR-81) 81 MG Oral Tab EC Take 1 tablet by mouth daily. 1 tablet 0       Allergies:  Allergies   Allergen Reactions    Pcn [Penicillins] ANAPHYLAXIS, HIVES, HALLUCINATION and SHORTNESS OF BREATH     Respiratory distress    Beta Adrenergic Blockers     Latex      Surgical tape  Welts, burning of skin, itching    Statins          ROS:     10 systems reviewed and otherwise unremarkable       PHYSICAL EXAM:   /58 (BP Location: Right arm)   Wt 257 lb (116.6 kg)   BMI 34.85 kg/m²   Wt Readings from Last 6 Encounters:   06/18/24 257 lb (116.6 kg)   06/14/24 256 lb 8 oz (116.3 kg)   06/07/24 258 lb 8 oz (117.3 kg)   05/24/24 255 lb 8 oz (115.9 kg)   05/17/24 257 lb (116.6 kg)   05/10/24 255 lb 12.8 oz (116 kg)       General: Alert and oriented in no apparent distress.  HEENT: No scleral icterus, MMM  Neck: Supple, no ELLIOT or thyromegaly  Cardiac: Regular rate and rhythm, S1, S2 normal, no murmur or rub  Lungs: Clear without wheezes, rales, rhonchi.    Abdomen: Soft, non-tender. + bowel sounds, no palpable organomegaly  Extremities: Without clubbing, cyanosis ; no edema  Neurologic: Alert and oriented, normal affect, cranial nerves grossly intact, moving all extremities  Skin: Warm and dry, no rashes    Component      Latest Ref Rng 11/24/2023   WBC      4.0 - 11.0 x10(3) uL 12.9 (H)    WBC       13.3 (H)    RBC      3.80 - 5.80 x10(6)uL 3.60 (L)    RBC       3.63 (L)    Hemoglobin      13.0 - 17.5 g/dL 12.8 (L)    Hemoglobin       12.8 (L)    Hematocrit      39.0 - 53.0 % 39.3    Hematocrit       39.4    Platelet Count      150.0 - 450.0 10(3)uL 268.0    Platelet Count       256.0    MCV      80.0 - 100.0 fL 109.2 (H)    MCV       108.5  (H)    MCH      26.0 - 34.0 pg 35.6 (H)    MCH       35.3 (H)    MCHC      31.0 - 37.0 g/dL 32.6    MCHC       32.5    RDW      % 13.3    RDW       13.4    Prelim Neutrophil Abs      1.50 - 7.70 x10 (3) uL 12.49 (H)    Prelim Neutrophil Abs       12.88 (H)    Neutrophils Absolute      1.50 - 7.70 x10(3) uL 12.49 (H)    Neutrophils Absolute       12.88 (H)    Lymphocytes Absolute      1.00 - 4.00 x10(3) uL 0.21 (L)    Lymphocytes Absolute       0.21 (L)    Monocytes Absolute      0.10 - 1.00 x10(3) uL 0.08 (L)    Monocytes Absolute       0.08 (L)    Eosinophils Absolute      0.00 - 0.70 x10(3) uL 0.02    Eosinophils Absolute       0.01    Basophils Absolute      0.00 - 0.20 x10(3) uL 0.02    Basophils Absolute       0.03    Immature Granulocyte Absolute      0.00 - 1.00 x10(3) uL 0.05    Immature Granulocyte Absolute       0.04    Neutrophils %      % 97.0    Neutrophils %       97.2    Lymphocytes %      % 1.6    Lymphocytes %       1.6    Monocytes %      % 0.6    Monocytes %       0.6    Eosinophils %      % 0.2    Eosinophils %       0.1    Basophils %      % 0.2    Basophils %       0.2    Immature Granulocyte %      % 0.4    Immature Granulocyte %       0.3    Glucose      70 - 99 mg/dL 147 (H)    Glucose       150 (H)    Sodium      136 - 145 mmol/L 136    Sodium       136    Potassium      3.5 - 5.1 mmol/L 4.3    Potassium       4.4    Chloride      98 - 112 mmol/L 108    Chloride       109    Carbon Dioxide, Total      21.0 - 32.0 mmol/L 22.0    Carbon Dioxide, Total       22.0    ANION GAP      0 - 18 mmol/L 6    ANION GAP       5    BUN      9 - 23 mg/dL 27 (H)    BUN       29 (H)    CREATININE      0.70 - 1.30 mg/dL 1.74 (H)    CREATININE       1.68 (H)    CALCIUM      8.5 - 10.1 mg/dL 9.0    CALCIUM       9.1    CALCULATED OSMOLALITY      275 - 295 mOsm/kg 290    CALCULATED OSMOLALITY       291    EGFR      >=60 mL/min/1.73m2 40 (L)    EGFR       42 (L)    AST (SGOT)      15 - 37 U/L 12 (L)    ALT  (SGPT)      16 - 61 U/L 16    ALKALINE PHOSPHATASE      45 - 117 U/L 87    Total Bilirubin      0.1 - 2.0 mg/dL 0.8    PROTEIN, TOTAL      6.4 - 8.2 g/dL 6.5    PROTEIN, TOTAL      5.7 - 8.2 g/dL 6.0    Albumin      3.4 - 5.0 g/dL 3.7    Albumin      3.75 - 5.21 g/dL 4.02    Globulin      2.8 - 4.4 g/dL 2.8    A/G Ratio      1.0 - 2.0  1.3    Patient Fasting for CMP? Patient not present    ALPHA-1-GLOBULINS      0.19 - 0.46 g/dL 0.30    ALPHA-2-GLOBULINS      0.48 - 1.05 g/dL 0.86    BETA GLOBULINS      0.68 - 1.23 g/dL 0.59 (L)    GAMMA GLOBULINS      0.62 - 1.70 g/dL 0.22 (L)    ALBUMIN/GLOBULIN RATIO      1.00 - 2.00  2.03 (H)    SPE INTERPRETATION Small abnormality in the gamma region.    IMMUNOFIXATION Small Monoclonal IgG kappa. If clinically indicated, 24 hour urine monoclonal protein studies is suggested.    KAPPA FREE LIGHT CHAIN      0.330 - 1.940 mg/dL 13.950 (H)    LAMBDA FREE LIGHT CHAIN      0.571 - 2.630 mg/dL 0.152 (L)    KAPPA/LAMBDA FLC RATIO      0.26 - 1.65  91.78 (H)    M-Cameron      <=0.00 g/dL 0.04 (H)    Reviewed By: Reviewed by Carolina Lucio M.D. Pathology 11/27/23 at 4:59 PM    Patient Fasting for BMP? Patient not present    Color Urine      Yellow  Yellow    Clarity Urine      Clear  Clear    Spec Gravity      1.005 - 1.030  1.015    Glucose Urine      Negative mg/dL 500 !    Bilirubin Urine      Negative  Negative    Ketones, UA      Negative mg/dL Negative    Blood Urine      Negative  Trace-Intact !    PH Urine      5.0 - 8.0  5.5    Protein Urine      Negative mg/dL 30 mg/dL !    Urobilinogen Urine      <2.0 mg/dL 0.2    Nitrite Urine      Negative  Negative    Leukocyte Esterase       Negative  Negative    WBC Urine      0 - 5 /HPF 1-5    RBC Urine      0 - 2 /HPF 0-2    Bacteria Urine      None Seen /HPF Rare !    SQUAM EPI CELLS UR      None Seen /HPF Few !    RENAL TUBULAR EPITHELIAL CELLS      None Seen /HPF None Seen    TRANSITIONAL EPI CELLS      None Seen /HPF None Seen    YEAST  URINE      None Seen /HPF None Seen    TOTAL PROTEIN URINE RANDOM      mg/dL 38.1    CREATININE UR RANDOM      mg/dL 42.70    PHOSPHORUS      2.5 - 4.9 mg/dL 1.8 (L)    Magnesium, Serum      1.6 - 2.6 mg/dL 2.4    VITAMIN D, 25-OH, TOTAL      30.0 - 100.0 ng/mL 24.7 (L)       Legend:  (H) High  (L) Low  ! Abnormal    ASSESSMENT/PLAN:       1. Hx of Myeloma kidney disease -   Required plasmapheresis/HD treatments in the hospital  His renal function had recovered and stable despite the recurrence of MM: + microalbuminuria     Volume is good;; Cr has gradually risen over the past few years; more recently baseline ~ 1.8-2.0    - monitor labs  - further MM w/u per heme/onc; he is currently on salvage chemo    2. MM- s/p stem cell;  w/ relapse- on tx per oncology    3. Cardiomyopathy/HTN- stable/compensated; monitor ; cardiac bx negative for amyloid; follows w/ cardiology  ; pt is on jardiance/inspra,/entresto  - appears compensated     4. BMD- Ca OK; vit D/PTH @ goal     F/U in 4 mos     Francisco Silva MD  6/18/2024

## 2024-06-21 ENCOUNTER — OFFICE VISIT (OUTPATIENT)
Dept: HEMATOLOGY/ONCOLOGY | Age: 74
End: 2024-06-21
Attending: INTERNAL MEDICINE

## 2024-06-21 VITALS
RESPIRATION RATE: 18 BRPM | TEMPERATURE: 97 F | WEIGHT: 259.5 LBS | BODY MASS INDEX: 35.15 KG/M2 | DIASTOLIC BLOOD PRESSURE: 65 MMHG | OXYGEN SATURATION: 98 % | HEIGHT: 72.01 IN | HEART RATE: 60 BPM | SYSTOLIC BLOOD PRESSURE: 106 MMHG

## 2024-06-21 DIAGNOSIS — R79.89 ELEVATED LFTS: ICD-10-CM

## 2024-06-21 DIAGNOSIS — C90.00 MULTIPLE MYELOMA, REMISSION STATUS UNSPECIFIED (HCC): ICD-10-CM

## 2024-06-21 DIAGNOSIS — C90.00 MULTIPLE MYELOMA NOT HAVING ACHIEVED REMISSION (HCC): Primary | ICD-10-CM

## 2024-06-21 LAB
ALBUMIN SERPL-MCNC: 3.6 G/DL (ref 3.4–5)
BASOPHILS # BLD AUTO: 0.01 X10(3) UL (ref 0–0.2)
BASOPHILS NFR BLD AUTO: 0.1 %
CALCIUM BLD-MCNC: 9.3 MG/DL (ref 8.5–10.1)
CREAT BLD-MCNC: 1.98 MG/DL
EGFRCR SERPLBLD CKD-EPI 2021: 34 ML/MIN/1.73M2 (ref 60–?)
EOSINOPHIL # BLD AUTO: 0 X10(3) UL (ref 0–0.7)
EOSINOPHIL NFR BLD AUTO: 0 %
ERYTHROCYTE [DISTWIDTH] IN BLOOD BY AUTOMATED COUNT: 13.6 %
HCT VFR BLD AUTO: 36.2 %
HGB BLD-MCNC: 12.1 G/DL
IMM GRANULOCYTES # BLD AUTO: 0.04 X10(3) UL (ref 0–1)
IMM GRANULOCYTES NFR BLD: 0.3 %
LYMPHOCYTES # BLD AUTO: 0.13 X10(3) UL (ref 1–4)
LYMPHOCYTES NFR BLD AUTO: 1 %
MCH RBC QN AUTO: 35.8 PG (ref 26–34)
MCHC RBC AUTO-ENTMCNC: 33.4 G/DL (ref 31–37)
MCV RBC AUTO: 107.1 FL
MONOCYTES # BLD AUTO: 0.08 X10(3) UL (ref 0.1–1)
MONOCYTES NFR BLD AUTO: 0.6 %
NEUTROPHILS # BLD AUTO: 13.06 X10 (3) UL (ref 1.5–7.7)
NEUTROPHILS # BLD AUTO: 13.06 X10(3) UL (ref 1.5–7.7)
NEUTROPHILS NFR BLD AUTO: 98 %
PLATELET # BLD AUTO: 157 10(3)UL (ref 150–450)
RBC # BLD AUTO: 3.38 X10(6)UL
WBC # BLD AUTO: 13.3 X10(3) UL (ref 4–11)

## 2024-06-21 PROCEDURE — 36415 COLL VENOUS BLD VENIPUNCTURE: CPT

## 2024-06-21 PROCEDURE — 82310 ASSAY OF CALCIUM: CPT

## 2024-06-21 PROCEDURE — 96413 CHEMO IV INFUSION 1 HR: CPT

## 2024-06-21 PROCEDURE — 96367 TX/PROPH/DG ADDL SEQ IV INF: CPT

## 2024-06-21 PROCEDURE — 85025 COMPLETE CBC W/AUTO DIFF WBC: CPT

## 2024-06-21 PROCEDURE — 82040 ASSAY OF SERUM ALBUMIN: CPT

## 2024-06-21 PROCEDURE — 82565 ASSAY OF CREATININE: CPT

## 2024-06-21 NOTE — PROGRESS NOTES
Pt here for C45 D15 Drug(s): Kyprolis/Zometa.  Arrives Ambulating independently, accompanied by Self     Patient was evaluated today by Treatment Nurse.    Oral medications included in this regimen:  no    Patient confirms comprehension of cancer treatment schedule:  yes    Pregnancy screening:  Not applicable    Modifications in dose or schedule:  No    Medications appearance and physical integrity checked by RN: yes.    Chemotherapy IV pump settings verified by 2 RNs:  Yes.  Frequency of blood return and site check throughout administration: Prior to administration and At completion of therapy     Infusion/treatment outcome:  patient tolerated treatment without incident    Education Record    Learner:  Patient  Barriers / Limitations:  None  Method:  Discussion  Education / instructions given:  Reviewed plan of care and follow up appts  Outcome:  Shows understanding    Discharged Home, Ambulating independently, accompanied by:Self    Patient/family verbalized understanding of future appointments: by printed AVS

## 2024-06-27 ENCOUNTER — TELEPHONE (OUTPATIENT)
Dept: PHYSICAL THERAPY | Facility: HOSPITAL | Age: 74
End: 2024-06-27

## 2024-07-03 ENCOUNTER — OFFICE VISIT (OUTPATIENT)
Dept: PHYSICAL THERAPY | Age: 74
End: 2024-07-03
Attending: PHYSICIAN ASSISTANT
Payer: MEDICARE

## 2024-07-03 PROCEDURE — 97110 THERAPEUTIC EXERCISES: CPT

## 2024-07-03 NOTE — PROGRESS NOTES
Diagnosis:   Lumbar pain (M54.50)  Chronic left-sided thoracic back pain (M54.6,G89.29)  Muscle weakness on examination (M62.81)  Leg heaviness (R29.898)  Weakness of both lower extremities (R29.898)  Hyperreflexia (R29.2)  Multiple myeloma, remission status unspecified (HCC) (C90.00)  Compression fracture of body of thoracic vertebra (HCC) (S22.000A)         Referring Provider: Nohemi Tirado  Date of Evaluation:    4/4/2024    Precautions:  None Next MD visit:   none scheduled  Date of Surgery: n/a   Insurance Primary/Secondary: MEDICARE / [place]DEMN[place]     # Auth Visits: n/a             Subjective: \" I am in a lot of pain today \". \" I think I over did  it at my work today \".  Pain: 8/10    Objective:   Observation: Posture: FHP, L shoulder higher than R, rounded shoulders, Increased Thoracic kyphosis  Palpation: Tenderness on R lateral and anterior hip  Sensation: Intact  Proprioception: Foot positioning - Intact  Coordination: heel to shin - Intact     Flexibility:  Hip Flexor: R Severe restrictions , L Severe restrictions   Hamstrings: R Severe restrictions ; L Severe restrictions   Piriformis: R Severe restrictions ; L Severe restrictions   Quads: R Mild restrictions ; L Mild restrictions   Gastroc-soleus: R Moderate restrictions ; L Moderate restrictions      Strength/MMT: (* denotes performed with pain)  Hip Knee Foot/Ankle   Flexion: R 3+/5; L 3+/5+  Extension: R 3+/5; L 3+/5  Abduction: R 3+/5; L 3+/5  ER: R 4-/5; L 4-/5  IR: R 4-/5; L 4-/5 Flexion: R 5/5; L 5/5  Extension: R 5/5; L 5/5    DF: R 3/5; L 3/5  PF: R 3/5; L 3/5            Gait: pt ambulates on level ground with antalgic gait on the R.   Balance: SLS: R 0 L - 0   5 STST - 23 secs with UE support, unable without UE- decreased mobility  TUG- 16 secs - high risk for falls  ( 5/28/2024 ) TUG 12 sec                       SLS R  :  2 sec        SLS L : 3 sec  ( 6/17/2024 ) SLS R :  3 sec         SLS L : 4 sec       Assessment:    held resistance  on all strength ex's  and held all balance activities to address patient report of increase in his symptoms this visit . .Patient was able to perform all given ex's without report of increase in pain level .    Goals:   Goals: (to be met in 8 visits)   Pt will improve transversus abdominis recruitment to perform proper isometric contraction without requiring verbal or tactile cuing to promote advancement of therex, improvement of core muscles strength to promote lumbar stability with activities that include bending, lifting and increased LE activity- progressing  Pt will demonstrate good understanding of proper posture and body mechanics to decrease pain and improve spinal safety -progressing  Pt will have improvement of muscles flexibility to allow increase ease with movement and improve positional tolerance- progressing  Pt will demonstrate improved core strength, hips and abdominal muscles strength to 4/5 and improve kinsesthetic and proprioceptive awareness to be able decreased pain to <2/10 with sitting, standing, walking, bending, carrying and lifting, especially toward end of the day- progressing  Patient will have improvement with overall mobility with decreased risk for falls, with score of less than 15 secs for 5 STST and less than 14 secs for TUG-progressing  Patient will be able to tolerate 45-60 mins of NMR, therapeutic exercises and therapeutic activities with good stability, stable vital signs, and no evidence of fatigue.-progressing  Pt will be independent and compliant with comprehensive HEP to maintain progress achieved in PT, and be able to utilize HEP to manage future exacerbations  -  progressing    Plan: Continue PT as per updated POC.   Date : 24/24  TX # 9/16 Date : 5/28/24  TX # 10/16 Date: 6/6/2024   Tx #: 11/16 Date : 6/14/24  TX # 12/16 Date : 6/17/24  TX # 13/16 Date : 7/03/24  TX # 14/16    NU step x 6 min , level 5  Standing :  B gastroc stretch on slant board   5 x 20 sec hold   B  heel raises x 20  B toes raises x 20  Shuttle :  B leg press with 50 lbs   2  x 10  R/L leg press with 25 lbs   2 x 10   Step up / lateral step up on 6 inch step   2 x 10 each   Marches in // bars with 1.5 lbs around ankles    2 x 10   Alt hip abd ib // bars with 1.5 lbs around ankles 2 x 10   Sidestepping in // bars with 1.5 lbs around ankles   4 laps   Balance re - education :  Tandem walk / retro tandem walk in // bars 2 laps on balance beam   Sideways steps  on balance beam x 2 laps   Air ex :  Step ups x 20  Lateral step ups x 20   NU step x 6 min , level 5  Standing :  B gastroc stretch on slant board x 5 x 20 sec hold   B heel raises x 20  B toes raises x 20   Tandem stance   R/L x 5 attempts   L/R x 5 attempts   R/L singe cone tap   X 10 reps each side   Tandem walk forward / backward in // bars x 4 rounds   Rocker board :  B LE balancing A/P X 30  B LE balancing M/L x 30   High marches on air ex ( with no arm support ) x 30   Step up / lateral step up on 6 inch step with 1.2 lbs around ankles   Alt hip abd with 1.2 lbs on air ex x 20   Alt hip ext with 1.2 lbs on air ex x 20  NU step x 6 min , level 5  Standing :  B gastroc stretch on slant board x 2 x 30 sec hold   B heel raises x 20  B toes raises x 20  Supine piriformis stretch 2x 30 secs (fig 4 no lift )  LTR 1x 10  Supine  R hip abd x 10  R SLR x 5  R hip ER/IR x 10  Sit to stand x 5 Nu step x 6 min , level 5  Standing :  B gastroc stretch on slant board   3 x 30 sec hold   B heel raises x 20  B heel raises x 20  Supine :  R/L piriformis stretch   2 x 30 sec hold   LTR x 10   R/L SLR 2 x 10   Sit to stand x 5   Standing :  Alt marches with 2 lbs 2 x 10   Alt hip abd with 2 lbs   2 x 10   Alt hip ext with 2 lbs   2 x 10   Sidestepping in // bars with 2 lbs   2 laps  NU step x 6 min , level 5  Standing :  B gastroc stretch on slant board x 5 reps x 20 sec hold   B heel raises x 20  B toes raises x 20   Sit to stand 2 x 10   Alt marches with 2 lbs 2 x 15    Alt hip abd with 2 lbs 2 x 15   Alt hip ext with 2 lbs 2 x 15   Sidestepping in // bars with 2 lbs   2 laps   Balance re- training :  Air ex :  High marches x 20  Step up / lateral step up 2 x 10 each   Step over lateral 2 x 10   Balance beam :  Tandem walk / retro tandem walk x 2 rounds   Sidestepping x 2 rounds    NU step x 6 min , level 5  Standing :  B gastroc stretch on slant board 5 x 20 sec hold   B toes raises x 20  B heel raises x 20   Alt marches in // bard   2 x 10   Alt hip flex 2 x 10   Alt hip abd 2 x 10   Alt hip ext 2 x 10   Sidestepping in // bars 2 laps   Step up / lateral step up on 4 inch step 2 x 10                             HEP: Access Code: [email or web address]  URL: [email or web address]  Date: 04/09/2024  Prepared by: Fang Hein    Exercises  - Heel Toe Raises with Counter Support  - 1 x daily - 7 x weekly - 2 sets - 10 reps  - Standing March with Counter Support  - 1 x daily - 7 x weekly - 2 sets - 10 reps  - Standing Knee Flexion with Counter Support  - 1 x daily - 7 x weekly - 2 sets - 10 reps  - Standing Hip Extension with Unilateral Counter Support  - 1 x daily - 7 x weekly - 2 sets - 10 reps  - Standing Hip Abduction with Counter Support  - 1 x daily - 7 x weekly - 2 sets - 10 reps  - Seated Long Arc Quad  - 1 x daily - 7 x weekly - 2 sets - 10 reps  - Supine Heel Slide  - 1 x daily - 7 x weekly - 1-2 sets - 10 reps  - Supine Transversus Abdominis Bracing - Hands on Stomach  - 1 x daily - 7 x weekly - 1-2 sets - 10 reps - 5 secs hold  - Clamshell  - 2 x daily - 7 x weekly - 1-2 sets - 10 reps        Charges: Thera ex x 3          Total Timed Treatment: 40 min  Total Treatment Time: 45 min

## 2024-07-05 ENCOUNTER — OFFICE VISIT (OUTPATIENT)
Dept: HEMATOLOGY/ONCOLOGY | Age: 74
End: 2024-07-05
Attending: INTERNAL MEDICINE
Payer: MEDICARE

## 2024-07-05 VITALS
DIASTOLIC BLOOD PRESSURE: 60 MMHG | HEIGHT: 72.01 IN | SYSTOLIC BLOOD PRESSURE: 93 MMHG | WEIGHT: 258 LBS | TEMPERATURE: 98 F | RESPIRATION RATE: 18 BRPM | BODY MASS INDEX: 34.95 KG/M2 | OXYGEN SATURATION: 97 % | HEART RATE: 55 BPM

## 2024-07-05 DIAGNOSIS — C90.00 MULTIPLE MYELOMA NOT HAVING ACHIEVED REMISSION (HCC): Primary | ICD-10-CM

## 2024-07-05 DIAGNOSIS — R79.89 ELEVATED LFTS: ICD-10-CM

## 2024-07-05 DIAGNOSIS — M54.50 ACUTE RIGHT-SIDED LOW BACK PAIN WITHOUT SCIATICA: ICD-10-CM

## 2024-07-05 LAB
ALBUMIN SERPL-MCNC: 3.6 G/DL (ref 3.4–5)
ALBUMIN/GLOB SERPL: 1.3 {RATIO} (ref 1–2)
ALP LIVER SERPL-CCNC: 86 U/L
ALT SERPL-CCNC: 20 U/L
ANION GAP SERPL CALC-SCNC: 9 MMOL/L (ref 0–18)
AST SERPL-CCNC: 11 U/L (ref 15–37)
BASOPHILS # BLD AUTO: 0.01 X10(3) UL (ref 0–0.2)
BASOPHILS NFR BLD AUTO: 0.1 %
BILIRUB SERPL-MCNC: 0.7 MG/DL (ref 0.1–2)
BUN BLD-MCNC: 31 MG/DL (ref 9–23)
CALCIUM BLD-MCNC: 8.5 MG/DL (ref 8.5–10.1)
CHLORIDE SERPL-SCNC: 107 MMOL/L (ref 98–112)
CO2 SERPL-SCNC: 18 MMOL/L (ref 21–32)
CREAT BLD-MCNC: 2.37 MG/DL
EGFRCR SERPLBLD CKD-EPI 2021: 28 ML/MIN/1.73M2 (ref 60–?)
EOSINOPHIL # BLD AUTO: 0 X10(3) UL (ref 0–0.7)
EOSINOPHIL NFR BLD AUTO: 0 %
ERYTHROCYTE [DISTWIDTH] IN BLOOD BY AUTOMATED COUNT: 13 %
FASTING STATUS PATIENT QL REPORTED: NO
GLOBULIN PLAS-MCNC: 2.7 G/DL (ref 2.8–4.4)
GLUCOSE BLD-MCNC: 163 MG/DL (ref 70–99)
HCT VFR BLD AUTO: 35.7 %
HGB BLD-MCNC: 11.7 G/DL
IGA SERPL-MCNC: <7.83 MG/DL (ref 70–312)
IGM SERPL-MCNC: <5.3 MG/DL (ref 43–279)
IMM GRANULOCYTES # BLD AUTO: 0.04 X10(3) UL (ref 0–1)
IMM GRANULOCYTES NFR BLD: 0.3 %
IMMUNOGLOBULIN PNL SER-MCNC: 163 MG/DL (ref 791–1643)
LYMPHOCYTES # BLD AUTO: 0.13 X10(3) UL (ref 1–4)
LYMPHOCYTES NFR BLD AUTO: 1 %
MCH RBC QN AUTO: 35.3 PG (ref 26–34)
MCHC RBC AUTO-ENTMCNC: 32.8 G/DL (ref 31–37)
MCV RBC AUTO: 107.9 FL
MONOCYTES # BLD AUTO: 0.06 X10(3) UL (ref 0.1–1)
MONOCYTES NFR BLD AUTO: 0.5 %
NEUTROPHILS # BLD AUTO: 12.27 X10 (3) UL (ref 1.5–7.7)
NEUTROPHILS # BLD AUTO: 12.27 X10(3) UL (ref 1.5–7.7)
NEUTROPHILS NFR BLD AUTO: 98.1 %
OSMOLALITY SERPL CALC.SUM OF ELEC: 288 MOSM/KG (ref 275–295)
PLATELET # BLD AUTO: 248 10(3)UL (ref 150–450)
POTASSIUM SERPL-SCNC: 4.2 MMOL/L (ref 3.5–5.1)
PROT SERPL-MCNC: 6.3 G/DL (ref 6.4–8.2)
RBC # BLD AUTO: 3.31 X10(6)UL
SODIUM SERPL-SCNC: 134 MMOL/L (ref 136–145)
WBC # BLD AUTO: 12.5 X10(3) UL (ref 4–11)

## 2024-07-05 PROCEDURE — 96401 CHEMO ANTI-NEOPL SQ/IM: CPT

## 2024-07-05 PROCEDURE — G2211 COMPLEX E/M VISIT ADD ON: HCPCS | Performed by: NURSE PRACTITIONER

## 2024-07-05 PROCEDURE — 99215 OFFICE O/P EST HI 40 MIN: CPT | Performed by: NURSE PRACTITIONER

## 2024-07-05 PROCEDURE — 96413 CHEMO IV INFUSION 1 HR: CPT

## 2024-07-05 RX ORDER — METHYLPREDNISOLONE 4 MG/1
TABLET ORAL
Qty: 21 EACH | Refills: 0 | Status: SHIPPED | OUTPATIENT
Start: 2024-07-05

## 2024-07-05 NOTE — PROGRESS NOTES
Patient here for C46D1 Kyprolis/Darzalex. Patient states he has acute pain in his right lower back that he rates a 8/10 that started 2 weeks. Patient denies injury to away. Patient taking tramadol for pain that he states is helping. Patient states he has good appetite with adequate fluid intake.     Education Record    Learner:  Patient    Disease / Diagnosis: multiple myeloma     Barriers / Limitations:  None   Comments:    Method:  Discussion   Comments:    General Topics:  Medication, Side effects and symptom management, and Plan of care reviewed   Comments:    Outcome:  Shows understanding   Comments:

## 2024-07-05 NOTE — PROGRESS NOTES
Cancer Center Progress Note    Patient Name: Nacho Valle   YOB: 1950   Medical Record Number: [id number]    Date of visit: 7/5/2024    Chief Complaint/Reason for Visit:  Chief Complaint   Patient presents with    Chemotherapy     [id number] Kyprolis/Darzalex       History of Present Illness: Arron presents today for follow up and chemotherapy. He has multiple myeloma and his oncologist is Dr. Frias. He was initially diagnosed in 7/2016. He received induction chemotherapy followed by high dose stem cell infusoin in 1/2017. He was on revlimid maintenance but developed progression. He started salvage chemotherapy daratumumab, cafilzomib and dexamethasone on 2/18/2021. Today is cycle 47.     Nacho denies any new side effects from the chemotherapy. He reports new onset right lower back pain rated a 8 out of 10 at highest severity. Certain positions aggravate the pain such as sitting for prolonged periods of time.  He denies known injury or trauma. He has been using Tylenol and Tramadol with partial relief. Denies recent fever or chills, cough or SOB, swelling.     Problem List:  Patient Active Problem List   Diagnosis    Impotence of organic origin    Generalized osteoarthrosis of hand    Morbid obesity (HCC)    Essential hypertension    Hypertension    Multiple myeloma (HCC)    Myeloma kidney disease (HCC)    Hx of stem cell transplant (HCC)    Hypoxia    Multiple myeloma, remission status unspecified (HCC)    Type 2 diabetes mellitus with hyperglycemia, without long-term current use of insulin (HCC)    Cardiomyopathy, unspecified type (HCC)    Morbid (severe) obesity due to excess calories (HCC)    Elevated LFTs        Medical History:  Past Medical History:    Allergic rhinitis, cause unspecified    BCC (basal cell carcinoma of skin)    s/p surgical excision    BPH (benign prostatic hyperplasia)    Colon polyps    Generalized osteoarthrosis, involving hand    Herniation of intervertebral disc between L4  and L5    s/p surgical repair    High blood pressure    High cholesterol    Impotence of organic origin    Morbid obesity (HCC)    Osteoarthritis    Osteoarthrosis, unspecified whether generalized or localized, lower leg    Log Date: 08/21/2012     Other and unspecified hyperlipidemia    Pneumonia due to organism    Prostate cancer (HCC)    s/p total prostatectomy and radiation    Trigger finger (acquired)    Type II or unspecified type diabetes mellitus without mention of complication, not stated as uncontrolled    Unspecified essential hypertension    Unspecified internal derangement of knee    Log Date: 08/21/2012        Surgical History:  Past Surgical History:   Procedure Laterality Date    Back surgery  1/2001    lower back L4 L5 for herniated disc    Colonoscopy      202, 2012 Dr. Wolf, polyps    Other surgical history  1/31/2007    total prostatectomy    Skin surgery  1997    BCC       Allergies:  Allergies   Allergen Reactions    Pcn [Penicillins] ANAPHYLAXIS, HIVES, HALLUCINATION and SHORTNESS OF BREATH     Respiratory distress    Beta Adrenergic Blockers     Latex      Surgical tape  Welts, burning of skin, itching    Statins        Family History:  Family History   Problem Relation Age of Onset    Diabetes Other         family hx    Hypertension Other         family hx    Prostate Cancer Father        Social History:  Social History     Socioeconomic History    Marital status:      Spouse name: Not on file    Number of children: 3    Years of education: Not on file    Highest education level: Not on file   Occupational History    Occupation: Retired      Comment:    Tobacco Use    Smoking status: Never    Smokeless tobacco: Never   Vaping Use    Vaping status: Never Used   Substance and Sexual Activity    Alcohol use: No     Alcohol/week: 0.0 standard drinks of alcohol    Drug use: No    Sexual activity: Not on file   Other Topics Concern    Not on file   Social  History Narrative    Not on file     Social Determinants of Health     Financial Resource Strain: Low Risk  (2/20/2023)    Financial Resource Strain     Difficulty of Paying Living Expenses: Not hard at all     Med Affordability: No   Food Insecurity: No Food Insecurity (2/20/2023)    Food Insecurity     Food Insecurity: Never true   Transportation Needs: No Transportation Needs (2/20/2023)    Transportation Needs     Lack of Transportation: No   Physical Activity: Sufficiently Active (2/20/2023)    Exercise Vital Sign     Days of Exercise per Week: 7 days     Minutes of Exercise per Session: 150+ min   Stress: No Stress Concern Present (2/20/2023)    Stress     Feeling of Stress : No   Social Connections: Socially Integrated (2/20/2023)    Social Connections     Frequency of Socialization with Friends and Family: 3   Housing Stability: Low Risk  (2/20/2023)    Housing Stability     Housing Instability: No     Housing Instability Emergency: Not on file       Medications:    Current Outpatient Medications:     dexamethasone (DECADRON) 4 MG tablet, TAKE 3 TABLETS(12 MG) BY MOUTH 1 TIME EVERY WEEK, Disp: 36 tablet, Rfl: 3    ATORVASTATIN 20 MG Oral Tab, TAKE 1 TABLET(20 MG) BY MOUTH DAILY, Disp: 90 tablet, Rfl: 0    ACYCLOVIR 400 MG Oral Tab, TAKE 1 TABLET(400 MG) BY MOUTH TWICE DAILY, Disp: 180 tablet, Rfl: 1    traMADol 50 MG Oral Tab, Take 1 tablet (50 mg total) by mouth every 6 (six) hours as needed for Pain., Disp: 40 tablet, Rfl: 0    benzonatate 200 MG Oral Cap, Take 1 capsule (200 mg total) by mouth 3 (three) times daily as needed for cough., Disp: 30 capsule, Rfl: 0    cyclobenzaprine 10 MG Oral Tab, TAKE 1 TABLET(10 MG) BY MOUTH THREE TIMES DAILY AS NEEDED FOR MUSCLE SPASMS, Disp: 10 tablet, Rfl: 1    DRISDOL 1.25 MG (08590 UT) Oral Cap, , Disp: , Rfl:     ONETOUCH ULTRASOFT LANCETS Does not apply Misc, TEST BLOOD SUGAR TWICE DAILY, Disp: 200 each, Rfl: 2    ENTRESTO  MG Oral Tab, Take 1 tablet by mouth  daily., Disp: , Rfl:     empagliflozin 10 MG Oral Tab, Take 1 tablet (10 mg total) by mouth daily. JARDIANCE, Disp: , Rfl:     Glucose Blood (ONETOUCH ULTRA) In Vitro Strip, CHECK SUGARS THREE TIMES DAILY AS DIRECTED, Disp: 300 strip, Rfl: 2    Metoprolol Succinate  MG Oral Tablet 24 Hr, Take 1 tablet (200 mg total) by mouth every evening., Disp: , Rfl:     eplerenone 25 MG Oral Tab, Take 1 tablet (25 mg total) by mouth daily., Disp: , Rfl:     omega-3 fatty acids 1000 MG Oral Cap, Take 1,000 mg by mouth 2 (two) times daily., Disp: , Rfl:     Multiple Vitamin Oral Tab, Take 1 tablet by mouth., Disp: , Rfl:     acetaminophen 500 MG Oral Tab, Take 2 tablets (1,000 mg total) by mouth nightly., Disp: , Rfl:     aspirin (ASPIR-81) 81 MG Oral Tab EC, Take 1 tablet by mouth daily., Disp: 1 tablet, Rfl: 0    Review of Systems:  A comprehensive 14 point review of systems was completed.  Pertinent positives and negatives noted in the HPI.    Performance Status: ECOG 0 - Fully active, able to carry on all predisease activities without restrictions.    Physical Examination:  General: Patient is alert and oriented x 3, not in acute distress.  Vital Signs: Height: 182.9 cm (6' 0.01\") (07/05 1146)  Weight: 117 kg (258 lb) (07/05 1146)  BSA (Calculated - sq m): 2.37 sq meters (07/05 1146)  Pulse: 55 (07/05 1146)  BP: 93/60 (07/05 1146)  Temp: 97.7 °F (36.5 °C) (07/05 1146)  Do Not Use - Resp Rate: --  SpO2: 97 % (07/05 1146)  HEENT: Anicteric, conjunctivae and sclerae clear, no oropharyngeal lesion/thrush, mucous membranes are moist   Chest: Clear to auscultation. Respirations unlabored.   Heart: Regular rate and rhythm.   Abdomen: Soft, non-distended, non-tender with present bowel sounds.  Extremities: No edema.  Neurological: Grossly intact.   Lymphatics: There is no palpable lymphadenopathy throughout in the cervical or supraclavicular regions.   Skin: warm, dry, no erythema or rash   Psych/Depression: mood and affect are  appropriate.     Labs:     Recent Results (from the past 72 hour(s))   CBC W/ DIFFERENTIAL    Collection Time: 07/05/24 11:37 AM   Result Value Ref Range    WBC 12.5 (H) 4.0 - 11.0 x10(3) uL    RBC 3.31 (L) 3.80 - 5.80 x10(6)uL    HGB 11.7 (L) 13.0 - 17.5 g/dL    HCT 35.7 (L) 39.0 - 53.0 %    .0 150.0 - 450.0 10(3)uL    .9 (H) 80.0 - 100.0 fL    MCH 35.3 (H) 26.0 - 34.0 pg    MCHC 32.8 31.0 - 37.0 g/dL    RDW 13.0 %    Neutrophil Absolute Prelim 12.27 (H) 1.50 - 7.70 x10 (3) uL    Neutrophil Absolute 12.27 (H) 1.50 - 7.70 x10(3) uL    Lymphocyte Absolute 0.13 (L) 1.00 - 4.00 x10(3) uL    Monocyte Absolute 0.06 (L) 0.10 - 1.00 x10(3) uL    Eosinophil Absolute 0.00 0.00 - 0.70 x10(3) uL    Basophil Absolute 0.01 0.00 - 0.20 x10(3) uL    Immature Granulocyte Absolute 0.04 0.00 - 1.00 x10(3) uL    Neutrophil % 98.1 %    Lymphocyte % 1.0 %    Monocyte % 0.5 %    Eosinophil % 0.0 %    Basophil % 0.1 %    Immature Granulocyte % 0.3 %       Impression/Plan    Multiple myeloma:  initially diagnosed in 7/2016. He received induction chemotherapy followed by high dose stem cell infusion in 1/2017. He was on revlimid maintenance but developed progression. He started salvage chemotherapy daratumumab, cafilzomib and dexamethasone on 2/18/2021. He is tolerating treatment well. Labs reviewed, proceed with cycle 47 today. Monoclonal protein study pending.     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 Medrol dose cassia sent. If pain continues, will obtain imaging.     Planned Follow Up: weekly for chemotherapy; 4 weeks follow up with Dr. Frias, labs and chemotherapy    Risk Level: HIGH multiple myeloma receiving chemotherapy requiring close monitoring     Electronically Signed by:    ITA Sanford, NP-C  Nurse Practitioner  [facility]

## 2024-07-09 LAB
ALBUMIN SERPL ELPH-MCNC: 3.86 G/DL (ref 3.75–5.21)
ALBUMIN/GLOB SERPL: 1.9 {RATIO} (ref 1–2)
ALPHA1 GLOB SERPL ELPH-MCNC: 0.35 G/DL (ref 0.19–0.46)
ALPHA2 GLOB SERPL ELPH-MCNC: 0.93 G/DL (ref 0.48–1.05)
B-GLOBULIN SERPL ELPH-MCNC: 0.59 G/DL (ref 0.68–1.23)
GAMMA GLOB SERPL ELPH-MCNC: 0.17 G/DL (ref 0.62–1.7)
KAPPA LC FREE SER-MCNC: 71.11 MG/DL (ref 0.33–1.94)
LAMBDA LC FREE SERPL-MCNC: <0.134 MG/DL (ref 0.57–2.63)
M PROTEIN 1 SERPL ELPH-MCNC: 0.04 G/DL (ref ?–0)
PROT SERPL-MCNC: 5.9 G/DL (ref 5.7–8.2)

## 2024-07-11 ENCOUNTER — OFFICE VISIT (OUTPATIENT)
Dept: FAMILY MEDICINE CLINIC | Facility: CLINIC | Age: 74
End: 2024-07-11
Payer: MEDICARE

## 2024-07-11 VITALS
RESPIRATION RATE: 18 BRPM | TEMPERATURE: 98 F | BODY MASS INDEX: 35.35 KG/M2 | SYSTOLIC BLOOD PRESSURE: 106 MMHG | HEART RATE: 57 BPM | DIASTOLIC BLOOD PRESSURE: 48 MMHG | WEIGHT: 261 LBS | HEIGHT: 72 IN | OXYGEN SATURATION: 97 %

## 2024-07-11 DIAGNOSIS — Z00.00 ENCOUNTER FOR ANNUAL HEALTH EXAMINATION: Primary | ICD-10-CM

## 2024-07-11 DIAGNOSIS — C90.00 MULTIPLE MYELOMA NOT HAVING ACHIEVED REMISSION (HCC): ICD-10-CM

## 2024-07-11 DIAGNOSIS — I10 ESSENTIAL HYPERTENSION: ICD-10-CM

## 2024-07-11 DIAGNOSIS — I42.9 CARDIOMYOPATHY, UNSPECIFIED TYPE (HCC): ICD-10-CM

## 2024-07-11 DIAGNOSIS — E66.01 MORBID OBESITY (HCC): ICD-10-CM

## 2024-07-11 DIAGNOSIS — Z94.84 HX OF STEM CELL TRANSPLANT (HCC): ICD-10-CM

## 2024-07-11 DIAGNOSIS — E11.65 TYPE 2 DIABETES MELLITUS WITH HYPERGLYCEMIA, WITHOUT LONG-TERM CURRENT USE OF INSULIN (HCC): ICD-10-CM

## 2024-07-11 DIAGNOSIS — N08 MYELOMA KIDNEY DISEASE (HCC): ICD-10-CM

## 2024-07-11 DIAGNOSIS — C90.00 MYELOMA KIDNEY DISEASE (HCC): ICD-10-CM

## 2024-07-11 DIAGNOSIS — E78.2 MIXED HYPERLIPIDEMIA: ICD-10-CM

## 2024-07-11 DIAGNOSIS — Z12.5 ENCOUNTER FOR SCREENING FOR MALIGNANT NEOPLASM OF PROSTATE: ICD-10-CM

## 2024-07-11 RX ORDER — METOPROLOL SUCCINATE 100 MG/1
100 TABLET, EXTENDED RELEASE ORAL EVERY EVENING
COMMUNITY
Start: 2024-06-17 | End: 2024-07-11

## 2024-07-11 RX ORDER — METOPROLOL SUCCINATE 100 MG/1
100 TABLET, EXTENDED RELEASE ORAL EVERY EVENING
COMMUNITY
Start: 2024-07-11

## 2024-07-11 NOTE — PROGRESS NOTES
Subjective:   Nacho Valle is a 74 year old male who presents for a Medicare Subsequent Annual Wellness visit (Pt already had Initial Annual Wellness) and scheduled follow up of multiple significant but stable problems.   Patient with history of multiple myeloma not in remission, history of stem cell transplant, cardiomyopathy, hypertension, hyperlipidemia and morbid obesity as well as type 2 diabetes mellitus, diet controlled is here today for his annual physical  Patient denies any complaints except for the chronic back pain  He is entering the care of oncology and receiving chemotherapy infusion at the cancer center in [place]    He also has history of prostate cancer, s/p prostatectomy patient was being followed by [facility]  He is looking for a different oncologist, as he is having difficulty in scheduling follow-up appointments  Patient wants to see a urologist who comes to the [facility]  He does not know the name    History/Other:   Fall Risk Assessment:   He has been screened for Falls and is low risk.      Cognitive Assessment:   Abnormal  What day of the week is this?: Correct  What month is it?: Correct  What year is it?: Correct  Recall \"Ball\": Correct  Recall \"Flag\": Incorrect  Recall \"Tree\": Incorrect    Functional Ability/Status:   Nacho Valle has some abnormal functions as listed below:  He has Vision problems based on screening of functional status.       Depression Screening (PHQ):  PHQ-2 SCORE: 0  , done 7/11/2024         Advanced Directives:   He does have a Living Will but we do NOT have it on file in Epic.    He has a Power of  for Health Care on file in [employer].  Patient has Advance Care Planning documents present in EMR. Reviewed documents with patient (and family/surrogate if present).      Patient Active Problem List   Diagnosis    Impotence of organic origin    Generalized osteoarthrosis of hand    Morbid obesity (HCC)    Essential hypertension    Hypertension     Multiple myeloma (HCC)    Myeloma kidney disease (HCC)    Hx of stem cell transplant (HCC)    Hypoxia    Multiple myeloma, remission status unspecified (HCC)    Type 2 diabetes mellitus with hyperglycemia, without long-term current use of insulin (HCC)    Cardiomyopathy, unspecified type (HCC)    Morbid (severe) obesity due to excess calories (HCC)    Elevated LFTs     Allergies:  He is allergic to pcn [penicillins], beta adrenergic blockers, latex, and statins.    Current Medications:  Outpatient Medications Marked as Taking for the 7/11/24 encounter (Office Visit) with Dagoberto Bell MD   Medication Sig    metoprolol succinate  MG Oral Tablet 24 Hr Take 1 tablet (100 mg total) by mouth every evening.    methylPREDNISolone 4 MG Oral Tablet Therapy Pack Take as directed.    dexamethasone (DECADRON) 4 MG tablet TAKE 3 TABLETS(12 MG) BY MOUTH 1 TIME EVERY WEEK    ATORVASTATIN 20 MG Oral Tab TAKE 1 TABLET(20 MG) BY MOUTH DAILY    ACYCLOVIR 400 MG Oral Tab TAKE 1 TABLET(400 MG) BY MOUTH TWICE DAILY    benzonatate 200 MG Oral Cap Take 1 capsule (200 mg total) by mouth 3 (three) times daily as needed for cough.    cyclobenzaprine 10 MG Oral Tab TAKE 1 TABLET(10 MG) BY MOUTH THREE TIMES DAILY AS NEEDED FOR MUSCLE SPASMS    DRISDOL 1.25 MG (15036 UT) Oral Cap     ONETOUCH ULTRASOFT LANCETS Does not apply Misc TEST BLOOD SUGAR TWICE DAILY    ENTRESTO  MG Oral Tab Take 1 tablet by mouth daily.    empagliflozin 10 MG Oral Tab Take 1 tablet (10 mg total) by mouth daily. JARDIANCE    Glucose Blood (ONETOUCH ULTRA) In Vitro Strip CHECK SUGARS THREE TIMES DAILY AS DIRECTED    eplerenone 25 MG Oral Tab Take 1 tablet (25 mg total) by mouth daily.    omega-3 fatty acids 1000 MG Oral Cap Take 1,000 mg by mouth 2 (two) times daily.    Multiple Vitamin Oral Tab Take 1 tablet by mouth.    acetaminophen 500 MG Oral Tab Take 2 tablets (1,000 mg total) by mouth nightly.    aspirin (ASPIR-81) 81 MG Oral Tab EC Take 1 tablet by  mouth daily.       Medical History:  He  has a past medical history of Allergic rhinitis, cause unspecified, BCC (basal cell carcinoma of skin) (1997), BPH (benign prostatic hyperplasia), Colon polyps, Generalized osteoarthrosis, involving hand, Herniation of intervertebral disc between L4 and L5 (2000), High blood pressure, High cholesterol, Impotence of organic origin, Morbid obesity (HCC), Osteoarthritis, Osteoarthrosis, unspecified whether generalized or localized, lower leg (6/20/2013), Other and unspecified hyperlipidemia, Pneumonia due to organism, Prostate cancer (HCC) (2007), Trigger finger (acquired), Type II or unspecified type diabetes mellitus without mention of complication, not stated as uncontrolled (10/2007), Unspecified essential hypertension, and Unspecified internal derangement of knee (6/20/2013).  Surgical History:  He  has a past surgical history that includes back surgery (1/2001); colonoscopy; other surgical history (1/31/2007); and skin surgery (1997).   Family History:  His family history includes Diabetes in an other family member; Hypertension in an other family member; Prostate Cancer in his father.  Social History:  He  reports that he has never smoked. He has never used smokeless tobacco. He reports that he does not drink alcohol and does not use drugs.    Tobacco:  He has never smoked tobacco.    CAGE Alcohol Screen:   CAGE screening score of 0 on 7/11/2024, showing low risk of alcohol abuse.      Patient Care Team:  Dagoberto Bell MD as PCP - General (Family Medicine)  Ajay Good (Hematology and Oncology)  Je Payne MD (UROLOGY)  Ruiz Frias MD (ONCOLOGY)  Francisco Silva MD (NEPHROLOGY)  Nohemi Tirado PA-C (Physician Assistant)  Fang Hein PT as Physical Therapist  Edilia Graham PTA as Physical Therapy Assistant (Physical Therapy)    Review of Systems   Review of Systems:   Constitutional: No fevers, chills, fatigue or night sweats.  ENT: No mouth pain,  neck pain, running nose, headaches or swollen glands.  Skin: No rashes, pruritus or skin changes,  Respiratory: Denies cough, wheezing or shortness of breath.  CV: Denies chest pain, palpitations, orthopnea, PND or dizziness.  Musculoskeletal: No joint pain, stiffness or swelling.  GI: No nausea, vomiting or diarrhea. No blood in stools.  Neurologic: No seizures, tremors, weakness or numbness     Objective:   Physical Exam    GENERAL: well developed, well nourished, in no apparent distress  SKIN: no rashes, no suspicious lesions  HEENT: atraumatic, normocephalic,   ears --within normal limits   throat is clear  EYES: PERRLA, EOMI, conjunctiva are clear  NECK: supple, no adenopathy, no bruits  CHEST: no chest tenderness  LUNGS: clear to auscultation  CARDIO: RRR without murmur  GI: good BS's, no masses, HSM or tenderness  : deferred  RECTAL: deferred  MUSCULOSKELETAL mild to moderate tenderness onto the mid thoracic back  EXTREMITIES: no cyanosis, clubbing or edema  NEURO: Oriented times three, cranial nerves are intact, motor and sensory are grossly intact     /48   Pulse 57   Temp 98.4 °F (36.9 °C) (Temporal)   Resp 18   Ht 6' (1.829 m)   Wt 261 lb (118.4 kg)   SpO2 97%   BMI 35.40 kg/m²  Estimated body mass index is 35.4 kg/m² as calculated from the following:    Height as of this encounter: 6' (1.829 m).    Weight as of this encounter: 261 lb (118.4 kg).    Medicare Hearing Assessment:   Hearing Screening    Time taken: 7/11/2024  2:52 PM  Screening Method: Whisper Test  Whisper Test Result: Pass         Visual Acuity:   Right Eye Visual Acuity: Corrected Right Eye Chart Acuity: 20/40   Left Eye Visual Acuity: Corrected Left Eye Chart Acuity: 20/200   Both Eyes Visual Acuity: Corrected Both Eyes Chart Acuity: 20/30   Able To Tolerate Visual Acuity: Yes        Assessment & Plan:   Nacho Valle is a 74 year old male who presents for a Medicare Assessment.     1. Encounter for annual health  examination (Primary)  -Fasting labs ordered  Immunizations reviewed  Depression screen completed  Fall risk assessment done  Cognitive assessment done  Advance care planning-reviewed  Diet and exercise counseling given       PSA Total, Screen; Future; Expected date: 07/11/2024  -     Lipid Panel; Future; Expected date: 07/11/2024  -     TSH W Reflex To Free T4; Future; Expected date: 07/11/2024  2. Encounter for screening for malignant neoplasm of prostate  -     PSA Total, Screen; Future; Expected date: 07/11/2024  3. Multiple myeloma not having achieved remission (HCC)  Patient on chemotherapy, he goes to infusion center [facility] a month  4. Hx of stem cell transplant (HCC)  CD4 count is being monitored by [facility]  5. Mixed hyperlipidemia  Check lipid profile  Continue atorvastatin  6. Morbid obesity ([facility])  Discussed diet and exercise and losing weight  7. Myeloma kidney disease ([facility])  Last creatinine 2.37  Under the care of nephrology Dr. Silva  8. Type 2 diabetes mellitus with hyperglycemia, without long-term current use of insulin ([facility])  Log of sugars reviewed-Check hemoglobin A1c  Check urine for microalbumin  Patient is not on any medications at this time       Expanded, Low Complexity (78291)  -     Microalb/Creat Ratio, Random Urine; Future; Expected date: 07/11/2024  9. Essential hypertension  -Under control  Continue metoprolol   expanded, Low Complexity (45301)  10. Cardiomyopathy, unspecified type ([facility])  Stable  Continue Entresto      Other orders  -     Hemoglobin A1C; Future; Expected date: 07/11/2024  The patient indicates understanding of these issues and agrees to the plan.      Return in 3 months (on 10/11/2024).     Dagoberto Bell MD, 7/11/2024     Supplementary Documentation:   General Health:  In the past six months, have you lost more than 10 pounds without trying?: 2 - No  Has your appetite been poor?: No  Type of Diet: Diabetic  How does the patient maintain a good energy  level?: Other  How would you describe your daily physical activity?: Heavy  How would you describe your current health state?: Good  How do you maintain positive mental well-being?: Social Interaction;Puzzles  On a scale of 0 to 10, with 0 being no pain and 10 being severe pain, what is your pain level?: 4 - (Moderate)  In the past six months, have you experienced urine leakage?: 1-Yes  At any time do you feel concerned for the safety/well-being of yourself and/or your children, in your home or elsewhere?: No  Have you had any immunizations at another office such as Influenza, Hepatitis B, Tetanus, or Pneumococcal?: No    Health Maintenance   Topic Date Due    Zoster Vaccines (1 of 2) 10/15/2013    COVID-19 Vaccine (6 - 2023-24 season) 01/26/2024    Annual Physical  06/08/2024    Diabetes Care: Microalb/Creat Ratio  06/09/2024    Pneumococcal Vaccine: 65+ Years (2 of 2 - PPSV23 or PCV20) 04/01/2025 (Originally 11/24/2019)    Diabetes Care Foot Exam  09/07/2024    Influenza Vaccine (1) 10/01/2024    Diabetes Care A1C  10/12/2024    Diabetes Care Dilated Eye Exam  10/24/2024    PSA  11/30/2024    Diabetes Care: GFR  07/05/2025    Annual Depression Screening  Completed    Fall Risk Screening (Annual)  Completed    Colorectal Cancer Screening  Discontinued

## 2024-07-11 NOTE — PATIENT INSTRUCTIONS
Nacho Valle's SCREENING SCHEDULE   Tests on this list are recommended by your physician but may not be covered, or covered at this frequency, by your insurer.   Please check with your insurance carrier before scheduling to verify coverage.   PREVENTATIVE SERVICES FREQUENCY &  COVERAGE DETAILS LAST COMPLETION DATE   Diabetes Screening    Fasting Blood Sugar / Glucose    One screening every 12 months if never tested or if previously tested but not diagnosed with pre-diabetes   One screening every 6 months if diagnosed with pre-diabetes Lab Results   Component Value Date     (H) 07/05/2024        Cardiovascular Disease Screening    Lipid Panel  Cholesterol  Lipoprotein (HDL)  Triglycerides Covered every 5 years for all Medicare beneficiaries without apparent signs or symptoms of cardiovascular disease Lab Results   Component Value Date    CHOLEST 135 01/12/2024    HDL 65 (H) 01/12/2024    LDL 45 01/12/2024    TRIG 147 01/12/2024         Electrocardiogram (EKG)   Covered if needed at Welcome to Medicare, and non-screening if indicated for medical reasons 01/25/2021      Ultrasound Screening for Abdominal Aortic Aneurysm (AAA) Covered once in a lifetime for one of the following risk factors   • Men who are 65-75 years old and have ever smoked   • Anyone with a family history -     Colorectal Cancer Screening  Covered for ages 50-85; only need ONE of the following:    Colonoscopy   Covered every 10 years    Covered every 2 years if patient is at high risk or previous colonoscopy was abnormal 05/18/2022    Health Maintenance   Topic Date Due   • Colorectal Cancer Screening  Discontinued       Flexible Sigmoidoscopy   Covered every 4 years -    Fecal Occult Blood Test Covered annually -   Prostate Cancer Screening    Prostate-Specific Antigen (PSA) Annually Lab Results   Component Value Date    PSA <0.01 10/01/2021     Health Maintenance   Topic Date Due   • PSA  11/30/2024      Immunizations    Influenza Covered  once per flu season  Please get every year 09/26/2023  No recommendations at this time    Pneumococcal Each vaccine ([id number] & [id number]) covered once after 65 Prevnar 13: 09/29/2019    [id number]: -     No recommendations at this time    Hepatitis B One screening covered for patients with certain risk factors   -  No recommendations at this time    Tetanus Toxoid Not covered by Medicare Part B unless medically necessary (cut with metal); may be covered with your pharmacy prescription benefits -    Tetanus, Diptheria and Pertusis TD and TDaP Not covered by Medicare Part B -  No recommendations at this time    Zoster Not covered by Medicare Part B; may be covered with your pharmacy  prescription benefits 08/20/2013  Zoster Vaccines(1 of 2) due on 10/15/2013     Diabetes      Hemoglobin A1C Annually; if last result is elevated, may be asked to retest more frequently.    Medicare covers every 3 months Lab Results   Component Value Date     04/12/2024    A1C 5.5 04/12/2024       No recommendations at this time    Creat/alb ratio Annually Lab Results   Component Value Date    MICROALBCREA 144.3 (H) 06/09/2023       LDL Annually Lab Results   Component Value Date    LDL 45 01/12/2024       Dilated Eye Exam Annually [unfilled]

## 2024-07-12 ENCOUNTER — OFFICE VISIT (OUTPATIENT)
Dept: HEMATOLOGY/ONCOLOGY | Age: 74
End: 2024-07-12
Attending: INTERNAL MEDICINE
Payer: MEDICARE

## 2024-07-12 VITALS
DIASTOLIC BLOOD PRESSURE: 67 MMHG | SYSTOLIC BLOOD PRESSURE: 104 MMHG | RESPIRATION RATE: 18 BRPM | OXYGEN SATURATION: 97 % | HEART RATE: 56 BPM | TEMPERATURE: 97 F | HEIGHT: 72.01 IN | BODY MASS INDEX: 34.27 KG/M2 | WEIGHT: 253 LBS

## 2024-07-12 DIAGNOSIS — E11.65 TYPE 2 DIABETES MELLITUS WITH HYPERGLYCEMIA, WITHOUT LONG-TERM CURRENT USE OF INSULIN (HCC): ICD-10-CM

## 2024-07-12 DIAGNOSIS — R79.89 ELEVATED LFTS: ICD-10-CM

## 2024-07-12 DIAGNOSIS — Z12.5 ENCOUNTER FOR SCREENING FOR MALIGNANT NEOPLASM OF PROSTATE: ICD-10-CM

## 2024-07-12 DIAGNOSIS — C90.00 MULTIPLE MYELOMA NOT HAVING ACHIEVED REMISSION (HCC): Primary | ICD-10-CM

## 2024-07-12 DIAGNOSIS — Z00.00 ENCOUNTER FOR ANNUAL HEALTH EXAMINATION: ICD-10-CM

## 2024-07-12 LAB
BASOPHILS # BLD AUTO: 0.01 X10(3) UL (ref 0–0.2)
BASOPHILS NFR BLD AUTO: 0.1 %
COMPLEXED PSA SERPL-MCNC: <0.01 NG/ML (ref ?–4)
CREAT UR-SCNC: 76.7 MG/DL
EOSINOPHIL # BLD AUTO: 0 X10(3) UL (ref 0–0.7)
EOSINOPHIL NFR BLD AUTO: 0 %
ERYTHROCYTE [DISTWIDTH] IN BLOOD BY AUTOMATED COUNT: 13.5 %
EST. AVERAGE GLUCOSE BLD GHB EST-MCNC: 120 MG/DL (ref 68–126)
HBA1C MFR BLD: 5.8 % (ref ?–5.7)
HCT VFR BLD AUTO: 37.1 %
HGB BLD-MCNC: 12.4 G/DL
IMM GRANULOCYTES # BLD AUTO: 0.06 X10(3) UL (ref 0–1)
IMM GRANULOCYTES NFR BLD: 0.4 %
LYMPHOCYTES # BLD AUTO: 0.16 X10(3) UL (ref 1–4)
LYMPHOCYTES NFR BLD AUTO: 1.2 %
MCH RBC QN AUTO: 35.6 PG (ref 26–34)
MCHC RBC AUTO-ENTMCNC: 33.4 G/DL (ref 31–37)
MCV RBC AUTO: 106.6 FL
MICROALBUMIN UR-MCNC: 13.6 MG/DL
MICROALBUMIN/CREAT 24H UR-RTO: 177.3 UG/MG (ref ?–30)
MONOCYTES # BLD AUTO: 0.13 X10(3) UL (ref 0.1–1)
MONOCYTES NFR BLD AUTO: 0.9 %
NEUTROPHILS # BLD AUTO: 13.39 X10 (3) UL (ref 1.5–7.7)
NEUTROPHILS # BLD AUTO: 13.39 X10(3) UL (ref 1.5–7.7)
NEUTROPHILS NFR BLD AUTO: 97.4 %
PLATELET # BLD AUTO: 122 10(3)UL (ref 150–450)
RBC # BLD AUTO: 3.48 X10(6)UL
TSI SER-ACNC: 0.41 MIU/ML (ref 0.36–3.74)
WBC # BLD AUTO: 13.8 X10(3) UL (ref 4–11)

## 2024-07-12 PROCEDURE — 82043 UR ALBUMIN QUANTITATIVE: CPT

## 2024-07-12 PROCEDURE — 82570 ASSAY OF URINE CREATININE: CPT

## 2024-07-12 PROCEDURE — 84443 ASSAY THYROID STIM HORMONE: CPT

## 2024-07-12 PROCEDURE — 36415 COLL VENOUS BLD VENIPUNCTURE: CPT

## 2024-07-12 PROCEDURE — 83036 HEMOGLOBIN GLYCOSYLATED A1C: CPT

## 2024-07-12 PROCEDURE — 85025 COMPLETE CBC W/AUTO DIFF WBC: CPT

## 2024-07-12 PROCEDURE — 96413 CHEMO IV INFUSION 1 HR: CPT

## 2024-07-12 NOTE — PROGRESS NOTES
Pt here for C46D8 Drug(s)kyprolis.  Arrives Ambulating independently, accompanied by Self     Patient was evaluated today by Treatment Nurse.  Pt voices no new complaints today. Reports \"doing well\".   Oral medications included in this regimen:  no    Patient confirms comprehension of cancer treatment schedule:  yes    Pregnancy screening:  Not applicable    Modifications in dose or schedule:  No    Medications appearance and physical integrity checked by RN: yes.    Chemotherapy IV pump settings verified by 2 RNs:  Yes.  Frequency of blood return and site check throughout administration: Prior to administration and At completion of therapy     Infusion/treatment outcome:  patient tolerated treatment without incident    Education Record    Learner:  Patient  Barriers / Limitations:  None  Method:  Brief focused and Printed material  Education / instructions given:  reviewed labs and fu appts  Outcome:  Shows understanding    Discharged Home, Ambulating independently, accompanied by:Self    Patient/family verbalized understanding of future appointments: by printed AVS  Pt dc home ambulatory in stable condition, no new complaints.

## 2024-07-14 NOTE — PROGRESS NOTES
Your hemoglobin A1c is 5.8  Your urine is consistent with microalbumin in the urine,  No change in medications continue your diet and exercise  Rest of your labs are in acceptable limits

## 2024-07-19 ENCOUNTER — OFFICE VISIT (OUTPATIENT)
Dept: HEMATOLOGY/ONCOLOGY | Age: 74
End: 2024-07-19
Attending: INTERNAL MEDICINE
Payer: MEDICARE

## 2024-07-19 VITALS
SYSTOLIC BLOOD PRESSURE: 91 MMHG | DIASTOLIC BLOOD PRESSURE: 58 MMHG | HEIGHT: 72.01 IN | TEMPERATURE: 98 F | OXYGEN SATURATION: 96 % | WEIGHT: 252 LBS | RESPIRATION RATE: 18 BRPM | BODY MASS INDEX: 34.13 KG/M2 | HEART RATE: 57 BPM

## 2024-07-19 DIAGNOSIS — R79.89 ELEVATED LFTS: ICD-10-CM

## 2024-07-19 DIAGNOSIS — Z00.00 ENCOUNTER FOR ANNUAL HEALTH EXAMINATION: ICD-10-CM

## 2024-07-19 DIAGNOSIS — C90.00 MULTIPLE MYELOMA NOT HAVING ACHIEVED REMISSION (HCC): ICD-10-CM

## 2024-07-19 DIAGNOSIS — C90.00 MULTIPLE MYELOMA, REMISSION STATUS UNSPECIFIED (HCC): Primary | ICD-10-CM

## 2024-07-19 LAB
ALBUMIN SERPL-MCNC: 3.7 G/DL (ref 3.4–5)
BASOPHILS # BLD AUTO: 0.01 X10(3) UL (ref 0–0.2)
BASOPHILS NFR BLD AUTO: 0.1 %
CALCIUM BLD-MCNC: 9.5 MG/DL (ref 8.5–10.1)
CHOLEST SERPL-MCNC: 147 MG/DL (ref ?–200)
CREAT BLD-MCNC: 2.17 MG/DL
EGFRCR SERPLBLD CKD-EPI 2021: 31 ML/MIN/1.73M2 (ref 60–?)
EOSINOPHIL # BLD AUTO: 0.01 X10(3) UL (ref 0–0.7)
EOSINOPHIL NFR BLD AUTO: 0.1 %
ERYTHROCYTE [DISTWIDTH] IN BLOOD BY AUTOMATED COUNT: 13.7 %
FASTING PATIENT LIPID ANSWER: YES
HCT VFR BLD AUTO: 35.3 %
HDLC SERPL-MCNC: 51 MG/DL (ref 40–59)
HGB BLD-MCNC: 11.8 G/DL
IMM GRANULOCYTES # BLD AUTO: 0.05 X10(3) UL (ref 0–1)
IMM GRANULOCYTES NFR BLD: 0.4 %
LDLC SERPL CALC-MCNC: 72 MG/DL (ref ?–100)
LYMPHOCYTES # BLD AUTO: 0.13 X10(3) UL (ref 1–4)
LYMPHOCYTES NFR BLD AUTO: 1 %
MCH RBC QN AUTO: 35.4 PG (ref 26–34)
MCHC RBC AUTO-ENTMCNC: 33.4 G/DL (ref 31–37)
MCV RBC AUTO: 106 FL
MONOCYTES # BLD AUTO: 0.09 X10(3) UL (ref 0.1–1)
MONOCYTES NFR BLD AUTO: 0.7 %
NEUTROPHILS # BLD AUTO: 13.18 X10 (3) UL (ref 1.5–7.7)
NEUTROPHILS # BLD AUTO: 13.18 X10(3) UL (ref 1.5–7.7)
NEUTROPHILS NFR BLD AUTO: 97.7 %
NONHDLC SERPL-MCNC: 96 MG/DL (ref ?–130)
PLATELET # BLD AUTO: 128 10(3)UL (ref 150–450)
RBC # BLD AUTO: 3.33 X10(6)UL
TRIGL SERPL-MCNC: 141 MG/DL (ref 30–149)
VLDLC SERPL CALC-MCNC: 22 MG/DL (ref 0–30)
WBC # BLD AUTO: 13.5 X10(3) UL (ref 4–11)

## 2024-07-19 PROCEDURE — 82310 ASSAY OF CALCIUM: CPT

## 2024-07-19 PROCEDURE — 85025 COMPLETE CBC W/AUTO DIFF WBC: CPT

## 2024-07-19 PROCEDURE — 36415 COLL VENOUS BLD VENIPUNCTURE: CPT

## 2024-07-19 PROCEDURE — 96413 CHEMO IV INFUSION 1 HR: CPT

## 2024-07-19 PROCEDURE — 80061 LIPID PANEL: CPT

## 2024-07-19 PROCEDURE — 82040 ASSAY OF SERUM ALBUMIN: CPT

## 2024-07-19 PROCEDURE — 96367 TX/PROPH/DG ADDL SEQ IV INF: CPT

## 2024-07-19 PROCEDURE — 82565 ASSAY OF CREATININE: CPT

## 2024-07-19 NOTE — PROGRESS NOTES
Pt here for C46D15 Drug(s)kyprolis/zometa.  Arrives Ambulating independently, accompanied by self /family    Patient was evaluated today by Treatment RN    Oral medications included in this regimen: see MAR    Patient confirms comprehension of cancer treatment schedule: yes    Pregnancy screening: denies pregnancy    Modifications in dose or schedule: no    Medications appearance and physical integrity checked by RN: yes    Chemotherapy IV pump settings verified by 2 RNs: yes  Frequency of blood return and site check throughout administration: prior to administration and every 2-3 ml if ivp    Infusion/treatment outcome: pt  tolerated treatment with no c/o.     Education Record    Learner: patient  Barriers / Limitations:none  Method: verbal  Education / instructions given:  chemo administration  Outcome: pt verbalized understanding    Discharged home    Patient/family verbalized understanding of future appointments: yes

## 2024-07-24 RX ORDER — ACYCLOVIR 400 MG/1
400 TABLET ORAL 2 TIMES DAILY
Qty: 180 TABLET | Refills: 1 | Status: SHIPPED | OUTPATIENT
Start: 2024-07-24

## 2024-07-24 NOTE — TELEPHONE ENCOUNTER
Refill Per Protocol     Requested Prescriptions   Pending Prescriptions Disp Refills    ACYCLOVIR 400 MG Oral Tab [Pharmacy Med Name: ACYCLOVIR 400MG TABLETS] 180 tablet 1     Sig: TAKE 1 TABLET(400 MG) BY MOUTH TWICE DAILY       Herpes Agent Protocol Passed - 7/22/2024  3:46 AM        Passed - In person appointment or virtual visit in the past 12 mos or appointment in next 3 mos     Recent Outpatient Visits              5 days ago Multiple myeloma, remission status unspecified (HCC)    [facility] in [place]    Office Visit    1 week ago Multiple myeloma not having achieved remission (HCC)    [facility] in [place]    Office Visit    1 week ago Encounter for annual health examination    [address] Dagoberto Bell MD    Office Visit    2 weeks ago Multiple myeloma not having achieved remission ([facility])    [facility] in [place]    Office Visit    2 weeks ago Multiple myeloma not having achieved remission (HCC)    [facility] in [place] Janie Adan APRN    Office Visit          Future Appointments         Provider Department Appt Notes    In 6 days Fang Hein, PT [facility] in [place] MCR/ BCBS Suppl    In 1 week Ruiz Frias MD [facility] in [place] MD BRIELLE, Kyprolis/Faspro    In 1 week PF TX RN2 [facility] in [place] MD BRIELLE, Kyprolis/Faspro    In 2 months Dagoberto Bell MD [address] 3-month f/u    In 3 months Francisco Silva MD [facility] 4mth f/u                           Future Appointments         Provider Department Appt Notes    In 6 days Fang Hein PT [facility] in [place] MCR/ BCBS Suppl    In 1 week Ruiz Frias MD [facility] in [place] MD BRIELLE, Kyprolis/Faspro    In 1 week PF TX RN2 [facility] in [place] MD BRIELLE, Kyprolis/Faspro    In 2 months Dagoberto Bell MD  [facility], [address] [place] [address] 3-month f/u    In 3 months Francisco Silva MD [facility] 4mth f/u          Recent Outpatient Visits              5 days ago Multiple myeloma, remission status unspecified (HCC)    [facility] in [place]    Office Visit    1 week ago Multiple myeloma not having achieved remission (HCC)    [facility] in [place]    Office Visit    1 week ago Encounter for annual health examination    [facility], [address] [place] [address] Dagoberto Bell MD    Office Visit    2 weeks ago Multiple myeloma not having achieved remission (HCC)    [facility] in [place]    Office Visit    2 weeks ago Multiple myeloma not having achieved remission (HCC)    [facility] in [place] Janei Adan APRN    Office Visit

## 2024-07-30 ENCOUNTER — OFFICE VISIT (OUTPATIENT)
Dept: PHYSICAL THERAPY | Age: 74
End: 2024-07-30
Attending: PHYSICIAN ASSISTANT
Payer: MEDICARE

## 2024-07-30 PROCEDURE — 97110 THERAPEUTIC EXERCISES: CPT | Performed by: PHYSICAL THERAPIST

## 2024-07-30 PROCEDURE — 97530 THERAPEUTIC ACTIVITIES: CPT | Performed by: PHYSICAL THERAPIST

## 2024-07-30 NOTE — PROGRESS NOTES
Diagnosis:   Lumbar pain (M54.50)  Chronic left-sided thoracic back pain (M54.6,G89.29)  Muscle weakness on examination (M62.81)  Leg heaviness (R29.898)  Weakness of both lower extremities (R29.898)  Hyperreflexia (R29.2)  Multiple myeloma, remission status unspecified (HCC) (C90.00)  Compression fracture of body of thoracic vertebra (HCC) (S22.000A)         Referring Provider: Nohemi Tirado  Date of Evaluation:    4/4/2024    Precautions:  None Next MD visit:   none scheduled  Date of Surgery: n/a   Insurance Primary/Secondary: MEDICARE / [place]BS [place] INDEMNITY     # Auth Visits: n/a            Discharge Summary  Pt has attended 15 visits in Physical Therapy.      Subjective: I am on my off week from chemo. Constant pain in his R shoulder and R of the trunk. Patient reports that he has been able to walk better, but toward the end of the day, he still gets tired. The temperature, when it is too hot also affects his tolerance to activities.  He does not feel limited with the symptoms on his back, he is able to do all activities including lifting and carrying without pain. 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    Pain: 2/10    Objective:   Observation: Posture: FHP, L shoulder higher than R, rounded shoulders, Increased Thoracic kyphosis  Palpation: No Tenderness on the T-L spine, paralumbars  Sensation: Intact  Proprioception: Foot positioning - Intact  Coordination: heel to shin - Intact     Flexibility:  Hip Flexor: R Mild restrictions , L Mild restrictions   Hamstrings: R Mild restrictions ; L Moderate restrictions   Piriformis: R Mild restrictions ; L Mild restrictions   Quads: R Mild restrictions ; L Mild restrictions   Gastroc-soleus: R Moderate restrictions ; L Moderate restrictions      Strength/MMT: (* denotes performed with pain)  Hip Knee Foot/Ankle   Flexion: R 4/5; L 4/5  Extension: R 5/5; L 5/5  Abduction: R 5/5; L 5/5  ER: R 5/5; L 5/5  IR: R 5/5; L 5/5 Flexion: R  5/5; L 5/5  Extension: R 5/5; L 5/5    DF: R 4/5; L 4/5  PF: R 4/5; L 4/5            Gait: pt ambulates on level ground with wide base    Balance:  5 STST - 13 secs from 23 secs with UE support, unable without UE- good mobility  TUG- 13 sec secs - low risk for falls  SLS R - 2 secs, L - 7 secs  6MWT - O2 sat - 97, CA - 57 , 1175 ft, CA -89, O2 96   Stairs - able to perform with 1 UE support alternating pattern for ascending and descending step      Assessment:   Patient progressed well with PT treatments, noted improvement with ROM, ease with transitional and bed mobility. Patient also was able to demonstrate improvement with balance with now low risk for falls. Pain has been minimal today, and did not limit patient with any activity. Was able to demonstrate good performance of exercises.  Discussed importance of regular compliance with HEP, patient understood, provided an updated copy of HEP.  Today, patient does not feel limited by his spinal problems, and has 0% disability based on ABDULAZIZ. Patient  is now discharged from PT.       Goals:   Goals: (to be met in 8 visits)   Pt will improve transversus abdominis recruitment to perform proper isometric contraction without requiring verbal or tactile cuing to promote advancement of therex, improvement of core muscles strength to promote lumbar stability with activities that include bending, lifting and increased LE activity- MET  Pt will demonstrate good understanding of proper posture and body mechanics to decrease pain and improve spinal safety - MET  Pt will have improvement of muscles flexibility to allow increase ease with movement and improve positional tolerance- MET  Pt will demonstrate improved core strength, hips and abdominal muscles strength to 4/5 and improve kinsesthetic and proprioceptive awareness to be able decreased pain to <2/10 with sitting, standing, walking, bending, carrying and lifting, especially toward end of the day- partially MET  Patient will  have improvement with overall mobility with decreased risk for falls, with score of less than 15 secs for 5 STST and less than 14 secs for TUG- MET  Patient will be able to tolerate 45-60 mins of NMR, therapeutic exercises and therapeutic activities with good stability, stable vital signs, and no evidence of fatigue.- MET  Pt will be independent and compliant with comprehensive HEP to maintain progress achieved in PT, and be able to utilize HEP to manage future exacerbations  -  MET    Plan: Patient is now discharged  from PT.   Date: 7/30/2024   Tx #:  15/16        Reassessment  6 MWT  Stair activities- ascending and descending  B toes raises x 20  B heel raises x 20   Alt marches in // bars  2 x 10   Alt hip flex 2 x 10   Alt hip abd 2 x 10   Alt hip ext 2 x 10   Standing knee flexion x 20  TA brace x 3 x 5 secs  TA brace with bridge x 20  Sit to stand x 10    Sidestepping no UE support x 5 rounds                                HEP: Access Code: [email or web address]  URL: [email or web address]  Date: 07/30/2024  Prepared by: Fang Hein    Exercises  - Heel Toe Raises with Counter Support  - 1 x daily - 7 x weekly - 2 sets - 10 reps  - Standing March with Counter Support  - 1 x daily - 7 x weekly - 2 sets - 10 reps  - Standing Knee Flexion with Counter Support  - 1 x daily - 7 x weekly - 2 sets - 10 reps  - Standing Hip Extension with Unilateral Counter Support  - 1 x daily - 7 x weekly - 2 sets - 10 reps  - Standing Hip Abduction with Counter Support  - 1 x daily - 7 x weekly - 2 sets - 10 reps  - Seated Long Arc Quad  - 1 x daily - 7 x weekly - 2 sets - 10 reps  - Standing Marching  - 2 x daily - 7 x weekly - 1-2 sets - 10 reps  - Standing Hip Flexion with Counter Support  - 2 x daily - 7 x weekly - 1-2 sets - 10 reps  - Supine Bridge  - 2 x daily - 7 x weekly - 1-2 sets - 10 reps  - Supine Transversus Abdominis Bracing - Hands on Stomach  - 1 x daily - 7 x weekly - 1-2 sets - 10 reps - 5 secs hold  -  Clamshell  - 2 x daily - 7 x weekly - 1-2 sets - 10 reps  - Sit to Stand with Armchair  - 2 x daily - 7 x weekly - 1-2 sets - 10 reps  - Sideways Walking  - 2 x daily - 7 x weekly - 1-2 sets - 5 reps  Post Oswestry Disability Index Score  Post Score: 0 % (7/30/2024  3:43 PM)    No disability    Plan: Patient is now discharged on HEP.      Patient/Family/Caregiver was advised of these findings, precautions, and treatment options and has agreed to actively participate in planning and for this course of care.    Thank you for your referral. If you have any questions, please contact me at Dept: [phone].    Sincerely,  Electronically signed by therapist: Fang Hein PT     Physician's certification required:  No  Please co-sign or sign and return this letter via fax as soon as possible to [phone].   I certify the need for these services furnished under this plan of treatment and while under my care.    X___________________________________________________ Date____________________    Certification From: 7/30/2024  To:10/28/2024    Charges: Thera ex x 2, TA - 1          Total Timed Treatment: 45 min  Total Treatment Time: 45 min

## 2024-08-02 ENCOUNTER — OFFICE VISIT (OUTPATIENT)
Dept: HEMATOLOGY/ONCOLOGY | Age: 74
End: 2024-08-02
Attending: INTERNAL MEDICINE
Payer: MEDICARE

## 2024-08-02 VITALS
WEIGHT: 256 LBS | RESPIRATION RATE: 18 BRPM | HEART RATE: 70 BPM | OXYGEN SATURATION: 98 % | DIASTOLIC BLOOD PRESSURE: 62 MMHG | TEMPERATURE: 98 F | HEIGHT: 72.01 IN | BODY MASS INDEX: 34.67 KG/M2 | SYSTOLIC BLOOD PRESSURE: 94 MMHG

## 2024-08-02 DIAGNOSIS — C90.00 MULTIPLE MYELOMA NOT HAVING ACHIEVED REMISSION (HCC): Primary | ICD-10-CM

## 2024-08-02 DIAGNOSIS — R79.89 ELEVATED LFTS: ICD-10-CM

## 2024-08-02 LAB
ALBUMIN SERPL-MCNC: 3.6 G/DL (ref 3.4–5)
ALBUMIN/GLOB SERPL: 1.2 {RATIO} (ref 1–2)
ALP LIVER SERPL-CCNC: 89 U/L
ALT SERPL-CCNC: 22 U/L
ANION GAP SERPL CALC-SCNC: 7 MMOL/L (ref 0–18)
AST SERPL-CCNC: 12 U/L (ref 15–37)
BASOPHILS # BLD AUTO: 0.03 X10(3) UL (ref 0–0.2)
BASOPHILS NFR BLD AUTO: 0.2 %
BILIRUB SERPL-MCNC: 0.8 MG/DL (ref 0.1–2)
BUN BLD-MCNC: 41 MG/DL (ref 9–23)
CALCIUM BLD-MCNC: 9.4 MG/DL (ref 8.5–10.1)
CHLORIDE SERPL-SCNC: 111 MMOL/L (ref 98–112)
CO2 SERPL-SCNC: 17 MMOL/L (ref 21–32)
CREAT BLD-MCNC: 2.17 MG/DL
EGFRCR SERPLBLD CKD-EPI 2021: 31 ML/MIN/1.73M2 (ref 60–?)
EOSINOPHIL # BLD AUTO: 0.01 X10(3) UL (ref 0–0.7)
EOSINOPHIL NFR BLD AUTO: 0.1 %
ERYTHROCYTE [DISTWIDTH] IN BLOOD BY AUTOMATED COUNT: 14.2 %
FASTING STATUS PATIENT QL REPORTED: NO
GLOBULIN PLAS-MCNC: 3 G/DL (ref 2.8–4.4)
GLUCOSE BLD-MCNC: 167 MG/DL (ref 70–99)
HCT VFR BLD AUTO: 34.9 %
HGB BLD-MCNC: 11.5 G/DL
IGA SERPL-MCNC: <33 MG/DL (ref 40–350)
IGM SERPL-MCNC: <21 MG/DL (ref 50–300)
IMM GRANULOCYTES # BLD AUTO: 0.05 X10(3) UL (ref 0–1)
IMM GRANULOCYTES NFR BLD: 0.4 %
IMMUNOGLOBULIN PNL SER-MCNC: 187 MG/DL (ref 650–1600)
LYMPHOCYTES # BLD AUTO: 0.2 X10(3) UL (ref 1–4)
LYMPHOCYTES NFR BLD AUTO: 1.6 %
MCH RBC QN AUTO: 35.7 PG (ref 26–34)
MCHC RBC AUTO-ENTMCNC: 33 G/DL (ref 31–37)
MCV RBC AUTO: 108.4 FL
MONOCYTES # BLD AUTO: 0.16 X10(3) UL (ref 0.1–1)
MONOCYTES NFR BLD AUTO: 1.3 %
NEUTROPHILS # BLD AUTO: 12.13 X10 (3) UL (ref 1.5–7.7)
NEUTROPHILS # BLD AUTO: 12.13 X10(3) UL (ref 1.5–7.7)
NEUTROPHILS NFR BLD AUTO: 96.4 %
OSMOLALITY SERPL CALC.SUM OF ELEC: 294 MOSM/KG (ref 275–295)
PLATELET # BLD AUTO: 270 10(3)UL (ref 150–450)
POTASSIUM SERPL-SCNC: 4.3 MMOL/L (ref 3.5–5.1)
PROT SERPL-MCNC: 6.6 G/DL (ref 6.4–8.2)
RBC # BLD AUTO: 3.22 X10(6)UL
SODIUM SERPL-SCNC: 135 MMOL/L (ref 136–145)
WBC # BLD AUTO: 12.6 X10(3) UL (ref 4–11)

## 2024-08-02 PROCEDURE — 99214 OFFICE O/P EST MOD 30 MIN: CPT | Performed by: INTERNAL MEDICINE

## 2024-08-02 PROCEDURE — 96413 CHEMO IV INFUSION 1 HR: CPT

## 2024-08-02 PROCEDURE — 96401 CHEMO ANTI-NEOPL SQ/IM: CPT

## 2024-08-02 NOTE — PROGRESS NOTES
Cancer Center Progress Note    Problem List:      Patient Active Problem List   Diagnosis    Impotence of organic origin    Generalized osteoarthrosis of hand    Morbid obesity (HCC)    Essential hypertension    Hypertension    Multiple myeloma (HCC)    Myeloma kidney disease (HCC)    Hx of stem cell transplant (HCC)    Hypoxia    Multiple myeloma, remission status unspecified (HCC)    Type 2 diabetes mellitus with hyperglycemia, without long-term current use of insulin (HCC)    Cardiomyopathy, unspecified type (HCC)    Morbid (severe) obesity due to excess calories (HCC)    Elevated LFTs       Interim History:    Nacho Valle presents today for evaluation and management of a diagnosis of multiple myeloma.     He is here for cycle 46 day 1 of Ann/Carf/Dex. He complains of right hip pain. This has been bothering him more with time. He no longer has upper back pain. He has no other new pain. He has no fever or sweats.     He has had a gradual increase in his kappa free light chain. The repeat level is pending today.    He has had a PET scan on 1/3/2024 with no abnormality in the bones to suggest active myeloma.     He has had intermittent headache in the region of the right eye that is mild. This is stable in frequency and intensity.     He is otherwise doing well. He is taking dex 12 mg weekly three of four weeks. He has no fever or sweats. He has no dyspnea or cough. He has no abdominal pain. He has no other bone pain.    He had an echo on 5/22/2023 that had LVEF 50%.     He had right heart cath to r/o amyloid on 2/2/20.     He originally had 5 cycles of Cybord. He now has had high dose therapy. He was getting Zometa monthly infusions. His last Zometa was in 6/16/2017.    Pathology from cardiac biopsy on 2/2/2021:  Addendum 1   The endomyocardial biopsy  was sent to [facility], [place], [place] for processing and examination where the case was reviewed by Jaswinder Torre MD.   The full  report has been scanned and is available as a hyperlink within this report (under the final diagnoses section).     \"Final Pathologic Diagnosis:      A.  Heart, endomyocardial biopsy:   -Fragments of myocardium with mild myocyte hypertrophy, no evidence of cardiac amyloidosis, see comment.   -Portion of fibrosis tissue.      B.  Heart, endomyocardial biopsy for electron microscopy:   -Negative for any significant ultrastructural changes, see addendum below for details.      Comment:     The clinical concern for amyloidosis is noted.  A Congo red stain performed with appropriate controls is negative for amyloid deposition, and a trichrome stain with appropriate controls shows focal interstitial and subendocardial fibrosis.  Prussian zeyad stain is negative for iron deposition (controls appropriate). There is no evidence of active myocarditis, giant cells, or granulomas. Electron microscopy studies will be reported as an addendum. The clinical team was notified of the results on 2/3/2021 at 3:35 PM\".          Review of Systems:   Constitutional: Negative for anorexia, fatigue, fevers, chills, night sweats and weight loss.  Psychiatric: The patient's mood was calm and appropriate for this visit.  The pertinent positives and negatives were described. All other systems were negative.    PMH/PSH:  Past Medical History:    Allergic rhinitis, cause unspecified    BCC (basal cell carcinoma of skin)    s/p surgical excision    BPH (benign prostatic hyperplasia)    Colon polyps    Generalized osteoarthrosis, involving hand    Herniation of intervertebral disc between L4 and L5    s/p surgical repair    High blood pressure    High cholesterol    Impotence of organic origin    Morbid obesity (HCC)    Osteoarthritis    Osteoarthrosis, unspecified whether generalized or localized, lower leg    Log Date: 08/21/2012     Other and unspecified hyperlipidemia    Pneumonia due to organism    Prostate cancer (HCC)    s/p total  prostatectomy and radiation    Trigger finger (acquired)    Type II or unspecified type diabetes mellitus without mention of complication, not stated as uncontrolled    Unspecified essential hypertension    Unspecified internal derangement of knee    Log Date: 08/21/2012        Past Surgical History:   Procedure Laterality Date    Back surgery  1/2001    lower back L4 L5 for herniated disc    Colonoscopy      202, 2012 Dr. Wolf, polyps    Other surgical history  1/31/2007    total prostatectomy    Skin surgery  1997    BCC       Family History Reviewed:  Family History   Problem Relation Age of Onset    Diabetes Other         family hx    Hypertension Other         family hx    Prostate Cancer Father        Allergies:     Allergies   Allergen Reactions    Pcn [Penicillins] ANAPHYLAXIS, HIVES, HALLUCINATION and SHORTNESS OF BREATH     Respiratory distress    Beta Adrenergic Blockers     Latex      Surgical tape  Welts, burning of skin, itching    Statins        Medications:   dexamethasone (DECADRON) 4 MG tablet TAKE 3 TABLETS(12 MG) BY MOUTH 1 TIME EVERY WEEK 36 tablet 3    ATORVASTATIN 20 MG Oral Tab TAKE 1 TABLET(20 MG) BY MOUTH DAILY 90 tablet 0    ACYCLOVIR 400 MG Oral Tab TAKE 1 TABLET(400 MG) BY MOUTH TWICE DAILY 180 tablet 1    traMADol 50 MG Oral Tab Take 1 tablet (50 mg total) by mouth every 6 (six) hours as needed for Pain. 40 tablet 0    benzonatate 200 MG Oral Cap Take 1 capsule (200 mg total) by mouth 3 (three) times daily as needed for cough. 30 capsule 0    cyclobenzaprine 10 MG Oral Tab TAKE 1 TABLET(10 MG) BY MOUTH THREE TIMES DAILY AS NEEDED FOR MUSCLE SPASMS 10 tablet 1    DRISDOL 1.25 MG (35929 UT) Oral Cap       ONETOUCH ULTRASOFT LANCETS Does not apply Misc TEST BLOOD SUGAR TWICE DAILY 200 each 2    ENTRESTO  MG Oral Tab Take 1 tablet by mouth daily.      empagliflozin 10 MG Oral Tab Take 1 tablet (10 mg total) by mouth daily. JARDIANCE      Glucose Blood (ONETOUCH ULTRA) In Vitro Strip  CHECK SUGARS THREE TIMES DAILY AS DIRECTED 300 strip 2    Metoprolol Succinate  MG Oral Tablet 24 Hr Take 1 tablet (200 mg total) by mouth every evening.      eplerenone 25 MG Oral Tab Take 1 tablet (25 mg total) by mouth daily.      omega-3 fatty acids 1000 MG Oral Cap Take 1,000 mg by mouth 2 (two) times daily.      Multiple Vitamin Oral Tab Take 1 tablet by mouth.      acetaminophen 500 MG Oral Tab Take 2 tablets (1,000 mg total) by mouth nightly.      aspirin (ASPIR-81) 81 MG Oral Tab EC Take 1 tablet by mouth daily. 1 tablet 0     Vital Signs:      Height: 182.9 cm (6' 0.01\") (06/07 1115)  Weight: 117.3 kg (258 lb 8 oz) (06/07 1115)  BSA (Calculated - sq m): 2.38 sq meters (06/07 1115)  Pulse: 58 (06/07 1115)  BP: 101/60 (06/07 1115)  Temp: 97.5 °F (36.4 °C) (06/07 1115)  Do Not Use - Resp Rate: --  SpO2: 97 % (06/07 1115)      Performance Status:  ECOG 0: Fully active, able to carry on all pre-disease performance without restriction     Physical Examination:    Constitutional: Patient is alert and not in acute distress.  Respiratory: Clear to auscultation and percussion. No rales.  No wheezes.  Cardiovascular: Regular rate and rhythm. No murmurs.  Gastrointestinal: Soft, non tender with good bowel sounds.  Musculoskeletal: No edema. No calf tenderness.  Psychiatric: The patient's mood is calm and appropriate for this visit.       Labs reviewed at this visit:     Lab Results   Component Value Date    WBC 12.6 (H) 08/02/2024    RBC 3.22 (L) 08/02/2024    HGB 11.5 (L) 08/02/2024    HCT 34.9 (L) 08/02/2024    .4 (H) 08/02/2024    MCH 35.7 (H) 08/02/2024    MCHC 33.0 08/02/2024    RDW 14.2 08/02/2024    .0 08/02/2024     Lab Results   Component Value Date     (L) 08/02/2024    K 4.3 08/02/2024     08/02/2024    CO2 17.0 (L) 08/02/2024    BUN 41 (H) 08/02/2024    CREATSERUM 2.17 (H) 08/02/2024     (H) 08/02/2024    CA 9.4 08/02/2024    ALKPHO 89 08/02/2024    ALT 22 08/02/2024     AST 12 (L) 08/02/2024    BILT 0.8 08/02/2024    ALB 3.6 08/02/2024    TP 6.6 08/02/2024          Component  Ref Range & Units 7/5/24 1137   Immunoglobulin A  70.00 - 312.00 mg/dL <7.83 Low    Immunoglobulin G  791 - 1,643 mg/dL 163 Low    Immunoglobulin M  43.0 - 279.0 mg/dL <5.3 Low             Component  Ref Range & Units 7/5/24 1137   Total Protein  5.7 - 8.2 g/dL 5.9   Albumin  3.75 - 5.21 g/dL 3.86   Alpha-1-Globulins  0.19 - 0.46 g/dL 0.35   Alpha-2-Globulins  0.48 - 1.05 g/dL 0.93   Beta Globulins  0.68 - 1.23 g/dL 0.59 Low    Gamma Globulins  0.62 - 1.70 g/dL 0.17 Low    Albumin/Globulin Ratio  1.00 - 2.00 1.90   SPE Interpretation Small abnormality in the gamma region.   Immunofixation Small monoclonal IgG kappa in the gamma region (0.04 g/dL). If clinically indicated, 24 hour urine monoclonal protein studies is suggested.   [place] Free Light Chain  0.330 - 1.940 mg/dL 71.105 High    Lambda Free Light Chain  0.571 - 2.630 mg/dL <0.134 Low    Kappa/Lambda Flc Ratio    Comment: Unable to calculate  Note: If renal impairment is present, use a reference range of 0.37 - 3.10 for Kappa/Lambda FLC ratio.       M-Cameron  <=0.00 g/dL 0.04 High           Radiologic imaging reviewed at this visit:      PET/CT scan on 1/3/2024:  FINDINGS:    ABNORMAL FOCI:    Diffuse metabolic activity is noted throughout the osseous structures.      Metabolic activity in the anterior right 2nd rib with associated pathologic fracture demonstrates a maximum SUV of 11.0 (series 6, image 167), previously demonstrating a maximum SUV of 6.4.     Activity in the posterior left iliac wing demonstrates a maximum SUV of 3.2 (image 343), has not significantly changed from the prior exam.  Activity in the sacrum demonstrates a maximum SUV of 3.5 (image 342), previously demonstrating a maximum SUV of  3.3.     Degenerative activity involving right anterior lateral osteophytes in the thoracic spine likely represents DISH.  Metabolic activity  around the right hip joint capsule may represent bursitis.  Superinfection cannot be excluded.  This is relatively  unchanged from the prior exam.     OTHER:  Calcified pleural plaque along the right hemidiaphragm is noted with minimal calcification along the left hemidiaphragm.  Coronary artery atherosclerosis is noted.  Calcific tendinosis of the right greater than left Achilles tendon is  noted.     Impression   CONCLUSION:    1. Diffuse low-level metabolic activity throughout the axial skeleton has not significantly changed with the exception of the anterior right 2nd rib which demonstrates increased metabolic activity compared to the prior examination.          Assessment/Plan:     Multiple myeloma IgG kappa:  Hypercalcemia  Acute renal failure  Anemia  Diffuse bone lytic lesions  Beta-2 Microglobulin 18.5 mg/L    ISS stage III  5 cycles of (VCd) Dexamethasone, Bortezomib and cyclophosphamide  High dose therapy with stem cell infusion on 1/27/2017  Revlimid maintenance from 6/2017 to 12/202  Progression with repeat bone marrow biopsy on 1/12/2021  50% plasma cells  FISH study was not done  Chromosomes at relapse:   45,X,-Y[7]/46,XY[13]  Carfilzomib/Ann/Dex started on 2/18/2021       He is on salvage treatment with ann/carfilzomib/Dex.  He continues to have a low m-spike and low quant Ig levels but the kappa free light chain has been increasing. I will repeat this today. If this continues to increase we will need to assess for progression. We will continue with the current treatment. His pain has been stable.    He will continue with cycle 46, day 1. He is tolerating the treatment well. He will continue the Dex 12 mg weekly. He will continue with the current carfilzomib. The quant Ig levels are low. He will return in 4 weeks with repeat labs.      Lytic bone disease:   back pain:  Hypercalcemia    He has been on Zometa. The PET scan had diffuse bony changes consistent with myeloma.      Chronic Kidney  Disease:     Overall stable.    Cardiomyopathy:    Continue cardiology follow up. He is s/p myocardial biopsy that is negative for amyloid. He will continue follow up with cardiology.    Anemia complicating neoplastic disease:    Borderline low now. Continue to follow.        Ruiz Frias MD

## 2024-08-02 NOTE — PROGRESS NOTES
Patient here for C47D1 Kyprolis/faspro. Patient states he took his tylenol and benadryl at 0900 today. Patient denies fever, cough or bleeding issues. Patient states he has good appetite and adequate fluid intake.     Education Record    Learner:  Patient    Disease / Diagnosis: multiple myeloma     Barriers / Limitations:  None   Comments:    Method:  Discussion   Comments:    General Topics:  Diet, Medication, Side effects and symptom management, and Plan of care reviewed   Comments:    Outcome:  Shows understanding   Comments:

## 2024-08-02 NOTE — PROGRESS NOTES
Pt here for C47D1 Drug(s)kyprolis/faspro.  Arrives Ambulating independently, accompanied by self /family    Patient was evaluated today by Treatment RN    Oral medications included in this regimen: see MAR    Patient confirms comprehension of cancer treatment schedule: yes    Pregnancy screening: denies pregnancy    Modifications in dose or schedule: no    Medications appearance and physical integrity checked by RN: yes    Chemotherapy IV pump settings verified by 2 RNs: yes  Frequency of blood return and site check throughout administration: prior to administration and every 2-3 ml if ivp    Infusion/treatment outcome: pt  tolerated treatment with no c/o.     Education Record    Learner: patient  Barriers / Limitations:none  Method: verbal  Education / instructions given:  chemo administration  Outcome: pt verbalized understanding    Discharged home    Patient/family verbalized understanding of future appointments: yes    
Render Note In Bullet Format When Appropriate: No
Detail Level: Simple
Duration Of Freeze Thaw-Cycle (Seconds): 0
Consent: The patient's consent was obtained including but not limited to risks of crusting, scabbing, blistering, scarring, darker or lighter pigmentary change, recurrence, incomplete removal and infection.
Post-Care Instructions: I reviewed with the patient in detail post-care instructions. Patient is to wear sunprotection, and avoid picking at any of the treated lesions. Pt may apply Vaseline to crusted or scabbing areas.
Number Of Freeze-Thaw Cycles: 2 freeze-thaw cycles

## 2024-08-06 LAB
ALBUMIN SERPL ELPH-MCNC: 4.04 G/DL (ref 3.75–5.21)
ALBUMIN/GLOB SERPL: 1.87 {RATIO} (ref 1–2)
ALPHA1 GLOB SERPL ELPH-MCNC: 0.36 G/DL (ref 0.19–0.46)
ALPHA2 GLOB SERPL ELPH-MCNC: 0.94 G/DL (ref 0.48–1.05)
B-GLOBULIN SERPL ELPH-MCNC: 0.67 G/DL (ref 0.68–1.23)
GAMMA GLOB SERPL ELPH-MCNC: 0.19 G/DL (ref 0.62–1.7)
KAPPA LC FREE SER-MCNC: 91.34 MG/DL (ref 0.33–1.94)
KAPPA LC FREE/LAMBDA FREE SER NEPH: 671.59 {RATIO} (ref 0.26–1.65)
LAMBDA LC FREE SERPL-MCNC: 0.14 MG/DL (ref 0.57–2.63)
M PROTEIN 1 SERPL ELPH-MCNC: 0.04 G/DL (ref ?–0)
PROT SERPL-MCNC: 6.2 G/DL (ref 5.7–8.2)

## 2024-08-09 ENCOUNTER — OFFICE VISIT (OUTPATIENT)
Dept: HEMATOLOGY/ONCOLOGY | Age: 74
End: 2024-08-09
Attending: INTERNAL MEDICINE
Payer: MEDICARE

## 2024-08-09 VITALS
SYSTOLIC BLOOD PRESSURE: 94 MMHG | DIASTOLIC BLOOD PRESSURE: 58 MMHG | TEMPERATURE: 98 F | OXYGEN SATURATION: 98 % | BODY MASS INDEX: 34.27 KG/M2 | HEIGHT: 72.01 IN | RESPIRATION RATE: 18 BRPM | HEART RATE: 72 BPM | WEIGHT: 253 LBS

## 2024-08-09 DIAGNOSIS — R79.89 ELEVATED LFTS: ICD-10-CM

## 2024-08-09 DIAGNOSIS — C90.00 MULTIPLE MYELOMA NOT HAVING ACHIEVED REMISSION (HCC): Primary | ICD-10-CM

## 2024-08-09 PROCEDURE — 96413 CHEMO IV INFUSION 1 HR: CPT

## 2024-08-09 PROCEDURE — 36415 COLL VENOUS BLD VENIPUNCTURE: CPT

## 2024-08-09 NOTE — PROGRESS NOTES
Pt here for C47 D8 Drug(s): Kyprolis.  Arrives Ambulating independently, accompanied by Self     Patient was evaluated today by Treatment Nurse.    Oral medications included in this regimen:  yes - dexamethasone, Tylenol, Benadryl    Patient confirms comprehension of cancer treatment schedule:  yes    Pregnancy screening:  Not applicable    Modifications in dose or schedule:  No    Medications appearance and physical integrity checked by RN: yes.    Chemotherapy IV pump settings verified by 2 RNs:  Yes.  Frequency of blood return and site check throughout administration: Prior to administration and At completion of therapy     Infusion/treatment outcome:  patient tolerated treatment without incident    Education Record    Learner:  Patient  Barriers / Limitations:  None  Method:  Discussion  Education / instructions given:  Reviewed plan of care and follow up appts  Outcome:  Shows understanding    Discharged Home, Ambulating independently, accompanied by:Self    Patient/family verbalized understanding of future appointments: by printed AVS

## 2024-08-14 NOTE — TELEPHONE ENCOUNTER
Please review pended refill request as unable to refill due to high/very high interaction warning copied here:    High  Allergy/Contraindication: atorvastatinNo reactions specified. No reaction type specified. User documented allergy severity: None specified.  Level 2 with STATINS.  Details    Requested Prescriptions   Pending Prescriptions Disp Refills    ATORVASTATIN 20 MG Oral Tab [Pharmacy Med Name: ATORVASTATIN 20MG TABLETS] 90 tablet 0     Sig: TAKE 1 TABLET(20 MG) BY MOUTH DAILY       Cholesterol Medication Protocol Passed - 8/11/2024 12:15 PM        Passed - ALT < 80     Lab Results   Component Value Date    ALT 22 08/02/2024             Passed - ALT resulted within past year        Passed - Lipid panel within past 12 months     Lab Results   Component Value Date    CHOLEST 147 07/19/2024    TRIG 141 07/19/2024    HDL 51 07/19/2024    LDL 72 07/19/2024    VLDL 22 07/19/2024    TCHDLRATIO 2.03 06/22/2018    NONHDLC 96 07/19/2024             Passed - In person appointment or virtual visit in the past 12 mos or appointment in next 3 mos     Recent Outpatient Visits              5 days ago Multiple myeloma not having achieved remission ([facility])    [facility] in [place]    Office Visit    1 week ago Multiple myeloma not having achieved remission ([facility])    [facility] in [place] Ruiz Frias MD    Office Visit    1 week ago Multiple myeloma not having achieved remission ([facility])    [facility] in [place]    Office Visit    2 weeks ago     [facility] in [place] Fang Hein, JYOTHI    Office Visit    3 weeks ago Multiple myeloma, remission status unspecified ([facility])    [facility] in [place]    Office Visit          Future Appointments         Provider Department Appt Notes    In 2 days PF TX RN1 [facility] in [place] PL, chemo, zometa    In 2 weeks Janie Adan APRN [facility] in [place], apn, chemo    In 2 weeks PF TX RN1  [facility] in [place] [address], apn, chemo    In 2 months Dagoberto Bell MD [address] 3-month f/u    In 2 months Francisco Silva MD [facility] 4mth f/u                           Future Appointments         Provider Department Appt Notes    In 2 days PF TX RN1 [facility] in [place] [address], chemo, zometa    In 2 weeks Janie Adan APRN [facility] in [place] [facility], apn, chemo    In 2 weeks PF TX RN1 [facility] in [place] [address], apn, chemo    In 2 months Dagoberto Bell MD [address] 3-month f/u    In 2 months Francisco Silva MD [facility] 4mth f/u          Recent Outpatient Visits              5 days ago Multiple myeloma not having achieved remission (HCC)    [facility] in [place]    Office Visit    1 week ago Multiple myeloma not having achieved remission (HCC)    [facility] in [place] Ruiz Frias MD    Office Visit    1 week ago Multiple myeloma not having achieved remission (HCC)    [facility] in [place]    Office Visit    2 weeks ago     [facility] [place] Fang Hein PT    Office Visit    3 weeks ago Multiple myeloma, remission status unspecified (HCC)    [facility] in [place]    Office Visit

## 2024-08-15 RX ORDER — ATORVASTATIN CALCIUM 20 MG/1
20 TABLET, FILM COATED ORAL DAILY
Qty: 90 TABLET | Refills: 3 | Status: SHIPPED | OUTPATIENT
Start: 2024-08-15

## 2024-08-16 ENCOUNTER — APPOINTMENT (OUTPATIENT)
Dept: HEMATOLOGY/ONCOLOGY | Age: 74
End: 2024-08-16
Attending: INTERNAL MEDICINE
Payer: MEDICARE

## 2024-08-16 ENCOUNTER — OFFICE VISIT (OUTPATIENT)
Dept: HEMATOLOGY/ONCOLOGY | Age: 74
End: 2024-08-16
Attending: INTERNAL MEDICINE
Payer: MEDICARE

## 2024-08-16 VITALS
SYSTOLIC BLOOD PRESSURE: 89 MMHG | BODY MASS INDEX: 34.38 KG/M2 | DIASTOLIC BLOOD PRESSURE: 55 MMHG | OXYGEN SATURATION: 97 % | TEMPERATURE: 97 F | RESPIRATION RATE: 18 BRPM | HEIGHT: 72.01 IN | WEIGHT: 253.81 LBS | HEART RATE: 60 BPM

## 2024-08-16 DIAGNOSIS — R79.89 ELEVATED LFTS: ICD-10-CM

## 2024-08-16 DIAGNOSIS — C90.00 MULTIPLE MYELOMA, REMISSION STATUS UNSPECIFIED (HCC): Primary | ICD-10-CM

## 2024-08-16 DIAGNOSIS — C90.00 MULTIPLE MYELOMA NOT HAVING ACHIEVED REMISSION (HCC): ICD-10-CM

## 2024-08-16 LAB
ALBUMIN SERPL-MCNC: 3.7 G/DL (ref 3.4–5)
BASOPHILS # BLD AUTO: 0.01 X10(3) UL (ref 0–0.2)
BASOPHILS NFR BLD AUTO: 0.1 %
CALCIUM BLD-MCNC: 9.4 MG/DL (ref 8.5–10.1)
CREAT BLD-MCNC: 2.12 MG/DL
EGFRCR SERPLBLD CKD-EPI 2021: 32 ML/MIN/1.73M2 (ref 60–?)
EOSINOPHIL # BLD AUTO: 0.01 X10(3) UL (ref 0–0.7)
EOSINOPHIL NFR BLD AUTO: 0.1 %
ERYTHROCYTE [DISTWIDTH] IN BLOOD BY AUTOMATED COUNT: 14.1 %
HCT VFR BLD AUTO: 33.3 %
HGB BLD-MCNC: 11.3 G/DL
IMM GRANULOCYTES # BLD AUTO: 0.06 X10(3) UL (ref 0–1)
IMM GRANULOCYTES NFR BLD: 0.5 %
LYMPHOCYTES # BLD AUTO: 0.22 X10(3) UL (ref 1–4)
LYMPHOCYTES NFR BLD AUTO: 1.7 %
MCH RBC QN AUTO: 36.2 PG (ref 26–34)
MCHC RBC AUTO-ENTMCNC: 33.9 G/DL (ref 31–37)
MCV RBC AUTO: 106.7 FL
MONOCYTES # BLD AUTO: 0.11 X10(3) UL (ref 0.1–1)
MONOCYTES NFR BLD AUTO: 0.8 %
NEUTROPHILS # BLD AUTO: 12.89 X10 (3) UL (ref 1.5–7.7)
NEUTROPHILS # BLD AUTO: 12.89 X10(3) UL (ref 1.5–7.7)
NEUTROPHILS NFR BLD AUTO: 96.8 %
PLATELET # BLD AUTO: 165 10(3)UL (ref 150–450)
RBC # BLD AUTO: 3.12 X10(6)UL
WBC # BLD AUTO: 13.3 X10(3) UL (ref 4–11)

## 2024-08-16 PROCEDURE — 85025 COMPLETE CBC W/AUTO DIFF WBC: CPT

## 2024-08-16 PROCEDURE — 82565 ASSAY OF CREATININE: CPT

## 2024-08-16 PROCEDURE — 96375 TX/PRO/DX INJ NEW DRUG ADDON: CPT

## 2024-08-16 PROCEDURE — 82040 ASSAY OF SERUM ALBUMIN: CPT

## 2024-08-16 PROCEDURE — 96413 CHEMO IV INFUSION 1 HR: CPT

## 2024-08-16 PROCEDURE — 36415 COLL VENOUS BLD VENIPUNCTURE: CPT

## 2024-08-16 PROCEDURE — 82310 ASSAY OF CALCIUM: CPT

## 2024-08-16 NOTE — PROGRESS NOTES
Pt here for C47D15 Drug(s)kyprolis.  Arrives Ambulating independently, accompanied by Self     Patient was evaluated today by Treatment Nurse.    Oral medications included in this regimen:  no    Patient confirms comprehension of cancer treatment schedule:  yes    Pregnancy screening:  Not applicable    Modifications in dose or schedule:  No    Medications appearance and physical integrity checked by RN: yes.    Chemotherapy IV pump settings verified by 2 RNs:  Yes.  Frequency of blood return and site check throughout administration: Prior to administration, Prior to each drug, and At completion of therapy     Infusion/treatment outcome:  patient tolerated treatment without incident    Education Record    Learner:  Patient  Barriers / Limitations:  None  Method:  Brief focused and Discussion  Education / instructions given:  schedule  Outcome:  Shows understanding    Discharged Home, Ambulating independently, accompanied by:Self    Patient/family verbalized understanding of future appointments: by [employer] messaging

## 2024-08-30 ENCOUNTER — APPOINTMENT (OUTPATIENT)
Dept: HEMATOLOGY/ONCOLOGY | Age: 74
End: 2024-08-30
Attending: INTERNAL MEDICINE
Payer: MEDICARE

## 2024-08-30 ENCOUNTER — OFFICE VISIT (OUTPATIENT)
Dept: HEMATOLOGY/ONCOLOGY | Age: 74
End: 2024-08-30
Attending: INTERNAL MEDICINE
Payer: MEDICARE

## 2024-08-30 VITALS
OXYGEN SATURATION: 98 % | DIASTOLIC BLOOD PRESSURE: 59 MMHG | TEMPERATURE: 98 F | HEART RATE: 63 BPM | HEIGHT: 72.01 IN | RESPIRATION RATE: 20 BRPM | WEIGHT: 253 LBS | BODY MASS INDEX: 34.27 KG/M2 | SYSTOLIC BLOOD PRESSURE: 94 MMHG

## 2024-08-30 DIAGNOSIS — N18.30 STAGE 3 CHRONIC KIDNEY DISEASE, UNSPECIFIED WHETHER STAGE 3A OR 3B CKD (HCC): ICD-10-CM

## 2024-08-30 DIAGNOSIS — R79.89 ELEVATED LFTS: Primary | ICD-10-CM

## 2024-08-30 DIAGNOSIS — C90.00 MULTIPLE MYELOMA NOT HAVING ACHIEVED REMISSION (HCC): ICD-10-CM

## 2024-08-30 DIAGNOSIS — C90.00 MULTIPLE MYELOMA NOT HAVING ACHIEVED REMISSION (HCC): Primary | ICD-10-CM

## 2024-08-30 LAB
ALBUMIN SERPL-MCNC: 3.6 G/DL (ref 3.4–5)
ALBUMIN/GLOB SERPL: 1.2 {RATIO} (ref 1–2)
ALP LIVER SERPL-CCNC: 94 U/L
ALT SERPL-CCNC: 20 U/L
ANION GAP SERPL CALC-SCNC: 8 MMOL/L (ref 0–18)
AST SERPL-CCNC: 10 U/L (ref 15–37)
BASOPHILS # BLD AUTO: 0.01 X10(3) UL (ref 0–0.2)
BASOPHILS NFR BLD AUTO: 0.1 %
BILIRUB SERPL-MCNC: 0.8 MG/DL (ref 0.1–2)
BUN BLD-MCNC: 35 MG/DL (ref 9–23)
CALCIUM BLD-MCNC: 9.4 MG/DL (ref 8.5–10.1)
CHLORIDE SERPL-SCNC: 108 MMOL/L (ref 98–112)
CO2 SERPL-SCNC: 19 MMOL/L (ref 21–32)
CREAT BLD-MCNC: 2.59 MG/DL
EGFRCR SERPLBLD CKD-EPI 2021: 25 ML/MIN/1.73M2 (ref 60–?)
EOSINOPHIL # BLD AUTO: 0 X10(3) UL (ref 0–0.7)
EOSINOPHIL NFR BLD AUTO: 0 %
ERYTHROCYTE [DISTWIDTH] IN BLOOD BY AUTOMATED COUNT: 13.8 %
FASTING STATUS PATIENT QL REPORTED: NO
GLOBULIN PLAS-MCNC: 3 G/DL (ref 2.8–4.4)
GLUCOSE BLD-MCNC: 206 MG/DL (ref 70–99)
HCT VFR BLD AUTO: 33.4 %
HGB BLD-MCNC: 11.1 G/DL
IGA SERPL-MCNC: <33 MG/DL (ref 40–350)
IGM SERPL-MCNC: <21 MG/DL (ref 50–300)
IMM GRANULOCYTES # BLD AUTO: 0.05 X10(3) UL (ref 0–1)
IMM GRANULOCYTES NFR BLD: 0.4 %
IMMUNOGLOBULIN PNL SER-MCNC: <140 MG/DL (ref 650–1600)
LYMPHOCYTES # BLD AUTO: 0.16 X10(3) UL (ref 1–4)
LYMPHOCYTES NFR BLD AUTO: 1.4 %
MCH RBC QN AUTO: 36 PG (ref 26–34)
MCHC RBC AUTO-ENTMCNC: 33.2 G/DL (ref 31–37)
MCV RBC AUTO: 108.4 FL
MONOCYTES # BLD AUTO: 0.05 X10(3) UL (ref 0.1–1)
MONOCYTES NFR BLD AUTO: 0.4 %
NEUTROPHILS # BLD AUTO: 11.45 X10 (3) UL (ref 1.5–7.7)
NEUTROPHILS # BLD AUTO: 11.45 X10(3) UL (ref 1.5–7.7)
NEUTROPHILS NFR BLD AUTO: 97.7 %
OSMOLALITY SERPL CALC.SUM OF ELEC: 294 MOSM/KG (ref 275–295)
PLATELET # BLD AUTO: 297 10(3)UL (ref 150–450)
POTASSIUM SERPL-SCNC: 4.6 MMOL/L (ref 3.5–5.1)
PROT SERPL-MCNC: 6.6 G/DL (ref 6.4–8.2)
RBC # BLD AUTO: 3.08 X10(6)UL
SODIUM SERPL-SCNC: 135 MMOL/L (ref 136–145)
WBC # BLD AUTO: 11.7 X10(3) UL (ref 4–11)

## 2024-08-30 PROCEDURE — 96413 CHEMO IV INFUSION 1 HR: CPT

## 2024-08-30 PROCEDURE — 96401 CHEMO ANTI-NEOPL SQ/IM: CPT

## 2024-08-30 PROCEDURE — 99215 OFFICE O/P EST HI 40 MIN: CPT | Performed by: NURSE PRACTITIONER

## 2024-08-30 NOTE — PROGRESS NOTES
Cancer Center Progress Note    Patient Name: Nacho Valle   YOB: 1950   Medical Record Number: [id number]    Date of visit: 8/30/2024    Chief Complaint/Reason for Visit:  Chief Complaint   Patient presents with    Follow - Up    Chemotherapy     C48D1 Kyprolis/faspro       History of Present Illness: Arron presents today for follow up and chemotherapy. He has multiple myeloma and his oncologist is Dr. Frias. He was initially diagnosed in 7/2016. He received induction chemotherapy followed by high dose stem cell infusoin in 1/2017. He was on revlimid maintenance but developed progression. He started salvage chemotherapy daratumumab, cafilzomib and dexamethasone on 2/18/2021. Today is cycle 48.    Nacho denies any new side effects from the chemotherapy. He denies new bone pain. Denies recent fever or chills, cough or SOB, swelling.     Problem List:  Patient Active Problem List   Diagnosis    Impotence of organic origin    Generalized osteoarthrosis of hand    Morbid obesity (HCC)    Essential hypertension    Hypertension    Multiple myeloma (HCC)    Myeloma kidney disease (HCC)    Hx of stem cell transplant (HCC)    Hypoxia    Multiple myeloma, remission status unspecified (HCC)    Type 2 diabetes mellitus with hyperglycemia, without long-term current use of insulin (HCC)    Cardiomyopathy, unspecified type (HCC)    Morbid (severe) obesity due to excess calories (HCC)    Elevated LFTs        Medical History:  Past Medical History:    Allergic rhinitis, cause unspecified    BCC (basal cell carcinoma of skin)    s/p surgical excision    BPH (benign prostatic hyperplasia)    Colon polyps    Generalized osteoarthrosis, involving hand    Herniation of intervertebral disc between L4 and L5    s/p surgical repair    High blood pressure    High cholesterol    Impotence of organic origin    Morbid obesity (HCC)    Osteoarthritis    Osteoarthrosis, unspecified whether generalized or localized, lower leg    Log  Date: 08/21/2012     Other and unspecified hyperlipidemia    Pneumonia due to organism    Prostate cancer (HCC)    s/p total prostatectomy and radiation    Trigger finger (acquired)    Type II or unspecified type diabetes mellitus without mention of complication, not stated as uncontrolled    Unspecified essential hypertension    Unspecified internal derangement of knee    Log Date: 08/21/2012        Surgical History:  Past Surgical History:   Procedure Laterality Date    Back surgery  1/2001    lower back L4 L5 for herniated disc    Colonoscopy      202, 2012 Dr. Wolf, polyps    Other surgical history  1/31/2007    total prostatectomy    Skin surgery  1997    BCC       Allergies:  Allergies   Allergen Reactions    Pcn [Penicillins] ANAPHYLAXIS, HIVES, HALLUCINATION and SHORTNESS OF BREATH     Respiratory distress    Beta Adrenergic Blockers     Latex      Surgical tape  Welts, burning of skin, itching    Statins        Family History:  Family History   Problem Relation Age of Onset    Diabetes Other         family hx    Hypertension Other         family hx    Prostate Cancer Father        Social History:  Social History     Socioeconomic History    Marital status:      Spouse name: Not on file    Number of children: 3    Years of education: Not on file    Highest education level: Not on file   Occupational History    Occupation: Retired      Comment:    Tobacco Use    Smoking status: Never    Smokeless tobacco: Never   Vaping Use    Vaping status: Never Used   Substance and Sexual Activity    Alcohol use: No     Alcohol/week: 0.0 standard drinks of alcohol    Drug use: No    Sexual activity: Not on file   Other Topics Concern    Not on file   Social History Narrative    Not on file     Social Determinants of Health     Financial Resource Strain: Low Risk  (2/20/2023)    Financial Resource Strain     Difficulty of Paying Living Expenses: Not hard at all     Med Affordability:  No   Food Insecurity: No Food Insecurity (2/20/2023)    Food Insecurity     Food Insecurity: Never true   Transportation Needs: No Transportation Needs (2/20/2023)    Transportation Needs     Lack of Transportation: No   Physical Activity: Sufficiently Active (2/20/2023)    Exercise Vital Sign     Days of Exercise per Week: 7 days     Minutes of Exercise per Session: 150+ min   Stress: No Stress Concern Present (2/20/2023)    Stress     Feeling of Stress : No   Social Connections: Socially Integrated (2/20/2023)    Social Connections     Frequency of Socialization with Friends and Family: 3   Housing Stability: Low Risk  (2/20/2023)    Housing Stability     Housing Instability: No     Housing Instability Emergency: Not on file       Medications:    Current Outpatient Medications:     ATORVASTATIN 20 MG Oral Tab, TAKE 1 TABLET(20 MG) BY MOUTH DAILY, Disp: 90 tablet, Rfl: 3    acyclovir 400 MG Oral Tab, Take 1 tablet (400 mg total) by mouth 2 (two) times daily., Disp: 180 tablet, Rfl: 1    metoprolol succinate  MG Oral Tablet 24 Hr, Take 1 tablet (100 mg total) by mouth every evening., Disp: , Rfl:     methylPREDNISolone 4 MG Oral Tablet Therapy Pack, Take as directed., Disp: 21 each, Rfl: 0    dexamethasone (DECADRON) 4 MG tablet, TAKE 3 TABLETS(12 MG) BY MOUTH 1 TIME EVERY WEEK, Disp: 36 tablet, Rfl: 3    benzonatate 200 MG Oral Cap, Take 1 capsule (200 mg total) by mouth 3 (three) times daily as needed for cough., Disp: 30 capsule, Rfl: 0    cyclobenzaprine 10 MG Oral Tab, TAKE 1 TABLET(10 MG) BY MOUTH THREE TIMES DAILY AS NEEDED FOR MUSCLE SPASMS, Disp: 10 tablet, Rfl: 1    DRISDOL 1.25 MG (57141 UT) Oral Cap, , Disp: , Rfl:     ONETOUCH ULTRASOFT LANCETS Does not apply Misc, TEST BLOOD SUGAR TWICE DAILY, Disp: 200 each, Rfl: 2    ENTRESTO  MG Oral Tab, Take 1 tablet by mouth daily., Disp: , Rfl:     empagliflozin 10 MG Oral Tab, Take 1 tablet (10 mg total) by mouth daily. JARDIANCE, Disp: , Rfl:      Glucose Blood (ONETOUCH ULTRA) In Vitro Strip, CHECK SUGARS THREE TIMES DAILY AS DIRECTED, Disp: 300 strip, Rfl: 2    eplerenone 25 MG Oral Tab, Take 1 tablet (25 mg total) by mouth daily., Disp: , Rfl:     omega-3 fatty acids 1000 MG Oral Cap, Take 1,000 mg by mouth 2 (two) times daily., Disp: , Rfl:     Multiple Vitamin Oral Tab, Take 1 tablet by mouth., Disp: , Rfl:     acetaminophen 500 MG Oral Tab, Take 2 tablets (1,000 mg total) by mouth nightly., Disp: , Rfl:     aspirin (ASPIR-81) 81 MG Oral Tab EC, Take 1 tablet by mouth daily., Disp: 1 tablet, Rfl: 0    Review of Systems:  A comprehensive 14 point review of systems was completed.  Pertinent positives and negatives noted in the HPI.    Performance Status: ECOG 0 - Fully active, able to carry on all predisease activities without restrictions.    Physical Examination:  General: Patient is alert and oriented x 3, not in acute distress.  Vital Signs: Height: 182.9 cm (6' 0.01\") (08/30 1113)  Weight: 114.8 kg (253 lb) (08/30 1113)  BSA (Calculated - sq m): 2.35 sq meters (08/30 1113)  Pulse: 63 (08/30 1113)  BP: 94/59 (08/30 1113)  Temp: 97.9 °F (36.6 °C) (08/30 1113)  Do Not Use - Resp Rate: --  SpO2: 98 % (08/30 1113)  HEENT: Anicteric, conjunctivae and sclerae clear, no oropharyngeal lesion/thrush, mucous membranes are moist   Chest: Clear to auscultation. Respirations unlabored.   Heart: Regular rate and rhythm.   Abdomen: Soft, non-distended, non-tender with present bowel sounds.  Extremities: No edema.  Neurological: Grossly intact.   Lymphatics: There is no palpable lymphadenopathy throughout in the cervical or supraclavicular regions.   Skin: warm, dry, no erythema or rash   Psych/Depression: mood and affect are appropriate.     Labs:     Recent Results (from the past 72 hour(s))   CBC W Differential W Platelet    Collection Time: 08/30/24 11:12 AM   Result Value Ref Range    WBC 11.7 (H) 4.0 - 11.0 x10(3) uL    RBC 3.08 (L) 3.80 - 5.80 x10(6)uL    HGB  11.1 (L) 13.0 - 17.5 g/dL    HCT 33.4 (L) 39.0 - 53.0 %    .0 150.0 - 450.0 10(3)uL    .4 (H) 80.0 - 100.0 fL    MCH 36.0 (H) 26.0 - 34.0 pg    MCHC 33.2 31.0 - 37.0 g/dL    RDW 13.8 %    Neutrophil Absolute Prelim 11.45 (H) 1.50 - 7.70 x10 (3) uL    Neutrophil Absolute 11.45 (H) 1.50 - 7.70 x10(3) uL    Lymphocyte Absolute 0.16 (L) 1.00 - 4.00 x10(3) uL    Monocyte Absolute 0.05 (L) 0.10 - 1.00 x10(3) uL    Eosinophil Absolute 0.00 0.00 - 0.70 x10(3) uL    Basophil Absolute 0.01 0.00 - 0.20 x10(3) uL    Immature Granulocyte Absolute 0.05 0.00 - 1.00 x10(3) uL    Neutrophil % 97.7 %    Lymphocyte % 1.4 %    Monocyte % 0.4 %    Eosinophil % 0.0 %    Basophil % 0.1 %    Immature Granulocyte % 0.4 %   Comp Metabolic Panel (14)    Collection Time: 08/30/24 11:12 AM   Result Value Ref Range    Glucose 206 (H) 70 - 99 mg/dL    Sodium 135 (L) 136 - 145 mmol/L    Potassium 4.6 3.5 - 5.1 mmol/L    Chloride 108 98 - 112 mmol/L    CO2 19.0 (L) 21.0 - 32.0 mmol/L    Anion Gap 8 0 - 18 mmol/L    BUN 35 (H) 9 - 23 mg/dL    Creatinine 2.59 (H) 0.70 - 1.30 mg/dL    Calcium, Total 9.4 8.5 - 10.1 mg/dL    Calculated Osmolality 294 275 - 295 mOsm/kg    eGFR-Cr 25 (L) >=60 mL/min/1.73m2    AST 10 (L) 15 - 37 U/L    ALT 20 16 - 61 U/L    Alkaline Phosphatase 94 45 - 117 U/L    Bilirubin, Total 0.8 0.1 - 2.0 mg/dL    Total Protein 6.6 6.4 - 8.2 g/dL    Albumin 3.6 3.4 - 5.0 g/dL    Globulin  3.0 2.8 - 4.4 g/dL    A/G Ratio 1.2 1.0 - 2.0    Patient Fasting for CMP? No        Impression/Plan    Multiple myeloma:  initially diagnosed in 7/2016. He received induction chemotherapy followed by high dose stem cell infusion in 1/2017. He was on revlimid maintenance but developed progression. He started salvage chemotherapy daratumumab, cafilzomib and dexamethasone on 2/18/2021. He is tolerating treatment without significant side effects. [place] [facility] [place] has been increasing. Discussed with Dr. Frias and will get PET scan. CBC  and CMP reviewed, proceed with cycle 48 today.     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 Will recheck BMP in 1 week. No dose modifications to carfilzomib or daratumumab.     Planned Follow Up: weekly for chemotherapy; 4 weeks follow up with Dr. Frias, labs and chemotherapy    Risk Level: HIGH multiple myeloma receiving chemotherapy requiring close monitoring     Electronically Signed by:    ITA Sanford, NP-C  Nurse Practitioner  [facility]

## 2024-08-30 NOTE — PROGRESS NOTES
Pt here for C48D1 Drug(s)kyprolis/faspro.  Arrives Ambulating independently, accompanied by self /family    Patient was evaluated today by Treatment RN    Oral medications included in this regimen: see MAR    Patient confirms comprehension of cancer treatment schedule: yes    Pregnancy screening: denies pregnancy    Modifications in dose or schedule: no    Medications appearance and physical integrity checked by RN: yes    Chemotherapy IV pump settings verified by 2 RNs: yes  Frequency of blood return and site check throughout administration: prior to administration and every 2-3 ml if ivp    Infusion/treatment outcome: pt  tolerated treatment with no c/o.     Education Record    Learner: patient  Barriers / Limitations:none  Method: verbal  Education / instructions given:  chemo administration  Outcome: pt verbalized understanding    Discharged home    Patient/family verbalized understanding of future appointments: yes

## 2024-08-30 NOTE — PROGRESS NOTES
Patient here for C48D1 Kyprolis/Faspro. Patient states he has back pain that is chronic. Patient took his benadryl and tylenol at 0930.     Education Record    Learner:  Patient    Disease / Diagnosis: multiple myeloma    Barriers / Limitations:  None   Comments:    Method:  Discussion   Comments:    General Topics:  Medication, Side effects and symptom management, and Plan of care reviewed   Comments:    Outcome:  Shows understanding   Comments:

## 2024-09-03 ENCOUNTER — TELEPHONE (OUTPATIENT)
Dept: HEMATOLOGY/ONCOLOGY | Facility: HOSPITAL | Age: 74
End: 2024-09-03

## 2024-09-03 DIAGNOSIS — C90.00 MULTIPLE MYELOMA (HCC): Primary | ICD-10-CM

## 2024-09-03 LAB
ALBUMIN SERPL ELPH-MCNC: 3.98 G/DL (ref 3.75–5.21)
ALBUMIN/GLOB SERPL: 1.88 {RATIO} (ref 1–2)
ALPHA1 GLOB SERPL ELPH-MCNC: 0.38 G/DL (ref 0.19–0.46)
ALPHA2 GLOB SERPL ELPH-MCNC: 0.92 G/DL (ref 0.48–1.05)
B-GLOBULIN SERPL ELPH-MCNC: 0.65 G/DL (ref 0.68–1.23)
GAMMA GLOB SERPL ELPH-MCNC: 0.17 G/DL (ref 0.62–1.7)
KAPPA LC FREE SER-MCNC: 132.3 MG/DL (ref 0.33–1.94)
LAMBDA LC FREE SERPL-MCNC: <0.133 MG/DL (ref 0.57–2.63)
M PROTEIN 1 SERPL ELPH-MCNC: 0.05 G/DL (ref ?–0)
PROT SERPL-MCNC: 6.1 G/DL (ref 5.7–8.2)

## 2024-09-03 NOTE — TELEPHONE ENCOUNTER
PET Scan Department at [facility] is calling to request orders be revised to \"skull to toe\" for Pet on 9/5/24. Freida [phone]. Called 9/3/24. MP

## 2024-09-03 NOTE — TELEPHONE ENCOUNTER
Left voicemail to inform patient that he is scheduled for his PET scan on 9/5/24. Awaiting a call back.

## 2024-09-05 ENCOUNTER — HOSPITAL ENCOUNTER (OUTPATIENT)
Dept: NUCLEAR MEDICINE | Facility: HOSPITAL | Age: 74
Discharge: HOME | End: 2024-09-05
Attending: NURSE PRACTITIONER
Payer: MEDICARE

## 2024-09-05 DIAGNOSIS — C90.00 MULTIPLE MYELOMA (HCC): ICD-10-CM

## 2024-09-05 LAB — GLUCOSE BLD-MCNC: 97 MG/DL (ref 70–99)

## 2024-09-05 PROCEDURE — 82962 GLUCOSE BLOOD TEST: CPT

## 2024-09-05 PROCEDURE — 78816 PET IMAGE W/CT FULL BODY: CPT | Performed by: NURSE PRACTITIONER

## 2024-09-06 ENCOUNTER — OFFICE VISIT (OUTPATIENT)
Dept: HEMATOLOGY/ONCOLOGY | Age: 74
End: 2024-09-06
Attending: INTERNAL MEDICINE
Payer: MEDICARE

## 2024-09-06 VITALS
HEIGHT: 72.01 IN | DIASTOLIC BLOOD PRESSURE: 64 MMHG | RESPIRATION RATE: 18 BRPM | BODY MASS INDEX: 34.2 KG/M2 | HEART RATE: 54 BPM | SYSTOLIC BLOOD PRESSURE: 99 MMHG | TEMPERATURE: 97 F | OXYGEN SATURATION: 98 % | WEIGHT: 252.5 LBS

## 2024-09-06 DIAGNOSIS — R79.89 ELEVATED LFTS: Primary | ICD-10-CM

## 2024-09-06 DIAGNOSIS — C90.00 MULTIPLE MYELOMA NOT HAVING ACHIEVED REMISSION (HCC): ICD-10-CM

## 2024-09-06 LAB
ANION GAP SERPL CALC-SCNC: 6 MMOL/L (ref 0–18)
BASOPHILS # BLD AUTO: 0 X10(3) UL (ref 0–0.2)
BASOPHILS NFR BLD AUTO: 0 %
BUN BLD-MCNC: 27 MG/DL (ref 9–23)
CALCIUM BLD-MCNC: 9.8 MG/DL (ref 8.5–10.1)
CHLORIDE SERPL-SCNC: 109 MMOL/L (ref 98–112)
CO2 SERPL-SCNC: 20 MMOL/L (ref 21–32)
CREAT BLD-MCNC: 2.29 MG/DL
EGFRCR SERPLBLD CKD-EPI 2021: 29 ML/MIN/1.73M2 (ref 60–?)
EOSINOPHIL # BLD AUTO: 0 X10(3) UL (ref 0–0.7)
EOSINOPHIL NFR BLD AUTO: 0 %
ERYTHROCYTE [DISTWIDTH] IN BLOOD BY AUTOMATED COUNT: 13.7 %
FASTING STATUS PATIENT QL REPORTED: NO
GLUCOSE BLD-MCNC: 172 MG/DL (ref 70–99)
HCT VFR BLD AUTO: 33.2 %
HGB BLD-MCNC: 11.2 G/DL
IMM GRANULOCYTES # BLD AUTO: 0.05 X10(3) UL (ref 0–1)
IMM GRANULOCYTES NFR BLD: 0.5 %
LYMPHOCYTES # BLD AUTO: 0.18 X10(3) UL (ref 1–4)
LYMPHOCYTES NFR BLD AUTO: 1.7 %
MCH RBC QN AUTO: 36 PG (ref 26–34)
MCHC RBC AUTO-ENTMCNC: 33.7 G/DL (ref 31–37)
MCV RBC AUTO: 106.8 FL
MONOCYTES # BLD AUTO: 0.13 X10(3) UL (ref 0.1–1)
MONOCYTES NFR BLD AUTO: 1.2 %
NEUTROPHILS # BLD AUTO: 10.32 X10 (3) UL (ref 1.5–7.7)
NEUTROPHILS # BLD AUTO: 10.32 X10(3) UL (ref 1.5–7.7)
NEUTROPHILS NFR BLD AUTO: 96.6 %
OSMOLALITY SERPL CALC.SUM OF ELEC: 289 MOSM/KG (ref 275–295)
PLATELET # BLD AUTO: 159 10(3)UL (ref 150–450)
POTASSIUM SERPL-SCNC: 4.3 MMOL/L (ref 3.5–5.1)
RBC # BLD AUTO: 3.11 X10(6)UL
SODIUM SERPL-SCNC: 135 MMOL/L (ref 136–145)
WBC # BLD AUTO: 10.7 X10(3) UL (ref 4–11)

## 2024-09-06 PROCEDURE — 80048 BASIC METABOLIC PNL TOTAL CA: CPT

## 2024-09-06 PROCEDURE — 85025 COMPLETE CBC W/AUTO DIFF WBC: CPT

## 2024-09-06 NOTE — PROGRESS NOTES
Pt here for C48D8 Drug(s)kyprolis.  Arrives Ambulating independently, accompanied by Self     Patient was evaluated today by Treatment Nurse.  Pt denies any new complaints today. States \"he is doing okay\"    Oral medications included in this regimen:  yes - dexamethasone    Patient confirms comprehension of cancer treatment schedule:  yes    Pregnancy screening:  Not applicable    Modifications in dose or schedule:  No  Reviewed labs results with TRUDY Crow- orders to proceed with treatment today as scheduled  Medications appearance and physical integrity checked by RN: yes.    Chemotherapy IV pump settings verified by 2 RNs:  Yes.  Frequency of blood return and site check throughout administration: Prior to administration and At completion of therapy     Infusion/treatment outcome:  patient tolerated treatment without incident    Education Record    Learner:  Patient  Barriers / Limitations:  None  Method:  Brief focused and Printed material  Education / instructions given:  reviewed fu  Outcome:  Shows understanding    Discharged Home, Ambulating independently, accompanied by:Self    Patient/family verbalized understanding of future appointments: by printed AVS  Pt dc home ambulatory in stable condition, no new complaints. FU provided

## 2024-09-13 ENCOUNTER — OFFICE VISIT (OUTPATIENT)
Dept: HEMATOLOGY/ONCOLOGY | Age: 74
End: 2024-09-13
Attending: INTERNAL MEDICINE
Payer: MEDICARE

## 2024-09-13 VITALS
HEIGHT: 72.01 IN | OXYGEN SATURATION: 97 % | TEMPERATURE: 97 F | WEIGHT: 251.5 LBS | SYSTOLIC BLOOD PRESSURE: 95 MMHG | DIASTOLIC BLOOD PRESSURE: 60 MMHG | RESPIRATION RATE: 18 BRPM | HEART RATE: 66 BPM | BODY MASS INDEX: 34.06 KG/M2

## 2024-09-13 DIAGNOSIS — C90.00 MULTIPLE MYELOMA NOT HAVING ACHIEVED REMISSION (HCC): ICD-10-CM

## 2024-09-13 DIAGNOSIS — C90.00 MULTIPLE MYELOMA, REMISSION STATUS UNSPECIFIED (HCC): ICD-10-CM

## 2024-09-13 DIAGNOSIS — R79.89 ELEVATED LFTS: Primary | ICD-10-CM

## 2024-09-13 LAB
ALBUMIN SERPL-MCNC: 3.5 G/DL (ref 3.4–5)
ANION GAP SERPL CALC-SCNC: 7 MMOL/L (ref 0–18)
BASOPHILS # BLD AUTO: 0.01 X10(3) UL (ref 0–0.2)
BASOPHILS NFR BLD AUTO: 0.1 %
BUN BLD-MCNC: 39 MG/DL (ref 9–23)
CALCIUM BLD-MCNC: 9.3 MG/DL (ref 8.5–10.1)
CALCIUM BLD-MCNC: 9.4 MG/DL (ref 8.5–10.1)
CHLORIDE SERPL-SCNC: 110 MMOL/L (ref 98–112)
CO2 SERPL-SCNC: 18 MMOL/L (ref 21–32)
CREAT BLD-MCNC: 2.39 MG/DL
CREAT BLD-MCNC: 2.4 MG/DL
EGFRCR SERPLBLD CKD-EPI 2021: 27 ML/MIN/1.73M2 (ref 60–?)
EGFRCR SERPLBLD CKD-EPI 2021: 27 ML/MIN/1.73M2 (ref 60–?)
EOSINOPHIL # BLD AUTO: 0 X10(3) UL (ref 0–0.7)
EOSINOPHIL NFR BLD AUTO: 0 %
ERYTHROCYTE [DISTWIDTH] IN BLOOD BY AUTOMATED COUNT: 13.5 %
GLUCOSE BLD-MCNC: 164 MG/DL (ref 70–99)
HCT VFR BLD AUTO: 32.3 %
HGB BLD-MCNC: 10.7 G/DL
IMM GRANULOCYTES # BLD AUTO: 0.05 X10(3) UL (ref 0–1)
IMM GRANULOCYTES NFR BLD: 0.4 %
LYMPHOCYTES # BLD AUTO: 0.16 X10(3) UL (ref 1–4)
LYMPHOCYTES NFR BLD AUTO: 1.2 %
MCH RBC QN AUTO: 35.7 PG (ref 26–34)
MCHC RBC AUTO-ENTMCNC: 33.1 G/DL (ref 31–37)
MCV RBC AUTO: 107.7 FL
MONOCYTES # BLD AUTO: 0.1 X10(3) UL (ref 0.1–1)
MONOCYTES NFR BLD AUTO: 0.7 %
NEUTROPHILS # BLD AUTO: 13.09 X10 (3) UL (ref 1.5–7.7)
NEUTROPHILS # BLD AUTO: 13.09 X10(3) UL (ref 1.5–7.7)
NEUTROPHILS NFR BLD AUTO: 97.6 %
OSMOLALITY SERPL CALC.SUM OF ELEC: 293 MOSM/KG (ref 275–295)
PLATELET # BLD AUTO: 186 10(3)UL (ref 150–450)
POTASSIUM SERPL-SCNC: 4.1 MMOL/L (ref 3.5–5.1)
RBC # BLD AUTO: 3 X10(6)UL
SODIUM SERPL-SCNC: 135 MMOL/L (ref 136–145)
WBC # BLD AUTO: 13.4 X10(3) UL (ref 4–11)

## 2024-09-13 PROCEDURE — 96375 TX/PRO/DX INJ NEW DRUG ADDON: CPT

## 2024-09-13 PROCEDURE — 82565 ASSAY OF CREATININE: CPT

## 2024-09-13 PROCEDURE — 96413 CHEMO IV INFUSION 1 HR: CPT

## 2024-09-13 PROCEDURE — 80048 BASIC METABOLIC PNL TOTAL CA: CPT

## 2024-09-13 PROCEDURE — 36415 COLL VENOUS BLD VENIPUNCTURE: CPT

## 2024-09-13 PROCEDURE — 82040 ASSAY OF SERUM ALBUMIN: CPT

## 2024-09-13 PROCEDURE — 85025 COMPLETE CBC W/AUTO DIFF WBC: CPT

## 2024-09-13 PROCEDURE — 82310 ASSAY OF CALCIUM: CPT

## 2024-09-13 NOTE — PROGRESS NOTES
Pt here for C48D1 Drug(s)kyprolis.  Arrives Ambulating independently, accompanied by self /family    Patient was evaluated today by Treatment RN    Oral medications included in this regimen: see MAR    Patient confirms comprehension of cancer treatment schedule: yes    Pregnancy screening: denies pregnancy    Modifications in dose or schedule: no    Medications appearance and physical integrity checked by RN: yes    Chemotherapy IV pump settings verified by 2 RNs: yes  Frequency of blood return and site check throughout administration: prior to administration and every 2-3 ml if ivp    Infusion/treatment outcome: pt  tolerated treatment with no c/o.     Education Record    Learner: patient  Barriers / Limitations:none  Method: verbal  Education / instructions given:  chemo administration  Outcome: pt verbalized understanding    Discharged home    Patient/family verbalized understanding of future appointments: yes

## 2024-09-27 ENCOUNTER — OFFICE VISIT (OUTPATIENT)
Dept: HEMATOLOGY/ONCOLOGY | Age: 74
End: 2024-09-27
Attending: INTERNAL MEDICINE
Payer: MEDICARE

## 2024-09-27 VITALS
TEMPERATURE: 98 F | WEIGHT: 249.5 LBS | SYSTOLIC BLOOD PRESSURE: 89 MMHG | OXYGEN SATURATION: 99 % | RESPIRATION RATE: 18 BRPM | DIASTOLIC BLOOD PRESSURE: 57 MMHG | HEIGHT: 72.01 IN | HEART RATE: 60 BPM | BODY MASS INDEX: 33.79 KG/M2

## 2024-09-27 DIAGNOSIS — C90.00 MULTIPLE MYELOMA NOT HAVING ACHIEVED REMISSION (HCC): Primary | ICD-10-CM

## 2024-09-27 DIAGNOSIS — R79.89 ELEVATED LFTS: ICD-10-CM

## 2024-09-27 LAB
ALBUMIN SERPL-MCNC: 3.4 G/DL (ref 3.4–5)
ALBUMIN/GLOB SERPL: 1.1 {RATIO} (ref 1–2)
ALP LIVER SERPL-CCNC: 84 U/L
ALT SERPL-CCNC: 15 U/L
ANION GAP SERPL CALC-SCNC: 9 MMOL/L (ref 0–18)
AST SERPL-CCNC: 13 U/L (ref 15–37)
BASOPHILS # BLD AUTO: 0.02 X10(3) UL (ref 0–0.2)
BASOPHILS NFR BLD AUTO: 0.1 %
BILIRUB SERPL-MCNC: 0.7 MG/DL (ref 0.1–2)
BUN BLD-MCNC: 46 MG/DL (ref 9–23)
CALCIUM BLD-MCNC: 10 MG/DL (ref 8.5–10.1)
CHLORIDE SERPL-SCNC: 106 MMOL/L (ref 98–112)
CO2 SERPL-SCNC: 17 MMOL/L (ref 21–32)
CREAT BLD-MCNC: 2.61 MG/DL
EGFRCR SERPLBLD CKD-EPI 2021: 25 ML/MIN/1.73M2 (ref 60–?)
EOSINOPHIL # BLD AUTO: 0.01 X10(3) UL (ref 0–0.7)
EOSINOPHIL NFR BLD AUTO: 0.1 %
ERYTHROCYTE [DISTWIDTH] IN BLOOD BY AUTOMATED COUNT: 13.2 %
FASTING STATUS PATIENT QL REPORTED: NO
GLOBULIN PLAS-MCNC: 3.2 G/DL (ref 2.8–4.4)
GLUCOSE BLD-MCNC: 164 MG/DL (ref 70–99)
HCT VFR BLD AUTO: 30.9 %
HGB BLD-MCNC: 10.5 G/DL
IGA SERPL-MCNC: <33 MG/DL (ref 40–350)
IGM SERPL-MCNC: <21 MG/DL (ref 50–300)
IMM GRANULOCYTES # BLD AUTO: 0.07 X10(3) UL (ref 0–1)
IMM GRANULOCYTES NFR BLD: 0.5 %
IMMUNOGLOBULIN PNL SER-MCNC: <140 MG/DL (ref 650–1600)
LYMPHOCYTES # BLD AUTO: 0.26 X10(3) UL (ref 1–4)
LYMPHOCYTES NFR BLD AUTO: 1.9 %
MCH RBC QN AUTO: 36.8 PG (ref 26–34)
MCHC RBC AUTO-ENTMCNC: 34 G/DL (ref 31–37)
MCV RBC AUTO: 108.4 FL
MONOCYTES # BLD AUTO: 0.17 X10(3) UL (ref 0.1–1)
MONOCYTES NFR BLD AUTO: 1.2 %
NEUTROPHILS # BLD AUTO: 13.39 X10 (3) UL (ref 1.5–7.7)
NEUTROPHILS # BLD AUTO: 13.39 X10(3) UL (ref 1.5–7.7)
NEUTROPHILS NFR BLD AUTO: 96.2 %
OSMOLALITY SERPL CALC.SUM OF ELEC: 290 MOSM/KG (ref 275–295)
PLATELET # BLD AUTO: 320 10(3)UL (ref 150–450)
POTASSIUM SERPL-SCNC: 4 MMOL/L (ref 3.5–5.1)
PROT SERPL-MCNC: 6.6 G/DL (ref 6.4–8.2)
RBC # BLD AUTO: 2.85 X10(6)UL
SODIUM SERPL-SCNC: 132 MMOL/L (ref 136–145)
WBC # BLD AUTO: 13.9 X10(3) UL (ref 4–11)

## 2024-09-27 PROCEDURE — 96413 CHEMO IV INFUSION 1 HR: CPT

## 2024-09-27 PROCEDURE — 99215 OFFICE O/P EST HI 40 MIN: CPT | Performed by: INTERNAL MEDICINE

## 2024-09-27 PROCEDURE — 96401 CHEMO ANTI-NEOPL SQ/IM: CPT

## 2024-09-27 NOTE — PROGRESS NOTES
Pt here for C49D1 Drug(s)kyprolis, faspro.  Arrives Ambulating independently, accompanied by Self     Patient was evaluated today by MD.    Oral medications included in this regimen:  no    Patient confirms comprehension of cancer treatment schedule:  yes    Pregnancy screening:  Not applicable    Modifications in dose or schedule:  No    Medications appearance and physical integrity checked by RN: yes.    Chemotherapy IV pump settings verified by 2 RNs:  Yes.  Frequency of blood return and site check throughout administration: Prior to administration and At completion of therapy     Infusion/treatment outcome:  patient tolerated treatment without incident    Education Record    Learner:  Patient  Barriers / Limitations:  None  Method:  Discussion  Education / instructions given:  medications administered, appts, AVS   Outcome:  Shows understanding    Discharged Home, Ambulating independently, accompanied by:Self    Patient/family verbalized understanding of future appointments: by printed AVS

## 2024-09-27 NOTE — PROGRESS NOTES
Cancer Center Progress Note    Problem List:      Patient Active Problem List   Diagnosis    Impotence of organic origin    Generalized osteoarthrosis of hand    Morbid obesity (HCC)    Essential hypertension    Hypertension    Multiple myeloma (HCC)    Myeloma kidney disease (HCC)    Hx of stem cell transplant (HCC)    Hypoxia    Multiple myeloma, remission status unspecified (HCC)    Type 2 diabetes mellitus with hyperglycemia, without long-term current use of insulin (HCC)    Cardiomyopathy, unspecified type (HCC)    Morbid (severe) obesity due to excess calories (HCC)    Elevated LFTs       Interim History:    Nacho Valle presents today for evaluation and management of a diagnosis of multiple myeloma.     Since last visit the patient had work up for increasing kappa free light chain from 44 in 6/2024 to 132 on 8/30/2024. He had a PET scan on 9/5/2024 that showed new areas of bone uptake with results below. He returned to see Dr. Ajay Good at [place]. He is being worked up for possible CAR therapy.     He is here for cycle 49 day 1 of Ann/Carf/Dex. He has no new pain. He says he has less pain on the day that he has the dexamethasone.  He has no other new pain. He has no fever or sweats.     He is otherwise doing well. He is taking dex 12 mg weekly three of four weeks. He has no fever or sweats. He has no dyspnea or cough. He has no abdominal pain.     He had an echo on 5/22/2023 that had LVEF 50%.     He had right heart cath to r/o amyloid on 2/2/20.     He originally had 5 cycles of Cybord. He now has had high dose therapy. He was getting Zometa monthly infusions. His last Zometa was in 6/16/2017.    Pathology from cardiac biopsy on 2/2/2021:  Addendum 1   The endomyocardial biopsy  was sent to [facility], [place], [place] for processing and examination where the case was reviewed by Jaswinder Torre MD.   The full report has been scanned and is available as a hyperlink within  this report (under the final diagnoses section).     \"Final Pathologic Diagnosis:      A.  Heart, endomyocardial biopsy:   -Fragments of myocardium with mild myocyte hypertrophy, no evidence of cardiac amyloidosis, see comment.   -Portion of fibrosis tissue.      B.  Heart, endomyocardial biopsy for electron microscopy:   -Negative for any significant ultrastructural changes, see addendum below for details.      Comment:     The clinical concern for amyloidosis is noted.  A Congo red stain performed with appropriate controls is negative for amyloid deposition, and a trichrome stain with appropriate controls shows focal interstitial and subendocardial fibrosis.  Prussian zeyad stain is negative for iron deposition (controls appropriate). There is no evidence of active myocarditis, giant cells, or granulomas. Electron microscopy studies will be reported as an addendum. The clinical team was notified of the results on 2/3/2021 at 3:35 PM\".          Review of Systems:   Constitutional: Negative for anorexia, fatigue, fevers, chills, night sweats and weight loss.  Psychiatric: The patient's mood was calm and appropriate for this visit.  The pertinent positives and negatives were described. All other systems were negative.    PMH/PSH:  Past Medical History:    Allergic rhinitis, cause unspecified    BCC (basal cell carcinoma of skin)    s/p surgical excision    BPH (benign prostatic hyperplasia)    Colon polyps    Generalized osteoarthrosis, involving hand    Herniation of intervertebral disc between L4 and L5    s/p surgical repair    High blood pressure    High cholesterol    Impotence of organic origin    Morbid obesity (HCC)    Osteoarthritis    Osteoarthrosis, unspecified whether generalized or localized, lower leg    Log Date: 08/21/2012     Other and unspecified hyperlipidemia    Pneumonia due to organism    Prostate cancer (HCC)    s/p total prostatectomy and radiation    Trigger finger (acquired)    Type II or  unspecified type diabetes mellitus without mention of complication, not stated as uncontrolled    Unspecified essential hypertension    Unspecified internal derangement of knee    Log Date: 08/21/2012        Past Surgical History:   Procedure Laterality Date    Back surgery  1/2001    lower back L4 L5 for herniated disc    Colonoscopy      202, 2012 Dr. Wolf, polyps    Other surgical history  1/31/2007    total prostatectomy    Skin surgery  1997    [facility]       Family History Reviewed:  Family History   Problem Relation Age of Onset    Diabetes Other         family hx    Hypertension Other         family hx    Prostate Cancer Father        Allergies:     Allergies   Allergen Reactions    Pcn [Penicillins] ANAPHYLAXIS, HIVES, HALLUCINATION and SHORTNESS OF BREATH     Respiratory distress    Beta Adrenergic Blockers     Latex      Surgical tape  Welts, burning of skin, itching    Statins        Medications:   dexamethasone (DECADRON) 4 MG tablet TAKE 3 TABLETS(12 MG) BY MOUTH 1 TIME EVERY WEEK 36 tablet 3    ATORVASTATIN 20 MG Oral Tab TAKE 1 TABLET(20 MG) BY MOUTH DAILY 90 tablet 0    ACYCLOVIR 400 MG Oral Tab TAKE 1 TABLET(400 MG) BY MOUTH TWICE DAILY 180 tablet 1    traMADol 50 MG Oral Tab Take 1 tablet (50 mg total) by mouth every 6 (six) hours as needed for Pain. 40 tablet 0    benzonatate 200 MG Oral Cap Take 1 capsule (200 mg total) by mouth 3 (three) times daily as needed for cough. 30 capsule 0    cyclobenzaprine 10 MG Oral Tab TAKE 1 TABLET(10 MG) BY MOUTH THREE TIMES DAILY AS NEEDED FOR MUSCLE SPASMS 10 tablet 1    DRISDOL 1.25 MG (19405 UT) Oral Cap       ONETOUCH ULTRASOFT LANCETS Does not apply Misc TEST BLOOD SUGAR TWICE DAILY 200 each 2    ENTRESTO  MG Oral Tab Take 1 tablet by mouth daily.      empagliflozin 10 MG Oral Tab Take 1 tablet (10 mg total) by mouth daily. JARDIANCE      Glucose Blood (ONETOUCH ULTRA) In Vitro Strip CHECK SUGARS THREE TIMES DAILY AS DIRECTED 300 strip 2    Metoprolol  Succinate  MG Oral Tablet 24 Hr Take 1 tablet (200 mg total) by mouth every evening.      eplerenone 25 MG Oral Tab Take 1 tablet (25 mg total) by mouth daily.      omega-3 fatty acids 1000 MG Oral Cap Take 1,000 mg by mouth 2 (two) times daily.      Multiple Vitamin Oral Tab Take 1 tablet by mouth.      acetaminophen 500 MG Oral Tab Take 2 tablets (1,000 mg total) by mouth nightly.      aspirin (ASPIR-81) 81 MG Oral Tab EC Take 1 tablet by mouth daily. 1 tablet 0     Vital Signs:      Height: 182.9 cm (6' 0.01\") (06/07 1115)  Weight: 117.3 kg (258 lb 8 oz) (06/07 1115)  BSA (Calculated - sq m): 2.38 sq meters (06/07 1115)  Pulse: 58 (06/07 1115)  BP: 101/60 (06/07 1115)  Temp: 97.5 °F (36.4 °C) (06/07 1115)  Do Not Use - Resp Rate: --  SpO2: 97 % (06/07 1115)      Performance Status:  ECOG 0: Fully active, able to carry on all pre-disease performance without restriction     Physical Examination:    Constitutional: Patient is alert and not in acute distress.  Respiratory: Clear to auscultation and percussion. No rales.  No wheezes.  Cardiovascular: Regular rate and rhythm. No murmurs.  Gastrointestinal: Soft, non tender with good bowel sounds.  Musculoskeletal: No edema. No calf tenderness.  Psychiatric: The patient's mood is calm and appropriate for this visit.       Labs reviewed at this visit:     Lab Results   Component Value Date    WBC 13.9 (H) 09/27/2024    RBC 2.85 (L) 09/27/2024    HGB 10.5 (L) 09/27/2024    HCT 30.9 (L) 09/27/2024    .4 (H) 09/27/2024    MCH 36.8 (H) 09/27/2024    MCHC 34.0 09/27/2024    RDW 13.2 09/27/2024    .0 09/27/2024     Lab Results   Component Value Date     (L) 09/27/2024    K 4.0 09/27/2024     09/27/2024    CO2 17.0 (L) 09/27/2024    BUN 46 (H) 09/27/2024    CREATSERUM 2.61 (H) 09/27/2024     (H) 09/27/2024    CA 10.0 09/27/2024    ALKPHO 84 09/27/2024    ALT 15 (L) 09/27/2024    AST 13 (L) 09/27/2024    BILT 0.7 09/27/2024    ALB 3.4  09/27/2024    TP 6.6 09/27/2024      Component  Ref Range & Units 9/26/24 0928   IG G  635 - 1741 MG/ Low    IG A  66 - 433 MG/DL <10 Low    IG M  45 - 281 MG/DL <20 Low      Component  Ref Range & Units 9/19/24 1330   FREE KAPPA LT CHAIN  0.33 - 1.94 mg/dL 138.29 High    FREE LAMBDA LT CHAIN  0.57 - 2.63 mg/dL <0.15 Low    FREE K/L RATIO  0.26 - 1.65 921.93 High        Radiologic imaging reviewed at this visit:      PET/CT scan on 9/5/2024:  FINDINGS:    ABNORMAL FOCI:  There is abnormal FDG activity throughout the osseous structures.  A new focus of uptake involves a lower cervical spinous process with a maximum SUV of 3.8.  Increasing scapular uptake demonstrates a maximum SUV of 3.7 versus 3. New  uptake is present within the acromium with a maximum SUV of 3.1.  Uptake involves multiple pathologic rib fractures bilaterally many of which are new with maximum SUV involving anterior 2nd left rib equals 10.2 versus 6.9 and uptake involving the right  anterior 2nd rib with a maximum SUV of 6.6 versus 11.  Increasing soft tissue density masslike area surrounds the right anterior 2nd rib which measures 5 x 2.6 versus 3.2 x 1.4 cm. New uptake involves the sternum with a maximum SUV of 6.5.  There is also   new uptake involving left costovertebral junction throughout the thoracic spine.    OTHER:  Calcified pleural plaque.  Calcification within the right distal Achilles tendon demonstrates a max SUV of 4.0. Persistent uptake surrounds the shoulders and right hip.  Severe anterior wedge compression fracture of T6.     Impression   CONCLUSION:    1. When compared to most recent PET performed 1/3/2024, there is unfavorable progression of disease with persistent abnormal diffuse osseous uptake and areas of new uptake as detailed above.       Assessment/Plan:     Multiple myeloma IgG kappa:  Hypercalcemia  Acute renal failure  Anemia  Diffuse bone lytic lesions  Beta-2 Microglobulin 18.5 mg/L    ISS stage III  5  cycles of (VCd) Dexamethasone, Bortezomib and cyclophosphamide  High dose therapy with stem cell infusion on 1/27/2017  Revlimid maintenance from 6/2017 to 12/202  Progression with repeat bone marrow biopsy on 1/12/2021  50% plasma cells  FISH study was not done  Chromosomes at relapse:   45,X,-Y[7]/46,XY[13]  Carfilzomib/Ann/Dex started on 2/18/2021       He is on salvage treatment with ann/carfilzomib/Dex.  He has progression with increased kappa free light chain and new PET scan findings. He will proceed with CAR therapy evaluation and treatment planning. We will continue the current therapy for now with the ann/carf/Dex. He will return to see me in four weeks but we will coordinate care with [facility].     He will continue with cycle 49, day 1. He is tolerating the treatment well. He will continue the Dex 12 mg weekly. He will continue with the current carfilzomib.  He will return in 4 weeks with repeat labs.      Lytic bone disease:   back pain:  Hypercalcemia    He has been on Zometa. The PET scan had diffuse bony changes consistent with myeloma.      Chronic Kidney Disease:     Overall stable.    Cardiomyopathy:    Continue cardiology follow up. He is s/p myocardial biopsy that is negative for amyloid. He will continue follow up with cardiology.    Anemia complicating neoplastic disease:    Borderline low now. Continue to follow.        Ruiz Frias MD

## 2024-10-02 LAB
ALBUMIN SERPL ELPH-MCNC: 3.73 G/DL (ref 3.75–5.21)
ALBUMIN/GLOB SERPL: 1.57 {RATIO} (ref 1–2)
ALPHA1 GLOB SERPL ELPH-MCNC: 0.4 G/DL (ref 0.19–0.46)
ALPHA2 GLOB SERPL ELPH-MCNC: 1.06 G/DL (ref 0.48–1.05)
B-GLOBULIN SERPL ELPH-MCNC: 0.72 G/DL (ref 0.68–1.23)
GAMMA GLOB SERPL ELPH-MCNC: 0.2 G/DL (ref 0.62–1.7)
KAPPA LC FREE SER-MCNC: 206.25 MG/DL (ref 0.33–1.94)
KAPPA LC FREE/LAMBDA FREE SER NEPH: 1289.06 {RATIO} (ref 0.26–1.65)
LAMBDA LC FREE SERPL-MCNC: 0.16 MG/DL (ref 0.57–2.63)
M PROTEIN 1 SERPL ELPH-MCNC: 0.04 G/DL (ref ?–0)
PROT SERPL-MCNC: 6.1 G/DL (ref 5.7–8.2)

## 2024-10-04 ENCOUNTER — OFFICE VISIT (OUTPATIENT)
Dept: HEMATOLOGY/ONCOLOGY | Age: 74
End: 2024-10-04
Attending: INTERNAL MEDICINE
Payer: MEDICARE

## 2024-10-04 VITALS
HEART RATE: 68 BPM | SYSTOLIC BLOOD PRESSURE: 90 MMHG | RESPIRATION RATE: 18 BRPM | WEIGHT: 245 LBS | HEIGHT: 72.01 IN | OXYGEN SATURATION: 98 % | TEMPERATURE: 98 F | DIASTOLIC BLOOD PRESSURE: 58 MMHG | BODY MASS INDEX: 33.18 KG/M2

## 2024-10-04 DIAGNOSIS — R79.89 ELEVATED LFTS: ICD-10-CM

## 2024-10-04 DIAGNOSIS — C90.00 MULTIPLE MYELOMA NOT HAVING ACHIEVED REMISSION (HCC): Primary | ICD-10-CM

## 2024-10-04 LAB
ANION GAP SERPL CALC-SCNC: 6 MMOL/L (ref 0–18)
BASOPHILS # BLD AUTO: 0.01 X10(3) UL (ref 0–0.2)
BASOPHILS NFR BLD AUTO: 0.1 %
BUN BLD-MCNC: 42 MG/DL (ref 9–23)
CALCIUM BLD-MCNC: 9.7 MG/DL (ref 8.5–10.1)
CHLORIDE SERPL-SCNC: 110 MMOL/L (ref 98–112)
CO2 SERPL-SCNC: 17 MMOL/L (ref 21–32)
CREAT BLD-MCNC: 2.99 MG/DL
EGFRCR SERPLBLD CKD-EPI 2021: 21 ML/MIN/1.73M2 (ref 60–?)
EOSINOPHIL # BLD AUTO: 0 X10(3) UL (ref 0–0.7)
EOSINOPHIL NFR BLD AUTO: 0 %
ERYTHROCYTE [DISTWIDTH] IN BLOOD BY AUTOMATED COUNT: 13.5 %
GLUCOSE BLD-MCNC: 239 MG/DL (ref 70–99)
HCT VFR BLD AUTO: 30.2 %
HGB BLD-MCNC: 10.4 G/DL
IMM GRANULOCYTES # BLD AUTO: 0.07 X10(3) UL (ref 0–1)
IMM GRANULOCYTES NFR BLD: 0.6 %
LYMPHOCYTES # BLD AUTO: 0.17 X10(3) UL (ref 1–4)
LYMPHOCYTES NFR BLD AUTO: 1.4 %
MCH RBC QN AUTO: 36.9 PG (ref 26–34)
MCHC RBC AUTO-ENTMCNC: 34.4 G/DL (ref 31–37)
MCV RBC AUTO: 107.1 FL
MONOCYTES # BLD AUTO: 0.09 X10(3) UL (ref 0.1–1)
MONOCYTES NFR BLD AUTO: 0.7 %
NEUTROPHILS # BLD AUTO: 12.13 X10 (3) UL (ref 1.5–7.7)
NEUTROPHILS # BLD AUTO: 12.13 X10(3) UL (ref 1.5–7.7)
NEUTROPHILS NFR BLD AUTO: 97.2 %
OSMOLALITY SERPL CALC.SUM OF ELEC: 294 MOSM/KG (ref 275–295)
PLATELET # BLD AUTO: 167 10(3)UL (ref 150–450)
POTASSIUM SERPL-SCNC: 4.2 MMOL/L (ref 3.5–5.1)
RBC # BLD AUTO: 2.82 X10(6)UL
SODIUM SERPL-SCNC: 133 MMOL/L (ref 136–145)
WBC # BLD AUTO: 12.5 X10(3) UL (ref 4–11)

## 2024-10-04 PROCEDURE — 85025 COMPLETE CBC W/AUTO DIFF WBC: CPT

## 2024-10-04 PROCEDURE — 80048 BASIC METABOLIC PNL TOTAL CA: CPT

## 2024-10-04 NOTE — PROGRESS NOTES
Pt here for C49D8 Drug(s)kyprolis.  Arrives Ambulating independently, accompanied by Self     Patient was evaluated today by Treatment Nurse.    Patient confirms comprehension of cancer treatment schedule:  yes    Pregnancy screening:  Not applicable    Modifications in dose or schedule:  No    Medications appearance and physical integrity checked by RN: yes.    Chemotherapy IV pump settings verified by 2 RNs:  Yes.  Frequency of blood return and site check throughout administration: Prior to administration and At completion of therapy     Infusion/treatment outcome:  patient tolerated treatment without incident    Education Record    Learner:  Patient  Barriers / Limitations:  None  Method:  Discussion  Education / instructions given:  appts, AVS, medications administered   Outcome:  Shows understanding    Discharged Home, Ambulating independently, accompanied by:Self    Patient/family verbalized understanding of future appointments: by printed AVS    Per Janie YATES ok to continue treatment with creatinine 2.99

## 2024-10-11 ENCOUNTER — OFFICE VISIT (OUTPATIENT)
Dept: HEMATOLOGY/ONCOLOGY | Age: 74
End: 2024-10-11
Attending: INTERNAL MEDICINE
Payer: MEDICARE

## 2024-10-11 VITALS
OXYGEN SATURATION: 98 % | HEIGHT: 72.01 IN | HEART RATE: 65 BPM | WEIGHT: 246.5 LBS | BODY MASS INDEX: 33.39 KG/M2 | SYSTOLIC BLOOD PRESSURE: 87 MMHG | TEMPERATURE: 98 F | DIASTOLIC BLOOD PRESSURE: 56 MMHG | RESPIRATION RATE: 18 BRPM

## 2024-10-11 DIAGNOSIS — C90.00 MULTIPLE MYELOMA NOT HAVING ACHIEVED REMISSION (HCC): Primary | ICD-10-CM

## 2024-10-11 DIAGNOSIS — C90.00 MULTIPLE MYELOMA, REMISSION STATUS UNSPECIFIED (HCC): ICD-10-CM

## 2024-10-11 DIAGNOSIS — R79.89 ELEVATED LFTS: ICD-10-CM

## 2024-10-11 LAB
ALBUMIN SERPL-MCNC: 3.1 G/DL (ref 3.4–5)
ANION GAP SERPL CALC-SCNC: 8 MMOL/L (ref 0–18)
BASOPHILS # BLD AUTO: 0.02 X10(3) UL (ref 0–0.2)
BASOPHILS NFR BLD AUTO: 0.1 %
BUN BLD-MCNC: 40 MG/DL (ref 9–23)
CALCIUM BLD-MCNC: 9.7 MG/DL (ref 8.5–10.1)
CHLORIDE SERPL-SCNC: 110 MMOL/L (ref 98–112)
CO2 SERPL-SCNC: 18 MMOL/L (ref 21–32)
CREAT BLD-MCNC: 2.52 MG/DL
EGFRCR SERPLBLD CKD-EPI 2021: 26 ML/MIN/1.73M2 (ref 60–?)
EOSINOPHIL # BLD AUTO: 0.01 X10(3) UL (ref 0–0.7)
EOSINOPHIL NFR BLD AUTO: 0.1 %
ERYTHROCYTE [DISTWIDTH] IN BLOOD BY AUTOMATED COUNT: 13.7 %
FASTING STATUS PATIENT QL REPORTED: NO
GLUCOSE BLD-MCNC: 192 MG/DL (ref 70–99)
HCT VFR BLD AUTO: 28.3 %
HGB BLD-MCNC: 9.6 G/DL
IMM GRANULOCYTES # BLD AUTO: 0.11 X10(3) UL (ref 0–1)
IMM GRANULOCYTES NFR BLD: 0.8 %
LYMPHOCYTES # BLD AUTO: 0.26 X10(3) UL (ref 1–4)
LYMPHOCYTES NFR BLD AUTO: 1.9 %
MCH RBC QN AUTO: 36.5 PG (ref 26–34)
MCHC RBC AUTO-ENTMCNC: 33.9 G/DL (ref 31–37)
MCV RBC AUTO: 107.6 FL
MONOCYTES # BLD AUTO: 0.13 X10(3) UL (ref 0.1–1)
MONOCYTES NFR BLD AUTO: 0.9 %
NEUTROPHILS # BLD AUTO: 13.34 X10 (3) UL (ref 1.5–7.7)
NEUTROPHILS # BLD AUTO: 13.34 X10(3) UL (ref 1.5–7.7)
NEUTROPHILS NFR BLD AUTO: 96.2 %
OSMOLALITY SERPL CALC.SUM OF ELEC: 297 MOSM/KG (ref 275–295)
PLATELET # BLD AUTO: 174 10(3)UL (ref 150–450)
POTASSIUM SERPL-SCNC: 4.4 MMOL/L (ref 3.5–5.1)
RBC # BLD AUTO: 2.63 X10(6)UL
SODIUM SERPL-SCNC: 136 MMOL/L (ref 136–145)
WBC # BLD AUTO: 13.9 X10(3) UL (ref 4–11)

## 2024-10-11 PROCEDURE — 82040 ASSAY OF SERUM ALBUMIN: CPT

## 2024-10-11 PROCEDURE — 36415 COLL VENOUS BLD VENIPUNCTURE: CPT

## 2024-10-11 PROCEDURE — 85025 COMPLETE CBC W/AUTO DIFF WBC: CPT

## 2024-10-11 PROCEDURE — 96375 TX/PRO/DX INJ NEW DRUG ADDON: CPT

## 2024-10-11 PROCEDURE — 96413 CHEMO IV INFUSION 1 HR: CPT

## 2024-10-11 PROCEDURE — 80048 BASIC METABOLIC PNL TOTAL CA: CPT

## 2024-10-11 NOTE — PROGRESS NOTES
Pt here for C49D15 Drug(s)kyprolis.  Arrives Ambulating independently, accompanied by Self     Patient was evaluated today by Treatment Nurse.    Oral medications included in this regimen:  no    Patient confirms comprehension of cancer treatment schedule:  yes    Pregnancy screening:  Not applicable    Modifications in dose or schedule:  No    Medications appearance and physical integrity checked by RN: yes.    Chemotherapy IV pump settings verified by 2 RNs:  Yes.  Frequency of blood return and site check throughout administration: Prior to administration, Prior to each drug, and At completion of therapy     Infusion/treatment outcome:  patient tolerated treatment without incident    Education Record    Learner:  Patient  Barriers / Limitations:  None  Method:  Brief focused and Discussion  Education / instructions given:  schedule  Outcome:  Shows understanding    Discharged Home, Ambulating independently, accompanied by:Self    Patient/family verbalized understanding of future appointments: by printed AVS

## 2024-10-17 ENCOUNTER — OFFICE VISIT (OUTPATIENT)
Dept: FAMILY MEDICINE CLINIC | Facility: CLINIC | Age: 74
End: 2024-10-17
Payer: MEDICARE

## 2024-10-17 VITALS
DIASTOLIC BLOOD PRESSURE: 56 MMHG | BODY MASS INDEX: 33.05 KG/M2 | RESPIRATION RATE: 18 BRPM | SYSTOLIC BLOOD PRESSURE: 90 MMHG | TEMPERATURE: 98 F | WEIGHT: 244 LBS | HEART RATE: 72 BPM | HEIGHT: 72 IN

## 2024-10-17 DIAGNOSIS — M54.9 CHRONIC BILATERAL BACK PAIN, UNSPECIFIED BACK LOCATION: Primary | ICD-10-CM

## 2024-10-17 DIAGNOSIS — G89.29 CHRONIC BILATERAL BACK PAIN, UNSPECIFIED BACK LOCATION: Primary | ICD-10-CM

## 2024-10-17 DIAGNOSIS — C90.00 MULTIPLE MYELOMA NOT HAVING ACHIEVED REMISSION (HCC): ICD-10-CM

## 2024-10-17 PROCEDURE — 99214 OFFICE O/P EST MOD 30 MIN: CPT | Performed by: FAMILY MEDICINE

## 2024-10-17 RX ORDER — ACETAMINOPHEN AND CODEINE PHOSPHATE 300; 30 MG/1; MG/1
1 TABLET ORAL EVERY 4 HOURS PRN
Qty: 60 TABLET | Refills: 0 | Status: SHIPPED | OUTPATIENT
Start: 2024-10-17

## 2024-10-17 RX ORDER — KETOROLAC TROMETHAMINE 5 MG/ML
SOLUTION OPHTHALMIC
COMMUNITY
Start: 2024-07-31

## 2024-10-17 NOTE — PROGRESS NOTES
BP 90/56   Pulse 72   Temp 98.4 °F (36.9 °C) (Temporal)   Resp 18   Ht 6' (1.829 m)   Wt 244 lb (110.7 kg)   BMI 33.09 kg/m²               Chief Complaint   Patient presents with    Follow - Up     3mo follow-up           HPI;    Nacho Valle is a 74 year old male.  With history of multiple myeloma relapsing, patient is in the treatment of myeloma oncology, he has history of bone marrow transplant, history of coronary artery disease hypertension hyperlipidemia type 2 diabetes mellitus and morbid obesity  Patient is here today very tired and looks like he is in a lot of pain  Patient states that his whole body hurts  He is taking Tylenol which does not help much  According to the patient's wife patient moans  with pain, all day  His memory is getting worse    Patient has upcoming appointments with oncology and also met with Dr. Barragan at [place] next week and possible admission to the hospital for chemo          Wt Readings from Last 3 Encounters:   10/17/24 244 lb (110.7 kg)   10/11/24 246 lb 8 oz (111.8 kg)   10/04/24 245 lb (111.1 kg)     BP Readings from Last 3 Encounters:   10/17/24 90/56   10/11/24 (!) 87/56   10/04/24 90/58         ALLERGIES:  Allergies[1]  Current Outpatient Medications   Medication Sig Dispense Refill    ketorolac 0.5 % Ophthalmic Solution INSTILL 1 DROP IN BOTH EYES FOUR TIMES DAILY AS NEEDED      acetaminophen-codeine 300-30 MG Oral Tab Take 1 tablet by mouth every 4 (four) hours as needed for Pain. 60 tablet 0    ATORVASTATIN 20 MG Oral Tab TAKE 1 TABLET(20 MG) BY MOUTH DAILY 90 tablet 3    acyclovir 400 MG Oral Tab Take 1 tablet (400 mg total) by mouth 2 (two) times daily. 180 tablet 1    metoprolol succinate  MG Oral Tablet 24 Hr Take 1 tablet (100 mg total) by mouth every evening.      dexamethasone (DECADRON) 4 MG tablet TAKE 3 TABLETS(12 MG) BY MOUTH 1 TIME EVERY WEEK 36 tablet 3    benzonatate 200 MG Oral Cap Take 1 capsule (200 mg total) by mouth 3 (three) times daily  as needed for cough. 30 capsule 0    cyclobenzaprine 10 MG Oral Tab TAKE 1 TABLET(10 MG) BY MOUTH THREE TIMES DAILY AS NEEDED FOR MUSCLE SPASMS 10 tablet 1    DRISDOL 1.25 MG (52859 UT) Oral Cap       ONETOUCH ULTRASOFT LANCETS Does not apply Misc TEST BLOOD SUGAR TWICE DAILY 200 each 2    ENTRESTO  MG Oral Tab Take 1 tablet by mouth daily.      empagliflozin 10 MG Oral Tab Take 1 tablet (10 mg total) by mouth daily. JARDIANCE      Glucose Blood (ONETOUCH ULTRA) In Vitro Strip CHECK SUGARS THREE TIMES DAILY AS DIRECTED 300 strip 2    eplerenone 25 MG Oral Tab Take 1 tablet (25 mg total) by mouth daily.      omega-3 fatty acids 1000 MG Oral Cap Take 1,000 mg by mouth 2 (two) times daily.      Multiple Vitamin Oral Tab Take 1 tablet by mouth.      acetaminophen 500 MG Oral Tab Take 2 tablets (1,000 mg total) by mouth nightly.      aspirin (ASPIR-81) 81 MG Oral Tab EC Take 1 tablet by mouth daily. 1 tablet 0      Past Medical History:    Allergic rhinitis, cause unspecified    BCC (basal cell carcinoma of skin)    s/p surgical excision    BPH (benign prostatic hyperplasia)    Colon polyps    Generalized osteoarthrosis, involving hand    Herniation of intervertebral disc between L4 and L5    s/p surgical repair    High blood pressure    High cholesterol    Impotence of organic origin    Morbid obesity (HCC)    Osteoarthritis    Osteoarthrosis, unspecified whether generalized or localized, lower leg    Log Date: 08/21/2012     Other and unspecified hyperlipidemia    Pneumonia due to organism    Prostate cancer (HCC)    s/p total prostatectomy and radiation    Trigger finger (acquired)    Type II or unspecified type diabetes mellitus without mention of complication, not stated as uncontrolled    Unspecified essential hypertension    Unspecified internal derangement of knee    Log Date: 08/21/2012       Social History:  Social History     Socioeconomic History    Marital status:     Number of children: 3    Occupational History    Occupation: Retired      Comment:    Tobacco Use    Smoking status: Never    Smokeless tobacco: Never   Vaping Use    Vaping status: Never Used   Substance and Sexual Activity    Alcohol use: No     Alcohol/week: 0.0 standard drinks of alcohol    Drug use: No     Social Drivers of Health     Financial Resource Strain: Low Risk  (2/20/2023)    Financial Resource Strain     Difficulty of Paying Living Expenses: Not hard at all     Med Affordability: No   Food Insecurity: No Food Insecurity (2/20/2023)    Food Insecurity     Food Insecurity: Never true   Transportation Needs: No Transportation Needs (2/20/2023)    Transportation Needs     Lack of Transportation: No   Physical Activity: Sufficiently Active (2/20/2023)    Exercise Vital Sign     Days of Exercise per Week: 7 days     Minutes of Exercise per Session: 150+ min   Stress: No Stress Concern Present (2/20/2023)    Stress     Feeling of Stress : No   Social Connections: Socially Integrated (2/20/2023)    Social Connections     Frequency of Socialization with Friends and Family: 3   Housing Stability: Low Risk  (2/20/2023)    Housing Stability     Housing Instability: No        REVIEW OF SYSTEMS:   GENERAL HEALTH: feels well otherwise  SKIN: denies any unusual skin lesions or rashes  RESPIRATORY: denies shortness of breath with exertion  CARDIOVASCULAR: denies chest pain on exertion  GI: denies abdominal pain and denies heartburn  NEURO: denies headaches  EXAM:   BP 90/56   Pulse 72   Temp 98.4 °F (36.9 °C) (Temporal)   Resp 18   Ht 6' (1.829 m)   Wt 244 lb (110.7 kg)   BMI 33.09 kg/m²   GENERAL: Alert awake oriented, patient in a lot of pain in his back and ribs  SKIN: no rashes,no suspicious lesions  HEENT: atraumatic, normocephalic,  LUNGS: clear to auscultation  CARDIO: RRR without murmur  GI: good BS's,no masses, HSM or tenderness  EXTREMITIES: no cyanosis, clubbing or edema    ASSESSMENT AND  PLAN:   Diagnoses and all orders for this visit:    Chronic bilateral back pain, unspecified back location  Following discussion with the patient and his wife regarding pain medication  Previous prescription of tramadol discussed  Patient does not like the medication  Would like to try something else  Prescription of Tylenol 3 sent to the pharmacy  -     acetaminophen-codeine 300-30 MG Oral Tab; Take 1 tablet by mouth every 4 (four) hours as needed for Pain.    Multiple myeloma not having achieved remission (HCC)  Reviewed patient's blood test, MRI and PET scan      The patient indicates understanding of these issues and agrees to the plan.  Imaging & Consults:  None  Meds & Refills for this Visit:  Requested Prescriptions     Signed Prescriptions Disp Refills    acetaminophen-codeine 300-30 MG Oral Tab 60 tablet 0     Sig: Take 1 tablet by mouth every 4 (four) hours as needed for Pain.     No orders of the defined types were placed in this encounter.      Time spent at appointment today is 30minutes including preparing to see patient, reviewing test results, performing medically appropriate examination and evaluation and coordinating care, counseling and educating patient/family, ordering medications and testing, and documenting clinical information in EMR.        Dagoberto Bell MD   [facility]  [address] [place]  [facility] [address]    Electronically signed    This dictation was performed with a verbal recognition program (DRAGON) and it was checked for errors. It is possible that there are still dictated errors within this office note. If so, please bring any errors to my attention for an addendum. All efforts were made to ensure that this office note is accurate                   [1]   Allergies  Allergen Reactions    Pcn [Penicillins] ANAPHYLAXIS, HIVES, HALLUCINATION and SHORTNESS OF BREATH     Respiratory distress    Beta Adrenergic Blockers     Latex      Surgical tape  Welts, burning of skin,  itching    Statins

## 2024-10-22 ENCOUNTER — OFFICE VISIT (OUTPATIENT)
Dept: NEPHROLOGY | Facility: CLINIC | Age: 74
End: 2024-10-22
Payer: MEDICARE

## 2024-10-22 VITALS — DIASTOLIC BLOOD PRESSURE: 68 MMHG | WEIGHT: 241.5 LBS | SYSTOLIC BLOOD PRESSURE: 102 MMHG | BODY MASS INDEX: 33 KG/M2

## 2024-10-22 DIAGNOSIS — C90.00 MYELOMA KIDNEY (HCC): ICD-10-CM

## 2024-10-22 DIAGNOSIS — N08 MYELOMA KIDNEY (HCC): ICD-10-CM

## 2024-10-22 DIAGNOSIS — N18.4 STAGE 4 CHRONIC KIDNEY DISEASE (HCC): Primary | ICD-10-CM

## 2024-10-22 PROCEDURE — 99215 OFFICE O/P EST HI 40 MIN: CPT | Performed by: INTERNAL MEDICINE

## 2024-10-22 RX ORDER — SODIUM BICARBONATE 650 MG/1
650 TABLET ORAL 2 TIMES DAILY
Qty: 60 TABLET | Refills: 3 | Status: SHIPPED | OUTPATIENT
Start: 2024-10-22

## 2024-10-22 RX ORDER — CEPHALEXIN 500 MG/1
500 CAPSULE ORAL
COMMUNITY

## 2024-10-22 NOTE — PROGRESS NOTES
Nephrology Progress Note      Nacho Valle is a 74 year old male.    HPI:     Chief Complaint   Patient presents with    Chronic Kidney Disease     Myeloma kidney disease       75 yo male with hx of MM, MADDI related to myeloma kidney + severe hypercalcemia, DM, HTN presents for follow up. He was treated in hospital with pamidronate/fluids/plasmapheresis. He also required few treatments of HD.    S/p stem cell tx @ [facility]-  did well for few years  Patient is now on salvage chemo (diagnosed w/ recurrent dx in Jan 2021)  He follows w/ Dr. Frias and Dr. Barragan ([facility])- has been on salvage chemo as documented in oncology notes; recent eval shows worsening disease; MPS w/ higher paraprotenemia- being worked up for CART tx    He is here w/ his wife today  Dx'ed with UTI- currently on keflex       Denies any n/v  No urinary complains  Denies any LE edema       Medications (Active prior to today's visit):  Current Outpatient Medications   Medication Sig Dispense Refill    sodium bicarbonate 650 MG Oral Tab Take 1 tablet (650 mg total) by mouth 2 (two) times daily. 60 tablet 3    cephALEXin 500 MG Oral Cap Take 1 capsule (500 mg total) by mouth. Directed use for 7 days for UTI      ketorolac 0.5 % Ophthalmic Solution INSTILL 1 DROP IN BOTH EYES FOUR TIMES DAILY AS NEEDED      acetaminophen-codeine 300-30 MG Oral Tab Take 1 tablet by mouth every 4 (four) hours as needed for Pain. 60 tablet 0    ATORVASTATIN 20 MG Oral Tab TAKE 1 TABLET(20 MG) BY MOUTH DAILY 90 tablet 3    acyclovir 400 MG Oral Tab Take 1 tablet (400 mg total) by mouth 2 (two) times daily. 180 tablet 1    metoprolol succinate  MG Oral Tablet 24 Hr Take 1 tablet (100 mg total) by mouth every evening.      dexamethasone (DECADRON) 4 MG tablet TAKE 3 TABLETS(12 MG) BY MOUTH 1 TIME EVERY WEEK 36 tablet 3    benzonatate 200 MG Oral Cap Take 1 capsule (200 mg total) by mouth 3 (three) times daily as needed for cough. 30 capsule 0    cyclobenzaprine 10 MG Oral Tab  TAKE 1 TABLET(10 MG) BY MOUTH THREE TIMES DAILY AS NEEDED FOR MUSCLE SPASMS 10 tablet 1    DRISDOL 1.25 MG (05264 UT) Oral Cap       ONETOUCH ULTRASOFT LANCETS Does not apply Misc TEST BLOOD SUGAR TWICE DAILY 200 each 2    ENTRESTO  MG Oral Tab Take 1 tablet by mouth daily.      empagliflozin 10 MG Oral Tab Take 1 tablet (10 mg total) by mouth daily. JARDIANCE      Glucose Blood (ONETOUCH ULTRA) In Vitro Strip CHECK SUGARS THREE TIMES DAILY AS DIRECTED 300 strip 2    eplerenone 25 MG Oral Tab Take 1 tablet (25 mg total) by mouth daily.      omega-3 fatty acids 1000 MG Oral Cap Take 1,000 mg by mouth 2 (two) times daily.      Multiple Vitamin Oral Tab Take 1 tablet by mouth.      acetaminophen 500 MG Oral Tab Take 2 tablets (1,000 mg total) by mouth nightly.      aspirin (ASPIR-81) 81 MG Oral Tab EC Take 1 tablet by mouth daily. 1 tablet 0       Allergies:  Allergies   Allergen Reactions    Pcn [Penicillins] ANAPHYLAXIS, HIVES, HALLUCINATION and SHORTNESS OF BREATH     Respiratory distress    Beta Adrenergic Blockers     Latex      Surgical tape  Welts, burning of skin, itching    Statins          ROS:     10 systems reviewed and otherwise unremarkable       PHYSICAL EXAM:   /68   Wt 241 lb 8 oz (109.5 kg)   BMI 32.75 kg/m²   Wt Readings from Last 6 Encounters:   10/22/24 241 lb 8 oz (109.5 kg)   10/17/24 244 lb (110.7 kg)   10/11/24 246 lb 8 oz (111.8 kg)   10/04/24 245 lb (111.1 kg)   09/27/24 249 lb 8 oz (113.2 kg)   09/13/24 251 lb 8 oz (114.1 kg)       General: Alert and oriented in no apparent distress.  HEENT: No scleral icterus, MMM  Neck: Supple, no ELLIOT or thyromegaly  Cardiac: Regular rate and rhythm, S1, S2 normal, no murmur or rub  Lungs: Clear without wheezes, rales, rhonchi.    Abdomen: Soft, non-tender. + bowel sounds, no palpable organomegaly  Extremities: Without clubbing, cyanosis ; no edema  Neurologic: Alert and oriented, normal affect, cranial nerves grossly intact, moving all  extremities  Skin: Warm and dry, no rashes        Labs reviewed      ASSESSMENT/PLAN:       1. Hx of Myeloma kidney disease -   Required plasmapheresis/HD treatments in the hospital  His renal function had recovered and stable despite the recurrence of MM: + microalbuminuria     Volume is good;; Cr has gradually risen over the past few years; more recently baseline ~ 2  Recent labs show worsening/relapsed disease despite salvage tx    - will need closer eye on his renal function given relapsed MM    - monitor labs  - further MM w/u per heme/onc; he is currently on salvage chemo; being worked up for CAR T tx     2. MM- s/p stem cell;  w/ relapse- on tx per oncology    3. Cardiomyopathy/HTN- stable/compensated; monitor ; cardiac bx negative for amyloid; follows w/ cardiology  ; pt is on jardiance/inspra,/entresto  - appears compensated     4. BMD- Ca OK; vit D/PTH @ goal    5. Acidosis- related to CKD- start oral bicarb    6. Anemia - per oncology; monitor      7. UTI- on abx; consider stopping SGLT2i; he will discuss w/ cards      F/u in 1 mo     Francisco Silva MD  10/22/2024

## 2024-10-23 ENCOUNTER — TELEPHONE (OUTPATIENT)
Dept: INTERNAL MEDICINE CLINIC | Facility: CLINIC | Age: 74
End: 2024-10-23

## 2024-10-23 NOTE — TELEPHONE ENCOUNTER
Pt was seen in ER for UTI on 10/17/24. He was prescribed antibiotics. He had appointment with nephrology yesterday. They recommended he stop Jardiance \"until his UTI clears up.\" Pt's wife calling to request order for repeat urinalysis after pt completes his antibiotics. Per pt's chart, urinalysis ordered yesterday by Dr. Silva. Informed pt's wife of order. She states she will have him repeat urinalysis next week.

## 2024-10-25 ENCOUNTER — OFFICE VISIT (OUTPATIENT)
Dept: HEMATOLOGY/ONCOLOGY | Age: 74
End: 2024-10-25
Attending: INTERNAL MEDICINE
Payer: MEDICARE

## 2024-10-25 VITALS
HEIGHT: 72.01 IN | BODY MASS INDEX: 33.39 KG/M2 | HEART RATE: 58 BPM | RESPIRATION RATE: 18 BRPM | OXYGEN SATURATION: 99 % | TEMPERATURE: 97 F | WEIGHT: 246.5 LBS | DIASTOLIC BLOOD PRESSURE: 48 MMHG | SYSTOLIC BLOOD PRESSURE: 77 MMHG

## 2024-10-25 DIAGNOSIS — R79.89 ELEVATED LFTS: ICD-10-CM

## 2024-10-25 DIAGNOSIS — C90.00 MULTIPLE MYELOMA, REMISSION STATUS UNSPECIFIED (HCC): Primary | ICD-10-CM

## 2024-10-25 DIAGNOSIS — C90.00 MULTIPLE MYELOMA NOT HAVING ACHIEVED REMISSION (HCC): Primary | ICD-10-CM

## 2024-10-25 DIAGNOSIS — R41.0 CONFUSION: ICD-10-CM

## 2024-10-25 DIAGNOSIS — E83.52 HYPERCALCEMIA: ICD-10-CM

## 2024-10-25 DIAGNOSIS — D63.0 ANEMIA COMPLICATING NEOPLASTIC DISEASE: ICD-10-CM

## 2024-10-25 LAB
ALBUMIN SERPL-MCNC: 3.2 G/DL (ref 3.4–5)
ALBUMIN/GLOB SERPL: 0.9 {RATIO} (ref 1–2)
ALP LIVER SERPL-CCNC: 95 U/L
ALT SERPL-CCNC: 17 U/L
ANION GAP SERPL CALC-SCNC: 8 MMOL/L (ref 0–18)
AST SERPL-CCNC: 13 U/L (ref 15–37)
BASOPHILS # BLD AUTO: 0.03 X10(3) UL (ref 0–0.2)
BASOPHILS NFR BLD AUTO: 0.3 %
BILIRUB SERPL-MCNC: 0.6 MG/DL (ref 0.1–2)
BUN BLD-MCNC: 40 MG/DL (ref 9–23)
CALCIUM BLD-MCNC: 10.7 MG/DL (ref 8.5–10.1)
CHLORIDE SERPL-SCNC: 107 MMOL/L (ref 98–112)
CO2 SERPL-SCNC: 20 MMOL/L (ref 21–32)
CREAT BLD-MCNC: 3.27 MG/DL
EGFRCR SERPLBLD CKD-EPI 2021: 19 ML/MIN/1.73M2 (ref 60–?)
EOSINOPHIL # BLD AUTO: 0.09 X10(3) UL (ref 0–0.7)
EOSINOPHIL NFR BLD AUTO: 0.9 %
ERYTHROCYTE [DISTWIDTH] IN BLOOD BY AUTOMATED COUNT: 14.1 %
GLOBULIN PLAS-MCNC: 3.5 G/DL (ref 2.8–4.4)
GLUCOSE BLD-MCNC: 141 MG/DL (ref 70–99)
HCT VFR BLD AUTO: 27.3 %
HGB BLD-MCNC: 8.9 G/DL
IGA SERPL-MCNC: <33 MG/DL (ref 40–350)
IGM SERPL-MCNC: <21 MG/DL (ref 50–300)
IMM GRANULOCYTES # BLD AUTO: 0.04 X10(3) UL (ref 0–1)
IMM GRANULOCYTES NFR BLD: 0.4 %
IMMUNOGLOBULIN PNL SER-MCNC: <140 MG/DL (ref 650–1600)
LYMPHOCYTES # BLD AUTO: 0.52 X10(3) UL (ref 1–4)
LYMPHOCYTES NFR BLD AUTO: 5.5 %
MCH RBC QN AUTO: 36.2 PG (ref 26–34)
MCHC RBC AUTO-ENTMCNC: 32.6 G/DL (ref 31–37)
MCV RBC AUTO: 111 FL
MONOCYTES # BLD AUTO: 0.69 X10(3) UL (ref 0.1–1)
MONOCYTES NFR BLD AUTO: 7.3 %
NEUTROPHILS # BLD AUTO: 8.12 X10 (3) UL (ref 1.5–7.7)
NEUTROPHILS # BLD AUTO: 8.12 X10(3) UL (ref 1.5–7.7)
NEUTROPHILS NFR BLD AUTO: 85.6 %
OSMOLALITY SERPL CALC.SUM OF ELEC: 292 MOSM/KG (ref 275–295)
PLATELET # BLD AUTO: 310 10(3)UL (ref 150–450)
POTASSIUM SERPL-SCNC: 4.1 MMOL/L (ref 3.5–5.1)
PROT SERPL-MCNC: 6.7 G/DL (ref 6.4–8.2)
RBC # BLD AUTO: 2.46 X10(6)UL
SODIUM SERPL-SCNC: 135 MMOL/L (ref 136–145)
WBC # BLD AUTO: 9.5 X10(3) UL (ref 4–11)

## 2024-10-25 PROCEDURE — 99214 OFFICE O/P EST MOD 30 MIN: CPT | Performed by: INTERNAL MEDICINE

## 2024-10-25 PROCEDURE — 96360 HYDRATION IV INFUSION INIT: CPT

## 2024-10-25 RX ORDER — SODIUM CHLORIDE 9 MG/ML
INJECTION, SOLUTION INTRAVENOUS ONCE
Status: CANCELLED
Start: 2024-11-01

## 2024-10-25 RX ORDER — SODIUM CHLORIDE 9 MG/ML
INJECTION, SOLUTION INTRAVENOUS ONCE
Status: CANCELLED
Start: 2024-10-25

## 2024-10-25 RX ORDER — SODIUM CHLORIDE 9 MG/ML
INJECTION, SOLUTION INTRAVENOUS ONCE
Status: COMPLETED | OUTPATIENT
Start: 2024-10-25 | End: 2024-10-25

## 2024-10-25 RX ADMIN — SODIUM CHLORIDE: 9 INJECTION, SOLUTION INTRAVENOUS at 12:32:00

## 2024-10-25 NOTE — PROGRESS NOTES
Patient here for c50 Kyprolis/darzalex. Patient taking dexamethasone 12 mg weekly. Patient is started cephlaxin for UTI on Tuesday. Patient co of mild AMS since Tuesday. Patient had f/u with Dr Barragan yesterday and is to start CART at [facility] on Monday. Patient states he has pain all over his body, denies fever or chills.     Education Record    Learner:  Patient and Spouse    Disease / Diagnosis: multiple myeloma    Barriers / Limitations:  None   Comments:    Method:  Discussion   Comments:    General Topics:  Medication, Pain, Side effects and symptom management, and Plan of care reviewed   Comments:    Outcome:  Shows understanding   Comments:

## 2024-10-25 NOTE — PROGRESS NOTES
Education Record    Learner:  Patient    Disease / Diagnosis: here for IVF    Barriers / Limitations:  None    Method:  Brief focused, printed material and  reinforcement    General Topics:  Plan of care reviewed    Outcome:  patient ambulatory. No complaints. No chemo today per Dr Frias. Tolerated IVF. Per Adam BARRERA no appt needed at this time patient to f/u after CART. Shows understanding. Discharged in stable condition

## 2024-10-25 NOTE — PROGRESS NOTES
Cancer Center Progress Note    Problem List:      Patient Active Problem List   Diagnosis    Impotence of organic origin    Generalized osteoarthrosis of hand    Morbid obesity (HCC)    Essential hypertension    Hypertension    Multiple myeloma (HCC)    Myeloma kidney disease (HCC)    Hx of stem cell transplant (HCC)    Hypoxia    Multiple myeloma, remission status unspecified (HCC)    Type 2 diabetes mellitus with hyperglycemia, without long-term current use of insulin (HCC)    Cardiomyopathy, unspecified type (HCC)    Morbid (severe) obesity due to excess calories (HCC)    Elevated LFTs       Interim History:    Nacho Valle presents today for evaluation and management of a diagnosis of multiple myeloma.     The patient has had follow up at [facility] yesterday. He is being evaluated for CAR therapy. He has had increased confusion over the last 1 week. He is disoriented to time. He has no new pain. He has no fever or sweats. He was in the ED and diagnosed with UTI. He is on antibiotics. He has no dysuria. He has no other complaints.    He had an echo on 5/22/2023 that had LVEF 50%.     He had right heart cath to r/o amyloid on 2/2/20.     He originally had 5 cycles of Cybord. He now has had high dose therapy. He was getting Zometa monthly infusions. His last Zometa was in 6/16/2017.    Pathology from cardiac biopsy on 2/2/2021:  Addendum 1   The endomyocardial biopsy  was sent to [facility], [place], [place] for processing and examination where the case was reviewed by Jaswinder Torre MD.   The full report has been scanned and is available as a hyperlink within this report (under the final diagnoses section).     \"Final Pathologic Diagnosis:      A.  Heart, endomyocardial biopsy:   -Fragments of myocardium with mild myocyte hypertrophy, no evidence of cardiac amyloidosis, see comment.   -Portion of fibrosis tissue.      B.  Heart, endomyocardial biopsy for electron microscopy:   -Negative  for any significant ultrastructural changes, see addendum below for details.      Comment:     The clinical concern for amyloidosis is noted.  A Congo red stain performed with appropriate controls is negative for amyloid deposition, and a trichrome stain with appropriate controls shows focal interstitial and subendocardial fibrosis.  Prussian zeyad stain is negative for iron deposition (controls appropriate). There is no evidence of active myocarditis, giant cells, or granulomas. Electron microscopy studies will be reported as an addendum. The clinical team was notified of the results on 2/3/2021 at 3:35 PM\".          Review of Systems:   Constitutional: Negative for anorexia, fatigue, fevers, chills, night sweats and weight loss.  Psychiatric: The patient's mood was calm and appropriate for this visit.  The pertinent positives and negatives were described. All other systems were negative.    PMH/PSH:  Past Medical History:    Allergic rhinitis, cause unspecified    BCC (basal cell carcinoma of skin)    s/p surgical excision    BPH (benign prostatic hyperplasia)    Colon polyps    Generalized osteoarthrosis, involving hand    Herniation of intervertebral disc between L4 and L5    s/p surgical repair    High blood pressure    High cholesterol    Impotence of organic origin    Morbid obesity (HCC)    Osteoarthritis    Osteoarthrosis, unspecified whether generalized or localized, lower leg    Log Date: 08/21/2012     Other and unspecified hyperlipidemia    Pneumonia due to organism    Prostate cancer (HCC)    s/p total prostatectomy and radiation    Trigger finger (acquired)    Type II or unspecified type diabetes mellitus without mention of complication, not stated as uncontrolled    Unspecified essential hypertension    Unspecified internal derangement of knee    Log Date: 08/21/2012        Past Surgical History:   Procedure Laterality Date    Back surgery  1/2001    lower back L4 L5 for herniated disc    Colonoscopy       202, 2012 Dr. Wolf, polyps    Other surgical history  1/31/2007    total prostatectomy    Skin surgery  1997    [facility]       Family History Reviewed:  Family History   Problem Relation Age of Onset    Diabetes Other         family hx    Hypertension Other         family hx    Prostate Cancer Father        Allergies:     Allergies   Allergen Reactions    Pcn [Penicillins] ANAPHYLAXIS, HIVES, HALLUCINATION and SHORTNESS OF BREATH     Respiratory distress    Beta Adrenergic Blockers     Latex      Surgical tape  Welts, burning of skin, itching    Statins        Medications:   dexamethasone (DECADRON) 4 MG tablet TAKE 3 TABLETS(12 MG) BY MOUTH 1 TIME EVERY WEEK 36 tablet 3    ATORVASTATIN 20 MG Oral Tab TAKE 1 TABLET(20 MG) BY MOUTH DAILY 90 tablet 0    ACYCLOVIR 400 MG Oral Tab TAKE 1 TABLET(400 MG) BY MOUTH TWICE DAILY 180 tablet 1    traMADol 50 MG Oral Tab Take 1 tablet (50 mg total) by mouth every 6 (six) hours as needed for Pain. 40 tablet 0    benzonatate 200 MG Oral Cap Take 1 capsule (200 mg total) by mouth 3 (three) times daily as needed for cough. 30 capsule 0    cyclobenzaprine 10 MG Oral Tab TAKE 1 TABLET(10 MG) BY MOUTH THREE TIMES DAILY AS NEEDED FOR MUSCLE SPASMS 10 tablet 1    DRISDOL 1.25 MG (81881 UT) Oral Cap       ONETOUCH ULTRASOFT LANCETS Does not apply Misc TEST BLOOD SUGAR TWICE DAILY 200 each 2    ENTRESTO  MG Oral Tab Take 1 tablet by mouth daily.      empagliflozin 10 MG Oral Tab Take 1 tablet (10 mg total) by mouth daily. JARDIANCE      Glucose Blood (ONETOUCH ULTRA) In Vitro Strip CHECK SUGARS THREE TIMES DAILY AS DIRECTED 300 strip 2    Metoprolol Succinate  MG Oral Tablet 24 Hr Take 1 tablet (200 mg total) by mouth every evening.      eplerenone 25 MG Oral Tab Take 1 tablet (25 mg total) by mouth daily.      omega-3 fatty acids 1000 MG Oral Cap Take 1,000 mg by mouth 2 (two) times daily.      Multiple Vitamin Oral Tab Take 1 tablet by mouth.      acetaminophen 500 MG Oral  Tab Take 2 tablets (1,000 mg total) by mouth nightly.      aspirin (ASPIR-81) 81 MG Oral Tab EC Take 1 tablet by mouth daily. 1 tablet 0     Vital Signs:      Height: 182.9 cm (6' 0.01\") (06/07 1115)  Weight: 117.3 kg (258 lb 8 oz) (06/07 1115)  BSA (Calculated - sq m): 2.38 sq meters (06/07 1115)  Pulse: 58 (06/07 1115)  BP: 101/60 (06/07 1115)  Temp: 97.5 °F (36.4 °C) (06/07 1115)  Do Not Use - Resp Rate: --  SpO2: 97 % (06/07 1115)      Performance Status:  ECOG 0: Fully active, able to carry on all pre-disease performance without restriction     Physical Examination:    Constitutional: Patient is alert and not in acute distress.  Respiratory: Clear to auscultation and percussion. No rales.  No wheezes.  Cardiovascular: Regular rate and rhythm. No murmurs.  Gastrointestinal: Soft, non tender with good bowel sounds.  Musculoskeletal: No edema. No calf tenderness.  Psychiatric: The patient's mood is calm and appropriate for this visit.       Labs reviewed at this visit:     Lab Results   Component Value Date    WBC 9.5 10/25/2024    RBC 2.46 (L) 10/25/2024    HGB 8.9 (L) 10/25/2024    HCT 27.3 (L) 10/25/2024    .0 (H) 10/25/2024    MCH 36.2 (H) 10/25/2024    MCHC 32.6 10/25/2024    RDW 14.1 10/25/2024    .0 10/25/2024     Lab Results   Component Value Date     (L) 10/25/2024    K 4.1 10/25/2024     10/25/2024    CO2 20.0 (L) 10/25/2024    BUN 40 (H) 10/25/2024    CREATSERUM 3.27 (H) 10/25/2024     (H) 10/25/2024    CA 10.7 (H) 10/25/2024    ALKPHO 95 10/25/2024    ALT 17 10/25/2024    AST 13 (L) 10/25/2024    BILT 0.6 10/25/2024    ALB 3.2 (L) 10/25/2024    TP 6.7 10/25/2024      Component  Ref Range & Units 9/26/24 0928   IG G  635 - 1741 MG/ Low    IG A  66 - 433 MG/DL <10 Low    IG M  45 - 281 MG/DL <20 Low      Component  Ref Range & Units 9/19/24 1330   FREE KAPPA LT CHAIN  0.33 - 1.94 mg/dL 138.29 High    FREE LAMBDA LT CHAIN  0.57 - 2.63 mg/dL <0.15 Low    FREE K/L  RATIO  0.26 - 1.65 921.93 High        Radiologic imaging reviewed at this visit:      PET/CT scan on 9/5/2024:  FINDINGS:    ABNORMAL FOCI:  There is abnormal FDG activity throughout the osseous structures.  A new focus of uptake involves a lower cervical spinous process with a maximum SUV of 3.8.  Increasing scapular uptake demonstrates a maximum SUV of 3.7 versus 3. New  uptake is present within the acromium with a maximum SUV of 3.1.  Uptake involves multiple pathologic rib fractures bilaterally many of which are new with maximum SUV involving anterior 2nd left rib equals 10.2 versus 6.9 and uptake involving the right  anterior 2nd rib with a maximum SUV of 6.6 versus 11.  Increasing soft tissue density masslike area surrounds the right anterior 2nd rib which measures 5 x 2.6 versus 3.2 x 1.4 cm. New uptake involves the sternum with a maximum SUV of 6.5.  There is also   new uptake involving left costovertebral junction throughout the thoracic spine.    OTHER:  Calcified pleural plaque.  Calcification within the right distal Achilles tendon demonstrates a max SUV of 4.0. Persistent uptake surrounds the shoulders and right hip.  Severe anterior wedge compression fracture of T6.     Impression   CONCLUSION:    1. When compared to most recent PET performed 1/3/2024, there is unfavorable progression of disease with persistent abnormal diffuse osseous uptake and areas of new uptake as detailed above.       Assessment/Plan:     Multiple myeloma IgG kappa:  Hypercalcemia  Acute renal failure  Anemia  Diffuse bone lytic lesions  Beta-2 Microglobulin 18.5 mg/L    ISS stage III  5 cycles of (VCd) Dexamethasone, Bortezomib and cyclophosphamide  High dose therapy with stem cell infusion on 1/27/2017  Revlimid maintenance from 6/2017 to 12/202  Progression with repeat bone marrow biopsy on 1/12/2021  50% plasma cells  FISH study was not done  Chromosomes at relapse:   45,X,-Y[7]/46,XY[13]  Carfilzomib/Ann/Dex started  on 2/18/2021       He is on salvage treatment with vic/carfilzomib/Dex.  He has progression with increased kappa free light chain and new PET scan findings. I will stop his therapy now. He will proceed with CAR therapy evaluation and treatment planning at [facility].    He has new confusion. He will continue the antibiotic for possible UTI. He has mild hypercalcemia that I don't think is causing this. The calcium was normal when he was in the ED. His creatinine is higher. I will give 500 mL of NS today. He will be reassessed on Monday at [facility] for possible cell mobilization.      Lytic bone disease:   back pain:  Hypercalcemia    He has been on Zometa with last dose 2 weeks ago. The PET scan had diffuse bony changes consistent with myeloma.      Chronic Kidney Disease:     Overall stable.    Cardiomyopathy:    Continue cardiology follow up. He is s/p myocardial biopsy that is negative for amyloid. He will continue follow up with cardiology.    Anemia complicating neoplastic disease:    Borderline low now. Continue to follow.        Ruiz Frias MD

## 2024-10-30 ENCOUNTER — LAB ENCOUNTER (OUTPATIENT)
Dept: LAB | Age: 74
End: 2024-10-30
Attending: FAMILY MEDICINE
Payer: MEDICARE

## 2024-10-30 DIAGNOSIS — C90.00 MYELOMA KIDNEY (HCC): ICD-10-CM

## 2024-10-30 DIAGNOSIS — N08 MYELOMA KIDNEY (HCC): ICD-10-CM

## 2024-10-30 DIAGNOSIS — N18.4 STAGE 4 CHRONIC KIDNEY DISEASE (HCC): ICD-10-CM

## 2024-10-30 LAB
BILIRUB UR QL STRIP.AUTO: NEGATIVE
CLARITY UR REFRACT.AUTO: CLEAR
COLOR UR AUTO: YELLOW
CREAT UR-SCNC: 99 MG/DL
GLUCOSE UR STRIP.AUTO-MCNC: 500 MG/DL
KETONES UR STRIP.AUTO-MCNC: NEGATIVE MG/DL
LEUKOCYTE ESTERASE UR QL STRIP.AUTO: NEGATIVE
NITRITE UR QL STRIP.AUTO: NEGATIVE
PH UR STRIP.AUTO: 6 [PH] (ref 5–8)
PROT UR-MCNC: 386.8 MG/DL (ref ?–14)
SP GR UR STRIP.AUTO: 1.02 (ref 1–1.03)
UROBILINOGEN UR STRIP.AUTO-MCNC: 0.2 MG/DL

## 2024-10-30 PROCEDURE — 82570 ASSAY OF URINE CREATININE: CPT

## 2024-10-30 PROCEDURE — 84156 ASSAY OF PROTEIN URINE: CPT

## 2024-10-30 PROCEDURE — 81015 MICROSCOPIC EXAM OF URINE: CPT

## 2024-10-30 PROCEDURE — 81001 URINALYSIS AUTO W/SCOPE: CPT

## 2024-10-31 LAB
ALBUMIN SERPL ELPH-MCNC: 3.54 G/DL (ref 3.75–5.21)
ALBUMIN/GLOB SERPL: 1.38 {RATIO} (ref 1–2)
ALPHA1 GLOB SERPL ELPH-MCNC: 0.43 G/DL (ref 0.19–0.46)
ALPHA2 GLOB SERPL ELPH-MCNC: 1.22 G/DL (ref 0.48–1.05)
B-GLOBULIN SERPL ELPH-MCNC: 0.7 G/DL (ref 0.68–1.23)
GAMMA GLOB SERPL ELPH-MCNC: 0.21 G/DL (ref 0.62–1.7)
KAPPA LC FREE SER-MCNC: 436.61 MG/DL (ref 0.33–1.94)
KAPPA LC FREE/LAMBDA FREE SER NEPH: 2745.96 {RATIO} (ref 0.26–1.65)
LAMBDA LC FREE SERPL-MCNC: 0.16 MG/DL (ref 0.57–2.63)
M PROTEIN 1 SERPL ELPH-MCNC: 0.04 G/DL (ref ?–0)
M PROTEIN 2 SERPL ELPH-MCNC: 0.04 G/DL (ref ?–0)
PROT SERPL-MCNC: 6.1 G/DL (ref 5.7–8.2)

## 2024-11-13 ENCOUNTER — LAB ENCOUNTER (OUTPATIENT)
Dept: LAB | Age: 74
End: 2024-11-13
Attending: INTERNAL MEDICINE
Payer: MEDICARE

## 2024-11-13 DIAGNOSIS — N18.4 STAGE 4 CHRONIC KIDNEY DISEASE (HCC): ICD-10-CM

## 2024-11-13 DIAGNOSIS — C90.00 MYELOMA KIDNEY (HCC): ICD-10-CM

## 2024-11-13 DIAGNOSIS — N08 MYELOMA KIDNEY (HCC): ICD-10-CM

## 2024-11-13 LAB
ANION GAP SERPL CALC-SCNC: 10 MMOL/L (ref 0–18)
BASOPHILS # BLD AUTO: 0.01 X10(3) UL (ref 0–0.2)
BASOPHILS NFR BLD AUTO: 0.1 %
BUN BLD-MCNC: 34 MG/DL (ref 9–23)
CALCIUM BLD-MCNC: 7.9 MG/DL (ref 8.7–10.4)
CHLORIDE SERPL-SCNC: 112 MMOL/L (ref 98–112)
CO2 SERPL-SCNC: 20 MMOL/L (ref 21–32)
CREAT BLD-MCNC: 2.58 MG/DL
EGFRCR SERPLBLD CKD-EPI 2021: 25 ML/MIN/1.73M2 (ref 60–?)
EOSINOPHIL # BLD AUTO: 0.03 X10(3) UL (ref 0–0.7)
EOSINOPHIL NFR BLD AUTO: 0.3 %
ERYTHROCYTE [DISTWIDTH] IN BLOOD BY AUTOMATED COUNT: 14 %
FASTING STATUS PATIENT QL REPORTED: YES
GLUCOSE BLD-MCNC: 164 MG/DL (ref 70–99)
HCT VFR BLD AUTO: 27.2 %
HGB BLD-MCNC: 8.7 G/DL
IMM GRANULOCYTES # BLD AUTO: 0.1 X10(3) UL (ref 0–1)
IMM GRANULOCYTES NFR BLD: 0.9 %
LYMPHOCYTES # BLD AUTO: 0.48 X10(3) UL (ref 1–4)
LYMPHOCYTES NFR BLD AUTO: 4.1 %
MAGNESIUM SERPL-MCNC: 2.1 MG/DL (ref 1.6–2.6)
MCH RBC QN AUTO: 35.1 PG (ref 26–34)
MCHC RBC AUTO-ENTMCNC: 32 G/DL (ref 31–37)
MCV RBC AUTO: 109.7 FL
MONOCYTES # BLD AUTO: 0.74 X10(3) UL (ref 0.1–1)
MONOCYTES NFR BLD AUTO: 6.4 %
NEUTROPHILS # BLD AUTO: 10.28 X10 (3) UL (ref 1.5–7.7)
NEUTROPHILS # BLD AUTO: 10.28 X10(3) UL (ref 1.5–7.7)
NEUTROPHILS NFR BLD AUTO: 88.2 %
OSMOLALITY SERPL CALC.SUM OF ELEC: 305 MOSM/KG (ref 275–295)
PHOSPHATE SERPL-MCNC: 2.2 MG/DL (ref 2.4–5.1)
PLATELET # BLD AUTO: 192 10(3)UL (ref 150–450)
POTASSIUM SERPL-SCNC: 4 MMOL/L (ref 3.5–5.1)
PTH-INTACT SERPL-MCNC: 575.3 PG/ML (ref 18.5–88)
RBC # BLD AUTO: 2.48 X10(6)UL
SODIUM SERPL-SCNC: 142 MMOL/L (ref 136–145)
VIT D+METAB SERPL-MCNC: 33.8 NG/ML (ref 30–100)
WBC # BLD AUTO: 11.6 X10(3) UL (ref 4–11)

## 2024-11-13 PROCEDURE — 36415 COLL VENOUS BLD VENIPUNCTURE: CPT

## 2024-11-13 PROCEDURE — 84100 ASSAY OF PHOSPHORUS: CPT

## 2024-11-13 PROCEDURE — 80048 BASIC METABOLIC PNL TOTAL CA: CPT

## 2024-11-13 PROCEDURE — 83735 ASSAY OF MAGNESIUM: CPT

## 2024-11-13 PROCEDURE — 85025 COMPLETE CBC W/AUTO DIFF WBC: CPT

## 2024-11-13 PROCEDURE — 83970 ASSAY OF PARATHORMONE: CPT

## 2024-11-13 PROCEDURE — 82306 VITAMIN D 25 HYDROXY: CPT

## 2024-11-19 ENCOUNTER — OFFICE VISIT (OUTPATIENT)
Dept: NEPHROLOGY | Facility: CLINIC | Age: 74
End: 2024-11-19
Payer: MEDICARE

## 2024-11-19 ENCOUNTER — HOSPITAL ENCOUNTER (INPATIENT)
Facility: HOSPITAL | Age: 74
LOS: 2 days | Discharge: HOME | End: 2024-11-23
Attending: EMERGENCY MEDICINE | Admitting: HOSPITALIST
Payer: MEDICARE

## 2024-11-19 ENCOUNTER — APPOINTMENT (OUTPATIENT)
Dept: GENERAL RADIOLOGY | Facility: HOSPITAL | Age: 74
DRG: 689 | End: 2024-11-19
Attending: EMERGENCY MEDICINE
Payer: MEDICARE

## 2024-11-19 ENCOUNTER — APPOINTMENT (OUTPATIENT)
Dept: GENERAL RADIOLOGY | Facility: HOSPITAL | Age: 74
End: 2024-11-19
Attending: EMERGENCY MEDICINE
Payer: MEDICARE

## 2024-11-19 ENCOUNTER — HOSPITAL ENCOUNTER (INPATIENT)
Facility: HOSPITAL | Age: 74
LOS: 2 days | Discharge: HOME | DRG: 689 | End: 2024-11-23
Attending: EMERGENCY MEDICINE | Admitting: HOSPITALIST
Payer: MEDICARE

## 2024-11-19 VITALS — DIASTOLIC BLOOD PRESSURE: 76 MMHG | WEIGHT: 234.25 LBS | SYSTOLIC BLOOD PRESSURE: 122 MMHG | BODY MASS INDEX: 32 KG/M2

## 2024-11-19 DIAGNOSIS — Z00.00 ROUTINE GENERAL MEDICAL EXAMINATION AT A HEALTH CARE FACILITY: ICD-10-CM

## 2024-11-19 DIAGNOSIS — R53.1 WEAKNESS GENERALIZED: Primary | ICD-10-CM

## 2024-11-19 DIAGNOSIS — N28.9 RENAL INSUFFICIENCY: ICD-10-CM

## 2024-11-19 DIAGNOSIS — J18.9 COMMUNITY ACQUIRED PNEUMONIA, UNSPECIFIED LATERALITY: ICD-10-CM

## 2024-11-19 DIAGNOSIS — N18.4 CKD (CHRONIC KIDNEY DISEASE) STAGE 4, GFR 15-29 ML/MIN (HCC): Primary | ICD-10-CM

## 2024-11-19 DIAGNOSIS — D64.9 ANEMIA, UNSPECIFIED TYPE: ICD-10-CM

## 2024-11-19 LAB
ALBUMIN SERPL-MCNC: 4.2 G/DL (ref 3.2–4.8)
ALBUMIN/GLOB SERPL: 2 {RATIO} (ref 1–2)
ALP LIVER SERPL-CCNC: 76 U/L
ALT SERPL-CCNC: 14 U/L
ANION GAP SERPL CALC-SCNC: 13 MMOL/L (ref 0–18)
APTT PPP: 22.3 SECONDS (ref 23–36)
AST SERPL-CCNC: 16 U/L (ref ?–34)
BASOPHILS # BLD AUTO: 0.01 X10(3) UL (ref 0–0.2)
BASOPHILS NFR BLD AUTO: 0.1 %
BILIRUB SERPL-MCNC: 1.1 MG/DL (ref 0.2–1.1)
BUN BLD-MCNC: 27 MG/DL (ref 9–23)
CALCIUM BLD-MCNC: 8.6 MG/DL (ref 8.7–10.4)
CHLORIDE SERPL-SCNC: 108 MMOL/L (ref 98–112)
CO2 SERPL-SCNC: 16 MMOL/L (ref 21–32)
CREAT BLD-MCNC: 2.47 MG/DL
EGFRCR SERPLBLD CKD-EPI 2021: 26 ML/MIN/1.73M2 (ref 60–?)
EOSINOPHIL # BLD AUTO: 0.01 X10(3) UL (ref 0–0.7)
EOSINOPHIL NFR BLD AUTO: 0.1 %
ERYTHROCYTE [DISTWIDTH] IN BLOOD BY AUTOMATED COUNT: 14.9 %
FLUAV + FLUBV RNA SPEC NAA+PROBE: NEGATIVE
FLUAV + FLUBV RNA SPEC NAA+PROBE: NEGATIVE
GLOBULIN PLAS-MCNC: 2.1 G/DL (ref 2–3.5)
GLUCOSE BLD-MCNC: 207 MG/DL (ref 70–99)
HCT VFR BLD AUTO: 22.9 %
HGB BLD-MCNC: 7.6 G/DL
IMM GRANULOCYTES # BLD AUTO: 0.08 X10(3) UL (ref 0–1)
IMM GRANULOCYTES NFR BLD: 0.7 %
INR BLD: 1.19 (ref 0.8–1.2)
LYMPHOCYTES # BLD AUTO: 0.16 X10(3) UL (ref 1–4)
LYMPHOCYTES NFR BLD AUTO: 1.3 %
MCH RBC QN AUTO: 35.8 PG (ref 26–34)
MCHC RBC AUTO-ENTMCNC: 33.2 G/DL (ref 31–37)
MCV RBC AUTO: 108 FL
MONOCYTES # BLD AUTO: 0.53 X10(3) UL (ref 0.1–1)
MONOCYTES NFR BLD AUTO: 4.5 %
NEUTROPHILS # BLD AUTO: 11.12 X10 (3) UL (ref 1.5–7.7)
NEUTROPHILS # BLD AUTO: 11.12 X10(3) UL (ref 1.5–7.7)
NEUTROPHILS NFR BLD AUTO: 93.3 %
OSMOLALITY SERPL CALC.SUM OF ELEC: 295 MOSM/KG (ref 275–295)
PLATELET # BLD AUTO: 94 10(3)UL (ref 150–450)
POTASSIUM SERPL-SCNC: 3.6 MMOL/L (ref 3.5–5.1)
PROT SERPL-MCNC: 6.3 G/DL (ref 5.7–8.2)
PROTHROMBIN TIME: 15.2 SECONDS (ref 11.6–14.8)
RBC # BLD AUTO: 2.12 X10(6)UL
RSV RNA SPEC NAA+PROBE: NEGATIVE
SARS-COV-2 RNA RESP QL NAA+PROBE: NOT DETECTED
SODIUM SERPL-SCNC: 137 MMOL/L (ref 136–145)
TROPONIN I SERPL HS-MCNC: 18 NG/L
WBC # BLD AUTO: 11.9 X10(3) UL (ref 4–11)

## 2024-11-19 PROCEDURE — 71045 X-RAY EXAM CHEST 1 VIEW: CPT | Performed by: EMERGENCY MEDICINE

## 2024-11-19 PROCEDURE — 99223 1ST HOSP IP/OBS HIGH 75: CPT | Performed by: HOSPITALIST

## 2024-11-19 RX ORDER — METOPROLOL SUCCINATE 50 MG/1
50 TABLET, EXTENDED RELEASE ORAL DAILY
COMMUNITY

## 2024-11-19 NOTE — PROGRESS NOTES
Nephrology Progress Note      Nacho Valle is a 74 year old male.    HPI:     No chief complaint on file.      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 He was treated in hospital with pamidronate/fluids/plasmapheresis. He also required few treatments of HD.    S/p stem cell tx @ [facility]-  did well for few years  Patient is now on salvage chemo (diagnosed w/ recurrent dx in Jan 2021)  He follows w/ Dr. Frias and Dr. Barragan ([facility])- has been on salvage chemo as documented in oncology notes; recent eval shows worsening disease; MPS w/ higher paraprotenemia- being worked up for CAR T-cell tx; plans to proceed @ [facility] next week.     He is here w/ his wife today    Pt is noted to be more fatigued in general. Confused off and on per the wife. BP was been low,but improved today     Denies any n/v  No urinary complains  Denies any LE edema       Medications (Active prior to today's visit):  Current Outpatient Medications   Medication Sig Dispense Refill    sodium bicarbonate 650 MG Oral Tab Take 1 tablet (650 mg total) by mouth 2 (two) times daily. (Patient not taking: Reported on 10/25/2024) 60 tablet 3    cephALEXin 500 MG Oral Cap Take 1 capsule (500 mg total) by mouth. Directed use for 7 days for UTI      ketorolac 0.5 % Ophthalmic Solution INSTILL 1 DROP IN BOTH EYES FOUR TIMES DAILY AS NEEDED      acetaminophen-codeine 300-30 MG Oral Tab Take 1 tablet by mouth every 4 (four) hours as needed for Pain. (Patient not taking: Reported on 10/25/2024) 60 tablet 0    ATORVASTATIN 20 MG Oral Tab TAKE 1 TABLET(20 MG) BY MOUTH DAILY 90 tablet 3    acyclovir 400 MG Oral Tab Take 1 tablet (400 mg total) by mouth 2 (two) times daily. 180 tablet 1    metoprolol succinate  MG Oral Tablet 24 Hr Take 1 tablet (100 mg total) by mouth every evening.      dexamethasone (DECADRON) 4 MG tablet TAKE 3 TABLETS(12 MG) BY MOUTH 1 TIME EVERY WEEK 36 tablet 3    benzonatate  200 MG Oral Cap Take 1 capsule (200 mg total) by mouth 3 (three) times daily as needed for cough. (Patient not taking: Reported on 10/25/2024) 30 capsule 0    cyclobenzaprine 10 MG Oral Tab TAKE 1 TABLET(10 MG) BY MOUTH THREE TIMES DAILY AS NEEDED FOR MUSCLE SPASMS (Patient not taking: Reported on 10/25/2024) 10 tablet 1    DRISDOL 1.25 MG (08205 UT) Oral Cap       ONETOUCH ULTRASOFT LANCETS Does not apply Misc TEST BLOOD SUGAR TWICE DAILY 200 each 2    ENTRESTO  MG Oral Tab Take 1 tablet by mouth daily.      empagliflozin 10 MG Oral Tab Take 1 tablet (10 mg total) by mouth daily. JARDIANCE (Patient not taking: Reported on 10/25/2024)      Glucose Blood (ONETOUCH ULTRA) In Vitro Strip CHECK SUGARS THREE TIMES DAILY AS DIRECTED 300 strip 2    eplerenone 25 MG Oral Tab Take 1 tablet (25 mg total) by mouth daily.      omega-3 fatty acids 1000 MG Oral Cap Take 1,000 mg by mouth 2 (two) times daily.      Multiple Vitamin Oral Tab Take 1 tablet by mouth.      acetaminophen 500 MG Oral Tab Take 2 tablets (1,000 mg total) by mouth nightly.      aspirin (ASPIR-81) 81 MG Oral Tab EC Take 1 tablet by mouth daily. 1 tablet 0       Allergies:  Allergies   Allergen Reactions    Pcn [Penicillins] ANAPHYLAXIS, HIVES, HALLUCINATION and SHORTNESS OF BREATH     Respiratory distress    Beta Adrenergic Blockers     Latex      Surgical tape  Welts, burning of skin, itching    Statins          ROS:     10 systems reviewed and otherwise unremarkable       PHYSICAL EXAM:   There were no vitals taken for this visit.  Wt Readings from Last 6 Encounters:   10/25/24 246 lb 8 oz (111.8 kg)   10/22/24 241 lb 8 oz (109.5 kg)   10/17/24 244 lb (110.7 kg)   10/11/24 246 lb 8 oz (111.8 kg)   10/04/24 245 lb (111.1 kg)   09/27/24 249 lb 8 oz (113.2 kg)       General: Alert and oriented in no apparent distress.  HEENT: No scleral icterus, MMM  Neck: Supple, no ELLIOT or thyromegaly  Cardiac: Regular rate and rhythm, S1, S2 normal, no murmur or  rub  Lungs: Clear without wheezes, rales, rhonchi.    Abdomen: Soft, non-tender. + bowel sounds, no palpable organomegaly  Extremities: Without clubbing, cyanosis ; no edema  Neurologic: Alert and oriented, normal affect, cranial nerves grossly intact, moving all extremities  Skin: Warm and dry, no rashes        Labs reviewed      ASSESSMENT/PLAN:       1. Hx of Myeloma kidney disease -   Required plasmapheresis/HD treatments in the hospital  His renal function had recovered  and was able to come off HD  Volume is good today; Cr has gradually risen over the past few years; more recently baseline ~ 2-2.5  Recent labs show worsening/relapsed disease despite salvage tx    - will need closer eye on his renal function given relapsed MM; currently stable.     - monitor labs  - further MM w/u per heme/onc; he is currently on salvage chemo; being worked up for CAR T tx     2. MM- s/p stem cell;  w/ relapse- on tx per oncology; plan for CAR T-cell tx     3. Cardiomyopathy/HTN- stable/compensated; monitor ; cardiac bx negative for amyloid; follows w/ cardiology     - appears compensated ; BP meds have been adjusted lower and as noted his BP is stale today     4. Acidosis- related to CKD- start oral bicarb    5. Anemia - per oncology; monitor      6. Recent UTI; s/p abx; stable UA  on repeat      F/u in Jan (hopefully after all his acute management @ [facility] for CAR T-cell tx)     Francisco Silva MD  11/19/2024

## 2024-11-20 PROBLEM — D69.6 THROMBOCYTOPENIA (HCC): Status: ACTIVE | Noted: 2024-11-20

## 2024-11-20 PROBLEM — D63.0 ANEMIA COMPLICATING NEOPLASTIC DISEASE: Status: ACTIVE | Noted: 2024-11-20

## 2024-11-20 PROBLEM — N18.32 CKD STAGE 3B, GFR 30-44 ML/MIN (HCC): Status: ACTIVE | Noted: 2024-11-20

## 2024-11-20 PROBLEM — N18.4 CKD (CHRONIC KIDNEY DISEASE) STAGE 4, GFR 15-29 ML/MIN (HCC): Status: ACTIVE | Noted: 2024-11-20

## 2024-11-20 PROBLEM — N30.00 ACUTE CYSTITIS WITHOUT HEMATURIA: Status: ACTIVE | Noted: 2024-11-20

## 2024-11-20 PROBLEM — J18.9 COMMUNITY ACQUIRED PNEUMONIA, UNSPECIFIED LATERALITY: Status: ACTIVE | Noted: 2024-11-20

## 2024-11-20 PROBLEM — I50.32 CHRONIC HEART FAILURE WITH PRESERVED EJECTION FRACTION (HCC): Chronic | Status: ACTIVE | Noted: 2024-11-20

## 2024-11-20 PROBLEM — N28.9 RENAL INSUFFICIENCY: Status: ACTIVE | Noted: 2024-11-20

## 2024-11-20 PROBLEM — D64.9 ANEMIA, UNSPECIFIED TYPE: Status: ACTIVE | Noted: 2024-11-20

## 2024-11-20 PROBLEM — D69.6 THROMBOCYTOPENIA: Status: ACTIVE | Noted: 2024-11-20

## 2024-11-20 LAB
ANTIBODY SCREEN: POSITIVE
ATRIAL RATE: 88 BPM
BILIRUB UR QL STRIP.AUTO: NEGATIVE
EST. AVERAGE GLUCOSE BLD GHB EST-MCNC: 120 MG/DL (ref 68–126)
GLUCOSE BLD-MCNC: 165 MG/DL (ref 70–99)
GLUCOSE BLD-MCNC: 172 MG/DL (ref 70–99)
GLUCOSE BLD-MCNC: 174 MG/DL (ref 70–99)
GLUCOSE BLD-MCNC: 177 MG/DL (ref 70–99)
GLUCOSE BLD-MCNC: 213 MG/DL (ref 70–99)
GLUCOSE UR STRIP.AUTO-MCNC: >1000 MG/DL
HBA1C MFR BLD: 5.8 % (ref ?–5.7)
LACTATE SERPL-SCNC: 0.8 MMOL/L (ref 0.5–2)
LEUKOCYTE ESTERASE UR QL STRIP.AUTO: 250
P AXIS: 46 DEGREES
P-R INTERVAL: 180 MS
PH UR STRIP.AUTO: 5.5 [PH] (ref 5–8)
PROT UR STRIP.AUTO-MCNC: 70 MG/DL
Q-T INTERVAL: 330 MS
QRS DURATION: 88 MS
QTC CALCULATION (BEZET): 399 MS
R AXIS: -26 DEGREES
RGTSCRN: 2
RH BLOOD TYPE: NEGATIVE
RH BLOOD TYPE: NEGATIVE
SP GR UR STRIP.AUTO: 1.01 (ref 1–1.03)
T AXIS: 26 DEGREES
TREATCELL: 1
UROBILINOGEN UR STRIP.AUTO-MCNC: NORMAL MG/DL
VENTRICULAR RATE: 88 BPM
WBC #/AREA URNS AUTO: >50 /HPF

## 2024-11-20 PROCEDURE — 99223 1ST HOSP IP/OBS HIGH 75: CPT | Performed by: INTERNAL MEDICINE

## 2024-11-20 RX ORDER — SENNOSIDES 8.6 MG
17.2 TABLET ORAL NIGHTLY PRN
Status: DISCONTINUED | OUTPATIENT
Start: 2024-11-20 | End: 2024-11-23

## 2024-11-20 RX ORDER — BENZONATATE 200 MG/1
200 CAPSULE ORAL 3 TIMES DAILY PRN
Status: DISCONTINUED | OUTPATIENT
Start: 2024-11-20 | End: 2024-11-23

## 2024-11-20 RX ORDER — HEPARIN SODIUM 5000 [USP'U]/ML
5000 INJECTION, SOLUTION INTRAVENOUS; SUBCUTANEOUS EVERY 8 HOURS SCHEDULED
Status: DISCONTINUED | OUTPATIENT
Start: 2024-11-20 | End: 2024-11-23

## 2024-11-20 RX ORDER — SODIUM BICARBONATE 325 MG/1
650 TABLET ORAL 2 TIMES DAILY
Status: DISCONTINUED | OUTPATIENT
Start: 2024-11-20 | End: 2024-11-23

## 2024-11-20 RX ORDER — ACETAMINOPHEN 500 MG
500 TABLET ORAL EVERY 4 HOURS PRN
Status: DISCONTINUED | OUTPATIENT
Start: 2024-11-20 | End: 2024-11-20

## 2024-11-20 RX ORDER — NICOTINE POLACRILEX 4 MG
15 LOZENGE BUCCAL
Status: DISCONTINUED | OUTPATIENT
Start: 2024-11-20 | End: 2024-11-23

## 2024-11-20 RX ORDER — POLYETHYLENE GLYCOL 3350 17 G/17G
17 POWDER, FOR SOLUTION ORAL DAILY PRN
Status: DISCONTINUED | OUTPATIENT
Start: 2024-11-20 | End: 2024-11-23

## 2024-11-20 RX ORDER — SODIUM CHLORIDE 9 MG/ML
INJECTION, SOLUTION INTRAVENOUS CONTINUOUS
Status: DISCONTINUED | OUTPATIENT
Start: 2024-11-20 | End: 2024-11-20

## 2024-11-20 RX ORDER — ONDANSETRON 2 MG/ML
4 INJECTION INTRAMUSCULAR; INTRAVENOUS EVERY 6 HOURS PRN
Status: DISCONTINUED | OUTPATIENT
Start: 2024-11-20 | End: 2024-11-23

## 2024-11-20 RX ORDER — ACETAMINOPHEN 500 MG
1000 TABLET ORAL EVERY 6 HOURS PRN
Status: DISCONTINUED | OUTPATIENT
Start: 2024-11-20 | End: 2024-11-23

## 2024-11-20 RX ORDER — ACYCLOVIR 400 MG/1
400 TABLET ORAL 2 TIMES DAILY
Status: DISCONTINUED | OUTPATIENT
Start: 2024-11-20 | End: 2024-11-23

## 2024-11-20 RX ORDER — EPLERENONE 25 MG/1
12.5 TABLET, FILM COATED ORAL DAILY
Status: DISCONTINUED | OUTPATIENT
Start: 2024-11-20 | End: 2024-11-23

## 2024-11-20 RX ORDER — METOPROLOL SUCCINATE 50 MG/1
50 TABLET, EXTENDED RELEASE ORAL
Status: DISCONTINUED | OUTPATIENT
Start: 2024-11-20 | End: 2024-11-23

## 2024-11-20 RX ORDER — METOCLOPRAMIDE HYDROCHLORIDE 5 MG/ML
5 INJECTION INTRAMUSCULAR; INTRAVENOUS EVERY 8 HOURS PRN
Status: DISCONTINUED | OUTPATIENT
Start: 2024-11-20 | End: 2024-11-23

## 2024-11-20 RX ORDER — ATORVASTATIN CALCIUM 20 MG/1
20 TABLET, FILM COATED ORAL DAILY
Status: DISCONTINUED | OUTPATIENT
Start: 2024-11-20 | End: 2024-11-23

## 2024-11-20 RX ORDER — NICOTINE POLACRILEX 4 MG
30 LOZENGE BUCCAL
Status: DISCONTINUED | OUTPATIENT
Start: 2024-11-20 | End: 2024-11-23

## 2024-11-20 RX ORDER — DEXTROSE MONOHYDRATE 25 G/50ML
50 INJECTION, SOLUTION INTRAVENOUS
Status: DISCONTINUED | OUTPATIENT
Start: 2024-11-20 | End: 2024-11-23

## 2024-11-20 RX ORDER — BISACODYL 10 MG
10 SUPPOSITORY, RECTAL RECTAL
Status: DISCONTINUED | OUTPATIENT
Start: 2024-11-20 | End: 2024-11-23

## 2024-11-20 RX ORDER — CHOLECALCIFEROL (VITAMIN D3) 25 MCG
1000 TABLET ORAL DAILY
COMMUNITY

## 2024-11-20 RX ORDER — EPLERENONE 25 MG/1
25 TABLET, FILM COATED ORAL DAILY
Status: DISCONTINUED | OUTPATIENT
Start: 2024-11-20 | End: 2024-11-20

## 2024-11-20 RX ORDER — POTASSIUM CHLORIDE 1500 MG/1
40 TABLET, EXTENDED RELEASE ORAL ONCE
Status: COMPLETED | OUTPATIENT
Start: 2024-11-20 | End: 2024-11-20

## 2024-11-20 RX ORDER — ASPIRIN 81 MG/1
81 TABLET ORAL DAILY
Status: DISCONTINUED | OUTPATIENT
Start: 2024-11-20 | End: 2024-11-20

## 2024-11-20 NOTE — CONSULTS
[facility]  Report of Consultation    Nacho Valle Patient Status:  Observation    1950 MRN [id number]   Location [facility] 4NW-A Attending Teresita Manuel,    Hosp Day # 0 PCP Dagoberto Bell MD     Reason for Consultation:    The patient was seen for evaluation and management of multiple myeloma with confusion and weakness.    History of Present Illness:    Nacho Valle is a a(n) 74 year old male. The patient has recurrent multiple myeloma. He has had recent work up and management at [place] for CAR-T therapy. He presents with worsening confusion and weakness. He has had history of UTI. He had UA with possible urine infection. Culture is pending. He had CXR with possible infiltrate. He was admitted on antibiotics.     He has had worsening fatigue and weakness over the last 3 weeks. He has had confusion with difficulty with orientation to time and place. He has no headache. He has no fever or sweats. He has no abdominal pain. He has had chronic back pains.    PMH:  Past Medical History:    Allergic rhinitis, cause unspecified    BCC (basal cell carcinoma of skin)    s/p surgical excision    BPH (benign prostatic hyperplasia)    Colon polyps    Generalized osteoarthrosis, involving hand    Herniation of intervertebral disc between L4 and L5    s/p surgical repair    High blood pressure    High cholesterol    Impotence of organic origin    Morbid obesity (HCC)    Osteoarthritis    Osteoarthrosis, unspecified whether generalized or localized, lower leg    Log Date: 2012     Other and unspecified hyperlipidemia    Pneumonia due to organism    Prostate cancer (HCC)    s/p total prostatectomy and radiation    Trigger finger (acquired)    Type II or unspecified type diabetes mellitus without mention of complication, not stated as uncontrolled    Unspecified essential hypertension    Unspecified internal derangement of knee    Log Date: 2012        Past Surgical History:   Procedure Laterality  Date    Back surgery  1/2001    lower back L4 L5 for herniated disc    Colonoscopy      202, 2012 Dr. Wolf, polyps    Other surgical history  1/31/2007    total prostatectomy    Skin surgery  1997    BCC       Family History:  Family History   Problem Relation Age of Onset    Diabetes Other         family hx    Hypertension Other         family hx    Prostate Cancer Father        Social History:  he reports that he has never smoked. He has never used smokeless tobacco. He reports that he does not drink alcohol and does not use drugs.    Allergies:  Allergies[1]    Medications:    Current Facility-Administered Medications:     atorvastatin (Lipitor) tab 20 mg, 20 mg, Oral, Daily    benzonatate (Tessalon) cap 200 mg, 200 mg, Oral, TID PRN    metoprolol succinate ER (Toprol XL) 24 hr tab 50 mg, 50 mg, Oral, Daily Beta Blocker    glucose (Dex4) 15 GM/59ML oral liquid 15 g, 15 g, Oral, Q15 Min PRN **OR** glucose (Glutose) 40% oral gel 15 g, 15 g, Oral, Q15 Min PRN **OR** glucose-vitamin C (Dex-4) chewable tab 4 tablet, 4 tablet, Oral, Q15 Min PRN **OR** dextrose 50% injection 50 mL, 50 mL, Intravenous, Q15 Min PRN **OR** glucose (Dex4) 15 GM/59ML oral liquid 30 g, 30 g, Oral, Q15 Min PRN **OR** glucose (Glutose) 40% oral gel 30 g, 30 g, Oral, Q15 Min PRN **OR** glucose-vitamin C (Dex-4) chewable tab 8 tablet, 8 tablet, Oral, Q15 Min PRN    melatonin tab 3 mg, 3 mg, Oral, Nightly PRN    ondansetron (Zofran) 4 MG/2ML injection 4 mg, 4 mg, Intravenous, Q6H PRN    metoclopramide (Reglan) 5 mg/mL injection 5 mg, 5 mg, Intravenous, Q8H PRN    heparin (Porcine) 5000 UNIT/ML injection 5,000 Units, 5,000 Units, Subcutaneous, Q8H JOAQUINA    acetaminophen (Tylenol Extra Strength) tab 500 mg, 500 mg, Oral, Q4H PRN    polyethylene glycol (PEG 3350) (Miralax) 17 g oral packet 17 g, 17 g, Oral, Daily PRN    sennosides (Senokot) tab 17.2 mg, 17.2 mg, Oral, Nightly PRN    bisacodyl (Dulcolax) 10 MG rectal suppository 10 mg, 10 mg, Rectal,  Daily PRN    benzonatate (Tessalon) cap 200 mg, 200 mg, Oral, TID PRN    guaiFENesin (Robitussin) 100 MG/5 ML oral liquid 200 mg, 200 mg, Oral, Q4H PRN    [START ON 11/21/2024] cefTRIAXone (Rocephin) 2 g in sodium chloride 0.9% 100 mL IVPB-ADDV, 2 g, Intravenous, Q24H    [START ON 11/21/2024] azithromycin (Zithromax) 500 mg in sodium chloride 0.9% 250mL IVPB premix, 500 mg, Intravenous, Q24H    [Held by provider] eplerenone (Inspra) tab 12.5 mg, 12.5 mg, Oral, Daily    acyclovir (Zovirax) tab 400 mg, 400 mg, Oral, BID    sodium bicarbonate tab 650 mg, 650 mg, Oral, BID    sodium bicarbonate 150 mEq in dextrose 5% 1,000 mL infusion, 150 mEq, Intravenous, Continuous    Review of Systems:  Psychiatric: The patient's mood was calm and appropriate for this visit.    The pertinent positives and negatives were described. All other systems were negative.    Physical Exam:   Vital Signs: /54 (BP Location: Left arm)   Pulse 68   Temp 98.4 °F (36.9 °C) (Oral)   Resp 17   Wt 105.6 kg (232 lb 14.4 oz)   SpO2 98%   BMI 31.58 kg/m²   Constitutional: Patient is alert not in acute distress. He is not oriented to time or place.  HEENT:  Oropharynx is clear.   Neck: No palpable lymphadenopathy. Neck is supple.  Respiratory: Clear to auscultation and percussion. No rales.  No wheezes.  Cardiovascular: Regular rate and rhythm.   Gastrointestinal: Soft, non tender with good bowel sounds.  Extremities: No edema. No calf tenderness.  Lymphatics: There is no palpable lymphadenopathy throughout in the cervical, supraclavicular, or axillary regions.      Laboratory Data reviewed at this visit:    Recent Labs   Lab 11/19/24  2245   RBC 2.12*   HGB 7.6*   HCT 22.9*   .0*   MCH 35.8*   MCHC 33.2   RDW 14.9   NEPRELIM 11.12*   WBC 11.9*   PLT 94.0*       Recent Labs   Lab 11/19/24  2245   *   BUN 27*   CREATSERUM 2.47*   CA 8.6*   ALB 4.2      K 3.6      CO2 16.0*   ALKPHO 76   AST 16   ALT 14   BILT 1.1   TP  6.3       Radiologic imaging reviewed at this visit:    CXR yesterday:  FINDINGS:       Chronic right-sided rib fractures.     Cardiac silhouette pulmonary vasculature are unremarkable.     Small left basilar opacity.  No pneumothorax.   Impression   CONCLUSION:  Small left basilar opacity may represent a developing pneumonia.       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[facility])    Hx of stem cell transplant ([facility])    Hypoxia    Multiple myeloma, remission status unspecified ([facility])    Type 2 diabetes mellitus with hyperglycemia, without long-term current use of insulin ([facility])    Cardiomyopathy, unspecified type ([facility])    Morbid (severe) obesity due to excess calories ([facility])    Elevated LFTs    Weakness generalized    Community acquired pneumonia, unspecified laterality    Anemia, unspecified type    Renal insufficiency    Acute cystitis without hematuria    Chronic heart failure with preserved ejection fraction ([facility])    CKD stage 3b, GFR 30-44 ml/min ([facility])       Multiple myeloma:    His is s/p multiple regimens. He has had increasing kappa free light chain. He is being managed at [facility] for possible CART therapy. He has had worsening confusion and weakness. He is being treated for possible UTI and pneumonia. We will need to re-evaluate to see if his mental status improves with this management otherwise he will need CNS workup starting with MRI of brain. Will follow.    Pneumonia/UTI:    Continue abx per primary service.    CKD stage III:    Nephrology consulted for management.    Anemia complicating neoplastic disease:    Worsening anemia without signs of blood loss. He has thrombocytopenia. Likely related to bone marrow failure with progressive myeloma. Will repeat CBC in am and transfuse if HGB < 7.0.      Ruiz  TOÑITO Frias MD  11/20/2024  5:28 PM        [1]   Allergies  Allergen Reactions    Pcn [Penicillins] ANAPHYLAXIS, HIVES, HALLUCINATION and SHORTNESS OF BREATH     Respiratory distress    Beta Adrenergic Blockers     Latex      Surgical tape  Welts, burning of skin, itching    Statins

## 2024-11-20 NOTE — PLAN OF CARE
Patient appears weak, c/o feeling cold and had chills this morning, Temp max 99.5, Dr. Manuel notified. Respirations unlabored, lungs sound clear, O2 sat 96% on room air. Patient denies pain with urination.   Problem: METABOLIC/FLUID AND ELECTROLYTES - ADULT  Goal: Electrolytes maintained within normal limits  Description: INTERVENTIONS:  - Monitor labs and rhythm and assess patient for signs and symptoms of electrolyte imbalances  - Administer electrolyte replacement as ordered  - Monitor response to electrolyte replacements, including rhythm and repeat lab results as appropriate  - Fluid restriction as ordered  - Instruct patient on fluid and nutrition restrictions as appropriate  Outcome: Progressing  Goal: Hemodynamic stability and optimal renal function maintained  Description: INTERVENTIONS:  - Monitor labs and assess for signs and symptoms of volume excess or deficit  - Monitor intake, output and patient weight  - Monitor urine specific gravity, serum osmolarity and serum sodium as indicated or ordered  - Monitor response to interventions for patient's volume status, including labs, urine output, blood pressure (other measures as available)  - Encourage oral intake as appropriate  - Instruct patient on fluid and nutrition restrictions as appropriate  Outcome: Progressing     Problem: HEMATOLOGIC - ADULT  Goal: Maintains hematologic stability  Description: INTERVENTIONS  - Assess for signs and symptoms of bleeding or hemorrhage  - Monitor labs and vital signs for trends  - Administer supportive blood products/factors, fluids and medications as ordered and appropriate  - Administer supportive blood products/factors as ordered and appropriate  Outcome: Progressing     Problem: MUSCULOSKELETAL - ADULT  Goal: Return mobility to safest level of function  Description: INTERVENTIONS:  - Assess patient stability and activity tolerance for standing, transferring and ambulating w/ or w/o assistive devices  - Assist with  transfers and ambulation using safe patient handling equipment as needed  - Ensure adequate protection for wounds/incisions during mobilization  - Obtain PT/OT consults as needed  - Advance activity as appropriate  - Communicate ordered activity level and limitations with patient/family  Outcome: Not Progressing   .

## 2024-11-20 NOTE — ED PROVIDER NOTES
Patient Seen in: [facility] Emergency Department      History     Chief Complaint   Patient presents with    Fatigue     Stated Complaint: Weakness    Subjective:   HPI      74-year-old male with history of multiple myeloma presenting with weakness.  Patient was able to walk around earlier today go to his doctor's office but this evening patient is feeling extremely weak diffusely.  No focal weakness.  Patient slid down to the ground did not injure himself when going down he was having some difficulty catching his breath he has had no fevers no chills no cough no other exacerbating relieving factors or associated symptoms.  He denies chest pain or headache he denies hip pain    Objective:     Past Medical History:    Allergic rhinitis, cause unspecified    BCC (basal cell carcinoma of skin)    s/p surgical excision    BPH (benign prostatic hyperplasia)    Colon polyps    Generalized osteoarthrosis, involving hand    Herniation of intervertebral disc between L4 and L5    s/p surgical repair    High blood pressure    High cholesterol    Impotence of organic origin    Morbid obesity (HCC)    Osteoarthritis    Osteoarthrosis, unspecified whether generalized or localized, lower leg    Log Date: 08/21/2012     Other and unspecified hyperlipidemia    Pneumonia due to organism    Prostate cancer (HCC)    s/p total prostatectomy and radiation    Trigger finger (acquired)    Type II or unspecified type diabetes mellitus without mention of complication, not stated as uncontrolled    Unspecified essential hypertension    Unspecified internal derangement of knee    Log Date: 08/21/2012               Past Surgical History:   Procedure Laterality Date    Back surgery  1/2001    lower back L4 L5 for herniated disc    Colonoscopy      202, 2012 Dr. Wolf, polyps    Other surgical history  1/31/2007    total prostatectomy    Skin surgery  1997    BCC                Social History     Socioeconomic History    Marital status:      Number of children: 3   Occupational History    Occupation: Retired      Comment:    Tobacco Use    Smoking status: Never    Smokeless tobacco: Never   Vaping Use    Vaping status: Never Used   Substance and Sexual Activity    Alcohol use: No     Alcohol/week: 0.0 standard drinks of alcohol    Drug use: No     Social Drivers of Health     Financial Resource Strain: Low Risk  (2/20/2023)    Financial Resource Strain     Difficulty of Paying Living Expenses: Not hard at all     Med Affordability: No   Food Insecurity: No Food Insecurity (2/20/2023)    Food Insecurity     Food Insecurity: Never true   Transportation Needs: No Transportation Needs (2/20/2023)    Transportation Needs     Lack of Transportation: No   Physical Activity: Sufficiently Active (2/20/2023)    Exercise Vital Sign     Days of Exercise per Week: 7 days     Minutes of Exercise per Session: 150+ min   Stress: No Stress Concern Present (2/20/2023)    Stress     Feeling of Stress : No   Social Connections: Socially Integrated (2/20/2023)    Social Connections     Frequency of Socialization with Friends and Family: 3   Housing Stability: Low Risk  (2/20/2023)    Housing Stability     Housing Instability: No                  Physical Exam     ED Triage Vitals [11/19/24 2240]   /60   Pulse 89   Resp 23   Temp 99.1 °F (37.3 °C)   Temp src Oral   SpO2 100 %   O2 Device None (Room air)       Current Vitals:   Vital Signs  BP: 116/60  Pulse: 89  Resp: 23  Temp: 99.1 °F (37.3 °C)  Temp src: Oral    Oxygen Therapy  SpO2: 100 %  O2 Device: None (Room air)        Physical Exam  Awake alert patient appears no distress he appears pallorous HEENT exam is normal lungs were clear cardiovascular exam regular rhythm abdomen soft nontender EXTR no COVID cyanosis or edema he is able to move his lower extremities but he is weak strength 4-5 3 out of 5 to his upper and lower extremities otherwise no other focal deficits  noted.    ED Course     Labs Reviewed   COMP METABOLIC PANEL (14) - Abnormal; Notable for the following components:       Result Value    Glucose 207 (*)     CO2 16.0 (*)     BUN 27 (*)     Creatinine 2.47 (*)     Calcium, Total 8.6 (*)     eGFR-Cr 26 (*)     All other components within normal limits   CBC WITH DIFFERENTIAL WITH PLATELET - Abnormal; Notable for the following components:    WBC 11.9 (*)     RBC 2.12 (*)     HGB 7.6 (*)     HCT 22.9 (*)     PLT 94.0 (*)     .0 (*)     MCH 35.8 (*)     Neutrophil Absolute Prelim 11.12 (*)     Neutrophil Absolute 11.12 (*)     Lymphocyte Absolute 0.16 (*)     All other components within normal limits   URINALYSIS WITH CULTURE REFLEX - Abnormal; Notable for the following components:    Clarity Urine Turbid (*)     Glucose Urine >1000 (*)     Ketones Urine Trace (*)     Blood Urine 2+ (*)     Protein Urine 70 (*)     Nitrite Urine 2+ (*)     Leukocyte Esterase Urine 250 (*)     WBC Urine >50 (*)     RBC Urine 3-5 (*)     Bacteria Urine 1+ (*)     All other components within normal limits   PROTHROMBIN TIME (PT) - Abnormal; Notable for the following components:    PT 15.2 (*)     All other components within normal limits   PTT, ACTIVATED - Abnormal; Notable for the following components:    PTT 22.3 (*)     All other components within normal limits   TROPONIN I HIGH SENSITIVITY - Normal   LACTIC ACID, PLASMA - Normal   SARS-COV-2/FLU A AND B/RSV BY PCR (GENEXPERT) - Normal    Narrative:     This test is intended for the qualitative detection and differentiation of SARS-CoV-2, influenza A, influenza B, and respiratory syncytial virus (RSV) viral RNA in nasopharyngeal or nares swabs from individuals suspected of respiratory viral infection consistent with COVID-19 by their healthcare provider. Signs and symptoms of respiratory viral infection due to SARS-CoV-2, influenza, and RSV can be similar.    Test performed using the [employer] Xpress SARS-CoV-2/FLU/RSV (real time  RT-PCR)  assay on the GeneXpert instrument, [employer], [place] [address].   This test is being used under the Food and Drug Administration's Emergency Use Authorization.    The authorized Fact Sheet for Healthcare Providers for this assay is available upon request from the laboratory.   SCAN SLIDE   ABORH (BLOOD TYPE)   ABORH CONFIRMATION   TYPE AND SCREEN    Narrative:     The following orders were created for panel order Type and screen.  Procedure                               Abnormality         Status                     ---------                               -----------         ------                     ABORH (Blood Type)[[id number]]                               Final result               Antibody Screen[[id number]]                                  In process                   Please view results for these tests on the individual orders.   ANTIBODY SCREEN   BLOOD CULTURE   BLOOD CULTURE   URINE CULTURE, ROUTINE     EKG    Rate, intervals and axes as noted on EKG Report.  Rate: 88  Rhythm: Sinus Rhythm  Reading: No areas of acute ST segment elevation or depression                Differential diagnosis includes anemia, acute coronary syndrome       MDM          Admission disposition: 11/20/2024 12:37 AM           Medical Decision Making  74-year-old male presenting with generalized weakness and tachypnea.  IV established cardiac monitor shows a sinus rhythm pulse ox shows no signs of hypoxia.  CBC shows an elevated white cell count and also shows anemia that has worsened could be active generic but we will watch for blood loss there is no reports of black or bloody bowel movements renal function is stable independent interpretation by ED physician of chest x-ray shows possible infiltrate antibiotics ordered patient will be admitted at this time    Problems Addressed:  Anemia, unspecified type: acute illness or injury  Community acquired pneumonia, unspecified laterality: acute illness or injury  Renal  insufficiency: acute illness or injury  Weakness generalized: acute illness or injury    Amount and/or Complexity of Data Reviewed  Labs: ordered. Decision-making details documented in ED Course.  Radiology: ordered and independent interpretation performed. Decision-making details documented in ED Course.  ECG/medicine tests: ordered and independent interpretation performed. Decision-making details documented in ED Course.    Risk  Decision regarding hospitalization.        Disposition and Plan     Clinical Impression:  1. Weakness generalized    2. Community acquired pneumonia, unspecified laterality    3. Anemia, unspecified type    4. Renal insufficiency         Disposition:  Admit  11/20/2024 12:37 am    Follow-up:  No follow-up provider specified.        Medications Prescribed:  Current Discharge Medication List              Supplementary Documentation:         Hospital Problems       Present on Admission  Date Reviewed: 11/19/2024            ICD-10-CM Noted POA    * (Principal) Weakness generalized R53.1 11/19/2024 Unknown    Anemia, unspecified type D64.9 11/20/2024 Unknown    Community acquired pneumonia, unspecified laterality J18.9 11/20/2024 Unknown    Renal insufficiency N28.9 11/20/2024 Unknown

## 2024-11-20 NOTE — CONSULTS
[facility]  Report of Consultation    Nacho Valle Patient Status:  Observation    1950 MRN [id number]   Location [facility] 4NW-A Attending Teresita Manuel, DO   Hosp Day # 0 PCP Dagoberto Bell MD     Reason for Consultation:  CKD    History of Present Illness:  Nacho Valle is a a(n) 74 year old male with relapsed MM (currently on chemo w/ plan for CAR-Tcell tx @ [facility] in upcoming weeks), CHF, DM, HL, admitted to hospital for evaluation of weakness. He was seen in nephrology clinic yesterday afternoon and after he got home he felt very weak. Wife feels he may have overdone it a bit. Was unable to stand/dress himself- wife called paramedics who brought him for further evaluation  Currently he feels OK; wife/family @ bedside  Denies any cp/sob  No f/c    On admit lab pt noted to have abnl UA again (he is on SGLT2i); started on abx        History:  Past Medical History:    Allergic rhinitis, cause unspecified    BCC (basal cell carcinoma of skin)    s/p surgical excision    BPH (benign prostatic hyperplasia)    Colon polyps    Generalized osteoarthrosis, involving hand    Herniation of intervertebral disc between L4 and L5    s/p surgical repair    High blood pressure    High cholesterol    Impotence of organic origin    Morbid obesity (HCC)    Osteoarthritis    Osteoarthrosis, unspecified whether generalized or localized, lower leg    Log Date: 2012     Other and unspecified hyperlipidemia    Pneumonia due to organism    Prostate cancer (HCC)    s/p total prostatectomy and radiation    Trigger finger (acquired)    Type II or unspecified type diabetes mellitus without mention of complication, not stated as uncontrolled    Unspecified essential hypertension    Unspecified internal derangement of knee    Log Date: 2012      Past Surgical History:   Procedure Laterality Date    Back surgery  2001    lower back L4 L5 for herniated disc    Colonoscopy      2012 Dr. Wolf, polyps    Other  surgical history  1/31/2007    total prostatectomy    Skin surgery  1997    BCC     Family History   Problem Relation Age of Onset    Diabetes Other         family hx    Hypertension Other         family hx    Prostate Cancer Father       reports that he has never smoked. He has never used smokeless tobacco. He reports that he does not drink alcohol and does not use drugs.    Allergies:  Allergies[1]    Current Medications:    Current Facility-Administered Medications:     atorvastatin (Lipitor) tab 20 mg, 20 mg, Oral, Daily    benzonatate (Tessalon) cap 200 mg, 200 mg, Oral, TID PRN    metoprolol succinate ER (Toprol XL) 24 hr tab 50 mg, 50 mg, Oral, Daily Beta Blocker    glucose (Dex4) 15 GM/59ML oral liquid 15 g, 15 g, Oral, Q15 Min PRN **OR** glucose (Glutose) 40% oral gel 15 g, 15 g, Oral, Q15 Min PRN **OR** glucose-vitamin C (Dex-4) chewable tab 4 tablet, 4 tablet, Oral, Q15 Min PRN **OR** dextrose 50% injection 50 mL, 50 mL, Intravenous, Q15 Min PRN **OR** glucose (Dex4) 15 GM/59ML oral liquid 30 g, 30 g, Oral, Q15 Min PRN **OR** glucose (Glutose) 40% oral gel 30 g, 30 g, Oral, Q15 Min PRN **OR** glucose-vitamin C (Dex-4) chewable tab 8 tablet, 8 tablet, Oral, Q15 Min PRN    melatonin tab 3 mg, 3 mg, Oral, Nightly PRN    ondansetron (Zofran) 4 MG/2ML injection 4 mg, 4 mg, Intravenous, Q6H PRN    metoclopramide (Reglan) 5 mg/mL injection 5 mg, 5 mg, Intravenous, Q8H PRN    heparin (Porcine) 5000 UNIT/ML injection 5,000 Units, 5,000 Units, Subcutaneous, Q8H JOAQUINA    acetaminophen (Tylenol Extra Strength) tab 500 mg, 500 mg, Oral, Q4H PRN    polyethylene glycol (PEG 3350) (Miralax) 17 g oral packet 17 g, 17 g, Oral, Daily PRN    sennosides (Senokot) tab 17.2 mg, 17.2 mg, Oral, Nightly PRN    bisacodyl (Dulcolax) 10 MG rectal suppository 10 mg, 10 mg, Rectal, Daily PRN    benzonatate (Tessalon) cap 200 mg, 200 mg, Oral, TID PRN    guaiFENesin (Robitussin) 100 MG/5 ML oral liquid 200 mg, 200 mg, Oral, Q4H PRN     [START ON 11/21/2024] cefTRIAXone (Rocephin) 2 g in sodium chloride 0.9% 100 mL IVPB-ADDV, 2 g, Intravenous, Q24H    [START ON 11/21/2024] azithromycin (Zithromax) 500 mg in sodium chloride 0.9% 250mL IVPB premix, 500 mg, Intravenous, Q24H    [Held by provider] eplerenone (Inspra) tab 12.5 mg, 12.5 mg, Oral, Daily    acyclovir (Zovirax) tab 400 mg, 400 mg, Oral, BID    sodium bicarbonate tab 650 mg, 650 mg, Oral, BID    sodium bicarbonate 150 mEq in dextrose 5% 1,000 mL infusion, 150 mEq, Intravenous, Continuous    potassium chloride (Klor-Con M20) tab 40 mEq, 40 mEq, Oral, Once  Home Medications:  No current outpatient medications on file.       Review of Systems:  See HPI; A total of 12 systems reviewed and otherwise unremarkable.    Physical Exam:  Vital signs: Blood pressure 101/46, pulse 77, temperature 97.7 °F (36.5 °C), temperature source Oral, resp. rate 18, weight 232 lb 14.4 oz (105.6 kg), SpO2 100%.  General: No acute distress. Alert and oriented ; generally weak  HEENT: Moist mucous membranes. EOM-I. PERRL  Neck: No lymphadenopathy.  No JVD. No carotid bruits.  Respiratory: Clear to auscultation bilaterally.  No wheezes. No rhonchi.  Cardiovascular: S1, S2.  Regular rate and rhythm.  No murmurs. Equal pulses   Abdomen: Soft, nontender, nondistended.  Positive bowel sounds. No rebound tenderness   Neurologic: No focal neurological deficits.   Musculoskeletal: Full range of motion of all extremities.  No swelling noted.   Integument: No lesions. No erythema.  Psychiatric: Appropriate mood and affect.    Laboratory:  Lab Results   Component Value Date    WBC 11.9 11/19/2024    HGB 7.6 11/19/2024    HCT 22.9 11/19/2024    PLT 94.0 11/19/2024    CREATSERUM 2.47 11/19/2024    BUN 27 11/19/2024     11/19/2024    K 3.6 11/19/2024     11/19/2024    CO2 16.0 11/19/2024     11/19/2024    CA 8.6 11/19/2024    ALB 4.2 11/19/2024    ALKPHO 76 11/19/2024    BILT 1.1 11/19/2024    TP 6.3 11/19/2024     AST 16 11/19/2024    ALT 14 11/19/2024    PTT 22.3 11/19/2024    INR 1.19 11/19/2024    PTP 15.2 11/19/2024    PGLU 213 11/20/2024     Lab Results   Component Value Date    COLORUR Light-Yellow 11/20/2024    CLARITY Turbid 11/20/2024    SPECGRAVITY 1.009 11/20/2024    GLUUR >1000 11/20/2024    BILUR Negative 11/20/2024    KETUR Trace 11/20/2024    BLOODURINE 2+ 11/20/2024    PHURINE 5.5 11/20/2024    PROUR 70 11/20/2024    UROBILINOGEN Normal 11/20/2024    NITRITE 2+ 11/20/2024    LEUUR 250 11/20/2024       BUN (mg/dL)   Date Value   11/19/2024 27 (H)   11/13/2024 34 (H)   10/25/2024 40 (H)   05/21/2014 21 (H)     CREATININE (mg/dL)   Date Value   05/21/2014 0.75     Creatinine (mg/dL)   Date Value   11/19/2024 2.47 (H)   11/13/2024 2.58 (H)   10/25/2024 3.27 (H)         Imaging:  Reviewd     Impression:  CKD- stg IV; hx of myeloma kidney ; baseline r ~ 2-2.5 as of recent labs.   Cr @ baseline. He is mildly volume depleted at present time  - continue fluids  - monitor labs  Relapsed Myeloma ; currently w/ treatment plan through [facility]- consult oncology  Anemia - multifactorial; monitor; transfuse prn for hgb<7  Weakness- w/ recurrent UTI- pt w/ noted pyuria +/- ? PNA; on abx; monitor - f/u cultures; plan to hold SGLT2i for now   Acidosis- provide bicarbonate; on infusion ;+ continue oral bicarbonate  Hx o CHF- currently compensated ; holding diuretics  DM       Thank you for allowing me to participate in this patient's care.  Please feel free to call me with any questions or concerns.    Francisco Silva MD  11/20/2024  2:29 PM         [1]   Allergies  Allergen Reactions    Pcn [Penicillins] ANAPHYLAXIS, HIVES, HALLUCINATION and SHORTNESS OF BREATH     Respiratory distress    Beta Adrenergic Blockers     Latex      Surgical tape  Welts, burning of skin, itching    Statins

## 2024-11-20 NOTE — ED INITIAL ASSESSMENT (HPI)
Pt arrived via EMS with c/o weakness. Pt slid out of bed and did not have enough strength in his legs to get himself up. Pt had increased respirations on EMS arrival.     Pt was able to walk in and out of doctors offices this afternoon. Pt getting t-cell treatment for multiple myeloma.

## 2024-11-20 NOTE — PLAN OF CARE
NURSING ADMISSION NOTE      Patient admitted via cart.  Oriented to room.  Safety precautions initiated.  Bed in low position.  Call light in reach.    Admitted patient from ED due to generalized weakness. CXR showed Community acquired Pneumonia. Patient has Hx of Multiple Myeloma, currently on T-cell treatment in [place]. Alert and oriented x4. Afebrile. Room air with sats 92-95%. No complaints of pain. Denies chest pain. No nausea and vomiting. On IV antibiotics for Pneumonia. Fall precautions in place. Needs attended.     Received admitting orders from Dr. Bonilla.

## 2024-11-20 NOTE — H&P
[facility]     Nacho Valle Patient Status:  Emergency    1950 MRN [id number]   [facility] ED EMERGENCY DEPARTMENT Attending Dae Norris MD   Hosp Day # 0 PCP Dagoberto Bell MD     Chief Complaint: Weakness    Subjective:    History of Present Illness:     Nacho Valle is a 74 year old male with multiple myeloma, BPH, chronic heart failure with preserved ejection fraction, type 2 diabetes, hypertension, hyperlipidemia, CKD stage 3, anemia presents to the emergency room with increased weakness that came on this evening.  Patient was able to go to the doctor's office earlier in the day but became extremely weak this evening.  Patient slid down to the ground.  Patient did not injure himself or pass out.  Patient said he was feeling some shortness of breath.  No fevers, chills, cough, nausea, vomiting, diarrhea or constipation.  No chest pain, palpitations.    History/Other:    Past Medical History:  Past Medical History:    Allergic rhinitis, cause unspecified    BCC (basal cell carcinoma of skin)    s/p surgical excision    BPH (benign prostatic hyperplasia)    Colon polyps    Generalized osteoarthrosis, involving hand    Herniation of intervertebral disc between L4 and L5    s/p surgical repair    High blood pressure    High cholesterol    Impotence of organic origin    Morbid obesity (HCC)    Osteoarthritis    Osteoarthrosis, unspecified whether generalized or localized, lower leg    Log Date: 2012     Other and unspecified hyperlipidemia    Pneumonia due to organism    Prostate cancer (HCC)    s/p total prostatectomy and radiation    Trigger finger (acquired)    Type II or unspecified type diabetes mellitus without mention of complication, not stated as uncontrolled    Unspecified essential hypertension    Unspecified internal derangement of knee    Log Date: 2012      Past Surgical History:   Past Surgical History:   Procedure Laterality Date     Back surgery  1/2001    lower back L4 L5 for herniated disc    Colonoscopy      202, 2012 Dr. Wolf, polyps    Other surgical history  1/31/2007    total prostatectomy    Skin surgery  1997    [facility]      Family History:   Family History   Problem Relation Age of Onset    Diabetes Other         family hx    Hypertension Other         family hx    Prostate Cancer Father      Social History:    reports that he has never smoked. He has never used smokeless tobacco. He reports that he does not drink alcohol and does not use drugs.     Allergies: Allergies[1]    Medications:  Medications Ordered Prior to Encounter[2]    Review of Systems:   A comprehensive review of systems was completed.    Pertinent positives and negatives noted in the HPI.    Objective:   Physical Exam:    /60   Pulse 89   Temp 99.1 °F (37.3 °C) (Oral)   Resp 23   Wt 245 lb (111.1 kg)   SpO2 100%   BMI 33.22 kg/m²   General: No acute distress, Alert  Respiratory: No rhonchi, no wheezes  Cardiovascular: S1, S2. Regular rate and rhythm  Abdomen: Soft, Non-tender, non-distended, positive bowel sounds  Neuro: No new focal deficits  Extremities: No edema      Results:    Labs:      Labs Last 24 Hours:    Recent Labs   Lab 11/13/24  1332 11/19/24  2245   RBC 2.48* 2.12*   HGB 8.7* 7.6*   HCT 27.2* 22.9*   .7* 108.0*   MCH 35.1* 35.8*   MCHC 32.0 33.2   RDW 14.0 14.9   NEPRELIM 10.28* 11.12*   WBC 11.6* 11.9*   .0 94.0*       Recent Labs   Lab 11/13/24  1332 11/19/24  2245   * 207*   BUN 34* 27*   CREATSERUM 2.58* 2.47*   EGFRCR 25* 26*   CA 7.9* 8.6*   ALB  --  4.2    137   K 4.0 3.6    108   CO2 20.0* 16.0*   ALKPHO  --  76   AST  --  16   ALT  --  14   BILT  --  1.1   TP  --  6.3       Lab Results   Component Value Date    INR 1.19 11/19/2024    INR 1.17 (H) 07/21/2016    INR 1.05 07/19/2016       Recent Labs   Lab 11/19/24  2245   TROPHS 18       No results for input(s): \"TROP\", \"PBNP\" in the last 168  hours.    No results for input(s): \"PCT\" in the last 168 hours.    Imaging: Imaging data reviewed in Epic.    Assessment & Plan:      # Pneumonia  - Will continue on ceftriaxone and azithromycin  - Cultures pending.    # UTI  -Will continue on antibiotics   -urine culture pending      # Multiple myloma  - patient is on salvage chemo    # Type 2 diabetes  -Will place on hyperglycemia protocol with correction factor insulin.    # Hyperlipidemia  - will continue on statin therapy.    # Chronic HFpEF  - Will continue on metoprolol and eplerenone    # CKD stage 3  - currently at baseline.    # Anemia  - secondary to multiple myloma and chemo  - Will monitor Hgb levels.       Plan of care discussed with patient at bedside.    Milo Bonilla, DO    Supplementary Documentation:     The 21st Century Cures Act makes medical notes like these available to patients in the interest of transparency. Please be advised this is a medical document. Medical documents are intended to carry relevant information, facts as evident, and the clinical opinion of the practitioner. The medical note is intended as peer to peer communication and may appear blunt or direct. It is written in medical language and may contain abbreviations or verbiage that are unfamiliar.                                     [1]   Allergies  Allergen Reactions    Pcn [Penicillins] ANAPHYLAXIS, HIVES, HALLUCINATION and SHORTNESS OF BREATH     Respiratory distress    Beta Adrenergic Blockers     Latex      Surgical tape  Welts, burning of skin, itching    Statins    [2]   Current Facility-Administered Medications on File Prior to Encounter   Medication Dose Route Frequency Provider Last Rate Last Admin    [COMPLETED] sodium chloride 0.9% infusion   Intravenous Once Ruiz Frias MD   Stopped at 10/25/24 1332    [COMPLETED] carfilzomib (Kyprolis) 160 mg in dextrose 5% 180 mL infusion  70 mg/m2 (Treatment Plan Recorded) Intravenous Once Ruiz Frias MD    Stopped at 10/11/24 1343    [COMPLETED] zoledronic acid (Zometa) 3 mg in sodium chloride 0.9% 103.75 mL IVPB  3 mg Intravenous Once Ruiz Frias MD   Stopped at 10/11/24 1310    [COMPLETED] carfilzomib (Kyprolis) 160 mg in dextrose 5% 180 mL infusion  70 mg/m2 (Treatment Plan Recorded) Intravenous Once Ruiz Frias MD   Stopped at 10/04/24 1358    [COMPLETED] carfilzomib (Kyprolis) 160 mg in dextrose 5% 180 mL infusion  70 mg/m2 (Treatment Plan Recorded) Intravenous Once Ruiz Frias MD   Stopped at 09/27/24 1202    [COMPLETED] daratumumab-hyaluronidase-fihj (Darzalex Faspro) 1800-99844 MG-UT/15ML injection 15 mL  15 mL Subcutaneous Once Ruiz Frias MD   15 mL at 09/27/24 1132    [COMPLETED] zoledronic acid (Zometa) 3.304 mg in sodium chloride 0.9% 104.13 mL IVPB  3.304 mg Intravenous Once Ruiz Frias MD   Stopped at 09/13/24 1147    [COMPLETED] carfilzomib (Kyprolis) 160 mg in dextrose 5% 180 mL infusion  70 mg/m2 (Treatment Plan Recorded) Intravenous Once Janie Adan APRN   Stopped at 09/13/24 1226    [COMPLETED] carfilzomib (Kyprolis) 160 mg in dextrose 5% 180 mL infusion  70 mg/m2 (Treatment Plan Recorded) Intravenous Once Janie Adan APRN   Stopped at 09/06/24 1307    [COMPLETED] carfilzomib (Kyprolis) 160 mg in dextrose 5% 180 mL infusion  70 mg/m2 (Treatment Plan Recorded) Intravenous Once Janie Adan APRN   Stopped at 08/30/24 1249    [COMPLETED] daratumumab-hyaluronidase-fihj (Darzalex Faspro) 1800-69703 MG-UT/15ML injection 15 mL  15 mL Subcutaneous Once Janie Adan APRN   15 mL at 08/30/24 1211     Current Outpatient Medications on File Prior to Encounter   Medication Sig Dispense Refill    metoprolol succinate ER 50 MG Oral Tablet 24 Hr Take 1 tablet (50 mg total) by mouth daily.      sodium bicarbonate 650 MG Oral Tab Take 1 tablet (650 mg total) by mouth 2 (two) times daily. 60 tablet 3    cephALEXin 500 MG Oral Cap Take 1 capsule (500 mg total) by mouth.  Directed use for 7 days for UTI      ketorolac 0.5 % Ophthalmic Solution INSTILL 1 DROP IN BOTH EYES FOUR TIMES DAILY AS NEEDED      ATORVASTATIN 20 MG Oral Tab TAKE 1 TABLET(20 MG) BY MOUTH DAILY 90 tablet 3    acyclovir 400 MG Oral Tab Take 1 tablet (400 mg total) by mouth 2 (two) times daily. 180 tablet 1    dexamethasone (DECADRON) 4 MG tablet TAKE 3 TABLETS(12 MG) BY MOUTH 1 TIME EVERY WEEK 36 tablet 3    benzonatate 200 MG Oral Cap Take 1 capsule (200 mg total) by mouth 3 (three) times daily as needed for cough. 30 capsule 0    cyclobenzaprine 10 MG Oral Tab TAKE 1 TABLET(10 MG) BY MOUTH THREE TIMES DAILY AS NEEDED FOR MUSCLE SPASMS (Patient not taking: Reported on 11/19/2024) 10 tablet 1    ONETOUCH ULTRASOFT LANCETS Does not apply Misc TEST BLOOD SUGAR TWICE DAILY 200 each 2    ENTRESTO  MG Oral Tab Take 1 tablet by mouth daily.      empagliflozin 10 MG Oral Tab Take 1 tablet (10 mg total) by mouth daily.      Glucose Blood (ONETOUCH ULTRA) In Vitro Strip CHECK SUGARS THREE TIMES DAILY AS DIRECTED 300 strip 2    eplerenone 25 MG Oral Tab Take 1 tablet (25 mg total) by mouth daily.      omega-3 fatty acids 1000 MG Oral Cap Take 1,000 mg by mouth 2 (two) times daily. (Patient not taking: Reported on 11/19/2024)      Multiple Vitamin Oral Tab Take 1 tablet by mouth.      acetaminophen 500 MG Oral Tab Take 2 tablets (1,000 mg total) by mouth nightly.      aspirin (ASPIR-81) 81 MG Oral Tab EC Take 1 tablet by mouth daily. 1 tablet 0

## 2024-11-21 LAB
ANION GAP SERPL CALC-SCNC: 9 MMOL/L (ref 0–18)
BASOPHILS # BLD AUTO: 0.01 X10(3) UL (ref 0–0.2)
BASOPHILS NFR BLD AUTO: 0.1 %
BUN BLD-MCNC: 29 MG/DL (ref 9–23)
CALCIUM BLD-MCNC: 8.3 MG/DL (ref 8.7–10.4)
CHLORIDE SERPL-SCNC: 109 MMOL/L (ref 98–112)
CO2 SERPL-SCNC: 21 MMOL/L (ref 21–32)
CREAT BLD-MCNC: 2.43 MG/DL
EGFRCR SERPLBLD CKD-EPI 2021: 27 ML/MIN/1.73M2 (ref 60–?)
EOSINOPHIL # BLD AUTO: 0.01 X10(3) UL (ref 0–0.7)
EOSINOPHIL NFR BLD AUTO: 0.1 %
ERYTHROCYTE [DISTWIDTH] IN BLOOD BY AUTOMATED COUNT: 15.5 %
GLUCOSE BLD-MCNC: 126 MG/DL (ref 70–99)
GLUCOSE BLD-MCNC: 134 MG/DL (ref 70–99)
GLUCOSE BLD-MCNC: 144 MG/DL (ref 70–99)
GLUCOSE BLD-MCNC: 147 MG/DL (ref 70–99)
GLUCOSE BLD-MCNC: 151 MG/DL (ref 70–99)
HCT VFR BLD AUTO: 20.9 %
HGB BLD-MCNC: 6.7 G/DL
HGB BLD-MCNC: 7.8 G/DL
IMM GRANULOCYTES # BLD AUTO: 0.05 X10(3) UL (ref 0–1)
IMM GRANULOCYTES NFR BLD: 0.6 %
LYMPHOCYTES # BLD AUTO: 0.18 X10(3) UL (ref 1–4)
LYMPHOCYTES NFR BLD AUTO: 2.1 %
MCH RBC QN AUTO: 35.4 PG (ref 26–34)
MCHC RBC AUTO-ENTMCNC: 32.1 G/DL (ref 31–37)
MCV RBC AUTO: 110.6 FL
MONOCYTES # BLD AUTO: 0.68 X10(3) UL (ref 0.1–1)
MONOCYTES NFR BLD AUTO: 8 %
NEUTROPHILS # BLD AUTO: 7.52 X10 (3) UL (ref 1.5–7.7)
NEUTROPHILS # BLD AUTO: 7.52 X10(3) UL (ref 1.5–7.7)
NEUTROPHILS NFR BLD AUTO: 89.1 %
OSMOLALITY SERPL CALC.SUM OF ELEC: 297 MOSM/KG (ref 275–295)
PLATELET # BLD AUTO: 94 10(3)UL (ref 150–450)
POTASSIUM SERPL-SCNC: 3.2 MMOL/L (ref 3.5–5.1)
POTASSIUM SERPL-SCNC: 3.3 MMOL/L (ref 3.5–5.1)
RBC # BLD AUTO: 1.89 X10(6)UL
SODIUM SERPL-SCNC: 139 MMOL/L (ref 136–145)
WBC # BLD AUTO: 8.5 X10(3) UL (ref 4–11)

## 2024-11-21 PROCEDURE — 99232 SBSQ HOSP IP/OBS MODERATE 35: CPT | Performed by: INTERNAL MEDICINE

## 2024-11-21 PROCEDURE — 99233 SBSQ HOSP IP/OBS HIGH 50: CPT | Performed by: HOSPITALIST

## 2024-11-21 PROCEDURE — 99497 ADVNCD CARE PLAN 30 MIN: CPT | Performed by: HOSPITALIST

## 2024-11-21 PROCEDURE — 30233N1 TRANSFUSION OF NONAUTOLOGOUS RED BLOOD CELLS INTO PERIPHERAL VEIN, PERCUTANEOUS APPROACH: ICD-10-PCS | Performed by: HOSPITALIST

## 2024-11-21 RX ORDER — POTASSIUM CHLORIDE 1500 MG/1
40 TABLET, EXTENDED RELEASE ORAL ONCE
Status: COMPLETED | OUTPATIENT
Start: 2024-11-21 | End: 2024-11-21

## 2024-11-21 RX ORDER — SODIUM CHLORIDE 9 MG/ML
INJECTION, SOLUTION INTRAVENOUS ONCE
Status: COMPLETED | OUTPATIENT
Start: 2024-11-21 | End: 2024-11-21

## 2024-11-21 RX ORDER — ACETAMINOPHEN 325 MG/1
650 TABLET ORAL ONCE
Status: DISCONTINUED | OUTPATIENT
Start: 2024-11-21 | End: 2024-11-23

## 2024-11-21 NOTE — PHYSICAL THERAPY NOTE
PHYSICAL THERAPY EVALUATION - INPATIENT     Room Number: 414/414-A  Evaluation Date: 11/21/2024  Type of Evaluation: Initial  Physician Order: PT Eval and Treat    Presenting Problem: weakness  Co-Morbidities : h/o MM  Reason for Therapy: Mobility Dysfunction and Discharge Planning    PHYSICAL THERAPY ASSESSMENT   Patient is a 74 year old male admitted 11/19/2024 for weakness.  Prior to admission, patient's baseline is ambulatory with RW.  Patient is currently functioning near baseline with bed mobility, transfers, and gait.  Patient is requiring minimal assist as a result of the following impairments: impaired dynamic balance.  Physical Therapy will continue to follow for duration of hospitalization.    Patient will benefit from continued skilled PT Services at discharge to promote prior level of function and safety with additional support and return home with OP PT.    PLAN DURING HOSPITALIZATION  Nursing Mobility Recommendation : 1 Assist     PT Treatment Plan: Bed mobility;Body mechanics;Endurance;Energy conservation;Patient education;Family education;Gait training;Range of motion;Strengthening;Stair training;Transfer training;Balance training  Rehab Potential : Good  Frequency (Obs):  (2-3x/week)     CURRENT GOALS    Goal #1 Patient is able to demonstrate supine - sit EOB @ level: modified independent     Goal #2 Patient is able to demonstrate transfers Sit to/from Stand at assistance level: modified independent     Goal #3 Patient is able to ambulate 200 feet with assist device: walker - rolling at assistance level: supervision     Goal #4 Patient will negotiate 1 flight of stairs with supervision to ensure safety at MI.   Goal #5    Goal #6    Goal Comments: Goals established on 11/21/2024      PHYSICAL THERAPY MEDICAL/SOCIAL HISTORY  History related to current admission: Patient is a 74 year old male admitted on 11/19/2024 from home for weakness.  Pt diagnosed with possible UTI and PNA, .    HOME  SITUATION  Type of Home: House  Home Layout: Two level                     Lives With: Spouse               Prior Level of [place]: Pt reports feeling getting stronger at home PTA.  Pt was ambulating with RW in home, cane at times.      SUBJECTIVE  \"I feel okay.\"     OBJECTIVE  Precautions: Bed/chair alarm  Fall Risk: High fall risk    WEIGHT BEARING RESTRICTION     PAIN ASSESSMENT  Ratin          COGNITION  Following Commands:  follows all commands and directions without difficulty    RANGE OF MOTION AND STRENGTH ASSESSMENT  Upper extremity ROM and strength are within functional limits     Lower extremity ROM is within functional limits     Lower extremity strength is within functional limits     BALANCE  Static Sitting: Good  Dynamic Sitting: Good  Static Standing: Fair -  Dynamic Standing: Poor +    ADDITIONAL TESTS                                    ACTIVITY TOLERANCE                         O2 WALK       NEUROLOGICAL FINDINGS                        AM-PAC '6-Clicks' INPATIENT SHORT FORM - BASIC MOBILITY  How much difficulty does the patient currently have...  Patient Difficulty: Turning over in bed (including adjusting bedclothes, sheets and blankets)?: A Little   Patient Difficulty: Sitting down on and standing up from a chair with arms (e.g., wheelchair, bedside commode, etc.): A Little   Patient Difficulty: Moving from lying on back to sitting on the side of the bed?: A Little   How much help from another person does the patient currently need...   Help from Another: Moving to and from a bed to a chair (including a wheelchair)?: A Little   Help from Another: Need to walk in hospital room?: A Little   Help from Another: Climbing 3-5 steps with a railing?: A Little     AM-PAC Score:  Raw Score: 18   Approx Degree of Impairment: 46.58%   Standardized Score (AM-PAC Scale): 43.63   CMS Modifier (G-Code): CK    FUNCTIONAL ABILITY STATUS  Gait Assessment   Functional Mobility/Gait Assessment  Gait  Assistance: Minimum assistance  Distance (ft): 120  Assistive Device: Rolling walker  Pattern:  (R lean at times)    Skilled Therapy Provided     Bed Mobility:  Rolling: supervision  Supine to sit: supervision   Sit to supine: NT     Transfer Mobility:  Sit to stand: CGA   Stand to sit: CGA  Gait = Ambulation completed as above.    Therapist's Comments: Pt encouraged to be OOB to chair, to ambulate frequently with staff.      Exercise/Education Provided:  Bed mobility  Body mechanics  Functional activity tolerated  Gait training  Transfer training    Patient End of Session: Up in chair;Needs met;Call light within reach;RN aware of session/findings;All patient questions and concerns addressed;Hospital anti-slip socks;Family present      Patient Evaluation Complexity Level:  History Moderate - 1 or 2 personal factors and/or co-morbidities   Examination of body systems Moderate - addressing a total of 3 or more elements   Clinical Presentation  Moderate - Evolving   Clinical Decision Making Moderate Complexity       PT Session Time: 20 minutes    Therapeutic Activity: 12 minutes

## 2024-11-21 NOTE — PROGRESS NOTES
[facility]  Progress Note    Nacho Valle Patient Status:  Observation    1950 MRN [id number]   [facility] 4NW-A Attending Teresita Manuel,    Hosp Day # 0 PCP Dagoberto Bell MD        Looks much better today  Sitting up ; eating breakfast      Current Facility-Administered Medications:     sodium chloride 0.9% infusion, , Intravenous, Once    acetaminophen (Tylenol) tab 650 mg, 650 mg, Oral, Once    atorvastatin (Lipitor) tab 20 mg, 20 mg, Oral, Daily    benzonatate (Tessalon) cap 200 mg, 200 mg, Oral, TID PRN    metoprolol succinate ER (Toprol XL) 24 hr tab 50 mg, 50 mg, Oral, Daily Beta Blocker    glucose (Dex4) 15 GM/59ML oral liquid 15 g, 15 g, Oral, Q15 Min PRN **OR** glucose (Glutose) 40% oral gel 15 g, 15 g, Oral, Q15 Min PRN **OR** glucose-vitamin C (Dex-4) chewable tab 4 tablet, 4 tablet, Oral, Q15 Min PRN **OR** dextrose 50% injection 50 mL, 50 mL, Intravenous, Q15 Min PRN **OR** glucose (Dex4) 15 GM/59ML oral liquid 30 g, 30 g, Oral, Q15 Min PRN **OR** glucose (Glutose) 40% oral gel 30 g, 30 g, Oral, Q15 Min PRN **OR** glucose-vitamin C (Dex-4) chewable tab 8 tablet, 8 tablet, Oral, Q15 Min PRN    melatonin tab 3 mg, 3 mg, Oral, Nightly PRN    ondansetron (Zofran) 4 MG/2ML injection 4 mg, 4 mg, Intravenous, Q6H PRN    metoclopramide (Reglan) 5 mg/mL injection 5 mg, 5 mg, Intravenous, Q8H PRN    heparin (Porcine) 5000 UNIT/ML injection 5,000 Units, 5,000 Units, Subcutaneous, Q8H JOAQUINA    polyethylene glycol (PEG 3350) (Miralax) 17 g oral packet 17 g, 17 g, Oral, Daily PRN    sennosides (Senokot) tab 17.2 mg, 17.2 mg, Oral, Nightly PRN    bisacodyl (Dulcolax) 10 MG rectal suppository 10 mg, 10 mg, Rectal, Daily PRN    benzonatate (Tessalon) cap 200 mg, 200 mg, Oral, TID PRN    guaiFENesin (Robitussin) 100 MG/5 ML oral liquid 200 mg, 200 mg, Oral, Q4H PRN    cefTRIAXone (Rocephin) 2 g in sodium chloride 0.9% 100 mL IVPB-ADDV, 2 g, Intravenous, Q24H    azithromycin (Zithromax) 500 mg in  sodium chloride 0.9% 250mL IVPB premix, 500 mg, Intravenous, Q24H    [Held by provider] eplerenone (Inspra) tab 12.5 mg, 12.5 mg, Oral, Daily    acyclovir (Zovirax) tab 400 mg, 400 mg, Oral, BID    sodium bicarbonate tab 650 mg, 650 mg, Oral, BID    sodium bicarbonate 150 mEq in dextrose 5% 1,000 mL infusion, 150 mEq, Intravenous, Continuous    acetaminophen (Tylenol Extra Strength) tab 1,000 mg, 1,000 mg, Oral, Q6H PRN       Physical Exam:  Vital signs: Blood pressure 111/54, pulse 63, temperature 97.9 °F (36.6 °C), temperature source Oral, resp. rate 16, weight 232 lb 14.4 oz (105.6 kg), SpO2 96%.  General: No acute distress. Alert and oriented x3  HEENT: Moist mucous membranes. EOM-I. PERRL  Neck: No lymphadenopathy.  No JVD. No carotid bruits.  Respiratory: Clear to auscultation bilaterally.  No wheezes. No rhonchi.  Cardiovascular: S1, S2.  Regular rate and rhythm.  No murmurs. Equal pulses   Abdomen: Soft, nontender, nondistended.  Positive bowel sounds. No rebound tenderness   Neurologic: No focal neurological deficits.   Musculoskeletal: Full range of motion of all extremities.  No swelling noted.   Integument: No lesions. No erythema.  Psychiatric: Appropriate mood and affect.    Recent Labs     11/19/24 2245 11/21/24  0607   WBC 11.9* 8.5   HGB 7.6* 6.7*   .0* 110.6*   PLT 94.0* 94.0*   INR 1.19  --        Recent Labs     11/19/24 2245 11/21/24  0608    139   K 3.6 3.3*    109   CO2 16.0* 21.0   BUN 27* 29*   CREATSERUM 2.47* 2.43*   CA 8.6* 8.3*       Recent Labs     11/19/24 2245   ALT 14   AST 16   ALB 4.2     Reviewd     Impression:  CKD- stg IV; hx of myeloma kidney ; baseline r ~ 2-2.5 as of recent labs.   Cr @ baseline. He is mildly volume depleted at present time  - HLIV  Relapsed Myeloma ; currently w/ treatment plan through [facility]- oncology following  Anemia - multifactorial; monitor; transfuse prn for hgb<7 (transfuse today)  Weakness- w/ recurrent UTI- pt w/ noted pyuria +/-  ? PNA; on abx; monitor - f/u cultures; plan to hold SGLT2i for now   Acidosis-continue oral bicarbonate   Hx o CHF- currently compensated ; holding diuretics  DM       Thank you for allowing me to participate in this patient's care.  Please feel free to call me with any questions or concerns.    Francisco Silva MD  11/21/2024

## 2024-11-21 NOTE — PLAN OF CARE
Received pt a/o x4. VSS. Afebrile. Reporting severe generalized pain, prn tylenol given w/ relief. Medication admin per MAR. IVF infusing. Voiding w/ urinal. PT/OT to see. Call light within reach. Safety precautions in place.     Problem: GASTROINTESTINAL - ADULT  Goal: Maintains adequate nutritional intake (undernourished)  Description: INTERVENTIONS:  - Monitor percentage of each meal consumed  - Identify factors contributing to decreased intake, treat as appropriate  - Assist with meals as needed  - Monitor I&O, WT and lab values  - Obtain nutritional consult as needed  - Optimize oral hygiene and moisture  - Encourage food from home; allow for food preferences  - Enhance eating environment  Outcome: Progressing     Problem: MUSCULOSKELETAL - ADULT  Goal: Return mobility to safest level of function  Description: INTERVENTIONS:  - Assess patient stability and activity tolerance for standing, transferring and ambulating w/ or w/o assistive devices  - Assist with transfers and ambulation using safe patient handling equipment as needed  - Ensure adequate protection for wounds/incisions during mobilization  - Obtain PT/OT consults as needed  - Advance activity as appropriate  - Communicate ordered activity level and limitations with patient/family  Outcome: Progressing

## 2024-11-21 NOTE — PLAN OF CARE
Problem: GASTROINTESTINAL - ADULT  Goal: Maintains adequate nutritional intake (undernourished)  Description: INTERVENTIONS:  - Monitor percentage of each meal consumed  - Identify factors contributing to decreased intake, treat as appropriate  - Assist with meals as needed  - Monitor I&O, WT and lab values  - Obtain nutritional consult as needed  - Optimize oral hygiene and moisture  - Encourage food from home; allow for food preferences  - Enhance eating environment  Outcome: Progressing     Problem: METABOLIC/FLUID AND ELECTROLYTES - ADULT  Goal: Electrolytes maintained within normal limits  Description: INTERVENTIONS:  - Monitor labs and rhythm and assess patient for signs and symptoms of electrolyte imbalances  - Administer electrolyte replacement as ordered  - Monitor response to electrolyte replacements, including rhythm and repeat lab results as appropriate  - Fluid restriction as ordered  - Instruct patient on fluid and nutrition restrictions as appropriate  Outcome: Progressing  Goal: Hemodynamic stability and optimal renal function maintained  Description: INTERVENTIONS:  - Monitor labs and assess for signs and symptoms of volume excess or deficit  - Monitor intake, output and patient weight  - Monitor urine specific gravity, serum osmolarity and serum sodium as indicated or ordered  - Monitor response to interventions for patient's volume status, including labs, urine output, blood pressure (other measures as available)  - Encourage oral intake as appropriate  - Instruct patient on fluid and nutrition restrictions as appropriate  Outcome: Progressing     Problem: HEMATOLOGIC - ADULT  Goal: Maintains hematologic stability  Description: INTERVENTIONS  - Assess for signs and symptoms of bleeding or hemorrhage  - Monitor labs and vital signs for trends  - Administer supportive blood products/factors, fluids and medications as ordered and appropriate  - Administer supportive blood products/factors as  ordered and appropriate  Outcome: Not Progressing     Problem: MUSCULOSKELETAL - ADULT  Goal: Return mobility to safest level of function  Description: INTERVENTIONS:  - Assess patient stability and activity tolerance for standing, transferring and ambulating w/ or w/o assistive devices  - Assist with transfers and ambulation using safe patient handling equipment as needed  - Ensure adequate protection for wounds/incisions during mobilization  - Obtain PT/OT consults as needed  - Advance activity as appropriate  - Communicate ordered activity level and limitations with patient/family  Outcome: Progressing   Patient received a unit of blood this shift hgb now 7.8 patient and his family at bedside and explained the plan of care. Patient has been sitting up in the chair for meals appetite has been good. Patient declined dinner states he ate a big breakfast. Has been receiving tylenol for pain.

## 2024-11-21 NOTE — PROGRESS NOTES
[facility]   part of [facility]     Hospitalist Progress Note     Nacho Valle Patient Status:  Inpatient    1950 MRN [id number]   Location [facility] 4NW-A Attending Teresita Manuel, DO   Hosp Day # 0 PCP Dagoberto Bell MD     Chief Complaint: weakness    Subjective:     Patient states weakness remains but better. Spoke to wife, all questions answered.     Objective:    Review of Systems:   A comprehensive review of systems was completed; pertinent positive and negatives stated in subjective.    Vital signs:  Temp:  [97.5 °F (36.4 °C)-99.5 °F (37.5 °C)] 97.9 °F (36.6 °C)  Pulse:  [59-77] 63  Resp:  [16-20] 16  BP: ()/(45-54) 111/54  SpO2:  [96 %-100 %] 96 %    Physical Exam:    General: No acute distress  Respiratory: No wheezes, no rhonchi  Cardiovascular: S1, S2, regular rate and rhythm  Abdomen: Soft, Non-tender, non-distended, positive bowel sounds  Neuro: No new focal deficits.   Extremities: No edema      Diagnostic Data:    Labs:  Recent Labs   Lab 24  0607   WBC 11.9* 8.5   HGB 7.6* 6.7*   .0* 110.6*   PLT 94.0* 94.0*   INR 1.19  --        Recent Labs   Lab 24  0608   * 147*   BUN 27* 29*   CREATSERUM 2.47* 2.43*   CA 8.6* 8.3*   ALB 4.2  --     139   K 3.6 3.3*    109   CO2 16.0* 21.0   ALKPHO 76  --    AST 16  --    ALT 14  --    BILT 1.1  --    TP 6.3  --        Estimated Creatinine Clearance: 28.4 mL/min (A) (based on SCr of 2.43 mg/dL (H)).    Recent Labs   Lab 24   TROPHS 18       Recent Labs   Lab 24   PTP 15.2*   INR 1.19                  Microbiology    Hospital Encounter on 24   1. Urine Culture, Routine     Status: Abnormal (Preliminary result)    Collection Time: 24  1:11 AM    Specimen: Urine, clean catch   Result Value Ref Range    Urine Culture >100,000 CFU/ML Gram Negative Yovanny (A) N/A   2. Blood Culture     Status: None (Preliminary result)    Collection Time:  11/20/24 12:38 AM    Specimen: Blood,peripheral   Result Value Ref Range    Blood Culture Result No Growth 1 Day N/A         Imaging: Reviewed in Epic.    Medications:    sodium chloride   Intravenous Once    acetaminophen  650 mg Oral Once    atorvastatin  20 mg Oral Daily    metoprolol succinate ER  50 mg Oral Daily Beta Blocker    heparin  5,000 Units Subcutaneous Q8H JOAQUINA    cefTRIAXone  2 g Intravenous Q24H    azithromycin  500 mg Intravenous Q24H    [Held by provider] eplerenone  12.5 mg Oral Daily    acyclovir  400 mg Oral BID    sodium bicarbonate  650 mg Oral BID       Assessment & Plan:      #CAP  #Complicated UTI  -iv ceftraixone (11/21-)  Azith (11/21 x3 doses  -fu Ucx / Bcx     #Multiple Myeloma  #Acute on chronic anemia  -heme/onc following  -1u prbc today  -outpatient management at [facility], plan for possible CAR-T therapy soon    #Debility  -therapies to see    #Thrombocytopenia    #DM2  -A1c 5.8  -ISS    #HLD  =statin     #HFpEF    #CKD3  #NAGMA  -nephro following  -PTA: bicarb po  -bicarb gtt for now  renal following     #Hypokalemia  -monitor and follow    #ACP  -full code, 16 mins spent face to face w/ patient and patient's family at the bedside. This was a voluntary conversation. We reviewed terms like cardiac arrest and the use of acls/cpr. Order updated on epic.       Teresita Manuel DO    Supplementary Documentation:     Quality:  DVT Mechanical Prophylaxis:   SCDs,    DVT Pharmacologic Prophylaxis   Medication    heparin (Porcine) 5000 UNIT/ML injection 5,000 Units                Code Status: Prior  Galan: No urinary catheter in place  Galan Duration (in days):   Central line:    KEYON:     Discharge is dependent on: clinical improvement   At this point Mr. Valle is expected to be discharge to: tbd    The 21st Century Cures Act makes medical notes like these available to patients in the interest of transparency. Please be advised this is a medical document. Medical documents are intended to carry  relevant information, facts as evident, and the clinical opinion of the practitioner. The medical note is intended as peer to peer communication and may appear blunt or direct. It is written in medical language and may contain abbreviations or verbiage that are unfamiliar.              **Certification      PHYSICIAN Certification of Need for Inpatient Hospitalization - Initial Certification    Patient will require inpatient services that will reasonably be expected to span two midnight's based on the clinical documentation in H+P.   Based on patients current state of illness, I anticipate that, after discharge, patient will require TBD.

## 2024-11-21 NOTE — PROGRESS NOTES
Heme/Onc Progress Note - [facility]      Chief Complaint:    Follow up for evaluation and management of multiple myeloma with confusion.    Interim History:      He is less confused this morning. He was oriented to time and place this morning. This is much better than yesterday. He has chronic diffuse musculoskeletal pain. He has no other new complaints.    Physical Examination:    Vital Signs: /45 (BP Location: Left arm)   Pulse 65   Temp 97.5 °F (36.4 °C) (Oral)   Resp 20   Wt 105.6 kg (232 lb 14.4 oz)   SpO2 98%   BMI 31.58 kg/m²     General: Patient is alert and oriented x 3, not in acute distress.  Chest: Clear to auscultation. No rales and no wheezes.  Heart: Regular rate and rhythm.   Abdomen: Soft, non tender with good bowel sounds.  Extremities: No edema. Non-tender.  Skin:  Warm, dry.      Labs reviewed at this visit:     Recent Labs   Lab 11/19/24 2245 11/21/24  0607   RBC 2.12* 1.89*   HGB 7.6* 6.7*   HCT 22.9* 20.9*   .0* 110.6*   MCH 35.8* 35.4*   MCHC 33.2 32.1   RDW 14.9 15.5   NEPRELIM 11.12* 7.52   WBC 11.9* 8.5   PLT 94.0* 94.0*       Recent Labs   Lab 11/19/24 2245 11/21/24  0608   * 147*   BUN 27* 29*   CREATSERUM 2.47* 2.43*   CA 8.6* 8.3*   ALB 4.2  --     139   K 3.6 3.3*    109   CO2 16.0* 21.0   ALKPHO 76  --    AST 16  --    ALT 14  --    BILT 1.1  --    TP 6.3  --        Radiologic imaging reviewed at this visit:    CXR on 11/19/2024:  FINDINGS:       Chronic right-sided rib fractures.     Cardiac silhouette pulmonary vasculature are unremarkable.     Small left basilar opacity.  No pneumothorax.     Impression   CONCLUSION:  Small left basilar opacity may represent a developing pneumonia.       Impression/Plan:     Multiple myeloma:     His is s/p multiple regimens. He has had increasing kappa free light chain. He is being managed at [facility] for possible CART therapy. His mental status was better this morning. Continue management for infection with plans  for follow up at [place] [facility] after discharge.     Pneumonia/UTI:     Continue abx per primary service.      CKD stage III:     Nephrology consulted for management.     Anemia complicating neoplastic disease:     Worsening anemia without signs of blood loss. He has thrombocytopenia that is stable. Likely related to bone marrow failure with progressive myeloma and suppression from infection. Will give PRBC x 1 unit today. Repeat cbc in am.    Ruiz Frias MD  [facility]

## 2024-11-22 LAB
ANION GAP SERPL CALC-SCNC: 9 MMOL/L (ref 0–18)
BLOOD TYPE BARCODE: 1700
BUN BLD-MCNC: 26 MG/DL (ref 9–23)
CALCIUM BLD-MCNC: 8.3 MG/DL (ref 8.7–10.4)
CHLORIDE SERPL-SCNC: 109 MMOL/L (ref 98–112)
CO2 SERPL-SCNC: 22 MMOL/L (ref 21–32)
CREAT BLD-MCNC: 2.12 MG/DL
EGFRCR SERPLBLD CKD-EPI 2021: 32 ML/MIN/1.73M2 (ref 60–?)
ERYTHROCYTE [DISTWIDTH] IN BLOOD BY AUTOMATED COUNT: 18.2 %
GLUCOSE BLD-MCNC: 116 MG/DL (ref 70–99)
GLUCOSE BLD-MCNC: 128 MG/DL (ref 70–99)
GLUCOSE BLD-MCNC: 139 MG/DL (ref 70–99)
GLUCOSE BLD-MCNC: 140 MG/DL (ref 70–99)
GLUCOSE BLD-MCNC: 144 MG/DL (ref 70–99)
HCT VFR BLD AUTO: 22 %
HGB BLD-MCNC: 7.3 G/DL
MAGNESIUM SERPL-MCNC: 1.6 MG/DL (ref 1.6–2.6)
MCH RBC QN AUTO: 34 PG (ref 26–34)
MCHC RBC AUTO-ENTMCNC: 33.2 G/DL (ref 31–37)
MCV RBC AUTO: 102.3 FL
OSMOLALITY SERPL CALC.SUM OF ELEC: 296 MOSM/KG (ref 275–295)
PLATELET # BLD AUTO: 103 10(3)UL (ref 150–450)
PLATELETS.RETICULATED NFR BLD AUTO: 4.7 % (ref 0–7)
POTASSIUM SERPL-SCNC: 3.7 MMOL/L (ref 3.5–5.1)
RBC # BLD AUTO: 2.15 X10(6)UL
SODIUM SERPL-SCNC: 140 MMOL/L (ref 136–145)
UNIT VOLUME: 350 ML
WBC # BLD AUTO: 5.9 X10(3) UL (ref 4–11)

## 2024-11-22 PROCEDURE — 99233 SBSQ HOSP IP/OBS HIGH 50: CPT | Performed by: INTERNAL MEDICINE

## 2024-11-22 PROCEDURE — 99232 SBSQ HOSP IP/OBS MODERATE 35: CPT | Performed by: INTERNAL MEDICINE

## 2024-11-22 PROCEDURE — 99233 SBSQ HOSP IP/OBS HIGH 50: CPT | Performed by: HOSPITALIST

## 2024-11-22 NOTE — PROGRESS NOTES
[facility]  Progress Note    Nacho Valle Patient Status:  Observation    1950 MRN [id number]   [facility] 4NW-A Attending Teresita Manuel,    Hosp Day # 1 PCP Dagoberto Bell MD     No complains  Family @ bedside        Current Facility-Administered Medications:     acetaminophen (Tylenol) tab 650 mg, 650 mg, Oral, Once    atorvastatin (Lipitor) tab 20 mg, 20 mg, Oral, Daily    benzonatate (Tessalon) cap 200 mg, 200 mg, Oral, TID PRN    metoprolol succinate ER (Toprol XL) 24 hr tab 50 mg, 50 mg, Oral, Daily Beta Blocker    glucose (Dex4) 15 GM/59ML oral liquid 15 g, 15 g, Oral, Q15 Min PRN **OR** glucose (Glutose) 40% oral gel 15 g, 15 g, Oral, Q15 Min PRN **OR** glucose-vitamin C (Dex-4) chewable tab 4 tablet, 4 tablet, Oral, Q15 Min PRN **OR** dextrose 50% injection 50 mL, 50 mL, Intravenous, Q15 Min PRN **OR** glucose (Dex4) 15 GM/59ML oral liquid 30 g, 30 g, Oral, Q15 Min PRN **OR** glucose (Glutose) 40% oral gel 30 g, 30 g, Oral, Q15 Min PRN **OR** glucose-vitamin C (Dex-4) chewable tab 8 tablet, 8 tablet, Oral, Q15 Min PRN    melatonin tab 3 mg, 3 mg, Oral, Nightly PRN    ondansetron (Zofran) 4 MG/2ML injection 4 mg, 4 mg, Intravenous, Q6H PRN    metoclopramide (Reglan) 5 mg/mL injection 5 mg, 5 mg, Intravenous, Q8H PRN    heparin (Porcine) 5000 UNIT/ML injection 5,000 Units, 5,000 Units, Subcutaneous, Q8H JOAQUINA    polyethylene glycol (PEG 3350) (Miralax) 17 g oral packet 17 g, 17 g, Oral, Daily PRN    sennosides (Senokot) tab 17.2 mg, 17.2 mg, Oral, Nightly PRN    bisacodyl (Dulcolax) 10 MG rectal suppository 10 mg, 10 mg, Rectal, Daily PRN    benzonatate (Tessalon) cap 200 mg, 200 mg, Oral, TID PRN    guaiFENesin (Robitussin) 100 MG/5 ML oral liquid 200 mg, 200 mg, Oral, Q4H PRN    cefTRIAXone (Rocephin) 2 g in sodium chloride 0.9% 100 mL IVPB-ADDV, 2 g, Intravenous, Q24H    [Held by provider] eplerenone (Inspra) tab 12.5 mg, 12.5 mg, Oral, Daily    acyclovir (Zovirax) tab 400 mg, 400  mg, Oral, BID    sodium bicarbonate tab 650 mg, 650 mg, Oral, BID    acetaminophen (Tylenol Extra Strength) tab 1,000 mg, 1,000 mg, Oral, Q6H PRN       Physical Exam:  Vital signs: Blood pressure 114/48, pulse 68, temperature 98 °F (36.7 °C), temperature source Oral, resp. rate 20, weight 232 lb 14.4 oz (105.6 kg), SpO2 97%.  General: No acute distress. Alert and oriented x3  HEENT: Moist mucous membranes. EOM-I. PERRL  Neck: No lymphadenopathy.  No JVD. No carotid bruits.  Respiratory: Clear to auscultation bilaterally.  No wheezes. No rhonchi.  Cardiovascular: S1, S2.  Regular rate and rhythm.  No murmurs. Equal pulses   Abdomen: Soft, nontender, nondistended.  Positive bowel sounds. No rebound tenderness   Neurologic: No focal neurological deficits.   Musculoskeletal: Full range of motion of all extremities.  No swelling noted.   Integument: No lesions. No erythema.  Psychiatric: Appropriate mood and affect.    Recent Labs     11/19/24 2245 11/21/24  0607 11/21/24  1615 11/22/24  0557   WBC 11.9* 8.5  --  5.9   HGB 7.6* 6.7* 7.8* 7.3*   .0* 110.6*  --  102.3*   PLT 94.0* 94.0*  --  103.0*   INR 1.19  --   --   --        Recent Labs     11/19/24  2245 11/21/24  0608 11/21/24  1150 11/22/24  0557    139  --  140   K 3.6 3.3* 3.2* 3.7    109  --  109   CO2 16.0* 21.0  --  22.0   BUN 27* 29*  --  26*   CREATSERUM 2.47* 2.43*  --  2.12*   CA 8.6* 8.3*  --  8.3*   MG  --   --   --  1.6       Recent Labs     11/19/24 2245   ALT 14   AST 16   ALB 4.2       Reviewd     Impression:  CKD- stg IV; hx of myeloma kidney ; baseline r ~ 2-2.5 as of recent labs.   Cr @ baseline.  No further w/u  Relapsed Myeloma ; currently w/ treatment plan through [facility]- oncology following  Anemia - multifactorial; monitor; transfuse prn for hgb<7 (transfused)  Weakness- w/ recurrent UTI- pt w/ noted pyuria +/- ? PNA; on abx; monitor - f/u cultures; plan to hold SGLT2i for now (dc on discharge)  Acidosis-continue oral  bicarbonate   Hx o CHF- currently compensated ; holding diuretics  DM       Thank you for allowing me to participate in this patient's care.  Please feel free to call me with any questions or concerns.    Francisco Silva MD  11/22/2024

## 2024-11-22 NOTE — PLAN OF CARE
Problem: HEMATOLOGIC - ADULT  Goal: Maintains hematologic stability  Description: INTERVENTIONS  - Assess for signs and symptoms of bleeding or hemorrhage  - Monitor labs and vital signs for trends  - Administer supportive blood products/factors, fluids and medications as ordered and appropriate  - Administer supportive blood products/factors as ordered and appropriate  Outcome: Progressing     Problem: METABOLIC/FLUID AND ELECTROLYTES - ADULT  Goal: Electrolytes maintained within normal limits  Description: INTERVENTIONS:  - Monitor labs and rhythm and assess patient for signs and symptoms of electrolyte imbalances  - Administer electrolyte replacement as ordered  - Monitor response to electrolyte replacements, including rhythm and repeat lab results as appropriate  - Fluid restriction as ordered  - Instruct patient on fluid and nutrition restrictions as appropriate  Outcome: Progressing  Goal: Hemodynamic stability and optimal renal function maintained  Description: INTERVENTIONS:  - Monitor labs and assess for signs and symptoms of volume excess or deficit  - Monitor intake, output and patient weight  - Monitor urine specific gravity, serum osmolarity and serum sodium as indicated or ordered  - Monitor response to interventions for patient's volume status, including labs, urine output, blood pressure (other measures as available)  - Encourage oral intake as appropriate  - Instruct patient on fluid and nutrition restrictions as appropriate  Outcome: Progressing     Problem: GASTROINTESTINAL - ADULT  Goal: Maintains adequate nutritional intake (undernourished)  Description: INTERVENTIONS:  - Monitor percentage of each meal consumed  - Identify factors contributing to decreased intake, treat as appropriate  - Assist with meals as needed  - Monitor I&O, WT and lab values  - Obtain nutritional consult as needed  - Optimize oral hygiene and moisture  - Encourage food from home; allow for food preferences  - Enhance  eating environment  Outcome: Progressing     Problem: MUSCULOSKELETAL - ADULT  Goal: Return mobility to safest level of function  Description: INTERVENTIONS:  - Assess patient stability and activity tolerance for standing, transferring and ambulating w/ or w/o assistive devices  - Assist with transfers and ambulation using safe patient handling equipment as needed  - Ensure adequate protection for wounds/incisions during mobilization  - Obtain PT/OT consults as needed  - Advance activity as appropriate  - Communicate ordered activity level and limitations with patient/family  Outcome: Progressing   Patient has been alert and oriented x3 this shift. Patient has been up ambulating using the walker gait was steady. Appetite has been good eating 100%. States that his pain has been good today and no need for pain meds at this time.patient family at bedside explained the plan of care and questions answered.

## 2024-11-22 NOTE — PROGRESS NOTES
Heme/Onc Progress Note - [facility]      Chief Complaint:    Follow up for evaluation and management of multiple myeloma with confusion.    Interim History:      He is less confused this morning. He was oriented to time and place this morning. He has chronic diffuse musculoskeletal pain. He has no other new complaints.    Physical Examination:    Vital Signs: /57 (BP Location: Right arm)   Pulse 61   Temp 98.3 °F (36.8 °C) (Oral)   Resp 18   Wt 105.6 kg (232 lb 14.4 oz)   SpO2 96%   BMI 31.58 kg/m²     General: Patient is alert and oriented x 3, not in acute distress.  Chest: Clear to auscultation. No rales and no wheezes.  Heart: Regular rate and rhythm.   Abdomen: Soft, non tender with good bowel sounds.  Extremities: No edema. Non-tender.  Skin:  Warm, dry.      Labs reviewed at this visit:     Recent Labs   Lab 11/19/24 2245 11/21/24  0607 11/21/24  1615 11/22/24  0557   RBC 2.12* 1.89*  --  2.15*   HGB 7.6* 6.7* 7.8* 7.3*   HCT 22.9* 20.9*  --  22.0*   .0* 110.6*  --  102.3*   MCH 35.8* 35.4*  --  34.0   MCHC 33.2 32.1  --  33.2   RDW 14.9 15.5  --  18.2   NEPRELIM 11.12* 7.52  --   --    WBC 11.9* 8.5  --  5.9   PLT 94.0* 94.0*  --  103.0*       Recent Labs   Lab 11/19/24  2245 11/21/24  0608 11/21/24  1150 11/22/24  0557   * 147*  --  116*   BUN 27* 29*  --  26*   CREATSERUM 2.47* 2.43*  --  2.12*   CA 8.6* 8.3*  --  8.3*   ALB 4.2  --   --   --     139  --  140   K 3.6 3.3* 3.2* 3.7    109  --  109   CO2 16.0* 21.0  --  22.0   ALKPHO 76  --   --   --    AST 16  --   --   --    ALT 14  --   --   --    BILT 1.1  --   --   --    TP 6.3  --   --   --        Radiologic imaging reviewed at this visit:    CXR on 11/19/2024:  FINDINGS:       Chronic right-sided rib fractures.     Cardiac silhouette pulmonary vasculature are unremarkable.     Small left basilar opacity.  No pneumothorax.     Impression   CONCLUSION:  Small left basilar opacity may represent a developing pneumonia.        Impression/Plan:     Multiple myeloma:     His is s/p multiple regimens. He has had increasing kappa free light chain. He is being managed at [facility] for possible CART therapy. His mental status was better this morning. Continue management for infection with plans for follow up at [facility] after discharge.     Pneumonia/UTI:     Continue abx per primary service. He is improving with improved MS. He will continue IV abx today with plans for discharge on oral antibiotic tomorrow. I discussed the case with the patient, his daughter and Dr. Manuel at the visit this morning.     CKD stage III:     Nephrology consulted for management.     Anemia complicating neoplastic disease:     Worsening anemia without signs of blood loss. He has thrombocytopenia that is stable. Likely related to bone marrow failure with progressive myeloma and suppression from infection. Repeat CBC in am and transfuse if HGB less than 7.0.    Ruiz Frias MD  [facility]

## 2024-11-22 NOTE — PROGRESS NOTES
[facility]   part of Located within [facility]     Hospitalist Progress Note     Nacho Valle Patient Status:  Inpatient    1950 MRN [id number]   Location [facility] 4NW-A Attending Teresita Manuel, DO   Hosp Day # 1 PCP Dagoberto Bell MD     Chief Complaint: weakness    Subjective:     Pt seen w/ Dr. Frias and daughter at the bedside. Mentation improving, remains weak.     Objective:    Review of Systems:   A comprehensive review of systems was completed; pertinent positive and negatives stated in subjective.    Vital signs:  Temp:  [97.4 °F (36.3 °C)-98.6 °F (37 °C)] 98.1 °F (36.7 °C)  Pulse:  [63-78] 75  Resp:  [16-20] 18  BP: ()/(41-58) 117/41  SpO2:  [96 %-99 %] 99 %    Physical Exam:    General: No acute distress  Respiratory: No wheezes, no rhonchi  Cardiovascular: S1, S2, regular rate and rhythm  Abdomen: Soft, Non-tender, non-distended, positive bowel sounds  Neuro: No new focal deficits.   Extremities: No edema      Diagnostic Data:    Labs:  Recent Labs   Lab 245 24  0607 24  1615   WBC 11.9* 8.5  --    HGB 7.6* 6.7* 7.8*   .0* 110.6*  --    PLT 94.0* 94.0*  --    INR 1.19  --   --        Recent Labs   Lab 24  2245 24  0608 24  1150 24  0557   * 147*  --  116*   BUN 27* 29*  --  26*   CREATSERUM 2.47* 2.43*  --  2.12*   CA 8.6* 8.3*  --  8.3*   ALB 4.2  --   --   --     139  --  140   K 3.6 3.3* 3.2* 3.7    109  --  109   CO2 16.0* 21.0  --  22.0   ALKPHO 76  --   --   --    AST 16  --   --   --    ALT 14  --   --   --    BILT 1.1  --   --   --    TP 6.3  --   --   --        Estimated Creatinine Clearance: 32.5 mL/min (A) (based on SCr of 2.12 mg/dL (H)).    Recent Labs   Lab 24   TROPHS 18       Recent Labs   Lab 24   PTP 15.2*   INR 1.19                  Microbiology    Hospital Encounter on 24   1. Urine Culture, Routine     Status: Abnormal    Collection Time: 24  1:11 AM    Specimen:  Urine, clean catch   Result Value Ref Range    Urine Culture >100,000 CFU/ML Escherichia coli (A) N/A       Susceptibility    Escherichia coli -  (no method available)     Ampicillin >=32 Resistant      Ampicillin + Sulbactam 4 Sensitive      Cefazolin <=4 Sensitive      Ciprofloxacin <=0.25 Sensitive      Gentamicin <=1 Sensitive      Nitrofurantoin <=16 Sensitive      Trimethoprim/Sulfa >=320 Resistant    2. Blood Culture     Status: None (Preliminary result)    Collection Time: 11/20/24 12:38 AM    Specimen: Blood,peripheral   Result Value Ref Range    Blood Culture Result No Growth 2 Days N/A         Imaging: Reviewed in Epic.    Medications:    acetaminophen  650 mg Oral Once    atorvastatin  20 mg Oral Daily    metoprolol succinate ER  50 mg Oral Daily Beta Blocker    heparin  5,000 Units Subcutaneous Q8H JOAQUINA    cefTRIAXone  2 g Intravenous Q24H    [Held by provider] eplerenone  12.5 mg Oral Daily    acyclovir  400 mg Oral BID    sodium bicarbonate  650 mg Oral BID       Assessment & Plan:        #CAP  #Complicated UTI, ecoli   -cont iv ceftraixone (11/21-)  Azith (11/21 x3 doses)  -Ucx reviewed  / Bcx NG to date    #Multiple Myeloma  #Acute on chronic anemia  -heme/onc following; discussed w/ Dr. Frias  -1u prbc (11/21); hgb now 7.3 repeat cbc in AM on 11/23, if stable will plan for dc on 11/23  -outpatient management at [facility], plan for possible CAR-T therapy soon    #Debility  -therapies to see    #CKD3  #NAGMA, improving   -nephro following  -PTA: bicarb po, resumed  -s/p bicarb gtt    #Thrombocytopenia    #DM2  -A1c 5.8  -ISS    #HLD  =statin     #HFpEF    #Hypokalemia  -monitor and follow    #ACP  -full code, discussed earlier on admission        Teresita Manuel DO    Supplementary Documentation:     Quality:  DVT Mechanical Prophylaxis:   SCDs,    DVT Pharmacologic Prophylaxis   Medication    heparin (Porcine) 5000 UNIT/ML injection 5,000 Units                Code Status: Full Code  Galan: No urinary catheter  in place  Adama Duration (in days):   Central line:    KEYON:     Discharge is dependent on: clinical improvement   At this point Mr. Valle is expected to be discharge to: tbd    The 21st Century Cures Act makes medical notes like these available to patients in the interest of transparency. Please be advised this is a medical document. Medical documents are intended to carry relevant information, facts as evident, and the clinical opinion of the practitioner. The medical note is intended as peer to peer communication and may appear blunt or direct. It is written in medical language and may contain abbreviations or verbiage that are unfamiliar.              **Certification      PHYSICIAN Certification of Need for Inpatient Hospitalization - Initial Certification    Patient will require inpatient services that will reasonably be expected to span two midnight's based on the clinical documentation in H+P.   Based on patients current state of illness, I anticipate that, after discharge, patient will require TBD.

## 2024-11-22 NOTE — OCCUPATIONAL THERAPY NOTE
OCCUPATIONAL THERAPY EVALUATION - INPATIENT    Room Number: 414/414-A  Evaluation Date: 11/21/2024     Type of Evaluation: Initial  Presenting Problem: UTI, pneumonia, anemia    Physician Order: IP Consult to Occupational Therapy  Reason for Therapy:  ADL/IADL Dysfunction and Discharge Planning    OCCUPATIONAL THERAPY ASSESSMENT   Patient is a 74 year old male admitted on 11/19/2024 with Presenting Problem: UTI, pneumonia, anemia. Co-Morbidities : CKD IV, MM, s/p stem cell transplant, CHF, DM, HTN  Patient is currently functioning near baseline with self-care and functional mobility.  Prior to admission, patient's baseline is mod I.  Patient met all OT goals at supervision/SBA level.  Patient reports no further questions/concerns at this time. Patient has good set-up and family support.    Patient will benefit from continued skilled OT Services for duration of hospitalization, however, given the patient is functioning near baseline level do not anticipate skilled therapy needs at discharge     Recommendations for nursing staff:   Transfers: one person and RW  Toileting location: Toilet    EVALUATION SESSION:  Patient at start of session: toilet    FUNCTIONAL TRANSFER ASSESSMENT  Sit to Stand: Chair; Edge of Bed  Edge of Bed: Supervision  Chair: Supervision    BED MOBILITY  Rolling: Independent  Supine to Sit : Modified Independent  Sit to Supine (OT): Not Tested  Scooting: Mod I    BALANCE ASSESSMENT  Static Sitting: Modified Independent  Static Standing: Modified Independent    FUNCTIONAL ADL ASSESSMENT  Eating: Independent  Grooming Seated: Modified Independent  LB Dressing Seated: Stand-by Assist  Toileting Seated: Supervision    ACTIVITY TOLERANCE:   Pulse: 78  Heart Rate Source: Monitor                   O2 SATURATIONS  Oxygen Therapy  SPO2% Ambulation on Room Air: 94    COGNITION  WFL  Impaired working memory    COGNITION ASSESSMENTS     Upper Extremity:   ROM: within functional limits   Strength: is within  functional limits   Coordination:  Gross motor: WFL  Fine motor: WLF  Sensation: light touch intactBUE    EDUCATION PROVIDED  Patient Education : Role of Occupational Therapy; Plan of Care  Patient's Response to Education: Verbalized Understanding  Family/Caregiver Education : Role of Occupational Therapy; Plan of Care  Family/Caregiver's Response to Education: Verbalized Understanding    Equipment used: RW  Demonstrates functional use    Therapist comments: OT educated patient on safety,  sequencing, energy conservation, pain management, home modifications and adaptive equipment to increase independence with ADLs.  Educated on energy conservation, work simplification and adaptive techniques.  Patient able to verbalize 3 strategies to complete ADL while incorporating modified strategies.      Patient End of Session: Up in chair;With  staff;Needs met;Call light within reach;RN aware of session/findings;All patient questions and concerns addressed;Alarm set;Discussed recommendations with /    OCCUPATIONAL PROFILE    HOME SITUATION  Type of Home: House  Home Layout: Two level  Lives With: Spouse    Toilet and Equipment: Comfort height toilet  Shower/Tub and Equipment: Walk-in shower  Other Equipment:  (cane, walker)    Occupation/Status: retired   Hand Dominance: Right  Drives: No  Patient Regularly Uses: Reading glasses;Glasses    Prior Level of Function: mod I with ADLs and functional mobility.  Patient reports one fall in the past 6 months.  Pt has supportive wife and daughter that will  assist prn during recovery period.    SUBJECTIVE  PAIN ASSESSMENT  Ratin  Location: denies       OBJECTIVE  Precautions: Bed/chair alarm  Fall Risk: High fall risk (h/o recent fall)    WEIGHT BEARING RESTRICTION       AM-PAC ‘6-Clicks’ Inpatient Daily Activity Short Form  -   Putting on and taking off regular lower body clothing?: A Little  -   Bathing (including washing, rinsing,  drying)?: A Little  -   Toileting, which includes using toilet, bedpan or urinal? : A Little  -   Putting on and taking off regular upper body clothing?: None  -   Taking care of personal grooming such as brushing teeth?: None  -   Eating meals?: None    AM-PAC Score:  Score: 21  Approx Degree of Impairment: 32.79%  Standardized Score (AM-PAC Scale): 44.27    ADDITIONAL TESTS     NEUROLOGICAL FINDINGS      PLAN   Patient has been evaluated and presents with no skilled Occupational Therapy needs at this time.  Patient discharged from Occupational Therapy services.  Please re-order if a new functional limitation presents during this admission.    OT Device Recommendations: Shower chair    Patient Evaluation Complexity Level:   Occupational Profile/Medical History MODERATE - Expanded review of history including review of medical or therapy record   Specific performance deficits impacting engagement in ADL/IADL MODERATE  3 - 5 performance deficits   Client Assessment/Performance Deficits MODERATE - Comorbidities and min to mod modifications of tasks    Clinical Decision Making MODERATE - Analysis of occupational profile, detailed assessments, several treatment options    Overall Complexity MODERATE     OT Session Time: 30 minutes  Self-Care Home Management: 15 minutes

## 2024-11-22 NOTE — PLAN OF CARE
Pt A&Ox4, VSS, RA, denies pain. All meds given per MAR, antibiotics given. Pt did well getting up to the bathroom with walker. Pt is resting in bed with call light in reach, safety precautions in place.       Problem: GASTROINTESTINAL - ADULT  Goal: Maintains adequate nutritional intake (undernourished)  Description: INTERVENTIONS:  - Monitor percentage of each meal consumed  - Identify factors contributing to decreased intake, treat as appropriate  - Assist with meals as needed  - Monitor I&O, WT and lab values  - Obtain nutritional consult as needed  - Optimize oral hygiene and moisture  - Encourage food from home; allow for food preferences  - Enhance eating environment  Outcome: Progressing     Problem: METABOLIC/FLUID AND ELECTROLYTES - ADULT  Goal: Electrolytes maintained within normal limits  Description: INTERVENTIONS:  - Monitor labs and rhythm and assess patient for signs and symptoms of electrolyte imbalances  - Administer electrolyte replacement as ordered  - Monitor response to electrolyte replacements, including rhythm and repeat lab results as appropriate  - Fluid restriction as ordered  - Instruct patient on fluid and nutrition restrictions as appropriate  Outcome: Progressing  Goal: Hemodynamic stability and optimal renal function maintained  Description: INTERVENTIONS:  - Monitor labs and assess for signs and symptoms of volume excess or deficit  - Monitor intake, output and patient weight  - Monitor urine specific gravity, serum osmolarity and serum sodium as indicated or ordered  - Monitor response to interventions for patient's volume status, including labs, urine output, blood pressure (other measures as available)  - Encourage oral intake as appropriate  - Instruct patient on fluid and nutrition restrictions as appropriate  Outcome: Progressing     Problem: HEMATOLOGIC - ADULT  Goal: Maintains hematologic stability  Description: INTERVENTIONS  - Assess for signs and symptoms of bleeding or  hemorrhage  - Monitor labs and vital signs for trends  - Administer supportive blood products/factors, fluids and medications as ordered and appropriate  - Administer supportive blood products/factors as ordered and appropriate  Outcome: Progressing     Problem: MUSCULOSKELETAL - ADULT  Goal: Return mobility to safest level of function  Description: INTERVENTIONS:  - Assess patient stability and activity tolerance for standing, transferring and ambulating w/ or w/o assistive devices  - Assist with transfers and ambulation using safe patient handling equipment as needed  - Ensure adequate protection for wounds/incisions during mobilization  - Obtain PT/OT consults as needed  - Advance activity as appropriate  - Communicate ordered activity level and limitations with patient/family  Outcome: Progressing

## 2024-11-23 VITALS
BODY MASS INDEX: 32 KG/M2 | HEART RATE: 61 BPM | SYSTOLIC BLOOD PRESSURE: 101 MMHG | DIASTOLIC BLOOD PRESSURE: 41 MMHG | OXYGEN SATURATION: 99 % | RESPIRATION RATE: 18 BRPM | WEIGHT: 232.88 LBS | TEMPERATURE: 98 F

## 2024-11-23 LAB
ANTIBODY SCREEN: POSITIVE
BASOPHILS # BLD AUTO: 0.01 X10(3) UL (ref 0–0.2)
BASOPHILS NFR BLD AUTO: 0.2 %
EOSINOPHIL # BLD AUTO: 0.06 X10(3) UL (ref 0–0.7)
EOSINOPHIL NFR BLD AUTO: 1.2 %
ERYTHROCYTE [DISTWIDTH] IN BLOOD BY AUTOMATED COUNT: 18 %
GLUCOSE BLD-MCNC: 125 MG/DL (ref 70–99)
GLUCOSE BLD-MCNC: 162 MG/DL (ref 70–99)
HCT VFR BLD AUTO: 21.5 %
HGB BLD-MCNC: 7 G/DL
IMM GRANULOCYTES # BLD AUTO: 0.08 X10(3) UL (ref 0–1)
IMM GRANULOCYTES NFR BLD: 1.6 %
LYMPHOCYTES # BLD AUTO: 0.33 X10(3) UL (ref 1–4)
LYMPHOCYTES NFR BLD AUTO: 6.8 %
MCH RBC QN AUTO: 34 PG (ref 26–34)
MCHC RBC AUTO-ENTMCNC: 32.6 G/DL (ref 31–37)
MCV RBC AUTO: 104.4 FL
MONOCYTES # BLD AUTO: 0.63 X10(3) UL (ref 0.1–1)
MONOCYTES NFR BLD AUTO: 12.9 %
NEUTROPHILS # BLD AUTO: 3.77 X10 (3) UL (ref 1.5–7.7)
NEUTROPHILS # BLD AUTO: 3.77 X10(3) UL (ref 1.5–7.7)
NEUTROPHILS NFR BLD AUTO: 77.3 %
PLATELET # BLD AUTO: 103 10(3)UL (ref 150–450)
RBC # BLD AUTO: 2.06 X10(6)UL
RGTSCRN: 1
RH BLOOD TYPE: NEGATIVE
TREATCELL: 1
WBC # BLD AUTO: 4.9 X10(3) UL (ref 4–11)

## 2024-11-23 PROCEDURE — 99239 HOSP IP/OBS DSCHRG MGMT >30: CPT | Performed by: HOSPITALIST

## 2024-11-23 PROCEDURE — 99232 SBSQ HOSP IP/OBS MODERATE 35: CPT | Performed by: INTERNAL MEDICINE

## 2024-11-23 RX ORDER — ACETAMINOPHEN 325 MG/1
650 TABLET ORAL ONCE
Status: COMPLETED | OUTPATIENT
Start: 2024-11-23 | End: 2024-11-23

## 2024-11-23 RX ORDER — ASPIRIN 81 MG/1
81 TABLET ORAL DAILY
Status: SHIPPED | OUTPATIENT
Start: 2024-11-23

## 2024-11-23 RX ORDER — CEFUROXIME AXETIL 500 MG/1
500 TABLET ORAL 2 TIMES DAILY
Qty: 4 TABLET | Refills: 0 | Status: SHIPPED | OUTPATIENT
Start: 2024-11-23 | End: 2024-11-25

## 2024-11-23 RX ORDER — POTASSIUM CHLORIDE 1500 MG/1
20 TABLET, EXTENDED RELEASE ORAL ONCE
Status: COMPLETED | OUTPATIENT
Start: 2024-11-23 | End: 2024-11-23

## 2024-11-23 RX ORDER — SODIUM CHLORIDE 9 MG/ML
INJECTION, SOLUTION INTRAVENOUS ONCE
Status: COMPLETED | OUTPATIENT
Start: 2024-11-23 | End: 2024-11-23

## 2024-11-23 NOTE — PLAN OF CARE
Patient on bed alert and oriented x3, with episode of confusions. Afebrile. On RA. No SOB. Denies any pain and discomforts.  Scheduled antibiotic given as ordered. Ambulates to the bathroom using walker with standby assist. Call light within reach. Safety precaution on place. Needs anticipated and attended.        Problem: GASTROINTESTINAL - ADULT  Goal: Maintains adequate nutritional intake (undernourished)  Description: INTERVENTIONS:  - Monitor percentage of each meal consumed  - Identify factors contributing to decreased intake, treat as appropriate  - Assist with meals as needed  - Monitor I&O, WT and lab values  - Obtain nutritional consult as needed  - Optimize oral hygiene and moisture  - Encourage food from home; allow for food preferences  - Enhance eating environment  Outcome: Progressing     Problem: METABOLIC/FLUID AND ELECTROLYTES - ADULT  Goal: Electrolytes maintained within normal limits  Description: INTERVENTIONS:  - Monitor labs and rhythm and assess patient for signs and symptoms of electrolyte imbalances  - Administer electrolyte replacement as ordered  - Monitor response to electrolyte replacements, including rhythm and repeat lab results as appropriate  - Fluid restriction as ordered  - Instruct patient on fluid and nutrition restrictions as appropriate  Outcome: Progressing  Goal: Hemodynamic stability and optimal renal function maintained  Description: INTERVENTIONS:  - Monitor labs and assess for signs and symptoms of volume excess or deficit  - Monitor intake, output and patient weight  - Monitor urine specific gravity, serum osmolarity and serum sodium as indicated or ordered  - Monitor response to interventions for patient's volume status, including labs, urine output, blood pressure (other measures as available)  - Encourage oral intake as appropriate  - Instruct patient on fluid and nutrition restrictions as appropriate  Outcome: Progressing     Problem: HEMATOLOGIC - ADULT  Goal:  Maintains hematologic stability  Description: INTERVENTIONS  - Assess for signs and symptoms of bleeding or hemorrhage  - Monitor labs and vital signs for trends  - Administer supportive blood products/factors, fluids and medications as ordered and appropriate  - Administer supportive blood products/factors as ordered and appropriate  Outcome: Progressing     Problem: MUSCULOSKELETAL - ADULT  Goal: Return mobility to safest level of function  Description: INTERVENTIONS:  - Assess patient stability and activity tolerance for standing, transferring and ambulating w/ or w/o assistive devices  - Assist with transfers and ambulation using safe patient handling equipment as needed  - Ensure adequate protection for wounds/incisions during mobilization  - Obtain PT/OT consults as needed  - Advance activity as appropriate  - Communicate ordered activity level and limitations with patient/family  Outcome: Progressing

## 2024-11-23 NOTE — PROGRESS NOTES
Several attempts made to see patient but he was in the bathroom for an extended period of time.  Per daughters,  he is feeling better.  Okay to discharge from oncology perspective.  Recommend PRBC prior to discharge, irradiated.  Will arrange for outpatient labs and follow-up with Dr. Frias.  Thanks, oncology will sign off.    Zahra Cortes M.D.    [facility]    [address] [place], [address]

## 2024-11-23 NOTE — PROGRESS NOTES
[facility]  Nephrology Progress Note    Nacho Valle Attending:  Teresita Manuel DO       Assessment and Plan:    1) CKD 4- baseline Cr < 2.5 mg/dl due to myeloma kidney; no further w/u    2) Relapsed myeloma- ongoing tx per once / [facility]- CART pending    3) AMS- likely due to UTI +/- PNA- on azithro / CTX- much improved    4) Metabolic acidosis- due to CKD / myeloma kidney- continue PO sodium bicarbonate    5) Anemia- due to above; transfuse for hgb < 7    DC planning. D/W family at bedside.       Subjective:  Awake notes reviewed    Physical Exam:   /71 (BP Location: Right arm)   Pulse 58   Temp 97.9 °F (36.6 °C) (Oral)   Resp 20   Wt 232 lb 14.4 oz (105.6 kg)   SpO2 97%   BMI 31.58 kg/m²   Temp (24hrs), Av.1 °F (36.7 °C), Min:97.9 °F (36.6 °C), Max:98.4 °F (36.9 °C)     No intake or output data in the 24 hours ending 24 0712  Wt Readings from Last 3 Encounters:   24 232 lb 14.4 oz (105.6 kg)   24 234 lb 4 oz (106.3 kg)   10/25/24 246 lb 8 oz (111.8 kg)     General: awake  HEENT: No scleral icterus, MMM  Neck: Supple, no ELLIOT or thyromegaly  Cardiac: Regular rate and rhythm, S1, S2 normal, no murmur or tub  Lungs: Decreased BS at bases bilaterally   Abdomen: Soft, non-tender. + bowel sounds, no palpable organomegaly  Extremities: Without clubbing, cyanosis; no edema  Neurologic: Cranial nerves grossly intact, moving all extremities  Skin: Warm and dry, no rashes       Labs:   Lab Results   Component Value Date    WBC 4.9 2024    HGB 7.0 2024    HCT 21.5 2024    .0 2024    PGLU 125 2024       Imaging:  All imaging studies reviewed.    Meds:   Current Facility-Administered Medications   Medication Dose Route Frequency    sodium chloride 0.9% infusion   Intravenous Once    acetaminophen (Tylenol) tab 650 mg  650 mg Oral Once    acetaminophen (Tylenol) tab 650 mg  650 mg Oral Once    atorvastatin (Lipitor) tab 20 mg  20 mg Oral Daily     benzonatate (Tessalon) cap 200 mg  200 mg Oral TID PRN    metoprolol succinate ER (Toprol XL) 24 hr tab 50 mg  50 mg Oral Daily Beta Blocker    glucose (Dex4) 15 GM/59ML oral liquid 15 g  15 g Oral Q15 Min PRN    Or    glucose (Glutose) 40% oral gel 15 g  15 g Oral Q15 Min PRN    Or    glucose-vitamin C (Dex-4) chewable tab 4 tablet  4 tablet Oral Q15 Min PRN    Or    dextrose 50% injection 50 mL  50 mL Intravenous Q15 Min PRN    Or    glucose (Dex4) 15 GM/59ML oral liquid 30 g  30 g Oral Q15 Min PRN    Or    glucose (Glutose) 40% oral gel 30 g  30 g Oral Q15 Min PRN    Or    glucose-vitamin C (Dex-4) chewable tab 8 tablet  8 tablet Oral Q15 Min PRN    melatonin tab 3 mg  3 mg Oral Nightly PRN    ondansetron (Zofran) 4 MG/2ML injection 4 mg  4 mg Intravenous Q6H PRN    metoclopramide (Reglan) 5 mg/mL injection 5 mg  5 mg Intravenous Q8H PRN    heparin (Porcine) 5000 UNIT/ML injection 5,000 Units  5,000 Units Subcutaneous Q8H JOAQUINA    polyethylene glycol (PEG 3350) (Miralax) 17 g oral packet 17 g  17 g Oral Daily PRN    sennosides (Senokot) tab 17.2 mg  17.2 mg Oral Nightly PRN    bisacodyl (Dulcolax) 10 MG rectal suppository 10 mg  10 mg Rectal Daily PRN    benzonatate (Tessalon) cap 200 mg  200 mg Oral TID PRN    guaiFENesin (Robitussin) 100 MG/5 ML oral liquid 200 mg  200 mg Oral Q4H PRN    cefTRIAXone (Rocephin) 2 g in sodium chloride 0.9% 100 mL IVPB-ADDV  2 g Intravenous Q24H    [Held by provider] eplerenone (Inspra) tab 12.5 mg  12.5 mg Oral Daily    acyclovir (Zovirax) tab 400 mg  400 mg Oral BID    sodium bicarbonate tab 650 mg  650 mg Oral BID    acetaminophen (Tylenol Extra Strength) tab 1,000 mg  1,000 mg Oral Q6H PRN         Questions/concerns were discussed with patient and/or family by bedside.          Laxmi Gonzáles MD  11/23/2024  7:12 AM

## 2024-11-23 NOTE — DISCHARGE SUMMARY
[place] HOSPITALIST  DISCHARGE SUMMARY     Nacho Valle Patient Status:  Inpatient    1950 MRN [id number]   Location [facility] 4NW-A Attending Teresita Manuel, DO   Hosp Day # 2 PCP Dagoberto Bell MD     Date of Admission: 2024  Date of Discharge:   2024    Discharge Disposition: Home or Self Care    Discharge Diagnosis:  #CAP  #Complicated UTI, ecoli   -cont iv ceftraixone (-)  Azith (11/21 x3 doses)  -Ucx reviewed  / Bcx NG to date     #Multiple Myeloma  #Acute on chronic anemia  -heme/onc following; discussed w/ Dr. Frias  -1u prbc (); hgb now 7.3 repeat cbc in AM on , if stable will plan for dc on   -outpatient management at [facility], plan for possible CAR-T therapy soon     #Debility  -therapies to see     #CKD3  #NAGMA, improving   -nephro following  -PTA: bicarb po, resumed  -s/p bicarb gtt     #Thrombocytopenia     #DM2  -A1c 5.8  -ISS     #HLD  =statin      #HFpEF     #Hypokalemia  -monitor and follow     #ACP  -full code    History of Present Illness: Nacho Valle is a 74 year old male with multiple myeloma, BPH, chronic heart failure with preserved ejection fraction, type 2 diabetes, hypertension, hyperlipidemia, CKD stage 3, anemia presents to the emergency room with increased weakness that came on this evening.  Patient was able to go to the doctor's office earlier in the day but became extremely weak this evening.  Patient slid down to the ground.  Patient did not injure himself or pass out.  Patient said he was feeling some shortness of breath.  No fevers, chills, cough, nausea, vomiting, diarrhea or constipation.  No chest pain, palpitations.          Brief Synopsis: Patient admitted to the hospital and found to have community-acquired pneumonia.  Patient treated with IV antibiotics and had improvement.  Patient has known multiple myeloma and saw oncology in consultation.  Patient to get packed red blood cell transfusions during hospitalizations.  Patient is to  follow-up with [facility] for further CAR-T therapy next week.  Patient also seen by nephrology in consultation for CKD and none anion gap metabolic acidosis.  Electrolytes stabilized.  Patient stable for discharge.    Lace+ Score: 69  59-90 High Risk  29-58 Medium Risk  0-28   Low Risk       TCM Follow-Up Recommendation:  LACE > 58: High Risk of readmission after discharge from the hospital.      Procedures during hospitalization:       Incidental or significant findings and recommendations (brief descriptions):      Lab/Test results pending at Discharge:       Consultants:  Oncology & nephrology     Discharge Medication List:     Discharge Medications        CONTINUE taking these medications        Instructions Prescription details   acyclovir 400 MG Tabs  Commonly known as: Zovirax      Take 1 tablet (400 mg total) by mouth 2 (two) times daily.   Quantity: 180 tablet  Refills: 1     dexamethasone 4 MG tablet  Commonly known as: Decadron      TAKE 3 TABLETS(12 MG) BY MOUTH 1 TIME EVERY WEEK   Quantity: 36 tablet  Refills: 3     OneTouch UltraSoft Lancets Misc      TEST BLOOD SUGAR TWICE DAILY   Quantity: 200 each  Refills: 2            ASK your doctor about these medications        Instructions Prescription details   acetaminophen 500 MG Tabs  Commonly known as: Tylenol Extra Strength      Take 2 tablets (1,000 mg total) by mouth nightly.   Refills: 0     aspirin 81 MG Tbec  Commonly known as: Aspirin 81      Take 1 tablet by mouth daily.   Quantity: 1 tablet  Refills: 0     atorvastatin 20 MG Tabs  Commonly known as: Lipitor      TAKE 1 TABLET(20 MG) BY MOUTH DAILY   Quantity: 90 tablet  Refills: 3     cholecalciferol 25 MCG (1000 UT) Tabs  Commonly known as: Vitamin D3      Take 1 tablet (1,000 Units total) by mouth daily.   Refills: 0     cyclobenzaprine 10 MG Tabs  Commonly known as: Flexeril      TAKE 1 TABLET(10 MG) BY MOUTH THREE TIMES DAILY AS NEEDED FOR MUSCLE SPASMS   Quantity: 10 tablet  Refills: 1      empagliflozin 10 MG Tabs  Commonly known as: Jardiance      Take 1 tablet (10 mg total) by mouth daily.   Refills: 0     Entresto  MG Tabs  Generic drug: sacubitril-valsartan      Take 1 tablet by mouth daily.   Refills: 0     eplerenone 25 MG Tabs  Commonly known as: Inspra      Take 0.5 tablets (12.5 mg total) by mouth daily.   Refills: 0     ketorolac 0.5 % Soln  Commonly known as: Acular      INSTILL 1 DROP IN BOTH EYES FOUR TIMES DAILY AS NEEDED   Refills: 0     metoprolol succinate ER 50 MG Tb24  Commonly known as: Toprol XL      Take 1 tablet (50 mg total) by mouth daily.   Refills: 0     Multiple Vitamin Tabs      Take 1 tablet by mouth.   Refills: 0     omega-3 fatty acids 1000 MG Caps  Commonly known as: Fish Oil      Take 1,000 mg by mouth 2 (two) times daily.   Refills: 0     OneTouch Ultra Strp  Generic drug: Glucose Blood      CHECK SUGARS THREE TIMES DAILY AS DIRECTED   Quantity: 300 strip  Refills: 2     sodium bicarbonate 650 MG Tabs      Take 1 tablet (650 mg total) by mouth 2 (two) times daily.   Quantity: 60 tablet  Refills: 3              [place] reviewed[place]     Follow-up appointment:   No follow-up provider specified.  Appointments for Next 30 Days 2024 - 2024      None            Vital signs:  Temp:  [97.7 °F (36.5 °C)-98.5 °F (36.9 °C)] 98.1 °F (36.7 °C)  Pulse:  [58-78] 65  Resp:  [16-20] 16  BP: ()/(44-71) 117/54  SpO2:  [96 %-99 %] 97 %    Physical Exam:    General: No acute distress   Lungs: clear to auscultation  Cardiovascular: S1, S2  Abdomen: Soft      -----------------------------------------------------------------------------------------------  PATIENT DISCHARGE INSTRUCTIONS: See electronic chart    Teresita Manuel DO    Total time spent on discharge plannin minutes     The  Cures Act makes medical notes like these available to patients in the interest of transparency. Please be advised this is a medical document. Medical documents are intended  to carry relevant information, facts as evident, and the clinical opinion of the practitioner. The medical note is intended as peer to peer communication and may appear blunt or direct. It is written in medical language and may contain abbreviations or verbiage that are unfamiliar.

## 2024-11-23 NOTE — PLAN OF CARE
NURSING DISCHARGE NOTE    Discharged Home via Wheelchair.  Accompanied by Family member and Support staff  Belongings Taken by patient/family.    Pt received A&Ox4. Afebrile. VSS. C/o generalized pain, declining pain meds. Hgb 7.0 - 1 unit PRBCs transfused per Dr. Cortes. No need for repeat hgb per MD. Seen by Dr. Gonzáles. K+ replaced per orders. Cleared to dc following transfusion. Dc instructions given to pt and pt's family at the bedside. Verbalized understanding. PIV removed.

## 2024-11-24 LAB
BLOOD TYPE BARCODE: 9500
UNIT VOLUME: 350 ML

## 2024-11-25 ENCOUNTER — PATIENT OUTREACH (OUTPATIENT)
Dept: CASE MANAGEMENT | Age: 74
End: 2024-11-25

## 2024-12-20 RX ORDER — ACYCLOVIR 400 MG/1
400 TABLET ORAL 2 TIMES DAILY
Qty: 180 TABLET | Refills: 1 | Status: SHIPPED | OUTPATIENT
Start: 2024-12-20

## 2024-12-20 NOTE — TELEPHONE ENCOUNTER
Please confirm refill request appropriate?  LR 07/24/24.    Requested Prescriptions   Pending Prescriptions Disp Refills    ACYCLOVIR 400 MG Oral Tab [Pharmacy Med Name: ACYCLOVIR 400MG TABLETS] 180 tablet 1     Sig: TAKE 1 TABLET(400 MG) BY MOUTH TWICE DAILY       Herpes Agent Protocol Passed - 12/19/2024  8:40 PM        Passed - In person appointment or virtual visit in the past 12 mos or appointment in next 3 mos     Recent Outpatient Visits              1 month ago CKD (chronic kidney disease) stage 4, GFR 15-29 ml/min ([facility])    [facility] Francisco Silva MD    Office Visit    1 month ago Multiple myeloma, remission status unspecified ([facility])    [facility] [place]    Office Visit    1 month ago Multiple myeloma not having achieved remission ([facility])    [facility] [place] Ruiz Frias MD    Office Visit    1 month ago Stage 4 chronic kidney disease ([facility])    [facility] Francisco Silva MD    Office Visit    2 months ago Chronic bilateral back pain, unspecified back location    [facility] [address], Dagoberto Victoria MD    Office Visit          Future Appointments         Provider Department Appt Notes    In 3 weeks Francisco Silva MD [facility] 2mth f/u

## 2025-01-09 ENCOUNTER — LAB ENCOUNTER (OUTPATIENT)
Dept: LAB | Age: 75
End: 2025-01-09
Attending: INTERNAL MEDICINE
Payer: MEDICARE

## 2025-01-09 DIAGNOSIS — N18.4 CKD (CHRONIC KIDNEY DISEASE) STAGE 4, GFR 15-29 ML/MIN (HCC): ICD-10-CM

## 2025-01-09 LAB
ANION GAP SERPL CALC-SCNC: 10 MMOL/L (ref 0–18)
BASOPHILS # BLD AUTO: 0.03 X10(3) UL (ref 0–0.2)
BASOPHILS NFR BLD AUTO: 0.5 %
BILIRUB UR QL STRIP.AUTO: NEGATIVE
BUN BLD-MCNC: 15 MG/DL (ref 9–23)
CALCIUM BLD-MCNC: 9.2 MG/DL (ref 8.7–10.4)
CHLORIDE SERPL-SCNC: 107 MMOL/L (ref 98–112)
CLARITY UR REFRACT.AUTO: CLEAR
CO2 SERPL-SCNC: 23 MMOL/L (ref 21–32)
CREAT BLD-MCNC: 1.96 MG/DL
CREAT UR-SCNC: 60.9 MG/DL
EGFRCR SERPLBLD CKD-EPI 2021: 35 ML/MIN/1.73M2 (ref 60–?)
EOSINOPHIL # BLD AUTO: 0.03 X10(3) UL (ref 0–0.7)
EOSINOPHIL NFR BLD AUTO: 0.5 %
ERYTHROCYTE [DISTWIDTH] IN BLOOD BY AUTOMATED COUNT: 21.5 %
FASTING STATUS PATIENT QL REPORTED: NO
GLUCOSE BLD-MCNC: 181 MG/DL (ref 70–99)
GLUCOSE UR STRIP.AUTO-MCNC: 1000 MG/DL
HCT VFR BLD AUTO: 31.7 %
HGB BLD-MCNC: 10.4 G/DL
HYALINE CASTS #/AREA URNS AUTO: PRESENT /LPF
IMM GRANULOCYTES # BLD AUTO: 0.03 X10(3) UL (ref 0–1)
IMM GRANULOCYTES NFR BLD: 0.5 %
KETONES UR STRIP.AUTO-MCNC: NEGATIVE MG/DL
LEUKOCYTE ESTERASE UR QL STRIP.AUTO: NEGATIVE
LYMPHOCYTES # BLD AUTO: 0.21 X10(3) UL (ref 1–4)
LYMPHOCYTES NFR BLD AUTO: 3.6 %
MAGNESIUM SERPL-MCNC: 2 MG/DL (ref 1.6–2.6)
MCH RBC QN AUTO: 36 PG (ref 26–34)
MCHC RBC AUTO-ENTMCNC: 32.8 G/DL (ref 31–37)
MCV RBC AUTO: 109.7 FL
MONOCYTES # BLD AUTO: 0.87 X10(3) UL (ref 0.1–1)
MONOCYTES NFR BLD AUTO: 15.1 %
NEUTROPHILS # BLD AUTO: 4.61 X10 (3) UL (ref 1.5–7.7)
NEUTROPHILS # BLD AUTO: 4.61 X10(3) UL (ref 1.5–7.7)
NEUTROPHILS NFR BLD AUTO: 79.8 %
NITRITE UR QL STRIP.AUTO: NEGATIVE
OSMOLALITY SERPL CALC.SUM OF ELEC: 295 MOSM/KG (ref 275–295)
PH UR STRIP.AUTO: 5.5 [PH] (ref 5–8)
PHOSPHATE SERPL-MCNC: 1.6 MG/DL (ref 2.4–5.1)
PLATELET # BLD AUTO: 101 10(3)UL (ref 150–450)
POTASSIUM SERPL-SCNC: 3.8 MMOL/L (ref 3.5–5.1)
PROT UR STRIP.AUTO-MCNC: 30 MG/DL
PROT UR-MCNC: 62.9 MG/DL (ref ?–14)
RBC # BLD AUTO: 2.89 X10(6)UL
RBC UR QL AUTO: NEGATIVE
SODIUM SERPL-SCNC: 140 MMOL/L (ref 136–145)
SP GR UR STRIP.AUTO: 1.01 (ref 1–1.03)
UROBILINOGEN UR STRIP.AUTO-MCNC: NORMAL MG/DL
WBC # BLD AUTO: 5.8 X10(3) UL (ref 4–11)
YEAST UR QL: PRESENT /HPF

## 2025-01-09 PROCEDURE — 84100 ASSAY OF PHOSPHORUS: CPT

## 2025-01-09 PROCEDURE — 83735 ASSAY OF MAGNESIUM: CPT

## 2025-01-09 PROCEDURE — 82570 ASSAY OF URINE CREATININE: CPT

## 2025-01-09 PROCEDURE — 85025 COMPLETE CBC W/AUTO DIFF WBC: CPT

## 2025-01-09 PROCEDURE — 84156 ASSAY OF PROTEIN URINE: CPT

## 2025-01-09 PROCEDURE — 36415 COLL VENOUS BLD VENIPUNCTURE: CPT

## 2025-01-09 PROCEDURE — 81001 URINALYSIS AUTO W/SCOPE: CPT

## 2025-01-09 PROCEDURE — 80048 BASIC METABOLIC PNL TOTAL CA: CPT

## 2025-01-14 ENCOUNTER — NURSE TRIAGE (OUTPATIENT)
Dept: FAMILY MEDICINE CLINIC | Facility: CLINIC | Age: 75
End: 2025-01-14

## 2025-01-14 ENCOUNTER — OFFICE VISIT (OUTPATIENT)
Dept: NEPHROLOGY | Facility: CLINIC | Age: 75
End: 2025-01-14
Payer: MEDICARE

## 2025-01-14 VITALS — SYSTOLIC BLOOD PRESSURE: 124 MMHG | DIASTOLIC BLOOD PRESSURE: 88 MMHG | BODY MASS INDEX: 31 KG/M2 | WEIGHT: 227 LBS

## 2025-01-14 DIAGNOSIS — N18.4 STAGE 4 CHRONIC KIDNEY DISEASE (HCC): Primary | ICD-10-CM

## 2025-01-14 PROCEDURE — 99215 OFFICE O/P EST HI 40 MIN: CPT | Performed by: INTERNAL MEDICINE

## 2025-01-14 RX ORDER — THIAMINE HCL 100 MG
6.25 TABLET ORAL 2 TIMES DAILY
COMMUNITY

## 2025-01-14 RX ORDER — POTASSIUM CHLORIDE 1500 MG/1
20 TABLET, EXTENDED RELEASE ORAL 2 TIMES DAILY
COMMUNITY

## 2025-01-14 RX ORDER — MIDODRINE HYDROCHLORIDE 5 MG/1
5 TABLET ORAL 3 TIMES DAILY
COMMUNITY

## 2025-01-14 RX ORDER — SULFAMETHOXAZOLE AND TRIMETHOPRIM 400; 80 MG/1; MG/1
1 TABLET ORAL 3 TIMES DAILY
COMMUNITY

## 2025-01-14 RX ORDER — FUROSEMIDE 20 MG/1
20 TABLET ORAL DAILY
COMMUNITY

## 2025-01-14 RX ORDER — PANTOPRAZOLE SODIUM 40 MG/1
40 TABLET, DELAYED RELEASE ORAL DAILY
COMMUNITY

## 2025-01-14 RX ORDER — MAGNESIUM OXIDE 400 MG/1
400 TABLET ORAL DAILY
COMMUNITY

## 2025-01-14 RX ORDER — TRAMADOL HYDROCHLORIDE 25 MG/1
25 TABLET, COATED ORAL AS DIRECTED
COMMUNITY

## 2025-01-14 NOTE — PROGRESS NOTES
Nephrology Progress Note      Nacho Valle is a 74 year old male.    HPI:     Chief Complaint   Patient presents with    Chronic Kidney Disease    Other     Myeloma Kidney Disease          75 yo male with hx of MM, MADDI related to myeloma kidney + severe hypercalcemia, DM, HTN presents for follow up. He was treated in hospital with pamidronate/fluids/plasmapheresis. He also required few treatments of HD.    S/p stem cell tx @ [facility]-  did well for few years  Patient is now on salvage chemo (diagnosed w/ recurrent dx in Jan 2021)  He follows w/ Dr. Frias and Dr. Barragan ([facility])-     He just got discharged from [facility] after planned CAR-T thrapy ([facility]).  Tx course complicated by grade 3 CRS and ICANS- requring tociluzumab x 3 doses; also needed pressor support _ steroids.    While in re-hab he was monitored by nephrology; Cr back to baseline.     He is here w/ his wife today    Somewhat forgetbut, but getting better slowly. Ongoing rehab ([facility])             Medications (Active prior to today's visit):  Current Outpatient Medications   Medication Sig Dispense Refill    Calcium Citrate-Vitamin D3 315-6.25 MG-MCG Oral Tab Take 6.25 mg by mouth 2 (two) times daily.      furosemide 20 MG Oral Tab Take 1 tablet (20 mg total) by mouth daily.      lidocaine-menthol 4-1 % External Patch Place 1 patch onto the skin daily.      magnesium oxide 400 MG Oral Tab Take 1 tablet (400 mg total) by mouth daily.      midodrine 5 MG Oral Tab Take 1 tablet (5 mg total) by mouth in the morning and 1 tablet (5 mg total) at noon and 1 tablet (5 mg total) in the evening. Take 1 Tab by mouth 3 (three) times a day if needed for other ( to systolic blood pressure less than 100).      pantoprazole 40 MG Oral Tab EC Take 1 tablet (40 mg total) by mouth daily.      Potassium Chloride ER 20 MEQ Oral Tab CR Take 20 mEq by mouth 2 (two) times daily.      sulfamethoxazole-trimethoprim 400-80 MG Oral Tab Take 1 tablet by mouth 3 (three) times daily.  Take 1 tablet by mouth 3 (three) times daily a week for 47 days      traMADol HCl 25 MG Oral Tab Take 25 mg by mouth As Directed. Take 25 mg by mouth every 8 (eight) hours if needed for mod pain (pain score 4-6) for up to 5 days      aspirin (ASPIRIN 81) 81 MG Oral Tab EC Take 1 tablet (81 mg total) by mouth daily. 11/20- Aspirin on hold per Cardiologist      ATORVASTATIN 20 MG Oral Tab TAKE 1 TABLET(20 MG) BY MOUTH DAILY 90 tablet 3    Multiple Vitamin Oral Tab Take 1 tablet by mouth.      ACYCLOVIR 400 MG Oral Tab TAKE 1 TABLET(400 MG) BY MOUTH TWICE DAILY 180 tablet 1    cholecalciferol 25 MCG (1000 UT) Oral Tab Take 1 tablet (1,000 Units total) by mouth daily.      metoprolol succinate ER 50 MG Oral Tablet 24 Hr Take 1 tablet (50 mg total) by mouth daily. (Patient not taking: Reported on 1/14/2025)      sodium bicarbonate 650 MG Oral Tab Take 1 tablet (650 mg total) by mouth 2 (two) times daily. (Patient not taking: Reported on 1/14/2025) 60 tablet 3    ketorolac 0.5 % Ophthalmic Solution INSTILL 1 DROP IN BOTH EYES FOUR TIMES DAILY AS NEEDED (Patient not taking: Reported on 1/14/2025)      dexamethasone (DECADRON) 4 MG tablet TAKE 3 TABLETS(12 MG) BY MOUTH 1 TIME EVERY WEEK (Patient not taking: Reported on 1/14/2025) 36 tablet 3    ONETOUCH ULTRASOFT LANCETS Does not apply Misc TEST BLOOD SUGAR TWICE DAILY (Patient not taking: Reported on 1/14/2025) 200 each 2    Glucose Blood (ONETOUCH ULTRA) In Vitro Strip CHECK SUGARS THREE TIMES DAILY AS DIRECTED (Patient not taking: Reported on 1/14/2025) 300 strip 2    eplerenone 25 MG Oral Tab Take 0.5 tablets (12.5 mg total) by mouth daily. (Patient not taking: Reported on 1/14/2025)      omega-3 fatty acids 1000 MG Oral Cap Take 1,000 mg by mouth 2 (two) times daily. (Patient not taking: Reported on 1/14/2025)         Allergies:  Allergies   Allergen Reactions    Pcn [Penicillins] ANAPHYLAXIS, HIVES, HALLUCINATION and SHORTNESS OF BREATH     Respiratory distress     Beta Adrenergic Blockers     Latex      Surgical tape  Welts, burning of skin, itching    Statins          ROS:     10 systems reviewed and otherwise unremarkable       PHYSICAL EXAM:   /88 (BP Location: Right arm, Patient Position: Sitting)   Wt 227 lb (103 kg)   BMI 30.78 kg/m²   Wt Readings from Last 6 Encounters:   01/14/25 227 lb (103 kg)   11/20/24 232 lb 14.4 oz (105.6 kg)   11/19/24 234 lb 4 oz (106.3 kg)   10/25/24 246 lb 8 oz (111.8 kg)   10/22/24 241 lb 8 oz (109.5 kg)   10/17/24 244 lb (110.7 kg)       General: Alert and oriented in no apparent distress.   HEENT: No scleral icterus, MMM  Neck: Supple, no ELLIOT or thyromegaly  Cardiac: Regular rate and rhythm, S1, S2 normal, no murmur or rub  Lungs: Clear without wheezes, rales, rhonchi.    Abdomen: Soft, non-tender. + bowel sounds, no palpable organomegaly  Extremities: Without clubbing, cyanosis ; no edema  Neurologic: Alert and oriented, normal affect, cranial nerves grossly intact, moving all extremities  Skin: Warm and dry, no rashes        Labs reviewed via [facility] Everywhere    Cr 1.96; 1/10/2025    ASSESSMENT/PLAN:       1. Hx of Myeloma kidney disease -   Required plasmapheresis/HD treatments in the hospital  His renal function had recovered  and was able to come off HD  Volume is good today; Cr has gradually risen over the past few years; more recently baseline ~ 2-2,5.   His myeloma relapsed despite slavage tx  He underwent CAR-T tx @ [facility] and is now in remission (see HPI)  - conitnue oncology f/u  - monitor Cr; volume looks good (he has lost weight)    2. MM-   as above    3. Cardiomyopathy/HTN- stable/compensated; monitor ; cardiac bx negative for amyloid; follows w/ cardiology     - appears compensated ; most of his cardiac meds have been adjusted or discontinued; he plans to f/u with cards soon. Continue lasix.    4. Acidosis- related to CKD-  await labs; for now continue to hold as [facility] was [facility]    5. Anemia - per oncology; monitor      F/u in 1 mos\     Francisco Silva MD  1/14/2025

## 2025-01-14 NOTE — TELEPHONE ENCOUNTER
Action Requested: Summary for Provider     []  Critical Lab, Recommendations Needed  [] Need Additional Advice  []   FYI    []   Need Orders  [] Need Medications Sent to Pharmacy  []  Other     SUMMARY: Spouse agreeable to take pt to IC for evaluation and pain management today. Scheduled follow up with Dr. Bell next week post hospitalization at [place]. Aware that my recommendation is to go to IC today and appointment next week is not for pain.     Reason for call: Condition Update and Appt Request  Onset: Today   Reason for Disposition   SEVERE pain (e.g., excruciating, unable to do any normal activities) and not improved 2 hours after pain medicine    Protocols used: Muscle Aches and Body Pain-A-OH    Pt was at [facility] for T cell transplant. Then went to [facility] for rehabilitation. Total time was 12/2 to 1/7   Pt currently getting rehab at [facility]  Pt c/o rib pain at PT session today  Spouse states this pain is chronic from myeloma, radiates into back   Wondering if ok to give Tylenol with codeine  Stated his pain is \"so severe\". Pt moaning and groaning due to pain at therapy   I advised given pain severity, I recommend evaluation in IC for pain management. Spouse agreeable to take pt to [facility].   Spouse wants to schedule f/u with Dr. Bell since there have been med changes since he was hospitalized. Scheduled f/u. Will take pt today to IC    Future Appointments   Date Time Provider Department Center   1/14/2025  3:00 PM Francisco Silva MD [id number] [address]   1/23/2025  3:00 PM Dagoberto Bell MD [facility] [address]

## 2025-01-15 ENCOUNTER — LAB ENCOUNTER (OUTPATIENT)
Dept: LAB | Age: 75
End: 2025-01-15
Attending: INTERNAL MEDICINE
Payer: MEDICARE

## 2025-01-15 DIAGNOSIS — N18.4 STAGE 4 CHRONIC KIDNEY DISEASE (HCC): ICD-10-CM

## 2025-01-15 LAB
ANION GAP SERPL CALC-SCNC: 9 MMOL/L (ref 0–18)
BUN BLD-MCNC: 14 MG/DL (ref 9–23)
CALCIUM BLD-MCNC: 9.4 MG/DL (ref 8.7–10.6)
CHLORIDE SERPL-SCNC: 109 MMOL/L (ref 98–112)
CO2 SERPL-SCNC: 24 MMOL/L (ref 21–32)
CREAT BLD-MCNC: 1.83 MG/DL
EGFRCR SERPLBLD CKD-EPI 2021: 38 ML/MIN/1.73M2 (ref 60–?)
FASTING STATUS PATIENT QL REPORTED: NO
GLUCOSE BLD-MCNC: 126 MG/DL (ref 70–99)
MAGNESIUM SERPL-MCNC: 2.1 MG/DL (ref 1.6–2.6)
OSMOLALITY SERPL CALC.SUM OF ELEC: 296 MOSM/KG (ref 275–295)
POTASSIUM SERPL-SCNC: 3.9 MMOL/L (ref 3.5–5.1)
SODIUM SERPL-SCNC: 142 MMOL/L (ref 136–145)

## 2025-01-15 PROCEDURE — 83735 ASSAY OF MAGNESIUM: CPT

## 2025-01-15 PROCEDURE — 80048 BASIC METABOLIC PNL TOTAL CA: CPT

## 2025-01-15 PROCEDURE — 36415 COLL VENOUS BLD VENIPUNCTURE: CPT

## 2025-01-23 ENCOUNTER — OFFICE VISIT (OUTPATIENT)
Dept: FAMILY MEDICINE CLINIC | Facility: CLINIC | Age: 75
End: 2025-01-23
Payer: MEDICARE

## 2025-01-23 VITALS
BODY MASS INDEX: 32 KG/M2 | TEMPERATURE: 97 F | WEIGHT: 235.31 LBS | RESPIRATION RATE: 18 BRPM | DIASTOLIC BLOOD PRESSURE: 90 MMHG | SYSTOLIC BLOOD PRESSURE: 140 MMHG | OXYGEN SATURATION: 98 % | HEART RATE: 72 BPM

## 2025-01-23 DIAGNOSIS — I50.32 CHRONIC HEART FAILURE WITH PRESERVED EJECTION FRACTION (HCC): Chronic | ICD-10-CM

## 2025-01-23 DIAGNOSIS — R53.81 PHYSICAL DECONDITIONING: Primary | ICD-10-CM

## 2025-01-23 DIAGNOSIS — C90.02 MULTIPLE MYELOMA IN RELAPSE (HCC): ICD-10-CM

## 2025-01-23 DIAGNOSIS — I10 ESSENTIAL HYPERTENSION: ICD-10-CM

## 2025-01-23 DIAGNOSIS — C90.00 MYELOMA KIDNEY DISEASE (HCC): ICD-10-CM

## 2025-01-23 DIAGNOSIS — N08 MYELOMA KIDNEY DISEASE (HCC): ICD-10-CM

## 2025-01-23 DIAGNOSIS — R53.1 WEAKNESS GENERALIZED: ICD-10-CM

## 2025-01-23 PROCEDURE — 99214 OFFICE O/P EST MOD 30 MIN: CPT | Performed by: FAMILY MEDICINE

## 2025-01-23 RX ORDER — FUROSEMIDE 20 MG/1
20 TABLET ORAL DAILY
Qty: 90 TABLET | Refills: 1 | Status: SHIPPED | OUTPATIENT
Start: 2025-01-23

## 2025-01-23 RX ORDER — MAGNESIUM OXIDE 400 MG/1
400 TABLET ORAL DAILY
Qty: 90 TABLET | Refills: 1 | Status: SHIPPED | OUTPATIENT
Start: 2025-01-23

## 2025-01-23 RX ORDER — POTASSIUM CHLORIDE 1500 MG/1
40 TABLET, EXTENDED RELEASE ORAL DAILY
Qty: 90 TABLET | Refills: 1 | Status: SHIPPED | OUTPATIENT
Start: 2025-01-23

## 2025-01-23 RX ORDER — ACYCLOVIR 400 MG/1
400 TABLET ORAL 2 TIMES DAILY
Qty: 180 TABLET | Refills: 1 | Status: SHIPPED | OUTPATIENT
Start: 2025-01-23

## 2025-01-23 RX ORDER — ATORVASTATIN CALCIUM 20 MG/1
20 TABLET, FILM COATED ORAL DAILY
Qty: 90 TABLET | Refills: 3 | Status: SHIPPED | OUTPATIENT
Start: 2025-01-23

## 2025-01-23 RX ORDER — POTASSIUM CHLORIDE 1500 MG/1
40 TABLET, EXTENDED RELEASE ORAL DAILY
COMMUNITY
Start: 2025-01-06 | End: 2025-01-23

## 2025-01-23 NOTE — PROGRESS NOTES
Subjective:   Nacho Valle is a 74 year old male who presents for hospital follow up.   He was discharged from Inpatient hospital, [facility] to Home   Admit Date: 12/2/2024 to [facility] transferred to [facility] on 12/17/2024  Discharge Date: Discharged home from [facility] 1/7/2025  Hospital Discharge Diagnosis:     Interactive contact within 2 business days post discharge first initiated on Date:     I accessed [facility]. and/or Ship It Bag Check Everywhere and personally reviewed the following for the recent hospitalization: provider notes, consults, summaries, labs and other test results and the pertinent findings are documented below.     HPI: 74-year-old male with history of hypertension hyperlipidemia type 2 diabetes mellitus multiple myeloma with bone marrow transplant in 2017, congestive heart failure chronic lower extremity edema CKD 3 and chronic T6 compression fracture who was admitted to [facility] for a planned CAR-T therapy, that was complicated by severe hypotension.  Patient was treated with pressors.  Also developed fever and was on meropenem, developed acute mental status changes thought to be due to cerebral edema that improved with dexamethasone.  Infectious workup was negative.  Patient was also treated for acute kidney insufficiency  He was transferred to [facility] for inpatient rehab on 12/17  Patient received physical therapy/Occupational Therapy/speech therapy and was transferred home on 1/7/2025.  With outpatient rehab services from [facility] starting on 1/13/2025    Patient was seen by nephrology on 1/14, his creatinine is at baseline at 1.83    Patient seen in the clinic today, besides weakness he is doing well, a bit forgetful but is improving according to the wife.  Patient is attending outpatient therapy at [facility]        History/Other:   Current Medications:  Medication Reconciliation:  I am aware of an inpatient discharge within the last 30 days.  The discharge  medication list has been reconciled with the patient's current medication list and reviewed by me. See medication list for additions of new medication, and changes to current doses of medications and discontinued medications.  Outpatient Medications Marked as Taking for the 25 encounter (Office Visit) with Dagoberto Bell MD   Medication Sig    [] potassium and sodium phosphates 280-160-250 MG Oral Powd Pack Take 1 packet by mouth daily.    acyclovir 400 MG Oral Tab Take 1 tablet (400 mg total) by mouth 2 (two) times daily.    atorvastatin 20 MG Oral Tab Take 1 tablet (20 mg total) by mouth daily.    furosemide 20 MG Oral Tab Take 1 tablet (20 mg total) by mouth daily.    magnesium oxide 400 MG Oral Tab Take 1 tablet (400 mg total) by mouth daily.    potassium chloride 20 MEQ Oral Tab CR Take 2 tablets (40 mEq total) by mouth daily.       Review of Systems:  GENERAL: weight stable, energy stable, no sweating  SKIN: denies any unusual skin lesions  EYES: denies blurred vision or double vision  HEENT: denies nasal congestion, sinus pain or ST  LUNGS: shortness of breath on exertion  CARDIOVASCULAR: denies chest pain on exertion or palpitations  GI: denies abdominal pain, denies heartburn, denies diarrhea  MUSCULOSKELETAL: Generalized muscle weakness, denies pain, normal range of motion of extremities  NEURO: denies headaches, denies dizziness, denies weakness  PSYCHE: denies depression or anxiety  HEMATOLOGIC: denies hx of anemia, or bruising, denies bleeding  ENDOCRINE: denies thyroid history  ALL/ASTHMA: denies hx of allergy or asthma    Objective:   No LMP for male patient.  Estimated body mass index is 31.91 kg/m² as calculated from the following:    Height as of 10/25/24: 6' 0.01\" (1.829 m).    Weight as of this encounter: 235 lb 5 oz (106.7 kg).   /90   Pulse 72   Temp 96.6 °F (35.9 °C) (Temporal)   Resp 18   Wt 235 lb 5 oz (106.7 kg)   SpO2 98%   BMI 31.91 kg/m²    GENERAL: well developed,  well nourished, in no apparent distress  SKIN: no rashes, no suspicious lesions  HEENT: atraumatic, normocephalic,  EYES: PERRLA, EOMI, conjunctiva are clear  NECK: supple, no adenopathy, no bruits  CHEST: no chest tenderness  LUNGS: clear to auscultation  CARDIO: RRR without murmur  GI: good BS's, no masses, HSM or tenderness  MUSCULOSKELETAL: back is not tender, FROM of the extremities  EXTREMITIES: Trace bilateral pedal edema  NEURO: Oriented times three, cranial nerves are intact, motor and sensory are grossly intact    Assessment & Plan:   1. Physical deconditioning (Primary)  2. Weakness generalized  3. Multiple myeloma in relapse (HCC)  4. Myeloma kidney disease (HCC)  5. Essential hypertension  6. Chronic heart failure with preserved ejection fraction (HCC)  Continue PT/OT  Refills given for medications as below  Labs reviewed with the patient from 1/17    -     Acyclovir; Take 1 tablet (400 mg total) by mouth 2 (two) times daily.  Dispense: 180 tablet; Refill: 1  -     Atorvastatin Calcium; Take 1 tablet (20 mg total) by mouth daily.  Dispense: 90 tablet; Refill: 3  -     Furosemide; Take 1 tablet (20 mg total) by mouth daily.  Dispense: 90 tablet; Refill: 1  -     Magnesium Oxide; Take 1 tablet (400 mg total) by mouth daily.  Dispense: 90 tablet; Refill: 1  -     Potassium Chloride Saloni ER; Take 2 tablets (40 mEq total) by mouth daily.  Dispense: 90 tablet; Refill: 1        No follow-ups on file.

## 2025-01-28 ENCOUNTER — MED REC SCAN ONLY (OUTPATIENT)
Dept: FAMILY MEDICINE CLINIC | Facility: CLINIC | Age: 75
End: 2025-01-28

## 2025-02-13 ENCOUNTER — OFFICE VISIT (OUTPATIENT)
Age: 75
End: 2025-02-13
Attending: INTERNAL MEDICINE
Payer: MEDICARE

## 2025-02-13 VITALS
OXYGEN SATURATION: 98 % | HEART RATE: 67 BPM | HEIGHT: 72.01 IN | BODY MASS INDEX: 31.49 KG/M2 | DIASTOLIC BLOOD PRESSURE: 66 MMHG | WEIGHT: 232.5 LBS | RESPIRATION RATE: 18 BRPM | SYSTOLIC BLOOD PRESSURE: 107 MMHG | TEMPERATURE: 98 F

## 2025-02-13 DIAGNOSIS — C90.00 MULTIPLE MYELOMA NOT HAVING ACHIEVED REMISSION (HCC): Primary | ICD-10-CM

## 2025-02-13 DIAGNOSIS — M89.9 LYTIC BONE LESIONS ON XRAY: ICD-10-CM

## 2025-02-13 DIAGNOSIS — N08 MYELOMA KIDNEY DISEASE (HCC): ICD-10-CM

## 2025-02-13 DIAGNOSIS — C90.00 MYELOMA KIDNEY DISEASE (HCC): ICD-10-CM

## 2025-02-13 DIAGNOSIS — N18.30 STAGE 3 CHRONIC KIDNEY DISEASE, UNSPECIFIED WHETHER STAGE 3A OR 3B CKD (HCC): ICD-10-CM

## 2025-02-13 DIAGNOSIS — D80.1 HYPOGAMMAGLOBULINEMIA (HCC): ICD-10-CM

## 2025-02-13 DIAGNOSIS — D63.0 ANEMIA COMPLICATING NEOPLASTIC DISEASE: ICD-10-CM

## 2025-02-13 DIAGNOSIS — R79.89 ELEVATED LFTS: ICD-10-CM

## 2025-02-13 NOTE — PROGRESS NOTES
Cancer Center Progress Note    Problem List:      Patient Active Problem List   Diagnosis    Impotence of organic origin    Generalized osteoarthrosis of hand    Morbid obesity (HCC)    Essential hypertension    Hypertension    MADDI (acute kidney injury)    Multiple myeloma (HCC)    Myeloma kidney disease (HCC)    Myeloma kidney (HCC)    Hx of stem cell transplant (HCC)    Hypoxia    Multiple myeloma, remission status unspecified (HCC)    Type 2 diabetes mellitus with hyperglycemia, without long-term current use of insulin (HCC)    Cardiomyopathy, unspecified type (HCC)    Morbid (severe) obesity due to excess calories (HCC)    Elevated LFTs    Weakness generalized    Community acquired pneumonia, unspecified laterality    Anemia, unspecified type    Renal insufficiency    Acute cystitis without hematuria    Chronic heart failure with preserved ejection fraction (HCC)    CKD stage 3b, GFR 30-44 ml/min (HCC)    Anemia complicating neoplastic disease    Thrombocytopenia    CKD (chronic kidney disease) stage 4, GFR 15-29 ml/min ([facility])         History of Present Illness  Nacho Valle is a 74 year old male with multiple myeloma who presents for follow-up after CAR T-cell therapy.    He underwent CAR T-cell therapy, receiving a fludarabine and cyclophosphamide lymphodepletion regimen from November 27 to November 29, 2024, followed by a CAR T-cell Abecma (idecabtagene vicleucel) infusion  on December 2, 2024. He experienced grade 3 cytokine release syndrome (CRS) requiring tocilizumab on December 2, 3, and 4, and norepinephrine support from December 3 to 6. He also had maximum grade 1 ICANS, receiving dexamethasone on December 4. He was discharged to [facility] on December 17, 2024, for further rehabilitation.    A PET scan on February 7, 2025, showed a marked interval decrease in the size of the soft tissue lesion at the anterior right second rib and mild diffuse FDG uptake in the axial skeleton and bilateral proximal humeri,  possibly related to post-treatment changes. He feels stronger and has been attending physical, occupational, and speech therapy since January 13, 2025.    He has persistent pain in the right posterior rib cage below the scapula, which remains unchanged. He takes two Tylenol at night to manage the pain and has no issues with sleep. No shortness of breath, chest pain, nausea, vomiting, diarrhea, constipation, or bleeding problems. He experiences numbness in his fingertips but not in his toes.    His current medications include acyclovir 400 mg twice daily, atorvastatin 20 mg daily, calcium citrate with vitamin D3 two tablets twice daily, furosemide 20 mg twice daily, magnesium oxide 400 mg daily, metoprolol 50 mg daily, potassium chloride 20 mEq daily, and Bactrim three times a week. He is not taking aspirin, midodrine, omega-3 fatty acids, sodium bicarbonate, or tramadol. He recently completed a course of pantoprazole.           The following individual(s) verbally consented to be recorded using ambient AI listening technology and understand that they can each withdraw their consent to this listening technology at any point by asking the clinician to turn off or pause the recording:    Patient name: Nacho Valle  Additional names:  Idania Valle        Review of Systems:   Constitutional: Negative for fevers, chills, night sweats.  Eyes: Negative for visual disturbance, irritation and redness.  Respiratory: Negative for cough, hemoptysis, chest pain, or dyspnea.  Cardiovascular: Negative for angina, orthopnea or palpitations.  Gastrointestinal: Negative for nausea, vomiting, change in bowel habits, diarrhea, constipation and abdominal pain.  Integument/breast: Negative for rash, skin lesions, and pruritus.  Hematologic/lymphatic: Negative for easy bruising, bleeding, and lymphadenopathy.  Genitourinary: Negative for dysuria or hematuria  Psychiatric: The patient's mood was calm and appropriate for this visit.  The  pertinent positives and negatives were described. All other systems were negative.    PMH/PSH:  Past Medical History:    Allergic rhinitis, cause unspecified    BCC (basal cell carcinoma of skin)    s/p surgical excision    BPH (benign prostatic hyperplasia)    Colon polyps    Generalized osteoarthrosis, involving hand    Herniation of intervertebral disc between L4 and L5    s/p surgical repair    High blood pressure    High cholesterol    Impotence of organic origin    Morbid obesity (HCC)    Osteoarthritis    Osteoarthrosis, unspecified whether generalized or localized, lower leg    Log Date: 08/21/2012     Other and unspecified hyperlipidemia    Pneumonia due to organism    Prostate cancer (HCC)    s/p total prostatectomy and radiation    Trigger finger (acquired)    Type II or unspecified type diabetes mellitus without mention of complication, not stated as uncontrolled    Unspecified essential hypertension    Unspecified internal derangement of knee    Log Date: 08/21/2012        Past Surgical History:   Procedure Laterality Date    Back surgery  1/2001    lower back L4 L5 for herniated disc    Colonoscopy      202, 2012 Dr. Wolf, polyps    Other surgical history  1/31/2007    total prostatectomy    Skin surgery  1997    BCC       Family History Reviewed:  Family History   Problem Relation Age of Onset    Diabetes Other         family hx    Hypertension Other         family hx    Prostate Cancer Father        Allergies:     Allergies[1]    Medications:   acyclovir 400 MG Oral Tab Take 1 tablet (400 mg total) by mouth 2 (two) times daily. 180 tablet 1    atorvastatin 20 MG Oral Tab Take 1 tablet (20 mg total) by mouth daily. 90 tablet 3    furosemide 20 MG Oral Tab Take 1 tablet (20 mg total) by mouth daily. (Patient taking differently: Take 1 tablet (20 mg total) by mouth 2 (two) times daily.) 90 tablet 1    magnesium oxide 400 MG Oral Tab Take 1 tablet (400 mg total) by mouth daily. 90 tablet 1    Calcium  Citrate-Vitamin D3 315-6.25 MG-MCG Oral Tab Take 6.25 mg by mouth 2 (two) times daily.      Potassium Chloride ER 20 MEQ Oral Tab CR Take 20 mEq by mouth 2 (two) times daily. (Patient taking differently: Take 20 mEq by mouth daily.)      sulfamethoxazole-trimethoprim 400-80 MG Oral Tab Take 1 tablet by mouth 3 (three) times daily. Take 1 tablet by mouth 3 (three) times daily a week for 47 days      metoprolol succinate ER 50 MG Oral Tablet 24 Hr Take 1 tablet (50 mg total) by mouth daily.      ONETOUCH ULTRASOFT LANCETS Does not apply Misc TEST BLOOD SUGAR TWICE DAILY 200 each 2    Glucose Blood (ONETOUCH ULTRA) In Vitro Strip CHECK SUGARS THREE TIMES DAILY AS DIRECTED 300 strip 2    Multiple Vitamin Oral Tab Take 1 tablet by mouth.           Vital Signs:      Height: 182.9 cm (6' 0.01\") (02/13 1314)  Weight: 105.5 kg (232 lb 8 oz) (02/13 1314)  BSA (Calculated - sq m): 2.27 sq meters (02/13 1314)  Pulse: 67 (02/13 1314)  BP: 107/66 (02/13 1314)  Temp: 97.5 °F (36.4 °C) (02/13 1314)  Do Not Use - Resp Rate: --  SpO2: 98 % (02/13 1314)      Performance Status:  ECOG 2: Ambulatory and capable of all selfcare but unable to carry out any work activities. Up and about more than 50% of waking hours     Physical Examination:    Physical Exam  Constitutional: Patient is alert and oriented x 3, not in acute distress.  HEENT:  Oropharynx is clear. Neck is supple.  CHEST: Lungs clear to auscultation, no wheezes, no crackles. Mild tenderness over the lateral lower ribs.  CARDIOVASCULAR: Heart is regular rate and rhythm, no murmurs.  ABDOMEN: Abdomen non-tender on palpation in all four quadrants.  MUSCULOSKELETAL: No tenderness over the clavicle bone. No tenderness over the shoulders. Mild tenderness towards the back of the right shoulder. No supraclavicular lymph nodes. No lymph nodes in the left axilla. No lymph nodes in the right axilla. Mild tenderness over the lateral lower ribs. No tenderness over the upper thoracic spine.  Mild tenderness over the lower thoracic spine. No tenderness in the lumbar spine.  NEUROLOGICAL: Numbness in both fingertips, not in the toes.  Psychiatric: The patient's mood is calm and appropriate for this visit.        Results reviewed at this visit:     Component  Ref Range & Units 1/31/25 0723   WBC  3.5 - 10.5 K/UL 3.7   RBC  4.20 - 5.80 M/UL 2.87 Low    HGB  13.0 - 17.5 GM/DL 10.7 Low    HCT  38.0 - 54.0 % 31.5 Low    MCV  82.0 - 99.0 .8 High    MCH  27.0 - 34.0 PG 37.3 High    MCHC  32.0 - 36.0 GM/DL 34.0   RDW  11.0 - 15.0 % 18.2 High    PLT CNT  150 - 400 K/ Low    Comment: Testing performed at [facility]   MPV  9.0 - 13.0 FL 9.9   ABS NEUTROPHIL COUNT  1.5 - 7.0 K/MM3 2.7   Comment: PRELIMINARY RESULT. CONFIRM WITH REVIEWED/MANUAL DIFF.   Immature Platelet Fraction  1.0 - 7.3 % 5.0   Immature Platelet Fraction Absolute  K/UL 5.6   DIFF TYPE AUTOMATED   GRAN % 74   GRAN #  1.5 - 7.0 K/MM3 2.7   LYMPH % 9   LYMPH #  1.0 - 4.0 K/MM3 0.3 Low    MONO % 15   MONO #  0.0 - 1.0 K/MM3 0.5   EO % 2   EO #  0.0 - 0.7 K/MM3 0.1   BASO % 0   BASO #  0.0 - 0.2 K/MM3 0.0   IMMATURE GRANULOCYTE %  % <1   ABS IMM. GRANULOCYTE  0.00 - 0.15 K/UL 0.01   AUTOMATED NRBC  <1 /100 WBC 0.0     Component  Ref Range & Units 1/31/25 0723   SODIUM  136 - 144 mmol/L 137   POTASSIUM  3.3 - 5.1 mmol/L 3.9   CHLORIDE  98 - 108 mmol/L 105   CO2  20 - 32 mmol/L 26   ANION GAP  4 - 16 6   BUN  7 - 22 MG/DL 19   CREATININE  0.6 - 1.4 MG/DL 1.93 High    GLUCOSE  70 - 100 MG/ High    ALBUMIN  3.6 - 5.0 GM/DL 4.3   PROTEIN, TOTAL  6.5 - 8.3 GM/DL 6.6   CALCIUM  8.5 - 10.5 MG/DL 9.5   ALKALINE PHOSPHATASE  30 - 110 U/L 60   ALT (SGPT)  10 - 40 U/L 56 High    AST (SGOT)  15 - 45 U/L 60 High    BILIRUBIN,TOTAL  0.2 - 1.4 MG/DL 0.6   ESTIMATED GFR  >59 ML/MIN/1.73M2 35 Low      Component  Ref Range & Units 2/7/25 0724   LDH  108 - 212 U/L 193     Component  Ref Range & Units 2/7/25 0724   C REACTIVE PROTEIN  <8.1 MG/L <1.0      Results  LABS  Glucose: 123 (2025)  BUN: 23 (2025)  Creatinine: 2.1 (2025)  ALT: 91 (2025)  AST: 81 (2025)  Total bilirubin: 1.0 (2025)  Alkaline phosphatase: 56 (2025)  Albumin: 4.3 (2025)  Creatinine: 1.93 (2025)  ALT: 56 (2025)  AST: 60 (2025)  Creatinine: 1.8 (2025)  ALT: 24 (2025)  AST: 30 (2025)  WBC: 4.2 (2025)  Hb: 11.6 (2025)  PLT: 108 (2025)  MCV: 108.6 (2025)  LDH: 193 (2025)  Uric acid: 2.6 (2025)  C-reactive protein: <1.0 (2025)  Free kappa light chain: 0.1 (2025)  Free lambda light chain: <0.15 (2025)  Kappa/lambda ratio: 0.67 (2025)  Serum protein electrophoresis: No evidence of monoclonal protein (2025)  Ig (2025)  IgA: <10 (2025)  IgM: <20 (2025)    RADIOLOGY  PET CT: FDG PET CT demonstrates a number of small, loosened foci throughout the axial and appendicular skeleton. Marked interval decrease in the size of the soft tissue lesion centered at the anterior right second rib, which demonstrates mild increased FDG uptake. Mild diffused FDG uptake in the axial skeleton and bilateral proximal humeri, possibly related to post-treatment changes. (2025)         Assessment & Plan  Multiple Myeloma  Undergoing CAR T-cell therapy Abecma (idecabtagene vicleucel). Received fludarabine and cyclophosphamide lymphodepletion regimen from -2024, followed by CAR T-cell infusion on 2024. Experienced grade 3 CRS requiring tocilizumab on -2024, and norepinephrine support from December 3-2024. Maximum grade 1 ICANS treated with dexamethasone on 2024. PET scan on 2025, showed marked interval decrease in the size of the soft tissue lesion and mild diffuse FDG uptake, indicating a positive response. Serum protein electrophoresis on 2025, showed no evidence of  monoclonal protein, suggesting remission. Discussed potential for durable remission and need for ongoing monitoring.  - Follow up with Dr. Good tomorrow to discuss PET scan results and further management.  - Repeat labs tomorrow at [facility]  - Repeat labs in two weeks to monitor liver enzymes and kidney function.  - Continue outpatient physical therapy at three times a week.  - Continue prophylactic treatment with acyclovir and Bactrim.    Hypogammaglobulinemia:    - He had IVIG in 1/2025. Will need to discuss with Dr. Good whether to continue monthly infusions.    Chronic Kidney Disease  Baseline creatinine around 2.0, increased to 3.7 during hospitalization for CAR T therapy, currently 2.1. Under the care of Dr. Silva, who advised against sodium bicarbonate. Discussed importance of monitoring kidney function and maintaining hydration.  - Follow up with Dr. Silva on February 25, 2025.  - Monitor kidney function with repeat labs in two weeks.    Anemia complicating neoplastic disease:  Thrombocytopenia:    - Getting weekly labs with labs tomorrow.  - I will see him in two weeks with repeat labs.    Hypertension  On metoprolol 50 mg once daily and furosemide 20 mg twice daily. Followed by Dr. Young. Discussed importance of blood pressure control.  - Continue metoprolol 50 mg once daily.  - Continue furosemide 20 mg twice daily.  - Follow up with Dr. Young at the end of March.    Pain Management  Persistent pain in the right posterior rib cage, managed with Tylenol. Refuses tramadol. Discussed non-opioid pain management strategies.  - Continue Tylenol as needed for pain.    General Health Maintenance  On multiple medications. Not taking aspirin or omega-3 fatty acids. Taking atorvastatin 20 mg daily, calcium citrate with vitamin D3, magnesium oxide 400 mg daily, and potassium chloride 20 mEq daily. Discussed importance of adherence to medication regimen.  - Continue atorvastatin 20 mg daily.  - Continue  calcium citrate with vitamin D3, two tablets twice daily.  - Continue magnesium oxide 400 mg daily.  - Continue potassium chloride 20 mEq daily.    Follow-up  - Follow up with Dr. Good tomorrow.  - Repeat labs in two weeks.  - Tentatively schedule follow-up appointment in two weeks with repeat labs.          Ruiz Frias MD          [1]   Allergies  Allergen Reactions    Pcn [Penicillins] ANAPHYLAXIS, HIVES, HALLUCINATION and SHORTNESS OF BREATH     Respiratory distress    Beta Adrenergic Blockers     Latex      Surgical tape  Welts, burning of skin, itching    Statins

## 2025-02-13 NOTE — PROGRESS NOTES
Patient here for post hospital f/u for multiple myeloma. Patient competed CAR-T on 1-7/25. Patient is in PT at [facility]. Patient states he still has pain in his right rib area. Patient rates pain 4/10. Patient denies dyspnea, cough or fever.     Education Record    Learner:  Patient and Spouse    Disease / Diagnosis: multiple myeloma     Barriers / Limitations:  None   Comments:    Method:  Discussion   Comments:    General Topics:  Medication, Side effects and symptom management, and Plan of care reviewed   Comments:    Outcome:  Shows understanding   Comments:

## 2025-02-14 ENCOUNTER — TELEPHONE (OUTPATIENT)
Age: 75
End: 2025-02-14

## 2025-02-14 NOTE — TELEPHONE ENCOUNTER
Pt spouse Idania calling back to obtain previous appt from 2/27 due to Dr. Barragan informed them patient should see Dr. Frias on this day. This appt was an add on to the  Schedule.       Please contact to confirm if still available.

## 2025-02-14 NOTE — TELEPHONE ENCOUNTER
Pt's wife is calling back to try to get appt back for 2/27/25 at 2:45pm    Please give the pt a call back

## 2025-02-14 NOTE — TELEPHONE ENCOUNTER
Returned patient's wife call regarding 2/27 appt. Advised that I would have our scheduling team add patient back on at 2:45 on 2/27.

## 2025-02-19 ENCOUNTER — DOCUMENTATION ONLY (OUTPATIENT)
Age: 75
End: 2025-02-19

## 2025-02-25 ENCOUNTER — OFFICE VISIT (OUTPATIENT)
Dept: NEPHROLOGY | Facility: CLINIC | Age: 75
End: 2025-02-25
Payer: MEDICARE

## 2025-02-25 VITALS — BODY MASS INDEX: 32 KG/M2 | WEIGHT: 238.13 LBS | DIASTOLIC BLOOD PRESSURE: 76 MMHG | SYSTOLIC BLOOD PRESSURE: 118 MMHG

## 2025-02-25 DIAGNOSIS — N18.30 STAGE 3 CHRONIC KIDNEY DISEASE, UNSPECIFIED WHETHER STAGE 3A OR 3B CKD (HCC): Primary | ICD-10-CM

## 2025-02-25 PROCEDURE — 99213 OFFICE O/P EST LOW 20 MIN: CPT | Performed by: INTERNAL MEDICINE

## 2025-02-25 NOTE — PROGRESS NOTES
Nephrology Progress Note      Nacho Valle is a 74 year old male.    HPI:     No chief complaint on file.      73 yo male with hx of MM, MADDI related to myeloma kidney + severe hypercalcemia, DM, HTN presents for follow up. He was treated in hospital with pamidronate/fluids/plasmapheresis. He also required few treatments of HD.    S/p stem cell tx @ [facility]-  did well for few years  Patient is now on salvage chemo (diagnosed w/ recurrent dx in Jan 2021)  He follows w/ Dr. Frias and Dr. Barragan ([facility])-    He just got discharged from [facility] after planned CAR-T thrapy (Abecma).  Tx course complicated by grade 3 CRS and ICANS- requring tociluzumab x 3 doses; also needed pressor support /steroids.     He is here w/ his wife today    Overall improved  conditioning since his last visit             Medications (Active prior to today's visit):  Current Outpatient Medications   Medication Sig Dispense Refill    acyclovir 400 MG Oral Tab Take 1 tablet (400 mg total) by mouth 2 (two) times daily. 180 tablet 1    atorvastatin 20 MG Oral Tab Take 1 tablet (20 mg total) by mouth daily. 90 tablet 3    furosemide 20 MG Oral Tab Take 1 tablet (20 mg total) by mouth daily. (Patient taking differently: Take 1 tablet (20 mg total) by mouth 2 (two) times daily.) 90 tablet 1    magnesium oxide 400 MG Oral Tab Take 1 tablet (400 mg total) by mouth daily. 90 tablet 1    Calcium Citrate-Vitamin D3 315-6.25 MG-MCG Oral Tab Take 6.25 mg by mouth 2 (two) times daily.      lidocaine-menthol 4-1 % External Patch Place 1 patch onto the skin daily. (Patient not taking: Reported on 2/13/2025)      midodrine 5 MG Oral Tab Take 1 tablet (5 mg total) by mouth in the morning and 1 tablet (5 mg total) at noon and 1 tablet (5 mg total) in the evening. Take 1 Tab by mouth 3 (three) times a day if needed for other ( to systolic blood pressure less than 100). (Patient not taking: Reported on 2/13/2025)      pantoprazole 40 MG Oral Tab EC Take 1 tablet (40 mg total)  by mouth daily. (Patient not taking: Reported on 2/13/2025)      Potassium Chloride ER 20 MEQ Oral Tab CR Take 20 mEq by mouth 2 (two) times daily. (Patient taking differently: Take 20 mEq by mouth daily.)      sulfamethoxazole-trimethoprim 400-80 MG Oral Tab Take 1 tablet by mouth 3 (three) times daily. Take 1 tablet by mouth 3 (three) times daily a week for 47 days      traMADol HCl 25 MG Oral Tab Take 25 mg by mouth As Directed. Take 25 mg by mouth every 8 (eight) hours if needed for mod pain (pain score 4-6) for up to 5 days (Patient not taking: Reported on 2/13/2025)      aspirin (ASPIRIN 81) 81 MG Oral Tab EC Take 1 tablet (81 mg total) by mouth daily. 11/20- Aspirin on hold per Cardiologist (Patient not taking: Reported on 2/13/2025)      cholecalciferol 25 MCG (1000 UT) Oral Tab Take 1 tablet (1,000 Units total) by mouth daily. (Patient not taking: Reported on 2/13/2025)      metoprolol succinate ER 50 MG Oral Tablet 24 Hr Take 1 tablet (50 mg total) by mouth daily.      sodium bicarbonate 650 MG Oral Tab Take 1 tablet (650 mg total) by mouth 2 (two) times daily. (Patient not taking: Reported on 2/13/2025) 60 tablet 3    ONETOUCH ULTRASOFT LANCETS Does not apply Misc TEST BLOOD SUGAR TWICE DAILY 200 each 2    Glucose Blood (ONETOUCH ULTRA) In Vitro Strip CHECK SUGARS THREE TIMES DAILY AS DIRECTED 300 strip 2    omega-3 fatty acids 1000 MG Oral Cap Take 1,000 mg by mouth 2 (two) times daily. (Patient not taking: Reported on 11/19/2024)      Multiple Vitamin Oral Tab Take 1 tablet by mouth.         Allergies:  Allergies   Allergen Reactions    Pcn [Penicillins] ANAPHYLAXIS, HIVES, HALLUCINATION and SHORTNESS OF BREATH     Respiratory distress    Beta Adrenergic Blockers     Latex      Surgical tape  Welts, burning of skin, itching    Statins          ROS:     10 systems reviewed and otherwise unremarkable       PHYSICAL EXAM:   There were no vitals taken for this visit.  Wt Readings from Last 6 Encounters:    02/13/25 232 lb 8 oz (105.5 kg)   01/23/25 235 lb 5 oz (106.7 kg)   01/14/25 227 lb (103 kg)   11/20/24 232 lb 14.4 oz (105.6 kg)   11/19/24 234 lb 4 oz (106.3 kg)   10/25/24 246 lb 8 oz (111.8 kg)       General: Alert and oriented in no apparent distress.   HEENT: No scleral icterus, MMM  Neck: Supple, no ELLIOT or thyromegaly  Cardiac: Regular rate and rhythm, S1, S2 normal, no murmur or rub  Lungs: Clear without wheezes, rales, rhonchi.    Abdomen: Soft, non-tender. + bowel sounds, no palpable organomegaly  Extremities: Without clubbing, cyanosis ; no edema  Neurologic: Alert and oriented, normal affect, cranial nerves grossly intact, moving all extremities  Skin: Warm and dry, no rashes      Labs reviewed      ASSESSMENT/PLAN:       1. Hx of Myeloma kidney disease -   Required plasmapheresis/HD treatments in the hospital  His renal function had recovered  and was able to come off HD  Volume is good today; Cr has gradually risen over the past few years; more recently baseline ~ 2-2,5.   His myeloma relapsed despite slavage tx  He underwent CAR-T tx @ [facility] and is now in remission (see HPI)  - conitnue oncology f/u  - monitor Cr; a big more edema now; asked pt to take extra diuretic ; goal weight ~ 230 #    2. MM-   as above    3. Cardiomyopathy/HTN- stable/compensated; monitor ; cardiac bx negative for amyloid; follows w/ cardiology     - appears compensated ; most of his cardiac meds have been adjusted or discontinued; he plans to f/u with cards in a month again.    4. Acidosis- related to CKD- off bicarb now; monitor     5. Anemia - per oncology; monitor      F/u 3 mos     Francisco Silva MD  2/26/2025

## 2025-02-27 ENCOUNTER — APPOINTMENT (OUTPATIENT)
Age: 75
End: 2025-02-27
Attending: INTERNAL MEDICINE
Payer: MEDICARE

## 2025-02-27 ENCOUNTER — OFFICE VISIT (OUTPATIENT)
Age: 75
End: 2025-02-27
Attending: INTERNAL MEDICINE
Payer: MEDICARE

## 2025-02-27 VITALS
WEIGHT: 232 LBS | HEIGHT: 72.01 IN | TEMPERATURE: 99 F | HEART RATE: 64 BPM | DIASTOLIC BLOOD PRESSURE: 63 MMHG | SYSTOLIC BLOOD PRESSURE: 118 MMHG | RESPIRATION RATE: 18 BRPM | OXYGEN SATURATION: 98 % | BODY MASS INDEX: 31.42 KG/M2

## 2025-02-27 DIAGNOSIS — C90.00 MYELOMA KIDNEY DISEASE (HCC): ICD-10-CM

## 2025-02-27 DIAGNOSIS — M89.9 LYTIC BONE LESIONS ON XRAY: ICD-10-CM

## 2025-02-27 DIAGNOSIS — R79.89 ELEVATED LFTS: ICD-10-CM

## 2025-02-27 DIAGNOSIS — D80.1 HYPOGAMMAGLOBULINEMIA (HCC): ICD-10-CM

## 2025-02-27 DIAGNOSIS — D63.0 ANEMIA COMPLICATING NEOPLASTIC DISEASE: ICD-10-CM

## 2025-02-27 DIAGNOSIS — C90.00 MULTIPLE MYELOMA NOT HAVING ACHIEVED REMISSION (HCC): ICD-10-CM

## 2025-02-27 DIAGNOSIS — N18.30 STAGE 3 CHRONIC KIDNEY DISEASE, UNSPECIFIED WHETHER STAGE 3A OR 3B CKD (HCC): ICD-10-CM

## 2025-02-27 DIAGNOSIS — N08 MYELOMA KIDNEY DISEASE (HCC): ICD-10-CM

## 2025-02-27 LAB
ALBUMIN SERPL-MCNC: 4.4 G/DL (ref 3.2–4.8)
ALBUMIN/GLOB SERPL: 2.1 {RATIO} (ref 1–2)
ALP LIVER SERPL-CCNC: 62 U/L
ALT SERPL-CCNC: 42 U/L
ANION GAP SERPL CALC-SCNC: 5 MMOL/L (ref 0–18)
AST SERPL-CCNC: 37 U/L (ref ?–34)
BASOPHILS # BLD AUTO: 0.01 X10(3) UL (ref 0–0.2)
BASOPHILS NFR BLD AUTO: 0.3 %
BILIRUB SERPL-MCNC: 0.7 MG/DL (ref 0.2–1.1)
BUN BLD-MCNC: 24 MG/DL (ref 9–23)
CALCIUM BLD-MCNC: 9.1 MG/DL (ref 8.7–10.6)
CHLORIDE SERPL-SCNC: 110 MMOL/L (ref 98–112)
CO2 SERPL-SCNC: 26 MMOL/L (ref 21–32)
CREAT BLD-MCNC: 2.09 MG/DL
EGFRCR SERPLBLD CKD-EPI 2021: 32 ML/MIN/1.73M2 (ref 60–?)
EOSINOPHIL # BLD AUTO: 0.05 X10(3) UL (ref 0–0.7)
EOSINOPHIL NFR BLD AUTO: 1.3 %
ERYTHROCYTE [DISTWIDTH] IN BLOOD BY AUTOMATED COUNT: 14.1 %
FASTING STATUS PATIENT QL REPORTED: NO
GLOBULIN PLAS-MCNC: 2.1 G/DL (ref 2–3.5)
GLUCOSE BLD-MCNC: 173 MG/DL (ref 70–99)
HCT VFR BLD AUTO: 31.9 %
HGB BLD-MCNC: 11.3 G/DL
IMM GRANULOCYTES # BLD AUTO: 0.01 X10(3) UL (ref 0–1)
IMM GRANULOCYTES NFR BLD: 0.3 %
LDH SERPL L TO P-CCNC: 214 U/L
LYMPHOCYTES # BLD AUTO: 0.36 X10(3) UL (ref 1–4)
LYMPHOCYTES NFR BLD AUTO: 9.2 %
MCH RBC QN AUTO: 39.4 PG (ref 26–34)
MCHC RBC AUTO-ENTMCNC: 35.4 G/DL (ref 31–37)
MCV RBC AUTO: 111.1 FL
MONOCYTES # BLD AUTO: 0.61 X10(3) UL (ref 0.1–1)
MONOCYTES NFR BLD AUTO: 15.5 %
NEUTROPHILS # BLD AUTO: 2.89 X10 (3) UL (ref 1.5–7.7)
NEUTROPHILS # BLD AUTO: 2.89 X10(3) UL (ref 1.5–7.7)
NEUTROPHILS NFR BLD AUTO: 73.4 %
OSMOLALITY SERPL CALC.SUM OF ELEC: 300 MOSM/KG (ref 275–295)
PLATELET # BLD AUTO: 116 10(3)UL (ref 150–450)
POTASSIUM SERPL-SCNC: 4.2 MMOL/L (ref 3.5–5.1)
PROT SERPL-MCNC: 6.5 G/DL (ref 5.7–8.2)
RBC # BLD AUTO: 2.87 X10(6)UL
SODIUM SERPL-SCNC: 141 MMOL/L (ref 136–145)
WBC # BLD AUTO: 3.9 X10(3) UL (ref 4–11)

## 2025-02-27 RX ORDER — ACETAMINOPHEN 325 MG/1
650 TABLET ORAL ONCE
OUTPATIENT
Start: 2025-02-27 | End: 2025-02-27

## 2025-02-27 RX ORDER — DIPHENHYDRAMINE HCL 25 MG
25 CAPSULE ORAL ONCE
OUTPATIENT
Start: 2025-02-27 | End: 2025-02-27

## 2025-02-27 RX ORDER — METHYLPREDNISOLONE SODIUM SUCCINATE 40 MG/ML
40 INJECTION INTRAMUSCULAR; INTRAVENOUS ONCE
OUTPATIENT
Start: 2025-02-27 | End: 2025-02-27

## 2025-02-27 NOTE — PROGRESS NOTES
Cancer Center Progress Note    Problem List:      Patient Active Problem List   Diagnosis    Impotence of organic origin    Generalized osteoarthrosis of hand    Morbid obesity (HCC)    Essential hypertension    Hypertension    MADDI (acute kidney injury)    Multiple myeloma (HCC)    Myeloma kidney disease (HCC)    Myeloma kidney (HCC)    Hx of stem cell transplant (HCC)    Hypoxia    Multiple myeloma, remission status unspecified (HCC)    Type 2 diabetes mellitus with hyperglycemia, without long-term current use of insulin (HCC)    Cardiomyopathy, unspecified type (HCC)    Morbid (severe) obesity due to excess calories (HCC)    Elevated LFTs    Weakness generalized    Community acquired pneumonia, unspecified laterality    Anemia, unspecified type    Renal insufficiency    Acute cystitis without hematuria    Chronic heart failure with preserved ejection fraction (HCC)    CKD stage 3b, GFR 30-44 ml/min (HCC)    Anemia complicating neoplastic disease    Thrombocytopenia    CKD (chronic kidney disease) stage 4, GFR 15-29 ml/min (HCC)         History of Present Illness  Nacho Valle is a 74 year old male with low antibody levels post-CAR T therapy who presents for follow-up and continuation of IVIG treatment.    He has low antibody levels following CAR T therapy and is receiving monthly IVIG infusions to maintain antibody levels and reduce infection risk. His last infusion was in January 2025, and he is due for another soon. He describes the IVIG infusion as a 'big bag' similar to a regular saline bag.    He is currently in remission with no melanoma markers found on his kappa lambdas.    He reports a tender scalp but denies any recent falls. No further details about the scalp tenderness were provided during this visit.    Recent blood work shows a white blood cell count of 3.9, hemoglobin of 11.3, platelets at 116, LDH at 214, BUN at 24, and creatinine at 2.09.      Review of Systems:   Constitutional: Negative for  fevers, chills, night sweats and weight loss.  Eyes: Negative for visual disturbance, irritation and redness.  Cardiovascular: Negative for angina, orthopnea or palpitations.  Gastrointestinal: Negative for nausea, vomiting, change in bowel habits, diarrhea, constipation and abdominal pain.  Integument/breast: Negative for rash, skin lesions, and pruritus.  Hematologic/lymphatic: Negative for easy bruising, bleeding, and lymphadenopathy.  Genitourinary: Negative for dysuria   The pertinent positives and negatives were described. All other systems were negative.    PMH/PSH:  Past Medical History:    Allergic rhinitis, cause unspecified    BCC (basal cell carcinoma of skin)    s/p surgical excision    BPH (benign prostatic hyperplasia)    Colon polyps    Generalized osteoarthrosis, involving hand    Herniation of intervertebral disc between L4 and L5    s/p surgical repair    High blood pressure    High cholesterol    Impotence of organic origin    Morbid obesity (HCC)    Osteoarthritis    Osteoarthrosis, unspecified whether generalized or localized, lower leg    Log Date: 08/21/2012     Other and unspecified hyperlipidemia    Pneumonia due to organism    Prostate cancer (HCC)    s/p total prostatectomy and radiation    Trigger finger (acquired)    Type II or unspecified type diabetes mellitus without mention of complication, not stated as uncontrolled    Unspecified essential hypertension    Unspecified internal derangement of knee    Log Date: 08/21/2012        Past Surgical History:   Procedure Laterality Date    Back surgery  1/2001    lower back L4 L5 for herniated disc    Colonoscopy      202, 2012 Dr. Wolf, polyps    Other surgical history  1/31/2007    total prostatectomy    Skin surgery  1997    BCC       Family History Reviewed:  Family History   Problem Relation Age of Onset    Diabetes Other         family hx    Hypertension Other         family hx    Prostate Cancer Father        Allergies:      Allergies[1]    Medications:   acyclovir 400 MG Oral Tab Take 1 tablet (400 mg total) by mouth 2 (two) times daily. 180 tablet 1    atorvastatin 20 MG Oral Tab Take 1 tablet (20 mg total) by mouth daily. 90 tablet 3    furosemide 20 MG Oral Tab Take 1 tablet (20 mg total) by mouth daily. (Patient taking differently: Take 1 tablet (20 mg total) by mouth 3 (three) times daily.) 90 tablet 1    magnesium oxide 400 MG Oral Tab Take 1 tablet (400 mg total) by mouth daily. 90 tablet 1    Potassium Chloride ER 20 MEQ Oral Tab CR Take 20 mEq by mouth 2 (two) times daily. (Patient taking differently: Take 20 mEq by mouth daily.)      sulfamethoxazole-trimethoprim 400-80 MG Oral Tab Take 1 tablet by mouth once daily. Take 1 tablet by mouth 3 (three) times a week      metoprolol succinate ER 50 MG Oral Tablet 24 Hr Take 1 tablet (50 mg total) by mouth daily.      ONETOUCH ULTRASOFT LANCETS Does not apply Misc TEST BLOOD SUGAR TWICE DAILY 200 each 2    Glucose Blood (ONETOUCH ULTRA) In Vitro Strip CHECK SUGARS THREE TIMES DAILY AS DIRECTED 300 strip 2         Vital Signs:      Height: 182.9 cm (6' 0.01\") (02/27 1424)  Weight: 105.2 kg (232 lb) (02/27 1424)  BSA (Calculated - sq m): 2.27 sq meters (02/27 1424)  Pulse: 64 (02/27 1424)  BP: 118/63 (02/27 1424)  Temp: 98.5 °F (36.9 °C) (02/27 1424)  Do Not Use - Resp Rate: --  SpO2: 98 % (02/27 1424)      Performance Status:  ECOG 1: Restricted in physically strenuous activity but ambulatory and able to carry out work of a light or sedentary nature.     Physical Examination:      Constitutional: Patient is alert and oriented x 3, not in acute distress.  Respiratory: Clear to auscultation and percussion. No rales.  No wheezes.  Cardiovascular: Regular rate and rhythm. No murmurs.  Gastrointestinal: Soft, non tender with good bowel sounds.  Musculoskeletal: No edema. No calf tenderness.  Psychiatric: The patient's mood is calm and appropriate for this visit.           Results  reviewed at this visit:     Lab Results   Component Value Date    WBC 3.9 (L) 02/27/2025    RBC 2.87 (L) 02/27/2025    HGB 11.3 (L) 02/27/2025    HCT 31.9 (L) 02/27/2025    .1 (H) 02/27/2025    MCH 39.4 (H) 02/27/2025    MCHC 35.4 02/27/2025    RDW 14.1 02/27/2025    .0 (L) 02/27/2025     Lab Results   Component Value Date     02/27/2025    K 4.2 02/27/2025     02/27/2025    CO2 26.0 02/27/2025    BUN 24 (H) 02/27/2025    CREATSERUM 2.09 (H) 02/27/2025     (H) 02/27/2025    CA 9.1 02/27/2025    ALKPHO 62 02/27/2025    ALT 42 02/27/2025    AST 37 (H) 02/27/2025    BILT 0.7 02/27/2025    ALB 4.4 02/27/2025    TP 6.5 02/27/2025       Results  LABS  CBC: WBC 3.9, Hb 11.3,  (02/27/2025)  LDH: 214 (02/27/2025)  Chemistry panel: BUN 24, Cr 2.09 (02/27/2025)         Assessment & Plan  Multiple Myeloma  Undergoing CAR T-cell therapy [place] (idecabtagene vicleucel). Received fludarabine and cyclophosphamide lymphodepletion regimen from November 27-29, 2024, followed by CAR T-cell infusion on December 2, 2024. Experienced grade 3 CRS requiring tocilizumab on December 2-4, 2024, and norepinephrine support from December 3-6, 2024. Maximum grade 1 ICANS treated with dexamethasone on December 4, 2024. PET scan on February 7, 2025, showed marked interval decrease in the size of the soft tissue lesion and mild diffuse FDG uptake, indicating a positive response. Serum protein electrophoresis on January 17, 2025, showed no evidence of monoclonal protein, suggesting remission. Discussed potential for durable remission and need for ongoing monitoring.  - Continue outpatient physical therapy at three times a week.  - Continue prophylactic treatment with acyclovir and Bactrim.  Currently in remission. Recent PET scan showed no active disease. Persistently low antibody levels post-CAR T therapy, managed with monthly IVIG infusions to minimize infection risk.  - Continue monthly IVIG infusions  -  Schedule follow-up PET scan in three to four months  - Follow-up with Dr. Good in three to four months    Chronic Kidney Disease  Baseline creatinine around 2.0, increased to 3.7 during hospitalization for CAR T therapy, currently 2.1. Under the care of Dr. Silva, who advised against sodium bicarbonate. Discussed importance of monitoring kidney function and maintaining hydration.  - Follow up with Dr. Silva      Anemia complicating neoplastic disease:  Thrombocytopenia:     - Repeat labs at next visit in about 6 weeks    Hypertension  On metoprolol 50 mg once daily and furosemide 20 mg twice daily. Followed by Dr. Young. Discussed importance of blood pressure control.  - Continue metoprolol 50 mg once daily.  - Continue furosemide 20 mg twice daily.  - Follow up with Dr. Young at the end of March.    Pain Management  Persistent pain in the right posterior rib cage, managed with Tylenol. Refuses tramadol. Discussed non-opioid pain management strategies.  - Continue Tylenol as needed for pain.      General Health Maintenance  On multiple medications. Not taking aspirin or omega-3 fatty acids. Taking atorvastatin 20 mg daily, calcium citrate with vitamin D3, magnesium oxide 400 mg daily, and potassium chloride 20 mEq daily. Discussed importance of adherence to medication regimen.  - Continue atorvastatin 20 mg daily.  - Continue calcium citrate with vitamin D3, two tablets twice daily.  - Continue magnesium oxide 400 mg daily.  - Continue potassium chloride 20 mEq daily.      Follow up:  - Return to see me in 5 to 6 weeks for monthly IVIG and labs           The following individual(s) verbally consented to be recorded using ambient AI listening technology and understand that they can each withdraw their consent to this listening technology at any point by asking the clinician to turn off or pause the recording:    Patient name: Nacho LUIZ Leonard  Other: Idania Frias MD          [1]    Allergies  Allergen Reactions    Pcn [Penicillins] ANAPHYLAXIS, HIVES, HALLUCINATION and SHORTNESS OF BREATH     Respiratory distress    Beta Adrenergic Blockers     Latex      Surgical tape  Welts, burning of skin, itching    Statins

## 2025-02-27 NOTE — PROGRESS NOTES
Patient here for f/u. Patient is currently in PT/OT. Patient states he has mild pain in his right mid back and shoulder. Patient states that the pain increases with PT. Patient denies fever, dyspnea, cough, fatigue or bleeding issues. Patient has no further concerns or complaints at this time.     Education Record    Learner:  Patient and Spouse    Disease / Diagnosis: multiple myeloma     Barriers / Limitations:  None   Comments:    Method:  Discussion   Comments:    General Topics:  Medication, Side effects and symptom management, and Plan of care reviewed   Comments:    Outcome:  Shows understanding   Comments:

## 2025-02-28 ENCOUNTER — TELEPHONE (OUTPATIENT)
Age: 75
End: 2025-02-28

## 2025-03-07 ENCOUNTER — APPOINTMENT (OUTPATIENT)
Age: 75
End: 2025-03-07
Attending: INTERNAL MEDICINE
Payer: MEDICARE

## 2025-03-07 ENCOUNTER — TELEPHONE (OUTPATIENT)
Age: 75
End: 2025-03-07

## 2025-03-11 ENCOUNTER — TELEPHONE (OUTPATIENT)
Dept: FAMILY MEDICINE CLINIC | Facility: CLINIC | Age: 75
End: 2025-03-11

## 2025-03-11 RX ORDER — FUROSEMIDE 20 MG/1
20 TABLET ORAL 2 TIMES DAILY
Qty: 180 TABLET | Refills: 0 | Status: SHIPPED | OUTPATIENT
Start: 2025-03-11

## 2025-03-11 NOTE — TELEPHONE ENCOUNTER
Attempted to call patient and wife on home number. Asked for them to call back office and ask to speak with nurse regarding questions about the lasix dose. Call back number provided.

## 2025-03-11 NOTE — TELEPHONE ENCOUNTER
Pt's wife in office.   States pt had increased Lasix dose to 20mg bid when at [facility] and after recent f/u with Nephrology took tid for a few days as had some increased edema,  and now current weight is 228 lbs, ie below goal weight of 230lbs.   Current dose is 20mg bid and ran out meds, requesting new refill.  Med sent to pharmacy as requested.

## 2025-03-11 NOTE — TELEPHONE ENCOUNTER
Spouse Mrs Valle has appointment today and has questions about patient medication for furosemide 20 MG Oral   Would like to know how often patient should be talking medication , Patient has been seen in hospital and was given a different amount , spouse stated that patient should not be taking medication three time a day because is has ran out of medication.  Please advise correct Dosage amount and if new rx is needed.

## 2025-03-12 ENCOUNTER — SOCIAL WORK SERVICES (OUTPATIENT)
Age: 75
End: 2025-03-12

## 2025-03-12 ENCOUNTER — OFFICE VISIT (OUTPATIENT)
Age: 75
End: 2025-03-12
Attending: INTERNAL MEDICINE
Payer: MEDICARE

## 2025-03-12 VITALS
SYSTOLIC BLOOD PRESSURE: 114 MMHG | RESPIRATION RATE: 18 BRPM | WEIGHT: 232.5 LBS | OXYGEN SATURATION: 100 % | HEIGHT: 72.01 IN | HEART RATE: 46 BPM | BODY MASS INDEX: 31.49 KG/M2 | DIASTOLIC BLOOD PRESSURE: 47 MMHG | TEMPERATURE: 97 F

## 2025-03-12 DIAGNOSIS — C90.00 MULTIPLE MYELOMA, REMISSION STATUS UNSPECIFIED (HCC): Primary | ICD-10-CM

## 2025-03-12 RX ORDER — ACETAMINOPHEN 325 MG/1
650 TABLET ORAL ONCE
OUTPATIENT
Start: 2025-03-26 | End: 2025-03-26

## 2025-03-12 RX ORDER — METHYLPREDNISOLONE SODIUM SUCCINATE 40 MG/ML
40 INJECTION INTRAMUSCULAR; INTRAVENOUS ONCE
Status: COMPLETED | OUTPATIENT
Start: 2025-03-12 | End: 2025-03-12

## 2025-03-12 RX ORDER — METHYLPREDNISOLONE SODIUM SUCCINATE 40 MG/ML
40 INJECTION INTRAMUSCULAR; INTRAVENOUS ONCE
OUTPATIENT
Start: 2025-03-26 | End: 2025-03-26

## 2025-03-12 RX ORDER — DIPHENHYDRAMINE HCL 25 MG
25 CAPSULE ORAL ONCE
OUTPATIENT
Start: 2025-03-26 | End: 2025-03-26

## 2025-03-12 RX ORDER — DIPHENHYDRAMINE HCL 25 MG
25 CAPSULE ORAL ONCE
Status: COMPLETED | OUTPATIENT
Start: 2025-03-12 | End: 2025-03-12

## 2025-03-12 RX ORDER — ACETAMINOPHEN 325 MG/1
650 TABLET ORAL ONCE
Status: COMPLETED | OUTPATIENT
Start: 2025-03-12 | End: 2025-03-12

## 2025-03-12 RX ADMIN — DIPHENHYDRAMINE HCL 25 MG: 25 MG CAPSULE ORAL at 09:12:00

## 2025-03-12 RX ADMIN — METHYLPREDNISOLONE SODIUM SUCCINATE 40 MG: 40 INJECTION INTRAMUSCULAR; INTRAVENOUS at 09:13:00

## 2025-03-12 RX ADMIN — ACETAMINOPHEN 650 MG: 325 TABLET ORAL at 09:12:00

## 2025-03-12 NOTE — PROGRESS NOTES
Education Record    Learner:  Patient    Pt here for: IVIG infusion    Barriers / Limitations:  None    Method:  Brief focused discussion and reinforcement    General Topics:  Plan of care reviewed    Outcome: Pt arrived ambulating with cane accompanied by self. Pt tolerated infusion with no c/o. Discharged home in stable condition - AVS provided.

## 2025-03-13 ENCOUNTER — APPOINTMENT (OUTPATIENT)
Age: 75
End: 2025-03-13
Attending: INTERNAL MEDICINE
Payer: MEDICARE

## 2025-03-19 ENCOUNTER — MED REC SCAN ONLY (OUTPATIENT)
Dept: FAMILY MEDICINE CLINIC | Facility: CLINIC | Age: 75
End: 2025-03-19

## 2025-04-03 ENCOUNTER — APPOINTMENT (OUTPATIENT)
Age: 75
End: 2025-04-03
Attending: INTERNAL MEDICINE
Payer: MEDICARE

## 2025-04-09 ENCOUNTER — OFFICE VISIT (OUTPATIENT)
Age: 75
End: 2025-04-09
Attending: INTERNAL MEDICINE
Payer: MEDICARE

## 2025-04-09 ENCOUNTER — SOCIAL WORK SERVICES (OUTPATIENT)
Age: 75
End: 2025-04-09

## 2025-04-09 VITALS
RESPIRATION RATE: 18 BRPM | DIASTOLIC BLOOD PRESSURE: 72 MMHG | HEIGHT: 72.01 IN | WEIGHT: 234.5 LBS | HEART RATE: 57 BPM | BODY MASS INDEX: 31.76 KG/M2 | SYSTOLIC BLOOD PRESSURE: 128 MMHG | OXYGEN SATURATION: 93 % | TEMPERATURE: 97 F

## 2025-04-09 DIAGNOSIS — C90.00 MULTIPLE MYELOMA, REMISSION STATUS UNSPECIFIED (HCC): Primary | ICD-10-CM

## 2025-04-09 RX ORDER — METHYLPREDNISOLONE SODIUM SUCCINATE 40 MG/ML
40 INJECTION INTRAMUSCULAR; INTRAVENOUS ONCE
OUTPATIENT
Start: 2025-05-01 | End: 2025-05-01

## 2025-04-09 RX ORDER — METHYLPREDNISOLONE SODIUM SUCCINATE 40 MG/ML
40 INJECTION INTRAMUSCULAR; INTRAVENOUS ONCE
Status: COMPLETED | OUTPATIENT
Start: 2025-04-09 | End: 2025-04-09

## 2025-04-09 RX ORDER — DIPHENHYDRAMINE HCL 25 MG
25 CAPSULE ORAL ONCE
OUTPATIENT
Start: 2025-05-01 | End: 2025-05-01

## 2025-04-09 RX ORDER — ACETAMINOPHEN 325 MG/1
650 TABLET ORAL ONCE
OUTPATIENT
Start: 2025-05-01 | End: 2025-05-01

## 2025-04-09 RX ORDER — ACETAMINOPHEN 325 MG/1
650 TABLET ORAL ONCE
Status: COMPLETED | OUTPATIENT
Start: 2025-04-09 | End: 2025-04-09

## 2025-04-09 RX ORDER — DIPHENHYDRAMINE HCL 25 MG
25 CAPSULE ORAL ONCE
Status: COMPLETED | OUTPATIENT
Start: 2025-04-09 | End: 2025-04-09

## 2025-04-09 RX ADMIN — ACETAMINOPHEN 650 MG: 325 TABLET ORAL at 08:50:00

## 2025-04-09 RX ADMIN — DIPHENHYDRAMINE HCL 25 MG: 25 MG CAPSULE ORAL at 08:50:00

## 2025-04-09 RX ADMIN — METHYLPREDNISOLONE SODIUM SUCCINATE 40 MG: 40 INJECTION INTRAMUSCULAR; INTRAVENOUS at 08:51:00

## 2025-04-09 NOTE — PROGRESS NOTES
Pt here for IVIG . Pt denies any issues or concerns.      Ordering Provider: Rodriguez Benitez Exp: N/a     Pt tolerated infusion without difficulty or complaint. Reviewed next apt date/time: yes      Education Record  Learner:  Patient  Disease / Diagnosis: MM  Barriers / Limitations:  None  Method:  Brief focused and Discussion  General Topics:  Medication and Plan of care reviewed  Outcome:  Shows understanding     Patient  dc'd home in stable condition.

## 2025-04-10 ENCOUNTER — APPOINTMENT (OUTPATIENT)
Age: 75
End: 2025-04-10
Attending: INTERNAL MEDICINE
Payer: MEDICARE

## 2025-04-11 ENCOUNTER — TELEPHONE (OUTPATIENT)
Age: 75
End: 2025-04-11

## 2025-04-11 RX ORDER — MELATONIN
1 DAILY
Qty: 90 TABLET | Refills: 0 | OUTPATIENT
Start: 2025-04-11

## 2025-04-11 NOTE — TELEPHONE ENCOUNTER
Talked to patient's wife regarding PET scan. Patient's wife stated that Dr Barragan wanted patient to get PET scan from the same machine at [facility] from his last PET scan. Patient's wife will call with date of PET and coordinate that with Dr Frias's f/u so he has results when he sees patient in May.

## 2025-04-17 ENCOUNTER — TELEPHONE (OUTPATIENT)
Age: 75
End: 2025-04-17

## 2025-04-17 NOTE — TELEPHONE ENCOUNTER
Pt wife is calling stating Pt is scheduled for Pet scan 5/20 at another location.      Please give Pt wife a call back,

## 2025-04-17 NOTE — TELEPHONE ENCOUNTER
Returned patient's wife call regarding rescheduling patient's f/u after completion of PET scan at [facility]. Sent message to PSRs to reschedule.

## 2025-04-18 ENCOUNTER — TELEPHONE (OUTPATIENT)
Age: 75
End: 2025-04-18

## 2025-04-20 NOTE — PROGRESS NOTES
Diagnosis:   Lumbar pain (M54.50)  Chronic left-sided thoracic back pain (M54.6,G89.29)  Muscle weakness on examination (M62.81)  Leg heaviness (R29.898)  Weakness of both lower extremities (R29.898)  Hyperreflexia (R29.2)  Multiple myeloma, remission status unspecified (HCC) (C90.00)  Compression fracture of body of thoracic vertebra (HCC) (S22.000A)         Referring Provider: Nohemi Tirado  Date of Evaluation:    4/4/2024    Precautions:  None Next MD visit:   none scheduled  Date of Surgery: n/a   Insurance Primary/Secondary: MEDICARE / [employer] [place] INDEMNITY     # Auth Visits: n/a            Subjective: Reports feeling tired today, 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 Some soreness after his initial eval last week. Regular pain in the LB/midback to the trunk area.    Pain: 3/10      Objective: Gait deviation and balance deficits. Patient had to stabilize himself when he first got up from sitting. Difficulty with bed mobility, rolling isde to side.  Vital signs after Thera -ex  BP: 110/74  O2 sat - 96, ND -78      Assessment: Initiated therapeutic exercises today. SBA for balance exercises for safety. Treatment time includes explanation of intended effect of each intervention, instruction before and during exercises, including cues for proper form and performance,Cues to control movements,  assessment of patient's response to each activity and education to improve performance of active intervention and good comprehension of treatment plan to improve patient participation and compliance. Intervention adjusted to patient's tolerance throughout session duration. Provided written copy of HEP.       Goals:   Goals: (to be met in 8 visits)   Pt will improve transversus abdominis recruitment to perform proper isometric contraction without requiring verbal or tactile cuing to promote advancement of therex, improvement of core muscles strength to promote lumbar stability with activities that include bending, lifting and  increased LE activity  Pt will demonstrate good understanding of proper posture and body mechanics to decrease pain and improve spinal safety   Pt will have improvement of muscles flexibility to allow increase ease with movement and improve positional tolerance  Pt will demonstrate improved core strength, hips and abdominal muscles strength to 4/5 and improve kinsesthetic and proprioceptive awareness to be able decreased pain to <2/10 with sitting, standing, walking, bending, carrying and lifting, especially toward end of the day  Patient will have improvement with overall mobility with decreased risk for falls, with score of less than 15 secs for 5 STST and less than 14 secs for TUG  Patient will be able to tolerate 45-60 mins of NMR, therapeutic exercises and therapeutic activities with good stability, stable vital signs, and no evidence of fatigue.  Pt will be independent and compliant with comprehensive HEP to maintain progress achieved in PT, and be able to utilize HEP to manage future exacerbations       Plan: Continue PT as per established POC. Assess response to today's visit, since patient will be starting chemo end of the week, adjust to tolerance.Check vital signs before tx.  Date: 4/9/2024  TX#: 2/8 Date:                 TX#: 3/ Date:                 TX#: 4/ Date:                 TX#: 5/ Date:   Tx#: 6/   Nu step L3, 5 mins  Calf stretches 2x 30 secs on incline board  Heel Raises and Toe Raises x 20  Romberg EC x 30 secs  March with 1 UE support x 1 min  Romberg EO and EC x 30 secs each, airex  March on airex x 1 min with 1 UE support  Alternating step taps on 4\" step no UE support x 10 each  3 way stepping x 10 each  Standing hip abduction x 10  Standing hip extension x 20  Hamstrings curls x 20  Sitting LAQ x 20  Supine:   Heel slides x 20  TA brace x 10 with 5 secs   Gluteal sets x 10 with 5 secs  TA brace with march x 10  TA brace with march x 10  SL clams x 15                                HEP:  Access Code: [email or web address]  URL: [email or web address]  Date: 04/09/2024  Prepared by: Fang Hein    Exercises  - Heel Toe Raises with Counter Support  - 1 x daily - 7 x weekly - 2 sets - 10 reps  - Standing March with Counter Support  - 1 x daily - 7 x weekly - 2 sets - 10 reps  - Standing Knee Flexion with Counter Support  - 1 x daily - 7 x weekly - 2 sets - 10 reps  - Standing Hip Extension with Unilateral Counter Support  - 1 x daily - 7 x weekly - 2 sets - 10 reps  - Standing Hip Abduction with Counter Support  - 1 x daily - 7 x weekly - 2 sets - 10 reps  - Seated Long Arc Quad  - 1 x daily - 7 x weekly - 2 sets - 10 reps  - Supine Heel Slide  - 1 x daily - 7 x weekly - 1-2 sets - 10 reps  - Supine Transversus Abdominis Bracing - Hands on Stomach  - 1 x daily - 7 x weekly - 1-2 sets - 10 reps - 5 secs hold  - Clamshell  - 2 x daily - 7 x weekly - 1-2 sets - 10 reps    Charges: Thera ex -3       Total Timed Treatment: 42 min  Total Treatment Time: 42 min   no rash/no itching

## 2025-04-24 ENCOUNTER — LAB ENCOUNTER (OUTPATIENT)
Dept: LAB | Age: 75
End: 2025-04-24
Attending: FAMILY MEDICINE
Payer: MEDICARE

## 2025-04-24 ENCOUNTER — OFFICE VISIT (OUTPATIENT)
Dept: FAMILY MEDICINE CLINIC | Facility: CLINIC | Age: 75
End: 2025-04-24
Payer: MEDICARE

## 2025-04-24 VITALS
TEMPERATURE: 98 F | BODY MASS INDEX: 31.42 KG/M2 | DIASTOLIC BLOOD PRESSURE: 72 MMHG | WEIGHT: 232 LBS | RESPIRATION RATE: 16 BRPM | OXYGEN SATURATION: 97 % | SYSTOLIC BLOOD PRESSURE: 116 MMHG | HEIGHT: 72 IN | HEART RATE: 66 BPM

## 2025-04-24 DIAGNOSIS — R39.9 UTI SYMPTOMS: Primary | ICD-10-CM

## 2025-04-24 DIAGNOSIS — E11.65 TYPE 2 DIABETES MELLITUS WITH HYPERGLYCEMIA, WITHOUT LONG-TERM CURRENT USE OF INSULIN (HCC): ICD-10-CM

## 2025-04-24 DIAGNOSIS — N18.30 STAGE 3 CHRONIC KIDNEY DISEASE, UNSPECIFIED WHETHER STAGE 3A OR 3B CKD (HCC): ICD-10-CM

## 2025-04-24 DIAGNOSIS — R53.1 WEAKNESS GENERALIZED: ICD-10-CM

## 2025-04-24 DIAGNOSIS — R39.9 UTI SYMPTOMS: ICD-10-CM

## 2025-04-24 LAB
ALBUMIN SERPL-MCNC: 4.9 G/DL (ref 3.2–4.8)
ALBUMIN/GLOB SERPL: 1.8 {RATIO} (ref 1–2)
ALP LIVER SERPL-CCNC: 96 U/L (ref 45–117)
ALT SERPL-CCNC: 35 U/L (ref 10–49)
ANION GAP SERPL CALC-SCNC: 10 MMOL/L (ref 0–18)
APPEARANCE: CLEAR
AST SERPL-CCNC: 36 U/L (ref ?–34)
BASOPHILS # BLD AUTO: 0.02 X10(3) UL (ref 0–0.2)
BASOPHILS NFR BLD AUTO: 0.3 %
BILIRUB SERPL-MCNC: 0.3 MG/DL (ref 0.2–1.1)
BILIRUB UR QL STRIP.AUTO: NEGATIVE
BILIRUBIN: NEGATIVE
BUN BLD-MCNC: 26 MG/DL (ref 9–23)
CALCIUM BLD-MCNC: 9.9 MG/DL (ref 8.7–10.6)
CHLORIDE SERPL-SCNC: 104 MMOL/L (ref 98–112)
CLARITY UR REFRACT.AUTO: CLEAR
CO2 SERPL-SCNC: 25 MMOL/L (ref 21–32)
CREAT BLD-MCNC: 2.04 MG/DL (ref 0.7–1.3)
EGFRCR SERPLBLD CKD-EPI 2021: 33 ML/MIN/1.73M2 (ref 60–?)
EOSINOPHIL # BLD AUTO: 0.05 X10(3) UL (ref 0–0.7)
EOSINOPHIL NFR BLD AUTO: 0.7 %
ERYTHROCYTE [DISTWIDTH] IN BLOOD BY AUTOMATED COUNT: 11.8 %
EST. AVERAGE GLUCOSE BLD GHB EST-MCNC: 126 MG/DL (ref 68–126)
FASTING STATUS PATIENT QL REPORTED: NO
GLOBULIN PLAS-MCNC: 2.8 G/DL (ref 2–3.5)
GLUCOSE (URINE DIPSTICK): 250 MG/DL
GLUCOSE BLD-MCNC: 124 MG/DL (ref 70–99)
GLUCOSE UR STRIP.AUTO-MCNC: 300 MG/DL
HBA1C MFR BLD: 6 % (ref ?–5.7)
HCT VFR BLD AUTO: 36 % (ref 39–53)
HGB BLD-MCNC: 12.2 G/DL (ref 13–17.5)
IMM GRANULOCYTES # BLD AUTO: 0.01 X10(3) UL (ref 0–1)
IMM GRANULOCYTES NFR BLD: 0.1 %
KETONES (URINE DIPSTICK): NEGATIVE MG/DL
KETONES UR STRIP.AUTO-MCNC: NEGATIVE MG/DL
LEUKOCYTE ESTERASE UR QL STRIP.AUTO: NEGATIVE
LEUKOCYTES: NEGATIVE
LYMPHOCYTES # BLD AUTO: 0.36 X10(3) UL (ref 1–4)
LYMPHOCYTES NFR BLD AUTO: 4.8 %
MCH RBC QN AUTO: 36.4 PG (ref 26–34)
MCHC RBC AUTO-ENTMCNC: 33.9 G/DL (ref 31–37)
MCV RBC AUTO: 107.5 FL (ref 80–100)
MONOCYTES # BLD AUTO: 0.73 X10(3) UL (ref 0.1–1)
MONOCYTES NFR BLD AUTO: 9.7 %
MULTISTIX LOT#: ABNORMAL NUMERIC
NEUTROPHILS # BLD AUTO: 6.38 X10 (3) UL (ref 1.5–7.7)
NEUTROPHILS # BLD AUTO: 6.38 X10(3) UL (ref 1.5–7.7)
NEUTROPHILS NFR BLD AUTO: 84.4 %
NITRITE UR QL STRIP.AUTO: NEGATIVE
NITRITE, URINE: NEGATIVE
OSMOLALITY SERPL CALC.SUM OF ELEC: 294 MOSM/KG (ref 275–295)
PH UR STRIP.AUTO: 5.5 [PH] (ref 5–8)
PH, URINE: 5.5 (ref 4.5–8)
PLATELET # BLD AUTO: 213 10(3)UL (ref 150–450)
POTASSIUM SERPL-SCNC: 4.3 MMOL/L (ref 3.5–5.1)
PROT SERPL-MCNC: 7.7 G/DL (ref 5.7–8.2)
PROT UR STRIP.AUTO-MCNC: 50 MG/DL
PROTEIN (URINE DIPSTICK): >=300 MG/DL
RBC # BLD AUTO: 3.35 X10(6)UL (ref 3.8–5.8)
SODIUM SERPL-SCNC: 139 MMOL/L (ref 136–145)
SP GR UR STRIP.AUTO: 1.02 (ref 1–1.03)
SPECIFIC GRAVITY: >=1.03 (ref 1–1.03)
URINE-COLOR: YELLOW
UROBILINOGEN UR STRIP.AUTO-MCNC: NORMAL MG/DL
UROBILINOGEN,SEMI-QN: 0.2 MG/DL (ref 0–1.9)
WBC # BLD AUTO: 7.6 X10(3) UL (ref 4–11)

## 2025-04-24 PROCEDURE — 80053 COMPREHEN METABOLIC PANEL: CPT

## 2025-04-24 PROCEDURE — 99214 OFFICE O/P EST MOD 30 MIN: CPT | Performed by: FAMILY MEDICINE

## 2025-04-24 PROCEDURE — 81003 URINALYSIS AUTO W/O SCOPE: CPT | Performed by: FAMILY MEDICINE

## 2025-04-24 PROCEDURE — 83036 HEMOGLOBIN GLYCOSYLATED A1C: CPT

## 2025-04-24 PROCEDURE — 81001 URINALYSIS AUTO W/SCOPE: CPT | Performed by: FAMILY MEDICINE

## 2025-04-24 PROCEDURE — 85025 COMPLETE CBC W/AUTO DIFF WBC: CPT

## 2025-04-24 PROCEDURE — 36415 COLL VENOUS BLD VENIPUNCTURE: CPT

## 2025-04-24 RX ORDER — MUPIROCIN 20 MG/G
OINTMENT TOPICAL
COMMUNITY

## 2025-04-24 RX ORDER — LEVOFLOXACIN 250 MG/1
TABLET, FILM COATED ORAL
COMMUNITY
Start: 2024-11-26 | End: 2025-04-24

## 2025-04-24 RX ORDER — LEVOFLOXACIN 250 MG/1
250 TABLET, FILM COATED ORAL DAILY
Qty: 7 TABLET | Refills: 0 | Status: SHIPPED | OUTPATIENT
Start: 2025-04-24

## 2025-04-24 RX ORDER — LEVETIRACETAM 500 MG/1
500 TABLET ORAL 2 TIMES DAILY
COMMUNITY
Start: 2025-01-07 | End: 2025-04-24

## 2025-04-24 RX ORDER — GLUC/MSM/COLGN2/HYAL/ANTIARTH3 375-375-20
TABLET ORAL
COMMUNITY
Start: 2025-01-24 | End: 2025-04-24

## 2025-04-24 RX ORDER — ACETAMINOPHEN 160 MG/5ML
LIQUID ORAL
COMMUNITY
Start: 2025-01-07 | End: 2025-04-24
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
## 2025-04-24 NOTE — PROGRESS NOTES
The following individual(s) verbally consented to be recorded using ambient AI listening technology and understand that they can each withdraw their consent to this listening technology at any point by asking the clinician to turn off or pause the recording:    Patient name: Nacho DEE Leonard  Additional names:  Idania Valle

## 2025-05-01 RX ORDER — MELATONIN
1 DAILY
Qty: 90 TABLET | Refills: 0 | OUTPATIENT
Start: 2025-05-01

## 2025-05-01 NOTE — TELEPHONE ENCOUNTER
Magnesium oxide 400m month supply sent to [facility] in [place] on 2025

## 2025-05-08 ENCOUNTER — OFFICE VISIT (OUTPATIENT)
Age: 75
End: 2025-05-08
Attending: INTERNAL MEDICINE
Payer: MEDICARE

## 2025-05-08 ENCOUNTER — APPOINTMENT (OUTPATIENT)
Age: 75
End: 2025-05-08
Attending: INTERNAL MEDICINE
Payer: MEDICARE

## 2025-05-08 ENCOUNTER — NURSE ONLY (OUTPATIENT)
Age: 75
End: 2025-05-08
Attending: INTERNAL MEDICINE
Payer: MEDICARE

## 2025-05-08 VITALS
HEART RATE: 51 BPM | OXYGEN SATURATION: 98 % | HEIGHT: 72.01 IN | WEIGHT: 241.13 LBS | SYSTOLIC BLOOD PRESSURE: 128 MMHG | DIASTOLIC BLOOD PRESSURE: 64 MMHG | BODY MASS INDEX: 32.66 KG/M2 | RESPIRATION RATE: 18 BRPM | TEMPERATURE: 98 F

## 2025-05-08 DIAGNOSIS — C90.00 MULTIPLE MYELOMA, REMISSION STATUS UNSPECIFIED (HCC): Primary | ICD-10-CM

## 2025-05-08 LAB
ALBUMIN SERPL-MCNC: 4.4 G/DL (ref 3.2–4.8)
CALCIUM BLD-MCNC: 9.1 MG/DL (ref 8.7–10.6)
CREAT BLD-MCNC: 1.86 MG/DL (ref 0.7–1.3)
EGFRCR SERPLBLD CKD-EPI 2021: 37 ML/MIN/1.73M2 (ref 60–?)

## 2025-05-08 RX ORDER — DIPHENHYDRAMINE HCL 25 MG
25 CAPSULE ORAL ONCE
Status: CANCELLED | OUTPATIENT
Start: 2025-06-01 | End: 2025-06-01

## 2025-05-08 RX ORDER — METHYLPREDNISOLONE SODIUM SUCCINATE 40 MG/ML
40 INJECTION INTRAMUSCULAR; INTRAVENOUS ONCE
Status: COMPLETED | OUTPATIENT
Start: 2025-05-08 | End: 2025-05-08

## 2025-05-08 RX ORDER — METHYLPREDNISOLONE SODIUM SUCCINATE 40 MG/ML
40 INJECTION INTRAMUSCULAR; INTRAVENOUS ONCE
Status: CANCELLED | OUTPATIENT
Start: 2025-06-01 | End: 2025-06-01

## 2025-05-08 RX ORDER — ACETAMINOPHEN 325 MG/1
650 TABLET ORAL ONCE
Status: DISCONTINUED | OUTPATIENT
Start: 2025-05-08 | End: 2025-05-08

## 2025-05-08 RX ORDER — METHYLPREDNISOLONE SODIUM SUCCINATE 40 MG/ML
40 INJECTION INTRAMUSCULAR; INTRAVENOUS ONCE
OUTPATIENT
Start: 2025-06-01 | End: 2025-06-01

## 2025-05-08 RX ORDER — DIPHENHYDRAMINE HCL 25 MG
25 CAPSULE ORAL ONCE
Status: DISCONTINUED | OUTPATIENT
Start: 2025-05-08 | End: 2025-05-08

## 2025-05-08 RX ORDER — DIPHENHYDRAMINE HCL 25 MG
25 CAPSULE ORAL ONCE
OUTPATIENT
Start: 2025-06-01 | End: 2025-06-01

## 2025-05-08 RX ORDER — ACETAMINOPHEN 325 MG/1
650 TABLET ORAL ONCE
OUTPATIENT
Start: 2025-06-01 | End: 2025-06-01

## 2025-05-08 RX ORDER — ACETAMINOPHEN 325 MG/1
650 TABLET ORAL ONCE
Status: CANCELLED | OUTPATIENT
Start: 2025-06-01 | End: 2025-06-01

## 2025-05-08 RX ADMIN — METHYLPREDNISOLONE SODIUM SUCCINATE 40 MG: 40 INJECTION INTRAMUSCULAR; INTRAVENOUS at 10:07:00

## 2025-05-08 NOTE — PROGRESS NOTES
Education Record    Learner:  Patient    Pt here for: ivig/zometa    Barriers / Limitations:  None    Method:  Brief focused, printed material and  reinforcement    General Topics:  Plan of care reviewed    Outcome: Pt tolerated infusion with no c/o.

## 2025-05-22 ENCOUNTER — MED REC SCAN ONLY (OUTPATIENT)
Dept: FAMILY MEDICINE CLINIC | Facility: CLINIC | Age: 75
End: 2025-05-22

## 2025-05-22 ENCOUNTER — OFFICE VISIT (OUTPATIENT)
Age: 75
End: 2025-05-22
Attending: INTERNAL MEDICINE
Payer: MEDICARE

## 2025-05-22 ENCOUNTER — NURSE ONLY (OUTPATIENT)
Age: 75
End: 2025-05-22
Attending: INTERNAL MEDICINE
Payer: MEDICARE

## 2025-05-22 VITALS
RESPIRATION RATE: 18 BRPM | WEIGHT: 239 LBS | SYSTOLIC BLOOD PRESSURE: 122 MMHG | BODY MASS INDEX: 32.37 KG/M2 | HEART RATE: 47 BPM | OXYGEN SATURATION: 96 % | HEIGHT: 72.01 IN | TEMPERATURE: 97 F | DIASTOLIC BLOOD PRESSURE: 73 MMHG

## 2025-05-22 DIAGNOSIS — N18.30 STAGE 3 CHRONIC KIDNEY DISEASE, UNSPECIFIED WHETHER STAGE 3A OR 3B CKD (HCC): ICD-10-CM

## 2025-05-22 DIAGNOSIS — C90.00 MULTIPLE MYELOMA NOT HAVING ACHIEVED REMISSION (HCC): Primary | ICD-10-CM

## 2025-05-22 LAB
ALBUMIN SERPL-MCNC: 4.9 G/DL (ref 3.2–4.8)
ALBUMIN/GLOB SERPL: 2 {RATIO} (ref 1–2)
ALP LIVER SERPL-CCNC: 93 U/L (ref 45–117)
ALT SERPL-CCNC: 24 U/L (ref 10–49)
ANION GAP SERPL CALC-SCNC: 7 MMOL/L (ref 0–18)
AST SERPL-CCNC: 24 U/L (ref ?–34)
BASOPHILS # BLD AUTO: 0.02 X10(3) UL (ref 0–0.2)
BASOPHILS NFR BLD AUTO: 0.3 %
BILIRUB SERPL-MCNC: 0.4 MG/DL (ref 0.2–1.1)
BUN BLD-MCNC: 30 MG/DL (ref 9–23)
CALCIUM BLD-MCNC: 9.4 MG/DL (ref 8.7–10.6)
CHLORIDE SERPL-SCNC: 106 MMOL/L (ref 98–112)
CO2 SERPL-SCNC: 23 MMOL/L (ref 21–32)
CREAT BLD-MCNC: 1.63 MG/DL (ref 0.7–1.3)
EGFRCR SERPLBLD CKD-EPI 2021: 43 ML/MIN/1.73M2 (ref 60–?)
EOSINOPHIL # BLD AUTO: 0.05 X10(3) UL (ref 0–0.7)
EOSINOPHIL NFR BLD AUTO: 0.8 %
ERYTHROCYTE [DISTWIDTH] IN BLOOD BY AUTOMATED COUNT: 12.5 %
FASTING STATUS PATIENT QL REPORTED: NO
GLOBULIN PLAS-MCNC: 2.4 G/DL (ref 2–3.5)
GLUCOSE BLD-MCNC: 90 MG/DL (ref 70–99)
HCT VFR BLD AUTO: 33.8 % (ref 39–53)
HGB BLD-MCNC: 11.3 G/DL (ref 13–17.5)
IMM GRANULOCYTES # BLD AUTO: 0.02 X10(3) UL (ref 0–1)
IMM GRANULOCYTES NFR BLD: 0.3 %
LDH SERPL L TO P-CCNC: 163 U/L (ref 120–246)
LYMPHOCYTES # BLD AUTO: 0.41 X10(3) UL (ref 1–4)
LYMPHOCYTES NFR BLD AUTO: 6.4 %
MAGNESIUM SERPL-MCNC: 2.4 MG/DL (ref 1.6–2.6)
MCH RBC QN AUTO: 35.2 PG (ref 26–34)
MCHC RBC AUTO-ENTMCNC: 33.4 G/DL (ref 31–37)
MCV RBC AUTO: 105.3 FL (ref 80–100)
MONOCYTES # BLD AUTO: 0.47 X10(3) UL (ref 0.1–1)
MONOCYTES NFR BLD AUTO: 7.4 %
NEUTROPHILS # BLD AUTO: 5.41 X10 (3) UL (ref 1.5–7.7)
NEUTROPHILS # BLD AUTO: 5.41 X10(3) UL (ref 1.5–7.7)
NEUTROPHILS NFR BLD AUTO: 84.8 %
OSMOLALITY SERPL CALC.SUM OF ELEC: 288 MOSM/KG (ref 275–295)
PHOSPHATE SERPL-MCNC: 1.9 MG/DL (ref 2.4–5.1)
PLATELET # BLD AUTO: 175 10(3)UL (ref 150–450)
POTASSIUM SERPL-SCNC: 4.2 MMOL/L (ref 3.5–5.1)
PROT SERPL-MCNC: 7.3 G/DL (ref 5.7–8.2)
PTH-INTACT SERPL-MCNC: 51.8 PG/ML (ref 18.5–88)
RBC # BLD AUTO: 3.21 X10(6)UL (ref 3.8–5.8)
SODIUM SERPL-SCNC: 136 MMOL/L (ref 136–145)
VIT D+METAB SERPL-MCNC: 45.6 NG/ML (ref 30–100)
WBC # BLD AUTO: 6.4 X10(3) UL (ref 4–11)

## 2025-05-22 RX ORDER — SULFAMETHOXAZOLE AND TRIMETHOPRIM 400; 80 MG/1; MG/1
1 TABLET ORAL
COMMUNITY
Start: 2025-04-28

## 2025-05-22 NOTE — PROGRESS NOTES
Patient here for f/u for multiple myeloma. Patient completed PET scan at [facility] on 5/20/2025 with Dr Barragan, results in care-everywhere. Patient's wife states that patient has increased confusion, and is no longer driving. Patient denies UTI symptoms, dyspnea, cough, fever or new pain symptoms.     Education Record    Learner:  Patient and Spouse    Disease / Diagnosis: multiple myeloma     Barriers / Limitations:  None   Comments:    Method:  Discussion   Comments:    General Topics:  Medication, Side effects and symptom management, and Plan of care reviewed   Comments:    Outcome:  Shows understanding   Comments:

## 2025-05-22 NOTE — PROGRESS NOTES
Cancer Center Progress Note    Problem List:      Problem List[1]      History of Present Illness  Nacho Valle is a 75 year old male with multiple myeloma who presents for routine follow-up after CAR T therapy.    He underwent CAR T therapy at [facility] and a recent PET scan on May 20th showed stable numerous small, loosened foci throughout the bone, indicating old damage from myeloma but no active disease. There is near complete resolution of the soft tissue lesion at the anterior right second rib with decreased activity, suggesting healing. Interval decrease in mild diffuse activity of the axial skeleton and bilateral shoulders reflects post-treatment changes. Overall, findings are stable or improved.    He experiences intermittent confusion, particularly noticeable when driving, as he once ended up in [place] instead of his intended destination. His family has observed these episodes, noting that he becomes argumentative during these times. No neurologic evaluation has been conducted yet for these symptoms.    Recent lab work shows a hemoglobin level of 11.3, white blood cell count of 6.4, and platelets at 175, all of which are considered good. Kidney function is stable with a creatinine level of 1.63, and liver enzymes are normal. LDH is also normal at 163.    He is currently receiving monthly IVIG treatments, with his last dose administered two weeks ago.    Socially, he continues to volunteer daily and is active, although he requires assistance with transportation. He reports persistent pain that remains unchanged and experiences intermittent confusion, particularly when driving, but is otherwise not too bad around the house.      Review of Systems:   Constitutional: Negative for anorexia, fatigue, fevers, chills, night sweats and weight loss.  Eyes: Negative for visual disturbance, irritation and redness.  Respiratory: Negative for cough, hemoptysis, chest pain, or dyspnea.  Cardiovascular: Negative for  angina, orthopnea or palpitations.  Gastrointestinal: Negative for nausea, vomiting, change in bowel habits, diarrhea, constipation and abdominal pain.  Integument/breast: Negative for rash, skin lesions, and pruritus.  Hematologic/lymphatic: Negative for easy bruising, bleeding, and lymphadenopathy.  Musculoskeletal: Negative for myalgias, arthralgias, muscle weakness.  Genitourinary: Negative for dysuria or hematuria  Psychiatric: The patient's mood was calm and appropriate for this visit.  The pertinent positives and negatives were described. All other systems were negative.    PMH/PSH:  Past Medical History[2]    Past Surgical History[3]    Family History Reviewed:  Family History[4]    Allergies:     Allergies[5]    Medications:  Current Medications[6]       Vital Signs:      Height: 182.9 cm (6' 0.01\") (05/22 1007)  Weight: 108.4 kg (239 lb) (05/22 1007)  BSA (Calculated - sq m): 2.3 sq meters (05/22 1007)  Pulse: 47 (05/22 1007)  BP: 122/73 (05/22 1007)  Temp: 96.6 °F (35.9 °C) (05/22 1007)  Do Not Use - Resp Rate: --  SpO2: 96 % (05/22 1007)      Performance Status:  ECOG 1: Restricted in physically strenuous activity but ambulatory and able to carry out work of a light or sedentary nature.     Physical Examination:      Constitutional: Patient is alert and oriented x 3, not in acute distress.  Eyes: Anicteric sclera, pink conjunctiva.  HEENT:  Oropharynx is clear. Neck is supple.  Respiratory: Clear to auscultation and percussion. No rales.  No wheezes.  Cardiovascular: Regular rate and rhythm. No murmurs.  Gastrointestinal: Soft, non tender with good bowel sounds.  Musculoskeletal: No edema. No calf tenderness.  Skin: No suspicious skin lesion, no rash, no ulceration.  Lymphatics: There is no palpable lymphadenopathy throughout in the cervical, supraclavicular, or axillary regions.  Psychiatric: The patient's mood is calm and appropriate for this visit.         Results reviewed at this visit:     Lab  Results   Component Value Date    WBC 6.4 05/22/2025    RBC 3.21 (L) 05/22/2025    HGB 11.3 (L) 05/22/2025    HCT 33.8 (L) 05/22/2025    .3 (H) 05/22/2025    MCH 35.2 (H) 05/22/2025    MCHC 33.4 05/22/2025    RDW 12.5 05/22/2025    .0 05/22/2025     Lab Results   Component Value Date     05/22/2025    K 4.2 05/22/2025     05/22/2025    CO2 23.0 05/22/2025    BUN 30 (H) 05/22/2025    CREATSERUM 1.63 (H) 05/22/2025    GLU 90 05/22/2025    CA 9.4 05/22/2025    ALKPHO 93 05/22/2025    ALT 24 05/22/2025    AST 24 05/22/2025    BILT 0.4 05/22/2025    ALB 4.9 (H) 05/22/2025    TP 7.3 05/22/2025       Results  LABS  Hb: 11.3 g/dL (05/22/2025)  WBC: 6.4 x10^3/uL (05/22/2025)  PLT: 175 x10^3/uL (05/22/2025)  Cr: 1.63 mg/dL (05/22/2025)  LDH: 163 U/L (05/22/2025)    RADIOLOGY  PET scan: Grossly stable, numerous small, lytic foci throughout the bone. Near complete resolution of the soft tissue lesion at the anterior right second rib with interval decreased activity. Interval decrease in the mild diffuse activity of the axial skeleton and the bilateral shoulders reflecting post-treatment changes. (05/20/2025)         Assessment & Plan  Multiple myeloma  CAR T-cell therapy Abecma (idecabtagene vicleucel). Received fludarabine and cyclophosphamide lymphodepletion regimen from November 27-29, 2024, followed by CAR T-cell infusion on December 2, 2024. Experienced grade 3 CRS requiring tocilizumab on December 2-4, 2024, and norepinephrine support from December 3-6, 2024. Maximum grade 1 ICANS treated with dexamethasone on December 4, 2024.      Multiple myeloma is well-controlled with no active disease activity on recent PET scan. Near complete resolution of soft tissue lesion at anterior right second rib. Interval decrease in mild diffuse activity of axial skeleton and bilateral shoulders reflecting post-treatment changes. Labs show good control with hemoglobin at 11.3, white count at 6.4, and platelets at  175. Kidney function and liver enzymes are normal.  - Continue current management plan.  - Schedule monthly IVIG infusions for hypogammaglobulinemia.  - Follow up with Dr. Good on June 24.  - Return for follow-up in three months with labs.    Anemia complicating neoplastic disease  Hemoglobin level is stable at 11.3, indicating good control of anemia related to multiple myeloma.    Pain management  Pain is reported as unchanged, with no significant changes.    Cognitive impairment  Intermittent confusion, particularly during driving. Differential diagnosis includes paraneoplastic syndrome or unrelated dementia. Neurologic evaluation recommended to rule out other causes.  - Refer to neurology for outpatient consultation to evaluate confusion and memory issues.           The following individual(s) verbally consented to be recorded using ambient AI listening technology and understand that they can each withdraw their consent to this listening technology at any point by asking the clinician to turn off or pause the recording:    Patient name: Nacho Valle  Additional names:  Idania Frias MD          [1]   Patient Active Problem List  Diagnosis    Impotence of organic origin    Generalized osteoarthrosis of hand    Morbid obesity (HCC)    Essential hypertension    Hypertension    MADDI (acute kidney injury)    Multiple myeloma (HCC)    Myeloma kidney disease (HCC)    Myeloma kidney (HCC)    Hx of stem cell transplant (HCC)    Hypoxia    Multiple myeloma, remission status unspecified (HCC)    Type 2 diabetes mellitus with hyperglycemia, without long-term current use of insulin (HCC)    Cardiomyopathy, unspecified type (HCC)    Morbid (severe) obesity due to excess calories (HCC)    Elevated LFTs    Weakness generalized    Community acquired pneumonia, unspecified laterality    Anemia, unspecified type    Renal insufficiency    Acute cystitis without hematuria    Chronic heart failure with  preserved ejection fraction (HCC)    CKD stage 3b, GFR 30-44 ml/min (HCC)    Anemia complicating neoplastic disease    Thrombocytopenia    CKD (chronic kidney disease) stage 4, GFR 15-29 ml/min (HCC)   [2]   Past Medical History:   Allergic rhinitis, cause unspecified    BCC (basal cell carcinoma of skin)    s/p surgical excision    BPH (benign prostatic hyperplasia)    Colon polyps    Generalized osteoarthrosis, involving hand    Herniation of intervertebral disc between L4 and L5    s/p surgical repair    High blood pressure    High cholesterol    Impotence of organic origin    Morbid obesity (HCC)    Osteoarthritis    Osteoarthrosis, unspecified whether generalized or localized, lower leg    Log Date: 08/21/2012     Other and unspecified hyperlipidemia    Pneumonia due to organism    Prostate cancer (HCC)    s/p total prostatectomy and radiation    Trigger finger (acquired)    Type II or unspecified type diabetes mellitus without mention of complication, not stated as uncontrolled    Unspecified essential hypertension    Unspecified internal derangement of knee    Log Date: 08/21/2012    [3]   Past Surgical History:  Procedure Laterality Date    Back surgery  1/2001    lower back L4 L5 for herniated disc    Colonoscopy      202, 2012 Dr. Wolf, polyps    Other surgical history  1/31/2007    total prostatectomy    Skin surgery  1997    BCC   [4]   Family History  Problem Relation Age of Onset    Diabetes Other         family hx    Hypertension Other         family hx    Prostate Cancer Father    [5]   Allergies  Allergen Reactions    Pcn [Penicillins] ANAPHYLAXIS, HIVES, HALLUCINATION and SHORTNESS OF BREATH     Respiratory distress    Beta Adrenergic Blockers     Latex      Surgical tape  Welts, burning of skin, itching    Statins    [6]    sulfamethoxazole-trimethoprim 400-80 MG Oral Tab Take 1 tablet by mouth 3 (three) times a week.      mupirocin 2 % External Ointment Apply topically.      furosemide (LASIX)  20 MG Oral Tab Take 1 tablet (20 mg total) by mouth 2 (two) times daily. 180 tablet 0    acyclovir 400 MG Oral Tab Take 1 tablet (400 mg total) by mouth 2 (two) times daily. 180 tablet 1    atorvastatin 20 MG Oral Tab Take 1 tablet (20 mg total) by mouth daily. 90 tablet 3    magnesium oxide 400 MG Oral Tab Take 1 tablet (400 mg total) by mouth daily. 90 tablet 1    Potassium Chloride ER 20 MEQ Oral Tab CR Take 20 mEq by mouth daily.      metoprolol succinate ER 50 MG Oral Tablet 24 Hr Take 1 tablet (50 mg total) by mouth in the morning.      ONETOUCH ULTRASOFT LANCETS Does not apply Misc TEST BLOOD SUGAR TWICE DAILY 200 each 2    Glucose Blood (ONETOUCH ULTRA) In Vitro Strip CHECK SUGARS THREE TIMES DAILY AS DIRECTED 300 strip 2

## 2025-05-27 ENCOUNTER — OFFICE VISIT (OUTPATIENT)
Dept: NEPHROLOGY | Facility: CLINIC | Age: 75
End: 2025-05-27
Payer: MEDICARE

## 2025-05-27 VITALS — WEIGHT: 240 LBS | BODY MASS INDEX: 33 KG/M2 | SYSTOLIC BLOOD PRESSURE: 116 MMHG | DIASTOLIC BLOOD PRESSURE: 72 MMHG

## 2025-05-27 DIAGNOSIS — N18.32 CKD STAGE 3B, GFR 30-44 ML/MIN (HCC): Primary | ICD-10-CM

## 2025-05-27 PROCEDURE — 99213 OFFICE O/P EST LOW 20 MIN: CPT | Performed by: INTERNAL MEDICINE

## 2025-05-27 NOTE — PROGRESS NOTES
Nephrology Progress Note      Nacho Valle is a 75 year old male.    HPI:     Chief Complaint   Patient presents with    Hypertension    Chronic Kidney Disease     Stage 3       75 yo male with hx of MM, MADDI related to myeloma kidney + severe hypercalcemia, DM, HTN presents for follow up. He was treated in hospital with pamidronate/fluids/plasmapheresis. He also required few treatments of HD.    S/p stem cell tx @ [facility]-  did well for few years  Patient is now on salvage chemo (diagnosed w/ recurrent dx in Jan 2021)  He follows w/ Dr. Frias and Dr. Barragan ([facility])-    S/p CAR-T thrapy (Abecma).  Tx course complicated by grade 3 CRS and ICANS- requring tociluzumab x 3 doses; also needed pressor support /steroids.    Overall he is doing well since last visit. He had an episode of AMS - he drove out to [place] in this confused state. He was alone driving.  Since then he has has not had any further episodes, but his driving has been restricted.           Medications (Active prior to today's visit):  Current Outpatient Medications   Medication Sig Dispense Refill    sulfamethoxazole-trimethoprim 400-80 MG Oral Tab Take 1 tablet by mouth 3 (three) times a week.      mupirocin 2 % External Ointment Apply topically.      furosemide (LASIX) 20 MG Oral Tab Take 1 tablet (20 mg total) by mouth 2 (two) times daily. 180 tablet 0    acyclovir 400 MG Oral Tab Take 1 tablet (400 mg total) by mouth 2 (two) times daily. 180 tablet 1    atorvastatin 20 MG Oral Tab Take 1 tablet (20 mg total) by mouth daily. 90 tablet 3    magnesium oxide 400 MG Oral Tab Take 1 tablet (400 mg total) by mouth daily. 90 tablet 1    Potassium Chloride ER 20 MEQ Oral Tab CR Take 20 mEq by mouth daily.      metoprolol succinate ER 50 MG Oral Tablet 24 Hr Take 1 tablet (50 mg total) by mouth in the morning.      ONETOUCH ULTRASOFT LANCETS Does not apply Misc TEST BLOOD SUGAR TWICE DAILY 200 each 2    Glucose Blood (ONETOUCH ULTRA) In Vitro Strip CHECK SUGARS THREE  TIMES DAILY AS DIRECTED 300 strip 2       Allergies:  Allergies   Allergen Reactions    Pcn [Penicillins] ANAPHYLAXIS, HIVES, HALLUCINATION and SHORTNESS OF BREATH     Respiratory distress    Beta Adrenergic Blockers     Latex      Surgical tape  Welts, burning of skin, itching    Statins          ROS:     10 systems reviewed and otherwise unremarkable       PHYSICAL EXAM:   /72 (BP Location: Right arm, Patient Position: Sitting, Cuff Size: adult)   Wt 240 lb (108.9 kg)   BMI 32.54 kg/m²   Wt Readings from Last 6 Encounters:   05/27/25 240 lb (108.9 kg)   05/22/25 239 lb (108.4 kg)   05/08/25 241 lb 1.6 oz (109.4 kg)   04/24/25 232 lb (105.2 kg)   04/09/25 234 lb 8 oz (106.4 kg)   03/12/25 232 lb 8 oz (105.5 kg)       General: Alert and oriented in no apparent distress.   HEENT: No scleral icterus, MMM  Neck: Supple, no ELLIOT or thyromegaly  Cardiac: Regular rate and rhythm, S1, S2 normal, no murmur or rub  Lungs: Clear without wheezes, rales, rhonchi.    Abdomen: Soft, non-tender. + bowel sounds, no palpable organomegaly  Extremities: Without clubbing, cyanosis ; no edema  Neurologic: Alert and oriented, normal affect, cranial nerves grossly intact, moving all extremities  Skin: Warm and dry, no rashes      Labs reviewed      ASSESSMENT/PLAN:       1. Hx of Myeloma kidney disease -   Required plasmapheresis/HD treatments in the hospital  His renal function had recovered  and was able to come off HD  Volume is good today; Cr  stable  His myeloma relapsed despite slavage tx-->He underwent CAR-T tx @ [facility] and is now in remission    - conitnue oncology f/u  - continue diuretics     2. MM-   as above    3. Cardiomyopathy/HTN- stable/compensated; monitor ; cardiac bx negative for amyloid; follows w/ cardiology     - appears compensated ; most of his cardiac meds have been adjusted or discontinued     4. Acidosis- related to CKD- off bicarb now; monitor     5. Anemia - per oncology; monitor      F/u 4 mos     Francisco  MD Shira  5/27/2025

## 2025-06-02 RX ORDER — FUROSEMIDE 20 MG/1
20 TABLET ORAL 2 TIMES DAILY
Qty: 180 TABLET | Refills: 0 | Status: SHIPPED | OUTPATIENT
Start: 2025-06-02

## 2025-06-04 ENCOUNTER — OFFICE VISIT (OUTPATIENT)
Facility: CLINIC | Age: 75
End: 2025-06-04
Payer: MEDICARE

## 2025-06-04 ENCOUNTER — LAB ENCOUNTER (OUTPATIENT)
Dept: LAB | Facility: REFERENCE LAB | Age: 75
End: 2025-06-04
Attending: Other
Payer: MEDICARE

## 2025-06-04 VITALS — SYSTOLIC BLOOD PRESSURE: 114 MMHG | DIASTOLIC BLOOD PRESSURE: 80 MMHG | HEART RATE: 60 BPM | RESPIRATION RATE: 16 BRPM

## 2025-06-04 DIAGNOSIS — R41.3 MEMORY LOSS: Primary | ICD-10-CM

## 2025-06-04 DIAGNOSIS — D75.89 MACROCYTOSIS: ICD-10-CM

## 2025-06-04 DIAGNOSIS — R41.3 MEMORY LOSS: ICD-10-CM

## 2025-06-04 LAB
FOLATE SERPL-MCNC: 19.4 NG/ML (ref 5.4–?)
TSI SER-ACNC: 1.54 UIU/ML (ref 0.55–4.78)
VIT B12 SERPL-MCNC: 346 PG/ML (ref 211–911)

## 2025-06-04 PROCEDURE — 82746 ASSAY OF FOLIC ACID SERUM: CPT

## 2025-06-04 PROCEDURE — 84425 ASSAY OF VITAMIN B-1: CPT

## 2025-06-04 PROCEDURE — 84443 ASSAY THYROID STIM HORMONE: CPT

## 2025-06-04 PROCEDURE — 82607 VITAMIN B-12: CPT

## 2025-06-04 PROCEDURE — 36415 COLL VENOUS BLD VENIPUNCTURE: CPT

## 2025-06-04 PROCEDURE — G2211 COMPLEX E/M VISIT ADD ON: HCPCS | Performed by: OTHER

## 2025-06-04 PROCEDURE — 99204 OFFICE O/P NEW MOD 45 MIN: CPT | Performed by: OTHER

## 2025-06-04 PROCEDURE — 83921 ORGANIC ACID SINGLE QUANT: CPT

## 2025-06-04 RX ORDER — LEVETIRACETAM 500 MG/1
500 TABLET ORAL 2 TIMES DAILY
Qty: 60 TABLET | Refills: 11 | Status: SHIPPED | OUTPATIENT
Start: 2025-06-04 | End: 2026-05-30

## 2025-06-04 NOTE — PATIENT INSTRUCTIONS
Instructions from Dr. Villareal:     Please call to schedule memory testing (neuropsychological testing)  Please call to schedule brain MRI  Please call to schedule EEG   Please have blood tests today  Please see me back in 3 months     ~~~~~~~~~~~~~~~~~~~~~~~     After your visit at the [facility] office  today,  please direct any follow up questions or medication needs to the staff in our  [place] office so that your concerns may be promptly addressed.  We are available through [employer] or at the numbers below:    The phone number is:   [phone] option #1    The fax number is:  [phone]    Your pharmacy should also send any requests electronically to the [facility].    Refill policies:    Allow 2-3 business days for refills; controlled substances may take longer.  Contact your pharmacy at least 5 days prior to running out of medication and have them send an electronic request or submit request through the “request refill” option in your [employer] account.  Refills are not addressed on weekends; covering physicians do not authorize routine medications on weekends.  No narcotics or controlled substances are refilled after noon on Fridays or by on call physicians.  By law, narcotics must be electronically prescribed.  A 30 day supply with no refills is the maximum allowed.  If your prescription is due for a refill, you may be due for a follow up appointment.  To best provide you care, patients receiving routine medications need to be seen at least once a year.  Patients receiving narcotic/controlled substance medications need to be seen at least once every 3 months.  In the event that your preferred pharmacy does not have the requested medication in stock (e.g. Backordered), it is your responsibility to find another pharmacy that has the requested medication available.  We will gladly send a new prescription to that pharmacy at your request.    Scheduling Tests:    If your physician has ordered  radiology tests such as MRI or CT scans, please contact Central Scheduling at [phone] right away to schedule the test.  Once scheduled, the [facility] Centralized Referral Team will work with your insurance carrier to obtain pre-certification or prior authorization.  Depending on your insurance carrier, approval may take 3-10 days.  It is highly recommended patients assure they have received an authorization before having a test performed.  If test is done without insurance authorization, patient may be responsible for the entire amount billed.      Precertification and Prior Authorizations:  If your physician has recommended that you have a procedure or additional testing performed the [facility] Centralized Referral Team will contact your insurance carrier to obtain pre-certification or prior authorization.    You are strongly encouraged to contact your insurance carrier to verify that your procedure/test has been approved and is a COVERED benefit.  Although the [facility] Centralized Referral Team does its due diligence, the insurance carrier gives the disclaimer that \"Although the procedure is authorized, this does not guarantee payment.\"    Ultimately the patient is responsible for payment.   Thank you for your understanding in this matter.  Paperwork Completion:  If you require FMLA or disability paperwork for your recovery, please make sure to either drop it off or have it faxed to our office at [phone]. Be sure the form has your name and date of birth on it.  The form will be faxed to our Forms Department and they will complete it for you.  There is a 25$ fee for all forms that need to be filled out.  Please be aware there is a 10-14 day turnaround time.  You will need to sign a release of information (TEMI) form if your paperwork does not come with one.  You may call the Forms Department with any questions at [phone].  Their fax number is [phone].

## 2025-06-04 NOTE — PROGRESS NOTES
Neurology History & Physical     ASSESSMENT & PLAN:      ICD-10-CM    1. Memory loss  R41.3 PSYCHOLOGY - INTERNAL     MRI BRAIN (W+WO) (CPT=70553)     Vitamin B12 with Reflex to MMA     Vitamin B1 (Thiamine), Blood     TSH W Reflex To Free T4     Folic Acid Serum (Folate)     EEG      2. Macrocytosis  D75.89 Vitamin B12 with Reflex to MMA     Folic Acid Serum (Folate)        Cognitive dysfunction / Memory loss, and also episodes concerning for seizure.  The history is very vague, and wife is not sure about the reason for Keppra in the past.  I recommended B1, B12, Folate (does have macrocytosis), MRI brain, EEG, and neuropsych testing.  There is also concern for paraneoplastic cause, check thyroid Ab's, autoimmune encephalopathy / paraneoplastic panel but he is on IVIg so this may be confounded.    No driving until further notice from me.  I am not sure if he has seizures superimposed on a cognitive disorder.   Depending on testing results may consider restarting  mg BID.    We discussed medication side effects and activity precautions. We discussed symptoms that would warrant urgent/emergent evaluation. They verbalized understanding and agreement.    I intend to be providing an ongoing, continuous, and active collaborative plan of care for the problem(s) above (the management of which require my specialized clinical knowledge, skill, and expertise).      Return in about 3 months (around 9/4/2025).       ~~~~~~~~~~~~~~~~~~~~~~~~~~~    CHIEF COMPLAINT / REASON FOR VISIT:    Chief Complaint   Patient presents with    Memory Loss     Memory loss for the last several months. With wife, notes concern with memory, has gotten lost while driving.      HISTORY OBTAINED FROM:  Patient and wife  Chart review    HISTORY:  Nacho Valle is a 75 year old male with MM s/p stem cell txpt (and CAR-T therapy with [facility] in Dec 2024), DM, HTN, presents with altered mental status, two episdoes while driving.  This is in the  setting of about 6+ months of memory problems (he says \"I don't know\" or \"I can't remember\" to many conversations).      In October 2024, he doesn't remember doing this, but wife noticed he had driven away from the home without his phone and without his dog (which are unusual), she went to check local stores and couldn't find him, ended up calling 911 and police traced truck using [employer] to contact him and track him down (he knew his name/address on the call but didn't know where he was).  He was in [place], [place] where he has never been.  He was taken to [facility] and apparently dx with UTI.  He doesn't remember any of this.    In April 2025, he was going to podiatrist appt, he drove separately from his wife as planned, but he didn't make it home.  Wife called him, he said he is going to see Dr but he was already 2 hrs past appt time, and he said he cannot pass the train (which didn't make sense).  Using AirTag, dtr figured out he is in [place], [place].  He did answer the phone, wife notes he was again oriented to self but not place.  He apparently was argumentative and loud and refused to get out of drivers seat.  He refused ER.  Saw Dr. Bell the next day (4/24/25), per PCP note, again may have had UTI (treated but not confirmed).    He was on Keppra but wife doesn't know when this was started, per PCP note he has stopped this.  Wife and pt aren't sure why it was stopped - wife guessing that maybe they were only told to take it until the Rx ran out.  Never saw neurology before, per wife.  LEV was listed to be take on Disch summary Dec 2024; [facility]ch summary from [facility] from Jan 2025 also notes to continue LEV; he then went to [facility].    Epilepsy risk factors:  Normal birth and development   No febrile or childhood seizures   No meningitis/encephalitis  No head trauma with prolonged LOC/amnesia   No known structural brain lesions  No FH of seizures    Driving status:  NOT Driving    DATA  REVIEWED:  As documented in the history    May 2025  Renal note  Onc note  CMP GFR 43  CBC Hb 11.3,     PHYSICAL EXAMINATION:  /80   Pulse 60   Resp 16     Gen: in NAD  MSE: alert, oriented to self and year but not place or month/date, normal language, 1/3 item recall at 5 min  CN: PERRL, VFF; EOMI, no nystagmus; nl facial mvmt bilaterally; nl hearing bilaterally; nl palate elevation bilaterally; nl voice; nl shoulder shrug b/I; nl tongue movement  Motor: 5/5 x4; no drift; normal tone; no abnormal movements  Sensory: decr vibration bilat ankles (known chronic, stable neuropathy)  Coord: nl FTN b/I  Reflex: 1+ BUE and BLE, trace ankles bilat  Gait: walks w cane    Allergies   Allergen Reactions    Pcn [Penicillins] ANAPHYLAXIS, HIVES, HALLUCINATION and SHORTNESS OF BREATH     Respiratory distress    Beta Adrenergic Blockers     Latex      Surgical tape  Welts, burning of skin, itching    Statins        Current medications:  Current Outpatient Medications on File Prior to Visit   Medication Sig Dispense Refill    Multiple Vitamin (MULTIVITAMIN ADULT OR) Take by mouth in the morning.      furosemide 20 MG Oral Tab Take 1 tablet (20 mg total) by mouth 2 (two) times daily. 180 tablet 0    sulfamethoxazole-trimethoprim 400-80 MG Oral Tab Take 1 tablet by mouth 3 (three) times a week.      mupirocin 2 % External Ointment Apply topically. (Patient taking differently: Apply topically as needed.)      acyclovir 400 MG Oral Tab Take 1 tablet (400 mg total) by mouth 2 (two) times daily. 180 tablet 1    atorvastatin 20 MG Oral Tab Take 1 tablet (20 mg total) by mouth daily. 90 tablet 3    magnesium oxide 400 MG Oral Tab Take 1 tablet (400 mg total) by mouth daily. 90 tablet 1    Potassium Chloride ER 20 MEQ Oral Tab CR Take 20 mEq by mouth daily.      metoprolol succinate ER 50 MG Oral Tablet 24 Hr Take 1 tablet (50 mg total) by mouth in the morning.      ONETOUCH ULTRASOFT LANCETS Does not apply Misc TEST BLOOD  SUGAR TWICE DAILY 200 each 2    Glucose Blood (ONETOUCH ULTRA) In Vitro Strip CHECK SUGARS THREE TIMES DAILY AS DIRECTED 300 strip 2     No current facility-administered medications on file prior to visit.        Past Medical History:    Allergic rhinitis, cause unspecified    BCC (basal cell carcinoma of skin)    s/p surgical excision    BPH (benign prostatic hyperplasia)    Colon polyps    Generalized osteoarthrosis, involving hand    Herniation of intervertebral disc between L4 and L5    s/p surgical repair    High blood pressure    High cholesterol    Impotence of organic origin    Morbid obesity (HCC)    Osteoarthritis    Osteoarthrosis, unspecified whether generalized or localized, lower leg    Log Date: 08/21/2012     Other and unspecified hyperlipidemia    OTHER DISEASES    stage 3 Multiple Myeloma    Pneumonia due to organism    Prostate cancer (HCC)    s/p total prostatectomy and radiation    Trigger finger (acquired)    Type II or unspecified type diabetes mellitus without mention of complication, not stated as uncontrolled    Unspecified essential hypertension    Unspecified internal derangement of knee    Log Date: 08/21/2012        Past Surgical History:   Procedure Laterality Date    Back surgery  01/01/2001    lower back L4 L5 for herniated disc    Colonoscopy      202, 2012 Dr. Wolf, polyps    Laminectomy  1/2001    Other surgical history  1/31/2007    total prostatectomy    Skin surgery  01/01/1997    BCC       Social History     Socioeconomic History    Marital status:     Number of children: 3   Occupational History    Occupation: Retired      Comment:    Tobacco Use    Smoking status: Never    Smokeless tobacco: Never   Vaping Use    Vaping status: Never Used   Substance and Sexual Activity    Alcohol use: No     Alcohol/week: 0.0 standard drinks of alcohol    Drug use: No   Other Topics Concern    Caffeine Concern No    Exercise Yes     Comment: walking        Family History   Problem Relation Age of Onset    Diabetes Other         family hx    Hypertension Other         family hx    Prostate Cancer Father     Diabetes Father     Hypertension Father     Cancer Father         Prostate cancer       Criss Villareal MD, FAES, FAAN  Board-Certified in Neurology, Epilepsy, and Clinical Neurophysiology  [facility]

## 2025-06-06 ENCOUNTER — OFFICE VISIT (OUTPATIENT)
Age: 75
End: 2025-06-06
Attending: INTERNAL MEDICINE
Payer: MEDICARE

## 2025-06-06 VITALS
TEMPERATURE: 97 F | DIASTOLIC BLOOD PRESSURE: 73 MMHG | RESPIRATION RATE: 18 BRPM | OXYGEN SATURATION: 100 % | HEIGHT: 72.01 IN | BODY MASS INDEX: 32.51 KG/M2 | SYSTOLIC BLOOD PRESSURE: 112 MMHG | WEIGHT: 240 LBS | HEART RATE: 59 BPM

## 2025-06-06 DIAGNOSIS — C90.00 MULTIPLE MYELOMA, REMISSION STATUS UNSPECIFIED (HCC): Primary | ICD-10-CM

## 2025-06-06 LAB
ALBUMIN SERPL-MCNC: 4.2 G/DL (ref 3.2–4.8)
CALCIUM BLD-MCNC: 8.7 MG/DL (ref 8.7–10.6)
CREAT BLD-MCNC: 1.65 MG/DL (ref 0.7–1.3)
EGFRCR SERPLBLD CKD-EPI 2021: 43 ML/MIN/1.73M2 (ref 60–?)

## 2025-06-06 RX ORDER — DIPHENHYDRAMINE HCL 25 MG
25 CAPSULE ORAL ONCE
OUTPATIENT
Start: 2025-07-01 | End: 2025-07-01

## 2025-06-06 RX ORDER — METHYLPREDNISOLONE SODIUM SUCCINATE 40 MG/ML
40 INJECTION INTRAMUSCULAR; INTRAVENOUS ONCE
Status: COMPLETED | OUTPATIENT
Start: 2025-06-06 | End: 2025-06-06

## 2025-06-06 RX ORDER — METHYLPREDNISOLONE SODIUM SUCCINATE 40 MG/ML
40 INJECTION INTRAMUSCULAR; INTRAVENOUS ONCE
OUTPATIENT
Start: 2025-07-01 | End: 2025-07-01

## 2025-06-06 RX ORDER — ACETAMINOPHEN 325 MG/1
650 TABLET ORAL ONCE
OUTPATIENT
Start: 2025-07-01 | End: 2025-07-01

## 2025-06-06 RX ORDER — ACETAMINOPHEN 325 MG/1
650 TABLET ORAL ONCE
Status: DISCONTINUED | OUTPATIENT
Start: 2025-06-06 | End: 2025-06-06

## 2025-06-06 RX ORDER — DIPHENHYDRAMINE HCL 25 MG
25 CAPSULE ORAL ONCE
Status: DISCONTINUED | OUTPATIENT
Start: 2025-06-06 | End: 2025-06-06

## 2025-06-06 RX ADMIN — METHYLPREDNISOLONE SODIUM SUCCINATE 40 MG: 40 INJECTION INTRAMUSCULAR; INTRAVENOUS at 09:32:00

## 2025-06-06 NOTE — PROGRESS NOTES
Education Record    Learner:  Patient    Disease / Diagnosis: here for IVIG, zometa    Barriers / Limitations:  None    Method:  Brief focused, printed material and  reinforcement    General Topics:  Plan of care reviewed    Outcome: patient ambulatory. No complaints. Tolerated infusions. AVS reviewed. Shows understanding. Discharged in stable condition.   Clarified with Dr Frias, patient to continue on zometa. Next appt due on July 4th, office is closed. Patient prefers to come on Fridays and he requested his next appt for the Friday after.

## 2025-06-09 LAB — METHYLMALONIC ACID: 423 NMOL/L

## 2025-06-11 LAB — VITAMIN B1 WHOLE BLD: 120 NMOL/L

## 2025-06-23 ENCOUNTER — NURSE ONLY (OUTPATIENT)
Dept: ELECTROPHYSIOLOGY | Facility: HOSPITAL | Age: 75
End: 2025-06-23
Attending: Other
Payer: MEDICARE

## 2025-06-23 DIAGNOSIS — R41.3 MEMORY LOSS: ICD-10-CM

## 2025-06-23 PROCEDURE — 95718 EEG PHYS/QHP 2-12 HR W/VEEG: CPT | Performed by: OTHER

## 2025-06-23 NOTE — PROCEDURES
[facility]    VIDEO-ELECTROENCEPHALOGRAM (EEG) REPORT  Patient Name:  Nacho Valle   MRN / CSN:  [id number] / [id number]   Date of Birth / Age:  4/23/1950 /  75 year old   Encounter (Start) Date:  6/23/25    Study Duration: 2 hrs 6 min     HISTORY  This is a 75 year old male, EEG has been ordered for Memory loss    METHODS:  Twenty-two electrodes were applied according to the 10-20 electrode placement system on this audio-video EEG. EKG monitoring, monopolar and bipolar montages are routinely utilized. Video-EEG data were recorded continuously (or only for the initial portion, in the case of ambulatory studies) and stored digitally.    FINDINGS:  1) Background: A 9-10 Hz posterior dominant rhythm (PDR) was seen, symmetric and synchronous, reactive to eye opening.  2) Sleep: Stage N1 / drowsiness and Stage N2 were recorded, with normal, symmetric, and synchronous architecture.  3) Activation Procedures:                    Hyperventilation was not performed.                    Photic stimulation was performed and no significant changes were noted.  4) Abnormal Slowing:  None  5) Epileptiform Activity:  None  6) Seizures:  None    FINAL INTERPRETATION:    This EEG is normal in waking, drowsiness, and sleep.    Criss Villareal MD, FAES, FAAN  Board-Certified in Neurology, Epilepsy, and Clinical Neurophysiology  [facility]

## 2025-06-26 ENCOUNTER — TELEPHONE (OUTPATIENT)
Dept: NEUROLOGY | Facility: CLINIC | Age: 75
End: 2025-06-26

## 2025-06-27 NOTE — TELEPHONE ENCOUNTER
Received initialed consult note from [facility] date of service 6/24/25 and endorsed to scanning.

## 2025-06-29 ENCOUNTER — APPOINTMENT (OUTPATIENT)
Dept: GENERAL RADIOLOGY | Facility: HOSPITAL | Age: 75
End: 2025-06-29
Attending: EMERGENCY MEDICINE
Payer: MEDICARE

## 2025-06-29 ENCOUNTER — HOSPITAL ENCOUNTER (INPATIENT)
Facility: HOSPITAL | Age: 75
LOS: 1 days | Discharge: HOME | End: 2025-07-01
Attending: EMERGENCY MEDICINE | Admitting: INTERNAL MEDICINE
Payer: MEDICARE

## 2025-06-29 ENCOUNTER — APPOINTMENT (OUTPATIENT)
Dept: CT IMAGING | Facility: HOSPITAL | Age: 75
End: 2025-06-29
Attending: EMERGENCY MEDICINE
Payer: MEDICARE

## 2025-06-29 DIAGNOSIS — R31.9 URINARY TRACT INFECTION WITH HEMATURIA, SITE UNSPECIFIED: Primary | ICD-10-CM

## 2025-06-29 DIAGNOSIS — N39.0 URINARY TRACT INFECTION WITH HEMATURIA, SITE UNSPECIFIED: Primary | ICD-10-CM
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
## 2025-06-29 PROBLEM — S09.90XA CLOSED HEAD INJURY: Status: ACTIVE | Noted: 2025-06-29

## 2025-06-29 PROBLEM — E11.9 TYPE 2 DIABETES MELLITUS WITHOUT COMPLICATION, WITHOUT LONG-TERM CURRENT USE OF INSULIN (HCC): Status: ACTIVE | Noted: 2025-06-29

## 2025-06-29 LAB
ALBUMIN SERPL-MCNC: 4.7 G/DL (ref 3.2–4.8)
ALBUMIN/GLOB SERPL: 1.8 {RATIO} (ref 1–2)
ALP LIVER SERPL-CCNC: 79 U/L (ref 45–117)
ALT SERPL-CCNC: 14 U/L (ref 10–49)
ANION GAP SERPL CALC-SCNC: 12 MMOL/L (ref 0–18)
AST SERPL-CCNC: 23 U/L (ref ?–34)
ATRIAL RATE: 105 BPM
BASOPHILS # BLD AUTO: 0.02 X10(3) UL (ref 0–0.2)
BASOPHILS NFR BLD AUTO: 0.2 %
BILIRUB SERPL-MCNC: 0.7 MG/DL (ref 0.2–1.1)
BILIRUB UR QL STRIP.AUTO: NEGATIVE
BUN BLD-MCNC: 22 MG/DL (ref 9–23)
CALCIUM BLD-MCNC: 9.3 MG/DL (ref 8.7–10.6)
CHLORIDE SERPL-SCNC: 104 MMOL/L (ref 98–112)
CO2 SERPL-SCNC: 23 MMOL/L (ref 21–32)
CREAT BLD-MCNC: 1.98 MG/DL (ref 0.7–1.3)
EGFRCR SERPLBLD CKD-EPI 2021: 34 ML/MIN/1.73M2 (ref 60–?)
EOSINOPHIL # BLD AUTO: 0.01 X10(3) UL (ref 0–0.7)
EOSINOPHIL NFR BLD AUTO: 0.1 %
ERYTHROCYTE [DISTWIDTH] IN BLOOD BY AUTOMATED COUNT: 13.3 %
FLUAV + FLUBV RNA SPEC NAA+PROBE: NEGATIVE
FLUAV + FLUBV RNA SPEC NAA+PROBE: NEGATIVE
GLOBULIN PLAS-MCNC: 2.6 G/DL (ref 2–3.5)
GLUCOSE BLD-MCNC: 141 MG/DL (ref 70–99)
GLUCOSE BLD-MCNC: 153 MG/DL (ref 70–99)
GLUCOSE BLD-MCNC: 158 MG/DL (ref 70–99)
GLUCOSE UR STRIP.AUTO-MCNC: 300 MG/DL
HCT VFR BLD AUTO: 33.8 % (ref 39–53)
HGB BLD-MCNC: 11.1 G/DL (ref 13–17.5)
IMM GRANULOCYTES # BLD AUTO: 0.06 X10(3) UL (ref 0–1)
IMM GRANULOCYTES NFR BLD: 0.5 %
LACTATE SERPL-SCNC: 1.7 MMOL/L (ref 0.5–2)
LEUKOCYTE ESTERASE UR QL STRIP.AUTO: 500
LYMPHOCYTES # BLD AUTO: 0.21 X10(3) UL (ref 1–4)
LYMPHOCYTES NFR BLD AUTO: 1.8 %
MCH RBC QN AUTO: 33.7 PG (ref 26–34)
MCHC RBC AUTO-ENTMCNC: 32.8 G/DL (ref 31–37)
MCV RBC AUTO: 102.7 FL (ref 80–100)
MONOCYTES # BLD AUTO: 0.73 X10(3) UL (ref 0.1–1)
MONOCYTES NFR BLD AUTO: 6.4 %
NEUTROPHILS # BLD AUTO: 10.42 X10 (3) UL (ref 1.5–7.7)
NEUTROPHILS # BLD AUTO: 10.42 X10(3) UL (ref 1.5–7.7)
NEUTROPHILS NFR BLD AUTO: 91 %
OSMOLALITY SERPL CALC.SUM OF ELEC: 295 MOSM/KG (ref 275–295)
P AXIS: 42 DEGREES
P-R INTERVAL: 170 MS
PH UR STRIP.AUTO: 7 [PH] (ref 5–8)
PLATELET # BLD AUTO: 140 10(3)UL (ref 150–450)
POTASSIUM SERPL-SCNC: 3.6 MMOL/L (ref 3.5–5.1)
PROT SERPL-MCNC: 7.3 G/DL (ref 5.7–8.2)
PROT UR STRIP.AUTO-MCNC: 50 MG/DL
Q-T INTERVAL: 330 MS
QRS DURATION: 96 MS
QTC CALCULATION (BEZET): 436 MS
R AXIS: -27 DEGREES
RBC # BLD AUTO: 3.29 X10(6)UL (ref 3.8–5.8)
RSV RNA SPEC NAA+PROBE: NEGATIVE
SARS-COV-2 RNA RESP QL NAA+PROBE: NOT DETECTED
SODIUM SERPL-SCNC: 139 MMOL/L (ref 136–145)
SP GR UR STRIP.AUTO: 1.01 (ref 1–1.03)
T AXIS: 50 DEGREES
UROBILINOGEN UR STRIP.AUTO-MCNC: NORMAL MG/DL
VENTRICULAR RATE: 105 BPM
WBC # BLD AUTO: 11.5 X10(3) UL (ref 4–11)

## 2025-06-29 PROCEDURE — 99223 1ST HOSP IP/OBS HIGH 75: CPT | Performed by: INTERNAL MEDICINE

## 2025-06-29 PROCEDURE — 71045 X-RAY EXAM CHEST 1 VIEW: CPT | Performed by: EMERGENCY MEDICINE

## 2025-06-29 PROCEDURE — 70450 CT HEAD/BRAIN W/O DYE: CPT | Performed by: EMERGENCY MEDICINE

## 2025-06-29 RX ORDER — ACETAMINOPHEN 500 MG
500 TABLET ORAL EVERY 4 HOURS PRN
Status: DISCONTINUED | OUTPATIENT
Start: 2025-06-29 | End: 2025-07-01

## 2025-06-29 RX ORDER — LEVETIRACETAM 500 MG/1
500 TABLET ORAL 2 TIMES DAILY
Status: DISCONTINUED | OUTPATIENT
Start: 2025-06-29 | End: 2025-06-29

## 2025-06-29 RX ORDER — ACETAMINOPHEN 500 MG
1000 TABLET ORAL ONCE
Status: COMPLETED | OUTPATIENT
Start: 2025-06-29 | End: 2025-06-29

## 2025-06-29 RX ORDER — METOPROLOL SUCCINATE 50 MG/1
50 TABLET, EXTENDED RELEASE ORAL DAILY
Status: DISCONTINUED | OUTPATIENT
Start: 2025-06-29 | End: 2025-07-01

## 2025-06-29 RX ORDER — NICOTINE POLACRILEX 4 MG
15 LOZENGE BUCCAL
Status: DISCONTINUED | OUTPATIENT
Start: 2025-06-29 | End: 2025-07-01

## 2025-06-29 RX ORDER — HEPARIN SODIUM 5000 [USP'U]/ML
5000 INJECTION, SOLUTION INTRAVENOUS; SUBCUTANEOUS EVERY 12 HOURS SCHEDULED
Status: DISCONTINUED | OUTPATIENT
Start: 2025-06-29 | End: 2025-07-01

## 2025-06-29 RX ORDER — POTASSIUM CHLORIDE 1500 MG/1
40 TABLET, EXTENDED RELEASE ORAL EVERY 4 HOURS
Status: COMPLETED | OUTPATIENT
Start: 2025-06-29 | End: 2025-06-29

## 2025-06-29 RX ORDER — ONDANSETRON 2 MG/ML
4 INJECTION INTRAMUSCULAR; INTRAVENOUS EVERY 6 HOURS PRN
Status: DISCONTINUED | OUTPATIENT
Start: 2025-06-29 | End: 2025-07-01

## 2025-06-29 RX ORDER — ATORVASTATIN CALCIUM 20 MG/1
20 TABLET, FILM COATED ORAL DAILY
Status: DISCONTINUED | OUTPATIENT
Start: 2025-06-29 | End: 2025-07-01

## 2025-06-29 RX ORDER — NICOTINE POLACRILEX 4 MG
30 LOZENGE BUCCAL
Status: DISCONTINUED | OUTPATIENT
Start: 2025-06-29 | End: 2025-07-01

## 2025-06-29 RX ORDER — DEXTROSE MONOHYDRATE 25 G/50ML
50 INJECTION, SOLUTION INTRAVENOUS
Status: DISCONTINUED | OUTPATIENT
Start: 2025-06-29 | End: 2025-07-01

## 2025-06-29 NOTE — ED PROVIDER NOTES
Patient Seen in: [id number]        History  Chief Complaint   Patient presents with    Fever    Fatigue     Stated Complaint: fever, weakness    Subjective:   HPI            75-year-old gentleman history of multiple myeloma in remission, here for evaluation of generalized weakness over the past few days.  Had a fall 3 days ago where his legs reportedly came entangled with the least of the dog causing him to fall forward struck his head, multiple abrasions to the extremities as well.  Wife states has had multiple recent falls recently reports generalized weakness.  No fevers vomiting diarrhea chest pain shortness of breath any other complaints.      Objective:     Past Medical History:    Allergic rhinitis, cause unspecified    BCC (basal cell carcinoma of skin)    s/p surgical excision    BPH (benign prostatic hyperplasia)    Colon polyps    Generalized osteoarthrosis, involving hand    Herniation of intervertebral disc between L4 and L5    s/p surgical repair    High blood pressure    High cholesterol    Impotence of organic origin    Morbid obesity (HCC)    Osteoarthritis    Osteoarthrosis, unspecified whether generalized or localized, lower leg    Log Date: 08/21/2012     Other and unspecified hyperlipidemia    OTHER DISEASES    stage 3 Multiple Myeloma    Pneumonia due to organism    Prostate cancer (HCC)    s/p total prostatectomy and radiation    Trigger finger (acquired)    Type II or unspecified type diabetes mellitus without mention of complication, not stated as uncontrolled    Unspecified essential hypertension    Unspecified internal derangement of knee    Log Date: 08/21/2012               Past Surgical History:   Procedure Laterality Date    Back surgery  01/01/2001    lower back L4 L5 for herniated disc    Colonoscopy      202, 2012 Dr. Wolf, polyps    Laminectomy  1/2001    Other surgical history  1/31/2007    total prostatectomy    Skin surgery  01/01/1997    BCC                Social  History     Socioeconomic History    Marital status:     Number of children: 3   Occupational History    Occupation: Retired      Comment:    Tobacco Use    Smoking status: Never    Smokeless tobacco: Never   Vaping Use    Vaping status: Never Used   Substance and Sexual Activity    Alcohol use: No     Alcohol/week: 0.0 standard drinks of alcohol    Drug use: No   Other Topics Concern    Caffeine Concern No    Exercise Yes     Comment: walking     Social Drivers of Health     Food Insecurity: No Food Insecurity (6/29/2025)    NCSS - Food Insecurity     Worried About Running Out of Food in the Last Year: No     Ran Out of Food in the Last Year: No   Transportation Needs: No Transportation Needs (6/29/2025)    NCSS - Transportation     Lack of Transportation: No   Housing Stability: Not At Risk (6/29/2025)    NCSS - Housing/Utilities     Has Housing: Yes     Worried About Losing Housing: No     Unable to Get Utilities: No                                Physical Exam    ED Triage Vitals [06/29/25 0941]   /73   Pulse 108   Resp 14   Temp 100.2 °F (37.9 °C)   Temp src Temporal   SpO2 96 %   O2 Device None (Room air)       Current Vitals:   Vital Signs  BP: 115/65  Pulse: 58  Resp: 12  Temp: 99.8 °F (37.7 °C)  Temp src: Axillary  MAP (mmHg): 77    Oxygen Therapy  SpO2: 97 %  O2 Device: None (Room air)  Pulse Oximetry Type: Continuous            Physical Exam      Physical Exam  Vitals signs and nursing note reviewed.   General:  Patient laying supine in the bed in no acute distress  Head: Normocephalic, well-healed abrasion above the left eyebrow.  HEENT:  Mucous membranes are moist.  Cervical spine no midline tenderness no pain with range of motion.  Cardiovascular:  Normal rate and regular rhythm.  No Edema  Pulmonary:  Pulmonary effort is normal.  Normal breath sounds. No wheezing, rhonchi or rales.   Abdominal: Soft nontender nondistended, normal bowel sounds, no guarding no  rebound tenderness  Skin: Warm and dry, abrasions to the extremities and bruising noted  Neurological: Awake alert, speech is normal, motor 5/5 in bilateral upper and lower extremities.  sensation is grossly intact throughout.  No facial asymmetry.  Stable narrow based gait.            ED Course  Labs Reviewed   URINALYSIS, ROUTINE - Abnormal; Notable for the following components:       Result Value    Clarity Urine Turbid (*)     Glucose Urine 300 (*)     Ketones Urine Trace (*)     Blood Urine 1+ (*)     Protein Urine 50 (*)     Nitrite Urine 2+ (*)     Leukocyte Esterase Urine 500 (*)     WBC Urine 21-50 (*)     Bacteria Urine Rare (*)     Squamous Epi. Cells Few (*)     All other components within normal limits   COMP METABOLIC PANEL (14) - Abnormal; Notable for the following components:    Glucose 158 (*)     Creatinine 1.98 (*)     eGFR-Cr 34 (*)     All other components within normal limits   CBC WITH DIFFERENTIAL WITH PLATELET - Abnormal; Notable for the following components:    WBC 11.5 (*)     RBC 3.29 (*)     HGB 11.1 (*)     HCT 33.8 (*)     .0 (*)     .7 (*)     Neutrophil Absolute Prelim 10.42 (*)     Neutrophil Absolute 10.42 (*)     Lymphocyte Absolute 0.21 (*)     All other components within normal limits   POCT GLUCOSE - Abnormal; Notable for the following components:    POC Glucose 153 (*)     All other components within normal limits   LACTIC ACID, PLASMA - Normal   SARS-COV-2/FLU A AND B/RSV BY PCR (GENEXPERT) - Normal    Narrative:     This test is intended for the qualitative detection and differentiation of SARS-CoV-2, influenza A, influenza B, and respiratory syncytial virus (RSV) viral RNA in nasopharyngeal or nares swabs from individuals suspected of respiratory viral infection consistent with COVID-19 by their healthcare provider. Signs and symptoms of respiratory viral infection due to SARS-CoV-2, influenza, and RSV can be similar.    Test performed using the Xpert Xpress  SARS-CoV-2/FLU/RSV (real time RT-PCR)  assay on the GeneXpert instrument, [employer], [place] [address].   This test is being used under the Food and Drug Administration's Emergency Use Authorization.    The authorized Fact Sheet for Healthcare Providers for this assay is available upon request from the laboratory.   RAINBOW DRAW LAVENDER   RAINBOW DRAW LIGHT GREEN   RAINBOW DRAW BLUE   BLOOD CULTURE   BLOOD CULTURE     EKG    Rate, intervals and axes as noted on EKG Report.  Rate: 105  Rhythm: Sinus Rhythm  Reading: No acute ischemic changes              CT BRAIN OR HEAD (CPT=70450)  Result Date: 6/29/2025  NONCONTRAST CT BRAIN HISTORY: Fever and weakness. TECHNIQUE: Noncontrast helical images of the brain were obtained from the foramen magnum to the vertex. Coronal reformatting was performed. COMPARISON:  None. FINDINGS:  The ventricles, sulci and cisterns are mildly prominent consistent with mild atrophy. There is no acute intraparenchymal hemorrhage identified. No abnormal extra-axial fluid collections identified. There is decreased attenuation in the periventricular and subcortical deep white matter which although nonspecific is consistent with chronic microvascular ischemic change. There is a 7 mm hypodense probable opacity in the right basal ganglia which may reflect a prominent perivascular space or old lacunar infarct. There is nothing specific for an acute territorial infarct. No calvarial abnormalities identified. No acute sinusitis or acute mastoiditis identified. The visualized portions of the orbits appear unremarkable.     IMPRESSION: No acute intracranial abnormality identified. Chronic microvascular ischemic changes and atrophy are noted. Right basal ganglia prominent perivascular space or old lacunar infarct. There is nothing specific for an acute territorial infarct. If there is clinical concern for an acute infarct and follow-up MRI recommended. Electronically Verified and Signed by Attending  Radiologist: Jaret Taveras MD 6/29/2025 1:46 PM Workstation: [id number]    XR CHEST AP PORTABLE  (CPT=71045)  Result Date: 6/29/2025  PROCEDURE: XR CHEST AP PORTABLE  (CPT=71045) INDICATIONS: fever, weakness COMPARISON: No comparisons. FINDINGS: Lung volumes are low normal, similar to the prior. No new consolidation or pleural effusion. No pneumothorax. Heart and pulmonary vessels appear stable, normal caliber. Mild scattered scarring is similar to the prior. Deformities of the right ribs consistent with remote rib fractures are again seen.     CONCLUSION: No evidence of active cardiopulmonary disease. Electronically Verified and Signed by Attending Radiologist: Alejandro De MD 6/29/2025 10:41 AM Workstation: [id number]                    [facility]     75-year-old gentleman here for evaluation of generalized weakness weakness frequent falls.  Differential includes electrolyte abnormality, urinary tract infection pneumonia acute intracranial hemorrhage.  He appears to be neurologically intact on exam, urinalysis consistent with infection, does report global weakness.  Given fall risk, acute urinary tract infection he was covered with ceftriaxone will be admitted for further monitoring evaluation [facility]    I independently viewed and interpreted the following imaging: CT head with no acute intracranial hemorrhage    Admission disposition: 6/29/2025 10:44 AM           Medical Decision Making      Disposition and Plan     Clinical Impression:  1. Urinary tract infection with hematuria, site unspecified         Disposition:  Admit  6/29/2025 10:44 am    Follow-up:  No follow-up provider specified.        Medications Prescribed:  Current Discharge Medication List                Supplementary Documentation:         Hospital Problems       Present on Admission  Date Reviewed: 6/4/2025          ICD-10-CM Noted POA    * (Principal) Urinary tract infection with  hematuria, site unspecified N39.0, R31.9 6/29/2025 Unknown

## 2025-06-29 NOTE — ED INITIAL ASSESSMENT (HPI)
Patient arrives from home via EMS for c/o of fever and weakness for one day.  Pt also states he has had urinary frequency for a few days. Pt has hx of multiple myeloma. Pt not on BP. A&Ox4.

## 2025-06-29 NOTE — PROGRESS NOTES
NURSING ADMISSION NOTE      Patient admitted via Cart  Oriented to room.  Safety precautions initiated.  Bed in low position.  Call light in reach.    Pt received A&Ox3-4, intermittent confusion/forgetfulness. Afebrile. VSS. Mild chronic pain to rib cage. Admission navigator and med rec completed w/ wife at bedside. Dr. Melchor aware and to see pt. Several abrasions to b/l knees, LUE, and left eye - fall reported Thursday. Pt denies any urinary symptoms. Electrolytes replaced per protocol. IV rocephin per MAR. Fall precautions in place. Call light in reach.     1815 - Pt more confused/nonsensical. D/w Dr. Melchor. Plan to monitor and re-eval tomorrow per MD.

## 2025-06-29 NOTE — ED QUICK NOTES
Orders for admission, patient is aware of plan and ready to go upstairs. Any questions, please call ED RN Neyda at extension [phone].     Patient Covid vaccination status: Fully vaccinated     COVID Test Ordered in ED: SARS-CoV-2/Flu A and B/RSV by PCR (GeneXpert)    COVID Suspicion at Admission: N/A    Running Infusions: Medication Infusions[1] None    Mental Status/LOC at time of transport: A&Ox4    Other pertinent information:   CIWA score: N/A   NIH score:  N/A             [1]

## 2025-06-29 NOTE — H&P
[facility]                                                               History & Physical         Nacho Valle Patient Status:  Emergency    1950 MRN [id number]   [facility] EMERGENCY DEPARTMENT Attending Hakeem Mckeon MD   Hosp Day # 0 PCP Dagoberto Bell MD     Chief Complaint: Generalized weakness, urinary frequency and suprapubic abdominal discomfort, fall at home    History of Present Illness:  Nacho Valle is a 75 year old male admitted with generalized weakness, urinary frequency and suprapubic abdominal discomfort, fall at home on Thursday while walking his dog according to patient.  Bumped his left side of his head and has a bruise near the left eyebrow from the fall.  Patient has been having generalized weakness and has had multiple recent falls at home as reported by patient and patient's wife at bedside.  Complains of chills.  Denies any fevers.  Denies any chest pain or shortness of breath.  Has some suprapubic discomfort.  Patient's wife at bedside reported that patient's having outpatient workup with neurology for possible dementia and has an outpatient MRI scheduled on this Tuesday.    History:  Past Medical History:    Allergic rhinitis, cause unspecified    BCC (basal cell carcinoma of skin)    s/p surgical excision    BPH (benign prostatic hyperplasia)    Colon polyps    Generalized osteoarthrosis, involving hand    Herniation of intervertebral disc between L4 and L5    s/p surgical repair    High blood pressure    High cholesterol    Impotence of organic origin    Morbid obesity (HCC)    Osteoarthritis    Osteoarthrosis, unspecified whether generalized or localized, lower leg    Log Date: 2012     Other and unspecified hyperlipidemia    OTHER DISEASES    stage 3 Multiple Myeloma    Pneumonia due to organism    Prostate cancer (HCC)    s/p total prostatectomy and radiation    Trigger finger (acquired)    Type II or unspecified type diabetes mellitus without  mention of complication, not stated as uncontrolled    Unspecified essential hypertension    Unspecified internal derangement of knee    Log Date: 08/21/2012        Past Surgical History:   Procedure Laterality Date    Back surgery  01/01/2001    lower back L4 L5 for herniated disc    Colonoscopy      202, 2012 Dr. Wolf, polyps    Laminectomy  1/2001    Other surgical history  1/31/2007    total prostatectomy    Skin surgery  01/01/1997    BCC       Family history:  Family History   Problem Relation Age of Onset    Diabetes Other         family hx    Hypertension Other         family hx    Prostate Cancer Father     Diabetes Father     Hypertension Father     Cancer Father         Prostate cancer      Reviewed    Social history:   reports that he has never smoked. He has never used smokeless tobacco. He reports that he does not drink alcohol and does not use drugs.    Allergies:  Allergies   Allergen Reactions    Pcn [Penicillins] ANAPHYLAXIS, HIVES, HALLUCINATION and SHORTNESS OF BREATH     Respiratory distress    Beta Adrenergic Blockers     Latex      Surgical tape  Welts, burning of skin, itching    Statins        Home Medications:  (Not in a hospital admission)      Review of Systems:  A comprehensive 14 point review of systems was completed.  Pertinent positives and negatives noted in the the HPI.    Physical Exam:     Vital signs: Blood pressure 107/65, pulse 70, temperature 99.8 °F (37.7 °C), temperature source Temporal, resp. rate 22, height 6' (1.829 m), weight 230 lb (104.3 kg), SpO2 97%.  General: No acute distress.   HEENT: Moist mucous membranes.  Has fading ecchymosis seen on the left forehead near the left eyebrow.  Respiratory: Clear to auscultation bilaterally.  No wheezes. No rhonchi.  Cardiovascular: S1, S2.  Regular rate and rhythm.    Abdomen: Soft, nontender, nondistended.  Positive bowel sounds.  Neurologic: Awake alert, no focal neurological deficits.  Musculoskeletal: Full range of motion  of all extremities.  No pedal edema or calf tenderness  Integument: Fading ecchymosis near the left forehead near the left eyebrow.  Also has healing abrasion on bilateral knee.  Psychiatric: Appropriate mood and affect.      Diagnostic Data:      Laboratory Data:   Lab Results   Component Value Date    WBC 11.5 06/29/2025    HGB 11.1 06/29/2025    HCT 33.8 06/29/2025    .0 06/29/2025    CREATSERUM 1.98 06/29/2025    BUN 22 06/29/2025     06/29/2025    K 3.6 06/29/2025     06/29/2025    CO2 23.0 06/29/2025     06/29/2025    CA 9.3 06/29/2025    ALB 4.7 06/29/2025    ALKPHO 79 06/29/2025    BILT 0.7 06/29/2025    TP 7.3 06/29/2025    AST 23 06/29/2025    ALT 14 06/29/2025      Latest Reference Range & Units 06/29/25 09:53   Color Urine Yellow  Light-Yellow   Clarity Urine Clear  Turbid !   Spec Gravity 1.005 - 1.030  1.015   Glucose Urine Normal mg/dL 300 !   Bilirubin Urine Negative  Negative   Ketones, UA Negative mg/dL Trace !   Blood Urine Negative  1+ !   PH Urine 5.0 - 8.0  7.0   Protein Urine Negative mg/dL 50 !   Urobilinogen Urine Normal mg/dL Normal   Nitrite Urine Negative  2+ !   Leukocyte Esterase  Negative  500 !   WBC Urine 0 - 5 /HPF 21-50 !   RBC Urine 0 - 2 /HPF 0-2   Bacteria Urine None Seen /HPF Rare !   SQUAM EPI CELLS UR None Seen /HPF Few !   RENAL TUBULAR EPITHELIAL CELLS None Seen /HPF None Seen   TRANSITIONAL EPI CELLS None Seen /HPF None Seen   YEAST URINE None Seen /HPF None Seen     Imaging:  Imaging data reviewed in Epic.  Chest x-ray done on 6/29/2025 with no evidence of active cardiopulmonary disease.    CT of the brain done on 6/29/2025 with no acute intracranial abnormality and 5.  Chronic microvascular ischemic changes and atrophy noted.  Right basal ganglia prominent perivascular space old lacunar infarct.  Nothing specific for acute territorial infarct.    ASSESSMENT / PLAN:     #Generalized weakness, fall at home due to possible UTI  #UA positive,  possible UTI  # closed head injury with contusion of the left forehead status post fall at home  Fall precautions  IV fluids  Continue IV antibiotics, patient given first dose of IV Rocephin from the emergency room which we will continue  UA positive, follow urine culture and sensitivity  PT OT eval  #Diabetes mellitus type 2, diet controlled at home according to patient  Hypoglycemia protocol  Follow Accu-Cheks  NovoLog  correction factor coverage as needed  Check hemoglobin A1c  #Hypertension  Continue home medication metoprolol  Follow blood pressure  #Hyperlipidemia  Continue home statin  #BPH  #History of prostate cancer with prior total prostatectomy and radiation  #Multiple myeloma  According to patient and patient's wife, patient follows up with hematology oncology Dr. Frias and Lalito and also with [place] Dr. Good for follow-up on this, patient's wife states that patient had CAR-T treatment and was on Keppra for Seizure prophylaxis and Keppra discontinued by his Ivet physician .  Patient and patient's wife states that patient never had any previous history of seizures.  #CKD stage III  Follow BMP in a.m.    Quality:  DVT Prophylaxis: DVT Mechanical Prophylaxis:   SCDs,    DVT Pharmacologic Prophylaxis   Medication    heparin (Porcine) 5000 UNIT/ML injection 5,000 Units              CODE status:   Code Status: Full Code  Galan: No urinary catheter in place    Plan of care discussed with patient, patient's wife at bedside      Discussed with ER Physician.    Gricelda Melchor MD  6/29/2025  1:50 PM

## 2025-06-30 ENCOUNTER — APPOINTMENT (OUTPATIENT)
Dept: CT IMAGING | Facility: HOSPITAL | Age: 75
End: 2025-06-30
Attending: INTERNAL MEDICINE
Payer: MEDICARE

## 2025-06-30 PROBLEM — B96.20 E COLI BACTEREMIA: Status: ACTIVE | Noted: 2025-06-30

## 2025-06-30 PROBLEM — R78.81 E COLI BACTEREMIA: Status: ACTIVE | Noted: 2025-06-30

## 2025-06-30 LAB
ANION GAP SERPL CALC-SCNC: 8 MMOL/L (ref 0–18)
BASOPHILS # BLD AUTO: 0.01 X10(3) UL (ref 0–0.2)
BASOPHILS NFR BLD AUTO: 0.2 %
BUN BLD-MCNC: 24 MG/DL (ref 9–23)
C DIFF TOX B STL QL: NEGATIVE
CALCIUM BLD-MCNC: 9.1 MG/DL (ref 8.7–10.6)
CHLORIDE SERPL-SCNC: 108 MMOL/L (ref 98–112)
CO2 SERPL-SCNC: 25 MMOL/L (ref 21–32)
CREAT BLD-MCNC: 1.97 MG/DL (ref 0.7–1.3)
EGFRCR SERPLBLD CKD-EPI 2021: 34 ML/MIN/1.73M2 (ref 60–?)
EOSINOPHIL # BLD AUTO: 0.03 X10(3) UL (ref 0–0.7)
EOSINOPHIL NFR BLD AUTO: 0.5 %
ERYTHROCYTE [DISTWIDTH] IN BLOOD BY AUTOMATED COUNT: 13.6 %
GLUCOSE BLD-MCNC: 115 MG/DL (ref 70–99)
GLUCOSE BLD-MCNC: 117 MG/DL (ref 70–99)
GLUCOSE BLD-MCNC: 124 MG/DL (ref 70–99)
GLUCOSE BLD-MCNC: 124 MG/DL (ref 70–99)
GLUCOSE BLD-MCNC: 247 MG/DL (ref 70–99)
HCT VFR BLD AUTO: 30.7 % (ref 39–53)
HGB BLD-MCNC: 10 G/DL (ref 13–17.5)
IMM GRANULOCYTES # BLD AUTO: 0.01 X10(3) UL (ref 0–1)
IMM GRANULOCYTES NFR BLD: 0.2 %
LYMPHOCYTES # BLD AUTO: 0.26 X10(3) UL (ref 1–4)
LYMPHOCYTES NFR BLD AUTO: 4 %
MAGNESIUM SERPL-MCNC: 2.3 MG/DL (ref 1.6–2.6)
MCH RBC QN AUTO: 34.1 PG (ref 26–34)
MCHC RBC AUTO-ENTMCNC: 32.6 G/DL (ref 31–37)
MCV RBC AUTO: 104.8 FL (ref 80–100)
MONOCYTES # BLD AUTO: 0.67 X10(3) UL (ref 0.1–1)
MONOCYTES NFR BLD AUTO: 10.3 %
NEUTROPHILS # BLD AUTO: 5.51 X10 (3) UL (ref 1.5–7.7)
NEUTROPHILS # BLD AUTO: 5.51 X10(3) UL (ref 1.5–7.7)
NEUTROPHILS NFR BLD AUTO: 84.8 %
OSMOLALITY SERPL CALC.SUM OF ELEC: 297 MOSM/KG (ref 275–295)
PLATELET # BLD AUTO: 136 10(3)UL (ref 150–450)
POTASSIUM SERPL-SCNC: 4.3 MMOL/L (ref 3.5–5.1)
POTASSIUM SERPL-SCNC: 4.3 MMOL/L (ref 3.5–5.1)
RBC # BLD AUTO: 2.93 X10(6)UL (ref 3.8–5.8)
SODIUM SERPL-SCNC: 141 MMOL/L (ref 136–145)
WBC # BLD AUTO: 6.5 X10(3) UL (ref 4–11)

## 2025-06-30 PROCEDURE — 99233 SBSQ HOSP IP/OBS HIGH 50: CPT | Performed by: INTERNAL MEDICINE

## 2025-06-30 PROCEDURE — 74176 CT ABD & PELVIS W/O CONTRAST: CPT | Performed by: INTERNAL MEDICINE

## 2025-06-30 NOTE — OCCUPATIONAL THERAPY NOTE
OCCUPATIONAL THERAPY EVALUATION - INPATIENT    Room Number: 421/421-A   Evaluation Date: 6/30/2025   Type of Evaluation: Initial  Presenting Problem: UTI w/ hematuria    Physician Order: IP Consult to Occupational Therapy  Reason for Therapy:  ADL/IADL Dysfunction and Discharge Planning      OCCUPATIONAL THERAPY ASSESSMENT   Patient is a 75 year old male admitted on 6/29/2025  with Presenting Problem: UTI w/ hematuria. Co-Morbidities : CKD IV, MM, s/p stem cell transplant, CHF, DM, HTN. Patient is currently functioning near baseline with self-care and functional mobility. Prior to admission, patient's baseline is mod I- supervision level. Patient met all OT goals at mod I to supervision/SBA level.  Patient reports no further questions/concerns at this time.     Patient will be discharged from OT services at this time.  Will benefit from return to home setting at discharge from hospital.    WEIGHT BEARING RESTRICTION  Weight Bearing Restriction: None    Recommendations for nursing staff:   Transfers: one person w/ walker  Toileting location: Toilet    EVALUATION SESSION:  Patient at start of session: Sitting in chair   FUNCTIONAL TRANSFER ASSESSMENT  Sit to Stand: Chair  Chair: Modified Independent    BED MOBILITY  Rolling: Not Tested  Supine to Sit : Not tested  Sit to Supine (OT): Not Tested    BALANCE ASSESSMENT  Static Sitting: Modified Independent  Static Standing: Stand-by Assist      FUNCTIONAL ADL ASSESSMENT       ACTIVITY TOLERANCE:     Pt tolerated mobility task from sitting to stand using r/w with modified independence  Pt tolerated walking in hallway with r/w for 5 minutes w no c/o SOB     O2 SATURATIONS       COGNITION  COGNITION  Arousal/Alertness:  appropriate responses to stimuli  Orientation Level:  oriented x4  Memory:  decreased long term memory    Noted in in chart, pt attending outpatient neuro eval d/t frequent forgetfulness and confusion within the past few months which was reported by wife      COGNITION ASSESSMENTS     Upper Extremity:   ROM: within functional limits   Strength: is within functional limits   Coordination:  Gross motor: WFL  Fine motor: WFL  Sensation: Light touch: intact; no c/o numbness or tingling    Vision: no acute changes reported  Able to read clock on wall without difficulty while wearing glasses    EDUCATION PROVIDED  Patient Education : Role of Occupational Therapy; Functional Transfer Techniques; Fall Prevention; Posture/Positioning; Energy Conservation; Proper Body Mechanics  Patient's Response to Education: Verbalized Understanding    Therapist comments: OT educated patient on safety,sequencing, energy conservation, pain management, home modifications and adaptive equipment to increase independence with ADLs.    Pt was AxOx4 and gave detailed description on why he was in the hospital  Pt was able to complete sitting to stand transfer with mod I using rw to help stand up   Pt walked in hallway using rw for 5 minutes with supervision  Pt was able to complete standing to sitting transfer Mod I   Educated pt on importance of reaching back to feel chair and having knees touching the chair to prevent falls while going from stand to sit    Patient End of Session: Up in chair, Needs met, Call light within reach, RN aware of session/findings, All patient questions and concerns addressed, Hospital anti-slip socks, Alarm set    OCCUPATIONAL PROFILE    HOME SITUATION  Type of Home: House  Home Layout: Two level  Lives With: Spouse, Other (Comment) (Dog= Peanut)    Toilet and Equipment: Comfort height toilet, Grab bar  Shower/Tub and Equipment: Walk-in shower, Grab bar, Shower chair  Other Equipment:  (straight cane, RW)    Occupation/Status: retired   Hand Dominance: Right  Drives: Yes  Patient Regularly Uses: Glasses, Cane    Prior Level of Function: Mod I- Min A with ADLs and functional mobility using cane at home  Reports walking his dog Peanut everyday about 2  blocks   Is able to dress upper body independently   Is able to shower using adaptive equipment listed above   Reports going to bathroom independently   Increased amount of falls within the past few months    SUBJECTIVE  PAIN ASSESSMENT  Rating: Unable to rate  Location: \"all over\"  Management Techniques: Activity promotion, Body mechanics, Relaxation, Repositioning    Pt stated he was walking his dog and got tangled in the leash leading to the fall    OBJECTIVE  Precautions: Bed/chair alarm  Fall Risk: High fall risk    WEIGHT BEARING RESTRICTION  Weight Bearing Restriction: None    AM-PAC ‘6-Clicks’ Inpatient Daily Activity Short Form  -   Putting on and taking off regular lower body clothing?: A Little  -   Bathing (including washing, rinsing, drying)?: A Little  -   Toileting, which includes using toilet, bedpan or urinal? : A Little  -   Putting on and taking off regular upper body clothing?: None  -   Taking care of personal grooming such as brushing teeth?: None  -   Eating meals?: None    AM-PAC Score:  Score: 21  Approx Degree of Impairment: 32.79%  Standardized Score (AM-PAC Scale): 44.27      ADDITIONAL TESTS     NEUROLOGICAL FINDINGS        PLAN   Patient has been evaluated and presents with no skilled Occupational Therapy needs at this time. Patient discharged from Occupational Therapy services. Please re-order if a new functional limitation presents during this admission.      Patient Evaluation Complexity Level:   Occupational Profile/Medical History LOW - Brief history including review of medical or therapy records    Specific performance deficits impacting engagement in ADL/IADL LOW  1 - 3 performance deficits    Client Assessment/Performance Deficits LOW - No comorbidities nor modifications of tasks    Clinical Decision Making LOW - Analysis of occupational profile, problem-focused assessments, limited treatment options    Overall Complexity LOW     OT Session Time: 30 minutes  Self-Care Home  Management: 15 minutes

## 2025-06-30 NOTE — PROGRESS NOTES
[facility]   part of [facility]     Hospitalist Progress Note     Nacho Valle Patient Status:  Observation    1950 MRN [id number]   Location [facility] 4NW-A Attending Gricelda Melchor MD   Hosp Day # 0 PCP Dagoberto Bell MD     Chief Complaint: Generalized weakness, urinary frequency and suprapubic abdominal discomfort, fall at home     Subjective:     Patient feeling better today, ambulating in the room when I was seeing the patient.  Denies any dizziness.  Urinary frequency and suprapubic discomfort improving.  Initial blood culture done on admission positive for E. coli    Objective:    Review of Systems:   A comprehensive review of systems was completed; pertinent positive and negatives stated in subjective.    Vital signs:  Temp:  [97.9 °F (36.6 °C)-98.6 °F (37 °C)] 98.2 °F (36.8 °C)  Pulse:  [54-70] 66  Resp:  [12-22] 16  BP: (100-123)/(53-73) 102/73  SpO2:  [96 %-99 %] 96 %    Physical Exam:    /73 (BP Location: Left arm)   Pulse 66   Temp 98.2 °F (36.8 °C) (Oral)   Resp 16   Ht 6' (1.829 m)   Wt 224 lb 11.2 oz (101.9 kg)   SpO2 96%   BMI 30.47 kg/m²   General: No acute distress.   HEENT: Moist mucous membranes.  Has fading ecchymosis seen on the left forehead near the left eyebrow.  Respiratory: Clear to auscultation bilaterally.  No wheezes. No rhonchi.  Cardiovascular: S1, S2.  Regular rate and rhythm.    Abdomen: Soft, nontender, nondistended.  Positive bowel sounds.  Neurologic: Awake alert, no focal neurological deficits.  Musculoskeletal: Full range of motion of all extremities.  No pedal edema or calf tenderness  Integument: Fading ecchymosis near the left forehead near the left eyebrow.  Also has healing abrasion on bilateral knee.  Psychiatric: Appropriate mood and affect.       Diagnostic Data:    Labs:  Recent Labs   Lab 25  0952 25  0528   WBC 11.5* 6.5   HGB 11.1* 10.0*   .7* 104.8*   .0* 136.0*       Recent Labs   Lab 25  0957  06/29/25  2353 06/30/25  0528   *  --  124*   BUN 22  --  24*   CREATSERUM 1.98*  --  1.97*   CA 9.3  --  9.1   ALB 4.7  --   --      --  141   K 3.6 4.3 4.3     --  108   CO2 23.0  --  25.0   ALKPHO 79  --   --    AST 23  --   --    ALT 14  --   --    BILT 0.7  --   --    TP 7.3  --   --        Estimated Glomerular Filtration Rate: 34 mL/min/1.73m2 (A) (result from lab).    No results for input(s): \"TROP\", \"TROPHS\", \"CK\" in the last 168 hours.    No results for input(s): \"PTP\", \"INR\" in the last 168 hours.               Microbiology    Hospital Encounter on 06/29/25   1. Blood Culture     Status: Abnormal (Preliminary result)    Collection Time: 06/29/25  9:52 AM    Specimen: Blood,peripheral   Result Value Ref Range    Blood Culture Result Positive N/A    Blood Culture Result Escherichia coli (A) N/A    Blood Smear Positive Blood Culture (A) N/A    Blood Smear Gram Negative Rods (A) N/A         Imaging: Reviewed in Epic.    Medications:    cefTRIAXone  2 g Intravenous Q24H    atorvastatin  20 mg Oral Daily    metoprolol succinate ER  50 mg Oral Daily    heparin  5,000 Units Subcutaneous 2 times per day    insulin aspart  1-10 Units Subcutaneous TID AC and HS       Assessment & Plan:      #Generalized weakness, fall at home due to E. coli UTI  # E. coli UTI and E. coli bacteremia in an immunosuppressed patient  # closed head injury with contusion of the left forehead status post fall at home  Fall precautions  IV fluids  Continue IV antibiotics, patient given first dose of IV Rocephin from the emergency room which we will continue  UA positive, follow urine culture and sensitivity  Blood culture sent on admission growing E. coli, sensitivity pending.  Will have ID on consult.  Will repeat blood culture to monitor clearance of bacteria.  PT OT eval  #Diabetes mellitus type 2, diet controlled at home according to patient  Hypoglycemia protocol  Follow Accu-Cheks  NovoLog  correction factor coverage  as needed  Check hemoglobin A1c  #Hypertension  Continue home medication metoprolol  Follow blood pressure  #Hyperlipidemia  Continue home statin  #BPH  #History of prostate cancer with prior total prostatectomy and radiation  #Multiple myeloma  According to patient and patient's wife, patient follows up with hematology oncology Dr. Frias and Lalito and also with [place] Dr. Good for follow-up on this, patient's wife states that patient had CAR-T treatment and was on Keppra for Seizure prophylaxis and Keppra discontinued by his Ivet physician .  Patient and patient's wife states that patient never had any previous history of seizures.  #CKD stage III  Follow BMP in a.zoila.      Gricelda Melchor MD    Supplementary Documentation:     Quality:  DVT Mechanical Prophylaxis:   SCDs,    DVT Pharmacologic Prophylaxis   Medication    heparin (Porcine) 5000 UNIT/ML injection 5,000 Units                Code Status: Full Code  Galan: No urinary catheter in place  Galan Duration (in days):   Central line:    KEYON:     Discharge is dependent on: Clinical progress  At this point Mr. Valle is expected to be discharge to: Pending PT OT    The 21st Century Cures Act makes medical notes like these available to patients in the interest of transparency. Please be advised this is a medical document. Medical documents are intended to carry relevant information, facts as evident, and the clinical opinion of the practitioner. The medical note is intended as peer to peer communication and may appear blunt or direct. It is written in medical language and may contain abbreviations or verbiage that are unfamiliar.              **Certification      PHYSICIAN Certification of Need for Inpatient Hospitalization - Initial Certification    Patient will require inpatient services that will reasonably be expected to span two midnight's based on the clinical documentation in H+P.   Based on patients current state of illness, I anticipate that, after  discharge, patient will require TBD.

## 2025-06-30 NOTE — PLAN OF CARE
Problem: SAFETY ADULT - FALL  Goal: Free from fall injury  Description: INTERVENTIONS:  - Assess pt frequently for physical needs  - Identify cognitive and physical deficits and behaviors that affect risk of falls.  - [employer] fall precautions as indicated by assessment.  - Educate pt/family on patient safety including physical limitations  - Instruct pt to call for assistance with activity based on assessment  - Modify environment to reduce risk of injury  - Provide assistive devices as appropriate  - Consider OT/PT consult to assist with strengthening/mobility  - Encourage toileting schedule  Outcome: Progressing

## 2025-06-30 NOTE — PROGRESS NOTES
Calm, cooperative.  Ambulating to bathroom with assistance.  Bed alarm on.  Vital signs stable.  Microbiology called with blood culture results.  MD paged.  Afebrile.

## 2025-06-30 NOTE — CONSULTS
INFECTIOUS DISEASE CONSULTATION    Nacho Valle Patient Status:  Observation    1950 MRN [id number]   [facility] 4NW-A Attending Gricelda Melchor MD   Hosp Day # 0 PCP Dagoberto Bell MD       Requested by Dr. Melchor    Reason for Consultation:  E coli bacteremia and UTI    History of Present Illness:  Nacho Valle is a a(n) 75 year old male brought to ED by EMS from home on , for generalized weakness and fever in the preceding 3 days.  He had fallen at home 3 days prior to admission, while becoming entangled with the family dog, and suffered multiple abrasions. . He is a poor historian.   Temp in ED was 100.2, UA positive.    He received ceftriaxone in ED.  Blood cultures form admission GNR/E coli by PCR, CTX-M NOT detected.       History:  Past Medical History[1]  Past Surgical History[2]  Family History[3]   reports that he has never smoked. He has never used smokeless tobacco. He reports that he does not drink alcohol and does not use drugs.      Allergies:  Allergies[4]    Medications:  Current Hospital Medications[5]  Medications Ordered Prior to Encounter[6]    Review of Systems:    A comprehensive 10 point review of systems was completed.  Pertinent positives and negatives noted in the the HPI.      Physical Exam:      Vital signs: Temp:  [97.9 °F (36.6 °C)-98.6 °F (37 °C)] 98.2 °F (36.8 °C)  Pulse:  [54-70] 66  Resp:  [12-22] 16  BP: (100-123)/(53-73) 102/73  SpO2:  [96 %-99 %] 96 %  Body mass index is 30.47 kg/m².  HEENT: Moist mucous membranes. Extraocular muscles are intact.  Neck: No swelling, no masses  Respiratory: Non labored, symmetric exursion  Cardiovascular: No irregularities in rhythm  Abdomen: Soft, nontender, nondistended.    Musculoskeletal: Full range of motion of all extremities.  No swelling noted.  Joints: no effusions  Skin: No lesions. No erythema, no open wounds    Laboratory Data:  Laboratory data  reviewed      Recent Labs   Lab 06/30/25  0528   RBC 2.93*   HGB 10.0*   HCT 30.7*   .8*   MCH 34.1*   MCHC 32.6   RDW 13.6   NEPRELIM 5.51   WBC 6.5   .0*       Recent Labs   Lab 06/29/25  0952 06/29/25  2353 06/30/25  0528   *  --  124*   BUN 22  --  24*   CREATSERUM 1.98*  --  1.97*   CA 9.3  --  9.1   ALB 4.7  --   --      --  141   K 3.6 4.3 4.3     --  108   CO2 23.0  --  25.0   ALKPHO 79  --   --    AST 23  --   --    ALT 14  --   --    BILT 0.7  --   --    TP 7.3  --   --      Recent Labs   Lab 06/29/25  0953   COLORUR Light-Yellow   CLARITY Turbid*   SPECGRAVITY 1.015   GLUUR 300*   BILUR Negative   KETUR Trace*   BLOODURINE 1+*   PHURINE 7.0   PROUR 50*   UROBILINOGEN Normal   NITRITE 2+*   LEUUR 500*   WBCUR 21-50*   RBCUR 0-2   BACUR Rare*   EPIUR Few*         Lab Results   Component Value Date    MG 2.3 06/30/2025    PGLU 115 06/30/2025         Microbiologic Data:   Hospital Encounter on 06/29/25   1. Blood Culture     Status: Abnormal (Preliminary result)    Collection Time: 06/29/25  9:52 AM    Specimen: Blood,peripheral   Result Value Ref Range    Blood Culture Result Positive N/A    Blood Culture Result Escherichia coli (A) N/A    Blood Smear Positive Blood Culture (A) N/A    Blood Smear Gram Negative Rods (A) N/A         I  Established Problem list:  Problem List[7]    ASSESSMENT/PLAN:  1. E coli bacteremia and UTI  Fever and fall at home, 3 days prior to admission  UA positive  Blood cultures E coli, CTX-M negative, S pending    Continue Ceftriaxone pending sensitivities  CT kidney stone protocol          Dean Madrid MD, MD  [facility]  [phone]         [1]   Past Medical History:   Allergic rhinitis, cause unspecified    BCC (basal cell carcinoma of skin)    s/p surgical excision    BPH (benign prostatic hyperplasia)    Colon polyps    Generalized osteoarthrosis, involving hand    Herniation of intervertebral disc between L4  and L5    s/p surgical repair    High blood pressure    High cholesterol    Impotence of organic origin    Morbid obesity (HCC)    Osteoarthritis    Osteoarthrosis, unspecified whether generalized or localized, lower leg    Log Date: 08/21/2012     Other and unspecified hyperlipidemia    OTHER DISEASES    stage 3 Multiple Myeloma    Pneumonia due to organism    Prostate cancer (HCC)    s/p total prostatectomy and radiation    Trigger finger (acquired)    Type II or unspecified type diabetes mellitus without mention of complication, not stated as uncontrolled    Unspecified essential hypertension    Unspecified internal derangement of knee    Log Date: 08/21/2012    [2]   Past Surgical History:  Procedure Laterality Date    Back surgery  01/01/2001    lower back L4 L5 for herniated disc    Colonoscopy      202, 2012 Dr. Wolf, polyps    Laminectomy  1/2001    Other surgical history  1/31/2007    total prostatectomy    Skin surgery  01/01/1997    BCC   [3]   Family History  Problem Relation Age of Onset    Diabetes Other         family hx    Hypertension Other         family hx    Prostate Cancer Father     Diabetes Father     Hypertension Father     Cancer Father         Prostate cancer   [4]   Allergies  Allergen Reactions    Pcn [Penicillins] ANAPHYLAXIS, HIVES, HALLUCINATION and SHORTNESS OF BREATH     Respiratory distress    Beta Adrenergic Blockers     Latex      Surgical tape  Welts, burning of skin, itching    Statins    [5]   Current Facility-Administered Medications:     cefTRIAXone (Rocephin) 2 g in sodium chloride 0.9% 100mL IVPB-CATRINA, 2 g, Intravenous, Q24H    atorvastatin (Lipitor) tab 20 mg, 20 mg, Oral, Daily    metoprolol succinate ER (Toprol XL) 24 hr tab 50 mg, 50 mg, Oral, Daily    glucose (Dex4) 15 GM/59ML oral liquid 15 g, 15 g, Oral, Q15 Min PRN **OR** glucose (Glutose) 40% oral gel 15 g, 15 g, Oral, Q15 Min PRN **OR** glucose-vitamin C (Dex-4) chewable tab 4 tablet, 4 tablet, Oral, Q15 Min PRN  **OR** dextrose 50% injection 50 mL, 50 mL, Intravenous, Q15 Min PRN **OR** glucose (Dex4) 15 GM/59ML oral liquid 30 g, 30 g, Oral, Q15 Min PRN **OR** glucose (Glutose) 40% oral gel 30 g, 30 g, Oral, Q15 Min PRN **OR** glucose-vitamin C (Dex-4) chewable tab 8 tablet, 8 tablet, Oral, Q15 Min PRN    heparin (Porcine) 5000 UNIT/ML injection 5,000 Units, 5,000 Units, Subcutaneous, 2 times per day    acetaminophen (Tylenol Extra Strength) tab 500 mg, 500 mg, Oral, Q4H PRN    melatonin tab 3 mg, 3 mg, Oral, Nightly PRN    ondansetron (Zofran) 4 MG/2ML injection 4 mg, 4 mg, Intravenous, Q6H PRN    insulin aspart (NovoLOG) 100 Units/mL FlexPen 1-10 Units, 1-10 Units, Subcutaneous, TID AC and HS  [6]   Current Facility-Administered Medications on File Prior to Encounter   Medication Dose Route Frequency Provider Last Rate Last Admin    [COMPLETED] immune globulin (Gammagard) 10% infusion 45 g  0.4 g/kg Intravenous Once Ruiz Frias MD   Stopped at 06/06/25 1223    [COMPLETED] methylPREDNISolone sodium succinate (Solu-MEDROL) injection 40 mg  40 mg Intravenous Once Ruiz Frias MD   40 mg at 06/06/25 0932    [COMPLETED] zoledronic acid (Zometa) 3.504 mg in sodium chloride 0.9% 104.38 mL IVPB  3.504 mg Intravenous Once Ruiz Frias MD   Stopped at 06/06/25 1249    [COMPLETED] zoledronic acid (Zometa) 3.504 mg in sodium chloride 0.9% 104.38 mL IVPB  3.504 mg Intravenous Once Ruiz Frias MD   Stopped at 05/08/25 1313    [COMPLETED] immune globulin (Gammagard) 10% infusion 45 g  0.4 g/kg Intravenous Once Ruiz Frias MD   Stopped at 05/08/25 1255    [COMPLETED] methylPREDNISolone sodium succinate (Solu-MEDROL) injection 40 mg  40 mg Intravenous Once Ruiz Frias MD   40 mg at 05/08/25 1007    [COMPLETED] immune globulin (Gammagard) 10% infusion 45 g  0.4 g/kg Intravenous Once Ruiz Frias MD   Stopped at 04/09/25 1153    [COMPLETED] acetaminophen (Tylenol) tab 650 mg  650 mg Oral Once Ruiz Frias MD    650 mg at 04/09/25 0850    [COMPLETED] methylPREDNISolone sodium succinate (Solu-MEDROL) injection 40 mg  40 mg Intravenous Once Ruiz Frias MD   40 mg at 04/09/25 0851    [COMPLETED] diphenhydrAMINE (Benadryl) cap/tab 25 mg  25 mg Oral Once Ruiz Frias MD   25 mg at 04/09/25 0850     Current Outpatient Medications on File Prior to Encounter   Medication Sig Dispense Refill    Cyanocobalamin (B12 FAST DISSOLVE OR) Take 1 tablet by mouth daily.      Multiple Vitamin (MULTIVITAMIN ADULT OR) Take by mouth in the morning.      levETIRAcetam 500 MG Oral Tab Take 1 tablet (500 mg total) by mouth 2 (two) times daily. 60 tablet 11    furosemide 20 MG Oral Tab Take 1 tablet (20 mg total) by mouth 2 (two) times daily. 180 tablet 0    sulfamethoxazole-trimethoprim 400-80 MG Oral Tab Take 1 tablet by mouth 3 (three) times a week.      mupirocin 2 % External Ointment Apply topically. (Patient taking differently: Apply topically as needed.)      acyclovir 400 MG Oral Tab Take 1 tablet (400 mg total) by mouth 2 (two) times daily. 180 tablet 1    atorvastatin 20 MG Oral Tab Take 1 tablet (20 mg total) by mouth daily. 90 tablet 3    magnesium oxide 400 MG Oral Tab Take 1 tablet (400 mg total) by mouth daily. 90 tablet 1    Potassium Chloride ER 20 MEQ Oral Tab CR Take 20 mEq by mouth daily.      metoprolol succinate ER 50 MG Oral Tablet 24 Hr Take 1 tablet (50 mg total) by mouth in the morning.      ONETOUCH ULTRASOFT LANCETS Does not apply Misc TEST BLOOD SUGAR TWICE DAILY 200 each 2    Glucose Blood (ONETOUCH ULTRA) In Vitro Strip CHECK SUGARS THREE TIMES DAILY AS DIRECTED 300 strip 2   [7]   Patient Active Problem List  Diagnosis    Impotence of organic origin    Generalized osteoarthrosis of hand    Morbid obesity (HCC)    Essential hypertension    Hypertension    MADDI (acute kidney injury)    Multiple myeloma (HCC)    Myeloma kidney disease (HCC)    Myeloma kidney (HCC)    Hx of stem cell transplant (HCC)    Hypoxia     Multiple myeloma, remission status unspecified ([facility])    Type 2 diabetes mellitus with hyperglycemia, without long-term current use of insulin (HCC)    Cardiomyopathy, unspecified type (HCC)    Morbid (severe) obesity due to excess calories (HCC)    Elevated LFTs    Weakness generalized    Community acquired pneumonia, unspecified laterality    Anemia, unspecified type    Renal insufficiency    Acute cystitis without hematuria    Chronic heart failure with preserved ejection fraction (HCC)    CKD stage 3b, GFR 30-44 ml/min ([facility])    Anemia complicating neoplastic disease    Thrombocytopenia    CKD (chronic kidney disease) stage 4, GFR 15-29 ml/min ([facility])    Urinary tract infection with hematuria, site unspecified    Type 2 diabetes mellitus without complication, without long-term current use of insulin (HCC)    Fall    Closed head injury

## 2025-07-01 VITALS
OXYGEN SATURATION: 100 % | HEIGHT: 72 IN | DIASTOLIC BLOOD PRESSURE: 68 MMHG | SYSTOLIC BLOOD PRESSURE: 131 MMHG | HEART RATE: 71 BPM | BODY MASS INDEX: 30.43 KG/M2 | WEIGHT: 224.69 LBS | TEMPERATURE: 98 F | RESPIRATION RATE: 20 BRPM

## 2025-07-01 LAB
BLACTX-M ISLT/SPM QL: NOT DETECTED
E COLI DNA BLD POS QL NAA+NON-PROBE: DETECTED
GLUCOSE BLD-MCNC: 112 MG/DL (ref 70–99)
GLUCOSE BLD-MCNC: 125 MG/DL (ref 70–99)

## 2025-07-01 PROCEDURE — 99239 HOSP IP/OBS DSCHRG MGMT >30: CPT | Performed by: INTERNAL MEDICINE

## 2025-07-01 RX ORDER — CIPROFLOXACIN 750 MG/1
750 TABLET, FILM COATED ORAL DAILY
Qty: 14 TABLET | Refills: 0 | Status: SHIPPED | OUTPATIENT
Start: 2025-07-01 | End: 2025-07-14

## 2025-07-01 NOTE — PROGRESS NOTES
[facility]   part of [facility]     Hospitalist Progress Note     Nacho Valle Patient Status:  Observation    1950 MRN [id number]   Location [facility] 4NW-A Attending Gricelda Melchor MD   Hosp Day # 1 PCP Dagoberto Bell MD     Chief Complaint: Generalized weakness, urinary frequency and suprapubic abdominal discomfort, fall at home     Subjective:     Patient feeling better today,eager to go home. No CP/abdo pain.    Objective:    Review of Systems:   A comprehensive review of systems was completed; pertinent positive and negatives stated in subjective.    Vital signs:  Temp:  [98 °F (36.7 °C)-99.6 °F (37.6 °C)] 98 °F (36.7 °C)  Pulse:  [61-71] 71  Resp:  [16-20] 20  BP: (107-131)/(57-69) 131/68  SpO2:  [96 %-100 %] 100 %    Physical Exam:    /68 (BP Location: Right arm)   Pulse 71   Temp 98 °F (36.7 °C) (Oral)   Resp 20   Ht 6' (1.829 m)   Wt 224 lb 11.2 oz (101.9 kg)   SpO2 100%   BMI 30.47 kg/m²   General: No acute distress.   HEENT: Moist mucous membranes.  Has fading ecchymosis seen on the left forehead near the left eyebrow.  Respiratory: Clear to auscultation bilaterally.  No wheezes. No rhonchi.  Cardiovascular: S1, S2.  Regular rate and rhythm.    Abdomen: Soft, nontender, nondistended.  Positive bowel sounds.  Neurologic: Awake alert, no focal neurological deficits.  Musculoskeletal: Full range of motion of all extremities.  No pedal edema or calf tenderness  Integument: Fading ecchymosis near the left forehead near the left eyebrow.  Also has healing abrasion on bilateral knee.  Psychiatric: Appropriate mood and affect.       Diagnostic Data:    Labs:  Recent Labs   Lab 25  0952 25  0528   WBC 11.5* 6.5   HGB 11.1* 10.0*   .7* 104.8*   .0* 136.0*       Recent Labs   Lab 25  0952 25  2353 25  0528   *  --  124*   BUN 22  --  24*   CREATSERUM 1.98*  --  1.97*   CA 9.3  --  9.1   ALB 4.7  --   --      --  141   K 3.6  4.3 4.3     --  108   CO2 23.0  --  25.0   ALKPHO 79  --   --    AST 23  --   --    ALT 14  --   --    BILT 0.7  --   --    TP 7.3  --   --        Estimated Glomerular Filtration Rate: 34 mL/min/1.73m2 (A) (result from lab).    No results for input(s): \"TROP\", \"TROPHS\", \"CK\" in the last 168 hours.    No results for input(s): \"PTP\", \"INR\" in the last 168 hours.               Microbiology    Hospital Encounter on 06/29/25   1. Urine Culture, Routine     Status: Abnormal (Preliminary result)    Collection Time: 06/29/25  9:53 AM    Specimen: Urine, clean catch   Result Value Ref Range    Urine Culture >100,000 CFU/ML Escherichia coli (A) N/A   2. Blood Culture     Status: None (Preliminary result)    Collection Time: 06/29/25  9:53 AM    Specimen: Blood,peripheral   Result Value Ref Range    Blood Culture Result No Growth 1 Day N/A         Imaging: Reviewed in Epic.    Medications:    [START ON 7/2/2025] ceFAZolin  2 g Intravenous q12h    atorvastatin  20 mg Oral Daily    metoprolol succinate ER  50 mg Oral Daily    heparin  5,000 Units Subcutaneous 2 times per day    insulin aspart  1-10 Units Subcutaneous TID AC and HS       Assessment & Plan:      #Generalized weakness, fall at home due to E. coli UTI  # E. coli UTI and E. coli bacteremia in an immunosuppressed patient  # closed head injury with contusion of the left forehead status post fall at home  Fall precautions  IV fluids  Continue IV antibiotics, patient given first dose of IV Rocephin from the emergency room which we will continue  UA positive, follow urine culture and sensitivity  Blood culture sent on admission growing E. coli, sensitivity pending.  Will have ID on consult.  Will repeat blood culture to monitor clearance of bacteria.  PT OT eval  #Diabetes mellitus type 2, diet controlled at home according to patient  Hypoglycemia protocol  Followed Accu-Cheks  NovoLog  correction factor coverage as needed  hemoglobin A1c 6  #Hypertension  Continue  home medication metoprolol  Follow blood pressure  #Hyperlipidemia  Continue home statin  #BPH  #History of prostate cancer with prior total prostatectomy and radiation  #Multiple myeloma  According to patient and patient's wife, patient follows up with hematology oncology Dr. Frias and [facility] and also with [facility] Dr. Good for follow-up on this, patient's wife states that patient had CAR-T treatment and was on Keppra for Seizure prophylaxis and Keppra discontinued by his [facility] physician .  Patient and patient's wife states that patient never had any previous history of seizures.  #CKD stage III  Follow BMP in a.m.  D/w pt, wife  Dc today on antibiotics per ID  F/u with reg OP PCP Dagoberto Bell MD in 1 week    Gricelda Melchor MD    Supplementary Documentation:     Quality:  DVT Mechanical Prophylaxis:   SCDs,    DVT Pharmacologic Prophylaxis   Medication    heparin (Porcine) 5000 UNIT/ML injection 5,000 Units                Code Status: Full Code  Galan: No urinary catheter in place  Galan Duration (in days):   Central line:    KEYON:     Discharge is dependent on: Clinical progress  At this point Mr. Valle is expected to be discharge to: Pending PT OT    The 21st Century Cures Act makes medical notes like these available to patients in the interest of transparency. Please be advised this is a medical document. Medical documents are intended to carry relevant information, facts as evident, and the clinical opinion of the practitioner. The medical note is intended as peer to peer communication and may appear blunt or direct. It is written in medical language and may contain abbreviations or verbiage that are unfamiliar.              **Certification      PHYSICIAN Certification of Need for Inpatient Hospitalization - Initial Certification    Patient will require inpatient services that will reasonably be expected to span two midnight's based on the clinical documentation in H+P.   Based on patients current state of  illness, I anticipate that, after discharge, patient will require TBD.

## 2025-07-01 NOTE — DISCHARGE SUMMARY
[facility]  Discharge Summary    Nacho Valle Patient Status:  Inpatient    1950 MRN [id number]   Location [facility] 4NW-A Attending No att. providers found   Hosp Day # 1 PCP Dagoberto Bell MD     Date of Admission: 2025    Date of Discharge: 2025      Disposition: Home or Self Care    Discharge Diagnosis:    #Generalized weakness, fall at home due to E. coli UTI  # E. coli UTI and E. coli bacteremia in an immunosuppressed patient  # closed head injury with contusion of the left forehead status post fall at home    #Diabetes mellitus type 2, diet controlled at home according to patient    #Hypertension    #Hyperlipidemia    #BPH  #History of prostate cancer with prior total prostatectomy and radiation  #Multiple myeloma    #CKD stage III      Chief Complaint:   Chief Complaint   Patient presents with    Fever    Fatigue       History of Present Illness:   Nacho Valle is a 75 year old male admitted with generalized weakness, urinary frequency and suprapubic abdominal discomfort, fall at home on Thursday while walking his dog according to patient.  Bumped his left side of his head and has a bruise near the left eyebrow from the fall.  Patient has been having generalized weakness and has had multiple recent falls at home as reported by patient and patient's wife at bedside.  Complains of chills.  Denies any fevers.  Denies any chest pain or shortness of breath.  Has some suprapubic discomfort.  Patient's wife at bedside reported that patient's having outpatient workup with neurology for possible dementia and has an outpatient MRI scheduled on this Tuesday.      Brief Synopsis:     #Generalized weakness, fall at home due to E. coli UTI  # E. coli UTI and E. coli bacteremia in an immunosuppressed patient  # closed head injury with contusion of the left forehead status post fall at home  Patient on fall precautions  Given IV fluids  Treated with IV antibiotics, IV Rocephin  UA positive,   urine culture and sensitivity positive for E. coli more than 100,000 sensitive to cephalosporin which patient is receiving in hospital  Blood culture sent on admission growing E. coli, seen by ID on consult.  repeat blood culture to monitor clearance of bacteria done on 6/30/2025 with no growth for 1 day.  PT OT eval  #Diabetes mellitus type 2, diet controlled at home according to patient  Hypoglycemia protocol  Followed Accu-Cheks  NovoLog  correction factor coverage as needed  hemoglobin A1c 6  #Hypertension  Continued home medication metoprolol   blood pressure controlled  #Hyperlipidemia  Continue home statin  #BPH  #History of prostate cancer with prior total prostatectomy and radiation  #Multiple myeloma  According to patient and patient's wife, patient follows up with hematology oncology Dr. Frias and Lalito and also with [facility] Dr. Good for follow-up on this, patient's wife states that patient had CAR-T treatment and was on Keppra for Seizure prophylaxis and Keppra discontinued by his [facility] physician .  Patient and patient's wife states that patient never had any previous history of seizures.  #CKD stage III  Follow BMP in a.m.    Patient improved clinically.  Feels stronger.  Ambulating well without issues.  Seen by PT OT and patient did well.  Discharged in stable condition on antibiotics per ID  F/u with reg OP PCP Dagoberto Bell MD in 1 week as outpatient      **Certification    Admission date was 6/29/2025.  Inpatient stay was shorter than expected.  Patient's Urinary tract infection with hematuria, site unspecified was initially serious enough to expect a more lengthy hospitalization but patient improved faster than expected.                 TCM Diagnosis at discharge from Hospital: Other: See above; still recommend for TCM follow-up    Lace+ Score: 75  59-90 High Risk  29-58 Medium Risk  0-28   Low Risk.     TCM Follow-Up Recommendation:Nacho Valle   LACE > 58: High Risk of readmission after  discharge from the hospital.      PCP: Dagoberto Bell MD    Consultations:   Consultants         Provider   Role Specialty     Dean Madrid MD      Consulting Physician INFECTIOUS DISEASES       Procedures during hospitalization:   None    Incidental or significant findings and recommendations (brief descriptions):  None    Lab/Test results pending at Discharge:   None      Discharge Medications:        Discharge Medications        START taking these medications        Instructions Prescription details   ciprofloxacin 750 MG Tabs  Commonly known as: Cipro      Take 1 tablet (750 mg total) by mouth daily for 14 days.   Stop taking on: July 15, 2025  Quantity: 14 tablet  Refills: 0            CONTINUE taking these medications        Instructions Prescription details   acyclovir 400 MG Tabs  Commonly known as: Zovirax      Take 1 tablet (400 mg total) by mouth 2 (two) times daily.   Quantity: 180 tablet  Refills: 1     atorvastatin 20 MG Tabs  Commonly known as: Lipitor      Take 1 tablet (20 mg total) by mouth daily.   Quantity: 90 tablet  Refills: 3     B12 FAST DISSOLVE OR      Take 1 tablet by mouth daily.   Refills: 0     furosemide 20 MG Tabs  Commonly known as: Lasix      Take 1 tablet (20 mg total) by mouth 2 (two) times daily.   Quantity: 180 tablet  Refills: 0     magnesium oxide 400 MG Tabs  Commonly known as: Mag-Ox      Take 1 tablet (400 mg total) by mouth daily.   Quantity: 90 tablet  Refills: 1     metoprolol succinate ER 50 MG Tb24  Commonly known as: Toprol XL      Take 1 tablet (50 mg total) by mouth in the morning.   Refills: 0     MULTIVITAMIN ADULT OR      Take by mouth in the morning.   Refills: 0     mupirocin 2 % Oint  Commonly known as: Bactroban      Apply topically.   Refills: 0     OneTouch Ultra Strp  Generic drug: Glucose Blood      CHECK SUGARS THREE TIMES DAILY AS DIRECTED   Quantity: 300 strip  Refills: 2     OneTouch UltraSoft Lancets Misc      TEST BLOOD SUGAR TWICE DAILY    Quantity: 200 each  Refills: 2     Potassium Chloride ER 20 MEQ Tbcr      Take 20 mEq by mouth daily.   Refills: 0     sulfamethoxazole-trimethoprim 400-80 MG Tabs  Commonly known as: Bactrim      Take 1 tablet by mouth 3 (three) times a week.   Refills: 0            STOP taking these medications      levETIRAcetam 500 MG Tabs  Commonly known as: Keppra                  Where to Get Your Medications        These medications were sent to [id number] - [place], [place] - [address] AT [facility] OF [address], [phone], [phone]  [address] [facility] [address]      Phone: [phone]   ciprofloxacin 750 MG Tabs          [place] prescription monitoring program data reviewed in epic before prescribing narcotics/controlled substances: Not applicable as no narcotics prescribed    Follow up Visits: Follow-up with Dagoberto Bell MD in 1 week    Dagoberto Bell MD  [address] [place] [address] [phone]    Schedule an appointment as soon as possible for a visit in 1 week(s)      Appointments for Next 30 Days 7/2/2025 - 8/1/2025        Date Arrival Time Visit Type Length Department Provider     7/11/2025  7:45 AM  [facility] MRI BRAIN WW [[id number]] 60 min [facility] MRI RM1 (1.5T)    Patient Instructions:     You may be subject to a fee if you do not show up for your appointment or you cancel within 24 hours of your appointment.    Please arrive 30 minutes prior to your scheduled appointment time.  You will need time to change your clothes, fill out screening forms, use the restroom, and may need an IV if your exam requires contrast.  If you arrive too late, your appointment may need to be rescheduled.    Please do not wear any form of eye make-up to your MRI appointment. It can cause artifacts on the images and potentially obscure vital information.  You will be required to remove any eye make-up at your appointment.  You can eat, drink, and take  your medication(s).     IF YOU REQUIRE ORAL SEDATION FOR YOUR MRI: Your physician is responsible for giving you a prescription for oral medication which you would fill at your local pharmacy. If you will be taking oral sedation, you must bring a  who will drive you home (the  does not necessarily have to stay throughout the procedure).        Location Instructions:     You may be subject to a fee if you do not show up for your appointment or you cancel within 24 hours of your appointment.  Your appointment will be at the [facility] located at [address] [place] [address]. The facility is located 1/2 mile west of [address] on [address] at the corner of [address] [place] [address] and [address]. Your test is in Building A, which is also labeled as the Emergency or Outpatient building. Please park in the Red Lot and follow the posted signs for guidance. The telephone number is [phone].  Because of the nature of the Emergency Department, please be advised of the possibility your appointment may be delayed at this location.  Due to safety reasons you will be required to change into a gown. You will be asked to remove all metallic items, such as watches, jewelry, hairpins, eyeglasses, hearing aids, and continuous glucose monitoring devices. Your purse, wallet or other personal items will remain in a secure locker or with your family during your exam.  Please bring your insurance card and photo ID. You will also need to bring your doctor's order unless your physician's office submitted the order electronically or faxed the order. Without the order, your test may be delayed or postponed.  Children: Children under the age of 12 must have another adult caregiver with them.&nbsp; Please do not bring your child/children without a caregiver.&nbsp; Because of the highly sensitive equipment and privacy of all our patients, children will not be permitted in the exam rooms, unless otherwise noted and  in accordance with departmental policy.  PATIENT RESPONSIBILITY ESTIMATE  - (Estimate) We will provide you with your estimated remaining deductible and coinsurance balance for your services at the time of check in.  - (Payment) Please be aware that you may be asked for payment at the time of service.  Masks are optional for all patients and visitors, unless otherwise indicated.               7/11/2025  9:00 AM  INFUSION [[id number]] 60 min [facility] [place] PF [place] RN4    Patient Instructions:         Location Instructions:     Your appointment is scheduled at the [facility] in [place]. The address is [address] [place] [address]. Please park in the GREEN LOT and enter through the [facility] CENTER ENTRANCE of BUILDING A.. Once you arrive, please register at the [facility]  on the 1st floor.  Masks are optional for all patients and visitors, unless otherwise indicated. No care partners/visitors under 18 years of age are allowed in the infusion room.               7/14/2025  3:00 PM  [facility] FOLLOW UP [[id number]] 30 min [facility], [address] [place], [place] [address]Dagoberto Coleman MD    Patient Instructions:         Location Instructions:     Masks are optional for all patients and visitors, unless otherwise indicated. Note: A $50 fee will be charged for missed appointments or same-day cancellations. Please provide 24 hours' notice if you need to cancel or reschedule your appointment.                      Physical Exam:  /68 (BP Location: Right arm)   Pulse 71   Temp 98 °F (36.7 °C) (Oral)   Resp 20   Ht 6' (1.829 m)   Wt 224 lb 11.2 oz (101.9 kg)   SpO2 100%   BMI 30.47 kg/m²     General: No acute distress.   HEENT: Moist mucous membranes.  Has fading ecchymosis seen on the left forehead near the left eyebrow.  Respiratory: Clear to auscultation bilaterally.  No wheezes. No rhonchi.  Cardiovascular: S1, S2.  Regular rate and rhythm.     Abdomen: Soft, nontender, nondistended.  Positive bowel sounds.  Neurologic: Awake alert, no focal neurological deficits.  Musculoskeletal: Full range of motion of all extremities.  No pedal edema or calf tenderness  Integument: Fading ecchymosis near the left forehead near the left eyebrow.  Also has healing abrasion on bilateral knee.  Psychiatric: Appropriate mood and affect.       Discharge Condition: stable    Patient Discharge Instructions: See electronic chart      More than 30 minutes on discharge    Gricelda Melchor MD

## 2025-07-01 NOTE — PLAN OF CARE
NURSING DISCHARGE NOTE    Discharged Home via Wheelchair.  Accompanied by Spouse  Belongings Taken by patient/family.    Pt a/o x4. VSS, remained afebrile. Meds given per MAR. Ambulated hallways with walker SBA, tolerated well. AVS reviewed with patient and spouse at bedside. PIV removed prior to discharge.     Problem: SAFETY ADULT - FALL  Goal: Free from fall injury  Description: INTERVENTIONS:  - Assess pt frequently for physical needs  - Identify cognitive and physical deficits and behaviors that affect risk of falls.  - [place] fall precautions as indicated by assessment.  - Educate pt/family on patient safety including physical limitations  - Instruct pt to call for assistance with activity based on assessment  - Modify environment to reduce risk of injury  - Provide assistive devices as appropriate  - Consider OT/PT consult to assist with strengthening/mobility  - Encourage toileting schedule  Outcome: Adequate for Discharge

## 2025-07-01 NOTE — PROGRESS NOTES
INFECTIOUS DISEASE  PROGRESS NOTE            [facility]    Nacho Valle Patient Status:  Inpatient    1950 MRN [id number]   [facility] 4NW-A Attending Gricelda Melchor MD   Hosp Day # 1 PCP Dagoberto Bell MD     Antibiotics: ceftraxone    Subjective:  : no fever    Objective:  Temp:  [98.1 °F (36.7 °C)-99.6 °F (37.6 °C)] 99.6 °F (37.6 °C)  Pulse:  [61-69] 67  Resp:  [16-18] 18  BP: (102-122)/(57-73) 122/69  SpO2:  [96 %-98 %] 96 %    General: awake alert  Vital signs:Temp:  [98.1 °F (36.7 °C)-99.6 °F (37.6 °C)] 99.6 °F (37.6 °C)  Pulse:  [61-69] 67  Resp:  [16-18] 18  BP: (102-122)/(57-73) 122/69  SpO2:  [96 %-98 %] 96 %  HEENT: Moist mucous membranes. Extraocular muscles are intact.  Neck: supple no masses  Respiratory: Non labored, symmetric excursion, normal respirations  Cardiovascular: no irregularities in rhythm  Abdomen: Soft, nontender, nondistended.   Musculoskeletal: joints: no swelling   Integument: No lesions. No erythema. No open wounds.  Labs:   C-Reactive Protein (mg/dL)   Date Value   2016 1.10 (H)        Recent Labs   Lab 25  0528   RBC 2.93*   HGB 10.0*   HCT 30.7*   .8*   MCH 34.1*   MCHC 32.6   RDW 13.6   NEPRELIM 5.51   WBC 6.5   .0*         Recent Labs   Lab 25  0952 25  2353 25  0528   *  --  124*   BUN 22  --  24*   CREATSERUM 1.98*  --  1.97*   CA 9.3  --  9.1   ALB 4.7  --   --      --  141   K 3.6 4.3 4.3     --  108   CO2 23.0  --  25.0   ALKPHO 79  --   --    AST 23  --   --    ALT 14  --   --    BILT 0.7  --   --    TP 7.3  --   --        No results found for: \"VANCT\"  Microbiology    Hospital Encounter on 25   1. Urine Culture, Routine     Status: Abnormal (Preliminary result)    Collection Time: 25  9:53 AM    Specimen: Urine, clean catch   Result Value Ref Range    Urine Culture >100,000 CFU/ML Gram Negative Yovanny (A) N/A   2. Blood Culture     Status: None (Preliminary result)     Collection Time: 06/29/25  9:53 AM    Specimen: Blood,peripheral   Result Value Ref Range    Blood Culture Result No Growth 1 Day N/A             Problem list reviewed:  Problem List[1]          ASSESSMENT/PLAN:  1.  E coli bacteremia/UTI  CT shows bladder thickening, no stone or hydro    Ceftriaxone repalced with cefazolin  PO cipo for dc (preferred to cover for prostate)      Dean Madrid MD, MD [facility]  [phone]         [1]   Patient Active Problem List  Diagnosis    Impotence of organic origin    Generalized osteoarthrosis of hand    Morbid obesity (HCC)    Essential hypertension    Hypertension    MADDI (acute kidney injury)    Multiple myeloma (HCC)    Myeloma kidney disease (HCC)    Myeloma kidney (HCC)    Hx of stem cell transplant ([facility])    Hypoxia    Multiple myeloma, remission status unspecified ([facility])    Type 2 diabetes mellitus with hyperglycemia, without long-term current use of insulin ([facility])    Cardiomyopathy, unspecified type ([facility])    Morbid (severe) obesity due to excess calories ([facility])    Elevated LFTs    Weakness generalized    Community acquired pneumonia, unspecified laterality    Anemia, unspecified type    Renal insufficiency    Acute cystitis without hematuria    Chronic heart failure with preserved ejection fraction ([facility])    CKD stage 3b, GFR 30-44 ml/min ([facility])    Anemia complicating neoplastic disease    Thrombocytopenia    CKD (chronic kidney disease) stage 4, GFR 15-29 ml/min ([facility])    Urinary tract infection with hematuria, site unspecified    Type 2 diabetes mellitus without complication, without long-term current use of insulin ([facility])    Fall    Closed head injury    E coli bacteremia

## 2025-07-01 NOTE — PLAN OF CARE
Pt a/o x4. VSS on RA. Pt educated on insulin, pt declining. Other meds per MAR. Voiding using urinal. No acute events overnight.     Fall risk protocol followed, call light within reach.

## 2025-07-02 ENCOUNTER — PATIENT OUTREACH (OUTPATIENT)
Dept: CASE MANAGEMENT | Age: 75
End: 2025-07-02

## 2025-07-02 ENCOUNTER — PATIENT OUTREACH (OUTPATIENT)
Age: 75
End: 2025-07-02

## 2025-07-02 LAB — BACTERIA BLD CULT: POSITIVE

## 2025-07-02 NOTE — PROGRESS NOTES
Initial Anesthesia Post-op Note    Patient: Abdiel Akhtar  Procedure(s) Performed: LEFT TOTAL HIP ARTHROPLASTY - LEFT  Anesthesia type: General    Vitals Value Taken Time   Temp 37.3 11/16/21 1536   Pulse 86 11/16/21 1536   Resp 17 11/16/21 1536   SpO2 99 11/16/21 1536   /100 11/16/21 1536         Patient Location: PACU Phase 1  Post-op Vital Signs:stable  Level of Consciousness: awake, alert, oriented, participates in exam and answers questions appropriately  Respiratory Status: spontaneous ventilation, unassisted and face mask  Cardiovascular blood pressure returned to baseline and stable  Hydration: euvolemic  Pain Management: well controlled  Handoff: Handoff to receiving nurse was performed and questions were answered  Nausea: None  Airway Patency:patent  Post-op Assessment: awake, alert, appropriately conversant, or baseline, no complications, patient tolerated procedure well with no complications and no evidence of recall  Comments: Handed off pt to PACU nurse and gave detailed report.  Pts VSS.      No complications documented.   Transitional Care Management   Discharge Date: 25  Contact Date: 2025    Assessment:  TCM Initial Assessment    General:  Assessment completed with: Patient  Patient Subjective: Pt feeling better since discharge.  Chief Complaint: Urinary tract infection with hematuria, site unspecified  Verify patient name and  with patient/ caregiver: Yes    Hospital Stay/Discharge:  Prior to leaving the hospital were your Discharge Instructions reviewed with you?: Yes  Did you receive a copy of your written Discharge Instructions?: Yes  Do you feel better or worse since you left the hospital or emergency department?: Better    Follow - Up Appointment:  Do you have a follow-up appointment?: No  Are there any barriers to getting to your follow-up appointment?: No    Home Health/DME:  Prior to leaving the hospital was Home Health (HH) arranged for you?: No     Prior to leaving the hospital or emergency department was Durable Medical Equipment (DME), medical supplies, or infusions arranged for you?: No  Are DME/medical supply/infusions needs identified by staff during this assessment?: No     Medications/Diet:       Were you given a different diet per your Discharge Instructions?: No     Questions/Concerns:  Do you have any questions or concerns that have not been discussed?: No         Follow-up Appointments:  Your appointments       Date & Time Appointment Department (Center)    2025 7:45 AM CDT MRI BRAIN WWO with PF MRI RM1 (1.5T) [facility] ([facility])    You may be subject to a fee if you do not show up for your appointment or you cancel within 24 hours of your appointment.    Please arrive 30 minutes prior to your scheduled appointment time.  You will need time to change your clothes, fill out screening forms, use the restroom, and may need an IV if your exam requires contrast.  If you arrive too late, your appointment may need to be rescheduled.    Please do  not wear any form of eye make-up to your MRI appointment. It can cause artifacts on the images and potentially obscure vital information.  You will be required to remove any eye make-up at your appointment.  You can eat, drink, and take your medication(s).     IF YOU REQUIRE ORAL SEDATION FOR YOUR MRI: Your physician is responsible for giving you a prescription for oral medication which you would fill at your local pharmacy. If you will be taking oral sedation, you must bring a  who will drive you home (the  does not necessarily have to stay throughout the procedure).        Jul 11, 2025 9:00 AM CDT Infusion Visit with PF [place] RN4 [facility])        Jul 14, 2025 3:00 PM CDT Hospital Follow Up with Dagoberto Bell MD [address] (EMG [address] [place] [address]/[facility])        Aug 08, 2025 9:00 AM CDT Infusion Visit with PF TX RN3 [facility])        Sep 04, 2025 9:00 AM CDT PERIPHERAL LAB with PF OOT [facility])        Sep 04, 2025 9:30 AM CDT HEMATOLOGY ONCOLOGY FOLLOW UP with Ruiz Frias MD [facility] Hematology Oncology - [facility])        Sep 04, 2025 10:00 AM CDT Infusion Visit with PF TX RN1 [facility])        Sep 05, 2025 2:00 PM CDT Follow Up Visit with Criss Villareal MD [facility] ([facility])        Sep 30, 2025 2:00 PM CDT Exam - Established with Francisco Silva MD [facility] ([address])              [facility]  [address] [place] [address]  [phone] [facility]  Nephrology  [address]  [address]  [facility] [address]  [phone] [facility], [facility], [place]  [facility] [place]  [address]  [facility] [id number] [phone]    [facility], [facility], [facility]  [address]  [facility] [address] [phone] [facility] Hematology Oncology - [facility]  [address]  [facility] [address]  [phone] [facility]fusion [facility]  [address]  [facility] [address]  [phone]            Transitional Care Clinic  Was TCC Ordered: No      Primary Care Provider (If no TCC appointment)  Does patient already have a PCP appointment scheduled? No  Care Manager Scheduled PCP office TCM appointment with patient    Specialist  Does the patient have any other follow-up appointment(s) that need to be scheduled? No   -If yes: Care Manager reviewed upcoming specialist appointments with patient: No   -Does the patient need assistance scheduling appointment(s): No      Book By Date: 7/15/25

## 2025-07-11 ENCOUNTER — HOSPITAL ENCOUNTER (OUTPATIENT)
Dept: MRI IMAGING | Age: 75
Discharge: HOME | End: 2025-07-11
Attending: Other
Payer: MEDICARE

## 2025-07-11 ENCOUNTER — OFFICE VISIT (OUTPATIENT)
Age: 75
End: 2025-07-11
Attending: INTERNAL MEDICINE
Payer: MEDICARE

## 2025-07-11 VITALS
TEMPERATURE: 97 F | OXYGEN SATURATION: 98 % | HEART RATE: 60 BPM | DIASTOLIC BLOOD PRESSURE: 74 MMHG | SYSTOLIC BLOOD PRESSURE: 117 MMHG | WEIGHT: 230 LBS | BODY MASS INDEX: 31.15 KG/M2 | HEIGHT: 72.01 IN | RESPIRATION RATE: 18 BRPM

## 2025-07-11 DIAGNOSIS — R41.3 MEMORY LOSS: ICD-10-CM

## 2025-07-11 DIAGNOSIS — C90.00 MULTIPLE MYELOMA, REMISSION STATUS UNSPECIFIED (HCC): Primary | ICD-10-CM

## 2025-07-11 LAB
ALBUMIN SERPL-MCNC: 4.4 G/DL (ref 3.2–4.8)
CALCIUM BLD-MCNC: 8.7 MG/DL (ref 8.7–10.6)
CREAT BLD-MCNC: 2.13 MG/DL (ref 0.7–1.3)
EGFRCR SERPLBLD CKD-EPI 2021: 31 ML/MIN/1.73M2 (ref 60–?)

## 2025-07-11 PROCEDURE — A9575 INJ GADOTERATE MEGLUMI 0.1ML: HCPCS

## 2025-07-11 PROCEDURE — 70553 MRI BRAIN STEM W/O & W/DYE: CPT | Performed by: OTHER

## 2025-07-11 RX ORDER — DIPHENHYDRAMINE HCL 25 MG
25 CAPSULE ORAL ONCE
OUTPATIENT
Start: 2025-08-01 | End: 2025-08-01

## 2025-07-11 RX ORDER — ACETAMINOPHEN 325 MG/1
650 TABLET ORAL ONCE
OUTPATIENT
Start: 2025-08-01 | End: 2025-08-01

## 2025-07-11 RX ORDER — METHYLPREDNISOLONE SODIUM SUCCINATE 40 MG/ML
40 INJECTION INTRAMUSCULAR; INTRAVENOUS ONCE
OUTPATIENT
Start: 2025-08-01 | End: 2025-08-01

## 2025-07-11 RX ORDER — METHYLPREDNISOLONE SODIUM SUCCINATE 40 MG/ML
40 INJECTION INTRAMUSCULAR; INTRAVENOUS ONCE
Status: DISCONTINUED | OUTPATIENT
Start: 2025-07-11 | End: 2025-07-11

## 2025-07-11 RX ORDER — GADOTERATE MEGLUMINE 376.9 MG/ML
15 INJECTION INTRAVENOUS
Status: COMPLETED | OUTPATIENT
Start: 2025-07-11 | End: 2025-07-11

## 2025-07-11 RX ADMIN — GADOTERATE MEGLUMINE 30 ML: 376.9 INJECTION INTRAVENOUS at 08:50:00

## 2025-07-14 ENCOUNTER — OFFICE VISIT (OUTPATIENT)
Dept: FAMILY MEDICINE CLINIC | Facility: CLINIC | Age: 75
End: 2025-07-14
Payer: MEDICARE

## 2025-07-14 VITALS
HEART RATE: 64 BPM | BODY MASS INDEX: 31.53 KG/M2 | WEIGHT: 232.81 LBS | TEMPERATURE: 98 F | DIASTOLIC BLOOD PRESSURE: 70 MMHG | RESPIRATION RATE: 16 BRPM | HEIGHT: 72.01 IN | OXYGEN SATURATION: 99 % | SYSTOLIC BLOOD PRESSURE: 120 MMHG

## 2025-07-14 DIAGNOSIS — D69.6 DECREASED PLATELET COUNT: ICD-10-CM

## 2025-07-14 DIAGNOSIS — E66.01 MORBID OBESITY (HCC): ICD-10-CM

## 2025-07-14 DIAGNOSIS — Z94.84 HX OF STEM CELL TRANSPLANT (HCC): ICD-10-CM

## 2025-07-14 DIAGNOSIS — D61.818 PANCYTOPENIA (HCC): ICD-10-CM

## 2025-07-14 DIAGNOSIS — R39.9 UTI SYMPTOMS: Primary | ICD-10-CM

## 2025-07-14 LAB
APPEARANCE: CLEAR
BILIRUBIN: NEGATIVE
GLUCOSE (URINE DIPSTICK): 100 MG/DL
KETONES (URINE DIPSTICK): NEGATIVE MG/DL
LEUKOCYTES: NEGATIVE
MULTISTIX LOT#: ABNORMAL NUMERIC
NITRITE, URINE: NEGATIVE
PH, URINE: 6 (ref 4.5–8)
PROTEIN (URINE DIPSTICK): >=300 MG/DL
SPECIFIC GRAVITY: 1.02 (ref 1–1.03)
URINE-COLOR: YELLOW
UROBILINOGEN,SEMI-QN: 0.2 MG/DL (ref 0–1.9)

## 2025-07-14 NOTE — PROGRESS NOTES
Subjective:   Nacho Valle is a 75 year old male who presents for hospital follow up.   He was discharged from [facility] to Home or Self Care  Admission Date: 6/29/25   Discharge Date: 7/1/25  Hospital Discharge Diagnosis:   Discharge Diagnosis:    #Generalized weakness, fall at home due to E. coli UTI  # E. coli UTI and E. coli bacteremia in an immunosuppressed patient  # closed head injury with contusion of the left forehead status post fall at home     #Diabetes mellitus type 2, diet controlled at home according to patient     #Hypertension     #Hyperlipidemia     #BPH  #History of prostate cancer with prior total prostatectomy and radiation  #Multiple myeloma     #CKD stage III  Interactive contact within 2 business days post discharge first initiated on Date: 7/2/2025    I accessed [employer] and/or [employer]eeWhere Everywhere and personally reviewed the following for the recent hospitalization: provider notes, consults, summaries, labs and other test results and the pertinent findings are documented below.   History of Present Illness  Nacho Valle is a 75 year old male who presents with a recent hospitalization due to an E. Coli infection and difficulty getting up from bed.    He was hospitalized from Sunday to Tuesday due to an E. Coli infection. On Sunday morning, he was unable to get up from bed, prompting his daughter and her  to assist him. When they were unable to help, paramedics were called, and he was admitted to the hospital for treatment with IV antibiotics.    Prior to the hospitalization, he fell on Thursday after getting tangled with a dog leash and his cane, resulting in multiple scrapes and a gash. He refused to call 911 at the time, despite neighbors offering assistance. A CT scan of the brain was performed during his hospital stay to rule out bleeding due to the fall, which was negative. An MRI was also conducted, showing no intracranial abnormalities but previously noted regional volume  loss.    He experiences pain from his shoulder blades down to the bottom right side of his back. He is currently taking Tylenol, specifically the eight-hour extended-release version, approximately three times a day, with two pills each time, totaling 3000 mg daily. He finds it helps with his pain and sleep. He has a prescription for tramadol but chooses not to take it.    He mentions feeling a lack of energy, particularly in the afternoon, and sometimes in the morning. He acknowledges not drinking enough water. He drinks three big cups of water a day but admits to not having adequate water intake today.    No abdominal pain.        History/Other:   Current Medications:  Medication Reconciliation:  I am aware of an inpatient discharge within the last 30 days.  The discharge medication list has been reconciled with the patient's current medication list and reviewed by me. See medication list for additions of new medication, and changes to current doses of medications and discontinued medications.  Active Meds, Sig Only[1]    Review of Systems:  GENERAL: weight stable, energy stable, no sweating  SKIN: denies any unusual skin lesions  EYES: denies blurred vision or double vision  HEENT: denies nasal congestion, sinus pain or ST  LUNGS: denies shortness of breath with exertion  CARDIOVASCULAR: denies chest pain on exertion or palpitations  GI: denies abdominal pain, denies heartburn, denies diarrhea  MUSCULOSKELETAL: denies pain, normal range of motion of extremities  NEURO: denies headaches, denies dizziness, denies weakness  PSYCHE: denies depression or anxiety  HEMATOLOGIC: denies hx of anemia, or bruising, denies bleeding  ENDOCRINE: denies thyroid history  ALL/ASTHMA: denies hx of allergy or asthma    Objective:   No LMP for male patient.  Estimated body mass index is 31.56 kg/m² as calculated from the following:    Height as of this encounter: 6' 0.01\" (1.829 m).    Weight as of this encounter: 232 lb 12.8 oz  (105.6 kg).   /70   Pulse 64   Temp 98.4 °F (36.9 °C)   Resp 16   Ht 6' 0.01\" (1.829 m)   Wt 232 lb 12.8 oz (105.6 kg)   SpO2 99%   BMI 31.56 kg/m²    GENERAL: well developed, well nourished, in no apparent distress  SKIN: no rashes, no suspicious lesions  HEENT: atraumatic, normocephalic, ears and throat are clear  EYES: PERRLA, EOMI, conjunctiva are clear  NECK: supple, no adenopathy, no bruits  CHEST: no chest tenderness  LUNGS: clear to auscultation  CARDIO: RRR without murmur  GI: good BS's, no masses, HSM or tenderness  MUSCULOSKELETAL:   EXTREMITIES: no cyanosis, clubbing or edema  NEURO: Oriented times three, cranial nerves are intact, motor and sensory are grossly intact  Lab Results   Component Value Date    WBC 6.5 06/30/2025    RBC 2.93 (L) 06/30/2025    HGB 10.0 (L) 06/30/2025    HCT 30.7 (L) 06/30/2025    .0 (L) 06/30/2025    .8 (H) 06/30/2025    MCH 34.1 (H) 06/30/2025    MCHC 32.6 06/30/2025    RDW 13.6 06/30/2025    NEPRELIM 5.51 06/30/2025    NEPERCENT 84.8 06/30/2025    LYPERCENT 4.0 06/30/2025    MOPERCENT 10.3 06/30/2025    EOPERCENT 0.5 06/30/2025    BAPERCENT 0.2 06/30/2025    NE 5.51 06/30/2025    LYMABS 0.26 (L) 06/30/2025    MOABSO 0.67 06/30/2025    EOABSO 0.03 06/30/2025    BAABSO 0.01 06/30/2025       Lab Results   Component Value Date     (H) 06/30/2025    BUN 24 (H) 06/30/2025    BUNCREA 19.6 08/25/2022    CREATSERUM 2.13 (H) 07/11/2025    ANIONGAP 8 06/30/2025    GFR 73 01/26/2018    GFRNAA 46 (L) 07/22/2022    GFRAA 53 (L) 07/22/2022    CA 8.7 07/11/2025    OSMOCALC 297 (H) 06/30/2025    ALKPHO 79 06/29/2025    AST 23 06/29/2025    ALT 14 06/29/2025    BILT 0.7 06/29/2025    TP 7.3 06/29/2025    ALB 4.4 07/11/2025    GLOBULIN 2.6 06/29/2025     06/30/2025    K 4.3 06/30/2025     06/30/2025    CO2 25.0 06/30/2025       Lab Results   Component Value Date     04/24/2025    A1C 6.0 (H) 04/24/2025      Assessment & Plan:   1. UTI  symptoms (Primary)  Recent hospitalization for E. Coli UTI, exacerbated by immunosuppression. Resolved faster than anticipated with IV antibiotics. Current urinalysis shows no infection but a trace of hematuria. Specific gravity indicates inadequate hydration, increasing future infection risk.  - Encourage increased water intake to prevent future infections.  - Advise to sip water throughout the day rather than consuming large amounts at once.  Patient has finished a course of ciprofloxacin 750 mg that was started in the hospital  Urine analysis done in the office today is negative for leukocyte esterase or nitrites  Urine analysis however showed high specific gravity  Patient is advised to drink more fluids  Chronic Pain  Pain from shoulder blades to lower back, currently managed with Tylenol. Exceeding recommended dosage poses hepatotoxicity risk. Hesitant to use tramadol due to narcotic concerns. Advised to alternate Tylenol with tramadol at night to manage pain and reduce Tylenol intake.  - Limit Tylenol intake to twice a day to avoid exceeding 2400 mg per day.  - Consider using tramadol at night for pain management to reduce Tylenol intake.  - Educate on the risks of excessive Tylenol use and the benefits of tramadol for nighttime pain relief.-     URINALYSIS, AUTO, W/O SCOPE  2. Hx of stem cell transplant (HCC)  Under the care of of Dr. Barragan at [facility], s/p CAR-T treatment  3. Morbid obesity ([facility])  Discussed increasing physical activity and exercise to help with the weight  PT/OT  4. Pancytopenia ([facility])  5. Decreased platelet count  .  Patient is under the care of [facility] with Dr. Frias as well as [facility]    Fall with minor injuries  Fall after entanglement with dog leash and cane, resulting in scrapes and a gash. No memory of fall location. CT and MRI negative for intracranial abnormalities.    General Health Maintenance  Specific gravity in urine indicates inadequate hydration, contributing to  recurrent infections and overall health issues.  - Encourage consistent hydration throughout the day.  - Educate on the importance of maintaining adequate hydration to support immune function and prevent infections.                No follow-ups on file.          [1]   Outpatient Medications Marked as Taking for the 7/14/25 encounter (Office Visit) with Dagoberto Bell MD   Medication Sig    Cyanocobalamin (B12 FAST DISSOLVE OR) Take 1 tablet by mouth daily.    Multiple Vitamin (MULTIVITAMIN ADULT OR) Take by mouth in the morning.    furosemide 20 MG Oral Tab Take 1 tablet (20 mg total) by mouth 2 (two) times daily.    sulfamethoxazole-trimethoprim 400-80 MG Oral Tab Take 1 tablet by mouth 3 (three) times a week.    mupirocin 2 % External Ointment Apply topically.    acyclovir 400 MG Oral Tab Take 1 tablet (400 mg total) by mouth 2 (two) times daily.    atorvastatin 20 MG Oral Tab Take 1 tablet (20 mg total) by mouth daily.    magnesium oxide 400 MG Oral Tab Take 1 tablet (400 mg total) by mouth daily.    Potassium Chloride ER 20 MEQ Oral Tab CR Take 20 mEq by mouth daily.    metoprolol succinate ER 50 MG Oral Tablet 24 Hr Take 1 tablet (50 mg total) by mouth in the morning.    ONETOUCH ULTRASOFT LANCETS Does not apply Misc TEST BLOOD SUGAR TWICE DAILY    Glucose Blood (ONETOUCH ULTRA) In Vitro Strip CHECK SUGARS THREE TIMES DAILY AS DIRECTED

## 2025-07-21 RX ORDER — MELATONIN
1 DAILY
Qty: 90 TABLET | Refills: 0 | OUTPATIENT
Start: 2025-07-21

## 2025-07-21 RX ORDER — MAGNESIUM OXIDE 400 MG/1
400 TABLET ORAL DAILY
Qty: 90 TABLET | Refills: 1 | Status: SHIPPED | OUTPATIENT
Start: 2025-07-21

## 2025-07-29 RX ORDER — ACYCLOVIR 400 MG/1
400 TABLET ORAL 2 TIMES DAILY
Qty: 180 TABLET | Refills: 3 | Status: SHIPPED | OUTPATIENT
Start: 2025-07-29

## 2025-07-29 RX ORDER — POTASSIUM CHLORIDE 1500 MG/1
20 TABLET, EXTENDED RELEASE ORAL DAILY
Qty: 90 TABLET | Refills: 1 | Status: SHIPPED | OUTPATIENT
Start: 2025-07-29

## 2025-08-08 ENCOUNTER — OFFICE VISIT (OUTPATIENT)
Facility: LOCATION | Age: 75
End: 2025-08-08
Attending: INTERNAL MEDICINE

## 2025-08-08 VITALS
OXYGEN SATURATION: 99 % | HEART RATE: 68 BPM | RESPIRATION RATE: 18 BRPM | HEIGHT: 72.01 IN | SYSTOLIC BLOOD PRESSURE: 118 MMHG | TEMPERATURE: 98 F | WEIGHT: 215 LBS | BODY MASS INDEX: 29.12 KG/M2 | DIASTOLIC BLOOD PRESSURE: 72 MMHG

## 2025-08-08 DIAGNOSIS — C90.00 MULTIPLE MYELOMA, REMISSION STATUS UNSPECIFIED (HCC): Primary | ICD-10-CM

## 2025-08-08 LAB
ALBUMIN SERPL-MCNC: 4.3 G/DL (ref 3.2–4.8)
CALCIUM BLD-MCNC: 9.1 MG/DL (ref 8.7–10.6)
CREAT BLD-MCNC: 1.9 MG/DL (ref 0.7–1.3)
EGFRCR SERPLBLD CKD-EPI 2021: 36 ML/MIN/1.73M2 (ref 60–?)

## 2025-08-08 RX ORDER — METHYLPREDNISOLONE SODIUM SUCCINATE 40 MG/ML
40 INJECTION INTRAMUSCULAR; INTRAVENOUS ONCE
OUTPATIENT
Start: 2025-08-29 | End: 2025-08-29

## 2025-08-08 RX ORDER — ACETAMINOPHEN 325 MG/1
650 TABLET ORAL ONCE
Status: COMPLETED | OUTPATIENT
Start: 2025-08-08 | End: 2025-08-08

## 2025-08-08 RX ORDER — ACETAMINOPHEN 325 MG/1
650 TABLET ORAL ONCE
OUTPATIENT
Start: 2025-08-29 | End: 2025-08-29

## 2025-08-08 RX ORDER — METHYLPREDNISOLONE SODIUM SUCCINATE 40 MG/ML
40 INJECTION INTRAMUSCULAR; INTRAVENOUS ONCE
Status: COMPLETED | OUTPATIENT
Start: 2025-08-08 | End: 2025-08-08

## 2025-08-08 RX ORDER — DIPHENHYDRAMINE HCL 25 MG
25 CAPSULE ORAL ONCE
OUTPATIENT
Start: 2025-08-29 | End: 2025-08-29

## 2025-08-08 RX ORDER — DIPHENHYDRAMINE HCL 25 MG
25 CAPSULE ORAL ONCE
Status: COMPLETED | OUTPATIENT
Start: 2025-08-08 | End: 2025-08-08

## 2025-08-08 RX ADMIN — DIPHENHYDRAMINE HCL 25 MG: 25 MG CAPSULE ORAL at 09:12:00

## 2025-08-08 RX ADMIN — METHYLPREDNISOLONE SODIUM SUCCINATE 40 MG: 40 INJECTION INTRAMUSCULAR; INTRAVENOUS at 09:12:00

## 2025-08-08 RX ADMIN — ACETAMINOPHEN 650 MG: 325 TABLET ORAL at 09:12:00

## 2025-08-12 ENCOUNTER — OFFICE VISIT (OUTPATIENT)
Dept: FAMILY MEDICINE CLINIC | Facility: CLINIC | Age: 75
End: 2025-08-12

## 2025-08-12 VITALS
HEART RATE: 62 BPM | HEIGHT: 69.09 IN | TEMPERATURE: 98 F | BODY MASS INDEX: 31.71 KG/M2 | SYSTOLIC BLOOD PRESSURE: 116 MMHG | RESPIRATION RATE: 16 BRPM | DIASTOLIC BLOOD PRESSURE: 70 MMHG | OXYGEN SATURATION: 96 % | WEIGHT: 214.06 LBS

## 2025-08-12 DIAGNOSIS — D61.818 PANCYTOPENIA (HCC): ICD-10-CM

## 2025-08-12 DIAGNOSIS — I50.32 CHRONIC HEART FAILURE WITH PRESERVED EJECTION FRACTION (HCC): ICD-10-CM

## 2025-08-12 DIAGNOSIS — Z94.84 HX OF STEM CELL TRANSPLANT (HCC): ICD-10-CM

## 2025-08-12 DIAGNOSIS — E11.65 TYPE 2 DIABETES MELLITUS WITH HYPERGLYCEMIA, WITHOUT LONG-TERM CURRENT USE OF INSULIN (HCC): ICD-10-CM

## 2025-08-12 DIAGNOSIS — R53.81 PHYSICAL DECONDITIONING: ICD-10-CM

## 2025-08-12 DIAGNOSIS — I10 ESSENTIAL HYPERTENSION: ICD-10-CM

## 2025-08-12 DIAGNOSIS — Z00.00 ENCOUNTER FOR ANNUAL HEALTH EXAMINATION: Primary | ICD-10-CM

## 2025-08-12 DIAGNOSIS — G89.29 OTHER CHRONIC PAIN: ICD-10-CM

## 2025-08-12 DIAGNOSIS — R39.9 UTI SYMPTOMS: ICD-10-CM

## 2025-08-12 DIAGNOSIS — F32.A DEPRESSION, UNSPECIFIED DEPRESSION TYPE: ICD-10-CM

## 2025-08-12 DIAGNOSIS — C90.02 MULTIPLE MYELOMA IN RELAPSE (HCC): ICD-10-CM

## 2025-08-12 PROBLEM — R07.9 CHEST PAIN, UNSPECIFIED: Status: ACTIVE | Noted: 2024-08-21

## 2025-08-12 PROBLEM — D80.1 HYPOGAMMAGLOBULINEMIA (HCC): Status: ACTIVE | Noted: 2025-01-17

## 2025-08-12 PROBLEM — R06.09 CHRONIC DYSPNEA: Status: ACTIVE | Noted: 2024-04-09

## 2025-08-12 PROBLEM — I50.9 CHF (CONGESTIVE HEART FAILURE) (HCC): Status: ACTIVE | Noted: 2024-12-18

## 2025-08-12 PROCEDURE — G0439 PPPS, SUBSEQ VISIT: HCPCS | Performed by: FAMILY MEDICINE

## 2025-08-12 PROCEDURE — 99214 OFFICE O/P EST MOD 30 MIN: CPT | Performed by: FAMILY MEDICINE

## 2025-08-12 RX ORDER — ESCITALOPRAM OXALATE 5 MG/1
5 TABLET ORAL DAILY
Qty: 90 TABLET | Refills: 1 | Status: ON HOLD | OUTPATIENT
Start: 2025-08-12

## 2025-08-13 ENCOUNTER — LAB ENCOUNTER (OUTPATIENT)
Dept: LAB | Age: 75
End: 2025-08-13
Attending: FAMILY MEDICINE

## 2025-08-13 DIAGNOSIS — E11.65 TYPE 2 DIABETES MELLITUS WITH HYPERGLYCEMIA, WITHOUT LONG-TERM CURRENT USE OF INSULIN (HCC): ICD-10-CM

## 2025-08-13 DIAGNOSIS — Z00.00 ENCOUNTER FOR ANNUAL HEALTH EXAMINATION: ICD-10-CM

## 2025-08-13 DIAGNOSIS — R39.9 UTI SYMPTOMS: ICD-10-CM

## 2025-08-13 LAB
ALBUMIN SERPL-MCNC: 4.2 G/DL (ref 3.2–4.8)
ALBUMIN/GLOB SERPL: 1.3 (ref 1–2)
ALP LIVER SERPL-CCNC: 89 U/L (ref 45–117)
ALT SERPL-CCNC: 35 U/L (ref 10–49)
ANION GAP SERPL CALC-SCNC: 14 MMOL/L (ref 0–18)
AST SERPL-CCNC: 32 U/L (ref ?–34)
BASOPHILS # BLD AUTO: 0.02 X10(3) UL (ref 0–0.2)
BASOPHILS NFR BLD AUTO: 0.2 %
BILIRUB SERPL-MCNC: 0.4 MG/DL (ref 0.2–1.1)
BILIRUB UR QL STRIP.AUTO: NEGATIVE
BUN BLD-MCNC: 18 MG/DL (ref 9–23)
CALCIUM BLD-MCNC: 8.7 MG/DL (ref 8.7–10.6)
CHLORIDE SERPL-SCNC: 108 MMOL/L (ref 98–112)
CHOLEST SERPL-MCNC: 120 MG/DL (ref ?–200)
CLARITY UR REFRACT.AUTO: CLEAR
CO2 SERPL-SCNC: 18 MMOL/L (ref 21–32)
CREAT BLD-MCNC: 1.95 MG/DL (ref 0.7–1.3)
CREAT UR-SCNC: 90 MG/DL
EGFRCR SERPLBLD CKD-EPI 2021: 35 ML/MIN/1.73M2 (ref 60–?)
EOSINOPHIL # BLD AUTO: 0.04 X10(3) UL (ref 0–0.7)
EOSINOPHIL NFR BLD AUTO: 0.4 %
ERYTHROCYTE [DISTWIDTH] IN BLOOD BY AUTOMATED COUNT: 15.7 %
EST. AVERAGE GLUCOSE BLD GHB EST-MCNC: 140 MG/DL (ref 68–126)
FASTING PATIENT LIPID ANSWER: NO
FASTING STATUS PATIENT QL REPORTED: NO
GLOBULIN PLAS-MCNC: 3.2 G/DL (ref 2–3.5)
GLUCOSE BLD-MCNC: 162 MG/DL (ref 70–99)
GLUCOSE UR STRIP.AUTO-MCNC: >1000 MG/DL
HBA1C MFR BLD: 6.5 % (ref ?–5.7)
HCT VFR BLD AUTO: 32.3 % (ref 39–53)
HDLC SERPL-MCNC: 48 MG/DL (ref 40–59)
HGB BLD-MCNC: 10.4 G/DL (ref 13–17.5)
IMM GRANULOCYTES # BLD AUTO: 0.02 X10(3) UL (ref 0–1)
IMM GRANULOCYTES NFR BLD: 0.2 %
KETONES UR STRIP.AUTO-MCNC: NEGATIVE MG/DL
LDLC SERPL CALC-MCNC: 49 MG/DL (ref ?–100)
LEUKOCYTE ESTERASE UR QL STRIP.AUTO: NEGATIVE
LYMPHOCYTES # BLD AUTO: 0.45 X10(3) UL (ref 1–4)
LYMPHOCYTES NFR BLD AUTO: 4.7 %
MCH RBC QN AUTO: 31.8 PG (ref 26–34)
MCHC RBC AUTO-ENTMCNC: 32.2 G/DL (ref 31–37)
MCV RBC AUTO: 98.8 FL (ref 80–100)
MICROALBUMIN UR-MCNC: 18.1 MG/DL
MICROALBUMIN/CREAT 24H UR-RTO: 201.1 UG/MG (ref ?–30)
MONOCYTES # BLD AUTO: 0.67 X10(3) UL (ref 0.1–1)
MONOCYTES NFR BLD AUTO: 6.9 %
NEUTROPHILS # BLD AUTO: 8.47 X10 (3) UL (ref 1.5–7.7)
NEUTROPHILS # BLD AUTO: 8.47 X10(3) UL (ref 1.5–7.7)
NEUTROPHILS NFR BLD AUTO: 87.6 %
NITRITE UR QL STRIP.AUTO: NEGATIVE
NONHDLC SERPL-MCNC: 72 MG/DL (ref ?–130)
OSMOLALITY SERPL CALC.SUM OF ELEC: 295 MOSM/KG (ref 275–295)
PH UR STRIP.AUTO: 6.5 (ref 5–8)
PLATELET # BLD AUTO: 215 10(3)UL (ref 150–450)
POTASSIUM SERPL-SCNC: 3.8 MMOL/L (ref 3.5–5.1)
PROT SERPL-MCNC: 7.4 G/DL (ref 5.7–8.2)
PROT UR STRIP.AUTO-MCNC: 100 MG/DL
RBC # BLD AUTO: 3.27 X10(6)UL (ref 3.8–5.8)
SODIUM SERPL-SCNC: 140 MMOL/L (ref 136–145)
SP GR UR STRIP.AUTO: 1.02 (ref 1–1.03)
TRIGL SERPL-MCNC: 134 MG/DL (ref 30–149)
TSI SER-ACNC: 1.83 UIU/ML (ref 0.55–4.78)
UROBILINOGEN UR STRIP.AUTO-MCNC: NORMAL MG/DL
VLDLC SERPL CALC-MCNC: 19 MG/DL (ref 0–30)
WBC # BLD AUTO: 9.7 X10(3) UL (ref 4–11)

## 2025-08-13 PROCEDURE — 81001 URINALYSIS AUTO W/SCOPE: CPT

## 2025-08-13 PROCEDURE — 80053 COMPREHEN METABOLIC PANEL: CPT

## 2025-08-13 PROCEDURE — 85025 COMPLETE CBC W/AUTO DIFF WBC: CPT

## 2025-08-13 PROCEDURE — 82570 ASSAY OF URINE CREATININE: CPT

## 2025-08-13 PROCEDURE — 82043 UR ALBUMIN QUANTITATIVE: CPT

## 2025-08-13 PROCEDURE — 83036 HEMOGLOBIN GLYCOSYLATED A1C: CPT

## 2025-08-13 PROCEDURE — 84443 ASSAY THYROID STIM HORMONE: CPT

## 2025-08-13 PROCEDURE — 36415 COLL VENOUS BLD VENIPUNCTURE: CPT

## 2025-08-13 PROCEDURE — 80061 LIPID PANEL: CPT

## 2025-08-18 ENCOUNTER — APPOINTMENT (OUTPATIENT)
Dept: GENERAL RADIOLOGY | Age: 75
End: 2025-08-18
Attending: PHYSICIAN ASSISTANT

## 2025-08-18 ENCOUNTER — HOSPITAL ENCOUNTER (INPATIENT)
Facility: HOSPITAL | Age: 75
LOS: 1 days | Discharge: HOME | End: 2025-08-19
Attending: EMERGENCY MEDICINE | Admitting: HOSPITALIST

## 2025-08-18 DIAGNOSIS — E87.6 HYPOKALEMIA: ICD-10-CM

## 2025-08-18 DIAGNOSIS — R19.7 DIARRHEA, UNSPECIFIED TYPE: ICD-10-CM

## 2025-08-18 DIAGNOSIS — R29.6 FREQUENT FALLS: ICD-10-CM

## 2025-08-18 DIAGNOSIS — E87.1 HYPONATREMIA: ICD-10-CM

## 2025-08-18 DIAGNOSIS — R53.1 WEAKNESS GENERALIZED: Primary | ICD-10-CM

## 2025-08-18 PROBLEM — E87.20 METABOLIC ACIDOSIS: Status: ACTIVE | Noted: 2025-08-18

## 2025-08-18 PROBLEM — R73.9 HYPERGLYCEMIA: Status: ACTIVE | Noted: 2025-08-18

## 2025-08-18 PROBLEM — D64.9 ANEMIA: Status: ACTIVE | Noted: 2025-08-18

## 2025-08-18 LAB
ALBUMIN SERPL-MCNC: 4.1 G/DL (ref 3.2–4.8)
ALBUMIN/GLOB SERPL: 1.5 (ref 1–2)
ALP LIVER SERPL-CCNC: 96 U/L (ref 45–117)
ALT SERPL-CCNC: 29 U/L (ref 10–49)
ANION GAP SERPL CALC-SCNC: 12 MMOL/L (ref 0–18)
AST SERPL-CCNC: 41 U/L (ref ?–34)
BASOPHILS # BLD AUTO: 0.01 X10(3) UL (ref 0–0.2)
BASOPHILS NFR BLD AUTO: 0.1 %
BILIRUB SERPL-MCNC: 0.4 MG/DL (ref 0.2–1.1)
BILIRUB UR QL STRIP.AUTO: NEGATIVE
BUN BLD-MCNC: 21 MG/DL (ref 9–23)
CALCIUM BLD-MCNC: 8.3 MG/DL (ref 8.7–10.6)
CHLORIDE SERPL-SCNC: 101 MMOL/L (ref 98–112)
CLARITY UR REFRACT.AUTO: CLEAR
CO2 SERPL-SCNC: 19 MMOL/L (ref 21–32)
CREAT BLD-MCNC: 1.92 MG/DL (ref 0.7–1.3)
EGFRCR SERPLBLD CKD-EPI 2021: 35 ML/MIN/1.73M2 (ref 60–?)
EOSINOPHIL # BLD AUTO: 0 X10(3) UL (ref 0–0.7)
EOSINOPHIL NFR BLD AUTO: 0 %
ERYTHROCYTE [DISTWIDTH] IN BLOOD BY AUTOMATED COUNT: 16.2 %
GLOBULIN PLAS-MCNC: 2.8 G/DL (ref 2–3.5)
GLUCOSE BLD-MCNC: 211 MG/DL (ref 70–99)
GLUCOSE UR STRIP.AUTO-MCNC: >=1000 MG/DL
HCT VFR BLD AUTO: 29.8 % (ref 39–53)
HGB BLD-MCNC: 10.2 G/DL (ref 13–17.5)
IMM GRANULOCYTES # BLD AUTO: 0.06 X10(3) UL (ref 0–1)
IMM GRANULOCYTES NFR BLD: 0.5 %
KETONES UR STRIP.AUTO-MCNC: NEGATIVE MG/DL
LEUKOCYTE ESTERASE UR QL STRIP.AUTO: NEGATIVE
LYMPHOCYTES # BLD AUTO: 0.27 X10(3) UL (ref 1–4)
LYMPHOCYTES NFR BLD AUTO: 2.1 %
MCH RBC QN AUTO: 33.1 PG (ref 26–34)
MCHC RBC AUTO-ENTMCNC: 34.2 G/DL (ref 31–37)
MCV RBC AUTO: 96.8 FL (ref 80–100)
MONOCYTES # BLD AUTO: 0.83 X10(3) UL (ref 0.1–1)
MONOCYTES NFR BLD AUTO: 6.4 %
NEUTROPHILS # BLD AUTO: 11.88 X10 (3) UL (ref 1.5–7.7)
NEUTROPHILS # BLD AUTO: 11.88 X10(3) UL (ref 1.5–7.7)
NEUTROPHILS NFR BLD AUTO: 90.9 %
NITRITE UR QL STRIP.AUTO: NEGATIVE
OSMOLALITY SERPL CALC.SUM OF ELEC: 283 MOSM/KG (ref 275–295)
PH UR STRIP.AUTO: 5.5 (ref 5–8)
PLATELET # BLD AUTO: 187 10(3)UL (ref 150–450)
POTASSIUM SERPL-SCNC: 3.2 MMOL/L (ref 3.5–5.1)
PROT SERPL-MCNC: 6.9 G/DL (ref 5.7–8.2)
RBC # BLD AUTO: 3.08 X10(6)UL (ref 3.8–5.8)
SARS-COV-2 RNA RESP QL NAA+PROBE: NOT DETECTED
SODIUM SERPL-SCNC: 132 MMOL/L (ref 136–145)
SP GR UR STRIP.AUTO: 1.01 (ref 1–1.03)
UROBILINOGEN UR STRIP.AUTO-MCNC: 0.2 MG/DL
WBC # BLD AUTO: 13.1 X10(3) UL (ref 4–11)
YEAST UR QL: PRESENT /HPF

## 2025-08-18 PROCEDURE — 71046 X-RAY EXAM CHEST 2 VIEWS: CPT | Performed by: PHYSICIAN ASSISTANT

## 2025-08-18 PROCEDURE — 73560 X-RAY EXAM OF KNEE 1 OR 2: CPT | Performed by: PHYSICIAN ASSISTANT

## 2025-08-18 PROCEDURE — 99223 1ST HOSP IP/OBS HIGH 75: CPT | Performed by: STUDENT IN AN ORGANIZED HEALTH CARE EDUCATION/TRAINING PROGRAM

## 2025-08-18 RX ORDER — SODIUM CHLORIDE 9 MG/ML
INJECTION, SOLUTION INTRAVENOUS CONTINUOUS
Status: DISCONTINUED | OUTPATIENT
Start: 2025-08-18 | End: 2025-08-19

## 2025-08-18 RX ORDER — METOCLOPRAMIDE HYDROCHLORIDE 5 MG/ML
5 INJECTION INTRAMUSCULAR; INTRAVENOUS EVERY 8 HOURS PRN
Status: DISCONTINUED | OUTPATIENT
Start: 2025-08-18 | End: 2025-08-19

## 2025-08-18 RX ORDER — POLYETHYLENE GLYCOL 3350 17 G/17G
17 POWDER, FOR SOLUTION ORAL DAILY PRN
Status: DISCONTINUED | OUTPATIENT
Start: 2025-08-18 | End: 2025-08-19

## 2025-08-18 RX ORDER — DEXTROSE MONOHYDRATE 25 G/50ML
50 INJECTION, SOLUTION INTRAVENOUS
Status: DISCONTINUED | OUTPATIENT
Start: 2025-08-18 | End: 2025-08-19

## 2025-08-18 RX ORDER — NICOTINE POLACRILEX 4 MG
30 LOZENGE BUCCAL
Status: DISCONTINUED | OUTPATIENT
Start: 2025-08-18 | End: 2025-08-19

## 2025-08-18 RX ORDER — HEPARIN SODIUM 5000 [USP'U]/ML
5000 INJECTION, SOLUTION INTRAVENOUS; SUBCUTANEOUS EVERY 12 HOURS SCHEDULED
Status: DISCONTINUED | OUTPATIENT
Start: 2025-08-18 | End: 2025-08-19

## 2025-08-18 RX ORDER — SODIUM CHLORIDE 9 MG/ML
INJECTION, SOLUTION INTRAVENOUS CONTINUOUS
Status: ACTIVE | OUTPATIENT
Start: 2025-08-18 | End: 2025-08-19

## 2025-08-18 RX ORDER — BISACODYL 10 MG
10 SUPPOSITORY, RECTAL RECTAL
Status: DISCONTINUED | OUTPATIENT
Start: 2025-08-18 | End: 2025-08-19

## 2025-08-18 RX ORDER — ECHINACEA PURPUREA EXTRACT 125 MG
1 TABLET ORAL
Status: DISCONTINUED | OUTPATIENT
Start: 2025-08-18 | End: 2025-08-19

## 2025-08-18 RX ORDER — SENNOSIDES 8.6 MG
17.2 TABLET ORAL NIGHTLY PRN
Status: DISCONTINUED | OUTPATIENT
Start: 2025-08-18 | End: 2025-08-19

## 2025-08-18 RX ORDER — NICOTINE POLACRILEX 4 MG
15 LOZENGE BUCCAL
Status: DISCONTINUED | OUTPATIENT
Start: 2025-08-18 | End: 2025-08-19

## 2025-08-18 RX ORDER — ONDANSETRON 2 MG/ML
4 INJECTION INTRAMUSCULAR; INTRAVENOUS EVERY 6 HOURS PRN
Status: DISCONTINUED | OUTPATIENT
Start: 2025-08-18 | End: 2025-08-19

## 2025-08-18 RX ORDER — SODIUM PHOSPHATE, DIBASIC AND SODIUM PHOSPHATE, MONOBASIC 7; 19 G/230ML; G/230ML
1 ENEMA RECTAL ONCE AS NEEDED
Status: DISCONTINUED | OUTPATIENT
Start: 2025-08-18 | End: 2025-08-19

## 2025-08-18 RX ORDER — ACETAMINOPHEN 500 MG
500 TABLET ORAL EVERY 4 HOURS PRN
Status: DISCONTINUED | OUTPATIENT
Start: 2025-08-18 | End: 2025-08-19

## 2025-08-18 RX ORDER — POTASSIUM CHLORIDE 1500 MG/1
40 TABLET, EXTENDED RELEASE ORAL ONCE
Status: COMPLETED | OUTPATIENT
Start: 2025-08-18 | End: 2025-08-18

## 2025-08-18 RX ORDER — BENZONATATE 200 MG/1
200 CAPSULE ORAL 3 TIMES DAILY PRN
Status: DISCONTINUED | OUTPATIENT
Start: 2025-08-18 | End: 2025-08-19

## 2025-08-18 RX ORDER — ONDANSETRON 2 MG/ML
4 INJECTION INTRAMUSCULAR; INTRAVENOUS EVERY 4 HOURS PRN
Status: DISCONTINUED | OUTPATIENT
Start: 2025-08-18 | End: 2025-08-18 | Stop reason: HOSPADM

## 2025-08-18 RX ORDER — ACETAMINOPHEN 500 MG
1000 TABLET ORAL EVERY 6 HOURS PRN
COMMUNITY

## 2025-08-19 VITALS
WEIGHT: 205 LBS | HEART RATE: 51 BPM | HEIGHT: 72 IN | SYSTOLIC BLOOD PRESSURE: 122 MMHG | RESPIRATION RATE: 20 BRPM | DIASTOLIC BLOOD PRESSURE: 65 MMHG | BODY MASS INDEX: 27.77 KG/M2 | OXYGEN SATURATION: 98 % | TEMPERATURE: 98 F

## 2025-08-19 LAB
ANION GAP SERPL CALC-SCNC: 13 MMOL/L (ref 0–18)
ATRIAL RATE: 63 BPM
BASOPHILS # BLD AUTO: 0.02 X10(3) UL (ref 0–0.2)
BASOPHILS NFR BLD AUTO: 0.2 %
BUN BLD-MCNC: 16 MG/DL (ref 9–23)
CALCIUM BLD-MCNC: 8.4 MG/DL (ref 8.7–10.6)
CHLORIDE SERPL-SCNC: 111 MMOL/L (ref 98–112)
CO2 SERPL-SCNC: 17 MMOL/L (ref 21–32)
CREAT BLD-MCNC: 1.78 MG/DL (ref 0.7–1.3)
EGFRCR SERPLBLD CKD-EPI 2021: 39 ML/MIN/1.73M2 (ref 60–?)
EOSINOPHIL # BLD AUTO: 0.01 X10(3) UL (ref 0–0.7)
EOSINOPHIL NFR BLD AUTO: 0.1 %
ERYTHROCYTE [DISTWIDTH] IN BLOOD BY AUTOMATED COUNT: 16.2 %
GLUCOSE BLD-MCNC: 140 MG/DL (ref 70–99)
GLUCOSE BLD-MCNC: 143 MG/DL (ref 70–99)
HCT VFR BLD AUTO: 27.7 % (ref 39–53)
HGB BLD-MCNC: 9.2 G/DL (ref 13–17.5)
IMM GRANULOCYTES # BLD AUTO: 0.04 X10(3) UL (ref 0–1)
IMM GRANULOCYTES NFR BLD: 0.4 %
LYMPHOCYTES # BLD AUTO: 0.33 X10(3) UL (ref 1–4)
LYMPHOCYTES NFR BLD AUTO: 3.5 %
MAGNESIUM SERPL-MCNC: 2.5 MG/DL (ref 1.6–2.6)
MCH RBC QN AUTO: 31.9 PG (ref 26–34)
MCHC RBC AUTO-ENTMCNC: 33.2 G/DL (ref 31–37)
MCV RBC AUTO: 96.2 FL (ref 80–100)
MONOCYTES # BLD AUTO: 0.77 X10(3) UL (ref 0.1–1)
MONOCYTES NFR BLD AUTO: 8.2 %
NEUTROPHILS # BLD AUTO: 8.27 X10 (3) UL (ref 1.5–7.7)
NEUTROPHILS # BLD AUTO: 8.27 X10(3) UL (ref 1.5–7.7)
NEUTROPHILS NFR BLD AUTO: 87.6 %
OSMOLALITY SERPL CALC.SUM OF ELEC: 295 MOSM/KG (ref 275–295)
P AXIS: 53 DEGREES
P-R INTERVAL: 192 MS
PLATELET # BLD AUTO: 160 10(3)UL (ref 150–450)
POTASSIUM SERPL-SCNC: 3.4 MMOL/L (ref 3.5–5.1)
Q-T INTERVAL: 414 MS
QRS DURATION: 96 MS
QTC CALCULATION (BEZET): 423 MS
R AXIS: -25 DEGREES
RBC # BLD AUTO: 2.88 X10(6)UL (ref 3.8–5.8)
SODIUM SERPL-SCNC: 141 MMOL/L (ref 136–145)
T AXIS: 15 DEGREES
VENTRICULAR RATE: 63 BPM
WBC # BLD AUTO: 9.4 X10(3) UL (ref 4–11)

## 2025-08-19 PROCEDURE — 99239 HOSP IP/OBS DSCHRG MGMT >30: CPT | Performed by: STUDENT IN AN ORGANIZED HEALTH CARE EDUCATION/TRAINING PROGRAM

## 2025-08-19 RX ORDER — POTASSIUM CHLORIDE 1500 MG/1
40 TABLET, EXTENDED RELEASE ORAL ONCE
Status: COMPLETED | OUTPATIENT
Start: 2025-08-19 | End: 2025-08-19

## 2025-08-19 RX ORDER — ATORVASTATIN CALCIUM 20 MG/1
20 TABLET, FILM COATED ORAL DAILY
Status: DISCONTINUED | OUTPATIENT
Start: 2025-08-19 | End: 2025-08-19

## 2025-08-19 RX ORDER — ESCITALOPRAM OXALATE 5 MG/1
5 TABLET ORAL DAILY
Status: DISCONTINUED | OUTPATIENT
Start: 2025-08-19 | End: 2025-08-19

## 2025-08-19 RX ORDER — METOPROLOL SUCCINATE 50 MG/1
50 TABLET, EXTENDED RELEASE ORAL
Status: DISCONTINUED | OUTPATIENT
Start: 2025-08-19 | End: 2025-08-19

## 2025-08-19 RX ORDER — FUROSEMIDE 20 MG/1
20 TABLET ORAL 2 TIMES DAILY
Qty: 180 TABLET | Refills: 3 | Status: SHIPPED | OUTPATIENT
Start: 2025-08-19

## 2025-08-19 RX ORDER — SULFAMETHOXAZOLE AND TRIMETHOPRIM 400; 80 MG/1; MG/1
1 TABLET ORAL
Status: DISCONTINUED | OUTPATIENT
Start: 2025-08-19 | End: 2025-08-19

## 2025-08-19 RX ORDER — ACYCLOVIR 400 MG/1
400 TABLET ORAL 2 TIMES DAILY
Status: DISCONTINUED | OUTPATIENT
Start: 2025-08-19 | End: 2025-08-19

## 2025-08-20 ENCOUNTER — PATIENT OUTREACH (OUTPATIENT)
Dept: CASE MANAGEMENT | Age: 75
End: 2025-08-20

## 2025-08-20 ENCOUNTER — PATIENT OUTREACH (OUTPATIENT)
Age: 75
End: 2025-08-20

## 2025-08-21 ENCOUNTER — APPOINTMENT (OUTPATIENT)
Facility: LOCATION | Age: 75
End: 2025-08-21
Attending: INTERNAL MEDICINE

## 2025-08-22 ENCOUNTER — TELEPHONE (OUTPATIENT)
Dept: FAMILY MEDICINE CLINIC | Facility: CLINIC | Age: 75
End: 2025-08-22

## 2025-08-26 ENCOUNTER — HOME HEALTH CHARGES (OUTPATIENT)
Dept: FAMILY MEDICINE CLINIC | Facility: CLINIC | Age: 75
End: 2025-08-26

## 2025-08-26 ENCOUNTER — OFFICE VISIT (OUTPATIENT)
Dept: FAMILY MEDICINE CLINIC | Facility: CLINIC | Age: 75
End: 2025-08-26

## 2025-08-26 ENCOUNTER — LAB ENCOUNTER (OUTPATIENT)
Dept: LAB | Age: 75
End: 2025-08-26
Attending: FAMILY MEDICINE

## 2025-08-26 VITALS
HEIGHT: 69.09 IN | OXYGEN SATURATION: 95 % | TEMPERATURE: 97 F | RESPIRATION RATE: 16 BRPM | WEIGHT: 203.38 LBS | DIASTOLIC BLOOD PRESSURE: 62 MMHG | HEART RATE: 63 BPM | BODY MASS INDEX: 30.12 KG/M2 | SYSTOLIC BLOOD PRESSURE: 110 MMHG

## 2025-08-26 DIAGNOSIS — E87.6 HYPOKALEMIA: ICD-10-CM

## 2025-08-26 DIAGNOSIS — E11.65 INADEQUATELY CONTROLLED DIABETES MELLITUS (HCC): ICD-10-CM

## 2025-08-26 DIAGNOSIS — F32.A DEPRESSION, UNSPECIFIED DEPRESSION TYPE: ICD-10-CM

## 2025-08-26 DIAGNOSIS — D61.818 PANCYTOPENIA (HCC): ICD-10-CM

## 2025-08-26 DIAGNOSIS — E87.6 HYPOPOTASSEMIA: Primary | ICD-10-CM

## 2025-08-26 DIAGNOSIS — R19.7 DIARRHEA, UNSPECIFIED TYPE: Primary | ICD-10-CM

## 2025-08-26 DIAGNOSIS — R19.7 DIARRHEA, UNSPECIFIED TYPE: ICD-10-CM

## 2025-08-26 DIAGNOSIS — F32.0 MILD MAJOR DEPRESSION, SINGLE EPISODE: ICD-10-CM

## 2025-08-26 LAB
ALBUMIN SERPL-MCNC: 4.4 G/DL (ref 3.2–4.8)
ALBUMIN/GLOB SERPL: 1.4 (ref 1–2)
ALP LIVER SERPL-CCNC: 116 U/L (ref 45–117)
ALT SERPL-CCNC: 52 U/L (ref 10–49)
ANION GAP SERPL CALC-SCNC: 15 MMOL/L (ref 0–18)
AST SERPL-CCNC: 63 U/L (ref ?–34)
BASOPHILS # BLD AUTO: 0.03 X10(3) UL (ref 0–0.2)
BASOPHILS NFR BLD AUTO: 0.5 %
BILIRUB SERPL-MCNC: 0.3 MG/DL (ref 0.2–1.1)
BUN BLD-MCNC: 14 MG/DL (ref 9–23)
CALCIUM BLD-MCNC: 9.8 MG/DL (ref 8.7–10.6)
CHLORIDE SERPL-SCNC: 105 MMOL/L (ref 98–112)
CO2 SERPL-SCNC: 19 MMOL/L (ref 21–32)
CREAT BLD-MCNC: 1.69 MG/DL (ref 0.7–1.3)
EGFRCR SERPLBLD CKD-EPI 2021: 41 ML/MIN/1.73M2 (ref 60–?)
EOSINOPHIL # BLD AUTO: 0.07 X10(3) UL (ref 0–0.7)
EOSINOPHIL NFR BLD AUTO: 1.1 %
ERYTHROCYTE [DISTWIDTH] IN BLOOD BY AUTOMATED COUNT: 16.8 %
FASTING STATUS PATIENT QL REPORTED: NO
GLOBULIN PLAS-MCNC: 3.2 G/DL (ref 2–3.5)
GLUCOSE BLD-MCNC: 117 MG/DL (ref 70–99)
HCT VFR BLD AUTO: 33.7 % (ref 39–53)
HGB BLD-MCNC: 11 G/DL (ref 13–17.5)
IMM GRANULOCYTES # BLD AUTO: 0.03 X10(3) UL (ref 0–1)
IMM GRANULOCYTES NFR BLD: 0.5 %
LYMPHOCYTES # BLD AUTO: 0.62 X10(3) UL (ref 1–4)
LYMPHOCYTES NFR BLD AUTO: 9.3 %
MCH RBC QN AUTO: 31.7 PG (ref 26–34)
MCHC RBC AUTO-ENTMCNC: 32.6 G/DL (ref 31–37)
MCV RBC AUTO: 97.1 FL (ref 80–100)
MONOCYTES # BLD AUTO: 0.61 X10(3) UL (ref 0.1–1)
MONOCYTES NFR BLD AUTO: 9.2 %
NEUTROPHILS # BLD AUTO: 5.3 X10 (3) UL (ref 1.5–7.7)
NEUTROPHILS # BLD AUTO: 5.3 X10(3) UL (ref 1.5–7.7)
NEUTROPHILS NFR BLD AUTO: 79.4 %
OSMOLALITY SERPL CALC.SUM OF ELEC: 290 MOSM/KG (ref 275–295)
PLATELET # BLD AUTO: 243 10(3)UL (ref 150–450)
POTASSIUM SERPL-SCNC: 3.9 MMOL/L (ref 3.5–5.1)
PROT SERPL-MCNC: 7.6 G/DL (ref 5.7–8.2)
RBC # BLD AUTO: 3.47 X10(6)UL (ref 3.8–5.8)
SODIUM SERPL-SCNC: 139 MMOL/L (ref 136–145)
WBC # BLD AUTO: 6.7 X10(3) UL (ref 4–11)

## 2025-08-26 PROCEDURE — 85025 COMPLETE CBC W/AUTO DIFF WBC: CPT

## 2025-08-26 PROCEDURE — 80053 COMPREHEN METABOLIC PANEL: CPT

## 2025-08-26 PROCEDURE — 36415 COLL VENOUS BLD VENIPUNCTURE: CPT

## 2025-08-26 PROCEDURE — 99495 TRANSJ CARE MGMT MOD F2F 14D: CPT | Performed by: FAMILY MEDICINE

## (undated) DIAGNOSIS — N30.00 ACUTE CYSTITIS WITHOUT HEMATURIA: Primary | ICD-10-CM

## (undated) DIAGNOSIS — N18.9 CHRONIC KIDNEY DISEASE, UNSPECIFIED CKD STAGE: Primary | ICD-10-CM

## (undated) NOTE — LETTER
07/25/18        Sanket Nowak [id number]      Dear Mag Roach,    [facility] records indicate that you have outstanding lab work and or testing that was ordered for you and has not yet been completed:          Hemoglobin A1C [E]

## (undated) NOTE — LETTER
[facility]  [address] [facility] [address]  [phone]    Blood Transfusion Consent    In the course of your treatment, it may become necessary to administer a transfusion of blood or blood components. This form provides basic information concerning this procedure and, if signed by you, authorizes its administration. By signing this form, you agree that all of your questions about the administration of blood or blood products have been answered by the ordering medical professional or designee.    Description of Procedure  Blood is introduced into one of your veins, commonly in the arm, using a sterilized disposable needle. The amount of blood transfused, and whether the transfusion will be of blood or blood components is a judgement the physician will make based on your particular needs.    Risks  The transfusion is a common procedure of low risk.  MINOR AND TEMPORARY REACTIONS ARE NOT UNCOMMON, including a slight bruise, swelling or local reaction in the area where the needle pierces your skin, or a nonserious reaction to the transfused material itself, including headache, fever or mild skin reaction, such as rash.  Serious reactions are possible, though very unlikely, and include severe allergic reaction (shock) and destruction (hemolysis) of transfused blood cells.  Infectious diseases which are known to be transmitted by blood transfusion include certain types of viral Hepatitis(liver infection from a virus), Human Immunodeficiency Virus (HIV-1,2) infection, a viral infection known to cause Acquired Immunodeficiency Syndrome (AIDS), as well as certain other bacterial, viral, and parasitic diseases. While a minimal risk of acquiring an infectious disease from transfused blood exists, in accordance with the Federal and State law, all due care has been taken in donor selection and testing to avoid transmission of disease.    Alternatives  If loss of blood poses serious threats during your  treatment, THERE IS NO EFFECTIVE ALTERNATIVE TO BLOOD TRANSFUSION. However, if you have any further questions on this matter, your provider will fully explain the alternatives to you if it has not already been done.    I, ______________________________, have read/had read to me the above. I understand the matters bearing on the decision whether or not to authorize a transfusion of blood or blood components. I have no questions which have not been answered to my full satisfaction. I hereby consent to such transfusion as my physician may deem necessary or advisable in the course of my treatment.    ______________________________________________                    ___________________________  (Signature of Patient or Responsible party in case of minor,                 (Printed Name of Patient or incompetent, or unconscious patient)              Responsible Party)    ___________________________               _____________________  (Relationship to Patient if not self)                                    (Date and Time)    __________________________                                                           ______________________              (Signature of Witness)               (Printed Name of Witness)     Language line ()    Telephone/Verbal/Video Consent    __________________________                     ____________________  (Signature of 2nd Witness           (Printed Name of 2nd  Telephone/Verbal/Video Consent)           Witness)    Patient Name: Nacho Valle     : 1950                 Printed: 2024     Medical Record #: [id number]      Rev: 2023

## (undated) NOTE — MR AVS SNAPSHOT
After Visit Summary   5/19/2017    Je Cm    MRN: [id number]           Diagnoses this Visit     [facility]    -  Primary       Allergies     Pcn [Penicillins] Anaphylaxis, Hives, Hallucinations, Shortness of Breath    Re You can access your MyChart to more actively manage your health care and view more details from this visit by going to [email or web address].   If you've recently had a stay at the Hospital you can access your discharge instructions in [address] by whit

## (undated) NOTE — MR AVS SNAPSHOT
After Visit Summary   5/19/2017    Blair Gorman    MRN: [id number]           Diagnoses this Visit     Diabetes mellitus with coincident hypertension ([facility]         Multiple myeloma, stage 3 ([facility]           Allergies     Pcn [Penicillins] Anaphylaxi [address] [facility],[address] [facility]       Monday August 14, 2017 1:00 PM     Appointment with Zack Key at [address] [facility] ([phone])   [address] [facility] [address]            Result view more details from this visit by going to [email or web address]. If you've recently had a stay at the Hospital you can access your discharge instructions in [facility] by going to Visits < Admission Summaries.  If you've been to the Emergency Depar

## (undated) NOTE — LETTER
06/08/20        Denis June [phone]      Dear Kip Zambrano,    [facility] records indicate that you have outstanding lab work and or testing that was ordered for you and has not yet been completed:  Orders Placed This Encounte

## (undated) NOTE — MR AVS SNAPSHOT
[facility] Utilities  [address] [facility],[address] [place], [address] [phone]               Thank you for choosing us for your health care visit with Sapna Read MD.  We are glad to serve you and happy to p BP Pulse Temp Height Weight BMI    110/79 mmHg 75 97.2 °F (36.2 °C) (Oral) 72\" 225 lb 6.4 oz 30.56 kg/m2         Current Medications          This list is accurate as of: 3/1/17 11:59 PM.  Always use your most recent med list.                acetaminophe Commonly known as:  GLYCOLAX           Potassium Chloride ER 20 MEQ Tbcr   Take 1 tablet (20 mEq total) by mouth daily. Commonly known as:  K-DUR M20           Prochlorperazine Maleate 10 mg tablet   Take 10 mg by mouth.    Commonly known as:  COMPAZINE

## (undated) NOTE — MR AVS SNAPSHOT
After Visit Summary   6/16/2017    Lorelei Gallegos    MRN: [id number]           Diagnoses this Visit     Multiple myeloma, stage 3 ([facility]    -  Primary     Diabetes mellitus with coincident hypertension ([facility]           Allergies     Pcn [Penicillins] Appointment with CATHY Esposito at [facility] in [place] ([phone])   [address]            Result Summary for CBC WITH DIFFERENTIAL WITH PLATELET      Narrative     The following orders were created for panel o eGFR calculated by the CKD-EPI equation.              Result Summary for CALCIUM      Component Results     Component Value Flag Ref Range Units Status    Calcium, Total 9.7  8.3-10.3  mg/dL Final    Comment:       Total Calciums are not corrected for effec

## (undated) NOTE — LETTER
November 14, 2019    Denita Mckeon [address]    Dear Amelie Astudillo: It was a pleasure speaking with you over the phone recently.  To follow up, I wanted to send you some contact information to utilize when you have a

## (undated) NOTE — LETTER
03/13/21        Ayanna Dailey [facility] [id number]      Dear Desiree Henriquez,    [facility] records indicate that you have outstanding lab work and or testing that was ordered for you and has not yet been completed:  Orders Placed This

## (undated) NOTE — MR AVS SNAPSHOT
After Visit Summary   6/23/2017    Magdy Rondon    MRN: [id number]           Diagnoses this Visit     [facility]    -  Primary       Allergies     Pcn [Penicillins] Anaphylaxis, Hives, Hallucinations, Shortness of Breath    Re Wednesday September 06, 2017 10:15 AM     Appointment with Kathleen Johnson at [place] [address] ([phone])   [address] [place] [address],[address] [place], [address] [place] [phone]       Friday September 08, 2017 10:00 AM     Appointmen

## (undated) NOTE — MR AVS SNAPSHOT
After Visit Summary   3/3/2017    Kendell Aranda    MRN: [id number]           Diagnoses this Visit     Multiple myeloma, stage 3 ([facility]    -  Primary     Diabetes mellitus with coincident hypertension ([facility]           Allergies     Pcn [Penicillins] [address]  [facility] [address]       weekly     LAB:  CBC WITH DIFFERENTIAL WITH PLATELET                      Result Summary for HEMOGLOBIN A1C      MyChart     Visit MyChart  You can access your MyChart to more actively manage your health care and view m

## (undated) NOTE — LETTER
[address] [place] [address]  [phone]           October 23, 2023    Elissa Mccabe [id number]    Dear Vincent Prospect:      RE:  CHRONIC CARE MANAGEMENT (CCM) FOLLOW UP        I have made several attempts to contact you at the following telephone number, [phone], but have been unsuccessful in connecting with you. Please call me at your earliest convenience. I am available to receive your call anytime between 8am and 4:00pm.  My telephone number is [phone]. Again, I look forward to hearing from you soon. I will await your call. Sincerely,    Jailyn Ricketts, A-HCCM     Jailyn Ricketts, [address] [place] [address], [facility] Dept. [address] [place] [address], [address] [place] [address] [phone]

## (undated) NOTE — MR AVS SNAPSHOT
After Visit Summary   1/22/2021    Julio Stanley    MRN: [id number]           Visit Information     Date & Time  1/22/2021  8:30 AM Provider  [facility] Department  [facility] in [address]  Phone  [phone] aspirin (ASPIR-81) 81 MG Oral Tab EC Take 1 tablet by mouth daily. Prochlorperazine Maleate (COMPAZINE) 10 mg tablet Take 1 tablet (10 mg total) by mouth every 6 (six) hours as needed for Nausea.     Ondansetron HCl (ZOFRAN) 8 MG tablet Take 1 tablet (8 Instructions    Take Dexamethasone 10 tablets the day after Daratumumab, with food and in the morning. [facility] now offers Video Visits through [address] for adult and pediatric patients.   Video Visits are available Monday - Friday for many c [address].   Monday – Friday  10:00 am – 10:00 pm   Saturday – Sunday  10:00 am – 4:00 pm     [address]   Monday – Friday  4:00 pm – 10:00 pm   Saturday – Sunday  10:00 am – 4:00 pm  WALK-IN CARE  Emergency Medicine Providers  Conditions

## (undated) NOTE — LETTER
[address]  [address]  Authorization for Surgical Operation and Procedure     Date:___________                                                                                                         Time:__________  I hereby authorize * Surgery not found *, my physician and his/her assistants (if applicable), which may include medical students, residents, and/or fellows, to perform the following surgical operation/ procedure and administer such anesthesia as may be determined necessary by my physician:  Operation/Procedure name (s)  on Nacho Valle   2.   I recognize that during the surgical operation/procedure, unforeseen conditions may necessitate additional or different procedures than those listed above.  I, therefore, further authorize and request that the above-named surgeon, assistants, or designees perform such procedures as are, in their judgment, necessary and desirable.    3.   My surgeon/physician has discussed prior to my surgery the potential benefits, risks and side effects of this procedure; the likelihood of achieving goals; and potential problems that might occur during recuperation.  They also discussed reasonable alternatives to the procedure, including risks, benefits, and side effects related to the alternatives and risks related to not receiving this procedure.  I have had all my questions answered and I acknowledge that no guarantee has been made as to the result that may be obtained.    4.   Should the need arise during my operation/procedure, which includes change of level of care prior to discharge, I also consent to the administration of blood and/or blood products.  Further, I understand that despite careful testing and screening of blood or blood products by collecting agencies, I may still be subject to ill effects as a result of receiving a blood transfusion and/or blood products.  The following are some, but not all, of the potential risks  that can occur: fever and allergic reactions, hemolytic reactions, transmission of diseases such as Hepatitis, AIDS and Cytomegalovirus (CMV) and fluid overload.  In the event that I wish to have an autologous transfusion of my own blood, or a directed donor transfusion, I will discuss this with my physician.  Check only if Refusing Blood or Blood Products  I understand refusal of blood or blood products as deemed necessary by my physician may have serious consequences to my condition to include possible death. I hereby assume responsibility for my refusal and release the hospital, its personnel, and my physicians from any responsibility for the consequences of my refusal.          o  Refuse      5.   I authorize the use of any specimen, organs, tissues, body parts or foreign objects that may be removed from my body during the operation/procedure for diagnosis, research or teaching purposes and their subsequent disposal by hospital authorities.  I also authorize the release of specimen test results and/or written reports to my treating physician on the hospital medical staff or other referring or consulting physicians involved in my care, at the discretion of the Pathologist or my treating physician.    6.   I consent to the photographing or videotaping of the operations or procedures to be performed, including appropriate portions of my body for medical, scientific, or educational purposes, provided my identity is not revealed by the pictures or by descriptive texts accompanying them.  If the procedure has been photographed/videotaped, the surgeon will obtain the original picture, image, videotape or CD.  The hospital will not be responsible for storage, release or maintenance of the picture, image, tape or CD.    7.   I consent to the presence of a  or observers in the operating room as deemed necessary by my physician or their designees.    8.   I recognize that in the event my procedure results  in extended X-Ray/fluoroscopy time, I may develop a skin reaction.    9. If I have a Do Not Attempt Resuscitation (DNAR) order in place, that status will be suspended while in the operating room, procedural suite, and during the recovery period unless otherwise explicitly stated by me (or a person authorized to consent on my behalf). The surgeon or my attending physician will determine when the applicable recovery period ends for purposes of reinstating the DNAR order.  10. Patients having a sterilization procedure: I understand that if the procedure is successful the results will be permanent and it will therefore be impossible for me to inseminate, conceive, or bear children.  I also understand that the procedure is intended to result in sterility, although the result has not been guaranteed.   11. I acknowledge that my physician has explained sedation/analgesia administration to me including the risk and benefits I consent to the administration of sedation/analgesia as may be necessary or desirable in the judgment of my physician.    I CERTIFY THAT I HAVE READ AND FULLY UNDERSTAND THE ABOVE CONSENT TO OPERATION and/or OTHER PROCEDURE.    _________________________________________  __________________________________  Signature of Patient     Signature of Responsible Person         ___________________________________         Printed Name of Responsible Person           _________________________________                 Relationship to Patient  _________________________________________  ______________________________  Signature of Witness          Date  Time      Patient Name: Nacho Valle     : 1950                 Printed: 2024     Medical Record #: [id number]                     Page 1 [address]  [id number]    Consent for Anesthesia    I, Nacho Valle agree to be cared for by an anesthesiologist, who is specially  trained to monitor me and give me medicine to put me to sleep or keep me comfortable during my procedure    I understand that my anesthesiologist is not an employee or agent of [facility] or [employer]. He or she works for [employer].    As the patient asking for anesthesia services, I agree to:  Allow the anesthesiologist (anesthesia doctor) to give me medicine and do additional procedures as necessary. Some examples are: Starting or using an “IV” to give me medicine, fluids or blood during my procedure, and having a breathing tube placed to help me breathe when I’m asleep (intubation). In the event that my heart stops working properly, I understand that my anesthesiologist will make every effort to sustain my life, unless otherwise directed by [facility] Do Not Resuscitate documents.  Tell my anesthesia doctor before my procedure:  If I am pregnant.  The last time that I ate or drank.  All of the medicines I take (including prescriptions, herbal supplements, and pills I can buy without a prescription (including street drugs/illegal medications). Failure to inform my anesthesiologist about these medicines may increase my risk of anesthetic complications.  If I am allergic to anything or have had a reaction to anesthesia before.  I understand how the anesthesia medicine will help me (benefits).  I understand that with any type of anesthesia medicine there are risks:  The most common risks are: nausea, vomiting, sore throat, muscle soreness, damage to my eyes, mouth, or teeth (from breathing tube placement).  Rare risks include: remembering what happened during my procedure, allergic reactions to medications, injury to my airway, heart, lungs, vision, nerves, or muscles and in extremely rare instances death.  My doctor has explained to me other choices available to me for my care (alternatives).  Pregnant Patients (“epidural”):  I understand that the risks of having an  epidural (medicine given into my back to help control pain during labor), include itching, low blood pressure, difficulty urinating, headache or slowing of the baby’s heart. Very rare risks include infection, bleeding, seizure, irregular heart rhythms and nerve injury.  Regional Anesthesia (“spinal”, “epidural”, & “nerve blocks”):  I understand that rare but potential complications include headache, bleeding, infection, seizure, irregular heart rhythms, and nerve injury.    I can change my mind about having anesthesia services at any time before I get the medicine.    _____________________________________________________________________________  Patient (or Representative) Signature/Relationship to Patient  Date   Time    _____________________________________________________________________________   Name (if used)    Language/Organization   Time    _____________________________________________________________________________  Anesthesiologist Signature     Date   Time  I have discussed the procedure and information above with the patient (or patient’s representative) and answered their questions. The patient or their representative has agreed to have anesthesia services.    _____________________________________________________________________________  Witness        Date   Time  I have verified that the signature is that of the patient or patient’s representative, and that it was signed before the procedure  Patient Name: Nacho Valle     : 1950                 Printed: 2024     Medical Record #: [id number]                     Page 2 of 2

## (undated) NOTE — LETTER
22    Patient: [facility]  : 1950 Visit date: 3/16/2022    Dear  Ale Kolb MD    Thank you for referring [facility] to my practice. Please find my assessment and plan below.      Assessment   History of colon polyps    Plan   Colonoscopy discussed with patient risk of bleeding and bowel perforation with surgery to repair      Sincerely,       Berlin Bess DO   CC: No Recipients

## (undated) NOTE — MR AVS SNAPSHOT
After Visit Summary   5/26/2017    Magdy Rondon    MRN: [id number]           Diagnoses this Visit     Diabetes mellitus with coincident hypertension ([facility] [address]         Multiple myeloma, stage 3 ([facility]           Allergies     Pcn [Penicillins] Anaphylaxi intolerable, ask your doctor about what kind of pain relievers you can take. You can also try to control the pain without medications if you are concerned about the health risks of pain relievers.   For instance, applying a cold compress to the biopsy site Friday August 11, 2017 10:00 AM     Appointment with CATHY Delgado at [facility] in [place] [id number]        Monday August 14, 2017 1:00 PM     Appointment with Meghana Mendosa at [address] [place] [address] Lymphocyte % 15.8   % Final    Monocyte % 8.7   % Final    Eosinophil % 1.7   % Final    Basophil % 0.2   % Final    Immature Granulocyte % 0.4   % Final               MyChart     Visit MyChart  You can access your MyChart to more actively manage your hea

## (undated) NOTE — MR AVS SNAPSHOT
After Visit Summary   7/17/2020    Blair Gorman    MRN: [id number]           Visit Information     Date & Time  7/17/2020  9:00 AM Provider  MD Varinder Mendez [address] in [address]  Phone  [phone]      Your Mei Follow-up    Return in about 6 months (around 1/17/2021) for md visit, labs next visit.      We Ordered the Following     Normal Orders This Visit    CBC W/ DIFFERENTIAL [[id number] CUSTOM]     CBC WITH DIFFERENTIAL WITH PLATELET [[id number] CUSTOM]     COMP MET [email or web address]                   DO YOU KNOW WHERE TO GO? Injury & Illness are never convenient. If you are dealing with a   non-emergency, consider your options before heading to an ER.   VIDEO VISITS  Visit [facility]-face with [facility] or call [phone]. ([phone])